# Patient Record
Sex: FEMALE | Race: WHITE | Employment: OTHER | URBAN - METROPOLITAN AREA
[De-identification: names, ages, dates, MRNs, and addresses within clinical notes are randomized per-mention and may not be internally consistent; named-entity substitution may affect disease eponyms.]

---

## 2017-02-09 ENCOUNTER — OFFICE VISIT (OUTPATIENT)
Dept: FAMILY MEDICINE CLINIC | Age: 82
End: 2017-02-09

## 2017-02-09 VITALS
SYSTOLIC BLOOD PRESSURE: 140 MMHG | TEMPERATURE: 98 F | RESPIRATION RATE: 20 BRPM | HEART RATE: 71 BPM | DIASTOLIC BLOOD PRESSURE: 63 MMHG | HEIGHT: 66 IN | BODY MASS INDEX: 30.05 KG/M2 | OXYGEN SATURATION: 93 % | WEIGHT: 187 LBS

## 2017-02-09 DIAGNOSIS — G89.4 CHRONIC PAIN SYNDROME: ICD-10-CM

## 2017-02-09 DIAGNOSIS — D68.9 COAGULATION DEFICIENCY (HCC): Primary | ICD-10-CM

## 2017-02-09 LAB
INR BLD: 2
PT POC: NORMAL SEC
VALID INTERNAL CONTROL?: YES

## 2017-02-09 RX ORDER — ACETAMINOPHEN AND CODEINE PHOSPHATE 300; 30 MG/1; MG/1
1 TABLET ORAL
Qty: 120 TAB | Refills: 2 | Status: SHIPPED | OUTPATIENT
Start: 2017-02-09 | End: 2017-04-25 | Stop reason: SDUPTHER

## 2017-02-09 NOTE — PROGRESS NOTES
Patient here for inr check. 3 mg coumadin daily. 1. Have you been to the ER, urgent care clinic since your last visit? Hospitalized since your last visit? No    2. Have you seen or consulted any other health care providers outside of the [address] [place] [address] since your last visit? Include any pap smears or colon screening. No     Results for orders placed or performed in visit on 02/09/17   AMB POC PT/INR   Result Value Ref Range    VALID INTERNAL CONTROL POC Yes     Prothrombin time (POC)  sec    INR POC 2.0      Chief Complaint   Patient presents with    Coagulation disorder     she is a 80y.o. year old female who presents for evalution. Reviewed PmHx, RxHx, FmHx, SocHx, AllgHx and updated and dated in the chart. Patient Active Problem List    Diagnosis    Iron deficiency anemia    Advance care planning     Has a LW      Pulmonary embol[facility] ([facility]    Itching    Elevated liver enzymes    S/P cholecystectomy     11/2013      S/P knee replacement     bilaterally      S/P shoulder surgery     Left      H/O carpal tunnel repair     Right hand      Chronic pain    HTN (hypertension)    OA (osteoarthritis)    Fibromyalgia    GERD (gastroesophageal reflux disease)    High cholesterol    Hiatal hernia       Review of Systems - negative except as listed above in the HPI    Objective:     Vitals:    02/09/17 0733   BP: 140/63   Pulse: 71   Resp: 20   Temp: 98 °F (36.7 °C)   SpO2: 93%   Weight: 187 lb (84.8 kg)   Height: 5' 6\" (1.676 m)     Physical Examination: General appearance - alert, well appearing, and in no distress  Chest - clear to auscultation, no wheezes, rales or rhonchi, symmetric air entry  Heart - normal rate, regular rhythm, normal S1, S2, no murmurs, rubs, clicks or gallops    Assessment/ Plan:   Tray was seen today for coagulation disorder.     Diagnoses and all orders for this visit:    Coagulation deficiency ([facility]  -     AMB POC PT/INR  -at goal       Follow-up Disposition:  Return in about 6 weeks (around 3/23/2017). I have discussed the diagnosis with the patient and the intended plan as seen in the above orders. The patient understands and agrees with the plan. The patient has received an after-visit summary and questions were answered concerning future plans. Medication Side Effects and Warnings were discussed with patient  Patient Labs were reviewed and or requested:  Patient Past Records were reviewed and or requested    Son Ding M.D. There are no Patient Instructions on file for this visit.

## 2017-02-09 NOTE — MR AVS SNAPSHOT
Visit Information Date & Time Provider Department Dept. Phone Encounter #  
 2/9/2017  7:45 AM Amanda Mak MD [address] [facility] [phone] [id number] Follow-up Instructions Return in about 6 weeks (around 3/23/2017). Your Appointments 5/9/2017  7:45 AM  
ESTABLISHED PATIENT with Amanda Mak MD  
[address] [facility] Appt Note: 3mo fuv  
 [address] [address]  
[phone]  
  
   
 [address] [address] Upcoming Health Maintenance Date Due  
 MEDICARE YEARLY EXAM 4/14/2017 GLAUCOMA SCREENING Q2Y 11/9/2018 DTaP/Tdap/Td series (2 - Td) 11/9/2026 Allergies as of 2/9/2017  Review Complete On: 2/9/2017 By: Amanda Mak MD  
  
 Severity Noted Reaction Type Reactions Angiotensin Ii,human  04/01/2010    Other (comments) States does not remember Avelox [Moxifloxacin]  04/01/2010    Other (comments) States does not remember Demerol [Meperidine]  04/01/2010    Hives Current Immunizations  Reviewed on 11/9/2016 Name Date Influenza High Dose Vaccine PF 11/9/2016 Influenza Vaccine 10/29/2013 Influenza Vaccine Nelida Savory) 10/20/2015 Influenza Vaccine Split 11/7/2012, 10/11/2011, 11/16/2010 Influenza Vaccine Whole 8/1/2009 Pneumococcal Conjugate (PCV-13) 4/13/2016 Pneumococcal Vaccine (Unspecified Type) 4/1/2008 Not reviewed this visit You Were Diagnosed With   
  
 Codes Comments Co[facility]    -  Primary ICD-10-CM: D69.9 ICD-9-CM: 286. 9 Chronic pain syndrome     ICD-10-CM: G89.4 ICD-9-CM: 338. 4 Vitals BP Pulse Temp Resp Height(growth percentile) Weight(growth percentile) 140/63 71 98 °F (36.7 °C) 20 5' 6\" (1.676 m) 187 lb (84.8 kg) SpO2 BMI OB Status Smoking Status 93% 30.18 kg/m2 Postmenopausal Never Smoker Vitals History BMI and BSA Data Body Mass Index Body Surface Area 30.18 kg/m 2 1.99 m 2 Preferred Pharmacy Pharmacy Name Phone [id number] [address] [facility] [place] [address] [phone] Your Updated Medication List  
  
   
This list is accurate as of: 2/9/17  8:10 AM.  Always use your most recent med list.  
  
  
  
  
 acetaminophen-codeine 300-30 mg per tablet Commonly known as:  TYLENOL #3 Take 1 Tab by mouth every four (4) hours as needed for Pain. Max Daily Amount: 6 Tabs. Indications: Pain  
  
 amLODIPine 10 mg tablet Commonly known as:  Wilkes Cater TAKE 1 TABLET BY MOUTH DAILY  
  
 citalopram 20 mg tablet Commonly known as:  CELEXA  
TAKE 1 TABLET BY MOUTH EVERY DAY  
  
 cyclobenzaprine 5 mg tablet Commonly known as:  FLEXERIL  
TAKE 1 TABLET BY MOUTH 3 TIMES A DAY AS NEEDED FORMUSCLE SPASM(S)  
  
 diclofenac EC 75 mg EC tablet Commonly known as:  VOLTAREN  
TAKE 1 TABLET BY MOUTH TWICE A DAY FOR 14 DAYS  
  
 gabapentin 600 mg tablet Commonly known as:  NEURONTIN  
TAKE 1 TABLET BY MOUTH 3 TIMES A DAY  
  
 iron polysaccharides 150 mg iron capsule Commonly known as:  Gregory Bloch Take 1 Cap by mouth two (2) times a day. losartan-hydroCHLOROthiazide 100-25 mg per tablet Commonly known as:  HYZAAR  
TAKE 1 TABLET BY MOUTH DAILY  
  
 warfarin 3 mg tablet Commonly known as:  COUMADIN Take 1 Tab by mouth daily. Prescriptions Printed Refills  
 acetaminophen-codeine (TYLENOL #3) 300-30 mg per tablet 2 Sig: Take 1 Tab by mouth every four (4) hours as needed for Pain. Max Daily Amount: 6 Tabs. Indications: Pain Class: Print Route: Oral  
  
We Performed the Following AMB POC PT/INR [08164 CPT(R)] Follow-up Instructions Return in about 6 weeks (around 3/23/2017). Introducing [facility]! Roxanna Rangel introduces [employer] patient portal. Now you can access parts of your medical record, email your doctor's office, and request medication refills online. 1. In your internet browser, go to [email or web address] 2. Click on the First Time User? Click Here link in the Sign In box. You will see the New Member Sign Up page. 3. Enter your [employer] Access Code exactly as it appears below. You will not need to use this code after youve completed the sign-up process. If you do not sign up before the expiration date, you must request a new code. · [employer] Access Code: [email or web address] Expires: 5/10/2017  8:10 AM 
 
4. Enter the last four digits of your Social Security Number (xxxx) and Date of Birth (mm/dd/yyyy) as indicated and click Submit. You will be taken to the next sign-up page. 5. Create a Happ[employer]hop ID. This will be your Happ[employer] login ID and cannot be changed, so think of one that is secure and easy to remember. 6. Create a Happ[employer] password. You can change your password at any time. 7. Enter your Password Reset Question and Answer. This can be used at a later time if you forget your password. 8. Enter your e-mail address. You will receive e-mail notification when new information is available in [address] 9. Click Sign Up. You can now view and download portions of your medical record. 10. Click the Download Summary menu link to download a portable copy of your medical information. If you have questions, please visit the Frequently Asked Questions section of the Happ[employer] website. Remember, [employer] is NOT to be used for urgent needs. For medical emergencies, dial 911. Now available from your iPhone and Android! Please provide this summary of care documentation to your next provider. Your primary care clinician is listed as LAUREL SINGH. If you have any questions after today's visit, please call [phone].

## 2017-02-13 DIAGNOSIS — D68.9 COAGULATION DEFICIENCY (HCC): ICD-10-CM

## 2017-02-13 RX ORDER — WARFARIN SODIUM 3 MG/1
TABLET ORAL
Qty: 30 TAB | Refills: 5 | Status: SHIPPED | OUTPATIENT
Start: 2017-02-13 | End: 2017-08-15 | Stop reason: SDUPTHER

## 2017-02-28 RX ORDER — AMLODIPINE BESYLATE 10 MG/1
TABLET ORAL
Qty: 90 TAB | Refills: 3 | Status: SHIPPED | OUTPATIENT
Start: 2017-02-28 | End: 2018-02-27 | Stop reason: SDUPTHER

## 2017-02-28 RX ORDER — LOSARTAN POTASSIUM AND HYDROCHLOROTHIAZIDE 25; 100 MG/1; MG/1
TABLET ORAL
Qty: 90 TAB | Refills: 3 | Status: SHIPPED | OUTPATIENT
Start: 2017-02-28 | End: 2018-02-27 | Stop reason: SDUPTHER

## 2017-02-28 RX ORDER — CITALOPRAM 20 MG/1
TABLET, FILM COATED ORAL
Qty: 90 TAB | Refills: 3 | Status: SHIPPED | OUTPATIENT
Start: 2017-02-28 | End: 2018-02-27 | Stop reason: SDUPTHER

## 2017-03-01 RX ORDER — GABAPENTIN 600 MG/1
TABLET ORAL
Qty: 270 TAB | Refills: 3 | Status: SHIPPED | OUTPATIENT
Start: 2017-03-01 | End: 2018-02-27 | Stop reason: SDUPTHER

## 2017-03-07 RX ORDER — DICLOFENAC SODIUM 75 MG/1
TABLET, DELAYED RELEASE ORAL
Qty: 60 TAB | Refills: 2 | Status: SHIPPED | OUTPATIENT
Start: 2017-03-07 | End: 2017-03-23

## 2017-03-07 RX ORDER — CYCLOBENZAPRINE HCL 5 MG
TABLET ORAL
Qty: 90 TAB | Refills: 3 | Status: SHIPPED | OUTPATIENT
Start: 2017-03-07 | End: 2018-03-21 | Stop reason: SDUPTHER

## 2017-03-23 ENCOUNTER — OFFICE VISIT (OUTPATIENT)
Dept: FAMILY MEDICINE CLINIC | Age: 82
End: 2017-03-23

## 2017-03-23 VITALS
RESPIRATION RATE: 20 BRPM | WEIGHT: 187 LBS | HEART RATE: 63 BPM | SYSTOLIC BLOOD PRESSURE: 119 MMHG | OXYGEN SATURATION: 94 % | HEIGHT: 66 IN | DIASTOLIC BLOOD PRESSURE: 69 MMHG | TEMPERATURE: 98 F | BODY MASS INDEX: 30.05 KG/M2

## 2017-03-23 DIAGNOSIS — M19.90 OSTEOARTHRITIS, UNSPECIFIED OSTEOARTHRITIS TYPE, UNSPECIFIED SITE: Chronic | ICD-10-CM

## 2017-03-23 DIAGNOSIS — D68.9 COAGULATION DEFICIENCY (HCC): Primary | ICD-10-CM

## 2017-03-23 DIAGNOSIS — G89.4 CHRONIC PAIN SYNDROME: ICD-10-CM

## 2017-03-23 DIAGNOSIS — I10 ESSENTIAL HYPERTENSION: Chronic | ICD-10-CM

## 2017-03-23 DIAGNOSIS — M79.7 FIBROMYALGIA: Chronic | ICD-10-CM

## 2017-03-23 LAB
INR BLD: 1.9
PT POC: NORMAL SEC
VALID INTERNAL CONTROL?: YES

## 2017-03-23 RX ORDER — NAPROXEN 500 MG/1
500 TABLET ORAL 2 TIMES DAILY WITH MEALS
Qty: 60 TAB | Refills: 5 | Status: SHIPPED | OUTPATIENT
Start: 2017-03-23 | End: 2017-10-16 | Stop reason: ALTCHOICE

## 2017-03-23 NOTE — PROGRESS NOTES
Patient here for inr check. 3 mg warfarin daily. 1. Have you been to the ER, urgent care clinic since your last visit? Hospitalized since your last visit? No    2. Have you seen or consulted any other health care providers outside of the [facility] since your last visit? Include any pap smears or colon screening. No     Results for orders placed or performed in visit on 03/23/17   AMB POC PT/INR   Result Value Ref Range    VALID INTERNAL CONTROL POC Yes     Prothrombin time (POC)  sec    INR POC 1.9    still with inc diffuse WINDY and wants to try diff nsaid    No chief complaint on file. she is a 80y.o. year old female who presents for evalution. Reviewed PmHx, RxHx, FmHx, SocHx, AllgHx and updated and dated in the chart. Patient Active Problem List    Diagnosis    Iron deficiency anemia    Advance care planning     Has a LW      Pulmonary embolism ([facility]    Itching    Elevated liver enzymes    S/P cholecystectomy     11/2013      S/P knee replacement     bilaterally      S/P shoulder surgery     Left      H/O carpal tunnel repair     Right hand      Chronic pain    HTN (hypertension)    OA (osteoarthritis)    Fibromyalgia    GERD (gastroesophageal reflux disease)    High cholesterol    Hiatal hernia       Review of Systems - negative except as listed above in the HPI    Objective:     Vitals:    03/23/17 0717   BP: 119/69   Pulse: 63   Resp: 20   Temp: 98 °F (36.7 °C)   SpO2: 94%   Weight: 187 lb (84.8 kg)   Height: 5' 6\" (1.676 m)     Physical Examination: General appearance - alert, well appearing, and in no distress      Assessment/ Plan:   Diagnoses and all orders for this visit:    Coagulation deficiency (HCC)  -     AMB POC PT/INR  -at goal    Essential hypertension  -at goal    Osteoarthritis, unspecified osteoarthritis type, unspecified site  -     naproxen (NAPROSYN) 500 mg tablet; Take 1 Tab by mouth two (2) times daily (with meals).   -dc voltaren    Fibromyalgia  -inc pain    Chronic pain syndrome  -pain contract signed today  - ok  -pt is 80 and UDS not indicated--chronic use and pt is using walker and known to MD     Follow-up Disposition:  Return in about 6 weeks (around 5/4/2017). I have discussed the diagnosis with the patient and the intended plan as seen in the above orders. The patient understands and agrees with the plan. The patient has received an after-visit summary and questions were answered concerning future plans. Medication Side Effects and Warnings were discussed with patient  Patient Labs were reviewed and or requested:  Patient Past Records were reviewed and or requested    Ortega Garcia M.D. There are no Patient Instructions on file for this visit.

## 2017-03-23 NOTE — LETTER
AGREEMENT for controlled medication treatment I, Tray Regan, have agreed to a course of treatment that includes taking controlled medication. For this treatment I designate _____________________________________ as the [facility] Provider.  The purpose of this agreement is to prevent misunderstandings about controlled medications I may be taking for treatment, and to comply with applicable laws. I understand this agreement is essential to the trust and confidence necessary in a provider-patient relationship and that the designated provider will treat me in accordance with the statements below. As part of my treatment I agree to the followin.  USE 
a. I will take the controlled medication as instructed by the designated provider and avoid improper use of controlled medications. b.   I will not share, sell or trade controlled medication with anyone as this is a criminal offense.  
c.   I will not use any illegal controlled substance, including marijuana, cocaine, etc. as this is a criminal offense. 2.  PROVIDERS 
a. I will only obtain controlled medication from the designated provider. b.   I will not attempt to obtain the same or similar controlled medications, such as opioid pain medication, stimulants, anti-anxiety or hypnotics from any other provider as this is a criminal offense. 
c.   I will inform the designated provider about all other licensed professionals providing medical care to me and authorize communication between all providers to coordinate care, particularly prescribing or dispensing of controlled medications. 3.  PHARMACY 
a.   I will only fill controlled medication prescriptions at the approved pharmacy as listed below. 4.  OFFICE VISITS 
a. I agree to attend scheduled office visit appointments. b.   I am aware my office visits may be monthly or as determined necessary by the designated provider. c.   I will communicate fully with the designated provider about the character and intensity of my symptoms, the effect of the symptoms on my daily life, and how well the controlled medication is helping to relieve the cause of my symptoms. 5.  REFILLS OR CALL-IN PRESCRIPTIONS OF CONTROLLED MEDICATIONS 
a. I agree that refills of my prescriptions for controlled medications will be made at the time of an office visit during regular office hours. No refills will be available during evenings or on weekends. Abusive or inappropriate behavior related to medication refills will not be tolerated. b.   I will not call the office repeatedly to inquire about my controlled medication. I understand that medications will be written on the due date and not before. Calling the office repeatedly will be considered harassment, and I may be discharged from the practice. c.   Controlled medications may not be called in for refill, but doing so is at the discretion of the designated provider. d.   I am aware that only I must  prescriptions for controlled medications at the office but the designated provider may allow a designee to  the prescription from the office under very specific circumstance that may develop. 6.  TOXICOLOGY SCREENING 
a. I agree to random urine toxicology screenings in order to comply with government and [address] [place] [address] regulations. I understand that I will be financially responsible for any charges incurred by this office, [facility] laboratory, [employer], or [facility] for the urine toxicology screening, which may not be covered by my insurance. Failure to do so will be considered non-compliance and I may be discharged. b. In some cases an oral swab or hair sample may be substituted for a urine screen. This is at the discretion of the designated provider. I understand that I will be financially responsible for any charges incurred as well. c.   I understand that if the urine toxicology does not show my medications prescribed to me by the designated provider, or it shows any illegal substance or any other medications NOT prescribed by the designated providers, I may be discharged from this practice at the discretion of the designated provider and no further controlled medications will be prescribed or follow-up appointments scheduled. Also, if any illegal substances are detected on the urine toxicology, this information may be provided to local law enforcement. 7. PILL COUNTS 
a. I am aware I may be called at any time to come into the office for a count of all my remaining controlled medications in order to help the designated provider understand the rate at which I use my controlled medications and to more effectively adjust dosage. b. I agree that I will use my medication at a rate NO greater than the prescribed rate and that use of my medication at a greater rate will result in my being without medication for the period of time until next expected due date. c. I will bring in the containers with the medication prescribed by all providers, including the designated provider, to each office visit even if there is no medication remaining. All controlled medication will be in the original containers from the pharmacy for each medication. Failure to do so will be considered non-compliance and I may be discharged from the practice. 8.  LOSS OR THEFT OF MEDICATION 
a. I will safeguard my controlled medication from loss or theft. b.   Lost or stolen controlled medication may not be replaced. This includes a prescription that has not yet been filled at the pharmacy. c.   In the event my controlled medications are stolen or lost, I will notify the designated providers office immediately.   If such event occurs during the night, weekend or holiday, I will leave a detailed message on the answering machine or answering service at the number listed above. 
d.   I will file and produce an official police report for any effort to replace controlled medications prescribed. 9.  AGENCY COLLABORATION I authorize the designated provider and the authorized pharmacy/pharmacist to cooperate fully with any city, state, or federal law enforcement agency in the investigation of any possible misuse, sale or other diversion of my controlled medication. 10.  TREATMENT I understand that if I violate any of the conditions, my controlled medication and/or treatment will be terminated. If the violation involves obtaining controlled substances and/or dangerous drugs from another source, the incident may be reported to other medical facilities and authorities, including law enforcement. In this case, the designated provider may taper off the medication over a period of several days, as necessary, to avoid withdrawal symptoms or will suggest alternate treatment facilities. Also, a drug dependence treatment program may be recommended. 11.  AGREEMENT I agree to follow these guidelines that have been fully explained to me. All of my questions and concerns regarding treatment have been adequately answered. A copy of this document has been given to me. I agree to use ______________________________ Authorized Pharmacy located at _________________________________________________________________ Telephone ________________________________for filling ALL controlled medication prescriptions. This agreement is entered into on 3/23/2017 Patient signature__________________________________________________________ Legal Guardian signature___________________________________________________ Provider signature_________________________________________________________ Witness signature_________________________________________________________

## 2017-03-23 NOTE — MR AVS SNAPSHOT
Visit Information Date & Time Provider Department Dept. Phone Encounter #  
 3/23/2017  7:30 AM Lesly Knight MD [address] [facility] [phone] [id number] Follow-up Instructions Return in about 6 weeks (around 5/4/2017). Your Appointments 5/9/2017  7:45 AM  
ESTABLISHED PATIENT with Lesly Knight MD  
[address] [facility]) Appt Note: 3mo fuv  
 [address] [address] [phone]  
  
   
 [address] [address] [address] Upcoming Health Maintenance Date Due  
 MEDICARE YEARLY EXAM 4/14/2017 GLAUCOMA SCREENING Q2Y 11/9/2018 DTaP/Tdap/Td series (2 - Td) 11/9/2026 Allergies as of 3/23/2017  Review Complete On: 3/23/2017 By: Lesly Knight MD  
  
 Severity Noted Reaction Type Reactions Angiotensin Ii,human  04/01/2010    Other (comments) States does not remember Avelox [Moxifloxacin]  04/01/2010    Other (comments) States does not remember Demerol [Meperidine]  04/01/2010    Hives Current Immunizations  Reviewed on 11/9/2016 Name Date Influenza High Dose Vaccine PF 11/9/2016 Influenza Vaccine 10/29/2013 Influenza Vaccine Naheed Miu) 10/20/2015 Influenza Vaccine Split 11/7/2012, 10/11/2011, 11/16/2010 Influenza Vaccine Whole 8/1/2009 Pneumococcal Conjugate (PCV-13) 4/13/2016 Pneumococcal Vaccine (Unspecified Type) 4/1/2008 Not reviewed this visit You Were Diagnosed With   
  
 Codes Comments [facility]    -  Primary ICD-10-CM: D69.9 ICD-9-CM: 286.9 Essential hypertension     ICD-10-CM: I10 
ICD-9-CM: 401.9 Osteoarthritis, unspecified osteoarthritis type, unspecified site     ICD-10-CM: M19.90 ICD-9-CM: 715.90 Fibromyalgia     ICD-10-CM: M79.7 ICD-9-CM: 729.1 Chronic pain syndrome     ICD-10-CM: G89.4 ICD-9-CM: 338. 4 Vitals BP Pulse Temp Resp Height(growth percentile) Weight(growth percentile) 119/69 63 98 °F (36.7 °C) 20 5' 6\" (1.676 m) 187 lb (84.8 kg) SpO2 BMI OB Status Smoking Status 94% 30.18 kg/m2 Postmenopausal Never Smoker Vitals History BMI and BSA Data Body Mass Index Body Surface Area  
 30.18 kg/m 2 1.99 m 2 Preferred Pharmacy Pharmacy Name Phone [facility] # Adalberto Hyatt [address] [place] [address] [phone] Your Updated Medication List  
  
   
This list is accurate as of: 3/23/17  7:57 AM.  Always use your most recent med list.  
  
  
  
  
 acetaminophen-codeine 300-30 mg per tablet Commonly known as:  TYLENOL #3 Take 1 Tab by mouth every four (4) hours as needed for Pain. Max Daily Amount: 6 Tabs. Indications: Pain  
  
 amLODIPine 10 mg tablet Commonly known as:  Rondall Andriy TAKE 1 TABLET BY MOUTH DAILY  
  
 citalopram 20 mg tablet Commonly known as:  CELEXA  
TAKE 1 TABLET BY MOUTH DAILY  
  
 cyclobenzaprine 5 mg tablet Commonly known as:  FLEXERIL  
TAKE 1 TABLET BY MOUTH 3 TIMES A DAY AS NEEDED FORMUSCLE SPASM(S)  
  
 gabapentin 600 mg tablet Commonly known as:  NEURONTIN  
TAKE 1 TABLET BY MOUTH 3 TIMES A DAY  
  
 iron polysaccharides 150 mg iron capsule Commonly known as:  Manoj [place] Take 1 Cap by mouth two (2) times a day. JANTOVEN 3 mg tablet Generic drug:  warfarin TAKE 1 TABLET BY MOUTH DAILY losartan-hydroCHLOROthiazide 100-25 mg per tablet Commonly known as:  HYZAAR  
TAKE 1 TABLET BY MOUTH DAILY  
  
 naproxen 500 mg tablet Commonly known as:  NAPROSYN Take 1 Tab by mouth two (2) times daily (with meals). Prescriptions Sent to Pharmacy Refills  
 naproxen (NAPROSYN) 500 mg tablet 5 Sig: Take 1 Tab by mouth two (2) times daily (with meals). Class: Normal  
 Pharmacy: [place] [address] [id number] [place] [address], [address] Ph #: [phone] Route: Oral  
  
We Performed the Following AMB POC PT/INR [57569 CPT(R)] Follow-up Instructions Return in about 6 weeks (around 5/4/2017). Introducing [facility]! Madhavi Salas introduces [employer] patient portal. Now you can access parts of your medical record, email your doctor's office, and request medication refills online. 1. In your internet browser, go to [email or web address] 2. Click on the First Time User? Click Here link in the Sign In box. You will see the New Member Sign Up page. 3. Enter your [employer] Access Code exactly as it appears below. You will not need to use this code after youve completed the sign-up process. If you do not sign up before the expiration date, you must request a new code. · [employer] Access Code: [email or web address] Expires: 5/10/2017  9:10 AM 
 
4. Enter the last four digits of your Social Security Number (xxxx) and Date of Birth (mm/dd/yyyy) as indicated and click Submit. You will be taken to the next sign-up page. 5. Create a [employer] ID. This will be your [employer] login ID and cannot be changed, so think of one that is secure and easy to remember. 6. Create a [employer] password. You can change your password at any time. 7. Enter your Password Reset Question and Answer. This can be used at a later time if you forget your password. 8. Enter your e-mail address. You will receive e-mail notification when new information is available in [address] 9. Click Sign Up. You can now view and download portions of your medical record. 10. Click the Download Summary menu link to download a portable copy of your medical information. If you have questions, please visit the Frequently Asked Questions section of the [employer] website. Remember, [employer] is NOT to be used for urgent needs. For medical emergencies, dial 911. Now available from your iPhone and Android! Please provide this summary of care documentation to your next provider. Your primary care clinician is listed as LAUREL SINGH.  If you have any questions after today's visit, please call [phone].

## 2017-04-24 ENCOUNTER — TELEPHONE (OUTPATIENT)
Dept: FAMILY MEDICINE CLINIC | Age: 82
End: 2017-04-24

## 2017-04-24 NOTE — TELEPHONE ENCOUNTER
Pt scheduled appt for tomorrow with you, wondering if you could call something into the pharmacy they are thinking she has the shingles. Thanks. Call back number for her is [phone].

## 2017-04-25 ENCOUNTER — OFFICE VISIT (OUTPATIENT)
Dept: FAMILY MEDICINE CLINIC | Age: 82
End: 2017-04-25

## 2017-04-25 VITALS
WEIGHT: 188.8 LBS | SYSTOLIC BLOOD PRESSURE: 129 MMHG | RESPIRATION RATE: 18 BRPM | DIASTOLIC BLOOD PRESSURE: 66 MMHG | BODY MASS INDEX: 30.34 KG/M2 | HEIGHT: 66 IN | TEMPERATURE: 98.3 F | HEART RATE: 64 BPM | OXYGEN SATURATION: 94 %

## 2017-04-25 DIAGNOSIS — I10 ESSENTIAL HYPERTENSION: Chronic | ICD-10-CM

## 2017-04-25 DIAGNOSIS — R21 RASH: Primary | ICD-10-CM

## 2017-04-25 DIAGNOSIS — G89.4 CHRONIC PAIN SYNDROME: ICD-10-CM

## 2017-04-25 RX ORDER — NALOXONE HYDROCHLORIDE 4 MG/.1ML
1 SPRAY NASAL AS NEEDED
Qty: 1 BOTTLE | Refills: 2 | Status: SHIPPED | OUTPATIENT
Start: 2017-04-25 | End: 2017-04-25 | Stop reason: SDUPTHER

## 2017-04-25 RX ORDER — NALOXONE HYDROCHLORIDE 4 MG/.1ML
1 SPRAY NASAL AS NEEDED
Qty: 1 BOTTLE | Refills: 2 | Status: SHIPPED | OUTPATIENT
Start: 2017-04-25 | End: 2018-05-30

## 2017-04-25 RX ORDER — ACETAMINOPHEN AND CODEINE PHOSPHATE 300; 30 MG/1; MG/1
1 TABLET ORAL
Qty: 120 TAB | Refills: 2 | Status: SHIPPED | OUTPATIENT
Start: 2017-04-25 | End: 2017-07-20 | Stop reason: SDUPTHER

## 2017-04-25 NOTE — MR AVS SNAPSHOT
Visit Information Date & Time Provider Department Dept. Phone Encounter #  
 4/25/2017  7:45 AM Barbara Knight MD [address] [facility] [phone] [id number] Follow-up Instructions Return in about 3 months (around 7/25/2017). Your Appointments 5/9/2017  7:45 AM  
ESTABLISHED PATIENT with Barbara Knight MD  
[address] [facility]) Appt Note: 3mo fuv  
 [address] [address]  
[phone]  
  
   
 [address] [address] Upcoming Health Maintenance Date Due  
 MEDICARE YEARLY EXAM 4/14/2017 GLAUCOMA SCREENING Q2Y 11/9/2018 DTaP/Tdap/Td series (2 - Td) 11/9/2026 Allergies as of 4/25/2017  Review Complete On: 4/25/2017 By: Barbara Knight MD  
  
 Severity Noted Reaction Type Reactions Angiotensin Ii,human  04/01/2010    Other (comments) States does not remember Avelox [Moxifloxacin]  04/01/2010    Other (comments) States does not remember Demerol [Meperidine]  04/01/2010    Hives Current Immunizations  Reviewed on 11/9/2016 Name Date Influenza High Dose Vaccine PF 11/9/2016 Influenza Vaccine 10/29/2013 Influenza Vaccine Otila Eloy) 10/20/2015 Influenza Vaccine Split 11/7/2012, 10/11/2011, 11/16/2010 Influenza Vaccine Whole 8/1/2009 Pneumococcal Conjugate (PCV-13) 4/13/2016 Pneumococcal Vaccine (Unspecified Type) 4/1/2008 Not reviewed this visit You Were Diagnosed With   
  
 Codes Comments Rash    -  Primary ICD-10-CM: R21 
ICD-9-CM: 782.1 Essential hypertension     ICD-10-CM: I10 
ICD-9-CM: 401.9 Chronic pain syndrome     ICD-10-CM: G89.4 ICD-9-CM: 338. 4 Vitals BP Pulse Temp Resp Height(growth percentile) Weight(growth percentile) 129/66 64 98.3 °F (36.8 °C) 18 5' 6\" (1.676 m) 188 lb 12.8 oz (85.6 kg) SpO2 BMI OB Status Smoking Status 94% 30.47 kg/m2 Postmenopausal Never Smoker Vitals History BMI and BSA Data Body Mass Index Body Surface Area  
 30.47 kg/m 2 2 m 2 Preferred Pharmacy Pharmacy Name Phone [id number] [address] [place] [address] [phone] Your Updated Medication List  
  
   
This list is accurate as of: 4/25/17  7:47 AM.  Always use your most recent med list.  
  
  
  
  
 acetaminophen-codeine 300-30 mg per tablet Commonly known as:  TYLENOL #3 Take 1 Tab by mouth every four (4) hours as needed for Pain. Max Daily Amount: 6 Tabs. Indications: Pain  
  
 amLODIPine 10 mg tablet Commonly known as:  [place] TAKE 1 TABLET BY MOUTH DAILY  
  
 citalopram 20 mg tablet Commonly known as:  CELEXA  
TAKE 1 TABLET BY MOUTH DAILY  
  
 cyclobenzaprine 5 mg tablet Commonly known as:  FLEXERIL  
TAKE 1 TABLET BY MOUTH 3 TIMES A DAY AS NEEDED FORMUSCLE SPASM(S)  
  
 gabapentin 600 mg tablet Commonly known as:  NEURONTIN  
TAKE 1 TABLET BY MOUTH 3 TIMES A DAY  
  
 iron polysaccharides 150 mg iron capsule Commonly known as:  Burnard Running Take 1 Cap by mouth two (2) times a day. JANTOVEN 3 mg tablet Generic drug:  warfarin TAKE 1 TABLET BY MOUTH DAILY losartan-hydroCHLOROthiazide 100-25 mg per tablet Commonly known as:  HYZAAR  
TAKE 1 TABLET BY MOUTH DAILY  
  
 methylPREDNISolone (PF) 125 mg/2 mL Solr Commonly known as:  SOLU-MEDROL 2 mL by IntraVENous route once for 1 dose.  
  
 naloxone 4 mg/actuation Spry Commonly known as:  NARCAN  
1 Spray by Nasal route as needed. Indications: OPIATE-INDUCED RESPIRATORY DEPRESSION  
  
 naproxen 500 mg tablet Commonly known as:  NAPROSYN Take 1 Tab by mouth two (2) times daily (with meals). Prescriptions Printed Refills  
 acetaminophen-codeine (TYLENOL #3) 300-30 mg per tablet 2 Sig: Take 1 Tab by mouth every four (4) hours as needed for Pain. Max Daily Amount: 6 Tabs. Indications: Pain Class: Print  Route: Oral  
  
 Prescriptions Sent to Pharmacy Refills  
 naloxone (NARCAN) 4 mg/actuation spry 2 Si Spray by Nasal route as needed. Indications: OPIATE-INDUCED RESPIRATORY DEPRESSION Class: Normal  
 Pharmacy: [place] [address] [id number] [place] [address], [address]  #: [phone] Route: Nasal  
  
We Performed the Following METHYLPREDNISOLONE INJECTION [ HCPCS] CA THER/PROPH/DIAG INJECTION, SUBCUT/IM [id number] CPT(R)] Follow-up Instructions Return in about 3 months (around 2017). Introducing [facility]! [facility] introduces [employer] patient portal. Now you can access parts of your medical record, email your doctor's office, and request medication refills online. 1. In your internet browser, go to [email or web address] 2. Click on the First Time User? Click Here link in the Sign In box. You will see the New Member Sign Up page. 3. Enter your [employer] Access Code exactly as it appears below. You will not need to use this code after youve completed the sign-up process. If you do not sign up before the expiration date, you must request a new code. · [employer] Access Code: [email or web address] Expires: 5/10/2017  9:10 AM 
 
4. Enter the last four digits of your Social Security Number (xxxx) and Date of Birth (mm/dd/yyyy) as indicated and click Submit. You will be taken to the next sign-up page. 5. Create a [employer] ID. This will be your [employer] login ID and cannot be changed, so think of one that is secure and easy to remember. 6. Create a [employer] password. You can change your password at any time. 7. Enter your Password Reset Question and Answer. This can be used at a later time if you forget your password. 8. Enter your e-mail address. You will receive e-mail notification when new information is available in [address] 9. Click Sign Up. You can now view and download portions of your medical record. 10. Click the Download Summary menu link to download a portable copy of your medical information. If you have questions, please visit the Frequently Asked Questions section of the [employer] website. Remember, [employer] is NOT to be used for urgent needs. For medical emergencies, dial 911. Now available from your iPhone and Android! Please provide this summary of care documentation to your next provider. Your primary care clinician is listed as LAUREL SINGH. If you have any questions after today's visit, please call [phone].

## 2017-04-25 NOTE — PROGRESS NOTES
Patient here for rash. Mostly on buttocks and spreading on back. Patient states it is painful. 9/10    1. Have you been to the ER, urgent care clinic since your last visit? Hospitalized since your last visit? No    2. Have you seen or consulted any other health care providers outside of the [address] [place] [address] since your last visit? Include any pap smears or colon screening. No       Chief Complaint   Patient presents with    Rash     buttocks, spreading up back     she is a 80y.o. year old female who presents for evalution. Reviewed PmHx, RxHx, FmHx, SocHx, AllgHx and updated and dated in the chart. Patient Active Problem List    Diagnosis    Iron deficiency anemia    Advance care planning     Has a LW      Pulmonary embolism ([facility]    Itching    Elevated liver enzymes    S/P cholecystectomy     11/2013      S/P knee replacement     bilaterally      S/P shoulder surgery     Left      H/O carpal tunnel repair     Right hand      Chronic pain    HTN (hypertension)    OA (osteoarthritis)    Fibromyalgia    GERD (gastroesophageal reflux disease)    High cholesterol    Hiatal hernia       Review of Systems - negative except as listed above in the HPI    Objective:     Vitals:    04/25/17 0724   BP: 129/66   Pulse: 64   Resp: 18   Temp: 98.3 °F (36.8 °C)   SpO2: 94%   Weight: 188 lb 12.8 oz (85.6 kg)   Height: 5' 6\" (1.676 m)     Physical Examination: General appearance - alert, well appearing, and in no distress  Skin - buttocks with red rash, diffuse    Assessment/ Plan:   Tray was seen today for rash. Diagnoses and all orders for this visit:    Rash  -     methylPREDNISolone, PF, (SOLU-MEDROL) 125 mg/2 mL solr; 2 mL by IntraVENous route once for 1 dose.   -     METHYLPREDNISOLONE INJECTION (Qty 2)  -     THER/PROPH/DIAG INJECTION, SUBCUT/IM  -use steroid cream    Essential hypertension  -at goal    Chronic pain syndrome  -     naloxone (NARCAN) 4 mg/actuation spry; 1 Spray by Nasal route as needed. Indications: OPIATE-INDUCED RESPIRATORY DEPRESSION  -     acetaminophen-codeine (TYLENOL #3) 300-30 mg per tablet; Take 1 Tab by mouth every four (4) hours as needed for Pain. Max Daily Amount: 6 Tabs. Indications: Pain  -   Opioid/Pain Management:    1. Has the patient signed a pain contract for chronic narcotics use? yes  2. Has the patient had a UDS or Serum screen as per guidelines as per [place] Board of Medicine?: well known pt and chronic ongoing issues   3. Calculated Morphine Milligram Equivalent (MME)=yes  4. Does patient meet necessary guidelines for Naloxone treatement per the [facility]?:    yes  5. Has the Prescription Monitoring Program been reviewed? yes             Follow-up Disposition:  Return in about 3 months (around 7/25/2017). I have discussed the diagnosis with the patient and the intended plan as seen in the above orders. The patient understands and agrees with the plan. The patient has received an after-visit summary and questions were answered concerning future plans. Medication Side Effects and Warnings were discussed with patient  Patient Labs were reviewed and or requested:  Patient Past Records were reviewed and or requested    Jt Horton M.D. There are no Patient Instructions on file for this visit.

## 2017-05-09 ENCOUNTER — OFFICE VISIT (OUTPATIENT)
Dept: FAMILY MEDICINE CLINIC | Age: 82
End: 2017-05-09

## 2017-05-09 ENCOUNTER — HOSPITAL ENCOUNTER (OUTPATIENT)
Dept: LAB | Age: 82
Discharge: HOME | End: 2017-05-09
Payer: MEDICARE

## 2017-05-09 VITALS
HEART RATE: 66 BPM | BODY MASS INDEX: 29.62 KG/M2 | TEMPERATURE: 98.7 F | HEIGHT: 66 IN | DIASTOLIC BLOOD PRESSURE: 66 MMHG | SYSTOLIC BLOOD PRESSURE: 135 MMHG | OXYGEN SATURATION: 97 % | RESPIRATION RATE: 18 BRPM | WEIGHT: 184.31 LBS

## 2017-05-09 DIAGNOSIS — E78.00 HIGH CHOLESTEROL: Chronic | ICD-10-CM

## 2017-05-09 DIAGNOSIS — G89.4 CHRONIC PAIN SYNDROME: ICD-10-CM

## 2017-05-09 DIAGNOSIS — I26.99 OTHER PULMONARY EMBOLISM WITHOUT ACUTE COR PULMONALE, UNSPECIFIED CHRONICITY (HCC): ICD-10-CM

## 2017-05-09 DIAGNOSIS — I10 ESSENTIAL HYPERTENSION: Chronic | ICD-10-CM

## 2017-05-09 DIAGNOSIS — D50.9 IRON DEFICIENCY ANEMIA, UNSPECIFIED IRON DEFICIENCY ANEMIA TYPE: ICD-10-CM

## 2017-05-09 DIAGNOSIS — Z00.00 ROUTINE GENERAL MEDICAL EXAMINATION AT A HEALTH CARE FACILITY: Primary | ICD-10-CM

## 2017-05-09 DIAGNOSIS — Z71.89 ADVANCED CARE PLANNING/COUNSELING DISCUSSION: ICD-10-CM

## 2017-05-09 DIAGNOSIS — E11.9 CONTROLLED TYPE 2 DIABETES MELLITUS WITHOUT COMPLICATION, WITHOUT LONG-TERM CURRENT USE OF INSULIN (HCC): ICD-10-CM

## 2017-05-09 LAB
INR BLD: 2.3
PT POC: 27.3 SEC
VALID INTERNAL CONTROL?: YES

## 2017-05-09 PROCEDURE — 82043 UR ALBUMIN QUANTITATIVE: CPT

## 2017-05-09 PROCEDURE — 80061 LIPID PANEL: CPT

## 2017-05-09 PROCEDURE — 83550 IRON BINDING TEST: CPT

## 2017-05-09 PROCEDURE — 83036 HEMOGLOBIN GLYCOSYLATED A1C: CPT

## 2017-05-09 PROCEDURE — 85025 COMPLETE CBC W/AUTO DIFF WBC: CPT

## 2017-05-09 PROCEDURE — 80053 COMPREHEN METABOLIC PANEL: CPT

## 2017-05-09 RX ORDER — WARFARIN 3 MG/1
3 TABLET ORAL DAILY
COMMUNITY
End: 2017-08-10 | Stop reason: ALTCHOICE

## 2017-05-09 NOTE — PROGRESS NOTES
1. Have you been to the ER, urgent care clinic since your last visit? Hospitalized since your last visit? No    2. Have you seen or consulted any other health care providers outside of the [facility] since your last visit? Include any pap smears or colon screening. No   Chief Complaint   Patient presents with    Hypertension     3 mos fuv for hypertension    Labs     labs- pt is fasting          Medicare Wellness Exam:    Chief Complaint   Patient presents with    Hypertension     3 mos fuv for hypertension    Labs     labs- pt is fasting      she is a 80y.o. year old female who presents for evaluation for their Medicare Wellness Visit. Catalina Holms is completed and assessed=yes  Depression Screen is completed and assessed=yes  Medication list reviewed and adjusted for accuracy=yes  Immunizations reviewed and updated=yes  Health/Preventative Screenings reviewed and updated=yes  ADL Functions reviewed=yes    Patient Active Problem List    Diagnosis    Advanced care planning/counseling discussion     Has no completed, dwp importance      Controlled type 2 diabetes mellitus without complication, without long-term current use of insulin ([facility]    Iron deficiency anemia    Advance care planning     Has a LW      Pulmonary embolism ([facility]    Itching    Elevated liver enzymes    S/P cholecystectomy     11/2013      S/P knee replacement     bilaterally      S/P shoulder surgery     Left      H/O carpal tunnel repair     Right hand      Chronic pain    HTN (hypertension)    OA (osteoarthritis)    Fibromyalgia    GERD (gastroesophageal reflux disease)    High cholesterol    Hiatal hernia       Reviewed PmHx, RxHx, FmHx, SocHx, AllgHx and updated and dated in the chart.     Review of Systems - negative except as listed above in the HPI    Objective:     Vitals:    05/09/17 0736   BP: 135/66   Pulse: 66   Resp: 18   Temp: 98.7 °F (37.1 °C)   TempSrc: Oral   SpO2: 97%   Weight: 184 lb 5 oz (83.6 kg)   Height: 5' 6\" (1.676 m)     Physical Examination: General appearance - alert, well appearing, and in no distress  Chest - clear to auscultation, no wheezes, rales or rhonchi, symmetric air entry  Heart - normal rate, regular rhythm, normal S1, S2, no murmurs, rubs, clicks or gallops  Abdomen - soft, nontender, nondistended, no masses or organomegaly  Extremities - peripheral pulses normal, no pedal edema, no clubbing or cyanosis      Assessment/ Plan:   Tray was seen today for hypertension and labs.     Diagnoses and all orders for this visit:    Routine general medical examination at a health care facility  -see below    Controlled type 2 diabetes mellitus without complication, without long-term current use of insulin ([facility]  -     LIPID PANEL  -     METABOLIC PANEL, COMPREHENSIVE  -     MICROALBUMIN, UR, RAND W/ MICROALBUMIN/CREA RATIO  -     HEMOGLOBIN A1C WITH EAG  -     REFERRAL TO OPHTHALMOLOGY  =-new dx  -dwp diet    Other pulmonary embolism without acute cor pulmonale, unspecified chronicity (HCC)  -     AMB POC PT/INR    Essential hypertension  -     LIPID PANEL  -     METABOLIC PANEL, COMPREHENSIVE  -at goal    High cholesterol  -     LIPID PANEL  -     METABOLIC PANEL, COMPREHENSIVE    Iron deficiency anemia, unspecified iron deficiency anemia type  -     CBC WITH AUTOMATED DIFF  -     IRON PROFILE    Chronic pain syndrome  -stable    Advanced care planning/counseling discussion  -dwp need to complete         -Pain evaluation performed in office=5/10  -Cognitive Screen performed in office  -Depression Screen, Fall risks (by up and go test)  and ADL functionality were addressed  -Medication list updated and reviewed for any changes   -A comprehensive review of medical issues and a plan was formulated  -End of life planning was addressed with pt   -Health Screenings for preventions were addressed and a plan was formulated  -Shingles Vaccine was recommended  -Discussed with patient cancer risk factors and appropriate screenings for age  -Patient evaluated for colonoscopy and referred if needed per screeing criteria  -Labs from previous visits were discussed with patient   -Discussed with patient diet and exercise and formulated a plan as needed  -An Advanced care plan was developed with the patient.  -Alcohol screening performed and was negative    -Follow-up Disposition:  Return in about 6 weeks (around 6/20/2017). I have discussed the diagnosis with the patient and the intended plan as seen in the above orders. The patient understands and agrees with the plan. The patient has received an after-visit summary and questions were answered concerning future plans. Medication Side Effects and Warnings were discussed with patien  Patient Labs were reviewed and or requested  Patient Past Records were reviewed and or requested    There are no Patient Instructions on file for this visit.       Hernesto Franco M.D.

## 2017-05-14 LAB
ALBUMIN SERPL-MCNC: 4 G/DL (ref 3.5–4.7)
ALBUMIN/CREAT UR: 31.5 MG/G CREAT (ref 0–30)
ALBUMIN/GLOB SERPL: 1.5 {RATIO} (ref 1.2–2.2)
ALP SERPL-CCNC: 136 IU/L (ref 39–117)
ALT SERPL-CCNC: 11 IU/L (ref 0–32)
AST SERPL-CCNC: 17 IU/L (ref 0–40)
BASOPHILS # BLD AUTO: 0 X10E3/UL (ref 0–0.2)
BASOPHILS NFR BLD AUTO: 1 %
BILIRUB SERPL-MCNC: <0.2 MG/DL (ref 0–1.2)
BUN SERPL-MCNC: 21 MG/DL (ref 8–27)
BUN/CREAT SERPL: 21 (ref 12–28)
CALCIUM SERPL-MCNC: 9.4 MG/DL (ref 8.7–10.3)
CHLORIDE SERPL-SCNC: 98 MMOL/L (ref 96–106)
CHOLEST SERPL-MCNC: 214 MG/DL (ref 100–199)
CO2 SERPL-SCNC: 22 MMOL/L (ref 18–29)
CREAT SERPL-MCNC: 0.99 MG/DL (ref 0.57–1)
CREAT UR-MCNC: 177.4 MG/DL
EOSINOPHIL # BLD AUTO: 0.1 X10E3/UL (ref 0–0.4)
EOSINOPHIL NFR BLD AUTO: 2 %
ERYTHROCYTE [DISTWIDTH] IN BLOOD BY AUTOMATED COUNT: 14.7 % (ref 12.3–15.4)
EST. AVERAGE GLUCOSE BLD GHB EST-MCNC: 128 MG/DL
GLOBULIN SER CALC-MCNC: 2.6 G/DL (ref 1.5–4.5)
GLUCOSE SERPL-MCNC: 102 MG/DL (ref 65–99)
HBA1C MFR BLD: 6.1 % (ref 4.8–5.6)
HCT VFR BLD AUTO: 29.9 % (ref 34–46.6)
HDLC SERPL-MCNC: 53 MG/DL
HGB BLD-MCNC: 9.3 G/DL (ref 11.1–15.9)
IMM GRANULOCYTES # BLD: 0 X10E3/UL (ref 0–0.1)
IMM GRANULOCYTES NFR BLD: 0 %
INTERPRETATION, 910389: NORMAL
INTERPRETATION: NORMAL
IRON SATN MFR SERPL: 16 % (ref 15–55)
IRON SERPL-MCNC: 61 UG/DL (ref 27–139)
LDLC SERPL CALC-MCNC: 134 MG/DL (ref 0–99)
LYMPHOCYTES # BLD AUTO: 1.3 X10E3/UL (ref 0.7–3.1)
LYMPHOCYTES NFR BLD AUTO: 22 %
Lab: NORMAL
MCH RBC QN AUTO: 28.4 PG (ref 26.6–33)
MCHC RBC AUTO-ENTMCNC: 31.1 G/DL (ref 31.5–35.7)
MCV RBC AUTO: 91 FL (ref 79–97)
MICROALBUMIN UR-MCNC: 55.9 UG/ML
MONOCYTES # BLD AUTO: 0.4 X10E3/UL (ref 0.1–0.9)
MONOCYTES NFR BLD AUTO: 7 %
NEUTROPHILS # BLD AUTO: 4.1 X10E3/UL (ref 1.4–7)
NEUTROPHILS NFR BLD AUTO: 68 %
PDF IMAGE, 910387: NORMAL
PLATELET # BLD AUTO: 298 X10E3/UL (ref 150–379)
POTASSIUM SERPL-SCNC: 4.4 MMOL/L (ref 3.5–5.2)
PROT SERPL-MCNC: 6.6 G/DL (ref 6–8.5)
RBC # BLD AUTO: 3.28 X10E6/UL (ref 3.77–5.28)
SODIUM SERPL-SCNC: 138 MMOL/L (ref 134–144)
TIBC SERPL-MCNC: 376 UG/DL (ref 250–450)
TRIGL SERPL-MCNC: 134 MG/DL (ref 0–149)
UIBC SERPL-MCNC: 315 UG/DL (ref 118–369)
VLDLC SERPL CALC-MCNC: 27 MG/DL (ref 5–40)
WBC # BLD AUTO: 5.9 X10E3/UL (ref 3.4–10.8)

## 2017-05-15 NOTE — PROGRESS NOTES
Mr. Adam Acvinicio notified, on [employer], reviewed lab results and rec per Dr. Chiara Bianchi. Verbalizes understanding. Appt to be made in 1 month for recheck labs.

## 2017-06-20 ENCOUNTER — OFFICE VISIT (OUTPATIENT)
Dept: FAMILY MEDICINE CLINIC | Age: 82
End: 2017-06-20

## 2017-06-20 VITALS
DIASTOLIC BLOOD PRESSURE: 55 MMHG | WEIGHT: 184 LBS | BODY MASS INDEX: 29.57 KG/M2 | HEART RATE: 87 BPM | RESPIRATION RATE: 16 BRPM | SYSTOLIC BLOOD PRESSURE: 135 MMHG | HEIGHT: 66 IN

## 2017-06-20 DIAGNOSIS — I26.99 OTHER PULMONARY EMBOLISM WITHOUT ACUTE COR PULMONALE, UNSPECIFIED CHRONICITY (HCC): Primary | ICD-10-CM

## 2017-06-20 DIAGNOSIS — E11.9 CONTROLLED TYPE 2 DIABETES MELLITUS WITHOUT COMPLICATION, WITHOUT LONG-TERM CURRENT USE OF INSULIN (HCC): ICD-10-CM

## 2017-06-20 DIAGNOSIS — D64.9 ANEMIA, UNSPECIFIED TYPE: ICD-10-CM

## 2017-06-20 LAB
INR BLD: 2.4
PT POC: 28.4 SECONDS
VALID INTERNAL CONTROL?: YES

## 2017-06-20 NOTE — PROGRESS NOTES
1. Have you been to the ER, urgent care clinic since your last visit? Hospitalized since your last visit? No    2. Have you seen or consulted any other health care providers outside of the [facility] since your last visit? Include any pap smears or colon screening. No   Chief Complaint   Patient presents with    Anticoagulation     Pt present to the office for PT/INR      Results for orders placed or performed in visit on 06/20/17   AMB POC PT/INR   Result Value Ref Range    VALID INTERNAL CONTROL POC Yes     Prothrombin time (POC) 28.4 seconds    INR POC 2.4       Having inc bilat knee pain and cannot walk very well and using a walker    Inc constipation as well    Pt is not taking her iron as rec      Chief Complaint   Patient presents with    Anticoagulation     she is a 80y.o. year old female who presents for evalution. Reviewed PmHx, RxHx, FmHx, SocHx, AllgHx and updated and dated in the chart.     Patient Active Problem List    Diagnosis    Advanced care planning/counseling discussion     Has no completed, dwp importance      Controlled type 2 diabetes mellitus without complication, without long-term current use of insulin ([address]    Iron deficiency anemia    Advance care planning     Has a LW      Pulmonary embolism ([address]    Itching    Elevated liver enzymes    S/P cholecystectomy     11/2013      S/P knee replacement     bilaterally      S/P shoulder surgery     Left      H/O carpal tunnel repair     Right hand      Chronic pain    HTN (hypertension)    OA (osteoarthritis)    Fibromyalgia    GERD (gastroesophageal reflux disease)    High cholesterol    Hiatal hernia       Review of Systems - negative except as listed above in the HPI    Objective:     Vitals:    06/20/17 0743   BP: 135/55   Pulse: 87   Resp: 16   Weight: 184 lb (83.5 kg)   Height: 5' 6\" (1.676 m)     Physical Examination: General appearance - alert, well appearing, and in no distress  Chest - clear to auscultation, no wheezes, rales or rhonchi, symmetric air entry  Heart - normal rate, regular rhythm, normal S1, S2, no murmurs, rubs, clicks or gallops  Abdomen - soft, nontender, nondistended, no masses or organomegaly  Extremities - peripheral pulses normal, no pedal edema, no clubbing or cyanosis    Assessment/ Plan:   Tray was seen today for anticoagulation. Diagnoses and all orders for this visit:    Other pulmonary embolism without acute cor pulmonale, unspecified chronicity (HCC)  -     AMB POC PT/INR  -  Results for orders placed or performed in visit on 06/20/17   AMB POC PT/INR   Result Value Ref Range    VALID INTERNAL CONTROL POC Yes     Prothrombin time (POC) 28.4 seconds    INR POC 2.4      'at goal    Controlled type 2 diabetes mellitus without complication, without long-term current use of insulin ([facility]  -     REFERRAL TO OPHTHALMOLOGY    Anemia, unspecified type  -pt is not taking iron, pt to take now       Follow-up Disposition:  Return in about 1 month (around 7/20/2017). I have discussed the diagnosis with the patient and the intended plan as seen in the above orders. The patient understands and agrees with the plan. The patient has received an after-visit summary and questions were answered concerning future plans. Medication Side Effects and Warnings were discussed with patient  Patient Labs were reviewed and or requested:  Patient Past Records were reviewed and or requested    Sean Martinez M.D. There are no Patient Instructions on file for this visit.

## 2017-06-20 NOTE — MR AVS SNAPSHOT
Visit Information Date & Time Provider Department Dept. Phone Encounter #  
 6/20/2017  7:45 AM Sadie Stoner MD [address] [facility] [phone] [id number] Follow-up Instructions Return in about 1 month (around 7/20/2017). Upcoming Health Maintenance Date Due  
 FOOT EXAM Q1 5/24/1943 EYE EXAM RETINAL OR DILATED Q1 7/18/2013 INFLUENZA AGE 9 TO ADULT 8/1/2017 HEMOGLOBIN A1C Q6M 11/9/2017 MICROALBUMIN Q1 5/9/2018 LIPID PANEL Q1 5/9/2018 MEDICARE YEARLY EXAM 5/10/2018 GLAUCOMA SCREENING Q2Y 11/9/2018 DTaP/Tdap/Td series (2 - Td) 11/9/2026 Allergies as of 6/20/2017  Review Complete On: 6/20/2017 By: Sadie Stoner MD  
  
 Severity Noted Reaction Type Reactions Angiotensin Ii,human  04/01/2010    Other (comments) States does not remember Avelox [Moxifloxacin]  04/01/2010    Other (comments) States does not remember Demerol [Meperidine]  04/01/2010    Hives Current Immunizations  Reviewed on 11/9/2016 Name Date Influenza High Dose Vaccine PF 11/9/2016 Influenza Vaccine 10/29/2013 Influenza Vaccine Cresencio Norris) 10/20/2015 Influenza Vaccine Split 11/7/2012, 10/11/2011, 11/16/2010 Influenza Vaccine Whole 8/1/2009 Pneumococcal Conjugate (PCV-13) 4/13/2016 Pneumococcal Vaccine (Unspecified Type) 4/1/2008 Not reviewed this visit You Were Diagnosed With   
  
 Codes Comments Other pulmonary embolism without acute cor pulmonale, unspecified chronicity (HCC)    -  Primary ICD-10-CM: I26.99 
ICD-9-CM: 415.19 Vitals BP Pulse Resp Height(growth percentile) Weight(growth percentile) BMI  
 135/55 87 16 5' 6\" (1.676 m) 184 lb (83.5 kg) 29.7 kg/m2 OB Status Smoking Status Postmenopausal Never Smoker BMI and BSA Data Body Mass Index Body Surface Area  
 29.7 kg/m 2 1.97 m 2 Preferred Pharmacy Pharmacy Name Phone [id number] - Darlene Clamp, [address] [place] [address] [phone] Your Updated Medication List  
  
   
This list is accurate as of: 6/20/17  8:06 AM.  Always use your most recent med list.  
  
  
  
  
 acetaminophen-codeine 300-30 mg per tablet Commonly known as:  TYLENOL #3 Take 1 Tab by mouth every four (4) hours as needed for Pain. Max Daily Amount: 6 Tabs. Indications: Pain  
  
 amLODIPine 10 mg tablet Commonly known as:  Pérez Staggers TAKE 1 TABLET BY MOUTH DAILY  
  
 citalopram 20 mg tablet Commonly known as:  CELEXA  
TAKE 1 TABLET BY MOUTH DAILY  
  
 cyclobenzaprine 5 mg tablet Commonly known as:  FLEXERIL  
TAKE 1 TABLET BY MOUTH 3 TIMES A DAY AS NEEDED FORMUSCLE SPASM(S)  
  
 gabapentin 600 mg tablet Commonly known as:  NEURONTIN  
TAKE 1 TABLET BY MOUTH 3 TIMES A DAY  
  
 iron polysaccharides 150 mg iron capsule Commonly known as:  Janace Dawley Take 1 Cap by mouth two (2) times a day. * COUMADIN 3 mg tablet Generic drug:  warfarin Take 3 mg by mouth daily. * JANTOVEN 3 mg tablet Generic drug:  warfarin TAKE 1 TABLET BY MOUTH DAILY losartan-hydroCHLOROthiazide 100-25 mg per tablet Commonly known as:  HYZAAR  
TAKE 1 TABLET BY MOUTH DAILY  
  
 naloxone 4 mg/actuation Spry Commonly known as:  NARCAN  
1 Spray by Nasal route as needed. Indications: OPIATE-INDUCED RESPIRATORY DEPRESSION  
  
 naproxen 500 mg tablet Commonly known as:  NAPROSYN Take 1 Tab by mouth two (2) times daily (with meals). * Notice: This list has 2 medication(s) that are the same as other medications prescribed for you. Read the directions carefully, and ask your doctor or other care provider to review them with you. We Performed the Following AMB POC PT/INR [47888 CPT(R)] Follow-up Instructions Return in about 1 month (around 7/20/2017). Introducing hospitals & HEALTH SERVICES!    
 [facility] introduces [employer] patient portal. Now you can access parts of your medical record, email your doctor's office, and request medication refills online. 1. In your internet browser, go to [email or web address] 2. Click on the First Time User? Click Here link in the Sign In box. You will see the New Member Sign Up page. 3. Enter your [employer] Access Code exactly as it appears below. You will not need to use this code after youve completed the sign-up process. If you do not sign up before the expiration date, you must request a new code. · [employer] Access Code: [email or web address] Expires: 9/18/2017  8:06 AM 
 
4. Enter the last four digits of your Social Security Number (xxxx) and Date of Birth (mm/dd/yyyy) as indicated and click Submit. You will be taken to the next sign-up page. 5. Create a Intellution ID. This will be your Intell[employer] login ID and cannot be changed, so think of one that is secure and easy to remember. 6. Create a [employer] password. You can change your password at any time. 7. Enter your Password Reset Question and Answer. This can be used at a later time if you forget your password. 8. Enter your e-mail address. You will receive e-mail notification when new information is available in [address] 9. Click Sign Up. You can now view and download portions of your medical record. 10. Click the Download Summary menu link to download a portable copy of your medical information. If you have questions, please visit the Frequently Asked Questions section of the [employer] website. Remember, [employer] is NOT to be used for urgent needs. For medical emergencies, dial 911. Now available from your iPhone and Android! Please provide this summary of care documentation to your next provider. Your primary care clinician is listed as LAUREL SINGH. If you have any questions after today's visit, please call [phone].

## 2017-07-20 ENCOUNTER — HOSPITAL ENCOUNTER (OUTPATIENT)
Dept: LAB | Age: 82
Discharge: HOME | End: 2017-07-20
Payer: MEDICARE

## 2017-07-20 ENCOUNTER — OFFICE VISIT (OUTPATIENT)
Dept: FAMILY MEDICINE CLINIC | Age: 82
End: 2017-07-20

## 2017-07-20 VITALS
SYSTOLIC BLOOD PRESSURE: 109 MMHG | HEIGHT: 66 IN | DIASTOLIC BLOOD PRESSURE: 65 MMHG | BODY MASS INDEX: 27.97 KG/M2 | HEART RATE: 80 BPM | OXYGEN SATURATION: 98 % | WEIGHT: 174 LBS | RESPIRATION RATE: 18 BRPM

## 2017-07-20 DIAGNOSIS — I26.99 OTHER PULMONARY EMBOLISM WITHOUT ACUTE COR PULMONALE, UNSPECIFIED CHRONICITY (HCC): Primary | ICD-10-CM

## 2017-07-20 DIAGNOSIS — R06.02 SOB (SHORTNESS OF BREATH): ICD-10-CM

## 2017-07-20 DIAGNOSIS — G89.4 CHRONIC PAIN SYNDROME: ICD-10-CM

## 2017-07-20 DIAGNOSIS — I10 ESSENTIAL HYPERTENSION: Chronic | ICD-10-CM

## 2017-07-20 DIAGNOSIS — D50.9 IRON DEFICIENCY ANEMIA, UNSPECIFIED IRON DEFICIENCY ANEMIA TYPE: ICD-10-CM

## 2017-07-20 LAB
INR BLD: 1.2
PT POC: 13.9 SECONDS
VALID INTERNAL CONTROL?: YES

## 2017-07-20 PROCEDURE — 85025 COMPLETE CBC W/AUTO DIFF WBC: CPT

## 2017-07-20 PROCEDURE — 83550 IRON BINDING TEST: CPT

## 2017-07-20 RX ORDER — ACETAMINOPHEN AND CODEINE PHOSPHATE 300; 30 MG/1; MG/1
1 TABLET ORAL
Qty: 120 TAB | Refills: 2 | Status: SHIPPED | OUTPATIENT
Start: 2017-07-20 | End: 2017-11-27

## 2017-07-20 NOTE — MR AVS SNAPSHOT
Visit Information Date & Time Provider Department Dept. Phone Encounter #  
 7/20/2017  7:45 AM Colby Morales MD [address] [facility] [phone] [id number] Follow-up Instructions Return in about 1 month (around 8/20/2017). Upcoming Health Maintenance Date Due  
 FOOT EXAM Q1 5/24/1943 EYE EXAM RETINAL OR DILATED Q1 7/18/2013 INFLUENZA AGE 9 TO ADULT 8/1/2017 HEMOGLOBIN A1C Q6M 11/9/2017 MICROALBUMIN Q1 5/9/2018 LIPID PANEL Q1 5/9/2018 MEDICARE YEARLY EXAM 5/10/2018 GLAUCOMA SCREENING Q2Y 11/9/2018 DTaP/Tdap/Td series (2 - Td) 11/9/2026 Allergies as of 7/20/2017  Review Complete On: 7/20/2017 By: Colby Morales MD  
  
 Severity Noted Reaction Type Reactions Angiotensin Ii,human  04/01/2010    Other (comments) States does not remember Avelox [Moxifloxacin]  04/01/2010    Other (comments) States does not remember Demerol [Meperidine]  04/01/2010    Hives Current Immunizations  Reviewed on 11/9/2016 Name Date Influenza High Dose Vaccine PF 11/9/2016 Influenza Vaccine 10/29/2013 Influenza Vaccine Haven Sallies) 10/20/2015 Influenza Vaccine Split 11/7/2012, 10/11/2011, 11/16/2010 Influenza Vaccine Whole 8/1/2009 Pneumococcal Conjugate (PCV-13) 4/13/2016 Pneumococcal Vaccine (Unspecified Type) 4/1/2008 Not reviewed this visit You Were Diagnosed With   
  
 Codes Comments Other pulmonary embolism without acute cor pulmonale, unspecified chronicity (HCC)    -  Primary ICD-10-CM: I26.99 
ICD-9-CM: 415.19 Essential hypertension     ICD-10-CM: I10 
ICD-9-CM: 401.9 Iron deficiency anemia, unspecified iron deficiency anemia type     ICD-10-CM: D50.9 ICD-9-CM: 280.9 Vitals BP Pulse Resp Height(growth percentile) Weight(growth percentile) SpO2  
 109/65 80 18 5' 6\" (1.676 m) 174 lb (78.9 kg) 98% BMI OB Status Smoking Status 28.08 kg/m2 Postmenopausal Never Smoker BMI and BSA Data Body Mass Index Body Surface Area 28.08 kg/m 2 1.92 m 2 Preferred Pharmacy Pharmacy Name Phone [facility] Carrie Hawley [address] [place] [address] [phone] Your Updated Medication List  
  
   
This list is accurate as of: 7/20/17  7:51 AM.  Always use your most recent med list.  
  
  
  
  
 acetaminophen-codeine 300-30 mg per tablet Commonly known as:  TYLENOL #3 Take 1 Tab by mouth every four (4) hours as needed for Pain. Max Daily Amount: 6 Tabs. Indications: Pain  
  
 amLODIPine 10 mg tablet Commonly known as:  Unknown Loris TAKE 1 TABLET BY MOUTH DAILY  
  
 citalopram 20 mg tablet Commonly known as:  CELEXA  
TAKE 1 TABLET BY MOUTH DAILY  
  
 cyclobenzaprine 5 mg tablet Commonly known as:  FLEXERIL  
TAKE 1 TABLET BY MOUTH 3 TIMES A DAY AS NEEDED FORMUSCLE SPASM(S)  
  
 gabapentin 600 mg tablet Commonly known as:  NEURONTIN  
TAKE 1 TABLET BY MOUTH 3 TIMES A DAY  
  
 iron polysaccharides 150 mg iron capsule Commonly known as:  Barbar Fury Take 1 Cap by mouth two (2) times a day. * COUMADIN 3 mg tablet Generic drug:  warfarin Take 3 mg by mouth daily. * JANTOVEN 3 mg tablet Generic drug:  warfarin TAKE 1 TABLET BY MOUTH DAILY losartan-hydroCHLOROthiazide 100-25 mg per tablet Commonly known as:  HYZAAR  
TAKE 1 TABLET BY MOUTH DAILY  
  
 naloxone 4 mg/actuation Spry Commonly known as:  NARCAN  
1 Spray by Nasal route as needed. Indications: OPIATE-INDUCED RESPIRATORY DEPRESSION  
  
 naproxen 500 mg tablet Commonly known as:  NAPROSYN Take 1 Tab by mouth two (2) times daily (with meals). * Notice: This list has 2 medication(s) that are the same as other medications prescribed for you. Read the directions carefully, and ask your doctor or other care provider to review them with you. We Performed the Following AMB POC PT/INR [10839 CPT(R)] CBC WITH AUTOMATED DIFF [26404 CPT(R)] IRON PROFILE [id number] CPT(R)] Follow-up Instructions Return in about 1 month (around 8/20/2017). Introducing [facility] & HEALTH SERVICES! Efrem George introduces [employer] patient portal. Now you can access parts of your medical record, email your doctor's office, and request medication refills online. 1. In your internet browser, go to [email or web address] 2. Click on the First Time User? Click Here link in the Sign In box. You will see the New Member Sign Up page. 3. Enter your VoyageByMe Access Code exactly as it appears below. You will not need to use this code after youve completed the sign-up process. If you do not sign up before the expiration date, you must request a new code. · Voy[employer]ByMe Access Code: [email or web address] Expires: 9/18/2017  8:06 AM 
 
4. Enter the last four digits of your Social Security Number (xxxx) and Date of Birth (mm/dd/yyyy) as indicated and click Submit. You will be taken to the next sign-up page. 5. Create a VoyageByMe ID. This will be your Voy[employer]ByMe login ID and cannot be changed, so think of one that is secure and easy to remember. 6. Create a Voy[employer]ByMe password. You can change your password at any time. 7. Enter your Password Reset Question and Answer. This can be used at a later time if you forget your password. 8. Enter your e-mail address. You will receive e-mail notification when new information is available in [address] 9. Click Sign Up. You can now view and download portions of your medical record. 10. Click the Download Summary menu link to download a portable copy of your medical information. If you have questions, please visit the Frequently Asked Questions section of the [employer]ByMe website. Remember, [employer]ByMe is NOT to be used for urgent needs. For medical emergencies, dial 911. Now available from your iPhone and Android! Please provide this summary of care documentation to your next provider. Your primary care clinician is listed as LAUREL SINGH. If you have any questions after today's visit, please call [phone].

## 2017-07-20 NOTE — PROGRESS NOTES
Chief Complaint   Patient presents with    Anticoagulation     Pt presents to the office  For INR, SOB    1. Have you been to the ER, urgent care clinic since your last visit? Hospitalized since your last visit? No    2. Have you seen or consulted any other health care providers outside of the [address] [place] [address] since your last visit? Include any pap smears or colon screening. No      Just had cardiac cath and was ok per pt last week and saw cardio    Pt is not taking her iron for the anemia      Chief Complaint   Patient presents with    Anticoagulation     she is a 80y.o. year old female who presents for evalution. Reviewed PmHx, RxHx, FmHx, SocHx, AllgHx and updated and dated in the chart.     Patient Active Problem List    Diagnosis    Advanced care planning/counseling discussion     Has no completed, dwp importance      Controlled type 2 diabetes mellitus without complication, without long-term current use of insulin ([address]    Iron deficiency anemia    Advance care planning     Has a LW      Pulmonary embolism ([facility]    Itching    Elevated liver enzymes    S/P cholecystectomy     11/2013      S/P knee replacement     bilaterally      S/P shoulder surgery     Left      H/O carpal tunnel repair     Right hand      Chronic pain    HTN (hypertension)    OA (osteoarthritis)    Fibromyalgia    GERD (gastroesophageal reflux disease)    High cholesterol    Hiatal hernia       Review of Systems - negative except as listed above in the HPI    Objective:     Vitals:    07/20/17 0738   BP: 109/65   Pulse: 80   Resp: 18   SpO2: 98%   Weight: 174 lb (78.9 kg)   Height: 5' 6\" (1.676 m)     Physical Examination: General appearance - alert, well appearing, and in no distress  Chest - clear to auscultation, no wheezes, rales or rhonchi, symmetric air entry  Heart - normal rate, regular rhythm, normal S1, S2, no murmurs, rubs, clicks or gallops  Abdomen - soft, nontender, nondistended, no masses or organomegaly  Extremities - peripheral pulses normal, no pedal edema, no clubbing or cyanosis        Assessment/ Plan:   Tray was seen today for anticoagulation. Diagnoses and all orders for this visit:    Other pulmonary embolism without acute cor pulmonale, unspecified chronicity (HCC)  -     AMB POC PT/INR  -off coumadin    Essential hypertension  -at goal    Iron deficiency anemia, unspecified iron deficiency anemia type  -     CBC WITH AUTOMATED DIFF  -     IRON PROFILE    SOB (shortness of breath)  -due to anemia  -pt must take iron tabs       Follow-up Disposition:  Return in about 1 month (around 8/20/2017). I have discussed the diagnosis with the patient and the intended plan as seen in the above orders. The patient understands and agrees with the plan. The patient has received an after-visit summary and questions were answered concerning future plans. Medication Side Effects and Warnings were discussed with patient  Patient Labs were reviewed and or requested:  Patient Past Records were reviewed and or requested    Price Starr M.D. There are no Patient Instructions on file for this visit.

## 2017-07-21 LAB
BASOPHILS # BLD AUTO: 0 X10E3/UL (ref 0–0.2)
BASOPHILS NFR BLD AUTO: 1 %
EOSINOPHIL # BLD AUTO: 0.1 X10E3/UL (ref 0–0.4)
EOSINOPHIL NFR BLD AUTO: 2 %
ERYTHROCYTE [DISTWIDTH] IN BLOOD BY AUTOMATED COUNT: 16 % (ref 12.3–15.4)
HCT VFR BLD AUTO: 23.3 % (ref 34–46.6)
HGB BLD-MCNC: 6.8 G/DL (ref 11.1–15.9)
IMM GRANULOCYTES # BLD: 0 X10E3/UL (ref 0–0.1)
IMM GRANULOCYTES NFR BLD: 0 %
IRON SATN MFR SERPL: 6 % (ref 15–55)
IRON SERPL-MCNC: 23 UG/DL (ref 27–139)
LYMPHOCYTES # BLD AUTO: 1.3 X10E3/UL (ref 0.7–3.1)
LYMPHOCYTES NFR BLD AUTO: 28 %
MCH RBC QN AUTO: 24.4 PG (ref 26.6–33)
MCHC RBC AUTO-ENTMCNC: 29.2 G/DL (ref 31.5–35.7)
MCV RBC AUTO: 84 FL (ref 79–97)
MONOCYTES # BLD AUTO: 0.4 X10E3/UL (ref 0.1–0.9)
MONOCYTES NFR BLD AUTO: 8 %
NEUTROPHILS # BLD AUTO: 2.8 X10E3/UL (ref 1.4–7)
NEUTROPHILS NFR BLD AUTO: 61 %
PLATELET # BLD AUTO: 367 X10E3/UL (ref 150–379)
RBC # BLD AUTO: 2.79 X10E6/UL (ref 3.77–5.28)
TIBC SERPL-MCNC: 370 UG/DL (ref 250–450)
UIBC SERPL-MCNC: 347 UG/DL (ref 118–369)
WBC # BLD AUTO: 4.6 X10E3/UL (ref 3.4–10.8)

## 2017-07-24 NOTE — PROGRESS NOTES
Blood count is too low--make a appt stat for her with Dr Mark Gautam for eval for transfusion and iron replacement

## 2017-07-24 NOTE — PROGRESS NOTES
Pt called on the number on file. Lab results were given to pt  and 's plan of care, pt verbalize understanding of this information.

## 2017-08-10 ENCOUNTER — OFFICE VISIT (OUTPATIENT)
Dept: ONCOLOGY | Age: 82
End: 2017-08-10

## 2017-08-10 ENCOUNTER — HOSPITAL ENCOUNTER (OUTPATIENT)
Dept: INFUSION THERAPY | Age: 82
Discharge: HOME | End: 2017-08-10
Payer: MEDICARE

## 2017-08-10 VITALS
HEART RATE: 73 BPM | DIASTOLIC BLOOD PRESSURE: 55 MMHG | SYSTOLIC BLOOD PRESSURE: 101 MMHG | TEMPERATURE: 97 F | RESPIRATION RATE: 16 BRPM

## 2017-08-10 DIAGNOSIS — K92.1 MELENA: ICD-10-CM

## 2017-08-10 DIAGNOSIS — R19.00 ABDOMINAL MASS, UNSPECIFIED LOCATION: ICD-10-CM

## 2017-08-10 DIAGNOSIS — I26.99 OTHER ACUTE PULMONARY EMBOLISM WITHOUT ACUTE COR PULMONALE (HCC): ICD-10-CM

## 2017-08-10 DIAGNOSIS — D64.9 ANEMIA, UNSPECIFIED TYPE: Primary | ICD-10-CM

## 2017-08-10 LAB
BASOPHILS # BLD AUTO: 0.1 K/UL (ref 0–0.1)
BASOPHILS # BLD: 1 % (ref 0–1)
DIFFERENTIAL METHOD BLD: ABNORMAL
EOSINOPHIL # BLD: 0.1 K/UL (ref 0–0.4)
EOSINOPHIL NFR BLD: 2 % (ref 0–7)
FERRITIN SERPL-MCNC: 38 NG/ML (ref 8–252)
HAPTOGLOB SERPL-MCNC: 136 MG/DL (ref 30–200)
HCT VFR BLD AUTO: 29.5 % (ref 35–47)
HGB BLD-MCNC: 8.9 G/DL (ref 11.5–16)
IRON SATN MFR SERPL: 26 % (ref 20–50)
IRON SERPL-MCNC: 91 UG/DL (ref 35–150)
LDH SERPL L TO P-CCNC: 181 U/L (ref 81–246)
LYMPHOCYTES # BLD AUTO: 32 % (ref 12–49)
LYMPHOCYTES # BLD: 1.6 K/UL (ref 0.8–3.5)
MCH RBC QN AUTO: 27.9 PG (ref 26–34)
MCHC RBC AUTO-ENTMCNC: 30.2 G/DL (ref 30–36.5)
MCV RBC AUTO: 92.5 FL (ref 80–99)
MONOCYTES # BLD: 0.2 K/UL (ref 0–1)
MONOCYTES NFR BLD AUTO: 4 % (ref 5–13)
NEUTS SEG # BLD: 3 K/UL (ref 1.8–8)
NEUTS SEG NFR BLD AUTO: 61 % (ref 32–75)
PERIPHERAL SMEAR,PSM: NORMAL
PLATELET # BLD AUTO: 296 K/UL (ref 150–400)
RBC # BLD AUTO: 3.19 M/UL (ref 3.8–5.2)
RBC MORPH BLD: ABNORMAL
RETICS/RBC NFR AUTO: 3.2 % (ref 0.7–2.1)
TIBC SERPL-MCNC: 356 UG/DL (ref 250–450)
VIT B12 SERPL-MCNC: 221 PG/ML (ref 211–911)
WBC # BLD AUTO: 5 K/UL (ref 3.6–11)

## 2017-08-10 PROCEDURE — 83010 ASSAY OF HAPTOGLOBIN QUANT: CPT | Performed by: INTERNAL MEDICINE

## 2017-08-10 PROCEDURE — 82728 ASSAY OF FERRITIN: CPT | Performed by: INTERNAL MEDICINE

## 2017-08-10 PROCEDURE — 83615 LACTATE (LD) (LDH) ENZYME: CPT | Performed by: INTERNAL MEDICINE

## 2017-08-10 PROCEDURE — 83540 ASSAY OF IRON: CPT | Performed by: INTERNAL MEDICINE

## 2017-08-10 PROCEDURE — 82747 ASSAY OF FOLIC ACID RBC: CPT | Performed by: INTERNAL MEDICINE

## 2017-08-10 PROCEDURE — 82607 VITAMIN B-12: CPT | Performed by: INTERNAL MEDICINE

## 2017-08-10 PROCEDURE — 36415 COLL VENOUS BLD VENIPUNCTURE: CPT

## 2017-08-10 PROCEDURE — 85025 COMPLETE CBC W/AUTO DIFF WBC: CPT | Performed by: INTERNAL MEDICINE

## 2017-08-10 PROCEDURE — 85045 AUTOMATED RETICULOCYTE COUNT: CPT | Performed by: INTERNAL MEDICINE

## 2017-08-10 PROCEDURE — 36415 COLL VENOUS BLD VENIPUNCTURE: CPT | Performed by: INTERNAL MEDICINE

## 2017-08-10 NOTE — PROGRESS NOTES
[address] [facility] at [facility]  [phone]    Hematology / Oncology Consult    Reason for Visit:   Parish Hardin is a 80 y.o. female who is seen in consultation at the request of Dr. Agustín Coe for evaluation of anemia. History of Present Illness:   Tray Alvarado is a pleasant 80 y.o. female who presents today for evaluation of anemia. She is a poor historian. She is accompanied by several family members who are also poor historians. Much of her history is from review of records. She has been having some anemia issues for the past year. She reports progressive fatigue over the past year as well. She notes some melena for the past month, describes black tarry stool. She believes this started PRIOR to her initiating iron pills. She reports frequently seeing \"white bugs\" in her stool. She had a colonoscopy with Dr. Rimma Johnson about 2 years ago, has never had an EGD that she can recollect. She has some abdominal pain at times, and she notes a \"lump\" in her left abdomen that has been growing, extending across the rest of her abdomen, uncomfortable. She denies indigestion, epigastric pain, or reflux symptoms. However, some family members think she may have a history of these issues. She does have some pica. She complains of double vision, and is seeing optho for this. She has a history of PE diagnosed at [facility] about 2 years ago, and she is on chronic coumadin since then. She and her family cannot recall many details about the diagnosis or about any possible VTE risk factors around that time.       Past Medical History:   Diagnosis Date    Anemia     Cancer ([facility]     basal cell on nose    Chronic pain     back pain    Fibromyalgia 4/1/2010    GERD (gastroesophageal reflux disease) 4/1/2010    Hiatal hernia 4/1/2010    High cholesterol 4/1/2010    HTN (hypertension) 4/1/2010    OA (osteoarthritis) 4/1/2010    back    Pulmonary emboli ([facility] 2015      Past Surgical History:   Procedure Laterality Date    BREAST SURGERY PROCEDURE UNLISTED      Breast im-plants x 2    ENLARGE BREAST WITH IMPLANT      HX APPENDECTOMY      HX BREAST BIOPSY      HX BREAST RECONSTRUCTION      Breast implants removed 1992    HX CATARACT REMOVAL      HX CHOLECYSTECTOMY  9/11/2013    HX HYSTERECTOMY      HX KNEE REPLACEMENT  2008    bilateral    HX ORTHOPAEDIC  2007    Bilateral TKR    HX PARTIAL HYSTERECTOMY      HX SHOULDER REPLACEMENT  2009    left    HX TONSILLECTOMY        Social History   Substance Use Topics    Smoking status: Never Smoker    Smokeless tobacco: Never Used    Alcohol use No      Family History   Problem Relation Age of Onset   [facility] Arthritis-rheumatoid Mother     Dementia Mother     Coronary Artery Disease Father     Cancer Brother      lung cancer     Current Outpatient Prescriptions   Medication Sig    acetaminophen-codeine (TYLENOL #3) 300-30 mg per tablet Take 1 Tab by mouth every four (4) hours as needed for Pain. Max Daily Amount: 6 Tabs. Indications: Pain    naloxone (NARCAN) 4 mg/actuation spry 1 Spray by Nasal route as needed. Indications: OPIATE-INDUCED RESPIRATORY DEPRESSION    naproxen (NAPROSYN) 500 mg tablet Take 1 Tab by mouth two (2) times daily (with meals).  cyclobenzaprine (FLEXERIL) 5 mg tablet TAKE 1 TABLET BY MOUTH 3 TIMES A DAY AS NEEDED FORMUSCLE SPASM(S)    gabapentin (NEURONTIN) 600 mg tablet TAKE 1 TABLET BY MOUTH 3 TIMES A DAY    losartan-hydroCHLOROthiazide (HYZAAR) 100-25 mg per tablet TAKE 1 TABLET BY MOUTH DAILY    citalopram (CELEXA) 20 mg tablet TAKE 1 TABLET BY MOUTH DAILY    amLODIPine (NORVASC) 10 mg tablet TAKE 1 TABLET BY MOUTH DAILY    JANTOVEN 3 mg tablet TAKE 1 TABLET BY MOUTH DAILY    iron polysaccharides (NU-IRON) 150 mg iron capsule Take 1 Cap by mouth two (2) times a day. No current facility-administered medications for this visit.        Allergies   Allergen Reactions    Angiotensin Ii,Human Other (comments)     States does not remember      Avelox [Moxifloxacin] Other (comments)     States does not remember      Demerol [Meperidine] Hives        Review of Systems: A complete review of systems was obtained, negative except as described above. Physical Exam:     Visit Vitals    BP (P) 122/65 (BP 1 Location: Right arm, BP Patient Position: Sitting)    Pulse (P) 61    Temp (P) 96.3 °F (35.7 °C) (Temporal)    Resp (P) 20    Ht (P) 5' 6\" (1.676 m)    Wt (P) 179 lb 9.6 oz (81.5 kg)    SpO2 (P) 97%    BMI (P) 28.99 kg/m2     General: No distress, elderly female  Eyes: PERRLA, anicteric sclerae  HENT: Atraumatic, OP clear  Neck: Supple  Lymphatic: No cervical, supraclavicular, or inguinal adenopathy  Respiratory: CTAB, normal respiratory effort  CV: Normal rate, regular rhythm, no murmurs, no peripheral edema  GI: Soft, nontender, nondistended. Palpable firmness in left lower quadrant. No hepatomegaly, no splenomegaly  MS: Normal gait and station. Digits without clubbing or cyanosis. Skin: No rashes, ecchymoses, or petechiae. Normal temperature, turgor, and texture. Psych: Alert, oriented, appropriate affect, normal judgment/insight    Results:     Lab Results   Component Value Date/Time    WBC 5.0 08/10/2017 12:10 PM    HGB 8.9 08/10/2017 12:10 PM    HCT 29.5 08/10/2017 12:10 PM    PLATELET 290 74/20/6592 12:10 PM    MCV 92.5 08/10/2017 12:10 PM    ABS.  NEUTROPHILS 3.0 08/10/2017 12:10 PM     Lab Results   Component Value Date/Time    Sodium 138 05/09/2017 08:05 AM    Potassium 4.4 05/09/2017 08:05 AM    Chloride 98 05/09/2017 08:05 AM    CO2 22 05/09/2017 08:05 AM    Glucose 102 05/09/2017 08:05 AM    BUN 21 05/09/2017 08:05 AM    Creatinine 0.99 05/09/2017 08:05 AM    GFR est AA 61 05/09/2017 08:05 AM    GFR est non-AA 53 05/09/2017 08:05 AM    Calcium 9.4 05/09/2017 08:05 AM    Creatinine (POC) 0.9 11/12/2012 07:33 AM     Lab Results   Component Value Date/Time    Bilirubin, total <0.2 05/09/2017 08:05 AM    ALT (SGPT) 11 05/09/2017 08:05 AM    AST (SGOT) 17 05/09/2017 08:05 AM    Alk. phosphatase 136 05/09/2017 08:05 AM    Protein, total 6.6 05/09/2017 08:05 AM    Albumin 4.0 05/09/2017 08:05 AM    Globulin 3.9 08/30/2013 12:03 PM     Lab Results   Component Value Date/Time    Reticulocyte count 3.2 08/10/2017 12:11 PM    Iron % saturation 6 07/20/2017 07:55 AM    TIBC 370 07/20/2017 07:55 AM    Ferritin 19 09/22/2016 08:47 AM     08/10/2017 12:10 PM    Sed rate (ESR) 11 11/12/2012 12:00 AM    TSH 2.480 07/07/2016 08:06 AM    HEP C VIRUS AB <0.1 11/25/2013 09:36 AM     Lab Results   Component Value Date/Time    INR POC 1.2 07/20/2017 07:43 AM         Colonoscopy by Dr. Alfredito Marshall 9/10/2015:   Terminal Ileum: normal  Cecum: normal  Ascending Colon: normal  Transverse Colon: normal  Descending Colon: normal  Sigmoid: mild diverticulosis; Rectum: no mucosal lesion appreciated  Grade 1 internal hemorrhoid(s); Records reviewed and summarized above. Assessment:   1) Anemia, normocytic  Present since 7/2016, previously HGB normal in 2014 and prior. Anemia progressing recently, with HGB dropping <7 on most recent check with PCP. Now rising back to 8.9 with just 3 weeks of oral iron. Concern for occult blood loss, given her coumadin use and reports of melena. Check labs today to further evaluate. Follow up with GI regarding melena. 2) H/o PE  On coumadin. Unclear if she needs to remain on this long-term or not, as she does not recall details of diagnosis or associated risk factors. Request records from [facility].    3) Melena  Follow up with GI about possible EGD    4) Abdominal mass  US to further evaluate      Plan:     · Labs today  · Continue oral iron  · US abdomen  · Follow up with GI  · Return to see me in several week to review lab results    I appreciate the opportunity to participate in Ms. Tray Regan's care.     Signed By: Adrianna Hanks MD     August 10, 2017

## 2017-08-10 NOTE — PROGRESS NOTES
08/10/17- Informed Mr. Contreras Christinaelan of results he verbalized understanding. No further questions or concerns.

## 2017-08-10 NOTE — PROGRESS NOTES
[facility] LAB NOTE    Pt arrived at [phone] left at 1226    ,Blood pressure 101/55, pulse 73, temperature 97 °F (36.1 °C), resp. rate 16.       Labs drawn peripherally as ordered    Lab will be in [facility]

## 2017-08-10 NOTE — MR AVS SNAPSHOT
Visit Information Date & Time Provider Department Dept. Phone Encounter #  
 8/10/2017 11:00 AM Luh Quezada MD [facility] at [address] [place] [address] Rd [id number] Follow-up Instructions Return in about 4 weeks (around 9/6/2017) for Raddin anemia fu. Your Appointments 8/21/2017  7:45 AM  
ESTABLISHED PATIENT with Guy Mancuso MD  
[address] [facility] Appt Note: fuv, blood count test  
 [address] [address]  
[phone]  
  
   
 [address] [address] Upcoming Health Maintenance Date Due  
 FOOT EXAM Q1 5/24/1943 EYE EXAM RETINAL OR DILATED Q1 7/18/2013 INFLUENZA AGE 9 TO ADULT 8/1/2017 HEMOGLOBIN A1C Q6M 11/9/2017 MICROALBUMIN Q1 5/9/2018 LIPID PANEL Q1 5/9/2018 MEDICARE YEARLY EXAM 5/10/2018 GLAUCOMA SCREENING Q2Y 11/9/2018 DTaP/Tdap/Td series (2 - Td) 11/9/2026 Allergies as of 8/10/2017  Review Complete On: 7/20/2017 By: Guy Mancuso MD  
  
 Severity Noted Reaction Type Reactions Angiotensin Ii,human  04/01/2010    Other (comments) States does not remember Avelox [Moxifloxacin]  04/01/2010    Other (comments) States does not remember Demerol [Meperidine]  04/01/2010    Hives Current Immunizations  Reviewed on 11/9/2016 Name Date Influenza High Dose Vaccine PF 11/9/2016 Influenza Vaccine 10/29/2013 Influenza Vaccine Camila Blight) 10/20/2015 Influenza Vaccine Split 11/7/2012, 10/11/2011, 11/16/2010 Influenza Vaccine Whole 8/1/2009 Pneumococcal Conjugate (PCV-13) 4/13/2016 ZZZ-RETIRED (DO NOT USE) Pneumococcal Vaccine (Unspecified Type) 4/1/2008 Not reviewed this visit You Were Diagnosed With   
  
 Codes Comments Abdominal mass, unspecified location    -  Primary ICD-10-CM: R19.00 ICD-9-CM: 789.30 Anemia, unspecified type     ICD-10-CM: D64.9 ICD-9-CM: 899. 9 Vitals BP Pulse Temp Resp Height(growth percentile) (P) 122/65 (BP 1 Location: Right arm, BP Patient Position: Sitting) (P) 61 (P) 96.3 °F (35.7 °C) (Temporal) (P) 20 (P) 5' 6\" (1.676 m) Weight(growth percentile) SpO2 BMI OB Status Smoking Status (P) 179 lb 9.6 oz (81.5 kg) (P) 97% (P) 28.99 kg/m2 Postmenopausal Never Smoker BMI and BSA Data Body Mass Index Body Surface Area (P) 28.99 kg/m 2 (P) 1.95 m 2 Preferred Pharmacy Pharmacy Name Phone [id number] [place] [place] [address] [phone] Your Updated Medication List  
  
   
This list is accurate as of: 8/10/17 11:48 AM.  Always use your most recent med list.  
  
  
  
  
 acetaminophen-codeine 300-30 mg per tablet Commonly known as:  TYLENOL #3 Take 1 Tab by mouth every four (4) hours as needed for Pain. Max Daily Amount: 6 Tabs. Indications: Pain  
  
 amLODIPine 10 mg tablet Commonly known as:  Dana Matar TAKE 1 TABLET BY MOUTH DAILY  
  
 citalopram 20 mg tablet Commonly known as:  CELEXA  
TAKE 1 TABLET BY MOUTH DAILY  
  
 cyclobenzaprine 5 mg tablet Commonly known as:  FLEXERIL  
TAKE 1 TABLET BY MOUTH 3 TIMES A DAY AS NEEDED FORMUSCLE SPASM(S)  
  
 gabapentin 600 mg tablet Commonly known as:  NEURONTIN  
TAKE 1 TABLET BY MOUTH 3 TIMES A DAY  
  
 iron polysaccharides 150 mg iron capsule Commonly known as:  Radha Billings Take 1 Cap by mouth two (2) times a day. * COUMADIN 3 mg tablet Generic drug:  warfarin Take 3 mg by mouth daily. * JANTOVEN 3 mg tablet Generic drug:  warfarin TAKE 1 TABLET BY MOUTH DAILY losartan-hydroCHLOROthiazide 100-25 mg per tablet Commonly known as:  HYZAAR  
TAKE 1 TABLET BY MOUTH DAILY  
  
 naloxone 4 mg/actuation Spry Commonly known as:  NARCAN  
1 Spray by Nasal route as needed. Indications: OPIATE-INDUCED RESPIRATORY DEPRESSION  
  
 naproxen 500 mg tablet Commonly known as:  NAPROSYN  
 Take 1 Tab by mouth two (2) times daily (with meals). * Notice: This list has 2 medication(s) that are the same as other medications prescribed for you. Read the directions carefully, and ask your doctor or other care provider to review them with you. Follow-up Instructions Return in about 4 weeks (around 9/6/2017) for rafael Pruett. To-Do List   
 Around 08/16/2017 Imaging:  US ABD COMP Introducing [facility] HE[facility]! [facility] introduces [employer] patient portal. Now you can access parts of your medical record, email your doctor's office, and request medication refills online. 1. In your internet browser, go to [email or web address] 2. Click on the First Time User? Click Here link in the Sign In box. You will see the New Member Sign Up page. 3. Enter your [employer] Access Code exactly as it appears below. You will not need to use this code after youve completed the sign-up process. If you do not sign up before the expiration date, you must request a new code. · [employer] Access Code: [email or web address] Expires: 9/18/2017  8:06 AM 
 
4. Enter the last four digits of your Social Security Number (xxxx) and Date of Birth (mm/dd/yyyy) as indicated and click Submit. You will be taken to the next sign-up page. 5. Create a [employer] ID. This will be your [employer] login ID and cannot be changed, so think of one that is secure and easy to remember. 6. Create a [employer] password. You can change your password at any time. 7. Enter your Password Reset Question and Answer. This can be used at a later time if you forget your password. 8. Enter your e-mail address. You will receive e-mail notification when new information is available in [address] 9. Click Sign Up. You can now view and download portions of your medical record. 10. Click the Download Summary menu link to download a portable copy of your medical information. If you have questions, please visit the Frequently Asked Questions section of the [employer] website. Remember, [employer] is NOT to be used for urgent needs. For medical emergencies, dial 911. Now available from your iPhone and Android! Please provide this summary of care documentation to your next provider. Your primary care clinician is listed as LAUREL SINGH. If you have any questions after today's visit, please call [phone].

## 2017-08-14 LAB
FOLATE BLD-MCNC: 403.2 NG/ML
FOLATE RBC-MCNC: 1371 NG/ML
HCT VFR BLD AUTO: 29.4 % (ref 34–46.6)

## 2017-08-15 ENCOUNTER — HOSPITAL ENCOUNTER (OUTPATIENT)
Dept: ULTRASOUND IMAGING | Age: 82
Discharge: HOME | End: 2017-08-15
Attending: INTERNAL MEDICINE
Payer: MEDICARE

## 2017-08-15 DIAGNOSIS — D68.9 COAGULATION DEFICIENCY (HCC): ICD-10-CM

## 2017-08-15 DIAGNOSIS — R19.00 ABDOMINAL MASS, UNSPECIFIED LOCATION: ICD-10-CM

## 2017-08-15 PROCEDURE — 76700 US EXAM ABDOM COMPLETE: CPT

## 2017-08-15 RX ORDER — WARFARIN SODIUM 3 MG/1
TABLET ORAL
Qty: 30 TAB | Refills: 5 | Status: SHIPPED | OUTPATIENT
Start: 2017-08-15 | End: 2018-02-13 | Stop reason: SDUPTHER

## 2017-08-21 ENCOUNTER — OFFICE VISIT (OUTPATIENT)
Dept: FAMILY MEDICINE CLINIC | Age: 82
End: 2017-08-21

## 2017-08-21 VITALS
TEMPERATURE: 98 F | SYSTOLIC BLOOD PRESSURE: 128 MMHG | HEIGHT: 66 IN | BODY MASS INDEX: 28.45 KG/M2 | OXYGEN SATURATION: 93 % | RESPIRATION RATE: 20 BRPM | HEART RATE: 71 BPM | WEIGHT: 177 LBS | DIASTOLIC BLOOD PRESSURE: 74 MMHG

## 2017-08-21 DIAGNOSIS — D68.9 COAGULATION DEFICIENCY (HCC): Primary | ICD-10-CM

## 2017-08-21 DIAGNOSIS — I10 ESSENTIAL HYPERTENSION: Chronic | ICD-10-CM

## 2017-08-21 DIAGNOSIS — K59.00 CONSTIPATION, UNSPECIFIED CONSTIPATION TYPE: ICD-10-CM

## 2017-08-21 LAB
INR BLD: 2.8
PT POC: NORMAL SECONDS
VALID INTERNAL CONTROL?: YES

## 2017-08-21 NOTE — MR AVS SNAPSHOT
Visit Information Date & Time Provider Department Dept. Phone Encounter #  
 8/21/2017  7:45 AM Guy Mancuso MD [address] [facility] [phone] [id number] Follow-up Instructions Return in about 6 weeks (around 10/2/2017), or if symptoms worsen or fail to improve. Your Appointments 9/8/2017  8:45 AM  
ESTABLISHED PATIENT with Chucho Erm, NP  
[facility] at [facility] [address] [facility]) Appt Note: Raddin, anemia fu.  
 [address] [facility], [address] [address]  
[phone]  
  
   
 [address] [facility], [address] [facility] Upcoming Health Maintenance Date Due  
 FOOT EXAM Q1 5/24/1943 EYE EXAM RETINAL OR DILATED Q1 7/18/2013 INFLUENZA AGE 9 TO ADULT 8/1/2017 HEMOGLOBIN A1C Q6M 11/9/2017 MICROALBUMIN Q1 5/9/2018 LIPID PANEL Q1 5/9/2018 MEDICARE YEARLY EXAM 5/10/2018 GLAUCOMA SCREENING Q2Y 11/9/2018 DTaP/Tdap/Td series (2 - Td) 11/9/2026 Allergies as of 8/21/2017  Review Complete On: 8/21/2017 By: Guy Mancuso MD  
  
 Severity Noted Reaction Type Reactions Angiotensin Ii,human  04/01/2010    Other (comments) States does not remember Avelox [Moxifloxacin]  04/01/2010    Other (comments) States does not remember Demerol [Meperidine]  04/01/2010    Hives Current Immunizations  Reviewed on 11/9/2016 Name Date Influenza High Dose Vaccine PF 11/9/2016 Influenza Vaccine 10/29/2013 Influenza Vaccine Camila Blight) 10/20/2015 Influenza Vaccine Split 11/7/2012, 10/11/2011, 11/16/2010 Influenza Vaccine Whole 8/1/2009 Pneumococcal Conjugate (PCV-13) 4/13/2016 ZZZ-RETIRED (DO NOT USE) Pneumococcal Vaccine (Unspecified Type) 4/1/2008 Not reviewed this visit You Were Diagnosed With   
  
 Codes Comments [facility] deficiency [facility]    -  Primary ICD-10-CM: D69.9 ICD-9-CM: 286.9 Constipation, unspecified constipation type     ICD-10-CM: K59.00 ICD-9-CM: 564.00 Essential hypertension     ICD-10-CM: I10 
ICD-9-CM: 401.9 Vitals BP Pulse Temp Resp Height(growth percentile) Weight(growth percentile) 128/74 71 98 °F (36.7 °C) 20 5' 6\" (1.676 m) 177 lb (80.3 kg) SpO2 BMI OB Status Smoking Status 93% 28.57 kg/m2 Postmenopausal Never Smoker Vitals History BMI and BSA Data Body Mass Index Body Surface Area 28.57 kg/m 2 1.93 m 2 Preferred Pharmacy Pharmacy Name Phone [id number] - [place] [facility] [address] [place] [address] [phone] Your Updated Medication List  
  
   
This list is accurate as of: 8/21/17  8:09 AM.  Always use your most recent med list.  
  
  
  
  
 acetaminophen-codeine 300-30 mg per tablet Commonly known as:  TYLENOL #3 Take 1 Tab by mouth every four (4) hours as needed for Pain. Max Daily Amount: 6 Tabs. Indications: Pain  
  
 amLODIPine 10 mg tablet Commonly known as:  Ran Sunny TAKE 1 TABLET BY MOUTH DAILY  
  
 citalopram 20 mg tablet Commonly known as:  CELEXA  
TAKE 1 TABLET BY MOUTH DAILY  
  
 cyclobenzaprine 5 mg tablet Commonly known as:  FLEXERIL  
TAKE 1 TABLET BY MOUTH 3 TIMES A DAY AS NEEDED FORMUSCLE SPASM(S)  
  
 gabapentin 600 mg tablet Commonly known as:  NEURONTIN  
TAKE 1 TABLET BY MOUTH 3 TIMES A DAY  
  
 iron polysaccharides 150 mg iron capsule Commonly known as:  Rendall Sieving Take 1 Cap by mouth two (2) times a day. JANTOVEN 3 mg tablet Generic drug:  warfarin TAKE 1 TABLET BY MOUTH DAILY losartan-hydroCHLOROthiazide 100-25 mg per tablet Commonly known as:  HYZAAR  
TAKE 1 TABLET BY MOUTH DAILY  
  
 naloxone 4 mg/actuation Spry Commonly known as:  NARCAN  
1 Spray by Nasal route as needed. Indications: OPIATE-INDUCED RESPIRATORY DEPRESSION  
  
 naproxen 500 mg tablet Commonly known as:  NAPROSYN  
 Take 1 Tab by mouth two (2) times daily (with meals). We Performed the Following AMB POC PT/INR [15448 CPT(R)] Follow-up Instructions Return in about 6 weeks (around 10/2/2017), or if symptoms worsen or fail to improve. Introducing [facility] HEALTH SERVICES! Orqu[employer] Keith introduces [employer] patient portal. Now you can access parts of your medical record, email your doctor's office, and request medication refills online. 1. In your internet browser, go to [email or web address] 2. Click on the First Time User? Click Here link in the Sign In box. You will see the New Member Sign Up page. 3. Enter your [employer] Access Code exactly as it appears below. You will not need to use this code after youve completed the sign-up process. If you do not sign up before the expiration date, you must request a new code. · [employer] Access Code: [email or web address] Expires: 9/18/2017  8:06 AM 
 
4. Enter the last four digits of your Social Security Number (xxxx) and Date of Birth (mm/dd/yyyy) as indicated and click Submit. You will be taken to the next sign-up page. 5. Create a [employer] ID. This will be your [employer] login ID and cannot be changed, so think of one that is secure and easy to remember. 6. Create a [employer] password. You can change your password at any time. 7. Enter your Password Reset Question and Answer. This can be used at a later time if you forget your password. 8. Enter your e-mail address. You will receive e-mail notification when new information is available in [address] 9. Click Sign Up. You can now view and download portions of your medical record. 10. Click the Download Summary menu link to download a portable copy of your medical information. If you have questions, please visit the Frequently Asked Questions section of the [employer] website. Remember, [employer] is NOT to be used for urgent needs. For medical emergencies, dial 911. Now available from your iPhone and Android! Please provide this summary of care documentation to your next provider. Your primary care clinician is listed as LAUREL SINGH. If you have any questions after today's visit, please call [phone].

## 2017-08-21 NOTE — PROGRESS NOTES
Patient here for INR check. Continues seeing Dr. Nellie Russell, last appt 8/10/2017. Continues iron suppliments. 1. Have you been to the ER, urgent care clinic since your last visit? Hospitalized since your last visit? No    2. Have you seen or consulted any other health care providers outside of the [address] [place] [address] since your last visit? Include any pap smears or colon screening. No     Results for orders placed or performed in visit on 08/21/17   AMB POC PT/INR   Result Value Ref Range    VALID INTERNAL CONTROL POC Yes     Prothrombin time (POC)  seconds    INR POC 2.8        Chief Complaint   Patient presents with    Coagulation disorder     3 mg jentoven daily     she is a 80y.o. year old female who presents for evalution. Reviewed PmHx, RxHx, FmHx, SocHx, AllgHx and updated and dated in the chart.     Patient Active Problem List    Diagnosis    Advanced care planning/counseling discussion     Has no completed, dwp importance      Controlled type 2 diabetes mellitus without complication, without long-term current use of insulin ([address]    Iron deficiency anemia    Advance care planning     Has a LW      Pulmonary embolism ([address]    Itching    Elevated liver enzymes    S/P cholecystectomy     11/2013      S/P knee replacement     bilaterally      S/P shoulder surgery     Left      H/O carpal tunnel repair     Right hand      Chronic pain    HTN (hypertension)    OA (osteoarthritis)    Fibromyalgia    GERD (gastroesophageal reflux disease)    High cholesterol    Hiatal hernia       Review of Systems - negative except as listed above in the HPI    Objective:     Vitals:    08/21/17 0738   BP: 128/74   Pulse: 71   Resp: 20   Temp: 98 °F (36.7 °C)   SpO2: 93%   Weight: 177 lb (80.3 kg)   Height: 5' 6\" (1.676 m)     Physical Examination: General appearance - alert, well appearing, and in no distress  Chest - clear to auscultation, no wheezes, rales or rhonchi, symmetric air entry  Heart - normal rate, regular rhythm, normal S1, S2, no murmurs, rubs, clicks or gallops        Assessment/ Plan:   Diagnoses and all orders for this visit:    1. Coagulation deficiency (HCC)  -     AMB POC PT/INR  -at goal    2. Constipation, unspecified constipation type  -rec senakot    3. Essential hypertension  -at goal     Follow-up Disposition:  Return in about 6 weeks (around 10/2/2017), or if symptoms worsen or fail to improve. I have discussed the diagnosis with the patient and the intended plan as seen in the above orders. The patient understands and agrees with the plan. The patient has received an after-visit summary and questions were answered concerning future plans. Medication Side Effects and Warnings were discussed with patient  Patient Labs were reviewed and or requested:  Patient Past Records were reviewed and or requested    Delmy Hardy M.D. There are no Patient Instructions on file for this visit.

## 2017-09-08 ENCOUNTER — HOSPITAL ENCOUNTER (OUTPATIENT)
Dept: INFUSION THERAPY | Age: 82
Discharge: HOME | End: 2017-09-08
Payer: MEDICARE

## 2017-09-08 ENCOUNTER — OFFICE VISIT (OUTPATIENT)
Dept: ONCOLOGY | Age: 82
End: 2017-09-08

## 2017-09-08 VITALS
OXYGEN SATURATION: 96 % | WEIGHT: 178 LBS | SYSTOLIC BLOOD PRESSURE: 120 MMHG | HEART RATE: 69 BPM | HEIGHT: 66 IN | DIASTOLIC BLOOD PRESSURE: 61 MMHG | BODY MASS INDEX: 28.61 KG/M2

## 2017-09-08 VITALS
SYSTOLIC BLOOD PRESSURE: 136 MMHG | DIASTOLIC BLOOD PRESSURE: 63 MMHG | RESPIRATION RATE: 20 BRPM | HEART RATE: 72 BPM | TEMPERATURE: 98.1 F

## 2017-09-08 DIAGNOSIS — I26.99 OTHER PULMONARY EMBOLISM WITHOUT ACUTE COR PULMONALE, UNSPECIFIED CHRONICITY (HCC): ICD-10-CM

## 2017-09-08 DIAGNOSIS — E44.0 MODERATE PROTEIN-CALORIE MALNUTRITION (HCC): ICD-10-CM

## 2017-09-08 DIAGNOSIS — Z79.01 CHRONIC ANTICOAGULATION: ICD-10-CM

## 2017-09-08 DIAGNOSIS — D50.9 IRON DEFICIENCY ANEMIA, UNSPECIFIED IRON DEFICIENCY ANEMIA TYPE: Primary | ICD-10-CM

## 2017-09-08 LAB
BASOPHILS # BLD: 0 K/UL (ref 0–0.1)
BASOPHILS NFR BLD: 1 % (ref 0–1)
EOSINOPHIL # BLD: 0.1 K/UL (ref 0–0.4)
EOSINOPHIL NFR BLD: 2 % (ref 0–7)
ERYTHROCYTE [DISTWIDTH] IN BLOOD BY AUTOMATED COUNT: 19.9 % (ref 11.5–14.5)
FERRITIN SERPL-MCNC: 42 NG/ML (ref 8–252)
HCT VFR BLD AUTO: 34.7 % (ref 35–47)
HGB BLD-MCNC: 11.1 G/DL (ref 11.5–16)
IRON SATN MFR SERPL: 14 % (ref 20–50)
IRON SERPL-MCNC: 43 UG/DL (ref 35–150)
LYMPHOCYTES # BLD: 2 K/UL (ref 0.8–3.5)
LYMPHOCYTES NFR BLD: 40 % (ref 12–49)
MCH RBC QN AUTO: 30.2 PG (ref 26–34)
MCHC RBC AUTO-ENTMCNC: 32 G/DL (ref 30–36.5)
MCV RBC AUTO: 94.6 FL (ref 80–99)
MONOCYTES # BLD: 0.4 K/UL (ref 0–1)
MONOCYTES NFR BLD: 8 % (ref 5–13)
NEUTS SEG # BLD: 2.5 K/UL (ref 1.8–8)
NEUTS SEG NFR BLD: 49 % (ref 32–75)
PLATELET # BLD AUTO: 241 K/UL (ref 150–400)
RBC # BLD AUTO: 3.67 M/UL (ref 3.8–5.2)
TIBC SERPL-MCNC: 318 UG/DL (ref 250–450)
WBC # BLD AUTO: 5 K/UL (ref 3.6–11)

## 2017-09-08 PROCEDURE — 83540 ASSAY OF IRON: CPT | Performed by: INTERNAL MEDICINE

## 2017-09-08 PROCEDURE — 85025 COMPLETE CBC W/AUTO DIFF WBC: CPT | Performed by: INTERNAL MEDICINE

## 2017-09-08 PROCEDURE — 36415 COLL VENOUS BLD VENIPUNCTURE: CPT

## 2017-09-08 PROCEDURE — 82728 ASSAY OF FERRITIN: CPT | Performed by: INTERNAL MEDICINE

## 2017-09-08 PROCEDURE — 36415 COLL VENOUS BLD VENIPUNCTURE: CPT | Performed by: INTERNAL MEDICINE

## 2017-09-08 NOTE — MR AVS SNAPSHOT
Visit Information Date & Time Provider Department Dept. Phone Encounter #  
 9/8/2017  8:45 AM Anthony Ritchie NP [facility] at [address] [place] [address] Rd [id number] Follow-up Instructions Return in 3 months (on 12/8/2017) for [place] . Your Appointments 10/2/2017  8:50 AM  
ESTABLISHED PATIENT with Arie Bullard MD  
[address] [facility]) Appt Note: 6wk f/u  
 [id number] [place] [address] Road [address]  
[phone]  
  
   
 [id number] [place] [address] Road [address]  
  
    
 1/5/2018  9:00 AM  
ESTABLISHED PATIENT with Blanca Fischer MD  
[facility] at [address] [address] [facility]) Appt Note: 3 months [address] [facility], [address] [facility] 99 [id number] [phone]  
  
   
 [address] [facility], [address] 100[address] [facility] Upcoming Health Maintenance Date Due  
 FOOT EXAM Q1 5/24/1943 EYE EXAM RETINAL OR DILATED Q1 7/18/2013 INFLUENZA AGE 9 TO ADULT 8/1/2017 HEMOGLOBIN A1C Q6M 11/9/2017 MICROALBUMIN Q1 5/9/2018 LIPID PANEL Q1 5/9/2018 MEDICARE YEARLY EXAM 5/10/2018 GLAUCOMA SCREENING Q2Y 11/9/2018 DTaP/Tdap/Td series (2 - Td) 11/9/2026 Allergies as of 9/8/2017  Review Complete On: 9/8/2017 By: Yelena Henning LPN Severity Noted Reaction Type Reactions Angiotensin Ii,human  04/01/2010    Other (comments) States does not remember Avelox [Moxifloxacin]  04/01/2010    Other (comments) States does not remember Demerol [Meperidine]  04/01/2010    Hives Current Immunizations  Reviewed on 11/9/2016 Name Date Influenza High Dose Vaccine PF 11/9/2016 Influenza Vaccine 10/29/2013 Influenza Vaccine Maria A Araiza) 10/20/2015 Influenza Vaccine Split 11/7/2012, 10/11/2011, 11/16/2010 Influenza Vaccine Whole 8/1/2009 Pneumococcal Conjugate (PCV-13) 4/13/2016  ZZZ-RETIRED (DO NOT USE) Pneumococcal Vaccine (Unspecified Type) 4/1/2008 Not reviewed this visit You Were Diagnosed With   
  
 Codes Comments Iron deficiency anemia, unspecified iron deficiency anemia type    -  Primary ICD-10-CM: D50.9 ICD-9-CM: 280.9 Moderate protein-calorie malnutrition ([address]     ICD-10-CM: E44.0 ICD-9-CM: 263.0 Vitals BP Pulse Height(growth percentile) Weight(growth percentile) SpO2 BMI  
 120/61 (BP 1 Location: Left arm, BP Patient Position: Sitting) 69 5' 6\" (1.676 m) 178 lb (80.7 kg) 96% 28.73 kg/m2 OB Status Smoking Status Postmenopausal Never Smoker Vitals History BMI and BSA Data Body Mass Index Body Surface Area 28.73 kg/m 2 1.94 m 2 Preferred Pharmacy Pharmacy Name Phone [id number]  [place] [address] [phone] Your Updated Medication List  
  
   
This list is accurate as of: 9/8/17  9:49 AM.  Always use your most recent med list.  
  
  
  
  
 acetaminophen-codeine 300-30 mg per tablet Commonly known as:  TYLENOL #3 Take 1 Tab by mouth every four (4) hours as needed for Pain. Max Daily Amount: 6 Tabs. Indications: Pain  
  
 amLODIPine 10 mg tablet Commonly known as:  [place] TAKE 1 TABLET BY MOUTH DAILY  
  
 citalopram 20 mg tablet Commonly known as:  CELEXA  
TAKE 1 TABLET BY MOUTH DAILY  
  
 cyclobenzaprine 5 mg tablet Commonly known as:  FLEXERIL  
TAKE 1 TABLET BY MOUTH 3 TIMES A DAY AS NEEDED FORMUSCLE SPASM(S)  
  
 gabapentin 600 mg tablet Commonly known as:  NEURONTIN  
TAKE 1 TABLET BY MOUTH 3 TIMES A DAY  
  
 iron polysaccharides 150 mg iron capsule Commonly known as:  Odean Davon Take 1 Cap by mouth two (2) times a day. JANTOVEN 3 mg tablet Generic drug:  warfarin TAKE 1 TABLET BY MOUTH DAILY losartan-hydroCHLOROthiazide 100-25 mg per tablet Commonly known as:  HYZAAR  
TAKE 1 TABLET BY MOUTH DAILY  
  
 naloxone 4 mg/actuation nasal spray Commonly known as:  [employer]  
 1 Spray by Nasal route as needed. Indications: OPIATE-INDUCED RESPIRATORY DEPRESSION  
  
 naproxen 500 mg tablet Commonly known as:  NAPROSYN Take 1 Tab by mouth two (2) times daily (with meals). We Performed the Following CBC WITH AUTOMATED DIFF [67251 CPT(R)] FERRITIN [01492 CPT(R)] IRON PROFILE [id number] CPT(R)] VITAMIN B12 [id number] CPT(R)] Follow-up Instructions Return in 3 months (on 12/8/2017) for [place] . Patient Instructions Dr. Estiven Kumari - [phone]  Gastrointestinal doctor Introducing [facility] [place] [facility]! Christina Stevens introduces Nusirt patient portal. Now you can access parts of your medical record, email your doctor's office, and request medication refills online. 1. In your internet browser, go to [email or web address] 2. Click on the First Time User? Click Here link in the Sign In box. You will see the New Member Sign Up page. 3. Enter your Nusirt Access Code exactly as it appears below. You will not need to use this code after youve completed the sign-up process. If you do not sign up before the expiration date, you must request a new code. · Nusirt Access Code: [email or web address] Expires: 9/18/2017  8:06 AM 
 
4. Enter the last four digits of your Social Security Number (xxxx) and Date of Birth (mm/dd/yyyy) as indicated and click Submit. You will be taken to the next sign-up page. 5. Create a Nusirt ID. This will be your Nusirt login ID and cannot be changed, so think of one that is secure and easy to remember. 6. Create a Nusirt password. You can change your password at any time. 7. Enter your Password Reset Question and Answer. This can be used at a later time if you forget your password. 8. Enter your e-mail address. You will receive e-mail notification when new information is available in [address] 9. Click Sign Up. You can now view and download portions of your medical record. 10. Click the Download Summary menu link to download a portable copy of your medical information. If you have questions, please visit the Frequently Asked Questions section of the goTenna website. Remember, goTenna is NOT to be used for urgent needs. For medical emergencies, dial 911. Now available from your iPhone and Android! Please provide this summary of care documentation to your next provider. Your primary care clinician is listed as LAUREL SINGH. If you have any questions after today's visit, please call [phone].

## 2017-09-08 NOTE — PROGRESS NOTES
[address] [facility] Oncology at [facility]  [phone]    Hematology / Oncology Followup    Reason for Visit:   Conner Escobar is a 80 y.o. female who is seen for follow up of  anemia. History of Present Illness:   Here today for follow up. Her  reports she continues on oral iron twice daily, tolerating it okay though having some abdominal discomfort and constipation which is managed with stool softeners. Patient remains very concerned about perceived \"knot\" on abdomen. Eating okay. Remains on coumadin. Family denies bleeding. Has not seen GI since last visit, family does not remember being told to follow up. She is a poor historian due to memory issues. She is accompanied by her  and daughter today. PAST HISTORY: The following sections were reviewed and updated in the EMR as appropriate: PMH, SH, FH, Medications, Allergies. Allergies   Allergen Reactions    Angiotensin Ii,Human Other (comments)     States does not remember      Avelox [Moxifloxacin] Other (comments)     States does not remember      Demerol [Meperidine] Hives      Review of Systems: A complete review of systems was obtained, reviewed, and scanned into the EMR. Pertinent findings reviewed above. Physical Exam:     Visit Vitals    /61 (BP 1 Location: Left arm, BP Patient Position: Sitting)    Pulse 69    Ht 5' 6\" (1.676 m)    Wt 178 lb (80.7 kg)    SpO2 96%    BMI 28.73 kg/m2     General: No distress, elderly female  Eyes: PERRLA, anicteric sclerae  HENT: Atraumatic, OP clear  Neck: Supple  Lymphatic: No cervical, supraclavicular, or inguinal adenopathy  Respiratory: CTAB, normal respiratory effort  CV: Normal rate, regular rhythm, no murmurs, no peripheral edema  GI: Soft, nontender, nondistended. No hepatomegaly, no splenomegaly  MS: Normal gait and station. Digits without clubbing or cyanosis. Skin: No rashes, ecchymoses, or petechiae.  Normal temperature, turgor, and texture. Psych: Alert, oriented, appropriate affect, normal judgment/insight    Results:     Lab Results   Component Value Date/Time    WBC 5.0 09/08/2017 10:30 AM    HGB 11.1 09/08/2017 10:30 AM    HCT 34.7 09/08/2017 10:30 AM    PLATELET 804 59/44/7505 10:30 AM    MCV 94.6 09/08/2017 10:30 AM    ABS. NEUTROPHILS 2.5 09/08/2017 10:30 AM     Lab Results   Component Value Date/Time    Sodium 138 05/09/2017 08:05 AM    Potassium 4.4 05/09/2017 08:05 AM    Chloride 98 05/09/2017 08:05 AM    CO2 22 05/09/2017 08:05 AM    Glucose 102 05/09/2017 08:05 AM    BUN 21 05/09/2017 08:05 AM    Creatinine 0.99 05/09/2017 08:05 AM    GFR est AA 61 05/09/2017 08:05 AM    GFR est non-AA 53 05/09/2017 08:05 AM    Calcium 9.4 05/09/2017 08:05 AM    Creatinine (POC) 0.9 11/12/2012 07:33 AM     Lab Results   Component Value Date/Time    Bilirubin, total <0.2 05/09/2017 08:05 AM    ALT (SGPT) 11 05/09/2017 08:05 AM    AST (SGOT) 17 05/09/2017 08:05 AM    Alk. phosphatase 136 05/09/2017 08:05 AM    Protein, total 6.6 05/09/2017 08:05 AM    Albumin 4.0 05/09/2017 08:05 AM    Globulin 3.9 08/30/2013 12:03 PM     Lab Results   Component Value Date/Time    Reticulocyte count 3.2 08/10/2017 12:11 PM    Iron % saturation 14 09/08/2017 10:30 AM    TIBC 318 09/08/2017 10:30 AM    Ferritin 42 09/08/2017 10:30 AM    Vitamin B12 221 08/10/2017 12:11 PM    Folate, RBC 1371 08/10/2017 12:10 PM    Haptoglobin 136 08/10/2017 12:10 PM     08/10/2017 12:10 PM    Sed rate (ESR) 11 11/12/2012 12:00 AM    TSH 2.480 07/07/2016 08:06 AM    HEP C VIRUS AB <0.1 11/25/2013 09:36 AM     Lab Results   Component Value Date/Time    INR POC 2.8 08/21/2017 07:45 AM       Colonoscopy by Dr. Melissa Franco 9/10/2015:   Terminal Ileum: normal  Cecum: normal  Ascending Colon: normal  Transverse Colon: normal  Descending Colon: normal  Sigmoid: mild diverticulosis; Rectum: no mucosal lesion appreciated  Grade 1 internal hemorrhoid(s); Abdomen U/S 8/15/2017:  There is no acute abnormality in the abdomen. Status post cholecystectomy. 2 incidental left kidney cysts. Assessment:   1) Anemia, normocytic  Present since 7/2016, previously HGB normal in 2014 and prior. Anemia progressing recently, with HGB dropping <7 prior to seeing me. Improving now on oral iron. Concern for occult blood loss, given her coumadin use and prior reports of melena. Continue oral iron and follow up with GI. Her B12 was borderline on last labs so I recommend she start an oral B12 supplement as well    2) H/o PE  On coumadin. Records report this event as unprovoked so she will likely need to continue this long term. 3) Melena  Follow up with GI about possible EGD and repeat colonoscopy. 4) Abdominal mass  Normal abdominal ultrasound.  No palpable mass on exam.       Plan:     · Labs today: CBC, ferritin, iron profile  · Continue oral iron  · Start Vitamin B12 oral  · Follow up with GI  · Labs in 3-4 months: CBC, ferritin, iron profile, B12 ([facility])  · Return to clinic in 3-4 months      Signed By: Piper Medrano MD     September 8, 2017

## 2017-09-08 NOTE — PROGRESS NOTES
Blood pressure 136/63, pulse 72, temperature 98.1 °F (36.7 °C), resp. rate 20. Pt arrived at 1027,labs drawn peripherally from right [facility], [place]. Pt tolerated well and left at 1034.

## 2017-09-08 NOTE — PROGRESS NOTES
Identified pt with two pt identifiers(name and ). Reviewed record in preparation for visit and have obtained necessary documentation. Chief Complaint   Patient presents with    Anemia        Health Maintenance Due   Topic    FOOT EXAM Q1     EYE EXAM RETINAL OR DILATED Q1     INFLUENZA AGE 9 TO ADULT      HM reviewed w/patient    Coordination of Care Questionnaire:  :   1) Have you been to an emergency room, urgent care clinic since your last visit? no   Hospitalized since your last visit? no             2. Have seen or consulted any other health care provider since your last visit? NO  If yes, where when, and reason for visit? 3) Do you have an Advanced Directive/ Living Will in place? YES  If yes, do we have a copy on file YES      Patient is accompanied by daughter and spouse I have received verbal consent from Louise W Carmelo Díaz to discuss any/all medical information while they are present in the room.

## 2017-10-02 ENCOUNTER — OFFICE VISIT (OUTPATIENT)
Dept: FAMILY MEDICINE CLINIC | Age: 82
End: 2017-10-02

## 2017-10-02 VITALS
HEIGHT: 66 IN | DIASTOLIC BLOOD PRESSURE: 59 MMHG | BODY MASS INDEX: 28.45 KG/M2 | HEART RATE: 89 BPM | RESPIRATION RATE: 18 BRPM | WEIGHT: 177 LBS | TEMPERATURE: 98 F | OXYGEN SATURATION: 98 % | SYSTOLIC BLOOD PRESSURE: 104 MMHG

## 2017-10-02 DIAGNOSIS — I26.99 OTHER PULMONARY EMBOLISM WITHOUT ACUTE COR PULMONALE, UNSPECIFIED CHRONICITY (HCC): Primary | ICD-10-CM

## 2017-10-02 LAB
INR BLD: 3.9
PT POC: 47.1 SECONDS
VALID INTERNAL CONTROL?: YES

## 2017-10-02 NOTE — PROGRESS NOTES
Chief Complaint   Patient presents with    Follow-up    Anticoagulation     Pt presents to the office for f/u, INR    1. Have you been to the ER, urgent care clinic since your last visit? Hospitalized since your last visit? No    2. Have you seen or consulted any other health care providers outside of the [address] [place] [address] since your last visit? Include any pap smears or colon screening. No      Results for orders placed or performed in visit on 10/02/17   AMB POC PT/INR   Result Value Ref Range    VALID INTERNAL CONTROL POC Yes     Prothrombin time (POC) 47.1 seconds    INR POC 3.9      Has cut back on salads      Chief Complaint   Patient presents with    Follow-up    Anticoagulation     She is a 80 y.o. female who presents for evalution. Reviewed PmHx, RxHx, FmHx, SocHx, AllgHx and updated and dated in the chart.     Patient Active Problem List    Diagnosis    Advanced care planning/counseling discussion     Has no completed, dwp importance      Controlled type 2 diabetes mellitus without complication, without long-term current use of insulin ([facility] [address]    Iron deficiency anemia    Advance care planning     Has a LW      Pulmonary embolism ([facility]    Itching    Elevated liver enzymes    S/P cholecystectomy     11/2013      S/P knee replacement     bilaterally      S/P shoulder surgery     Left      H/O carpal tunnel repair     Right hand      Chronic pain    HTN (hypertension)    OA (osteoarthritis)    Fibromyalgia    GERD (gastroesophageal reflux disease)    High cholesterol    Hiatal hernia       Review of Systems - negative except as listed above in the HPI    Objective:     Vitals:    10/02/17 0848   BP: 104/59   Pulse: 89   Resp: 18   Temp: 98 °F (36.7 °C)   TempSrc: Oral   SpO2: 98%   Weight: 177 lb (80.3 kg)   Height: 5' 6\" (1.676 m)     Physical Examination: General appearance - alert, well appearing, and in no distress  Chest - clear to auscultation, no wheezes, rales or rhonchi, symmetric air entry  Heart - normal rate, regular rhythm, normal S1, S2, no murmurs, rubs, clicks or gallops    Assessment/ Plan:   Diagnoses and all orders for this visit:    1. Other pulmonary embolism without acute cor pulmonale, unspecified chronicity (HCC)  -     AMB POC PT/INR  -too thin due to diet changes  -hold today and inc salads and greens in diet       Follow-up Disposition:  Return in about 2 weeks (around 10/16/2017). I have discussed the diagnosis with the patient and the intended plan as seen in the above orders. The patient understands and agrees with the plan. The patient has received an after-visit summary and questions were answered concerning future plans. Medication Side Effects and Warnings were discussed with patient  Patient Labs were reviewed and or requested:  Patient Past Records were reviewed and or requested    José Kirby M.D. There are no Patient Instructions on file for this visit.

## 2017-10-02 NOTE — MR AVS SNAPSHOT
Visit Information Date & Time Provider Department Dept. Phone Encounter #  
 10/2/2017  8:50 AM Kathy Young MD [address] [facility] [phone] [id number] Follow-up Instructions Return in about 2 weeks (around 10/16/2017). Your Appointments 1/5/2018  9:00 AM  
ESTABLISHED PATIENT with Katheryn Luna MD  
[facility] at [facility] [address] [facility]) Appt Note: 3 months [address], [address] [address]  
[phone]  
  
   
 [address], [address] 100[address] [facility] Upcoming Health Maintenance Date Due  
 FOOT EXAM Q1 5/24/1943 EYE EXAM RETINAL OR DILATED Q1 7/18/2013 INFLUENZA AGE 9 TO ADULT 8/1/2017 HEMOGLOBIN A1C Q6M 11/9/2017 MICROALBUMIN Q1 5/9/2018 LIPID PANEL Q1 5/9/2018 MEDICARE YEARLY EXAM 5/10/2018 GLAUCOMA SCREENING Q2Y 11/9/2018 DTaP/Tdap/Td series (2 - Td) 11/9/2026 Allergies as of 10/2/2017  Review Complete On: 10/2/2017 By: Kathy Young MD  
  
 Severity Noted Reaction Type Reactions Angiotensin Ii,human  04/01/2010    Other (comments) States does not remember Avelox [Moxifloxacin]  04/01/2010    Other (comments) States does not remember Demerol [Meperidine]  04/01/2010    Hives Current Immunizations  Reviewed on 11/9/2016 Name Date Influenza High Dose Vaccine PF 11/9/2016 Influenza Vaccine 10/29/2013 Influenza Vaccine Mittie Shouts) 10/20/2015 Influenza Vaccine Split 11/7/2012, 10/11/2011, 11/16/2010 Influenza Vaccine Whole 8/1/2009 Pneumococcal Conjugate (PCV-13) 4/13/2016 ZZZ-RETIRED (DO NOT USE) Pneumococcal Vaccine (Unspecified Type) 4/1/2008 Not reviewed this visit You Were Diagnosed With   
  
 Codes Comments Other pulmonary embolism without acute cor pulmonale, unspecified chronicity (HCC)    -  Primary ICD-10-CM: I26.99 
ICD-9-CM: 415.19 Vitals BP Pulse Temp Resp Height(growth percentile) Weight(growth percentile) 104/59 89 98 °F (36.7 °C) (Oral) 18 5' 6\" (1.676 m) 177 lb (80.3 kg) SpO2 BMI OB Status Smoking Status 98% 28.57 kg/m2 Postmenopausal Never Smoker Vitals History BMI and BSA Data Body Mass Index Body Surface Area 28.57 kg/m 2 1.93 m 2 Preferred Pharmacy Pharmacy Name Phone [facility] # [address] [place] [address] [phone] Your Updated Medication List  
  
   
This list is accurate as of: 10/2/17  9:08 AM.  Always use your most recent med list.  
  
  
  
  
 acetaminophen-codeine 300-30 mg per tablet Commonly known as:  TYLENOL #3 Take 1 Tab by mouth every four (4) hours as needed for Pain. Max Daily Amount: 6 Tabs. Indications: Pain  
  
 amLODIPine 10 mg tablet Commonly known as:  [place] TAKE 1 TABLET BY MOUTH DAILY  
  
 citalopram 20 mg tablet Commonly known as:  CELEXA  
TAKE 1 TABLET BY MOUTH DAILY  
  
 cyclobenzaprine 5 mg tablet Commonly known as:  FLEXERIL  
TAKE 1 TABLET BY MOUTH 3 TIMES A DAY AS NEEDED FORMUSCLE SPASM(S)  
  
 gabapentin 600 mg tablet Commonly known as:  NEURONTIN  
TAKE 1 TABLET BY MOUTH 3 TIMES A DAY  
  
 iron polysaccharides 150 mg iron capsule Commonly known as:  Karsten [employer]is Take 1 Cap by mouth two (2) times a day. JANTOVEN 3 mg tablet Generic drug:  warfarin TAKE 1 TABLET BY MOUTH DAILY losartan-hydroCHLOROthiazide 100-25 mg per tablet Commonly known as:  HYZAAR  
TAKE 1 TABLET BY MOUTH DAILY  
  
 naloxone 4 mg/actuation nasal spray Commonly known as:  NARCAN  
1 Spray by Nasal route as needed. Indications: OPIATE-INDUCED RESPIRATORY DEPRESSION  
  
 naproxen 500 mg tablet Commonly known as:  NAPROSYN Take 1 Tab by mouth two (2) times daily (with meals). We Performed the Following AMB POC PT/INR [28939 CPT(R)] Follow-up Instructions Return in about 2 weeks (around 10/16/2017). Introducing [facility] [place] [facility]! [employer] introduces GOBA patient portal. Now you can access parts of your medical record, email your doctor's office, and request medication refills online. 1. In your internet browser, go to [email or web address] 2. Click on the First Time User? Click Here link in the Sign In box. You will see the New Member Sign Up page. 3. Enter your GOBA Access Code exactly as it appears below. You will not need to use this code after youve completed the sign-up process. If you do not sign up before the expiration date, you must request a new code. · GOBA Access Code: [email or web address] Expires: 12/31/2017  9:08 AM 
 
4. Enter the last four digits of your Social Security Number (xxxx) and Date of Birth (mm/dd/yyyy) as indicated and click Submit. You will be taken to the next sign-up page. 5. Create a GOBA ID. This will be your GOBA login ID and cannot be changed, so think of one that is secure and easy to remember. 6. Create a GOBA password. You can change your password at any time. 7. Enter your Password Reset Question and Answer. This can be used at a later time if you forget your password. 8. Enter your e-mail address. You will receive e-mail notification when new information is available in [address] 9. Click Sign Up. You can now view and download portions of your medical record. 10. Click the Download Summary menu link to download a portable copy of your medical information. If you have questions, please visit the Frequently Asked Questions section of the GOBA website. Remember, GOBA is NOT to be used for urgent needs. For medical emergencies, dial 911. Now available from your iPhone and Android! Please provide this summary of care documentation to your next provider. Your primary care clinician is listed as LAUREL SINGH.  If you have any questions after today's visit, please call [phone].

## 2017-10-16 ENCOUNTER — OFFICE VISIT (OUTPATIENT)
Dept: FAMILY MEDICINE CLINIC | Age: 82
End: 2017-10-16

## 2017-10-16 VITALS
BODY MASS INDEX: 27.8 KG/M2 | HEIGHT: 66 IN | RESPIRATION RATE: 18 BRPM | WEIGHT: 173 LBS | DIASTOLIC BLOOD PRESSURE: 76 MMHG | SYSTOLIC BLOOD PRESSURE: 127 MMHG | OXYGEN SATURATION: 94 % | TEMPERATURE: 98.6 F | HEART RATE: 65 BPM

## 2017-10-16 DIAGNOSIS — D68.9 COAGULATION DEFICIENCY (HCC): ICD-10-CM

## 2017-10-16 DIAGNOSIS — I10 ESSENTIAL HYPERTENSION: Primary | Chronic | ICD-10-CM

## 2017-10-16 DIAGNOSIS — Z23 ENCOUNTER FOR IMMUNIZATION: ICD-10-CM

## 2017-10-16 LAB
INR BLD: 2.5
PT POC: NORMAL SECONDS
VALID INTERNAL CONTROL?: YES

## 2017-10-16 NOTE — MR AVS SNAPSHOT
Visit Information Date & Time Provider Department Dept. Phone Encounter #  
 10/16/2017  8:50 AM Phu Wick MD [address] [facility] [phone] [id number] Follow-up Instructions Return in about 6 weeks (around 11/27/2017). Your Appointments 1/5/2018  9:00 AM  
ESTABLISHED PATIENT with Krys Salmon MD  
[facility] [address] [facility]) Appt Note: 3 months [address], [address] [facility] [address] [id number]  
[phone]  
  
   
 [address] [facility], [address] [facility] Upcoming Health Maintenance Date Due  
 FOOT EXAM Q1 5/24/1943 EYE EXAM RETINAL OR DILATED Q1 7/18/2013 INFLUENZA AGE 9 TO ADULT 8/1/2017 HEMOGLOBIN A1C Q6M 11/9/2017 MICROALBUMIN Q1 5/9/2018 LIPID PANEL Q1 5/9/2018 MEDICARE YEARLY EXAM 5/10/2018 GLAUCOMA SCREENING Q2Y 11/9/2018 DTaP/Tdap/Td series (2 - Td) 11/9/2026 Allergies as of 10/16/2017  Review Complete On: 10/16/2017 By: Phu Wick MD  
  
 Severity Noted Reaction Type Reactions Angiotensin Ii,human  04/01/2010    Other (comments) States does not remember Avelox [Moxifloxacin]  04/01/2010    Other (comments) States does not remember Demerol [Meperidine]  04/01/2010    Hives Current Immunizations  Reviewed on 11/9/2016 Name Date Influenza High Dose Vaccine PF  Incomplete, 11/9/2016 Influenza Vaccine 10/29/2013 Influenza Vaccine Clifton Leech) 10/20/2015 Influenza Vaccine Split 11/7/2012, 10/11/2011, 11/16/2010 Influenza Vaccine Whole 8/1/2009 Pneumococcal Conjugate (PCV-13) 4/13/2016 ZZZ-RETIRED (DO NOT USE) Pneumococcal Vaccine (Unspecified Type) 4/1/2008 Not reviewed this visit You Were Diagnosed With   
  
 Codes Comments Essential hypertension    -  Primary ICD-10-CM: I10 
ICD-9-CM: 401.9 Coagulation deficiency ([facility]     ICD-10-CM: D69.9 ICD-9-CM: 286.9 Encounter for immunization     ICD-10-CM: H46 ICD-9-CM: V03.89 Vitals BP Pulse Temp Resp Height(growth percentile) Weight(growth percentile) 127/76 65 98.6 °F (37 °C) 18 5' 6\" (1.676 m) 173 lb (78.5 kg) SpO2 BMI OB Status Smoking Status 94% 27.92 kg/m2 Postmenopausal Never Smoker Vitals History BMI and BSA Data Body Mass Index Body Surface Area  
 27.92 kg/m 2 1.91 m 2 Preferred Pharmacy Pharmacy Name Phone [id number] - [place], [address] [facility] [address] [phone] Your Updated Medication List  
  
   
This list is accurate as of: 10/16/17  9:04 AM.  Always use your most recent med list.  
  
  
  
  
 acetaminophen-codeine 300-30 mg per tablet Commonly known as:  TYLENOL #3 Take 1 Tab by mouth every four (4) hours as needed for Pain. Max Daily Amount: 6 Tabs. Indications: Pain  
  
 amLODIPine 10 mg tablet Commonly known as:  Tucker Fanti TAKE 1 TABLET BY MOUTH DAILY  
  
 citalopram 20 mg tablet Commonly known as:  CELEXA  
TAKE 1 TABLET BY MOUTH DAILY  
  
 cyclobenzaprine 5 mg tablet Commonly known as:  FLEXERIL  
TAKE 1 TABLET BY MOUTH 3 TIMES A DAY AS NEEDED FORMUSCLE SPASM(S)  
  
 gabapentin 600 mg tablet Commonly known as:  NEURONTIN  
TAKE 1 TABLET BY MOUTH 3 TIMES A DAY  
  
 iron polysaccharides 150 mg iron capsule Commonly known as:  Kinza Phenes Take 1 Cap by mouth two (2) times a day. JANTOVEN 3 mg tablet Generic drug:  warfarin TAKE 1 TABLET BY MOUTH DAILY losartan-hydroCHLOROthiazide 100-25 mg per tablet Commonly known as:  HYZAAR  
TAKE 1 TABLET BY MOUTH DAILY  
  
 naloxone 4 mg/actuation nasal spray Commonly known as:  NARCAN  
1 Spray by Nasal route as needed. Indications: OPIATE-INDUCED RESPIRATORY DEPRESSION We Performed the Following ADMIN INFLUENZA VIRUS VAC [ HCPCS] AMB POC PT/INR [61255 CPT(R)] INFLUENZA VIRUS VACCINE, HIGH DOSE SEASONAL, PRESERVATIVE FREE [91374 CPT(R)] Follow-up Instructions Return in about 6 weeks (around 11/27/2017). Introducing [facility]! [facility] introduces [employer] patient portal. Now you can access parts of your medical record, email your doctor's office, and request medication refills online. 1. In your internet browser, go to [email or web address] 2. Click on the First Time User? Click Here link in the Sign In box. You will see the New Member Sign Up page. 3. Enter your [employer] Access Code exactly as it appears below. You will not need to use this code after youve completed the sign-up process. If you do not sign up before the expiration date, you must request a new code. · [employer] Access Code: [email or web address] Expires: 12/31/2017  9:08 AM 
 
4. Enter the last four digits of your Social Security Number (xxxx) and Date of Birth (mm/dd/yyyy) as indicated and click Submit. You will be taken to the next sign-up page. 5. Create a [employer] ID. This will be your [employer] login ID and cannot be changed, so think of one that is secure and easy to remember. 6. Create a [employer] password. You can change your password at any time. 7. Enter your Password Reset Question and Answer. This can be used at a later time if you forget your password. 8. Enter your e-mail address. You will receive e-mail notification when new information is available in [address] 9. Click Sign Up. You can now view and download portions of your medical record. 10. Click the Download Summary menu link to download a portable copy of your medical information. If you have questions, please visit the Frequently Asked Questions section of the [employer] website. Remember, [employer] is NOT to be used for urgent needs. For medical emergencies, dial 911. Now available from your iPhone and Android! Please provide this summary of care documentation to your next provider. Your primary care clinician is listed as LAUREL SINGH. If you have any questions after today's visit, please call [phone].

## 2017-10-16 NOTE — PROGRESS NOTES
Patient here for 2 week f/u, coumadin changes last visit. 1. Have you been to the ER, urgent care clinic since your last visit? Hospitalized since your last visit? No    2. Have you seen or consulted any other health care providers outside of the [address] [place] [address] since your last visit? Include any pap smears or colon screening. No     Results for orders placed or performed in visit on 10/16/17   AMB POC PT/INR   Result Value Ref Range    VALID INTERNAL CONTROL POC Yes     Prothrombin time (POC)  seconds    INR POC 2.5        Chief Complaint   Patient presents with    Coagulation disorder    Immunization/Injection     flu shot     She is a 80 y.o. female who presents for evalution. Reviewed PmHx, RxHx, FmHx, SocHx, AllgHx and updated and dated in the chart.     Patient Active Problem List    Diagnosis    Advanced care planning/counseling discussion     Has no completed, dwp importance      Controlled type 2 diabetes mellitus without complication, without long-term current use of insulin ([facility]    Iron deficiency anemia    Advance care planning     Has a LW      Pulmonary embolism ([facility]    Itching    Elevated liver enzymes    S/P cholecystectomy     11/2013      S/P knee replacement     bilaterally      S/P shoulder surgery     Left      H/O carpal tunnel repair     Right hand      Chronic pain    HTN (hypertension)    OA (osteoarthritis)    Fibromyalgia    GERD (gastroesophageal reflux disease)    High cholesterol    Hiatal hernia       Review of Systems - negative except as listed above in the HPI    Objective:     Vitals:    10/16/17 0846   BP: 127/76   Pulse: 65   Resp: 18   Temp: 98.6 °F (37 °C)   SpO2: 94%   Weight: 173 lb (78.5 kg)   Height: 5' 6\" (1.676 m)     Physical Examination: General appearance - alert, well appearing, and in no distress  Chest - clear to auscultation, no wheezes, rales or rhonchi, symmetric air entry  Heart - normal rate, regular rhythm, normal S1, S2, no murmurs, rubs, clicks or gallops    Assessment/ Plan:   Diagnoses and all orders for this visit:    1. Essential hypertension  -at goal    2. Coagulation deficiency (HCC)  -     AMB POC PT/INR  -at goal    3. Encounter for immunization  -     Influenza virus vaccine ([address]) 72 years and older ([address])  -     Administration fee () for Medicare insured patients       Follow-up Disposition:  Return in about 6 weeks (around 11/27/2017). I have discussed the diagnosis with the patient and the intended plan as seen in the above orders. The patient understands and agrees with the plan. The patient has received an after-visit summary and questions were answered concerning future plans. Medication Side Effects and Warnings were discussed with patient  Patient Labs were reviewed and or requested:  Patient Past Records were reviewed and or requested    Trudi Salazar M.D. There are no Patient Instructions on file for this visit.

## 2017-11-27 ENCOUNTER — HOSPITAL ENCOUNTER (OUTPATIENT)
Dept: LAB | Age: 82
Discharge: HOME | End: 2017-11-27
Payer: MEDICARE

## 2017-11-27 ENCOUNTER — OFFICE VISIT (OUTPATIENT)
Dept: FAMILY MEDICINE CLINIC | Age: 82
End: 2017-11-27

## 2017-11-27 ENCOUNTER — DOCUMENTATION ONLY (OUTPATIENT)
Dept: FAMILY MEDICINE CLINIC | Age: 82
End: 2017-11-27

## 2017-11-27 VITALS
BODY MASS INDEX: 28.04 KG/M2 | WEIGHT: 174.5 LBS | HEART RATE: 82 BPM | OXYGEN SATURATION: 97 % | SYSTOLIC BLOOD PRESSURE: 103 MMHG | RESPIRATION RATE: 20 BRPM | DIASTOLIC BLOOD PRESSURE: 63 MMHG | TEMPERATURE: 97.9 F | HEIGHT: 66 IN

## 2017-11-27 DIAGNOSIS — R79.1 INR (INTERNATIONAL NORMAL RATIO) ABNORMAL: ICD-10-CM

## 2017-11-27 DIAGNOSIS — G89.4 CHRONIC PAIN SYNDROME: ICD-10-CM

## 2017-11-27 DIAGNOSIS — I10 ESSENTIAL HYPERTENSION: Chronic | ICD-10-CM

## 2017-11-27 DIAGNOSIS — D50.9 IRON DEFICIENCY ANEMIA, UNSPECIFIED IRON DEFICIENCY ANEMIA TYPE: ICD-10-CM

## 2017-11-27 DIAGNOSIS — E11.9 CONTROLLED TYPE 2 DIABETES MELLITUS WITHOUT COMPLICATION, WITHOUT LONG-TERM CURRENT USE OF INSULIN (HCC): Primary | ICD-10-CM

## 2017-11-27 DIAGNOSIS — E78.00 HIGH CHOLESTEROL: Chronic | ICD-10-CM

## 2017-11-27 LAB
INR BLD: 3
PT POC: NORMAL SECONDS
VALID INTERNAL CONTROL?: YES

## 2017-11-27 PROCEDURE — 83036 HEMOGLOBIN GLYCOSYLATED A1C: CPT

## 2017-11-27 PROCEDURE — 80061 LIPID PANEL: CPT

## 2017-11-27 PROCEDURE — 80053 COMPREHEN METABOLIC PANEL: CPT

## 2017-11-27 PROCEDURE — 85025 COMPLETE CBC W/AUTO DIFF WBC: CPT

## 2017-11-27 PROCEDURE — 83550 IRON BINDING TEST: CPT

## 2017-11-27 RX ORDER — HYDROCODONE BITARTRATE AND ACETAMINOPHEN 7.5; 325 MG/1; MG/1
1 TABLET ORAL
Qty: 90 TAB | Refills: 0 | Status: SHIPPED | OUTPATIENT
Start: 2017-11-27 | End: 2018-02-27 | Stop reason: SDUPTHER

## 2017-11-27 RX ORDER — HYDROCODONE BITARTRATE AND ACETAMINOPHEN 7.5; 325 MG/1; MG/1
1 TABLET ORAL
Qty: 90 TAB | Refills: 0 | Status: SHIPPED | OUTPATIENT
Start: 2017-11-27 | End: 2017-11-27 | Stop reason: SDUPTHER

## 2017-11-27 NOTE — PROGRESS NOTES
1. Have you been to the ER, urgent care clinic since your last visit? Hospitalized since your last visit? No    2. Have you seen or consulted any other health care providers outside of the [address] [place] [address] since your last visit? Include any pap smears or colon screening. No   Chief Complaint   Patient presents with    Coagulation disorder     inr check      INR/Prothrombin Time  3.0    Inc pain and would like to inc from Tyl #3      Mult joint pains, walks with walker    Due for dm labs    Lab Results   Component Value Date/Time    Microalb/Creat ratio (ug/mg creat.) 31.5 05/09/2017 08:05 AM      Chief Complaint   Patient presents with    Coagulation disorder     inr check      she is a 80y.o. year old female who presents for evaluation. See Diabetic Report Card listed above. Patient Active Problem List    Diagnosis    Advanced care planning/counseling discussion     Has no completed, dwp importance      Controlled type 2 diabetes mellitus without complication, without long-term current use of insulin ([facility]    Iron deficiency anemia    Advance care planning     Has a LW      Pulmonary embolism ([facility]    Itching    Elevated liver enzymes    S/P cholecystectomy     11/2013      S/P knee replacement     bilaterally      S/P shoulder surgery     Left      H/O carpal tunnel repair     Right hand      Chronic pain    HTN (hypertension)    OA (osteoarthritis)    Fibromyalgia    GERD (gastroesophageal reflux disease)    High cholesterol    Hiatal hernia       Reviewed PmHx, RxHx, FmHx, SocHx, AllgHx--dated and updated in the chart.     Review of Systems - negative except as listed above in the HPI    Objective:     Vitals:    11/27/17 0838   BP: 103/63   Pulse: 82   Resp: 20   Temp: 97.9 °F (36.6 °C)   TempSrc: Oral   SpO2: 97%   Weight: 174 lb 8 oz (79.2 kg)   Height: 5' 6\" (1.676 m)     Physical Examination: General appearance - alert, well appearing, and in no distress  Neck - supple, no significant adenopathy  Chest - clear to auscultation, no wheezes, rales or rhonchi, symmetric air entry  Heart - normal rate, regular rhythm, normal S1, S2, no murmurs, rubs, clicks or gallops  Abdomen - soft, nontender, nondistended, no masses or organomegaly  Extremities - peripheral pulses normal, no pedal edema, no clubbing or cyanosis  Foot Exam:  Feet were examined, no sensory or circulatory issues, no ulcers noted  Assessment/ Plan:   Diagnoses and all orders for this visit:    1. Controlled type 2 diabetes mellitus without complication, without long-term current use of insulin (HCC)  -     LIPID PANEL  -     METABOLIC PANEL, COMPREHENSIVE  -     HEMOGLOBIN A1C WITH EAG  -     HM DIABETES FOOT EXAM  -     REFERRAL TO OPHTHALMOLOGY  -diet is poor    2. Essential hypertension  -     LIPID PANEL  -     METABOLIC PANEL, COMPREHENSIVE  -at goal    3. High cholesterol  -     LIPID PANEL  -     METABOLIC PANEL, COMPREHENSIVE    4. Iron deficiency anemia, unspecified iron deficiency anemia type  -     CBC WITH AUTOMATED DIFF  -is taking iron and B12 as well    5. INR (international normal ratio) abnormal  -     AMB POC PT/INR  -at goal    6. Chronic pain syndrome  -     HYDROcodone-acetaminophen (NORCO) 7.5-325 mg per tablet; Take 1 Tab by mouth every eight (8) hours as needed for Pain. Max Daily Amount: 3 Tabs. -3 fills  -dc Tyl #3  -pain is 9/10  -long hx of all over pain  -has had mult surgeries, walks with walker   Opioid/Pain Management:    1. Has the patient signed a pain contract for chronic narcotic use? yes  2. Has the patient had a UDS or Serum screen as per guidelines as per [place] [employer]?:   yes    3. Does patient meet necessary guidelines for Naloxone treatement per the [place] [employer]?:    yes  4. Has the Prescription Monitoring Program been reviewed? yes  5. Does patient have a long term condition that requires long term use of a Narcotic?  yes  6.   Has patient been tolerant of therapy and responsible to routine follow up and specialist follow-up? Yes         Follow-up Disposition:  Return in about 3 months (around 2/27/2018). Lab Results   Component Value Date/Time    Cholesterol, total 214 05/09/2017 08:05 AM    HDL Cholesterol 53 05/09/2017 08:05 AM    LDL, calculated 134 05/09/2017 08:05 AM    Triglyceride 134 05/09/2017 08:05 AM     Lab Results   Component Value Date/Time    Hemoglobin A1c 6.1 05/09/2017 08:05 AM    Hemoglobin A1c 6.5 07/07/2016 08:06 AM    Hemoglobin A1c 5.6 11/19/2009 08:09 AM    Microalb/Creat ratio (ug/mg creat.) 31.5 05/09/2017 08:05 AM    LDL, calculated 134 05/09/2017 08:05 AM    Creatinine (POC) 0.9 11/12/2012 07:33 AM    Creatinine 0.99 05/09/2017 08:05 AM          Discussed with patient goal of Diabetes to include:  HgA1C <7, LDL cholesterol <70, Blood pressure <130/80. Discussed with patient diet and weight management and to get regular exercise. Recommend yearly eye exams and daily foot care. The patient understands and agrees with the plan. I have discussed the diagnosis with the patient and the intended plan as seen in the above orders. The patient has received an after-visit summary and questions were answered concerning future plans. Medication Side Effects and Warnings were discussed with patient  Patient Labs were reviewed and or requested  Patient Past Records were reviewed and or requested    Felicia Simpson M.D. [address]    There are no Patient Instructions on file for this visit.

## 2017-11-27 NOTE — LETTER
11/29/2017 5:30 PM 
 
Ms. Tray Regan [facility] [address] [id number] Dear Ruel Oden: 
 
Please find your most recent results below. Resulted Orders AMB POC PT/INR Result Value Ref Range VALID INTERNAL CONTROL POC Yes Prothrombin time (POC)  seconds INR POC 3.0 IRON PROFILE Result Value Ref Range TIBC 289 250 - 450 ug/dL UIBC 171 118 - 369 ug/dL Iron 118 27 - 139 ug/dL Iron % saturation 41 15 - 55 % Narrative Performed at:  [address]  [id number] : Celeste Evans MD, Phone:  [phone] CBC WITH AUTOMATED DIFF Result Value Ref Range WBC 5.5 3.4 - 10.8 x10E3/uL  
 RBC 3.94 3.77 - 5.28 x10E6/uL HGB 12.1 11.1 - 15.9 g/dL Comment: **Effective December 4, 2017 the reference interval** 
  for Hemoglobin MALES only will be changing to: Males 13-15 years: 12.6 - 17.7 Males   >15 years: 13.0 - 17.7 HCT 37.6 34.0 - 46.6 % MCV 95 79 - 97 fL  
 MCH 30.7 26.6 - 33.0 pg  
 MCHC 32.2 31.5 - 35.7 g/dL  
 RDW 13.5 12.3 - 15.4 % PLATELET 515 075 - 908 x10E3/uL NEUTROPHILS 57 Not Estab. % Lymphocytes 36 Not Estab. % MONOCYTES 6 Not Estab. % EOSINOPHILS 1 Not Estab. % BASOPHILS 0 Not Estab. %  
 ABS. NEUTROPHILS 3.1 1.4 - 7.0 x10E3/uL Abs Lymphocytes 2.0 0.7 - 3.1 x10E3/uL  
 ABS. MONOCYTES 0.3 0.1 - 0.9 x10E3/uL  
 ABS. EOSINOPHILS 0.1 0.0 - 0.4 x10E3/uL  
 ABS. BASOPHILS 0.0 0.0 - 0.2 x10E3/uL IMMATURE GRANULOCYTES 0 Not Estab. %  
 ABS. IMM. GRANS. 0.0 0.0 - 0.1 x10E3/uL Narrative Performed at:  [address]  [id number] : Celeste Evans MD, Phone:  [phone] METABOLIC PANEL, COMPREHENSIVE Result Value Ref Range Glucose 93 65 - 99 mg/dL BUN 20 8 - 27 mg/dL Creatinine 0.97 0.57 - 1.00 mg/dL  GFR est non-AA 54 (L) >59 mL/min/1.73  
 GFR est AA 62 >59 mL/min/1.73  
 BUN/Creatinine ratio 21 12 - 28 Sodium 140 134 - 144 mmol/L Potassium 4.4 3.5 - 5.2 mmol/L Chloride 96 96 - 106 mmol/L  
 CO2 26 18 - 29 mmol/L Calcium 9.6 8.7 - 10.3 mg/dL Protein, total 6.8 6.0 - 8.5 g/dL Albumin 4.0 3.5 - 4.7 g/dL GLOBULIN, TOTAL 2.8 1.5 - 4.5 g/dL A-G Ratio 1.4 1.2 - 2.2 Bilirubin, total 0.4 0.0 - 1.2 mg/dL Alk. phosphatase 131 (H) 39 - 117 IU/L  
 AST (SGOT) 16 0 - 40 IU/L  
 ALT (SGPT) 8 0 - 32 IU/L Narrative Performed at:  [address]  [id number] : David Mackey MD, Phone:  [phone] LIPID PANEL Result Value Ref Range Cholesterol, total 244 (H) 100 - 199 mg/dL Triglyceride 194 (H) 0 - 149 mg/dL HDL Cholesterol 52 >39 mg/dL VLDL, calculated 39 5 - 40 mg/dL LDL, calculated 153 (H) 0 - 99 mg/dL Narrative Performed at:  [address]  [id number] : David Mackey MD, Phone:  [phone] CVD REPORT Result Value Ref Range INTERPRETATION Note Comment:  
   Supplemental report is available. PDF IMAGE Not applicable Narrative Performed at:  [facility] [place] [address]  [id number] : Janine Epperson PhD, Phone:  [phone] CKD REPORT Result Value Ref Range Interpretation Note Comment:  
   Supplemental report is available. Narrative Performed at:  [address] [place] [address]  [id number] : Janine Epperson PhD, Phone:  [phone] HEMOGLOBIN A1C WITH EAG Result Value Ref Range Hemoglobin A1c 6.0 (H) 4.8 - 5.6 % Comment:  
            Pre-diabetes: 5.7 - 6.4 Diabetes: >6.4 Glycemic control for adults with diabetes: <7.0 Estimated average glucose 126 mg/dL Narrative Performed at:  [facility] [address]  [id number] : Mar Jordan MD, Phone:  [phone] RECOMMENDATIONS: 
  
    
  Labs stable, no changes in medication Please call me if you have any questions: [phone] Sincerely, Abhishek Flores MD

## 2017-11-27 NOTE — MR AVS SNAPSHOT
Visit Information Date & Time Provider Department Dept. Phone Encounter #  
 11/27/2017  8:50 AM Malu Quach MD [address] [facility] [phone] [id number] Follow-up Instructions Return in about 3 months (around 2/27/2018). Your Appointments 1/5/2018  9:00 AM  
ESTABLISHED PATIENT with Alton Evans MD  
[facility] [address]) Appt Note: 3 months [address] [facility], [address] [facility] 99 [address]  
[phone]  
  
   
 [address] [facility], [address] 100[address] [facility] Upcoming Health Maintenance Date Due  
 FOOT EXAM Q1 5/24/1943 EYE EXAM RETINAL OR DILATED Q1 7/18/2013 HEMOGLOBIN A1C Q6M 11/9/2017 MICROALBUMIN Q1 5/9/2018 LIPID PANEL Q1 5/9/2018 MEDICARE YEARLY EXAM 5/10/2018 GLAUCOMA SCREENING Q2Y 11/9/2018 DTaP/Tdap/Td series (2 - Td) 11/9/2026 Allergies as of 11/27/2017  Review Complete On: 11/27/2017 By: Malu Quach MD  
  
 Severity Noted Reaction Type Reactions Angiotensin Ii,human  04/01/2010    Other (comments) States does not remember Avelox [Moxifloxacin]  04/01/2010    Other (comments) States does not remember Demerol [Meperidine]  04/01/2010    Hives Current Immunizations  Reviewed on 10/16/2017 Name Date Influenza High Dose Vaccine PF 10/16/2017, 11/9/2016 Influenza Vaccine 10/29/2013 Influenza Vaccine Cris León) 10/20/2015 Influenza Vaccine Split 11/7/2012, 10/11/2011, 11/16/2010 Influenza Vaccine Whole 8/1/2009 Pneumococcal Conjugate (PCV-13) 4/13/2016 ZZZ-RETIRED (DO NOT USE) Pneumococcal Vaccine (Unspecified Type) 4/1/2008 Not reviewed this visit You Were Diagnosed With   
  
 Codes Comments Controlled type 2 diabetes mellitus without complication, without long-term current use of insulin ([facility]    -  Primary ICD-10-CM: E11.9 ICD-9-CM: 250.00  Essential hypertension     ICD-10-CM: I10 
 ICD-9-CM: 401.9 High cholesterol     ICD-10-CM: E78.00 ICD-9-CM: 272.0 Iron deficiency anemia, unspecified iron deficiency anemia type     ICD-10-CM: D50.9 ICD-9-CM: 280.9 INR (international normal ratio) abnormal     ICD-10-CM: R79.1 ICD-9-CM: 790.92 Chronic pain syndrome     ICD-10-CM: G89.4 ICD-9-CM: 338. 4 Vitals BP Pulse Temp Resp Height(growth percentile) Weight(growth percentile) 103/63 (BP 1 Location: Left arm, BP Patient Position: Sitting) 82 97.9 °F (36.6 °C) (Oral) 20 5' 6\" (1.676 m) 174 lb 8 oz (79.2 kg) SpO2 BMI OB Status Smoking Status 97% 28.17 kg/m2 Postmenopausal Never Smoker Vitals History BMI and BSA Data Body Mass Index Body Surface Area  
 28.17 kg/m 2 1.92 m 2 Preferred Pharmacy Pharmacy Name Phone [facility] [id number] [place] [address] [phone] Your Updated Medication List  
  
   
This list is accurate as of: 11/27/17  9:03 AM.  Always use your most recent med list. amLODIPine 10 mg tablet Commonly known as:  Love Breach TAKE 1 TABLET BY MOUTH DAILY  
  
 citalopram 20 mg tablet Commonly known as:  CELEXA  
TAKE 1 TABLET BY MOUTH DAILY  
  
 cyclobenzaprine 5 mg tablet Commonly known as:  FLEXERIL  
TAKE 1 TABLET BY MOUTH 3 TIMES A DAY AS NEEDED FORMUSCLE SPASM(S)  
  
 gabapentin 600 mg tablet Commonly known as:  NEURONTIN  
TAKE 1 TABLET BY MOUTH 3 TIMES A DAY HYDROcodone-acetaminophen 7.5-325 mg per tablet Commonly known as:  Annamary Bertha Take 1 Tab by mouth every eight (8) hours as needed for Pain. Max Daily Amount: 3 Tabs. iron polysaccharides 150 mg iron capsule Commonly known as:  Celina Dock Take 1 Cap by mouth two (2) times a day. JANTOVEN 3 mg tablet Generic drug:  warfarin TAKE 1 TABLET BY MOUTH DAILY losartan-hydroCHLOROthiazide 100-25 mg per tablet Commonly known as:  HYZAAR  
TAKE 1 TABLET BY MOUTH DAILY naloxone 4 mg/actuation nasal spray Commonly known as:  NARCAN  
1 Spray by Nasal route as needed. Indications: OPIATE-INDUCED RESPIRATORY DEPRESSION Prescriptions Printed Refills HYDROcodone-acetaminophen (NORCO) 7.5-325 mg per tablet 0 Sig: Take 1 Tab by mouth every eight (8) hours as needed for Pain. Max Daily Amount: 3 Tabs. Class: Print Route: Oral  
  
We Performed the Following AMB POC PT/INR [40166 CPT(R)] CBC WITH AUTOMATED DIFF [72435 CPT(R)] HEMOGLOBIN A1C WITH EAG [48637 CPT(R)]  DIABETES FOOT EXAM [HM7 Custom] LIPID PANEL [20773 CPT(R)] METABOLIC PANEL, COMPREHENSIVE [12504 CPT(R)] REFERRAL TO OPHTHALMOLOGY [REF57 Custom] Follow-up Instructions Return in about 3 months (around 2/27/2018). Referral Information Referral ID Referred By Referred To  
  
 [id number] LAUREL SINGH Not Available Visits Status Start Date End Date 1 New Request 11/27/17 11/27/18 If your referral has a status of pending review or denied, additional information will be sent to support the outcome of this decision. Introducing [facility] & HEALTH SERVICES! Vickie Mclaughlin introduces S[employer] patient portal. Now you can access parts of your medical record, email your doctor's office, and request medication refills online. 1. In your internet browser, go to [email or web address] 2. Click on the First Time User? Click Here link in the Sign In box. You will see the New Member Sign Up page. 3. Enter your Soteira Access Code exactly as it appears below. You will not need to use this code after youve completed the sign-up process. If you do not sign up before the expiration date, you must request a new code. · Soteira Access Code: [email or web address] Expires: 12/31/2017  8:08 AM 
 
4. Enter the last four digits of your Social Security Number (xxxx) and Date of Birth (mm/dd/yyyy) as indicated and click Submit.  You will be taken to the next sign-up page. 5. Create a [email or web address] ID. This will be your [email or web address] login ID and cannot be changed, so think of one that is secure and easy to remember. 6. Create a [email or web address] password. You can change your password at any time. 7. Enter your Password Reset Question and Answer. This can be used at a later time if you forget your password. 8. Enter your e-mail address. You will receive e-mail notification when new information is available in [address] 9. Click Sign Up. You can now view and download portions of your medical record. 10. Click the Download Summary menu link to download a portable copy of your medical information. If you have questions, please visit the Frequently Asked Questions section of the [email or web address] website. Remember, [email or web address] is NOT to be used for urgent needs. For medical emergencies, dial 911. Now available from your iPhone and Android! Please provide this summary of care documentation to your next provider. Your primary care clinician is listed as LAUREL SINGH. If you have any questions after today's visit, please call [phone].

## 2017-11-28 LAB
ALBUMIN SERPL-MCNC: 4 G/DL (ref 3.5–4.7)
ALBUMIN/GLOB SERPL: 1.4 {RATIO} (ref 1.2–2.2)
ALP SERPL-CCNC: 131 IU/L (ref 39–117)
ALT SERPL-CCNC: 8 IU/L (ref 0–32)
AST SERPL-CCNC: 16 IU/L (ref 0–40)
BASOPHILS # BLD AUTO: 0 X10E3/UL (ref 0–0.2)
BASOPHILS NFR BLD AUTO: 0 %
BILIRUB SERPL-MCNC: 0.4 MG/DL (ref 0–1.2)
BUN SERPL-MCNC: 20 MG/DL (ref 8–27)
BUN/CREAT SERPL: 21 (ref 12–28)
CALCIUM SERPL-MCNC: 9.6 MG/DL (ref 8.7–10.3)
CHLORIDE SERPL-SCNC: 96 MMOL/L (ref 96–106)
CHOLEST SERPL-MCNC: 244 MG/DL (ref 100–199)
CO2 SERPL-SCNC: 26 MMOL/L (ref 18–29)
CREAT SERPL-MCNC: 0.97 MG/DL (ref 0.57–1)
EOSINOPHIL # BLD AUTO: 0.1 X10E3/UL (ref 0–0.4)
EOSINOPHIL NFR BLD AUTO: 1 %
ERYTHROCYTE [DISTWIDTH] IN BLOOD BY AUTOMATED COUNT: 13.5 % (ref 12.3–15.4)
EST. AVERAGE GLUCOSE BLD GHB EST-MCNC: 126 MG/DL
GFR SERPLBLD CREATININE-BSD FMLA CKD-EPI: 54 ML/MIN/1.73
GFR SERPLBLD CREATININE-BSD FMLA CKD-EPI: 62 ML/MIN/1.73
GLOBULIN SER CALC-MCNC: 2.8 G/DL (ref 1.5–4.5)
GLUCOSE SERPL-MCNC: 93 MG/DL (ref 65–99)
HBA1C MFR BLD: 6 % (ref 4.8–5.6)
HCT VFR BLD AUTO: 37.6 % (ref 34–46.6)
HDLC SERPL-MCNC: 52 MG/DL
HGB BLD-MCNC: 12.1 G/DL (ref 11.1–15.9)
IMM GRANULOCYTES # BLD: 0 X10E3/UL (ref 0–0.1)
IMM GRANULOCYTES NFR BLD: 0 %
INTERPRETATION, 910389: NORMAL
INTERPRETATION: NORMAL
IRON SATN MFR SERPL: 41 % (ref 15–55)
IRON SERPL-MCNC: 118 UG/DL (ref 27–139)
LDLC SERPL CALC-MCNC: 153 MG/DL (ref 0–99)
LYMPHOCYTES # BLD AUTO: 2 X10E3/UL (ref 0.7–3.1)
LYMPHOCYTES NFR BLD AUTO: 36 %
MCH RBC QN AUTO: 30.7 PG (ref 26.6–33)
MCHC RBC AUTO-ENTMCNC: 32.2 G/DL (ref 31.5–35.7)
MCV RBC AUTO: 95 FL (ref 79–97)
MONOCYTES # BLD AUTO: 0.3 X10E3/UL (ref 0.1–0.9)
MONOCYTES NFR BLD AUTO: 6 %
NEUTROPHILS # BLD AUTO: 3.1 X10E3/UL (ref 1.4–7)
NEUTROPHILS NFR BLD AUTO: 57 %
PDF IMAGE, 910387: NORMAL
PLATELET # BLD AUTO: 236 X10E3/UL (ref 150–379)
POTASSIUM SERPL-SCNC: 4.4 MMOL/L (ref 3.5–5.2)
PROT SERPL-MCNC: 6.8 G/DL (ref 6–8.5)
RBC # BLD AUTO: 3.94 X10E6/UL (ref 3.77–5.28)
SODIUM SERPL-SCNC: 140 MMOL/L (ref 134–144)
TIBC SERPL-MCNC: 289 UG/DL (ref 250–450)
TRIGL SERPL-MCNC: 194 MG/DL (ref 0–149)
UIBC SERPL-MCNC: 171 UG/DL (ref 118–369)
VLDLC SERPL CALC-MCNC: 39 MG/DL (ref 5–40)
WBC # BLD AUTO: 5.5 X10E3/UL (ref 3.4–10.8)

## 2017-11-29 NOTE — PROGRESS NOTES
A letter was sent to the patient at the address on file, with lab results and Dr. Uribe Juneau recommendations for the patient.

## 2018-01-02 ENCOUNTER — OFFICE VISIT (OUTPATIENT)
Dept: FAMILY MEDICINE CLINIC | Age: 83
End: 2018-01-02

## 2018-01-02 VITALS
WEIGHT: 176 LBS | HEIGHT: 66 IN | RESPIRATION RATE: 20 BRPM | HEART RATE: 58 BPM | OXYGEN SATURATION: 95 % | DIASTOLIC BLOOD PRESSURE: 56 MMHG | SYSTOLIC BLOOD PRESSURE: 127 MMHG | TEMPERATURE: 98 F | BODY MASS INDEX: 28.28 KG/M2

## 2018-01-02 DIAGNOSIS — E11.9 CONTROLLED TYPE 2 DIABETES MELLITUS WITHOUT COMPLICATION, WITHOUT LONG-TERM CURRENT USE OF INSULIN (HCC): ICD-10-CM

## 2018-01-02 DIAGNOSIS — I26.99 OTHER PULMONARY EMBOLISM WITHOUT ACUTE COR PULMONALE, UNSPECIFIED CHRONICITY (HCC): ICD-10-CM

## 2018-01-02 DIAGNOSIS — D68.9 COAGULATION DEFICIENCY (HCC): Primary | ICD-10-CM

## 2018-01-02 LAB
INR BLD: 2.9
PT POC: NORMAL SECONDS
VALID INTERNAL CONTROL?: YES

## 2018-01-02 NOTE — MR AVS SNAPSHOT
Visit Information Date & Time Provider Department Dept. Phone Encounter #  
 1/2/2018  8:50 AM Shelby Hayden MD [address] [facility] [phone] [id number] Follow-up Instructions Return in about 8 weeks (around 2/27/2018), or if symptoms worsen or fail to improve. Your Appointments 1/5/2018  9:00 AM  
ESTABLISHED PATIENT with Kerry Redmond MD  
[facility] at [address] [place] [address] [facility]) Appt Note: 3 months [address] [facility] [address] [facility] [address], [address]  
[phone]  
  
   
 [address] [facility], [address]  
  
    
 2/27/2018  7:45 AM  
ESTABLISHED PATIENT with Shelby Hayden MD  
[address] [facility]) Appt Note: 3mo fuv  
 [address] [address]  
[phone]  
  
   
 [address] [address] Upcoming Health Maintenance Date Due  
 EYE EXAM RETINAL OR DILATED Q1 7/18/2013 MICROALBUMIN Q1 5/9/2018 MEDICARE YEARLY EXAM 5/10/2018 HEMOGLOBIN A1C Q6M 5/27/2018 GLAUCOMA SCREENING Q2Y 11/9/2018 FOOT EXAM Q1 11/27/2018 LIPID PANEL Q1 11/27/2018 DTaP/Tdap/Td series (2 - Td) 11/9/2026 Allergies as of 1/2/2018  Review Complete On: 1/2/2018 By: Shelby Hayden MD  
  
 Severity Noted Reaction Type Reactions Angiotensin Ii,human  04/01/2010    Other (comments) States does not remember Avelox [Moxifloxacin]  04/01/2010    Other (comments) States does not remember Demerol [Meperidine]  04/01/2010    Hives Current Immunizations  Reviewed on 10/16/2017 Name Date Influenza High Dose Vaccine PF 10/16/2017, 11/9/2016 Influenza Vaccine 10/29/2013 Influenza Vaccine Paramjit Josephker) 10/20/2015 Influenza Vaccine Split 11/7/2012, 10/11/2011, 11/16/2010 Influenza Vaccine Whole 8/1/2009 Pneumococcal Conjugate (PCV-13) 4/13/2016 ZZZ-RETIRED (DO NOT USE) Pneumococcal Vaccine (Unspecified Type) 4/1/2008 Not reviewed this visit You Were Diagnosed With   
  
 Codes Comments Coagulation deficiency ([facility]    -  Primary ICD-10-CM: D69.9 ICD-9-CM: 286.9 Other pulmonary embolism without acute cor pulmonale, unspecified chronicity (HCC)     ICD-10-CM: I26.99 
ICD-9-CM: 415.19 Controlled type 2 diabetes mellitus without complication, without long-term current use of insulin ([facility].)     ICD-10-CM: E11.9 ICD-9-CM: 250.00 Vitals BP Pulse Temp Resp Height(growth percentile) Weight(growth percentile) 127/56 (!) 58 98 °F (36.7 °C) 20 5' 6\" (1.676 m) 176 lb (79.8 kg) SpO2 BMI OB Status Smoking Status 95% 28.41 kg/m2 Postmenopausal Never Smoker Vitals History BMI and BSA Data Body Mass Index Body Surface Area  
 28.41 kg/m 2 1.93 m 2 Preferred Pharmacy Pharmacy Name Phone [facility] # [id number]  Nate Pang, [address] [place] [address] [phone] Your Updated Medication List  
  
   
This list is accurate as of: 1/2/18  9:12 AM.  Always use your most recent med list. amLODIPine 10 mg tablet Commonly known as:  Allena Tl TAKE 1 TABLET BY MOUTH DAILY  
  
 citalopram 20 mg tablet Commonly known as:  CELEXA  
TAKE 1 TABLET BY MOUTH DAILY  
  
 cyclobenzaprine 5 mg tablet Commonly known as:  FLEXERIL  
TAKE 1 TABLET BY MOUTH 3 TIMES A DAY AS NEEDED FORMUSCLE SPASM(S)  
  
 gabapentin 600 mg tablet Commonly known as:  NEURONTIN  
TAKE 1 TABLET BY MOUTH 3 TIMES A DAY HYDROcodone-acetaminophen 7.5-325 mg per tablet Commonly known as:  Adriana Alfred Take 1 Tab by mouth every eight (8) hours as needed for Pain. Max Daily Amount: 3 Tabs. iron polysaccharides 150 mg iron capsule Commonly known as:  Patricia Blank Take 1 Cap by mouth two (2) times a day. JANTOVEN 3 mg tablet Generic drug:  warfarin TAKE 1 TABLET BY MOUTH DAILY losartan-hydroCHLOROthiazide 100-25 mg per tablet Commonly known as:  HYZAAR  
 TAKE 1 TABLET BY MOUTH DAILY  
  
 naloxone 4 mg/actuation nasal spray Commonly known as:  NARCAN  
1 Spray by Nasal route as needed. Indications: OPIATE-INDUCED RESPIRATORY DEPRESSION We Performed the Following AMB POC PT/INR [84707 CPT(R)] Follow-up Instructions Return in about 8 weeks (around 2/27/2018), or if symptoms worsen or fail to improve. Introducing [facility] & HEALTH SERVICES! El Mae introduces [employer]AGINATE - [employer] patient portal. Now you can access parts of your medical record, email your doctor's office, and request medication refills online. 1. In your internet browser, go to [email or web address] 2. Click on the First Time User? Click Here link in the Sign In box. You will see the New Member Sign Up page. 3. Enter your [employer]AG[employer] Access Code exactly as it appears below. You will not need to use this code after youve completed the sign-up process. If you do not sign up before the expiration date, you must request a new code. · IMAGIN[employer] Access Code: [email or web address] Expires: 4/2/2018  9:11 AM 
 
4. Enter the last four digits of your Social Security Number (xxxx) and Date of Birth (mm/dd/yyyy) as indicated and click Submit. You will be taken to the next sign-up page. 5. Create a IMAGINATE [employer] ID. This will be your [employer] login ID and cannot be changed, so think of one that is secure and easy to remember. 6. Create a [employer] password. You can change your password at any time. 7. Enter your Password Reset Question and Answer. This can be used at a later time if you forget your password. 8. Enter your e-mail address. You will receive e-mail notification when new information is available in [address] 9. Click Sign Up. You can now view and download portions of your medical record. 10. Click the Download Summary menu link to download a portable copy of your medical information.  
 
If you have questions, please visit the Frequently Asked Questions section of the [employer]. Remember, [employer] is NOT to be used for urgent needs. For medical emergencies, dial 911. Now available from your iPhone and Android! Please provide this summary of care documentation to your next provider. Your primary care clinician is listed as LAUREL SINGH. If you have any questions after today's visit, please call [phone].

## 2018-01-02 NOTE — PROGRESS NOTES
Patient here for inr check. 1. Have you been to the ER, urgent care clinic since your last visit? Hospitalized since your last visit? No    2. Have you seen or consulted any other health care providers outside of the [address] [place] [address] since your last visit? Include any pap smears or colon screening. No   Results for orders placed or performed in visit on 01/02/18   AMB POC PT/INR   Result Value Ref Range    VALID INTERNAL CONTROL POC Yes     Prothrombin time (POC)  seconds    INR POC 2.9      Lab Results   Component Value Date/Time    Microalb/Creat ratio (ug/mg creat.) 31.5 05/09/2017 08:05 AM      Chief Complaint   Patient presents with    Coagulation disorder     she is a 80y.o. year old female who presents for evaluation. See Diabetic Report Card listed above. Patient Active Problem List    Diagnosis    Advanced care planning/counseling discussion     Has no completed, dwp importance      Controlled type 2 diabetes mellitus without complication, without long-term current use of insulin ([facility]    Iron deficiency anemia    Advance care planning     Has a LW      Pulmonary embolism ([facility]    Itching    Elevated liver enzymes    S/P cholecystectomy     11/2013      S/P knee replacement     bilaterally      S/P shoulder surgery     Left      H/O carpal tunnel repair     Right hand      Chronic pain    HTN (hypertension)    OA (osteoarthritis)    Fibromyalgia    GERD (gastroesophageal reflux disease)    High cholesterol    Hiatal hernia       Reviewed PmHx, RxHx, FmHx, SocHx, AllgHx--dated and updated in the chart.     Review of Systems - negative except as listed above in the HPI    Objective:     Vitals:    01/02/18 0849   BP: 127/56   Pulse: (!) 58   Resp: 20   Temp: 98 °F (36.7 °C)   SpO2: 95%   Weight: 176 lb (79.8 kg)   Height: 5' 6\" (1.676 m)     Physical Examination: General appearance - alert, well appearing, and in no distress  Chest - clear to auscultation, no wheezes, rales or rhonchi, symmetric air entry  Heart - normal rate, regular rhythm, normal S1, S2, no murmurs, rubs, clicks or gallops    Assessment/ Plan:   Diagnoses and all orders for this visit:    1. Coagulation deficiency (HCC)  -     AMB POC PT/INR  -at goal    2. Other pulmonary embolism without acute cor pulmonale, unspecified chronicity ([facility]  -see above    3. Controlled type 2 diabetes mellitus without complication, without long-term current use of insulin ([facility]  -at goal       Follow-up Disposition:  Return in about 8 weeks (around 2/27/2018), or if symptoms worsen or fail to improve. Lab Results   Component Value Date/Time    Cholesterol, total 244 11/27/2017 09:14 AM    HDL Cholesterol 52 11/27/2017 09:14 AM    LDL, calculated 153 11/27/2017 09:14 AM    Triglyceride 194 11/27/2017 09:14 AM     Lab Results   Component Value Date/Time    Hemoglobin A1c 6.0 11/27/2017 09:14 AM    Hemoglobin A1c 6.1 05/09/2017 08:05 AM    Hemoglobin A1c 6.5 07/07/2016 08:06 AM    Microalb/Creat ratio (ug/mg creat.) 31.5 05/09/2017 08:05 AM    LDL, calculated 153 11/27/2017 09:14 AM    Creatinine (POC) 0.9 11/12/2012 07:33 AM    Creatinine 0.97 11/27/2017 09:14 AM          Discussed with patient goal of Diabetes to include:  HgA1C <7, LDL cholesterol <70, Blood pressure <130/80. Discussed with patient diet and weight management and to get regular exercise. Recommend yearly eye exams and daily foot care. The patient understands and agrees with the plan. I have discussed the diagnosis with the patient and the intended plan as seen in the above orders. The patient has received an after-visit summary and questions were answered concerning future plans. Medication Side Effects and Warnings were discussed with patient  Patient Labs were reviewed and or requested  Patient Past Records were reviewed and or requested    Micaela Gallagher M.D.   [address] [facility]    There are no Patient Instructions on file for this visit.

## 2018-02-05 ENCOUNTER — HOSPITAL ENCOUNTER (OUTPATIENT)
Dept: GENERAL RADIOLOGY | Age: 83
Discharge: HOME | End: 2018-02-05
Payer: MEDICARE

## 2018-02-05 DIAGNOSIS — K59.00 CONSTIPATION: ICD-10-CM

## 2018-02-05 PROCEDURE — 74018 RADEX ABDOMEN 1 VIEW: CPT

## 2018-02-09 ENCOUNTER — OFFICE VISIT (OUTPATIENT)
Dept: ONCOLOGY | Age: 83
End: 2018-02-09

## 2018-02-09 VITALS
OXYGEN SATURATION: 93 % | SYSTOLIC BLOOD PRESSURE: 136 MMHG | HEART RATE: 67 BPM | RESPIRATION RATE: 20 BRPM | TEMPERATURE: 97.8 F | DIASTOLIC BLOOD PRESSURE: 73 MMHG

## 2018-02-09 DIAGNOSIS — I26.99 OTHER PULMONARY EMBOLISM WITHOUT ACUTE COR PULMONALE, UNSPECIFIED CHRONICITY (HCC): ICD-10-CM

## 2018-02-09 DIAGNOSIS — E53.8 B12 DEFICIENCY: ICD-10-CM

## 2018-02-09 DIAGNOSIS — D50.9 IRON DEFICIENCY ANEMIA, UNSPECIFIED IRON DEFICIENCY ANEMIA TYPE: Primary | ICD-10-CM

## 2018-02-09 NOTE — PROGRESS NOTES
[facility] [place] at [address], [address] Scottie Graham: [phone]  F: [phone]      Reason for Visit:   Noman Riddle is a 80 y.o. female who is seen for follow up of  anemia. History of Present Illness:   She remains on oral iron, having some constipation issues with this. Taking some OTC medications. She saw GI, and they felt that a repeat EGD and colonoscopy were not necessary. She reports improvement in her abdominal symptoms. Still having black stools. She is a poor historian due to memory issues. She is accompanied by her  and daughter today. PAST HISTORY: The following sections were reviewed and updated in the EMR as appropriate: PMH, SH, FH, Medications, Allergies. Allergies   Allergen Reactions    Angiotensin Ii,Human Other (comments)     States does not remember      Avelox [Moxifloxacin] Other (comments)     States does not remember      Demerol [Meperidine] Hives      Review of Systems: A complete review of systems was obtained, reviewed, and scanned into the EMR. Pertinent findings reviewed above. Physical Exam:     Visit Vitals    /73 (BP 1 Location: Left arm, BP Patient Position: Sitting)    Pulse 67    Temp 97.8 °F (36.6 °C) (Oral)    Resp 20    SpO2 93%     General: No distress, elderly female  Eyes: PERRLA, anicteric sclerae  HENT: Atraumatic, OP clear  Neck: Supple  Lymphatic: No cervical, supraclavicular, or inguinal adenopathy  Respiratory: CTAB, normal respiratory effort  CV: Normal rate, regular rhythm, no murmurs, no peripheral edema  GI: Soft, nontender, nondistended. No hepatomegaly, no splenomegaly  MS: Normal gait and station. Digits without clubbing or cyanosis. Skin: No rashes, ecchymoses, or petechiae. Normal temperature, turgor, and texture.   Psych: Alert, oriented, appropriate affect, normal judgment/insight    Results:     Lab Results   Component Value Date/Time    WBC 5.5 11/27/2017 09:14 AM    HGB 12.1 11/27/2017 09:14 AM    HCT 37.6 11/27/2017 09:14 AM    PLATELET 504 14/54/6062 09:14 AM    MCV 95 11/27/2017 09:14 AM    ABS. NEUTROPHILS 3.1 11/27/2017 09:14 AM     Lab Results   Component Value Date/Time    Sodium 140 11/27/2017 09:14 AM    Potassium 4.4 11/27/2017 09:14 AM    Chloride 96 11/27/2017 09:14 AM    CO2 26 11/27/2017 09:14 AM    Glucose 93 11/27/2017 09:14 AM    BUN 20 11/27/2017 09:14 AM    Creatinine 0.97 11/27/2017 09:14 AM    GFR est AA 62 11/27/2017 09:14 AM    GFR est non-AA 54 (L) 11/27/2017 09:14 AM    Calcium 9.6 11/27/2017 09:14 AM    Creatinine (POC) 0.9 11/12/2012 07:33 AM     Lab Results   Component Value Date/Time    Bilirubin, total 0.4 11/27/2017 09:14 AM    ALT (SGPT) 8 11/27/2017 09:14 AM    AST (SGOT) 16 11/27/2017 09:14 AM    Alk. phosphatase 131 (H) 11/27/2017 09:14 AM    Protein, total 6.8 11/27/2017 09:14 AM    Albumin 4.0 11/27/2017 09:14 AM    Globulin 3.9 08/30/2013 12:03 PM     Lab Results   Component Value Date/Time    Reticulocyte count 3.2 (H) 08/10/2017 12:11 PM    Iron % saturation 41 11/27/2017 09:14 AM    TIBC 289 11/27/2017 09:14 AM    Ferritin 42 09/08/2017 10:30 AM    Vitamin B12 221 08/10/2017 12:11 PM    Folate, RBC 1371 08/10/2017 12:10 PM    Haptoglobin 136 08/10/2017 12:10 PM     08/10/2017 12:10 PM    Sed rate (ESR) 11 11/12/2012 12:00 AM    TSH 2.480 07/07/2016 08:06 AM    HEP C VIRUS AB <0.1 11/25/2013 09:36 AM     Lab Results   Component Value Date/Time    INR POC 2.9 01/02/2018 08:50 AM       Colonoscopy by Dr. Maren Castillo 9/10/2015:   Terminal Ileum: normal  Cecum: normal  Ascending Colon: normal  Transverse Colon: normal  Descending Colon: normal  Sigmoid: mild diverticulosis; Rectum: no mucosal lesion appreciated  Grade 1 internal hemorrhoid(s); Abdomen U/S 8/15/2017: There is no acute abnormality in the abdomen. Status post cholecystectomy. 2 incidental left kidney cysts.      Assessment:   1) Iron deficiency anemia  Improved with oral iron, HGB normal on last check from 11/2017. I will repeat labs to ensure they remain normal.  Continue oral iron for now, but if labs remain normal we can try her off the iron and see how she does. I have some concern for occult blood loss, given her coumadin use and prior reports of melena. If black stool persists off iron, then consider repeat endoscopy evaluation. 2) B12 deficiency  Continue oral B12 supplement. Repeat B12 level. 3) H/o PE  On coumadin. Records report this event as unprovoked so she will likely need to continue this long term. 4) Abdominal mass  Normal abdominal ultrasound. No palpable mass on exam. Symptoms seem to have improved with management of her constipation.       Plan:     · Labs today: CBC, ferritin, iron profile, B12  · Continue oral iron for now, pending labs  · Continue oral B12  · Labs in 6 months: CBC, ferritin, iron profile, B12 ([facility])  · Return to clinic in 6 months      Signed By: Abigail Muñiz MD

## 2018-02-13 ENCOUNTER — HOSPITAL ENCOUNTER (OUTPATIENT)
Dept: LAB | Age: 83
Discharge: HOME | End: 2018-02-13
Payer: MEDICARE

## 2018-02-13 DIAGNOSIS — D68.9 COAGULATION DEFICIENCY (HCC): ICD-10-CM

## 2018-02-13 PROCEDURE — 85025 COMPLETE CBC W/AUTO DIFF WBC: CPT

## 2018-02-13 PROCEDURE — 83550 IRON BINDING TEST: CPT

## 2018-02-13 PROCEDURE — 82607 VITAMIN B-12: CPT

## 2018-02-13 PROCEDURE — 82728 ASSAY OF FERRITIN: CPT

## 2018-02-13 PROCEDURE — 36415 COLL VENOUS BLD VENIPUNCTURE: CPT

## 2018-02-13 RX ORDER — WARFARIN SODIUM 3 MG/1
TABLET ORAL
Qty: 30 TAB | Refills: 4 | Status: SHIPPED | OUTPATIENT
Start: 2018-02-13 | End: 2018-06-28

## 2018-02-14 LAB
BASOPHILS # BLD AUTO: 0.1 X10E3/UL (ref 0–0.2)
BASOPHILS NFR BLD AUTO: 1 %
EOSINOPHIL # BLD AUTO: 0.1 X10E3/UL (ref 0–0.4)
EOSINOPHIL NFR BLD AUTO: 2 %
ERYTHROCYTE [DISTWIDTH] IN BLOOD BY AUTOMATED COUNT: 13.6 % (ref 12.3–15.4)
FERRITIN SERPL-MCNC: 88 NG/ML (ref 15–150)
HCT VFR BLD AUTO: 37.8 % (ref 34–46.6)
HGB BLD-MCNC: 12.3 G/DL (ref 11.1–15.9)
IMM GRANULOCYTES # BLD: 0 X10E3/UL (ref 0–0.1)
IMM GRANULOCYTES NFR BLD: 0 %
IRON SATN MFR SERPL: 37 % (ref 15–55)
IRON SERPL-MCNC: 112 UG/DL (ref 27–139)
LYMPHOCYTES # BLD AUTO: 1.9 X10E3/UL (ref 0.7–3.1)
LYMPHOCYTES NFR BLD AUTO: 35 %
MCH RBC QN AUTO: 31.6 PG (ref 26.6–33)
MCHC RBC AUTO-ENTMCNC: 32.5 G/DL (ref 31.5–35.7)
MCV RBC AUTO: 97 FL (ref 79–97)
MONOCYTES # BLD AUTO: 0.4 X10E3/UL (ref 0.1–0.9)
MONOCYTES NFR BLD AUTO: 8 %
NEUTROPHILS # BLD AUTO: 2.9 X10E3/UL (ref 1.4–7)
NEUTROPHILS NFR BLD AUTO: 54 %
PLATELET # BLD AUTO: 227 X10E3/UL (ref 150–379)
RBC # BLD AUTO: 3.89 X10E6/UL (ref 3.77–5.28)
TIBC SERPL-MCNC: 301 UG/DL (ref 250–450)
UIBC SERPL-MCNC: 189 UG/DL (ref 118–369)
VIT B12 SERPL-MCNC: 428 PG/ML (ref 232–1245)
WBC # BLD AUTO: 5.4 X10E3/UL (ref 3.4–10.8)

## 2018-02-27 ENCOUNTER — DOCUMENTATION ONLY (OUTPATIENT)
Dept: FAMILY MEDICINE CLINIC | Age: 83
End: 2018-02-27

## 2018-02-27 ENCOUNTER — HOSPITAL ENCOUNTER (OUTPATIENT)
Dept: LAB | Age: 83
Discharge: HOME | End: 2018-02-27
Payer: MEDICARE

## 2018-02-27 ENCOUNTER — OFFICE VISIT (OUTPATIENT)
Dept: FAMILY MEDICINE CLINIC | Age: 83
End: 2018-02-27

## 2018-02-27 VITALS
TEMPERATURE: 98.7 F | HEIGHT: 66 IN | OXYGEN SATURATION: 97 % | BODY MASS INDEX: 27.73 KG/M2 | DIASTOLIC BLOOD PRESSURE: 70 MMHG | SYSTOLIC BLOOD PRESSURE: 122 MMHG | WEIGHT: 172.56 LBS | HEART RATE: 60 BPM | RESPIRATION RATE: 20 BRPM

## 2018-02-27 DIAGNOSIS — G89.4 CHRONIC PAIN SYNDROME: ICD-10-CM

## 2018-02-27 DIAGNOSIS — R79.1 INR (INTERNATIONAL NORMAL RATIO) ABNORMAL: Primary | ICD-10-CM

## 2018-02-27 DIAGNOSIS — R31.9 URINARY TRACT INFECTION WITH HEMATURIA, SITE UNSPECIFIED: ICD-10-CM

## 2018-02-27 DIAGNOSIS — R30.0 DYSURIA: ICD-10-CM

## 2018-02-27 DIAGNOSIS — N39.0 URINARY TRACT INFECTION WITH HEMATURIA, SITE UNSPECIFIED: ICD-10-CM

## 2018-02-27 DIAGNOSIS — K59.00 CONSTIPATION, UNSPECIFIED CONSTIPATION TYPE: ICD-10-CM

## 2018-02-27 PROBLEM — E11.21 TYPE 2 DIABETES WITH NEPHROPATHY (HCC): Status: ACTIVE | Noted: 2018-02-27

## 2018-02-27 LAB
BILIRUB UR QL STRIP: NEGATIVE
GLUCOSE UR-MCNC: NEGATIVE MG/DL
INR BLD: 2.7
KETONES P FAST UR STRIP-MCNC: NEGATIVE MG/DL
PH UR STRIP: 6 [PH] (ref 4.6–8)
PROT UR QL STRIP: NORMAL
PT POC: NORMAL SECONDS
SP GR UR STRIP: 1.02 (ref 1–1.03)
UA UROBILINOGEN AMB POC: NORMAL (ref 0.2–1)
URINALYSIS CLARITY POC: CLEAR
URINALYSIS COLOR POC: YELLOW
URINE BLOOD POC: NORMAL
URINE LEUKOCYTES POC: NORMAL
URINE NITRITES POC: NEGATIVE
VALID INTERNAL CONTROL?: YES

## 2018-02-27 PROCEDURE — 82570 ASSAY OF URINE CREATININE: CPT

## 2018-02-27 PROCEDURE — 84443 ASSAY THYROID STIM HORMONE: CPT

## 2018-02-27 RX ORDER — GABAPENTIN 600 MG/1
TABLET ORAL
Qty: 270 TAB | Refills: 2 | Status: SHIPPED | OUTPATIENT
Start: 2018-02-27 | End: 2018-09-06

## 2018-02-27 RX ORDER — CITALOPRAM 20 MG/1
TABLET, FILM COATED ORAL
Qty: 90 TAB | Refills: 2 | Status: SHIPPED | OUTPATIENT
Start: 2018-02-27 | End: 2018-09-28

## 2018-02-27 RX ORDER — HYDROCODONE BITARTRATE AND ACETAMINOPHEN 7.5; 325 MG/1; MG/1
1 TABLET ORAL
Qty: 90 TAB | Refills: 0 | Status: SHIPPED | OUTPATIENT
Start: 2018-02-27 | End: 2018-04-16 | Stop reason: SDUPTHER

## 2018-02-27 RX ORDER — HYDROCODONE BITARTRATE AND ACETAMINOPHEN 7.5; 325 MG/1; MG/1
1 TABLET ORAL
Qty: 90 TAB | Refills: 0 | Status: SHIPPED | OUTPATIENT
Start: 2018-02-27 | End: 2018-02-27 | Stop reason: SDUPTHER

## 2018-02-27 RX ORDER — SULFAMETHOXAZOLE AND TRIMETHOPRIM 800; 160 MG/1; MG/1
1 TABLET ORAL 2 TIMES DAILY
Qty: 20 TAB | Refills: 0 | Status: SHIPPED | OUTPATIENT
Start: 2018-02-27 | End: 2018-03-09

## 2018-02-27 RX ORDER — LOSARTAN POTASSIUM AND HYDROCHLOROTHIAZIDE 25; 100 MG/1; MG/1
TABLET ORAL
Qty: 90 TAB | Refills: 2 | Status: SHIPPED | OUTPATIENT
Start: 2018-02-27 | End: 2018-06-22

## 2018-02-27 RX ORDER — AMLODIPINE BESYLATE 10 MG/1
TABLET ORAL
Qty: 90 TAB | Refills: 2 | Status: SHIPPED | OUTPATIENT
Start: 2018-02-27 | End: 2018-04-16

## 2018-02-27 NOTE — MR AVS SNAPSHOT
[address] [address] 
[phone] Patient: Tray Regan MRN:  [id number] Visit Information Date & Time Provider Department Dept. Phone Encounter #  
 2/27/2018  7:45 AM Everette Schirmer, MD [address] [phone] [id number] Follow-up Instructions Return in about 6 weeks (around 4/10/2018). Your Appointments 8/17/2018 10:00 AM  
ESTABLISHED PATIENT with Mariah Troncoso MD  
[facility] at [address] [place] [address]) Appt Note: 6mo iron deficiency fu, [place] [address] [facility], [address] [facility] [address]  
[phone]  
  
   
 [address] [facility], [address] [place] [address] Upcoming Health Maintenance Date Due  
 EYE EXAM RETINAL OR DILATED Q1 7/18/2013 MICROALBUMIN Q1 5/9/2018 MEDICARE YEARLY EXAM 5/10/2018 HEMOGLOBIN A1C Q6M 5/27/2018 GLAUCOMA SCREENING Q2Y 11/9/2018 FOOT EXAM Q1 11/27/2018 LIPID PANEL Q1 11/27/2018 DTaP/Tdap/Td series (2 - Td) 11/9/2026 Allergies as of 2/27/2018  Review Complete On: 2/27/2018 By: Everette Schirmer, MD  
  
 Severity Noted Reaction Type Reactions Angiotensin Ii,human  04/01/2010    Other (comments) States does not remember Avelox [Moxifloxacin]  04/01/2010    Other (comments) States does not remember Demerol [Meperidine]  04/01/2010    Hives Current Immunizations  Reviewed on 10/16/2017 Name Date Influenza High Dose Vaccine PF 10/16/2017, 11/9/2016 Influenza Vaccine 10/29/2013 Influenza Vaccine Parthenia Foyer) 10/20/2015 Influenza Vaccine Split 11/7/2012, 10/11/2011, 11/16/2010 Influenza Vaccine Whole 8/1/2009 Pneumococcal Conjugate (PCV-13) 4/13/2016 ZZZ-RETIRED (DO NOT USE) Pneumococcal Vaccine (Unspecified Type) 4/1/2008 Not reviewed this visit You Were Diagnosed With   
  
 Codes Comments INR (international normal ratio) abnormal    -  Primary ICD-10-CM: R79.1 ICD-9-CM: 790.92 Chronic pain syndrome     ICD-10-CM: G89.4 ICD-9-CM: 338.4 Constipation, unspecified constipation type     ICD-10-CM: K59.00 ICD-9-CM: 564.00 Dysuria     ICD-10-CM: R30.0 ICD-9-CM: 710. 1 Vitals BP Pulse Temp Resp Height(growth percentile) Weight(growth percentile) 122/70 (BP 1 Location: Left arm, BP Patient Position: Sitting) 60 98.7 °F (37.1 °C) (Oral) 20 5' 6\" (1.676 m) 172 lb 9 oz (78.3 kg) SpO2 BMI OB Status Smoking Status 97% 27.85 kg/m2 Postmenopausal Never Smoker Vitals History BMI and BSA Data Body Mass Index Body Surface Area  
 27.85 kg/m 2 1.91 m 2 Preferred Pharmacy Pharmacy Name Phone [id number] [place] [address] [address] [phone] Your Updated Medication List  
  
   
This list is accurate as of 2/27/18  8:05 AM.  Always use your most recent med list. amLODIPine 10 mg tablet Commonly known as:  Chelsey Prow TAKE 1 TABLET BY MOUTH DAILY  
  
 citalopram 20 mg tablet Commonly known as:  CELEXA  
TAKE 1 TABLET BY MOUTH DAILY  
  
 cyclobenzaprine 5 mg tablet Commonly known as:  FLEXERIL  
TAKE 1 TABLET BY MOUTH 3 TIMES A DAY AS NEEDED FORMUSCLE SPASM(S)  
  
 gabapentin 600 mg tablet Commonly known as:  NEURONTIN  
TAKE 1 TABLET BY MOUTH 3 TIMES A DAY  
  
 * HYDROcodone-acetaminophen 7.5-325 mg per tablet Commonly known as:  Savannah Pont Take 1 Tab by mouth every eight (8) hours as needed for Pain. Max Daily Amount: 3 Tabs. * HYDROcodone-acetaminophen 7.5-325 mg per tablet Commonly known as:  Savannah Pont Take 1 Tab by mouth every eight (8) hours as needed for Pain. Max Daily Amount: 3 Tabs. iron polysaccharides 150 mg iron capsule Commonly known as:  Yue Brenden Take 1 Cap by mouth two (2) times a day. JANTOVEN 3 mg tablet Generic drug:  warfarin TAKE 1 TABLET BY MOUTH DAILY losartan-hydroCHLOROthiazide 100-25 mg per tablet Commonly known as:  HYZAAR  
TAKE 1 TABLET BY MOUTH DAILY  
  
 naloxone 4 mg/actuation nasal spray Commonly known as:  NARCAN  
1 Spray by Nasal route as needed. Indications: OPIATE-INDUCED RESPIRATORY DEPRESSION  
  
 OTHER Folding Wheelchair Dx: OA  
  
 * Notice: This list has 2 medication(s) that are the same as other medications prescribed for you. Read the directions carefully, and ask your doctor or other care provider to review them with you. Prescriptions Printed Refills HYDROcodone-acetaminophen (NORCO) 7.5-325 mg per tablet 0 Sig: Take 1 Tab by mouth every eight (8) hours as needed for Pain. Max Daily Amount: 3 Tabs. Class: Print Route: Oral  
 HYDROcodone-acetaminophen (NORCO) 7.5-325 mg per tablet 0 Sig: Take 1 Tab by mouth every eight (8) hours as needed for Pain. Max Daily Amount: 3 Tabs. Class: Print Route: Oral  
 OTHER 0 Sig: Folding Wheelchair Dx: OA Class: Print We Performed the Following AMB POC PT/INR [47017 CPT(R)] TSH 3RD GENERATION [61951 CPT(R)] Follow-up Instructions Return in about 6 weeks (around 4/10/2018). Introducing [facility]! [employer] introduces Videofropper patient portal. Now you can access parts of your medical record, email your doctor's office, and request medication refills online. 1. In your internet browser, go to [email or web address] 2. Click on the First Time User? Click Here link in the Sign In box. You will see the New Member Sign Up page. 3. Enter your Videofropper Access Code exactly as it appears below. You will not need to use this code after youve completed the sign-up process. If you do not sign up before the expiration date, you must request a new code. · Videofropper Access Code: [email or web address] Expires: 4/2/2018  9:11 AM 
 
4.  Enter the last four digits of your Social Security Number (xxxx) and Date of Birth (mm/dd/yyyy) as indicated and click Submit. You will be taken to the next sign-up page. 5. Create a Spotlight ID. This will be your Spotlight login ID and cannot be changed, so think of one that is secure and easy to remember. 6. Create a Spotlight password. You can change your password at any time. 7. Enter your Password Reset Question and Answer. This can be used at a later time if you forget your password. 8. Enter your e-mail address. You will receive e-mail notification when new information is available in [address] 9. Click Sign Up. You can now view and download portions of your medical record. 10. Click the Download Summary menu link to download a portable copy of your medical information. If you have questions, please visit the Frequently Asked Questions section of the Spotlight website. Remember, Spotlight is NOT to be used for urgent needs. For medical emergencies, dial 911. Now available from your iPhone and Android! Please provide this summary of care documentation to your next provider. Your primary care clinician is listed as LAUREL SINGH. If you have any questions after today's visit, please call [phone].

## 2018-02-27 NOTE — LETTER
2/28/2018 8:39 AM 
 
Ms. Tray Regan [facility] [address] [address] Dear Ynes Au: 
 
Please find your most recent results below. Resulted Orders AMB POC PT/INR Result Value Ref Range VALID INTERNAL CONTROL POC Yes Prothrombin time (POC)  seconds INR POC 2.7 TSH 3RD GENERATION Result Value Ref Range TSH 2.300 0.450 - 4.500 uIU/mL Narrative Performed at:  [address]  [id number] : Jeb iRvas MD, Phone:  [phone] AMB POC URINALYSIS DIP STICK MANUAL W/O MICRO Result Value Ref Range Color (UA POC) Yellow Clarity (UA POC) Clear Glucose (UA POC) Negative Negative Bilirubin (UA POC) Negative Negative Ketones (UA POC) Negative Negative Specific gravity (UA POC) 1.020 1.001 - 1.035 Blood (UA POC) Trace Negative pH (UA POC) 6.0 4.6 - 8.0 Protein (UA POC) Trace Negative Urobilinogen (UA POC) 0.2 mg/dL 0.2 - 1 Nitrites (UA POC) Negative Negative Leukocyte esterase (UA POC) 1+ Negative RECOMMENDATIONS: 
   
    
  After reviewing your labs, I believe they are within normal  
limits for your age and let us stay with our current plan of action. In addition, you may receive a [employer] Survey from our office.    
Thank you in advance for filling this out for us, and if you cannot  
give a 10 on our ratings, please reach out to us and let us know  
what we can do better for you!  We strive for a ten, and while we  
may not be perfect 100% of the time, we always try our best for  
our patients! Magaly Henley M.D. Good Help to Those in Need Please call me if you have any questions: [phone] Sincerely, Dorine Boast, MD

## 2018-02-27 NOTE — PROGRESS NOTES
1. Have you been to the ER, urgent care clinic since your last visit? Hospitalized since your last visit? No    2. Have you seen or consulted any other health care providers outside of the [address] [place] [address] since your last visit? Include any pap smears or colon screening. No   Chief Complaint   Patient presents with    Coagulation disorder     INR       Results for orders placed or performed in visit on 02/27/18   AMB POC PT/INR   Result Value Ref Range    VALID INTERNAL CONTROL POC Yes     Prothrombin time (POC)  seconds    INR POC 2.7        Chief Complaint   Patient presents with    Coagulation disorder     INR     She is a 80 y.o. female who presents for evalution. Reviewed PmHx, RxHx, FmHx, SocHx, AllgHx and updated and dated in the chart.     Patient Active Problem List    Diagnosis    Advanced care planning/counseling discussion     Has no completed, dwp importance      Controlled type 2 diabetes mellitus without complication, without long-term current use of insulin ([facility]    Iron deficiency anemia    Advance care planning     Has a LW      Pulmonary embolism ([facility]    Itching    Elevated liver enzymes    S/P cholecystectomy     11/2013      S/P knee replacement     bilaterally      S/P shoulder surgery     Left      H/O carpal tunnel repair     Right hand      Chronic pain    HTN (hypertension)    OA (osteoarthritis)    Fibromyalgia    GERD (gastroesophageal reflux disease)    High cholesterol    Hiatal hernia       Review of Systems - negative except as listed above in the HPI    Objective:     Vitals:    02/27/18 0740   BP: 122/70   Pulse: 60   Resp: 20   Temp: 98.7 °F (37.1 °C)   TempSrc: Oral   SpO2: 97%   Weight: 172 lb 9 oz (78.3 kg)   Height: 5' 6\" (1.676 m)     Physical Examination: General appearance - alert, well appearing, and in no distress  Chest - clear to auscultation, no wheezes, rales or rhonchi, symmetric air entry  Heart - normal rate, regular rhythm, normal S1, S2, no murmurs, rubs, clicks or gallops  Abdomen - soft, nontender, nondistended, no masses or organomegaly    Assessment/ Plan:   Diagnoses and all orders for this visit:    1. INR (international normal ratio) abnormal  -     AMB POC PT/INR  -at goal    2. Chronic pain syndrome  -     HYDROcodone-acetaminophen (NORCO) 7.5-325 mg per tablet; Take 1 Tab by mouth every eight (8) hours as needed for Pain. Max Daily Amount: 3 Tabs. -     HYDROcodone-acetaminophen (NORCO) 7.5-325 mg per tablet; Take 1 Tab by mouth every eight (8) hours as needed for Pain. Max Daily Amount: 3 Tabs. -     OTHER; Folding Wheelchair  Dx: OA  -     COMPLIANCE DRUG SCREEN/PRESCRIPTION MONITORING  -3 fills   Opioid/Pain Management:    1. Has the patient signed a pain contract for chronic narcotic use? yes  2. Has the patient had a UDS or Serum screen as per guidelines as per [facility]?:   yes    3. Does patient meet necessary guidelines for Naloxone treatement per the [facility]?:    yes  4. Has the Prescription Monitoring Program been reviewed? yes  5. Does patient have a long term condition that requires long term use of a Narcotic?  yes  6. Has patient been tolerant of therapy and responsible to routine follow up and specialist follow-up? Yes            3. Constipation, unspecified constipation type  -     TSH 3RD GENERATION  -refer to GI    4. Dysuria  -     AMB POC URINALYSIS DIP STICK AUTO W/O MICRO       Follow-up Disposition:  Return in about 6 weeks (around 4/10/2018). I have discussed the diagnosis with the patient and the intended plan as seen in the above orders. The patient understands and agrees with the plan. The patient has received an after-visit summary and questions were answered concerning future plans.      Medication Side Effects and Warnings were discussed with patient  Patient Labs were reviewed and or requested:  Patient Past Records were reviewed and or requested    Lukasz Rascon M.D.    There are no Patient Instructions on file for this visit.

## 2018-02-28 LAB — TSH SERPL DL<=0.005 MIU/L-ACNC: 2.3 UIU/ML (ref 0.45–4.5)

## 2018-02-28 NOTE — PROGRESS NOTES
A letter was sent to the patient at the address on file, with lab results and Dr. Rickie George recommendations for the patient.

## 2018-02-28 NOTE — PROGRESS NOTES
After reviewing your labs, I believe they are within normal  limits for your age and let us stay with our current plan of action. In addition, you may receive a [employer] from our office. Thank you in advance for filling this out for us, and if you cannot   give a 10 on our ratings, please reach out to us and let us know  what we can do better for you! We strive for a ten, and while we   may not be perfect 100% of the time, we always try our best for  our patients! Jean-Claude Mccoy M.D.   Good Help to Those in Need  d

## 2018-03-04 LAB — DRUGS UR: NORMAL

## 2018-03-21 RX ORDER — CYCLOBENZAPRINE HCL 5 MG
TABLET ORAL
Qty: 90 TAB | Refills: 2 | Status: SHIPPED | OUTPATIENT
Start: 2018-03-21 | End: 2018-08-14 | Stop reason: CLARIF

## 2018-04-16 ENCOUNTER — HOSPITAL ENCOUNTER (OUTPATIENT)
Dept: LAB | Age: 83
Discharge: HOME | End: 2018-04-16
Payer: MEDICARE

## 2018-04-16 ENCOUNTER — OFFICE VISIT (OUTPATIENT)
Dept: FAMILY MEDICINE CLINIC | Age: 83
End: 2018-04-16

## 2018-04-16 VITALS
TEMPERATURE: 98.2 F | HEIGHT: 66 IN | HEART RATE: 88 BPM | OXYGEN SATURATION: 98 % | WEIGHT: 169 LBS | DIASTOLIC BLOOD PRESSURE: 76 MMHG | SYSTOLIC BLOOD PRESSURE: 111 MMHG | RESPIRATION RATE: 17 BRPM | BODY MASS INDEX: 27.16 KG/M2

## 2018-04-16 DIAGNOSIS — G89.4 CHRONIC PAIN SYNDROME: ICD-10-CM

## 2018-04-16 DIAGNOSIS — I26.99 OTHER PULMONARY EMBOLISM WITHOUT ACUTE COR PULMONALE, UNSPECIFIED CHRONICITY (HCC): ICD-10-CM

## 2018-04-16 DIAGNOSIS — E78.00 HIGH CHOLESTEROL: Chronic | ICD-10-CM

## 2018-04-16 DIAGNOSIS — M19.90 OSTEOARTHRITIS, UNSPECIFIED OSTEOARTHRITIS TYPE, UNSPECIFIED SITE: Chronic | ICD-10-CM

## 2018-04-16 DIAGNOSIS — I10 ESSENTIAL HYPERTENSION: Chronic | ICD-10-CM

## 2018-04-16 DIAGNOSIS — E11.9 CONTROLLED TYPE 2 DIABETES MELLITUS WITHOUT COMPLICATION, WITHOUT LONG-TERM CURRENT USE OF INSULIN (HCC): ICD-10-CM

## 2018-04-16 DIAGNOSIS — M79.7 FIBROMYALGIA: Chronic | ICD-10-CM

## 2018-04-16 DIAGNOSIS — D50.9 IRON DEFICIENCY ANEMIA, UNSPECIFIED IRON DEFICIENCY ANEMIA TYPE: ICD-10-CM

## 2018-04-16 DIAGNOSIS — E11.21 TYPE 2 DIABETES WITH NEPHROPATHY (HCC): Primary | ICD-10-CM

## 2018-04-16 PROCEDURE — 82043 UR ALBUMIN QUANTITATIVE: CPT

## 2018-04-16 PROCEDURE — 83036 HEMOGLOBIN GLYCOSYLATED A1C: CPT

## 2018-04-16 PROCEDURE — 80061 LIPID PANEL: CPT

## 2018-04-16 PROCEDURE — 85025 COMPLETE CBC W/AUTO DIFF WBC: CPT

## 2018-04-16 PROCEDURE — 80053 COMPREHEN METABOLIC PANEL: CPT

## 2018-04-16 PROCEDURE — 82728 ASSAY OF FERRITIN: CPT

## 2018-04-16 PROCEDURE — 83550 IRON BINDING TEST: CPT

## 2018-04-16 RX ORDER — LANOLIN ALCOHOL/MO/W.PET/CERES
500 CREAM (GRAM) TOPICAL DAILY
COMMUNITY
End: 2018-08-14 | Stop reason: CLARIF

## 2018-04-16 RX ORDER — HYDROCODONE BITARTRATE AND ACETAMINOPHEN 7.5; 325 MG/1; MG/1
1 TABLET ORAL
Qty: 90 TAB | Refills: 0 | Status: SHIPPED | OUTPATIENT
Start: 2018-04-16 | End: 2018-06-28 | Stop reason: SDUPTHER

## 2018-04-16 RX ORDER — HYDROCODONE BITARTRATE AND ACETAMINOPHEN 7.5; 325 MG/1; MG/1
1 TABLET ORAL
Qty: 90 TAB | Refills: 0 | Status: ON HOLD | OUTPATIENT
Start: 2018-04-16 | End: 2018-05-24

## 2018-04-16 NOTE — LETTER
4/18/2018 7:17 AM 
 
Ms. Tray Regan [facility] [address] [address] Dear Ann Marie Kline: 
 
Please find your most recent results below. Resulted Orders LIPID PANEL Result Value Ref Range Cholesterol, total 228 (H) 100 - 199 mg/dL Triglyceride 186 (H) 0 - 149 mg/dL HDL Cholesterol 55 >39 mg/dL VLDL, calculated 37 5 - 40 mg/dL LDL, calculated 136 (H) 0 - 99 mg/dL Narrative Performed at:  [address]  [id number] : Marsha Willams MD, Phone:  [phone] METABOLIC PANEL, COMPREHENSIVE Result Value Ref Range Glucose 104 (H) 65 - 99 mg/dL BUN 14 8 - 27 mg/dL Creatinine 0.90 0.57 - 1.00 mg/dL GFR est non-AA 59 (L) >59 mL/min/1.73 GFR est AA 68 >59 mL/min/1.73  
 BUN/Creatinine ratio 16 12 - 28 Sodium 142 134 - 144 mmol/L Potassium 4.3 3.5 - 5.2 mmol/L Chloride 100 96 - 106 mmol/L  
 CO2 25 18 - 29 mmol/L Calcium 10.0 8.7 - 10.3 mg/dL Protein, total 7.0 6.0 - 8.5 g/dL Albumin 3.9 3.5 - 4.7 g/dL GLOBULIN, TOTAL 3.1 1.5 - 4.5 g/dL A-G Ratio 1.3 1.2 - 2.2 Bilirubin, total 0.6 0.0 - 1.2 mg/dL Alk. phosphatase 136 (H) 39 - 117 IU/L  
 AST (SGOT) 15 0 - 40 IU/L  
 ALT (SGPT) 9 0 - 32 IU/L Narrative Performed at:  [address]  [id number] : Marsha Willams MD, Phone:  [phone] HEMOGLOBIN A1C WITH EAG Result Value Ref Range Hemoglobin A1c 5.4 4.8 - 5.6 % Comment:  
            Pre-diabetes: 5.7 - 6.4 Diabetes: >6.4 Glycemic control for adults with diabetes: <7.0 Estimated average glucose 108 mg/dL Narrative Performed at:  [address]  [id number] : Marsha Willams MD, Phone:  [phone] MICROALBUMIN, UR, RAND W/ MICROALB/CREAT RATIO Result Value Ref Range Creatinine, urine 136.2 Not Estab. mg/dL Microalbumin, urine 181.3 Not Estab. ug/mL Microalb/Creat ratio (ug/mg creat.) 133.1 (H) 0.0 - 30.0 mg/g creat Narrative Performed at:  [address]  [id number] : Aye Fierro MD, Phone:  [phone] CBC WITH AUTOMATED DIFF Result Value Ref Range WBC 5.5 3.4 - 10.8 x10E3/uL  
 RBC 3.86 3.77 - 5.28 x10E6/uL HGB 12.2 11.1 - 15.9 g/dL HCT 37.5 34.0 - 46.6 % MCV 97 79 - 97 fL  
 MCH 31.6 26.6 - 33.0 pg  
 MCHC 32.5 31.5 - 35.7 g/dL  
 RDW 15.0 12.3 - 15.4 % PLATELET 168 085 - 226 x10E3/uL NEUTROPHILS 55 Not Estab. % Lymphocytes 34 Not Estab. % MONOCYTES 9 Not Estab. % EOSINOPHILS 1 Not Estab. % BASOPHILS 1 Not Estab. %  
 ABS. NEUTROPHILS 3.0 1.4 - 7.0 x10E3/uL Abs Lymphocytes 1.9 0.7 - 3.1 x10E3/uL  
 ABS. MONOCYTES 0.5 0.1 - 0.9 x10E3/uL  
 ABS. EOSINOPHILS 0.1 0.0 - 0.4 x10E3/uL  
 ABS. BASOPHILS 0.0 0.0 - 0.2 x10E3/uL IMMATURE GRANULOCYTES 0 Not Estab. %  
 ABS. IMM. GRANS. 0.0 0.0 - 0.1 x10E3/uL Narrative Performed at:  [address]  [id number] : Aye Fierro MD, Phone:  [phone] IRON PROFILE Result Value Ref Range TIBC 303 250 - 450 ug/dL UIBC 92 (L) 118 - 369 ug/dL Iron 211 (H) 27 - 139 ug/dL Iron % saturation 70 (H) 15 - 55 % Narrative Performed at:  [address]  [id number] : Aye Fierro MD, Phone:  [phone] FERRITIN Result Value Ref Range Ferritin 87 15 - 150 ng/mL Narrative Performed at:  [address]  [id number] : Aye Fierro MD, Phone:  [phone] CVD REPORT Result Value Ref Range INTERPRETATION Note Comment:  
   Supplemental report is available. PDF IMAGE Not applicable Narrative Performed at:  [address] [place] [address] [address] [place] [address]  [id number] : Chinmay Ralph PhD, Phone:  [phone] CKD REPORT Result Value Ref Range Interpretation Note Comment:  
   Supplemental report is available. Narrative Performed at:  [address] [place] [address]  [id number] : Chinmay Ralph PhD, Phone:  [phone] DIABETES PATIENT EDUCATION Result Value Ref Range PDF Image Not applicable Narrative Performed at:  [address] [place] [address]  [id number] : Chinmay Ralph PhD, Phone:  [phone] RECOMMENDATIONS: 
  
    
  After reviewing your labs, I believe they are within normal  
limits for your age and let us stay with our current plan of action. In addition, you may receive a Press [employer]aney Survey from our office.    
Thank you in advance for filling this out for us, and if you cannot  
give a 10 on our ratings, please reach out to us and let us know  
what we can do better for you!  We strive for a ten, and while we  
may not be perfect 100% of the time, we always try our best for  
our patients! Flores Rees M.D. Good Help to Those in Need Please call me if you have any questions: [phone] Sincerely, Lenin Perez MD

## 2018-04-16 NOTE — PROGRESS NOTES
Chief Complaint   Patient presents with    Diabetes    Labs    Arthritis     No longer on amlodipine. ( stopped 3/25/18) BP: 111/76    1. Have you been to the ER, urgent care clinic since your last visit? Hospitalized since your last visit? No    2. Have you seen or consulted any other health care providers outside of the [address] [place] [address] since your last visit? Include any pap smears or colon screening. Yes Where: [facility] Pruett- Dr Gaston De La Garza      Lab Results   Component Value Date/Time    Microalb/Creat ratio (ug/mg creat.) 31.5 (H) 05/09/2017 08:05 AM      Chief Complaint   Patient presents with    Diabetes    Labs    Arthritis     she is a 80y.o. year old female who presents for evaluation. See Diabetic Report Card listed above. Patient Active Problem List    Diagnosis    Type 2 diabetes with nephropathy ([facility] [address]    Advanced care planning/counseling discussion     Has no completed, dwp importance      Controlled type 2 diabetes mellitus without complication, without long-term current use of insulin ([facility] [address]    Iron deficiency anemia    Advance care planning     Has a LW      Pulmonary embolism ([facility] [facility]    Itching    Elevated liver enzymes    S/P cholecystectomy     11/2013      S/P knee replacement     bilaterally      S/P shoulder surgery     Left      H/O carpal tunnel repair     Right hand      Chronic pain    HTN (hypertension)    OA (osteoarthritis)    Fibromyalgia    GERD (gastroesophageal reflux disease)    High cholesterol    Hiatal hernia       Reviewed PmHx, RxHx, FmHx, SocHx, AllgHx--dated and updated in the chart.     Review of Systems - negative except as listed above in the HPI    Objective:     Vitals:    04/16/18 0829   BP: 111/76   Pulse: 88   Resp: 17   Temp: 98.2 °F (36.8 °C)   TempSrc: Oral   SpO2: 98%   Weight: 169 lb (76.7 kg)   Height: 5' 6\" (1.676 m)     Physical Examination: General appearance - alert, well appearing, and in no distress  Chest - clear to auscultation, no wheezes, rales or rhonchi, symmetric air entry  Heart - normal rate, regular rhythm, normal S1, S2, no murmurs, rubs, clicks or gallops    Assessment/ Plan:   Diagnoses and all orders for this visit:    1. Type 2 diabetes with nephropathy ([facility])  -     LIPID PANEL  -     METABOLIC PANEL, COMPREHENSIVE  -     HEMOGLOBIN A1C WITH EAG  -     MICROALBUMIN, UR, RAND W/ MICROALB/CREAT RATIO  -at goal  Lab Results   Component Value Date/Time    Hemoglobin A1c 6.0 (H) 11/27/2017 09:14 AM       2. Controlled type 2 diabetes mellitus without complication, without long-term current use of insulin ([facility])  -     LIPID PANEL  -     METABOLIC PANEL, COMPREHENSIVE  -     HEMOGLOBIN A1C WITH EAG  -     MICROALBUMIN, UR, RAND W/ MICROALB/CREAT RATIO    3. Essential hypertension  -     LIPID PANEL  -     METABOLIC PANEL, COMPREHENSIVE  -at goal    4. High cholesterol  -     LIPID PANEL  -     METABOLIC PANEL, COMPREHENSIVE    5. Iron deficiency anemia, unspecified iron deficiency anemia type  -     CBC WITH AUTOMATED DIFF  -     IRON PROFILE  -     FERRITIN    6. Other pulmonary embolism without acute cor pulmonale, unspecified chronicity ([facility])  -     AMB POC PT/INR    7. Fibromyalgia   Opioid/Pain Management:    1. Has the patient signed a pain contract for chronic narcotic use? yes  2. Has the patient had a UDS or Serum screen as per guidelines as per [place] [facility]?:   yes    3. Does patient meet necessary guidelines for Naloxone treatement per the [place] [facility]?:    yes  4. Has the Prescription Monitoring Program been reviewed? yes  5. Does patient have a long term condition that requires long term use of a Narcotic?  yes  6. Has patient been tolerant of therapy and responsible to routine follow up and specialist follow-up? Yes      8. Osteoarthritis, unspecified osteoarthritis type, unspecified site  -stable on rx    9.  Chronic pain syndrome  -     HYDROcodone-acetaminophen (NORCO) 7.5-325 mg per tablet; Take 1 Tab by mouth every eight (8) hours as needed for Pain. Max Daily Amount: 3 Tabs. -     HYDROcodone-acetaminophen (NORCO) 7.5-325 mg per tablet; Take 1 Tab by mouth every eight (8) hours as needed for Pain. Max Daily Amount: 3 Tabs. -2 fills     Follow-up Disposition:  Return in about 6 weeks (around 5/28/2018). Lab Results   Component Value Date/Time    Cholesterol, total 244 (H) 11/27/2017 09:14 AM    HDL Cholesterol 52 11/27/2017 09:14 AM    LDL, calculated 153 (H) 11/27/2017 09:14 AM    Triglyceride 194 (H) 11/27/2017 09:14 AM     Lab Results   Component Value Date/Time    Hemoglobin A1c 6.0 (H) 11/27/2017 09:14 AM    Hemoglobin A1c 6.1 (H) 05/09/2017 08:05 AM    Hemoglobin A1c 6.5 (H) 07/07/2016 08:06 AM    Microalb/Creat ratio (ug/mg creat.) 31.5 (H) 05/09/2017 08:05 AM    LDL, calculated 153 (H) 11/27/2017 09:14 AM    Creatinine (POC) 0.9 11/12/2012 07:33 AM    Creatinine 0.97 11/27/2017 09:14 AM          Discussed with patient goal of Diabetes to include:  HgA1C <7, LDL cholesterol <70, Blood pressure <130/80. Discussed with patient diet and weight management and to get regular exercise. Recommend yearly eye exams and daily foot care. The patient understands and agrees with the plan. I have discussed the diagnosis with the patient and the intended plan as seen in the above orders. The patient has received an after-visit summary and questions were answered concerning future plans. Medication Side Effects and Warnings were discussed with patient  Patient Labs were reviewed and or requested  Patient Past Records were reviewed and or requested    Stephania Albert M.D. [id number]    There are no Patient Instructions on file for this visit.

## 2018-04-16 NOTE — MR AVS SNAPSHOT
[address] [address] 
[phone] Patient: Tray Regan MRN:  [id number] Visit Information Date & Time Provider Department Dept. Phone Encounter #  
 4/16/2018  9:00 AM Mynor Campbell MD [address] [facility] [phone] [id number] Follow-up Instructions Return in about 6 weeks (around 5/28/2018). Your Appointments 8/17/2018 10:00 AM  
ESTABLISHED PATIENT with Rogelio Starks MD  
[facility]) Appt Note: 6mo iron deficiency fu, [place] [address] [facility] [address] [facility] [id number] [phone]  
  
   
 [address] [facility], [address] 100[address] [facility] Upcoming Health Maintenance Date Due  
 EYE EXAM RETINAL OR DILATED Q1 7/18/2013 MICROALBUMIN Q1 5/9/2018 MEDICARE YEARLY EXAM 5/10/2018 HEMOGLOBIN A1C Q6M 5/27/2018 GLAUCOMA SCREENING Q2Y 11/9/2018 FOOT EXAM Q1 11/27/2018 LIPID PANEL Q1 11/27/2018 DTaP/Tdap/Td series (2 - Td) 11/9/2026 Allergies as of 4/16/2018  Review Complete On: 4/16/2018 By: Mynor Campbell MD  
  
 Severity Noted Reaction Type Reactions Angiotensin Ii,human  04/01/2010    Other (comments) States does not remember Avelox [Moxifloxacin]  04/01/2010    Other (comments) States does not remember Demerol [Meperidine]  04/01/2010    Hives Current Immunizations  Reviewed on 10/16/2017 Name Date Influenza High Dose Vaccine PF 10/16/2017, 11/9/2016 Influenza Vaccine 10/29/2013 Influenza Vaccine Page Echevaria) 10/20/2015 Influenza Vaccine Split 11/7/2012, 10/11/2011, 11/16/2010 Influenza Vaccine Whole 8/1/2009 Pneumococcal Conjugate (PCV-13) 4/13/2016 ZZZ-RETIRED (DO NOT USE) Pneumococcal Vaccine (Unspecified Type) 4/1/2008 Not reviewed this visit You Were Diagnosed With   
  
 Codes Comments Type 2 diabetes with nephropathy (HCC)    -  Primary ICD-10-CM: E11.21 
ICD-9-CM: 250.40, 583.81 Controlled type 2 diabetes mellitus without complication, without long-term current use of insulin ([facility]     ICD-10-CM: E11.9 ICD-9-CM: 250.00 Essential hypertension     ICD-10-CM: I10 
ICD-9-CM: 401.9 High cholesterol     ICD-10-CM: E78.00 ICD-9-CM: 272.0 Iron deficiency anemia, unspecified iron deficiency anemia type     ICD-10-CM: D50.9 ICD-9-CM: 280.9 Other pulmonary embolism without acute cor pulmonale, unspecified chronicity (HCC)     ICD-10-CM: I26.99 
ICD-9-CM: 415.19 Fibromyalgia     ICD-10-CM: M79.7 ICD-9-CM: 729.1 Osteoarthritis, unspecified osteoarthritis type, unspecified site     ICD-10-CM: M19.90 ICD-9-CM: 715.90 Chronic pain syndrome     ICD-10-CM: G89.4 ICD-9-CM: 338. 4 Vitals BP Pulse Temp Resp Height(growth percentile) Weight(growth percentile) 111/76 88 98.2 °F (36.8 °C) (Oral) 17 5' 6\" (1.676 m) 169 lb (76.7 kg) SpO2 BMI OB Status Smoking Status 98% 27.28 kg/m2 Postmenopausal Never Smoker Vitals History BMI and BSA Data Body Mass Index Body Surface Area  
 27.28 kg/m 2 1.89 m 2 Preferred Pharmacy Pharmacy Name Phone [facility] [id number] - [phone] [facility], [address] [place] [address] [phone] Your Updated Medication List  
  
   
This list is accurate as of 4/16/18  9:10 AM.  Always use your most recent med list.  
  
  
  
  
 citalopram 20 mg tablet Commonly known as:  CELEXA  
TAKE 1 TABLET BY MOUTH DAILY  
  
 cyclobenzaprine 5 mg tablet Commonly known as:  FLEXERIL  
TAKE 1 TABLET BY MOUTH 3 TIMES A DAY AS NEEDED FOR MUSCLE SPASMS  
  
 gabapentin 600 mg tablet Commonly known as:  NEURONTIN  
TAKE 1 TABLET BY MOUTH 3 TIMES A DAY  
  
 * HYDROcodone-acetaminophen 7.5-325 mg per tablet Commonly known as:  Francine Brown Take 1 Tab by mouth every eight (8) hours as needed for Pain.  Max Daily Amount: 3 Tabs. * HYDROcodone-acetaminophen 7.5-325 mg per tablet Commonly known as:  Sary Maria Take 1 Tab by mouth every eight (8) hours as needed for Pain. Max Daily Amount: 3 Tabs. Iron 325 mg (65 mg iron) Cper Generic drug:  ferrous sulfate Take  by mouth.  
  
 iron polysaccharides 150 mg iron capsule Commonly known as:  Jose Juan Amy Take 1 Cap by mouth two (2) times a day. JANTOVEN 3 mg tablet Generic drug:  warfarin TAKE 1 TABLET BY MOUTH DAILY losartan-hydroCHLOROthiazide 100-25 mg per tablet Commonly known as:  HYZAAR  
TAKE 1 TABLET BY MOUTH DAILY  
  
 naloxone 4 mg/actuation nasal spray Commonly known as:  NARCAN  
1 Spray by Nasal route as needed. Indications: OPIATE-INDUCED RESPIRATORY DEPRESSION  
  
 OTHER Folding Wheelchair Dx: OA  
  
 VITAMIN B-12 500 mcg tablet Generic drug:  cyanocobalamin Take 500 mcg by mouth daily. * Notice: This list has 2 medication(s) that are the same as other medications prescribed for you. Read the directions carefully, and ask your doctor or other care provider to review them with you. Prescriptions Printed Refills HYDROcodone-acetaminophen (NORCO) 7.5-325 mg per tablet 0 Sig: Take 1 Tab by mouth every eight (8) hours as needed for Pain. Max Daily Amount: 3 Tabs. Class: Print Route: Oral  
 HYDROcodone-acetaminophen (NORCO) 7.5-325 mg per tablet 0 Sig: Take 1 Tab by mouth every eight (8) hours as needed for Pain. Max Daily Amount: 3 Tabs. Class: Print Route: Oral  
  
We Performed the Following AMB POC PT/INR [13683 CPT(R)] CBC WITH AUTOMATED DIFF [37501 CPT(R)] HEMOGLOBIN A1C WITH EAG [71404 CPT(R)] LIPID PANEL [14555 CPT(R)] METABOLIC PANEL, COMPREHENSIVE [99984 CPT(R)] MICROALBUMIN, UR, RAND W/ MICROALB/CREAT RATIO [id number] CPT(R)] Follow-up Instructions Return in about 6 weeks (around 5/28/2018). Introducing hospitals & HEALTH SERVICES! [facility] introduces [facility] patient portal. Now you can access parts of your medical record, email your doctor's office, and request medication refills online. 1. In your internet browser, go to [email or web address] 2. Click on the First Time User? Click Here link in the Sign In box. You will see the New Member Sign Up page. 3. Enter your [facility] Access Code exactly as it appears below. You will not need to use this code after youve completed the sign-up process. If you do not sign up before the expiration date, you must request a new code. · [facility] Access Code: [email or web address] Expires: 7/15/2018  9:10 AM 
 
4. Enter the last four digits of your Social Security Number (xxxx) and Date of Birth (mm/dd/yyyy) as indicated and click Submit. You will be taken to the next sign-up page. 5. Create a [facility] ID. This will be your [facility] login ID and cannot be changed, so think of one that is secure and easy to remember. 6. Create a [facility] password. You can change your password at any time. 7. Enter your Password Reset Question and Answer. This can be used at a later time if you forget your password. 8. Enter your e-mail address. You will receive e-mail notification when new information is available in [address] 9. Click Sign Up. You can now view and download portions of your medical record. 10. Click the Download Summary menu link to download a portable copy of your medical information. If you have questions, please visit the Frequently Asked Questions section of the [facility] website. Remember, [facility] is NOT to be used for urgent needs. For medical emergencies, dial 911. Now available from your iPhone and Android! Please provide this summary of care documentation to your next provider. Your primary care clinician is listed as LAUREL SINGH. If you have any questions after today's visit, please call [phone].

## 2018-04-17 LAB
ALBUMIN SERPL-MCNC: 3.9 G/DL (ref 3.5–4.7)
ALBUMIN/CREAT UR: 133.1 MG/G CREAT (ref 0–30)
ALBUMIN/GLOB SERPL: 1.3 {RATIO} (ref 1.2–2.2)
ALP SERPL-CCNC: 136 IU/L (ref 39–117)
ALT SERPL-CCNC: 9 IU/L (ref 0–32)
AST SERPL-CCNC: 15 IU/L (ref 0–40)
BASOPHILS # BLD AUTO: 0 X10E3/UL (ref 0–0.2)
BASOPHILS NFR BLD AUTO: 1 %
BILIRUB SERPL-MCNC: 0.6 MG/DL (ref 0–1.2)
BUN SERPL-MCNC: 14 MG/DL (ref 8–27)
BUN/CREAT SERPL: 16 (ref 12–28)
CALCIUM SERPL-MCNC: 10 MG/DL (ref 8.7–10.3)
CHLORIDE SERPL-SCNC: 100 MMOL/L (ref 96–106)
CHOLEST SERPL-MCNC: 228 MG/DL (ref 100–199)
CO2 SERPL-SCNC: 25 MMOL/L (ref 18–29)
CREAT SERPL-MCNC: 0.9 MG/DL (ref 0.57–1)
CREAT UR-MCNC: 136.2 MG/DL
EOSINOPHIL # BLD AUTO: 0.1 X10E3/UL (ref 0–0.4)
EOSINOPHIL NFR BLD AUTO: 1 %
ERYTHROCYTE [DISTWIDTH] IN BLOOD BY AUTOMATED COUNT: 15 % (ref 12.3–15.4)
EST. AVERAGE GLUCOSE BLD GHB EST-MCNC: 108 MG/DL
FERRITIN SERPL-MCNC: 87 NG/ML (ref 15–150)
GFR SERPLBLD CREATININE-BSD FMLA CKD-EPI: 59 ML/MIN/1.73
GFR SERPLBLD CREATININE-BSD FMLA CKD-EPI: 68 ML/MIN/1.73
GLOBULIN SER CALC-MCNC: 3.1 G/DL (ref 1.5–4.5)
GLUCOSE SERPL-MCNC: 104 MG/DL (ref 65–99)
HBA1C MFR BLD: 5.4 % (ref 4.8–5.6)
HCT VFR BLD AUTO: 37.5 % (ref 34–46.6)
HDLC SERPL-MCNC: 55 MG/DL
HGB BLD-MCNC: 12.2 G/DL (ref 11.1–15.9)
IMM GRANULOCYTES # BLD: 0 X10E3/UL (ref 0–0.1)
IMM GRANULOCYTES NFR BLD: 0 %
INTERPRETATION, 910389: NORMAL
INTERPRETATION: NORMAL
IRON SATN MFR SERPL: 70 % (ref 15–55)
IRON SERPL-MCNC: 211 UG/DL (ref 27–139)
LDLC SERPL CALC-MCNC: 136 MG/DL (ref 0–99)
LYMPHOCYTES # BLD AUTO: 1.9 X10E3/UL (ref 0.7–3.1)
LYMPHOCYTES NFR BLD AUTO: 34 %
Lab: NORMAL
MCH RBC QN AUTO: 31.6 PG (ref 26.6–33)
MCHC RBC AUTO-ENTMCNC: 32.5 G/DL (ref 31.5–35.7)
MCV RBC AUTO: 97 FL (ref 79–97)
MICROALBUMIN UR-MCNC: 181.3 UG/ML
MONOCYTES # BLD AUTO: 0.5 X10E3/UL (ref 0.1–0.9)
MONOCYTES NFR BLD AUTO: 9 %
NEUTROPHILS # BLD AUTO: 3 X10E3/UL (ref 1.4–7)
NEUTROPHILS NFR BLD AUTO: 55 %
PDF IMAGE, 910387: NORMAL
PLATELET # BLD AUTO: 270 X10E3/UL (ref 150–379)
POTASSIUM SERPL-SCNC: 4.3 MMOL/L (ref 3.5–5.2)
PROT SERPL-MCNC: 7 G/DL (ref 6–8.5)
RBC # BLD AUTO: 3.86 X10E6/UL (ref 3.77–5.28)
SODIUM SERPL-SCNC: 142 MMOL/L (ref 134–144)
TIBC SERPL-MCNC: 303 UG/DL (ref 250–450)
TRIGL SERPL-MCNC: 186 MG/DL (ref 0–149)
UIBC SERPL-MCNC: 92 UG/DL (ref 118–369)
VLDLC SERPL CALC-MCNC: 37 MG/DL (ref 5–40)
WBC # BLD AUTO: 5.5 X10E3/UL (ref 3.4–10.8)

## 2018-04-17 NOTE — PROGRESS NOTES
After reviewing your labs, I believe they are within normal  limits for your age and let us stay with our current plan of action. In addition, you may receive a [employer] from our office. Thank you in advance for filling this out for us, and if you cannot   give a 10 on our ratings, please reach out to us and let us know  what we can do better for you! We strive for a ten, and while we   may not be perfect 100% of the time, we always try our best for  our patients! Tushar Vitale M.D.   Good Help to Those in Need

## 2018-04-18 NOTE — PROGRESS NOTES
A letter was sent to the patient at the address on file, with lab results and Dr. Erik Smith recommendations for the patient.

## 2018-05-24 ENCOUNTER — APPOINTMENT (OUTPATIENT)
Dept: GENERAL RADIOLOGY | Age: 83
DRG: 309 | End: 2018-05-24
Attending: INTERNAL MEDICINE
Payer: MEDICARE

## 2018-05-24 ENCOUNTER — HOSPITAL ENCOUNTER (INPATIENT)
Age: 83
LOS: 2 days | Discharge: HOME HEALTH | DRG: 309 | End: 2018-05-26
Attending: INTERNAL MEDICINE | Admitting: INTERNAL MEDICINE
Payer: MEDICARE

## 2018-05-24 PROBLEM — E78.5 DYSLIPIDEMIA: Status: ACTIVE | Noted: 2018-05-24

## 2018-05-24 PROBLEM — K59.00 CONSTIPATION: Status: ACTIVE | Noted: 2018-05-24

## 2018-05-24 PROBLEM — I48.91 A-FIB (HCC): Status: ACTIVE | Noted: 2018-05-24

## 2018-05-24 PROBLEM — Z79.01 CHRONIC ANTICOAGULATION: Status: ACTIVE | Noted: 2018-05-24

## 2018-05-24 PROBLEM — Z86.711 HISTORY OF PULMONARY EMBOLISM: Status: ACTIVE | Noted: 2018-05-24

## 2018-05-24 LAB
ALBUMIN SERPL-MCNC: 3.7 G/DL (ref 3.5–5)
ALBUMIN/GLOB SERPL: 1.1 {RATIO} (ref 1.1–2.2)
ALP SERPL-CCNC: 134 U/L (ref 45–117)
ALT SERPL-CCNC: 18 U/L (ref 12–78)
ANION GAP SERPL CALC-SCNC: 9 MMOL/L (ref 5–15)
AST SERPL-CCNC: 16 U/L (ref 15–37)
BASOPHILS # BLD: 0 K/UL (ref 0–0.1)
BASOPHILS NFR BLD: 1 % (ref 0–1)
BILIRUB DIRECT SERPL-MCNC: 0.2 MG/DL (ref 0–0.2)
BILIRUB SERPL-MCNC: 0.9 MG/DL (ref 0.2–1)
BNP SERPL-MCNC: 468 PG/ML (ref 0–450)
BUN SERPL-MCNC: 19 MG/DL (ref 6–20)
BUN/CREAT SERPL: 18 (ref 12–20)
CALCIUM SERPL-MCNC: 9.9 MG/DL (ref 8.5–10.1)
CHLORIDE SERPL-SCNC: 104 MMOL/L (ref 97–108)
CO2 SERPL-SCNC: 30 MMOL/L (ref 21–32)
CREAT SERPL-MCNC: 1.07 MG/DL (ref 0.55–1.02)
DIFFERENTIAL METHOD BLD: NORMAL
EOSINOPHIL # BLD: 0 K/UL (ref 0–0.4)
EOSINOPHIL NFR BLD: 0 % (ref 0–7)
ERYTHROCYTE [DISTWIDTH] IN BLOOD BY AUTOMATED COUNT: 13.2 % (ref 11.5–14.5)
GLOBULIN SER CALC-MCNC: 3.5 G/DL (ref 2–4)
GLUCOSE BLD STRIP.AUTO-MCNC: 118 MG/DL (ref 65–100)
GLUCOSE SERPL-MCNC: 105 MG/DL (ref 65–100)
HCT VFR BLD AUTO: 39.5 % (ref 35–47)
HGB BLD-MCNC: 13.3 G/DL (ref 11.5–16)
IMM GRANULOCYTES # BLD: 0 K/UL (ref 0–0.04)
IMM GRANULOCYTES NFR BLD AUTO: 0 % (ref 0–0.5)
INR PPP: 1.9 (ref 0.9–1.1)
LYMPHOCYTES # BLD: 1.4 K/UL (ref 0.8–3.5)
LYMPHOCYTES NFR BLD: 23 % (ref 12–49)
MCH RBC QN AUTO: 32.5 PG (ref 26–34)
MCHC RBC AUTO-ENTMCNC: 33.7 G/DL (ref 30–36.5)
MCV RBC AUTO: 96.6 FL (ref 80–99)
MONOCYTES # BLD: 0.4 K/UL (ref 0–1)
MONOCYTES NFR BLD: 6 % (ref 5–13)
NEUTS SEG # BLD: 4.4 K/UL (ref 1.8–8)
NEUTS SEG NFR BLD: 70 % (ref 32–75)
NRBC # BLD: 0 K/UL (ref 0–0.01)
NRBC BLD-RTO: 0 PER 100 WBC
PLATELET # BLD AUTO: 230 K/UL (ref 150–400)
PMV BLD AUTO: 10.9 FL (ref 8.9–12.9)
POTASSIUM SERPL-SCNC: 4 MMOL/L (ref 3.5–5.1)
PROT SERPL-MCNC: 7.2 G/DL (ref 6.4–8.2)
PROTHROMBIN TIME: 19.2 SEC (ref 9–11.1)
RBC # BLD AUTO: 4.09 M/UL (ref 3.8–5.2)
SERVICE CMNT-IMP: ABNORMAL
SODIUM SERPL-SCNC: 143 MMOL/L (ref 136–145)
TROPONIN I SERPL-MCNC: <0.04 NG/ML
TSH SERPL DL<=0.05 MIU/L-ACNC: 1.36 UIU/ML (ref 0.36–3.74)
WBC # BLD AUTO: 6.3 K/UL (ref 3.6–11)

## 2018-05-24 PROCEDURE — 84443 ASSAY THYROID STIM HORMONE: CPT | Performed by: INTERNAL MEDICINE

## 2018-05-24 PROCEDURE — 71045 X-RAY EXAM CHEST 1 VIEW: CPT

## 2018-05-24 PROCEDURE — 65610000006 HC RM INTENSIVE CARE

## 2018-05-24 PROCEDURE — 80076 HEPATIC FUNCTION PANEL: CPT | Performed by: INTERNAL MEDICINE

## 2018-05-24 PROCEDURE — 74011250636 HC RX REV CODE- 250/636: Performed by: INTERNAL MEDICINE

## 2018-05-24 PROCEDURE — 74011000250 HC RX REV CODE- 250: Performed by: INTERNAL MEDICINE

## 2018-05-24 PROCEDURE — 82962 GLUCOSE BLOOD TEST: CPT

## 2018-05-24 PROCEDURE — 80048 BASIC METABOLIC PNL TOTAL CA: CPT | Performed by: INTERNAL MEDICINE

## 2018-05-24 PROCEDURE — 83880 ASSAY OF NATRIURETIC PEPTIDE: CPT | Performed by: INTERNAL MEDICINE

## 2018-05-24 PROCEDURE — 85610 PROTHROMBIN TIME: CPT | Performed by: INTERNAL MEDICINE

## 2018-05-24 PROCEDURE — 36415 COLL VENOUS BLD VENIPUNCTURE: CPT | Performed by: INTERNAL MEDICINE

## 2018-05-24 PROCEDURE — 93005 ELECTROCARDIOGRAM TRACING: CPT

## 2018-05-24 PROCEDURE — 93306 TTE W/DOPPLER COMPLETE: CPT

## 2018-05-24 PROCEDURE — 84484 ASSAY OF TROPONIN QUANT: CPT | Performed by: INTERNAL MEDICINE

## 2018-05-24 PROCEDURE — 85025 COMPLETE CBC W/AUTO DIFF WBC: CPT | Performed by: INTERNAL MEDICINE

## 2018-05-24 PROCEDURE — 74011250637 HC RX REV CODE- 250/637: Performed by: INTERNAL MEDICINE

## 2018-05-24 RX ORDER — MAGNESIUM SULFATE 100 %
4 CRYSTALS MISCELLANEOUS AS NEEDED
Status: DISCONTINUED | OUTPATIENT
Start: 2018-05-24 | End: 2018-05-26 | Stop reason: HOSPADM

## 2018-05-24 RX ORDER — SODIUM CHLORIDE 0.9 % (FLUSH) 0.9 %
5-10 SYRINGE (ML) INJECTION EVERY 8 HOURS
Status: DISCONTINUED | OUTPATIENT
Start: 2018-05-24 | End: 2018-05-26 | Stop reason: HOSPADM

## 2018-05-24 RX ORDER — CITALOPRAM 20 MG/1
20 TABLET, FILM COATED ORAL DAILY
Status: DISCONTINUED | OUTPATIENT
Start: 2018-05-25 | End: 2018-05-26 | Stop reason: HOSPADM

## 2018-05-24 RX ORDER — ESMOLOL HYDROCHLORIDE 10 MG/ML
25-300 INJECTION, SOLUTION INTRAVENOUS
Status: DISCONTINUED | OUTPATIENT
Start: 2018-05-24 | End: 2018-05-25

## 2018-05-24 RX ORDER — GABAPENTIN 300 MG/1
300 CAPSULE ORAL 3 TIMES DAILY
Status: DISCONTINUED | OUTPATIENT
Start: 2018-05-24 | End: 2018-05-26 | Stop reason: HOSPADM

## 2018-05-24 RX ORDER — DEXTROSE 50 % IN WATER (D50W) INTRAVENOUS SYRINGE
12.5-25 AS NEEDED
Status: DISCONTINUED | OUTPATIENT
Start: 2018-05-24 | End: 2018-05-26 | Stop reason: HOSPADM

## 2018-05-24 RX ORDER — SODIUM CHLORIDE, SODIUM LACTATE, POTASSIUM CHLORIDE, CALCIUM CHLORIDE 600; 310; 30; 20 MG/100ML; MG/100ML; MG/100ML; MG/100ML
100 INJECTION, SOLUTION INTRAVENOUS CONTINUOUS
Status: DISCONTINUED | OUTPATIENT
Start: 2018-05-24 | End: 2018-05-25

## 2018-05-24 RX ORDER — AMIODARONE HYDROCHLORIDE 200 MG/1
400 TABLET ORAL 3 TIMES DAILY
Status: COMPLETED | OUTPATIENT
Start: 2018-05-24 | End: 2018-05-26

## 2018-05-24 RX ORDER — WARFARIN 2 MG/1
2 TABLET ORAL ONCE
Status: COMPLETED | OUTPATIENT
Start: 2018-05-24 | End: 2018-05-24

## 2018-05-24 RX ORDER — SODIUM CHLORIDE 0.9 % (FLUSH) 0.9 %
5-10 SYRINGE (ML) INJECTION AS NEEDED
Status: DISCONTINUED | OUTPATIENT
Start: 2018-05-24 | End: 2018-05-26 | Stop reason: HOSPADM

## 2018-05-24 RX ORDER — HYDROCODONE BITARTRATE AND ACETAMINOPHEN 7.5; 325 MG/1; MG/1
1 TABLET ORAL
Status: DISCONTINUED | OUTPATIENT
Start: 2018-05-24 | End: 2018-05-26 | Stop reason: HOSPADM

## 2018-05-24 RX ORDER — SODIUM CHLORIDE 9 MG/ML
75 INJECTION, SOLUTION INTRAVENOUS CONTINUOUS
Status: DISCONTINUED | OUTPATIENT
Start: 2018-05-24 | End: 2018-05-26 | Stop reason: HOSPADM

## 2018-05-24 RX ADMIN — AMIODARONE HYDROCHLORIDE 400 MG: 200 TABLET ORAL at 21:23

## 2018-05-24 RX ADMIN — WARFARIN SODIUM 2 MG: 2 TABLET ORAL at 18:03

## 2018-05-24 RX ADMIN — SODIUM CHLORIDE 75 ML/HR: 900 INJECTION, SOLUTION INTRAVENOUS at 12:23

## 2018-05-24 RX ADMIN — ESMOLOL HYDROCHLORIDE 150 MCG/KG/MIN: 10 INJECTION INTRAVENOUS at 19:42

## 2018-05-24 RX ADMIN — ESMOLOL HYDROCHLORIDE 200 MCG/KG/MIN: 10 INJECTION INTRAVENOUS at 16:07

## 2018-05-24 RX ADMIN — Medication 10 ML: at 13:46

## 2018-05-24 RX ADMIN — GABAPENTIN 300 MG: 300 CAPSULE ORAL at 21:24

## 2018-05-24 RX ADMIN — ESMOLOL HYDROCHLORIDE 150 MCG/KG/MIN: 10 INJECTION INTRAVENOUS at 18:04

## 2018-05-24 RX ADMIN — Medication 10 ML: at 21:24

## 2018-05-24 RX ADMIN — ESMOLOL HYDROCHLORIDE 25 MCG/KG/MIN: 10 INJECTION INTRAVENOUS at 12:23

## 2018-05-24 RX ADMIN — GABAPENTIN 300 MG: 300 CAPSULE ORAL at 15:54

## 2018-05-24 RX ADMIN — AMIODARONE HYDROCHLORIDE 400 MG: 200 TABLET ORAL at 15:54

## 2018-05-24 NOTE — PROGRESS NOTES
Anesthesia at bedside with the patient. Patient has a rapid, irregular heart rate. Will wait for further instructions before proceeding with procedure.

## 2018-05-24 NOTE — CONSULTS
Bri Conner MD    [address] [facility] [place], [address] [facility]    Office [phone],O [phone]  Pager [phone]    Date of  Admission: 5/24/2018  6:31 AM  PCP- Arron Zelaya MD    Tray Handy Pump is a 80 y.o. female admitted for CONSTIPATION . Consult requested by Fede Biggs MD    Assessment/Plan    Atrial fibrillation with RVR  Anemia  History of pulmonary embolism-on anticoagulation. Diabetes mellitus  Anemia  Constipation  Hypertension/dyslipidemia  Chronic pain/arthritis/on chronic narcotic pain medications\      Plan:  Since patient has A. fib with RVR I, Colonoscopy procedure be canceled for today. BMP/CBC/troponin/TSH/Mg  IV Esmolol/PO amiodarone  Continue anticoagulation; Phamacy to dose  Clear Liquids per Dr Garry Astorga. Possible colonoscopy in AM  Echocardiogram when heart rate has decreased             I appreciate the opportunity to be involved in [facility] Rodrige[facility]. See below note for details. Please do not hesitate to contact us with questions or concerns. Bri Conner MD    Cardiac Testing/ Procedures: A. Cardiac Cath/PCI:    B.ECHO/GULSHAN:    C.StressNuclear/Stress ECHO/Stress test:    D.Vascular:    E. EP:    F. Miscellaneous:    Care Plan discussed with: Patient, Family and Nursing Staff    Subjective:  Patient is a 45-year-old  female with history of anemia, chronic pain, dyslipidemia, hypertension, history of pulmonary emboli - on anticoagulation, DM who came in for outpatient Colonoscopy. He was noted to be in atrial fibrillation with rapid ventricular rate. No chest pain. No palpitations, dizziness. No history of syncope. No history of swelling lower extremities. Pt however states that she has had CP intermittently - for past few months.      Past Medical History:   Diagnosis Date    Anemia     Cancer ([facility]     basal cell on nose    Chronic pain     back pain    Fibromyalgia 4/1/2010    GERD (gastroesophageal reflux disease) 4/1/2010    Hiatal hernia 4/1/2010    High cholesterol 4/1/2010    HTN (hypertension) 4/1/2010    OA (osteoarthritis) 4/1/2010    back    Pulmonary emboli ([address] 2015      Past Surgical History:   Procedure Laterality Date    BREAST SURGERY PROCEDURE UNLISTED      Breast im-plants x 2    ENLARGE BREAST WITH IMPLANT      HX APPENDECTOMY      HX BREAST BIOPSY      HX BREAST RECONSTRUCTION      Breast implants removed 1992    HX CATARACT REMOVAL      HX CHOLECYSTECTOMY  9/11/2013    HX HYSTERECTOMY      HX KNEE REPLACEMENT  2008    bilateral    HX ORTHOPAEDIC  2007    Bilateral TKR    HX PARTIAL HYSTERECTOMY      HX SHOULDER REPLACEMENT  2009    left    HX TONSILLECTOMY       Allergies   Allergen Reactions    Angiotensin Ii,Human Other (comments)     States does not remember      Avelox [Moxifloxacin] Other (comments)     States does not remember      Demerol [Meperidine] Hives     Family History   Problem Relation Age of Onset   [facility] Arthritis-rheumatoid Mother     Dementia Mother     Coronary Artery Disease Father     Cancer Brother      lung cancer      Social History   Substance Use Topics    Smoking status: Never Smoker    Smokeless tobacco: Never Used    Alcohol use No          Medications:  Prescriptions Prior to Admission   Medication Sig    ferrous sulfate (IRON) 325 mg (65 mg iron) cpER Take  by mouth.  cyanocobalamin (VITAMIN B-12) 500 mcg tablet Take 500 mcg by mouth daily.  HYDROcodone-acetaminophen (NORCO) 7.5-325 mg per tablet Take 1 Tab by mouth every eight (8) hours as needed for Pain. Max Daily Amount: 3 Tabs.     cyclobenzaprine (FLEXERIL) 5 mg tablet TAKE 1 TABLET BY MOUTH 3 TIMES A DAY AS NEEDED FOR MUSCLE SPASMS    OTHER Folding Wheelchair  Dx: OA    losartan-hydroCHLOROthiazide (HYZAAR) 100-25 mg per tablet TAKE 1 TABLET BY MOUTH DAILY    citalopram (CELEXA) 20 mg tablet TAKE 1 TABLET BY MOUTH DAILY    gabapentin (NEURONTIN) 600 mg tablet TAKE 1 TABLET BY MOUTH 3 TIMES A DAY  JANTOVEN 3 mg tablet TAKE 1 TABLET BY MOUTH DAILY    naloxone (NARCAN) 4 mg/actuation spry 1 Spray by Nasal route as needed. Indications: OPIATE-INDUCED RESPIRATORY DEPRESSION     No current facility-administered medications for this encounter. Review of Systems:  (bold if positive, if negative)    As in HPI - rest of ROS noncontributory        Physical Exam:  Visit Vitals    /67    Pulse (!) 148    Resp 19    Ht 5' 6\" (1.676 m)    Wt 158 lb (71.7 kg)    BMI 25.5 kg/m2         Telemetry: Afib with RVR    Gen: Elderly, well-nourished, in no acute distress  HEENT:  Pale conjunctivae, hearing intact to voice, moist mucous membranes  Neck: Supple,No JVD, No Carotid Bruit,   Resp: No accessory muscle use, Clear breath sounds, No rales or rhonchi  Card: Irregular Rate,Rythm,Normal S1, S2, 2/6 sys murmur+, No rubs or gallop. No thrills. Abd:  Soft, SB+,   MSK: No cyanosis  Skin: No rashes   Neuro: moving all four extremities, no focal deficit, follows commands appropriately  Psych:  Fair insight, oriented to person, place, alert, Nml Affect  LE: No edema  Vascular:Radial Pulses 2+ and symmetric        EKG: Soumya Belgeorge  Afib with RVR/ Low voltage/  IMI/AMI au      Cxray:    LABS:        Lab Results   Component Value Date/Time    WBC 5.5 04/16/2018 09:16 AM    HGB 12.2 04/16/2018 09:16 AM    HCT 37.5 04/16/2018 09:16 AM    PLATELET 627 38/69/5200 09:16 AM    MCV 97 04/16/2018 09:16 AM     Lab Results   Component Value Date/Time    Sodium 142 04/16/2018 09:16 AM    Potassium 4.3 04/16/2018 09:16 AM    Chloride 100 04/16/2018 09:16 AM    CO2 25 04/16/2018 09:16 AM    Anion gap 9 08/30/2013 12:03 PM    Glucose 104 (H) 04/16/2018 09:16 AM    BUN 14 04/16/2018 09:16 AM    Creatinine 0.90 04/16/2018 09:16 AM    BUN/Creatinine ratio 16 04/16/2018 09:16 AM    GFR est AA 68 04/16/2018 09:16 AM    GFR est non-AA 59 (L) 04/16/2018 09:16 AM    Calcium 10.0 04/16/2018 09:16 AM     No results found for: CPK, RCK1, RCK2, RCK3, RCK4, CKNDX, CKND1, TROPT, TROIQ, BNPP, BNP  No results found for: APTT  Lab Results   Component Value Date/Time    INR POC 2.7 02/27/2018 07:50 AM    INR POC 2.9 01/02/2018 08:50 AM    INR POC 3.0 11/27/2017 08:46 AM     No results found for: BNP, BNPP, XBNPT      Nilson Medina MD

## 2018-05-24 NOTE — IP AVS SNAPSHOT
[address] 
[phone] Patient: Tray Regan MRN: [id number] EUK:[birth year] A check chris indicates which time of day the medication should be taken. My Medications START taking these medications Instructions Each Dose to Equal  
 Morning Noon Evening Bedtime  
 amiodarone 200 mg tablet Commonly known as:  CORDARONE Your last dose was: Your next dose is: Take 1 Tab by mouth daily. 200 mg  
    
   
   
   
  
 dilTIAZem  mg ER capsule Commonly known as:  CARDIZEM CD Your last dose was: Your next dose is: Take 1 Cap by mouth daily. 120 mg CONTINUE taking these medications Instructions Each Dose to Equal  
 Morning Noon Evening Bedtime  
 citalopram 20 mg tablet Commonly known as:  Demian Hopes Your last dose was: Your next dose is: TAKE 1 TABLET BY MOUTH DAILY  
     
   
   
   
  
 cyclobenzaprine 5 mg tablet Commonly known as:  FLEXERIL Your last dose was: Your next dose is: TAKE 1 TABLET BY MOUTH 3 TIMES A DAY AS NEEDED FOR MUSCLE SPASMS  
     
   
   
   
  
 gabapentin 600 mg tablet Commonly known as:  NEURONTIN Your last dose was: Your next dose is: TAKE 1 TABLET BY MOUTH 3 TIMES A DAY HYDROcodone-acetaminophen 7.5-325 mg per tablet Commonly known as:  Andriy Talamantes Your last dose was: Your next dose is: Take 1 Tab by mouth every eight (8) hours as needed for Pain. Max Daily Amount: 3 Tabs. 1 Tab Iron 325 mg (65 mg iron) Cper Generic drug:  ferrous sulfate Your last dose was: Your next dose is: Take  by mouth. JANTOVEN 3 mg tablet Generic drug:  warfarin Your last dose was: Your next dose is: TAKE 1 TABLET BY MOUTH DAILY losartan-hydroCHLOROthiazide 100-25 mg per tablet Commonly known as:  HYZAAR Your last dose was: Your next dose is: TAKE 1 TABLET BY MOUTH DAILY  
     
   
   
   
  
 naloxone 4 mg/actuation nasal spray Commonly known as:  [employer] Your last dose was: Your next dose is:    
   
   
 1 Spray by Nasal route as needed. Indications: OPIATE-INDUCED RESPIRATORY DEPRESSION  
 1 Spray OTHER Your last dose was: Your next dose is:    
   
   
 Folding Wheelchair Dx: OA  
     
   
   
   
  
 VITAMIN B-12 500 mcg tablet Generic drug:  cyanocobalamin Your last dose was: Your next dose is: Take 500 mcg by mouth daily. 500 mcg Where to Get Your Medications These medications were sent to [facility] [address] [address], [address]  [address] [place] [address] Phone:  [phone]  
  amiodarone 200 mg tablet  
 dilTIAZem  mg ER capsule

## 2018-05-24 NOTE — CARDIO/PULMONARY
Cardiac Rehab: Received consult for cardiac education on AFib. 79 yo female admitted for colonoscopy, which was postponed due to rapid HR (5/24). Per Dr Harriet Renee, dx includes: Preet Colvin. LVEF 55-60% by echo (5/24/18), with mod MR and mild-mod TR. Cardiac Meds:  ACE/ARB - losartan (as Hyzaar). BB - none  Statin - none  ASA - none (on warfarin). Prior to admission meds also included: warfarin and HCTZ (as Hyzaar). New PO Cardiac Medications: amiodarone and diltiazem. Smoking History: Never smoked. 5/24/2018 Pt has been on warfarin due to history of PE (2015). Also has hx of decreased memory. Cardiac teaching deferred until family is available. 5/25/2018 Met with Tray Regan on CCU. Her  was also present. Provided AFib education folder. Briefly, reviewed AFib, new cardiac meds, plan for DCCV, and purpose of anticoagulation with warfarin. Pt was primarily concerned with having a wash up done prior to procedure.  asked about how soon after procedure, pt would be discharged. Explained need to talk with MD after procedure to determine whether pt will be having colonoscopy done during this admission. Relayed pt/'s concerns to CCU nurse.

## 2018-05-24 NOTE — PROGRESS NOTES
1156: TRANSFER - IN REPORT:    Verbal report received from [facility], RN(name) on Tray LAMA Link  being received from Endo(unit) for routine progression of care      Report consisted of patients Situation, Background, Assessment and   Recommendations(SBAR). Information from the following report(s) SBAR, Intake/Output, MAR, Accordion, Recent Results, Med Rec Status and Cardiac Rhythm A fib was reviewed with the receiving nurse. Opportunity for questions and clarification was provided. Assessment completed upon patients arrival to unit and care assumed.

## 2018-05-24 NOTE — PROGRESS NOTES
1215: Patient received to the unit. Assessment completed, vital signs taken, patient shows no signs of distress. Esmolol started at 25mcg/kg/hr will titrate to achieved HR < 110. Primary Nurse Jonnathan Rivas and Mark Mancini RN performed a dual skin assessment on this patient No impairment noted  Oumar score is 20    1500: Patient is attempting to take off monitoring equipment. RN explained necessity of monitoring vital signs while on such important medication. 1610: Patient moved closer to nurse's station for monitoring. She has attempted to get out of the bed without an RN x2.     1751: Patient's HR is now consistently less than 110. Will attempt to begin weaning. 1842: Patient has tolerated weaning of Esmolol. VSS. Will continue to monitor. 1900: Bedside shift change report given to Radha Escobar RN (oncoming nurse) by Valencia/CALIXTO Morris (offgoing nurse). Report included the following information SBAR, Kardex, Intake/Output and MAR.

## 2018-05-24 NOTE — PERIOP NOTES
Pt arrived early for procedure. Spoke with pt and . They understand and are comfortable waiting until scheduled time. Will attempt to bring back early if able to do so.

## 2018-05-24 NOTE — DISCHARGE INSTRUCTIONS
Atrial Fibrillation: Care Instructions  Your Care Instructions    Atrial fibrillation is an irregular and often fast heartbeat. Treating this condition is important for several reasons. It can cause blood clots, which can travel from your heart to your brain and cause a stroke. If you have a fast heartbeat, you may feel lightheaded, dizzy, and weak. An irregular heartbeat can also increase your risk for heart failure. Atrial fibrillation is often the result of another heart condition, such as high blood pressure or coronary artery disease. Making changes to improve your heart condition will help you stay healthy and active. Follow-up care is a key part of your treatment and safety. Be sure to make and go to all appointments, and call your doctor if you are having problems. It's also a good idea to know your test results and keep a list of the medicines you take. How can you care for yourself at home? Medicines  ? · Take your medicines exactly as prescribed. Call your doctor if you think you are having a problem with your medicine. You will get more details on the specific medicines your doctor prescribes. ? · If your doctor has given you a blood thinner to prevent a stroke, be sure you get instructions about how to take your medicine safely. Blood thinners can cause serious bleeding problems. ? · Do not take any vitamins, over-the-counter drugs, or herbal products without talking to your doctor first.   ? Lifestyle changes  ? · Do not smoke. Smoking can increase your chance of a stroke and heart attack. If you need help quitting, talk to your doctor about stop-smoking programs and medicines. These can increase your chances of quitting for good. ? · Eat a heart-healthy diet. ? · Stay at a healthy weight. Lose weight if you need to.   ? · Limit alcohol to 2 drinks a day for men and 1 drink a day for women. Too much alcohol can cause health problems. ? · Avoid colds and flu.  Get a pneumococcal vaccine shot. If you have had one before, ask your doctor whether you need another dose. Get a flu shot every year. If you must be around people with colds or flu, wash your hands often. Activity  ? · If your doctor recommends it, get more exercise. Walking is a good choice. Bit by bit, increase the amount you walk every day. Try for at least 30 minutes on most days of the week. You also may want to swim, bike, or do other activities. Your doctor may suggest that you join a cardiac rehabilitation program so that you can have help increasing your physical activity safely. ? · Start light exercise if your doctor says it is okay. Even a small amount will help you get stronger, have more energy, and manage stress. Walking is an easy way to get exercise. Start out by walking a little more than you did in the hospital. Gradually increase the amount you walk. ? · When you exercise, watch for signs that your heart is working too hard. You are pushing too hard if you cannot talk while you are exercising. If you become short of breath or dizzy or have chest pain, sit down and rest immediately. ? · Check your pulse regularly. Place two fingers on the artery at the palm side of your wrist, in line with your thumb. If your heartbeat seems uneven or fast, talk to your doctor. When should you call for help? Call 911 anytime you think you may need emergency care. For example, call if:  ? · You have symptoms of a heart attack. These may include:  ¨ Chest pain or pressure, or a strange feeling in the chest.  ¨ Sweating. ¨ Shortness of breath. ¨ Nausea or vomiting. ¨ Pain, pressure, or a strange feeling in the back, neck, jaw, or upper belly or in one or both shoulders or arms. ¨ Lightheadedness or sudden weakness. ¨ A fast or irregular heartbeat. After you call 911, the  may tell you to chew 1 adult-strength or 2 to 4 low-dose aspirin. Wait for an ambulance. Do not try to drive yourself.    ? · You have symptoms of a stroke. These may include:  ¨ Sudden numbness, tingling, weakness, or loss of movement in your face, arm, or leg, especially on only one side of your body. ¨ Sudden vision changes. ¨ Sudden trouble speaking. ¨ Sudden confusion or trouble understanding simple statements. ¨ Sudden problems with walking or balance. ¨ A sudden, severe headache that is different from past headaches. ? · You passed out (lost consciousness). ?Call your doctor now or seek immediate medical care if:  ? · You have new or increased shortness of breath. ? · You feel dizzy or lightheaded, or you feel like you may faint. ? · Your heart rate becomes irregular. ? · You can feel your heart flutter in your chest or skip heartbeats. Tell your doctor if these symptoms are new or worse. ? Watch closely for changes in your health, and be sure to contact your doctor if you have any problems. Where can you learn more? Go to http://leonel-socorro.info/. Enter U020 in the search box to learn more about \"Atrial Fibrillation: Care Instructions. \"  Current as of: September 21, 2016  Content Version: 11.4  © 8560-6644 [employer]. Care instructions adapted under license by [employer] (which disclaims liability or warranty for this information). If you have questions about a medical condition or this instruction, always ask your healthcare professional. [address] any warranty or liability for your use of this information.

## 2018-05-24 NOTE — PROGRESS NOTES
[facility] Dosing Services:     Consult for Warfarin Dosing by Pharmacy by Dr. Flora Dietrich provided for this 80 y.o.  female , for indication of h/o Pulmonary Embolism, afib RVR. Day of Therapy: continued from home---> 3 mg po daily   Dose to achieve an INR goal of 2-3    Order entered for  Warfarin  2 (mg) ordered to be given today at 18:00. Dose reduced due to amiodarone new start. May require further reductions. Significant drug interactions: amiodarone new start  Previous dose given 3 mg PTA ? PT/INR Lab Results   Component Value Date/Time    INR 1.9 (H) 05/24/2018 11:50 AM      Platelets Lab Results   Component Value Date/Time    PLATELET 138 01/32/1550 11:10 AM      H/H Lab Results   Component Value Date/Time    HGB 13.3 05/24/2018 11:10 AM        Pharmacy to follow daily and will provide subsequent Warfarin dosing based on clinical status.   JASON Lopez  Contact information [phone]

## 2018-05-24 NOTE — PERIOP NOTES
1 Notified Dr Zander Presley of patient presenting with irregular heart rhythm (A-fib), patient and  deny any history. Due to new onset and uncontrolled rate, Dr Ba Patterson did not feel it was safe to administer anesthesia and suggested Cardiology consult. Dr Zander Presley verbally ordered 12 lead EKG and Cardiology consult. [address] to obtain 12 lead EKG. [phone] and spoke with Romeo Medrano for Cardiology Consult. 305 Gary Mclaughlin with Romeo Medrano regarding Cardiology Consult. 56 Dr Zander Presley verbally ordered labs. SBAR given to Jcarlos Valdes RN.    3689 Dr Vásquez Ahr called; SBAR given. Dr Vásquez Ahr cancelled procedure and will be by to see patient. Dr Vásquez Ahr requested additional labs. 0 Dr Thalia Ponce at bedside. 1150 Assisted patient up to bathroom. 1220 Assisted in transporting patient to [facility] bed 5 on travel monitor.

## 2018-05-24 NOTE — IP AVS SNAPSHOT
[address] [facility] [address] 
 
 
 [address] [place] [address] 
[phone] Patient: Tray Regan MRN: [id number] YG About your hospitalization You were admitted on:  May 24, 2018 You last received care in the:  [facility] 3 PROG CARE TELE 1 You were discharged on:  May 26, 2018 Why you were hospitalized Your primary diagnosis was:  Not on File Your diagnoses also included:  Chronic Pain, Htn (Hypertension), A-Fib (Hcc), Dyslipidemia, Chronic Anticoagulation, Constipation, History Of Pulmonary Embolism Follow-up Information Follow up With Details Comments Contact Info Fannie Munguia MD Go on 2018 Hospital PCP f/u appointment on  @ 9:00 a.m. [address] [address] [address] 
[phone] 4413 [facility] [phone] Arvind Hoskins MD On 2018 10:20 am  [address] [facility] [address] 1007 [facility] 
[phone] Your Scheduled Appointments Tuesday May 29, 2018  9:00 AM EDT Any with Fannie Munguia MD  
[facility] [facility] [address] [address] [phone] Wednesday May 30, 2018 10:20 AM EDT  
ESTABLISHED PATIENT with Arvind Hoskins MD  
[facility] ([facility]) [address] [facility] 600 1007 [facility]  
[phone] Discharge Orders None A check chris indicates which time of day the medication should be taken. My Medications START taking these medications Instructions Each Dose to Equal  
 Morning Noon Evening Bedtime  
 amiodarone 200 mg tablet Commonly known as:  CORDARONE Your last dose was: Your next dose is: Take 1 Tab by mouth daily. 200 mg  
    
   
   
   
  
 dilTIAZem  mg ER capsule Commonly known as:  CARDIZEM CD Your last dose was: Your next dose is: Take 1 Cap by mouth daily. 120 mg CONTINUE taking these medications Instructions Each Dose to Equal  
 Morning Noon Evening Bedtime  
 citalopram 20 mg tablet Commonly known as:  Charo Del Castillo Your last dose was: Your next dose is: TAKE 1 TABLET BY MOUTH DAILY  
     
   
   
   
  
 cyclobenzaprine 5 mg tablet Commonly known as:  FLEXERIL Your last dose was: Your next dose is: TAKE 1 TABLET BY MOUTH 3 TIMES A DAY AS NEEDED FOR MUSCLE SPASMS  
     
   
   
   
  
 gabapentin 600 mg tablet Commonly known as:  NEURONTIN Your last dose was: Your next dose is: TAKE 1 TABLET BY MOUTH 3 TIMES A DAY HYDROcodone-acetaminophen 7.5-325 mg per tablet Commonly known as:  Ashlee Plum Your last dose was: Your next dose is: Take 1 Tab by mouth every eight (8) hours as needed for Pain. Max Daily Amount: 3 Tabs. 1 Tab Iron 325 mg (65 mg iron) Cper Generic drug:  ferrous sulfate Your last dose was: Your next dose is: Take  by mouth. JANTOVEN 3 mg tablet Generic drug:  warfarin Your last dose was: Your next dose is: TAKE 1 TABLET BY MOUTH DAILY losartan-hydroCHLOROthiazide 100-25 mg per tablet Commonly known as:  HYZAAR Your last dose was: Your next dose is: TAKE 1 TABLET BY MOUTH DAILY  
     
   
   
   
  
 naloxone 4 mg/actuation nasal spray Commonly known as:  [employer] Your last dose was: Your next dose is:    
   
   
 1 Spray by Nasal route as needed. Indications: OPIATE-INDUCED RESPIRATORY DEPRESSION  
 1 Spray OTHER Your last dose was: Your next dose is:    
   
   
 Folding Wheelchair Dx: OA  
     
   
   
   
  
 VITAMIN B-12 500 mcg tablet Generic drug:  cyanocobalamin Your last dose was: Your next dose is: Take 500 mcg by mouth daily. 500 mcg Where to Get Your Medications These medications were sent to [facility] [address] [facility], [address]  [address] [place] [address] Phone:  [phone]  
  amiodarone 200 mg tablet  
 dilTIAZem  mg ER capsule Opioid Education Prescription Opioids: What You Need to Know: 
 
Prescription opioids can be used to help relieve moderate-to-severe pain and are often prescribed following a surgery or injury, or for certain health conditions. These medications can be an important part of treatment but also come with serious risks. Opioids are strong pain medicines. Examples include hydrocodone, oxycodone, fentanyl, and morphine. Heroin is an example of an illegal opioid. It is important to work with your health care provider to make sure you are getting the safest, most effective care. WHAT ARE THE RISKS AND SIDE EFFECTS OF OPIOID USE? Prescription opioids carry serious risks of addiction and overdose, especially with prolonged use. An opioid overdose, often marked by slow breathing, can cause sudden death. The use of prescription opioids can have a number of side effects as well, even when taken as directed. · Tolerance-meaning you might need to take more of a medication for the same pain relief · Physical dependence-meaning you have symptoms of withdrawal when the medication is stopped. Withdrawal symptoms can include nausea, sweating, chills, diarrhea, stomach cramps, and muscle aches. Withdrawal can last up to several weeks, depending on which drug you took and how long you took it. · Increased sensitivity to pain · Constipation · Nausea, vomiting, and dry mouth · Sleepiness and dizziness · Confusion · Depression · Low levels of testosterone that can result in lower sex drive, energy, and strength · Itching and sweating RISKS ARE GREATER WITH:      
· History of drug misuse, substance use disorder, or overdose · Mental health conditions (such as depression or anxiety) · Sleep apnea · Older age (72 years or older) · Pregnancy Avoid alcohol while taking prescription opioids. Also, unless specifically advised by your health care provider, medications to avoid include: · Benzodiazepines (such as Xanax or Valium) · Muscle relaxants (such as Soma or Flexeril) · Hypnotics (such as Ambien or Lunesta) · Other prescription opioids KNOW YOUR OPTIONS Talk to your health care provider about ways to manage your pain that don't involve prescription opioids. Some of these options may actually work better and have fewer risks and side effects. Options may include: 
· Pain relievers such as acetaminophen, ibuprofen, and naproxen · Some medications that are also used for depression or seizures · Physical therapy and exercise · Counseling to help patients learn how to cope better with triggers of pain and stress. · Application of heat or cold compress · Massage therapy · Relaxation techniques Be Informed Make sure you know the name of your medication, how much and how often to take it, and its potential risks & side effects. IF YOU ARE PRESCRIBED OPIOIDS FOR PAIN: 
· Never take opioids in greater amounts or more often than prescribed. Remember the goal is not to be pain-free but to manage your pain at a tolerable level. · Follow up with your primary care provider to: · Work together to create a plan on how to manage your pain. · Talk about ways to help manage your pain that don't involve prescription opioids. · Talk about any and all concerns and side effects. · Help prevent misuse and abuse. · Never sell or share prescription opioids · Help prevent misuse and abuse.  
· Store prescription opioids in a secure place and out of reach of others (this may include visitors, children, friends, and family). · Safely dispose of unused/unwanted prescription opioids: Find your community drug take-back program or your pharmacy mail-back program, or flush them down the toilet, following guidance from the Food and Drug Administration (www.fda.gov/Drugs/ResourcesForYou). · Visit www.cdc.gov/drugoverdose to learn about the risks of opioid abuse and overdose. · If you believe you may be struggling with addiction, tell your health care provider and ask for guidance or call [address] [place], [place] [address] at 2-212-566-VOZT. Discharge Instructions Atrial Fibrillation: Care Instructions Your Care Instructions Atrial fibrillation is an irregular and often fast heartbeat. Treating this condition is important for several reasons. It can cause blood clots, which can travel from your heart to your brain and cause a stroke. If you have a fast heartbeat, you may feel lightheaded, dizzy, and weak. An irregular heartbeat can also increase your risk for heart failure. Atrial fibrillation is often the result of another heart condition, such as high blood pressure or coronary artery disease. Making changes to improve your heart condition will help you stay healthy and active. Follow-up care is a key part of your treatment and safety. Be sure to make and go to all appointments, and call your doctor if you are having problems. It's also a good idea to know your test results and keep a list of the medicines you take. How can you care for yourself at home? Medicines ? · Take your medicines exactly as prescribed. Call your doctor if you think you are having a problem with your medicine. You will get more details on the specific medicines your doctor prescribes. ? · If your doctor has given you a blood thinner to prevent a stroke, be sure you get instructions about how to take your medicine safely.  Blood thinners can cause serious bleeding problems. ? · Do not take any vitamins, over-the-counter drugs, or herbal products without talking to your doctor first. ? Lifestyle changes ? · Do not smoke. Smoking can increase your chance of a stroke and heart attack. If you need help quitting, talk to your doctor about stop-smoking programs and medicines. These can increase your chances of quitting for good. ? · Eat a heart-healthy diet. ? · Stay at a healthy weight. Lose weight if you need to.  
? · Limit alcohol to 2 drinks a day for men and 1 drink a day for women. Too much alcohol can cause health problems. ? · Avoid colds and flu. Get a pneumococcal vaccine shot. If you have had one before, ask your doctor whether you need another dose. Get a flu shot every year. If you must be around people with colds or flu, wash your hands often. Activity ? · If your doctor recommends it, get more exercise. Walking is a good choice. Bit by bit, increase the amount you walk every day. Try for at least 30 minutes on most days of the week. You also may want to swim, bike, or do other activities. Your doctor may suggest that you join a cardiac rehabilitation program so that you can have help increasing your physical activity safely. ? · Start light exercise if your doctor says it is okay. Even a small amount will help you get stronger, have more energy, and manage stress. Walking is an easy way to get exercise. Start out by walking a little more than you did in the hospital. Gradually increase the amount you walk. ? · When you exercise, watch for signs that your heart is working too hard. You are pushing too hard if you cannot talk while you are exercising. If you become short of breath or dizzy or have chest pain, sit down and rest immediately. ? · Check your pulse regularly. Place two fingers on the artery at the palm side of your wrist, in line with your thumb.  If your heartbeat seems uneven or fast, talk to your doctor. When should you call for help? Call 911 anytime you think you may need emergency care. For example, call if: 
? · You have symptoms of a heart attack. These may include: ¨ Chest pain or pressure, or a strange feeling in the chest. 
¨ Sweating. ¨ Shortness of breath. ¨ Nausea or vomiting. ¨ Pain, pressure, or a strange feeling in the back, neck, jaw, or upper belly or in one or both shoulders or arms. ¨ Lightheadedness or sudden weakness. ¨ A fast or irregular heartbeat. After you call 911, the  may tell you to chew 1 adult-strength or 2 to 4 low-dose aspirin. Wait for an ambulance. Do not try to drive yourself. ? · You have symptoms of a stroke. These may include: 
¨ Sudden numbness, tingling, weakness, or loss of movement in your face, arm, or leg, especially on only one side of your body. ¨ Sudden vision changes. ¨ Sudden trouble speaking. ¨ Sudden confusion or trouble understanding simple statements. ¨ Sudden problems with walking or balance. ¨ A sudden, severe headache that is different from past headaches. ? · You passed out (lost consciousness). ?Call your doctor now or seek immediate medical care if: 
? · You have new or increased shortness of breath. ? · You feel dizzy or lightheaded, or you feel like you may faint. ? · Your heart rate becomes irregular. ? · You can feel your heart flutter in your chest or skip heartbeats. Tell your doctor if these symptoms are new or worse. ? Watch closely for changes in your health, and be sure to contact your doctor if you have any problems. Where can you learn more? Go to http://leonel-socorro.info/. Enter U020 in the search box to learn more about \"Atrial Fibrillation: Care Instructions. \" Current as of: September 21, 2016 Content Version: 11.4 © 0452-3862 Healthwise, [employer].  Care instructions adapted under license by [address] (which disclaims liability or warranty for this information). If you have questions about a medical condition or this instruction, always ask your healthcare professional. [address] any warranty or liability for your use of this information. ACO Transitions of Care Introducing [employer] [address] Ebony Mclaughlin offers a voluntary care coordination program to provide high quality service and care to [facility] fee-for-service beneficiaries. Minor Silk was designed to help you enhance your health and well-being through the following services: ? Transitions of Care  support for individuals who are transitioning from one care setting to another (example: Hospital to home). ? Chronic and Complex Care Coordination  support for individuals and caregivers of those with serious or chronic illnesses or with more than one chronic (ongoing) condition and those who take a number of different medications. If you meet specific medical criteria, a [facility] [address] may call you directly to coordinate your care with your primary care physician and your other care providers. For questions about the [facility] programs, please, contact your physicians office. For general questions or additional information about Accountable Care Organizations: 
Please visit www.medicare.gov/acos. html or call 1-800-MEDICARE (9-116.539.4993) TTY users should call 4-323.889.3452. Bubble Motion Announcement We are excited to announce that we are making your provider's discharge notes available to you in Bubble Motion. You will see these notes when they are completed and signed by the physician that discharged you from your recent hospital stay.   If you have any questions or concerns about any information you see in Bubble Motion, please call the [facility] Department where you were seen or reach out to your Primary Care Provider for more information about your plan of care. Introducing [facility]! [facility] introduces MixRank patient portal. Now you can access parts of your medical record, email your doctor's office, and request medication refills online. 1. In your internet browser, go to [email or web address] for Gamerst 2. Click on the First Time User? Click Here link in the Sign In box. You will see the New Member Sign Up page. 3. Enter your MixRank Access Code exactly as it appears below. You will not need to use this code after youve completed the sign-up process. If you do not sign up before the expiration date, you must request a new code. · MixRank Access Code: [email or web address] Expires: 7/15/2018  9:10 AM 
 
4. Enter the last four digits of your Social Security Number (xxxx) and Date of Birth (mm/dd/yyyy) as indicated and click Submit. You will be taken to the next sign-up page. 5. Create a MixRank ID. This will be your MixRank login ID and cannot be changed, so think of one that is secure and easy to remember. 6. Create a MixRank password. You can change your password at any time. 7. Enter your Password Reset Question and Answer. This can be used at a later time if you forget your password. 8. Enter your e-mail address. You will receive e-mail notification when new information is available in [address] 9. Click Sign Up. You can now view and download portions of your medical record. 10. Click the Download Summary menu link to download a portable copy of your medical information. If you have questions, please visit the Frequently Asked Questions section of the MixRank website. Remember, MixRank is NOT to be used for urgent needs. For medical emergencies, dial 911. Now available from your iPhone and Android! Introducing Naveed Tariq As a [employer] patient, I wanted to make you aware of our electronic visit tool called Naveed Tariq. [employer] 24/7 allows you to connect within minutes with a medical provider 24 hours a day, seven days a week via a mobile device or tablet or logging into a secure website from your computer. You can access Naveed Mariofin from anywhere in the [place]. A virtual visit might be right for you when you have a simple condition and feel like you just dont want to get out of bed, or cant get away from work for an appointment, when your regular [employer] provider is not available (evenings, weekends or holidays), or when youre out of town and need minor care. Electronic visits cost only $49 and if the [employer] 24/7 provider determines a prescription is needed to treat your condition, one can be electronically transmitted to a nearby pharmacy*. Please take a moment to enroll today if you have not already done so. The enrollment process is free and takes just a few minutes. To enroll, please download the [employer] 24/7 scarlet to your tablet or phone, or visit [email or web address] to enroll on your computer. And, as an [address] [place] [address] patient with a [employer] account, the results of your visits will be scanned into your electronic medical record and your primary care provider will be able to view the scanned results. We urge you to continue to see your regular [employer] provider for your ongoing medical care. And while your primary care provider may not be the one available when you seek a Naveed Rodyuefin virtual visit, the peace of mind you get from getting a real diagnosis real time can be priceless. For more information on Naveed Marcelyuefin, view our Frequently Asked Questions (FAQs) at [email or web address]. Sincerely, 
 
Natalie Parks MD 
Chief Medical Officer Jorge Mclaughlin *:  certain medications cannot be prescribed via Naveed Tariq Providers Seen During Your Hospitalization Provider Specialty Primary office phone Lorin Thomas MD Gastroenterology [phone] Tammy Turner MD Cardiology [phone] Your Primary Care Physician (PCP) Primary Care Physician Office Phone Office Fax 1650 Tina Ramirez [phone] You are allergic to the following Allergen Reactions Angiotensin Ii,Human Other (comments) States does not remember Avelox (Moxifloxacin) Other (comments) States does not remember Demerol (Meperidine) Hives Recent Documentation Height Weight BMI OB Status Smoking Status 1.676 m 74.6 kg 26.55 kg/m2 Postmenopausal Never Smoker Emergency Contacts Name Discharge Info Relation Home Work Mobile Link[employer]ero DISCHARGE CAREGIVER [3] Spouse [3] [phone] Patient Belongings The following personal items are in your possession at time of discharge: 
  Dental Appliances: With patient         Home Medications: None   Jewelry: With patient  Clothing: With patient    Other Valuables: None Discharge Instructions Attachments/References MEFS - AMIODARONE (CORDARONE, PACERONE) - (BY MOUTH) (ENGLISH) MEFS - WARFARIN (COUMADIN, JANTOVEN) - (BY MOUTH) (ENGLISH) WARFARIN SAFETY TIPS (ENGLISH) VITAMIN K DIET (ENGLISH) Patient Handouts Amiodarone (Cordarone, Pacerone) - (By mouth) Why this medicine is used:  
Treats heart rhythm problems. Contact a nurse or doctor right away if you have: · Blistering, peeling, red skin rash · Fast, pounding, or uneven heartbeat; lightheadedness or fainting · Blurred vision or other vision changes; yellow skin or eyes, hair loss · Dark urine or pale stools, loss of appetite, stomach pain · Weight gain or loss, nervousness, tremors, trouble sleeping, unusual tiredness Common side effects: · Mild nausea, vomiting · Constipation © 2017 [facility] Information is for End User's use only and may not be sold, redistributed or otherwise used for commercial purposes. Warfarin (Coumadin, Jantoven) - (By mouth) Why this medicine is used:  
Prevents and treats blood clots. Contact a nurse or doctor right away if you have: 
· Sudden or severe headache · Red or dark brown urine, or red or black, tarry stools · Bloody vomit or vomit that looks like coffee grounds · Bleeding that does not stop or bruises that do not heal 
· Purplish red, net-like, blotchy spots on the skin Common side effects: · Minor bleeding or bruising © 2017 [facility] Information is for End User's use only and may not be sold, redistributed or otherwise used for commercial purposes. Taking Warfarin Safely: Care Instructions Your Care Instructions Warfarin is a medicine that you take to prevent blood clots. It is often called a blood thinner. Doctors give warfarin (such as Coumadin) to reduce the risk of blood clots. You may be at risk for blood clots if you have atrial fibrillation or deep vein thrombosis. Some other health problems may also put you at risk. Warfarin slows the amount of time it takes for your blood to clot. It can cause bleeding problems. Even if you've been taking warfarin for a while, it's important to know how to take it safely. Foods and other medicines can affect the way warfarin works. Some can make warfarin work too well. This can cause bleeding problems. And some can make it work poorly, so that it does not prevent blood clots very well. You will need regular blood tests to check how long it takes for your blood to form a clot. This test is called a PT or prothrombin time test. The result of the test is called an INR level. Depending on the test results, your doctor or anticoagulation clinic may adjust your dose of warfarin. Follow-up care is a key part of your treatment and safety. Be sure to make and go to all appointments, and call your doctor if you are having problems. It's also a good idea to know your test results and keep a list of the medicines you take. How can you care for yourself at home? Take warfarin safely · Take your warfarin at the same time each day. · If you miss a dose of warfarin, don't take an extra dose to make up for it. Your doctor can tell you exactly what to do so you don't take too much or too little. · Wear medical alert jewelry that lets others know that you take warfarin. You can buy this at most drugstores. · Don't take warfarin if you are pregnant or planning to get pregnant. Talk to your doctor about how you can prevent getting pregnant while you are taking it. · Don't change your dose or stop taking warfarin unless your doctor tells you to. Effects of medicines and food on warfarin · Don't start or stop taking any medicines, vitamins, or natural remedies unless you first talk to your doctor. Many medicines can affect how warfarin works. These include aspirin and other pain relievers, over-the-counter medicines, multivitamins, dietary supplements, and herbal products. · Tell all of your doctors and pharmacists that you take warfarin. Some prescription medicines can affect how warfarin works. · Keep the amount of vitamin K in your diet about the same from day to day. Do not suddenly eat a lot more or a lot less food that is rich in vitamin K than you usually do. Vitamin K affects how warfarin works and how your blood clots. Talk with your doctor before making big changes in your diet. Foods that have a lot of vitamin K include cooked green vegetables, such as: ¨ Kale, spinach, turnip greens, roque greens, Swiss chard, and mustard greens. ¨ [place] sprouts, broccoli, and asparagus. · Limit your use of alcohol. Avoid bleeding by preventing falls and injuries · Wear slippers or shoes with nonskid soles. · Remove throw rugs and clutter. · Rearrange furniture and electrical cords to keep them out of walking paths. · Keep stairways, porches, and outside walkways well lit. Use night-lights in hallways and bathrooms. · Be extra careful when you work with sharp tools or knives. When should you call for help? Call 911 anytime you think you may need emergency care. For example, call if: 
? · You have a sudden, severe headache that is different from past headaches. ?Call your doctor now or seek immediate medical care if: 
? · You have any abnormal bleeding, such as: 
¨ Nosebleeds. ¨ Vaginal bleeding that is different (heavier, more frequent, at a different time of the month) than what you are used to. ¨ Bloody or black stools, or rectal bleeding. ¨ Bloody or pink urine. ? Watch closely for changes in your health, and be sure to contact your doctor if you have any problems. Where can you learn more? Go to http://leonel-socorro.info/. Enter V155 in the search box to learn more about \"Taking Warfarin Safely: Care Instructions. \" Current as of: September 21, 2016 Content Version: 11.4 © 5951-0633 [employer]. Care instructions adapted under license by [employer] (which disclaims liability or warranty for this information). If you have questions about a medical condition or this instruction, always ask your healthcare professional. [place] [address] any warranty or liability for your use of this information. Consistent Vitamin K Diet: Care Instructions Your Care Instructions Your body needs vitamin K to clot blood and keep your bones strong. It's found in leafy green vegetables such as kale and spinach. If you take the blood thinner warfarin (Coumadin), you need to be careful about how much vitamin K you get. Vitamin K can keep your warfarin from working as it should. Most people who take warfarin can eat normally. The important thing is to get about the same amount of vitamin K each day. Don't suddenly start eating foods with a lot more or a lot less vitamin K. You can choose how much vitamin K you eat. For example, if you already eat a lot of leafy green vegetables, that's fine. Just keep it about the same amount each day. Follow-up care is a key part of your treatment and safety. Be sure to make and go to all appointments, and call your doctor if you are having problems. It's also a good idea to know your test results and keep a list of the medicines you take. How can you care for yourself at home? You don't need to stop eating food high in vitamin K. But you do need to know what foods contain vitamin K. Then you can try to eat about the same amount of vitamin K each day. · You might limit foods that are high in vitamin K to about 1 serving a day. These foods have about 250 to 500 micrograms (mcg) of vitamin K in each serving. They include: ¨ Cooked leafy green vegetables. Examples are kale, spinach, turnip greens, roque greens, Swiss chard, and mustard greens. One serving is ½ cup. · You might limit foods that are medium-high in vitamin K to about 3 servings a day. These foods have about 50 to 150 mcg of vitamin K in each serving. These include: ¨ Cooked brussels sprouts, broccoli, cabbage, and asparagus. One serving is ½ cup. ¨ Raw leafy green vegetables. Examples are spinach, green leaf lettuce, anoop lettuce, and endive. One serving is 1 cup. · Vitamin K also is found in many multivitamins. You don't need to stop taking your multivitamin if it has vitamin K. But you do need to take it every day. · Check with your doctor before you start or stop taking any supplements or herbal products. Some of these may contain vitamin K. Where can you learn more? Go to http://leonel-socorro.info/. Enter W130 in the search box to learn more about \"Consistent Vitamin K Diet: Care Instructions. \" Current as of: September 21, 2016 Content Version: 11.4 © 2006-2017 [employer]. Care instructions adapted under license by [employer] (which disclaims liability or warranty for this information). If you have questions about a medical condition or this instruction, always ask your healthcare professional. [address] any warranty or liability for your use of this information. Please provide this summary of care documentation to your next provider. Signatures-by signing, you are acknowledging that this After Visit Summary has been reviewed with you and you have received a copy. Patient Signature:  ____________________________________________________________ Date:  ____________________________________________________________  
  
[facility] Provider Signature:  ____________________________________________________________ Date:  ____________________________________________________________

## 2018-05-24 NOTE — PERIOP NOTES
Pt presented with extreme tachycardia. Cardiology consulted. Dr Eulalio Ly ordered labs, collected and sent. Pt will be admitted. TRANSFER - OUT REPORT:    Verbal report given to CALIXTO SANDOVAL on Tray Regan  being transferred to [place] for urgent transfer       Report consisted of patients Situation, Background, Assessment and   Recommendations(SBAR). Information from the following report(s) SBAR and MAR was reviewed with the receiving nurse. Lines:       Opportunity for questions and clarification was provided. Patient transported with:   patent IV.
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
## 2018-05-25 ENCOUNTER — HOME HEALTH ADMISSION (OUTPATIENT)
Dept: HOME HEALTH SERVICES | Facility: HOME HEALTH | Age: 83
End: 2018-05-25
Payer: MEDICARE

## 2018-05-25 LAB
ANION GAP SERPL CALC-SCNC: 10 MMOL/L (ref 5–15)
ANION GAP SERPL CALC-SCNC: 11 MMOL/L (ref 5–15)
ATRIAL RATE: 312 BPM
BACTERIA SPEC CULT: NORMAL
BACTERIA SPEC CULT: NORMAL
BASOPHILS # BLD: 0 K/UL (ref 0–0.1)
BASOPHILS NFR BLD: 0 % (ref 0–1)
BUN SERPL-MCNC: 19 MG/DL (ref 6–20)
BUN SERPL-MCNC: 20 MG/DL (ref 6–20)
BUN/CREAT SERPL: 21 (ref 12–20)
BUN/CREAT SERPL: 21 (ref 12–20)
CALCIUM SERPL-MCNC: 9 MG/DL (ref 8.5–10.1)
CALCIUM SERPL-MCNC: 9.3 MG/DL (ref 8.5–10.1)
CALCULATED R AXIS, ECG10: -4 DEGREES
CALCULATED T AXIS, ECG11: 151 DEGREES
CHLORIDE SERPL-SCNC: 103 MMOL/L (ref 97–108)
CHLORIDE SERPL-SCNC: 104 MMOL/L (ref 97–108)
CO2 SERPL-SCNC: 24 MMOL/L (ref 21–32)
CO2 SERPL-SCNC: 24 MMOL/L (ref 21–32)
CREAT SERPL-MCNC: 0.9 MG/DL (ref 0.55–1.02)
CREAT SERPL-MCNC: 0.97 MG/DL (ref 0.55–1.02)
DIAGNOSIS, 93000: NORMAL
DIFFERENTIAL METHOD BLD: ABNORMAL
EOSINOPHIL # BLD: 0 K/UL (ref 0–0.4)
EOSINOPHIL NFR BLD: 0 % (ref 0–7)
ERYTHROCYTE [DISTWIDTH] IN BLOOD BY AUTOMATED COUNT: 13.2 % (ref 11.5–14.5)
EST. AVERAGE GLUCOSE BLD GHB EST-MCNC: 148 MG/DL
GLUCOSE BLD STRIP.AUTO-MCNC: 101 MG/DL (ref 65–100)
GLUCOSE BLD STRIP.AUTO-MCNC: 104 MG/DL (ref 65–100)
GLUCOSE BLD STRIP.AUTO-MCNC: 105 MG/DL (ref 65–100)
GLUCOSE BLD STRIP.AUTO-MCNC: 99 MG/DL (ref 65–100)
GLUCOSE SERPL-MCNC: 94 MG/DL (ref 65–100)
GLUCOSE SERPL-MCNC: 98 MG/DL (ref 65–100)
HBA1C MFR BLD: 6.8 % (ref 4.2–6.3)
HCT VFR BLD AUTO: 35.6 % (ref 35–47)
HGB BLD-MCNC: 11.7 G/DL (ref 11.5–16)
IMM GRANULOCYTES # BLD: 0 K/UL (ref 0–0.04)
IMM GRANULOCYTES NFR BLD AUTO: 1 % (ref 0–0.5)
INR PPP: 1.8 (ref 0.9–1.1)
LYMPHOCYTES # BLD: 1.5 K/UL (ref 0.8–3.5)
LYMPHOCYTES NFR BLD: 27 % (ref 12–49)
MAGNESIUM SERPL-MCNC: 1.3 MG/DL (ref 1.6–2.4)
MAGNESIUM SERPL-MCNC: 1.9 MG/DL (ref 1.6–2.4)
MCH RBC QN AUTO: 32.3 PG (ref 26–34)
MCHC RBC AUTO-ENTMCNC: 32.9 G/DL (ref 30–36.5)
MCV RBC AUTO: 98.3 FL (ref 80–99)
MONOCYTES # BLD: 0.3 K/UL (ref 0–1)
MONOCYTES NFR BLD: 6 % (ref 5–13)
NEUTS SEG # BLD: 3.6 K/UL (ref 1.8–8)
NEUTS SEG NFR BLD: 66 % (ref 32–75)
NRBC # BLD: 0.02 K/UL (ref 0–0.01)
NRBC BLD-RTO: 0.4 PER 100 WBC
PLATELET # BLD AUTO: 173 K/UL (ref 150–400)
PMV BLD AUTO: 10.9 FL (ref 8.9–12.9)
POTASSIUM SERPL-SCNC: 3.1 MMOL/L (ref 3.5–5.1)
POTASSIUM SERPL-SCNC: 3.4 MMOL/L (ref 3.5–5.1)
PROTHROMBIN TIME: 18.5 SEC (ref 9–11.1)
Q-T INTERVAL, ECG07: 276 MS
QRS DURATION, ECG06: 76 MS
QTC CALCULATION (BEZET), ECG08: 402 MS
RBC # BLD AUTO: 3.62 M/UL (ref 3.8–5.2)
SERVICE CMNT-IMP: ABNORMAL
SERVICE CMNT-IMP: NORMAL
SERVICE CMNT-IMP: NORMAL
SODIUM SERPL-SCNC: 138 MMOL/L (ref 136–145)
SODIUM SERPL-SCNC: 138 MMOL/L (ref 136–145)
VENTRICULAR RATE, ECG03: 128 BPM
WBC # BLD AUTO: 5.5 K/UL (ref 3.6–11)

## 2018-05-25 PROCEDURE — 83735 ASSAY OF MAGNESIUM: CPT | Performed by: NURSE PRACTITIONER

## 2018-05-25 PROCEDURE — 93312 ECHO TRANSESOPHAGEAL: CPT

## 2018-05-25 PROCEDURE — 85025 COMPLETE CBC W/AUTO DIFF WBC: CPT | Performed by: INTERNAL MEDICINE

## 2018-05-25 PROCEDURE — 74011250637 HC RX REV CODE- 250/637: Performed by: INTERNAL MEDICINE

## 2018-05-25 PROCEDURE — 74011250636 HC RX REV CODE- 250/636: Performed by: NURSE PRACTITIONER

## 2018-05-25 PROCEDURE — B24BZZ4 ULTRASONOGRAPHY OF HEART WITH AORTA, TRANSESOPHAGEAL: ICD-10-PCS | Performed by: SPECIALIST

## 2018-05-25 PROCEDURE — 74011250637 HC RX REV CODE- 250/637: Performed by: NURSE PRACTITIONER

## 2018-05-25 PROCEDURE — 5A2204Z RESTORATION OF CARDIAC RHYTHM, SINGLE: ICD-10-PCS | Performed by: SPECIALIST

## 2018-05-25 PROCEDURE — 80048 BASIC METABOLIC PNL TOTAL CA: CPT | Performed by: NURSE PRACTITIONER

## 2018-05-25 PROCEDURE — 36415 COLL VENOUS BLD VENIPUNCTURE: CPT | Performed by: INTERNAL MEDICINE

## 2018-05-25 PROCEDURE — 83036 HEMOGLOBIN GLYCOSYLATED A1C: CPT | Performed by: INTERNAL MEDICINE

## 2018-05-25 PROCEDURE — 85610 PROTHROMBIN TIME: CPT | Performed by: INTERNAL MEDICINE

## 2018-05-25 PROCEDURE — 74011250636 HC RX REV CODE- 250/636: Performed by: INTERNAL MEDICINE

## 2018-05-25 PROCEDURE — 92960 CARDIOVERSION ELECTRIC EXT: CPT

## 2018-05-25 PROCEDURE — 65660000000 HC RM CCU STEPDOWN

## 2018-05-25 PROCEDURE — 74011250636 HC RX REV CODE- 250/636

## 2018-05-25 PROCEDURE — 80048 BASIC METABOLIC PNL TOTAL CA: CPT | Performed by: INTERNAL MEDICINE

## 2018-05-25 PROCEDURE — 82962 GLUCOSE BLOOD TEST: CPT

## 2018-05-25 RX ORDER — ENOXAPARIN SODIUM 100 MG/ML
40 INJECTION SUBCUTANEOUS EVERY 12 HOURS
Status: DISCONTINUED | OUTPATIENT
Start: 2018-05-26 | End: 2018-05-26 | Stop reason: HOSPADM

## 2018-05-25 RX ORDER — DILTIAZEM HYDROCHLORIDE 30 MG/1
30 TABLET, FILM COATED ORAL
Status: DISPENSED | OUTPATIENT
Start: 2018-05-25 | End: 2018-05-26

## 2018-05-25 RX ORDER — AMIODARONE HYDROCHLORIDE 200 MG/1
200 TABLET ORAL DAILY
Qty: 30 TAB | Refills: 0 | Status: SHIPPED | OUTPATIENT
Start: 2018-05-25 | End: 2018-06-22 | Stop reason: SDUPTHER

## 2018-05-25 RX ORDER — DILTIAZEM HYDROCHLORIDE 120 MG/1
120 CAPSULE, COATED, EXTENDED RELEASE ORAL DAILY
Status: DISCONTINUED | OUTPATIENT
Start: 2018-05-26 | End: 2018-05-26

## 2018-05-25 RX ORDER — MIDAZOLAM HYDROCHLORIDE 1 MG/ML
INJECTION, SOLUTION INTRAMUSCULAR; INTRAVENOUS
Status: DISPENSED
Start: 2018-05-25 | End: 2018-05-26

## 2018-05-25 RX ORDER — LIDOCAINE HYDROCHLORIDE 20 MG/ML
JELLY TOPICAL ONCE
Status: DISCONTINUED | OUTPATIENT
Start: 2018-05-25 | End: 2018-05-25 | Stop reason: ALTCHOICE

## 2018-05-25 RX ORDER — POTASSIUM CHLORIDE 7.45 MG/ML
10 INJECTION INTRAVENOUS
Status: DISPENSED | OUTPATIENT
Start: 2018-05-25 | End: 2018-05-25

## 2018-05-25 RX ORDER — ENOXAPARIN SODIUM 100 MG/ML
30 INJECTION SUBCUTANEOUS EVERY 12 HOURS
Status: DISCONTINUED | OUTPATIENT
Start: 2018-05-26 | End: 2018-05-26 | Stop reason: HOSPADM

## 2018-05-25 RX ORDER — WARFARIN 2 MG/1
2 TABLET ORAL ONCE
Status: COMPLETED | OUTPATIENT
Start: 2018-05-25 | End: 2018-05-25

## 2018-05-25 RX ORDER — MIDAZOLAM HYDROCHLORIDE 1 MG/ML
.5-1 INJECTION, SOLUTION INTRAMUSCULAR; INTRAVENOUS
Status: DISCONTINUED | OUTPATIENT
Start: 2018-05-25 | End: 2018-05-25 | Stop reason: ALTCHOICE

## 2018-05-25 RX ORDER — MAGNESIUM SULFATE HEPTAHYDRATE 40 MG/ML
2 INJECTION, SOLUTION INTRAVENOUS ONCE
Status: COMPLETED | OUTPATIENT
Start: 2018-05-25 | End: 2018-05-25

## 2018-05-25 RX ORDER — ENOXAPARIN SODIUM 100 MG/ML
1 INJECTION SUBCUTANEOUS ONCE
Status: DISCONTINUED | OUTPATIENT
Start: 2018-05-25 | End: 2018-05-25

## 2018-05-25 RX ORDER — LIDOCAINE 50 MG/G
OINTMENT TOPICAL AS NEEDED
Status: DISCONTINUED | OUTPATIENT
Start: 2018-05-25 | End: 2018-05-25 | Stop reason: ALTCHOICE

## 2018-05-25 RX ORDER — POTASSIUM CHLORIDE 7.45 MG/ML
10 INJECTION INTRAVENOUS
Status: COMPLETED | OUTPATIENT
Start: 2018-05-25 | End: 2018-05-25

## 2018-05-25 RX ORDER — LIDOCAINE 50 MG/G
OINTMENT TOPICAL
Status: DISPENSED
Start: 2018-05-25 | End: 2018-05-26

## 2018-05-25 RX ORDER — INSULIN LISPRO 100 [IU]/ML
INJECTION, SOLUTION INTRAVENOUS; SUBCUTANEOUS EVERY 6 HOURS
Status: DISCONTINUED | OUTPATIENT
Start: 2018-05-25 | End: 2018-05-26 | Stop reason: HOSPADM

## 2018-05-25 RX ORDER — MIDAZOLAM HYDROCHLORIDE 5 MG/ML
5 INJECTION INTRAMUSCULAR; INTRAVENOUS ONCE
Status: COMPLETED | OUTPATIENT
Start: 2018-05-25 | End: 2018-05-25

## 2018-05-25 RX ORDER — LIDOCAINE HYDROCHLORIDE 20 MG/ML
15 SOLUTION OROPHARYNGEAL AS NEEDED
Status: DISCONTINUED | OUTPATIENT
Start: 2018-05-25 | End: 2018-05-26 | Stop reason: HOSPADM

## 2018-05-25 RX ORDER — DILTIAZEM HYDROCHLORIDE 120 MG/1
120 CAPSULE, COATED, EXTENDED RELEASE ORAL DAILY
Qty: 30 CAP | Refills: 1 | Status: SHIPPED | OUTPATIENT
Start: 2018-05-26 | End: 2018-06-22 | Stop reason: SDUPTHER

## 2018-05-25 RX ORDER — ENOXAPARIN SODIUM 100 MG/ML
1 INJECTION SUBCUTANEOUS EVERY 12 HOURS
Status: DISCONTINUED | OUTPATIENT
Start: 2018-05-26 | End: 2018-05-25 | Stop reason: CLARIF

## 2018-05-25 RX ORDER — FENTANYL CITRATE 50 UG/ML
INJECTION, SOLUTION INTRAMUSCULAR; INTRAVENOUS
Status: DISPENSED
Start: 2018-05-25 | End: 2018-05-26

## 2018-05-25 RX ORDER — LIDOCAINE HYDROCHLORIDE 20 MG/ML
JELLY TOPICAL
Status: DISPENSED
Start: 2018-05-25 | End: 2018-05-26

## 2018-05-25 RX ORDER — FENTANYL CITRATE 50 UG/ML
25-200 INJECTION, SOLUTION INTRAMUSCULAR; INTRAVENOUS
Status: DISCONTINUED | OUTPATIENT
Start: 2018-05-25 | End: 2018-05-26 | Stop reason: HOSPADM

## 2018-05-25 RX ORDER — ENOXAPARIN SODIUM 100 MG/ML
70 INJECTION SUBCUTANEOUS ONCE
Status: COMPLETED | OUTPATIENT
Start: 2018-05-25 | End: 2018-05-25

## 2018-05-25 RX ADMIN — AMIODARONE HYDROCHLORIDE 400 MG: 200 TABLET ORAL at 16:28

## 2018-05-25 RX ADMIN — DILTIAZEM HYDROCHLORIDE 30 MG: 30 TABLET, FILM COATED ORAL at 08:32

## 2018-05-25 RX ADMIN — GABAPENTIN 300 MG: 300 CAPSULE ORAL at 08:32

## 2018-05-25 RX ADMIN — POTASSIUM CHLORIDE 10 MEQ: 10 INJECTION, SOLUTION INTRAVENOUS at 12:05

## 2018-05-25 RX ADMIN — DILTIAZEM HYDROCHLORIDE 30 MG: 30 TABLET, FILM COATED ORAL at 21:45

## 2018-05-25 RX ADMIN — Medication 10 ML: at 06:02

## 2018-05-25 RX ADMIN — GABAPENTIN 300 MG: 300 CAPSULE ORAL at 16:28

## 2018-05-25 RX ADMIN — WARFARIN SODIUM 2 MG: 2 TABLET ORAL at 19:01

## 2018-05-25 RX ADMIN — CITALOPRAM 20 MG: 20 TABLET, FILM COATED ORAL at 08:32

## 2018-05-25 RX ADMIN — MAGNESIUM SULFATE HEPTAHYDRATE 2 G: 40 INJECTION, SOLUTION INTRAVENOUS at 11:00

## 2018-05-25 RX ADMIN — ENOXAPARIN SODIUM 70 MG: 80 INJECTION SUBCUTANEOUS at 13:36

## 2018-05-25 RX ADMIN — POTASSIUM CHLORIDE 10 MEQ: 10 INJECTION, SOLUTION INTRAVENOUS at 17:44

## 2018-05-25 RX ADMIN — SODIUM CHLORIDE 75 ML/HR: 900 INJECTION, SOLUTION INTRAVENOUS at 13:50

## 2018-05-25 RX ADMIN — POTASSIUM CHLORIDE 10 MEQ: 10 INJECTION, SOLUTION INTRAVENOUS at 11:00

## 2018-05-25 RX ADMIN — DILTIAZEM HYDROCHLORIDE 30 MG: 30 TABLET, FILM COATED ORAL at 16:28

## 2018-05-25 RX ADMIN — Medication 10 ML: at 01:38

## 2018-05-25 RX ADMIN — AMIODARONE HYDROCHLORIDE 400 MG: 200 TABLET ORAL at 21:45

## 2018-05-25 RX ADMIN — Medication 10 ML: at 14:20

## 2018-05-25 RX ADMIN — POTASSIUM CHLORIDE 10 MEQ: 10 INJECTION, SOLUTION INTRAVENOUS at 13:56

## 2018-05-25 RX ADMIN — SODIUM CHLORIDE 75 ML/HR: 900 INJECTION, SOLUTION INTRAVENOUS at 22:45

## 2018-05-25 RX ADMIN — SODIUM CHLORIDE 75 ML/HR: 900 INJECTION, SOLUTION INTRAVENOUS at 01:39

## 2018-05-25 RX ADMIN — AMIODARONE HYDROCHLORIDE 400 MG: 200 TABLET ORAL at 08:32

## 2018-05-25 RX ADMIN — Medication 10 ML: at 21:46

## 2018-05-25 RX ADMIN — GABAPENTIN 300 MG: 300 CAPSULE ORAL at 21:45

## 2018-05-25 RX ADMIN — MIDAZOLAM HYDROCHLORIDE 5 MG: 5 INJECTION, SOLUTION INTRAMUSCULAR; INTRAVENOUS at 13:48

## 2018-05-25 NOTE — PROGRESS NOTES
Cardiology Progress Note       ICU   NAME:  Tray Regan   :   1933   MRN:   [id number]     Assessment/Plan:   1. A fib RVR: esmolol weaned off overnight however HR sustaining in the 120's. Add po dilt, cont  amio, warfarin. Plan for GULSHAN/DCCV later this am. D/W pt and spouse at bedside and agree to proceed. 2. Chronic constipation: will need to postpone colonoscopy. 3. HTN:   4. Dyslipidemia:   5. Hx PE     Pt personally seen and examined. Chart reviewed. Agree with advanced NP's history, exam and  A/P with changes/additons. Continues to have HR in 120's at rest  GULSHAN DCCV today. ON Coumadin INR 1.8/1.9  TTE - EF nml/Mod MR/LA size - nml      GULSHAN Consent:GULSHAN guided DCCV consent    Patient seen and examined for GULSHAN. RIBA of procedure explained to patient/ ( he has had the procedure previously). Risks include but not limited to, need for conscious sedation, bleeding, esophageal tear/rupture, VT, VF,need for emergent surgery and death. Patient denies dysphagia/Cancer of Upper GI/throat/ROBE/Esophaageal varices;ulcers;webs/Cervical spine problems/Chest radiation. All the patient's questions were answered to their satisfaction. Patient expresses understanding and agrees to undergo procedure. ASA 2  AW 2    Discussed with patient/nursing/family    Rosenda Hernandez MD, [facility]    Face to face done with pt. Pt will be set up with Javier KAUR. Subjective:   Patient is a 69-year-old  female with history of anemia, chronic pain, dyslipidemia, hypertension, history of pulmonary emboli - on anticoagulation, DM who came in for outpatient Colonoscopy. She was noted to be in atrial fibrillation with rapid ventricular rate. No chest pain. No palpitations, dizziness. No history of syncope. No history of swelling lower extremities.  Pt however states that she has had CP intermittently - for past few months.              Cardiac ROS: Patient denies any exertional chest pain, dyspnea, palpitations, syncope, orthopnea, edema or paroxysmal nocturnal dyspnea. Previous Cardiac Eval        Review of Systems: No nausea, indigestion, vomiting, pain, cough, sputum. No bleeding. Taking clear liquids. Objective:     Visit Vitals    /81 (BP 1 Location: Left arm, BP Patient Position: At rest)    Pulse (!) 125    Temp 98.4 °F (36.9 °C)    Resp 18    Ht 5' 6\" (1.676 m)    Wt 158 lb (71.7 kg)    SpO2 96%    BMI 25.5 kg/m2      O2 Device: Room air    Temp (24hrs), Av.2 °F (36.8 °C), Min:98.1 °F (36.7 °C), Max:98.4 °F (36.9 °C)           1901 -  0700  In: 3.8 [I.V.:3.8]  Out: -   TELE: a fib RVR    General: AAOx3 cooperative, no acute distress. HEENT: Atraumatic. Pink and moist.  Anicteric sclerae. Neck : Supple,   Lungs: CTA bilaterally. No wheezing/rhonchi/rales. Heart: tachycardic, irregular, 2/6 ZANDER,  no rubs, no gallops. No JVD. No carotid bruits. Abdomen: Soft, non-distended, non-tender. + Bowel sounds. Extremities: No edema,  Neurologic: Grossly intact. Alert and oriented X 3. No acute neurological distress. Psych: Fair insight. Not anxious or agitated.         Care Plan discussed with:    Comments   Patient x    Family  x spouse   RN x    Care Manager                    Consultant:  x        Data Review:     No lab exists for component: ITNL   Recent Labs      18   1110   TROIQ  <0.04     Recent Labs      18   0440  18   1110   NA  138  143   K  3.4*  4.0   CL  103  104   CO2  24  30   BUN  19  19   CREA  0.90  1.07*   GLU  94  105*   ALB   --   3.7   WBC  5.5  6.3   HGB  11.7  13.3   HCT  35.6  39.5   PLT  173  230     Recent Labs      18   0440  18   1150   INR  1.8*  1.9*   PTP  18.5*  19.2*       Medications reviewed  Current Facility-Administered Medications   Medication Dose Route Frequency    dilTIAZem (CARDIZEM) IR tablet 30 mg  30 mg Oral AC&HS    lactated Ringers infusion  100 mL/hr IntraVENous CONTINUOUS    sodium chloride (NS) flush 5-10 mL  5-10 mL IntraVENous Q8H    sodium chloride (NS) flush 5-10 mL  5-10 mL IntraVENous PRN    0.9% sodium chloride infusion  75 mL/hr IntraVENous CONTINUOUS    amiodarone (CORDARONE) tablet 400 mg  400 mg Oral TID    HYDROcodone-acetaminophen (NORCO) 7.5-325 mg per tablet 1 Tab  1 Tab Oral Q8H PRN    citalopram (CELEXA) tablet 20 mg  20 mg Oral DAILY    gabapentin (NEURONTIN) capsule 300 mg  300 mg Oral TID    insulin regular (NOVOLIN R, HUMULIN R) injection   SubCUTAneous AC&HS    glucose chewable tablet 16 g  4 Tab Oral PRN    dextrose (D50W) injection syrg 12.5-25 g  12.5-25 g IntraVENous PRN    glucagon (GLUCAGEN) injection 1 mg  1 mg IntraMUSCular PRN    esmolol 10mg/mL (BREVIBLOC) infusion   mcg/kg/min IntraVENous TITRATE    warfarin: pharmacy to dose    Other Rx Dosing/Monitoring         Pooja Grubbs NP

## 2018-05-25 NOTE — PROGRESS NOTES
[facility] Dosing Services:     Consult for Warfarin Dosing by Pharmacy by Dr. Jimmy Hendrickson provided for this 80 y.o.  female , for indication of h/o Pulmonary Embolism, afib RVR. Day of Therapy--> continued from home---> 3 mg po daily   Dose to achieve an INR goal of 2-3    Order entered for  Warfarin  2 (mg) ordered to be given today at 18:00. Significant drug interactions: amiodarone (new start)  Previous dose given 2 mg    PT/INR Lab Results   Component Value Date/Time    INR 1.8 (H) 05/25/2018 04:40 AM      Platelets Lab Results   Component Value Date/Time    PLATELET 615 20/64/3731 04:40 AM      H/H Lab Results   Component Value Date/Time    HGB 11.7 05/25/2018 04:40 AM        Pharmacy to follow daily and will provide subsequent Warfarin dosing based on clinical status.   LOLIS Maier)  Contact information [phone]

## 2018-05-25 NOTE — PROGRESS NOTES
I discussed pt with Jose Rodriguez with [employer] as pt is a [facility] pt. I also discussed pt with attending who gave orders for home health for SN for afib education and medication reconciliation. Orders sent through cc link with the request that pt be followed by Dr Laurel Marmolejo for home health orders. I will discuss face to face with cardiology NP. I am requesting that [address] [facility] [facility] [address] [facility] [address] please bring me a face to face sheet for cardiology NP to complete. [facility] has accepted pt for home health services when discharged. Plan discussed with pt and her . [facility] placed on pt.'s AVS.    If pt is discharged home this weekend or on Monday(holiday) nursing,please notify [facility] by calling [phone] to tell them pt is going home. [facility] can pull up AVS and discharge information through the cc link.     Larry Rowe

## 2018-05-25 NOTE — PROGRESS NOTES
Hospital PCP JUNIOR follow-up appointment with Kathie Walls on Tuesday May 29,2018 @ 9:00 a.m.  Added to AVS.   Yashira Ryder CM Specialist

## 2018-05-25 NOTE — PROGRESS NOTES
TRANSFER - IN REPORT:    Verbal report received from [address] (name) on Tray LAMA Link  being received from ICU (unit) for routine progression of care      Report consisted of patients Situation, Background, Assessment and   Recommendations(SBAR). Information from the following report(s) SBAR, Kardex, Procedure Summary, Intake/Output, MAR, Recent Results and Cardiac Rhythm NSR was reviewed with the receiving nurse. Opportunity for questions and clarification was provided. 1745 Assessment completed upon patients arrival to unit and care assumed. 1900 Bedside and Verbal shift change report given to Debi (oncoming nurse) by Mauro Hargrove (offgoing nurse). Report included the following information SBAR, Intake/Output, MAR, Recent Results and Cardiac Rhythm NSR.

## 2018-05-25 NOTE — CDMP QUERY
1.  Please clarify if this patient has a history of CKD 3 (reference ranges given below)    => CKD 3 in the setting of elderly female with hypertension and diabetes, as evidenced by GFR ranging 41-60, dating 7/2016 through present admission  => Other explanation of clinical findings   => Clinically Undetermined (no explanation for clinical findings)    The medical record reflects the following clinical findings, treatment, and risk factors. Risk Factors:  79 y/o female, hypertension, diabetes    Clinical Indicators:  chart review shows serum GFR ranges 41-60 dating from 7/2016 through present admission    Treatment: serial monitoring of lab values    CKD STAGES    Stage 1: > 90  Stage 2:  60-89  Stage 3: 30-59  Stage 4: 15-29  Stage 5: < 15    Please clarify and document your clinical opinion in the progress notes and discharge summary including the definitive and/or presumptive diagnosis, (suspected or probable), related to the above clinical findings. Please include clinical findings supporting your diagnosis. Thanks for your time.     Barbara Mancera RN, BSN  [phone]

## 2018-05-25 NOTE — PROGRESS NOTES
Met with patient  Explained the procedure in detail - GULSHAN/DCCV  Consent signed by /myself  INR subtherapeutic  Will start lovenox bid to bridge until INR > 2.0 x 48 hrs    Mane Ram MD

## 2018-05-25 NOTE — PROGRESS NOTES
Spoke to Mansi Purcell, repleting K and Mg currently and will recheck and confirm K and Mg is therapeutic before procedure will begin; Tu Smyth is now performing procedure

## 2018-05-25 NOTE — PROCEDURES
Transesophageal Echo    Date of Procedure: 5/25/2018   Preoperative Diagnosis: persistent AF with RVR  Postoperative Diagnosis: see below    Procedure: GULSHAN  Cardiologist: Teresa Syed MD  Anesthesia: local + IV sedation  Estimated Blood Loss: None  Findings: no LA/MOHAN thrombus, prominent pectinate muscles in MOHAN. Moderate RIAN, mild-moderate MR, mod-severe TR, LVEF 55%. See full GULSHAN note.   Complications: none

## 2018-05-25 NOTE — PROGRESS NOTES
[facility]     Name: Parish Hardin MRN: [id number]   : 1933 Hospital: [address]   Date: 2018        Impression Plan   1. A-fib with RVR  2. Mild pulm edema secondary to a-fib w RVR  3. Hx of PE- on warfarin  4. Hx of HTN                 · Off of esmolol gtt  · Amiodarone/diltiazem  · GULSHAN with DCCV this afternoon  · Colonoscopy held           Radiology  ( personally reviewed) CXR mild pulmonary edema   ABG No results for input(s): PHI, PO2I, PCO2I in the last 72 hours. Subjective     Cc: I feel fine    79 yo with PMHX of anemia who presented for an elective colonoscopy and was found to be in a-fib with RVR. Admitted and a-fib initially controlled with esmolol and dilt. Is on warfarin for hx of PE. INR 1.8. Pt currently rate controlled on PO meds. Plan for DCCV this afternoon. Pt confused and unable to provide further hx. Review of Systems:  Review of systems not obtained due to patient factors. Past Medical History:   Diagnosis Date    Anemia     Cancer ([address]     basal cell on nose    Chronic pain     back pain    Fibromyalgia 2010    GERD (gastroesophageal reflux disease) 2010    Hiatal hernia 2010    High cholesterol 2010    HTN (hypertension) 2010    OA (osteoarthritis) 2010    back    Pulmonary emboli ([address]       Past Surgical History:   Procedure Laterality Date    BREAST SURGERY PROCEDURE UNLISTED      Breast im-plants x 2    ENLARGE BREAST WITH IMPLANT      HX APPENDECTOMY      HX BREAST BIOPSY      HX BREAST RECONSTRUCTION      Breast implants removed     HX CATARACT REMOVAL      HX CHOLECYSTECTOMY  2013    HX HYSTERECTOMY      HX KNEE REPLACEMENT  2008    bilateral    HX ORTHOPAEDIC  2007    Bilateral TKR    HX PARTIAL HYSTERECTOMY      HX SHOULDER REPLACEMENT  2009    left    HX TONSILLECTOMY        Prior to Admission medications    Medication Sig Start Date End Date Taking? Authorizing Provider   ferrous sulfate (IRON) 325 mg (65 mg iron) cpER Take  by mouth. Yes Historical Provider   cyanocobalamin (VITAMIN B-12) 500 mcg tablet Take 500 mcg by mouth daily. Yes Historical Provider   HYDROcodone-acetaminophen (NORCO) 7.5-325 mg per tablet Take 1 Tab by mouth every eight (8) hours as needed for Pain. Max Daily Amount: 3 Tabs. 4/16/18  Yes Lara Smith MD   cyclobenzaprine (FLEXERIL) 5 mg tablet TAKE 1 TABLET BY MOUTH 3 TIMES A DAY AS NEEDED FOR MUSCLE SPASMS 3/21/18  Yes Lara Smith MD   OTHER Folding Wheelchair  Dx: OA 2/27/18  Yes Lara Smith MD   losartan-hydroCHLOROthiazide [facility]) 100-25 mg per tablet TAKE 1 TABLET BY MOUTH DAILY 2/27/18  Yes Lara Smith MD   citalopram (CELEXA) 20 mg tablet TAKE 1 TABLET BY MOUTH DAILY 2/27/18  Yes Lara Smith MD   gabapentin (NEURONTIN) 600 mg tablet TAKE 1 TABLET BY MOUTH 3 TIMES A DAY 2/27/18  Yes Lara Smith MD   JANTOVEN 3 mg tablet TAKE 1 TABLET BY MOUTH DAILY 2/13/18  Yes Lara Smith MD   naloxone [facility]) 4 mg/actuation spry 1 Spray by Nasal route as needed.  Indications: OPIATE-INDUCED RESPIRATORY DEPRESSION 4/25/17  Yes Lara Smith MD     Current Facility-Administered Medications   Medication Dose Route Frequency    dilTIAZem (CARDIZEM) IR tablet 30 mg  30 mg Oral AC&HS    midazolam (VERSED) injection 5 mg  5 mg IntraVENous ONCE    warfarin (COUMADIN) tablet 2 mg  2 mg Oral ONCE    magnesium sulfate 2 g/50 ml IVPB (premix or compounded)  2 g IntraVENous ONCE    potassium chloride 10 mEq in 100 ml IVPB  10 mEq IntraVENous Q1H    lactated Ringers infusion  100 mL/hr IntraVENous CONTINUOUS    sodium chloride (NS) flush 5-10 mL  5-10 mL IntraVENous Q8H    0.9% sodium chloride infusion  75 mL/hr IntraVENous CONTINUOUS    amiodarone (CORDARONE) tablet 400 mg  400 mg Oral TID    citalopram (CELEXA) tablet 20 mg  20 mg Oral DAILY    gabapentin (NEURONTIN) capsule 300 mg  300 mg Oral TID    insulin regular (NOVOLIN R, HUMULIN R) injection   SubCUTAneous AC&HS    esmolol 10mg/mL (BREVIBLOC) infusion   mcg/kg/min IntraVENous TITRATE    warfarin: pharmacy to dose    Other Rx Dosing/Monitoring     Allergies   Allergen Reactions    Angiotensin Ii,Human Other (comments)     States does not remember      Avelox [Moxifloxacin] Other (comments)     States does not remember      Demerol [Meperidine] Hives      Social History   Substance Use Topics    Smoking status: Never Smoker    Smokeless tobacco: Never Used    Alcohol use No      Family History   Problem Relation Age of Onset   Arnaldo Tiwari Arthritis-rheumatoid Mother     Dementia Mother     Coronary Artery Disease Father     Cancer Brother      lung cancer          Laboratory: I have personally reviewed the critical care flowsheet and labs.      Recent Labs      05/25/18   0440  05/24/18   1110   WBC  5.5  6.3   HGB  11.7  13.3   HCT  35.6  39.5   PLT  173  230     Recent Labs      05/25/18   0440  05/24/18   1150  05/24/18   1110   NA  138   --   143   K  3.4*   --   4.0   CL  103   --   104   CO2  24   --   30   GLU  94   --   105*   BUN  19   --   19   CREA  0.90   --   1.07*   CA  9.0   --   9.9   MG  1.3*   --    --    ALB   --    --   3.7   SGOT   --    --   16   ALT   --    --   18   INR  1.8*  1.9*   --        Objective:     Mode Rate Tidal Volume Pressure FiO2 PEEP                    Vital Signs:     TMAX(24)      Intake/Output:   Last shift:         Last 3 shifts: 05/25 0701 - 05/25 1900  In: 60 [P.O.:60]  Out: - RRIOLAST3  Intake/Output Summary (Last 24 hours) at 05/25/18 1134  Last data filed at 05/25/18 2261   Gross per 24 hour   Intake          1983.76 ml   Output                0 ml   Net          1983.76 ml     EXAM:   GENERAL: well developed and in no distress, HEENT:  PERRL, EOMI, no alar flaring or epistaxis, oral mucosa moist without cyanosis, NECK:  no jugular vein distention, no retractions, no thyromegaly or masses, LUNGS: CTA, no w/r/r HEART: Irregular rate and rhythm with no MGR; no edema is present, ABDOMEN:  soft with no tenderness, bowel sounds present, EXTREMITIES:  warm with no cyanosis, SKIN:  no jaundice or ecchymosis and NEUROLOGIC:  alert and oriented, grossly non-focal    Adrián Fails, MD  [facility]

## 2018-05-25 NOTE — DIABETES MGMT
Progress Note     Addendum: [id number]  Met with Dr. Ant Franco MD and interdisciplinary team. Orders - for Humalog Normal Sensitivity scale to cover for glucose > 139 mg/dL Every 6 hours; discontinue insulin Regular were obtained via Lenin Franco MD and entered into [facility]. Yifan Sandoval. Amanda Mccloud, MPH, RN, BSN, [address]    [phone] (office)  [phone] (pager)              Recommendations/ Comments: If appropriate, please consider discontinuing insulin regular for POCT Glucose coverage and adding Humalog Normal Sensitivity scale to cover for glucose > 139 mg/dL. This will decrease risk for insulin stacking and consequently risk for hypoglycemia. Morning glucose: 104 mg/dL (per am POCT Glucose). Required 0 units of correction in the last 24 hours. Inpatient medications for glucose management:  1. Correction Scale: insulin Regular Normal Sensitivity scale to cover for glucose > 139 mg/dL before meals and for glucose >199 at bedtime      POC Glucose last 24hrs:   Lab Results   Component Value Date/Time    GLU 94 05/25/2018 04:40 AM     (H) 05/24/2018 11:10 AM    GLUCPOC 104 (H) 05/25/2018 07:54 AM    GLUCPOC 118 (H) 05/24/2018 03:57 PM        Estimated Creatinine Clearance: 46.4 mL/min (based on Cr of 0.9). Diet order:   Active Orders   Diet    DIET NPO Except Meds        PO intake: Patient Vitals for the past 72 hrs:   % Diet Eaten   05/25/18 0833 0 %       History of Diabetes:   Tray Wang is a 80 y.o. female with a past medical history significant for DM per  Buck Thomas MD's H&P dated 5/24/2018.      Prior to admission medications for glucose management: per past medical records  -none for DM    A1C: reflects excellent control  Lab Results   Component Value Date/Time    Hemoglobin A1c 5.4 04/16/2018 09:16 AM    Hemoglobin A1c 6.0 (H) 11/27/2017 09:14 AM       Reference range*:  Increased risk for diabetes: 5.7 - 6.4%  Diabetes: >6.4%  Glycemic control for adults with diabetes: <7.0 %    *LAXMI REVELES (2014). Diagnosis and classification of diabetes mellitus. Diabetes care, 37, S81. Thank you. María Serra. Ina MPH, RN, BSN, [address]   [phone]    -A target glucose range of 140-180 mg/dL (7.8-10.0 mmol/L) is recommended for the majority of critically ill patients and noncritically ill patients. -More stringent goals, such as 110-140 mg/dL (6.1-7.8 mmol/L), may be appropriate for selected patients, if this can be achieved without significant hypoglycemia. *    *American Diabetes Association. 14. Diabetes care in the hospital:Standards of Medical Care in [id number]. Diabetes Care 2018;41(Suppl.  1):P119-M130

## 2018-05-25 NOTE — PROCEDURES
BRIEF PROCEDURE NOTE    Date of Procedure: 5/25/2018   Preoperative Diagnosis: atrial fibrillation  Postoperative Diagnosis: same   Procedure: Synchronized DC cardioversion  Cardiologist: Tawanna Mckay MD  Anesthesia:  IV sedation  Estimated Blood Loss: None  Findings: Following GULSHAN demonstrating no LA/MOHAN thrombus, the patient was sedated with intravenous midazolam. 200 joule biphasic shock was delivered using anterior and posterior pads converting atrial fibrillation to sinus rhythm. No complications were noted.      Complications: none    Successful GULSHAN guided DCCV converting AF to sinus  Continue amiodarone  Continue lovenox bridge until INR > 2 x 48 hrs

## 2018-05-25 NOTE — PROGRESS NOTES
Bedside and Verbal shift change report given to Janet Jack RN (oncoming nurse) by Mills [place] (offgoing nurse). Report included the following information SBAR, Kardex, Procedure Summary, Intake/Output, MAR, Accordion, Recent Results, Med Rec Status and Cardiac Rhythm afib with RVR. Bedside and Verbal shift change report given to [address] [place] [address] (oncoming nurse) by Janet Jack RN (offgoing nurse). Report included the following information SBAR, Kardex, Procedure Summary, Intake/Output, MAR, Accordion, Recent Results, Med Rec Status and Cardiac Rhythm sinus tachy post cardioversion.

## 2018-05-25 NOTE — ADVANCED PRACTICE NURSE
Successful cardioversion to SR.    Will plan to change dilt to CD in am  amio 200 mg every day on discharge  INR in am  Cont lovenox till INR is 2 then plan to d/c   Amio/dilt e scribed to pharmacy
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
## 2018-05-26 VITALS
OXYGEN SATURATION: 95 % | HEIGHT: 66 IN | DIASTOLIC BLOOD PRESSURE: 92 MMHG | HEART RATE: 99 BPM | RESPIRATION RATE: 16 BRPM | WEIGHT: 164.5 LBS | BODY MASS INDEX: 26.44 KG/M2 | SYSTOLIC BLOOD PRESSURE: 169 MMHG | TEMPERATURE: 98.6 F

## 2018-05-26 LAB
ANION GAP SERPL CALC-SCNC: 10 MMOL/L (ref 5–15)
BUN SERPL-MCNC: 16 MG/DL (ref 6–20)
BUN/CREAT SERPL: 18 (ref 12–20)
CALCIUM SERPL-MCNC: 9.3 MG/DL (ref 8.5–10.1)
CHLORIDE SERPL-SCNC: 106 MMOL/L (ref 97–108)
CO2 SERPL-SCNC: 25 MMOL/L (ref 21–32)
CREAT SERPL-MCNC: 0.89 MG/DL (ref 0.55–1.02)
GLUCOSE BLD STRIP.AUTO-MCNC: 88 MG/DL (ref 65–100)
GLUCOSE BLD STRIP.AUTO-MCNC: 93 MG/DL (ref 65–100)
GLUCOSE SERPL-MCNC: 86 MG/DL (ref 65–100)
INR PPP: 2.2 (ref 0.9–1.1)
MAGNESIUM SERPL-MCNC: 1.6 MG/DL (ref 1.6–2.4)
POTASSIUM SERPL-SCNC: 3.6 MMOL/L (ref 3.5–5.1)
PROTHROMBIN TIME: 22.3 SEC (ref 9–11.1)
SERVICE CMNT-IMP: NORMAL
SERVICE CMNT-IMP: NORMAL
SODIUM SERPL-SCNC: 141 MMOL/L (ref 136–145)

## 2018-05-26 PROCEDURE — 36415 COLL VENOUS BLD VENIPUNCTURE: CPT | Performed by: INTERNAL MEDICINE

## 2018-05-26 PROCEDURE — 83735 ASSAY OF MAGNESIUM: CPT | Performed by: NURSE PRACTITIONER

## 2018-05-26 PROCEDURE — 74011250636 HC RX REV CODE- 250/636: Performed by: NURSE PRACTITIONER

## 2018-05-26 PROCEDURE — 74011250637 HC RX REV CODE- 250/637: Performed by: NURSE PRACTITIONER

## 2018-05-26 PROCEDURE — 80048 BASIC METABOLIC PNL TOTAL CA: CPT | Performed by: NURSE PRACTITIONER

## 2018-05-26 PROCEDURE — 74011250637 HC RX REV CODE- 250/637: Performed by: INTERNAL MEDICINE

## 2018-05-26 PROCEDURE — 82962 GLUCOSE BLOOD TEST: CPT

## 2018-05-26 PROCEDURE — 85610 PROTHROMBIN TIME: CPT | Performed by: INTERNAL MEDICINE

## 2018-05-26 RX ORDER — DILTIAZEM HYDROCHLORIDE 120 MG/1
240 CAPSULE, COATED, EXTENDED RELEASE ORAL DAILY
Status: DISCONTINUED | OUTPATIENT
Start: 2018-05-27 | End: 2018-05-26 | Stop reason: HOSPADM

## 2018-05-26 RX ADMIN — AMIODARONE HYDROCHLORIDE 400 MG: 200 TABLET ORAL at 09:16

## 2018-05-26 RX ADMIN — Medication 10 ML: at 05:39

## 2018-05-26 RX ADMIN — ENOXAPARIN SODIUM 30 MG: 40 INJECTION SUBCUTANEOUS at 05:39

## 2018-05-26 RX ADMIN — GABAPENTIN 300 MG: 300 CAPSULE ORAL at 09:17

## 2018-05-26 RX ADMIN — CITALOPRAM 20 MG: 20 TABLET, FILM COATED ORAL at 09:16

## 2018-05-26 RX ADMIN — DILTIAZEM HYDROCHLORIDE 120 MG: 120 CAPSULE, COATED, EXTENDED RELEASE ORAL at 09:17

## 2018-05-26 RX ADMIN — ENOXAPARIN SODIUM 40 MG: 40 INJECTION SUBCUTANEOUS at 05:39

## 2018-05-26 NOTE — PROGRESS NOTES
HISTORY OF PRESENTING ILLNESS     No acute events overnight. Patient underwent cardioversion. INR is now therapeutic.        ACTIVE PROBLEM LIST     Patient Active Problem List    Diagnosis Date Noted    A-fib [facility]) 05/24/2018    Dyslipidemia 05/24/2018    Chronic anticoagulation 05/24/2018    Constipation 05/24/2018    History of pulmonary embolism 05/24/2018    Type 2 diabetes with nephropathy ([address] 02/27/2018    Advanced care planning/counseling discussion 05/09/2017    Controlled type 2 diabetes mellitus without complication, without long-term current use of insulin ([address] 05/09/2017    Iron deficiency anemia 08/11/2016    Advance care planning 04/13/2016    Pulmonary embolism ([facility] 02/25/2015    Itching 02/07/2014    Elevated liver enzymes 01/02/2014    S/P cholecystectomy 01/02/2014    S/P knee replacement 01/02/2014    S/P shoulder surgery 01/02/2014    H/O carpal tunnel repair 01/02/2014    Chronic pain 11/16/2010    HTN (hypertension) 04/01/2010    OA (osteoarthritis) 04/01/2010    Fibromyalgia 04/01/2010    GERD (gastroesophageal reflux disease) 04/01/2010    High cholesterol 04/01/2010    Hiatal hernia 04/01/2010           PAST MEDICAL HISTORY     Past Medical History:   Diagnosis Date    Anemia     Cancer ([address]     basal cell on nose    Chronic pain     back pain    Fibromyalgia 4/1/2010    GERD (gastroesophageal reflux disease) 4/1/2010    Hiatal hernia 4/1/2010    High cholesterol 4/1/2010    HTN (hypertension) 4/1/2010    OA (osteoarthritis) 4/1/2010    back    Pulmonary emboli ([address] 2015           PAST SURGICAL HISTORY     Past Surgical History:   Procedure Laterality Date    BREAST SURGERY PROCEDURE UNLISTED      Breast im-plants x 2    ENLARGE BREAST WITH IMPLANT      HX APPENDECTOMY      HX BREAST BIOPSY      HX BREAST RECONSTRUCTION      Breast implants removed 1992    HX CATARACT REMOVAL      HX CHOLECYSTECTOMY  9/11/2013    HX HYSTERECTOMY  HX KNEE REPLACEMENT  2008    bilateral    HX ORTHOPAEDIC  2007    Bilateral TKR    HX PARTIAL HYSTERECTOMY      HX SHOULDER REPLACEMENT  2009    left    HX TONSILLECTOMY            ALLERGIES     Allergies   Allergen Reactions    Angiotensin Ii,Human Other (comments)     States does not remember      Avelox [Moxifloxacin] Other (comments)     States does not remember      Demerol [Meperidine] Hives          FAMILY HISTORY     Family History   Problem Relation Age of Onset   [facility] Arthritis-rheumatoid Mother     Dementia Mother     Coronary Artery Disease Father     Cancer Brother      lung cancer    negative for cardiac disease       SOCIAL HISTORY     Social History     Social History    Marital status:      Spouse name: N/A    Number of children: N/A    Years of education: N/A     Social History Main Topics    Smoking status: Never Smoker    Smokeless tobacco: Never Used    Alcohol use No    Drug use: No    Sexual activity: No     Other Topics Concern    Not on file     Social History Narrative         MEDICATIONS     Current Facility-Administered Medications   Medication Dose Route Frequency    fentaNYL citrate (PF) injection  mcg   mcg IntraVENous Multiple    lidocaine (XYLOCAINE) 2 % viscous solution 15 mL  15 mL Mouth/Throat PRN    insulin lispro (HUMALOG) injection   SubCUTAneous Q6H    dilTIAZem CD (CARDIZEM CD) capsule 120 mg  120 mg Oral DAILY    enoxaparin (LOVENOX) injection 40 mg  40 mg SubCUTAneous Q12H    And    enoxaparin (LOVENOX) injection 30 mg  30 mg SubCUTAneous Q12H    sodium chloride (NS) flush 5-10 mL  5-10 mL IntraVENous Q8H    sodium chloride (NS) flush 5-10 mL  5-10 mL IntraVENous PRN    0.9% sodium chloride infusion  75 mL/hr IntraVENous CONTINUOUS    HYDROcodone-acetaminophen (NORCO) 7.5-325 mg per tablet 1 Tab  1 Tab Oral Q8H PRN    citalopram (CELEXA) tablet 20 mg  20 mg Oral DAILY    gabapentin (NEURONTIN) capsule 300 mg  300 mg Oral TID    glucose chewable tablet 16 g  4 Tab Oral PRN    dextrose (D50W) injection syrg 12.5-25 g  12.5-25 g IntraVENous PRN    glucagon (GLUCAGEN) injection 1 mg  1 mg IntraMUSCular PRN    warfarin: pharmacy to dose    Other Rx Dosing/Monitoring       I have reviewed the nurses notes, vitals, problem list, allergy list, medical history, family, social history and medications. REVIEW OF SYMPTOMS      General: Pt denies excessive weight gain or loss. Pt is able to conduct ADL's  HEENT: Denies blurred vision, headaches, hearing loss, epistaxis and difficulty swallowing. Respiratory: Denies cough, congestion, shortness of breath, MELENDEZ, wheezing or stridor. Cardiovascular: Denies precordial pain, palpitations, edema or PND  Gastrointestinal: Denies poor appetite, indigestion, abdominal pain or blood in stool  Genitourinary: Denies hematuria, dysuria, increased urinary frequency  Musculoskeletal: Denies joint pain or swelling from muscles or joints  Neurologic: Denies tremor, paresthesias, headache, or sensory motor disturbance  Psychiatric: Denies confusion, insomnia, depression  Integumentray: Denies rash, itching or ulcers. Hematologic: Denies easy bruising, bleeding       PHYSICAL EXAMINATION      Vitals:    05/26/18 0213 05/26/18 0333 05/26/18 0653 05/26/18 0910   BP:  156/85  (!) 160/109   Pulse:  85 87 63   Resp:  20     Temp:  98.7 °F (37.1 °C)  98.1 °F (36.7 °C)   SpO2:  96%     Weight: 164 lb 8 oz (74.6 kg)      Height:         General: Well developed, in no acute distress. HEENT: No jaundice, oral mucosa moist, no oral ulcers  Neck: Supple, no stiffness, no lymphadenopathy, supple  Heart:  irreg irreg, no murmur, gallop or rub, no jugular venous distention  Respiratory: Clear bilaterally x 4, no wheezing or rales  Abdomen:   Soft, non-tender, bowel sounds are active.   Extremities:  No edema, normal cap refill, no cyanosis.   Musculoskeletal: No clubbing, no deformities  Neuro: A&Ox3, speech clear, gait stable, cooperative, no focal neurologic deficits  Skin: Skin color is normal. No rashes or lesions. Non diaphoretic, moist.  Vascular: 2+ pulses symmetric in all extremities       DIAGNOSTIC DATA      TELEMETRY: SR with frequent PACs       LABORATORY DATA      Lab Results   Component Value Date/Time    WBC 5.5 05/25/2018 04:40 AM    HGB 11.7 05/25/2018 04:40 AM    HCT 35.6 05/25/2018 04:40 AM    PLATELET 575 40/06/2425 04:40 AM    MCV 98.3 05/25/2018 04:40 AM      Lab Results   Component Value Date/Time    Sodium 141 05/26/2018 03:43 AM    Potassium 3.6 05/26/2018 03:43 AM    Chloride 106 05/26/2018 03:43 AM    CO2 25 05/26/2018 03:43 AM    Anion gap 10 05/26/2018 03:43 AM    Glucose 86 05/26/2018 03:43 AM    BUN 16 05/26/2018 03:43 AM    Creatinine 0.89 05/26/2018 03:43 AM    BUN/Creatinine ratio 18 05/26/2018 03:43 AM    GFR est AA >60 05/26/2018 03:43 AM    GFR est non-AA >60 05/26/2018 03:43 AM    Calcium 9.3 05/26/2018 03:43 AM    Bilirubin, total 0.9 05/24/2018 11:10 AM    AST (SGOT) 16 05/24/2018 11:10 AM    Alk. phosphatase 134 (H) 05/24/2018 11:10 AM    Protein, total 7.2 05/24/2018 11:10 AM    Albumin 3.7 05/24/2018 11:10 AM    Globulin 3.5 05/24/2018 11:10 AM    A-G Ratio 1.1 05/24/2018 11:10 AM    ALT (SGPT) 18 05/24/2018 11:10 AM           ASSESSMENT      1. Atrial fibrillation status post cardioversion plan for discharge today on Coumadin and amiodarone/diltiazem.  Frequent PACs          Sophie Hayes MD  Cardiac Electrophysiology / Cardiology    [address]  [address]  [phone] / [phone] Fax   [phone] / [phone] Fax

## 2018-05-26 NOTE — PROGRESS NOTES
Problem: Falls - Risk of  Goal: *Absence of Falls  Document Kizzy Fall Risk and appropriate interventions in the flowsheet. Outcome: Progressing Towards Goal  Fall Risk Interventions:  Mobility Interventions: Bed/chair exit alarm    Mentation Interventions: Bed/chair exit alarm    Medication Interventions: Bed/chair exit alarm    Elimination Interventions: Call light in reach. ...      1900- Bedside and Verbal shift change report given to [address] [place] [address]  (oncoming nurse) by Milena Marks RN  (offgoing nurse). Report included the following information SBAR, Kardex and Recent Results. 0700- Bedside and Verbal shift change report given to Thong Thomas RN  (oncoming nurse) by Yudelka Robles RN  (offgoing nurse). Report included the following information SBAR, Kardex and Recent Results. ..

## 2018-05-26 NOTE — DISCHARGE SUMMARY
Cardiac Electrophysiology Discharge Summary       Patient ID:  Johnny Leung  [id number]  1 y.o.  5/24/1933    Admit Date: 5/24/2018    Discharge Date: 5/26/2018     Admitting Physician: Arvind Hoskins MD     Discharge Physician: Merrill Galeano MD    Admission Diagnoses: CONSTIPATION ;A-fib [facility]); Constipation;HTN (hypertension);D*    Discharge Diagnoses: Active Problems:    HTN (hypertension) (4/1/2010)      Chronic pain (11/16/2010)      A-fib ([address] (5/24/2018)      Dyslipidemia (5/24/2018)      Chronic anticoagulation (5/24/2018)      Constipation (5/24/2018)      History of pulmonary embolism (5/24/2018)        Discharge Condition: stable    Cardiology Procedures this Admission:  Patient presented with AF with RVR. INR subtherapeutic. Underwent GULSHAN/DCCV. INR now 2.2. Started on amiodarone/diltiazem. Hospital Course: Patient underwent above procedure without complication and remained stable without acute events. Discharge Exam:  Visit Vitals    BP (!) 160/109 (BP 1 Location: Left arm, BP Patient Position: Sitting)    Pulse 63    Temp 98.1 °F (36.7 °C)    Resp 20    Ht 5' 6\" (1.676 m)    Wt 164 lb 8 oz (74.6 kg)    SpO2 96%    BMI 26.55 kg/m2       Abdomen: soft, non-tender  Extremities: extremities normal  Heart: regular rate and rhythm  Lungs: clear to auscultation bilaterally  Neck: no JVD  Neurologic: Grossly normal  Pulses: 2+ and symmetric    Disposition: Home    Patient Instructions:   Current Discharge Medication List      START taking these medications    Details   dilTIAZem CD (CARDIZEM CD) 120 mg ER capsule Take 1 Cap by mouth daily. Qty: 30 Cap, Refills: 1      amiodarone (CORDARONE) 200 mg tablet Take 1 Tab by mouth daily. Qty: 30 Tab, Refills: 0         CONTINUE these medications which have NOT CHANGED    Details   ferrous sulfate (IRON) 325 mg (65 mg iron) cpER Take  by mouth.      cyanocobalamin (VITAMIN B-12) 500 mcg tablet Take 500 mcg by mouth daily. HYDROcodone-acetaminophen (NORCO) 7.5-325 mg per tablet Take 1 Tab by mouth every eight (8) hours as needed for Pain. Max Daily Amount: 3 Tabs. Qty: 90 Tab, Refills: 0    Associated Diagnoses: Chronic pain syndrome      cyclobenzaprine (FLEXERIL) 5 mg tablet TAKE 1 TABLET BY MOUTH 3 TIMES A DAY AS NEEDED FOR MUSCLE SPASMS  Qty: 90 Tab, Refills: 2      OTHER Folding Wheelchair  Dx: OA  Qty: 1 Each, Refills: 0    Associated Diagnoses: Chronic pain syndrome      losartan-hydroCHLOROthiazide (HYZAAR) 100-25 mg per tablet TAKE 1 TABLET BY MOUTH DAILY  Qty: 90 Tab, Refills: 2      citalopram (CELEXA) 20 mg tablet TAKE 1 TABLET BY MOUTH DAILY  Qty: 90 Tab, Refills: 2      gabapentin (NEURONTIN) 600 mg tablet TAKE 1 TABLET BY MOUTH 3 TIMES A DAY  Qty: 270 Tab, Refills: 2      JANTOVEN 3 mg tablet TAKE 1 TABLET BY MOUTH DAILY  Qty: 30 Tab, Refills: 4    Associated Diagnoses: Coagulation deficiency (HCC)      naloxone (NARCAN) 4 mg/actuation spry 1 Spray by Nasal route as needed. Indications: OPIATE-INDUCED RESPIRATORY DEPRESSION  Qty: 1 Bottle, Refills: 2    Associated Diagnoses: Chronic pain syndrome             Referenced discharge instructions provided by nursing for diet and activity.     Follow-up with Paul Weiner MD             Signed:  Shanti Crabtree MD  Cardiac Electrophysiology  5/26/2018  10:21 AM

## 2018-05-28 ENCOUNTER — HOME CARE VISIT (OUTPATIENT)
Dept: SCHEDULING | Facility: HOME HEALTH | Age: 83
End: 2018-05-28
Payer: MEDICARE

## 2018-05-28 PROCEDURE — 400013 HH SOC

## 2018-05-28 PROCEDURE — 3331090001 HH PPS REVENUE CREDIT

## 2018-05-28 PROCEDURE — G0299 HHS/HOSPICE OF RN EA 15 MIN: HCPCS

## 2018-05-28 PROCEDURE — 3331090002 HH PPS REVENUE DEBIT

## 2018-05-29 ENCOUNTER — PATIENT OUTREACH (OUTPATIENT)
Dept: FAMILY MEDICINE CLINIC | Age: 83
End: 2018-05-29

## 2018-05-29 ENCOUNTER — OFFICE VISIT (OUTPATIENT)
Dept: FAMILY MEDICINE CLINIC | Age: 83
End: 2018-05-29

## 2018-05-29 VITALS
OXYGEN SATURATION: 95 % | DIASTOLIC BLOOD PRESSURE: 44 MMHG | BODY MASS INDEX: 26.92 KG/M2 | SYSTOLIC BLOOD PRESSURE: 97 MMHG | TEMPERATURE: 97.5 F | WEIGHT: 167.5 LBS | RESPIRATION RATE: 16 BRPM | HEIGHT: 66 IN | HEART RATE: 58 BPM

## 2018-05-29 VITALS
TEMPERATURE: 99.3 F | DIASTOLIC BLOOD PRESSURE: 68 MMHG | RESPIRATION RATE: 16 BRPM | OXYGEN SATURATION: 95 % | HEART RATE: 82 BPM | SYSTOLIC BLOOD PRESSURE: 128 MMHG

## 2018-05-29 DIAGNOSIS — Z00.00 ROUTINE GENERAL MEDICAL EXAMINATION AT A HEALTH CARE FACILITY: Primary | ICD-10-CM

## 2018-05-29 DIAGNOSIS — I10 ESSENTIAL HYPERTENSION: Chronic | ICD-10-CM

## 2018-05-29 DIAGNOSIS — Z71.89 ADVANCE CARE PLANNING: ICD-10-CM

## 2018-05-29 DIAGNOSIS — I48.91 ATRIAL FIBRILLATION, UNSPECIFIED TYPE (HCC): ICD-10-CM

## 2018-05-29 PROCEDURE — 3331090001 HH PPS REVENUE CREDIT

## 2018-05-29 PROCEDURE — 3331090002 HH PPS REVENUE DEBIT

## 2018-05-29 NOTE — MR AVS SNAPSHOT
[address] [address] 
[phone] Patient: Tray Regan MRN:  [id number] Visit Information Date & Time Provider Department Dept. Phone Encounter #  
 5/29/2018  9:00 AM Angel Lehman MD [address] [facility] [phone] [id number] Follow-up Instructions Return in about 1 month (around 6/29/2018). Your Appointments 5/29/2018  9:00 AM  
Any with Angel Lehman MD  
[facility] [address]) Appt Note: 6wk fuv  
 [address] [place] [address] [address] [id number] [phone]  
  
   
 [id number] [place] [address] Road [address]  
  
    
 5/30/2018 10:20 AM  
ESTABLISHED PATIENT with Nilson Medina MD  
[facility] ([facility]) Appt Note: hospital follow up for - AFIB - per Elizabethann Matter [address]  
54 [facility] [address] [address] 8/17/2018 10:00 AM  
ESTABLISHED PATIENT with Piper Medrano MD  
[facility] [address]) Appt Note: 6mo iron deficiency Seble lehman [address] [facility], [address] [facility] [address] [address] [phone]  
  
   
 [address] [facility], [facility]9 [address] Upcoming Health Maintenance Date Due  
 EYE EXAM RETINAL OR DILATED Q1 7/18/2013 MEDICARE YEARLY EXAM 5/10/2018 Influenza Age 5 to Adult 8/1/2018 GLAUCOMA SCREENING Q2Y 11/9/2018 HEMOGLOBIN A1C Q6M 11/25/2018 FOOT EXAM Q1 11/27/2018 MICROALBUMIN Q1 4/16/2019 LIPID PANEL Q1 4/16/2019 DTaP/Tdap/Td series (2 - Td) 11/9/2026 Allergies as of 5/29/2018  Review Complete On: 5/29/2018 By: Angel Lehman MD  
  
 Severity Noted Reaction Type Reactions Angiotensin Ii,human  04/01/2010    Other (comments) States does not remember Avelox [Moxifloxacin]  04/01/2010    Other (comments) States does not remember Demerol [Meperidine]  04/01/2010    Hives Current Immunizations  Reviewed on 5/24/2018 Name Date Influenza High Dose Vaccine PF 10/16/2017, 11/9/2016 Influenza Vaccine 10/29/2013 Influenza Vaccine Tori Rg) 10/20/2015 Influenza Vaccine Split 11/7/2012, 10/11/2011, 11/16/2010 Influenza Vaccine Whole 8/1/2009 Pneumococcal Conjugate (PCV-13) 4/13/2016 ZZZ-RETIRED (DO NOT USE) Pneumococcal Vaccine (Unspecified Type) 4/1/2008 Not reviewed this visit You Were Diagnosed With   
  
 Codes Comments Routine general medical examination at a health care facility    -  Primary ICD-10-CM: Z00.00 ICD-9-CM: V70.0 Atrial fibrillation, unspecified type ([facility]     ICD-10-CM: I48.91 
ICD-9-CM: 427.31 Essential hypertension     ICD-10-CM: I10 
ICD-9-CM: 401.9 Advance care planning     ICD-10-CM: Z71.89 ICD-9-CM: V65.49 Vitals BP Pulse Temp Resp Height(growth percentile) Weight(growth percentile) 97/44 (!) 58 97.5 °F (36.4 °C) (Oral) 16 5' 6\" (1.676 m) 167 lb 8 oz (76 kg) SpO2 BMI OB Status Smoking Status 95% 27.04 kg/m2 Postmenopausal Never Smoker Vitals History BMI and BSA Data Body Mass Index Body Surface Area  
 27.04 kg/m 2 1.88 m 2 Preferred Pharmacy Pharmacy Name Phone [id number] - [place], [address] [place] [address] [phone] Your Updated Medication List  
  
   
This list is accurate as of 5/29/18  8:57 AM.  Always use your most recent med list.  
  
  
  
  
 amiodarone 200 mg tablet Commonly known as:  CORDARONE Take 1 Tab by mouth daily. citalopram 20 mg tablet Commonly known as:  CELEXA  
TAKE 1 TABLET BY MOUTH DAILY  
  
 cyclobenzaprine 5 mg tablet Commonly known as:  FLEXERIL  
TAKE 1 TABLET BY MOUTH 3 TIMES A DAY AS NEEDED FOR MUSCLE SPASMS  
  
 dilTIAZem  mg ER capsule Commonly known as:  CARDIZEM CD Take 1 Cap by mouth daily. gabapentin 600 mg tablet Commonly known as:  NEURONTIN  
TAKE 1 TABLET BY MOUTH 3 TIMES A DAY HYDROcodone-acetaminophen 7.5-325 mg per tablet Commonly known as:  [place] Take 1 Tab by mouth every eight (8) hours as needed for Pain. Max Daily Amount: 3 Tabs. Iron 325 mg (65 mg iron) Cper Generic drug:  ferrous sulfate Take  by mouth. JANTOVEN 3 mg tablet Generic drug:  warfarin TAKE 1 TABLET BY MOUTH DAILY losartan-hydroCHLOROthiazide 100-25 mg per tablet Commonly known as:  HYZAAR  
TAKE 1 TABLET BY MOUTH DAILY  
  
 naloxone 4 mg/actuation nasal spray Commonly known as:  NARCAN  
1 Spray by Nasal route as needed. Indications: OPIATE-INDUCED RESPIRATORY DEPRESSION  
  
 OTHER Folding Wheelchair Dx: OA  
  
 VITAMIN B-12 500 mcg tablet Generic drug:  cyanocobalamin Take 500 mcg by mouth daily. Follow-up Instructions Return in about 1 month (around 6/29/2018). Introducing [facility]! [facility] introduces [employer] patient portal. Now you can access parts of your medical record, email your doctor's office, and request medication refills online. 1. In your internet browser, go to [email or web address] 2. Click on the First Time User? Click Here link in the Sign In box. You will see the New Member Sign Up page. 3. Enter your [employer]psByTips Access Code exactly as it appears below. You will not need to use this code after youve completed the sign-up process. If you do not sign up before the expiration date, you must request a new code. · [employer] Access Code: [email or web address] Expires: 7/15/2018  9:10 AM 
 
4. Enter the last four digits of your Social Security Number (xxxx) and Date of Birth (mm/dd/yyyy) as indicated and click Submit. You will be taken to the next sign-up page. 5. Create a [employer]psByTips ID.  This will be your [employer]ByT[employer] login ID and cannot be changed, so think of one that is secure and easy to remember. 6. Create a [employer] password. You can change your password at any time. 7. Enter your Password Reset Question and Answer. This can be used at a later time if you forget your password. 8. Enter your e-mail address. You will receive e-mail notification when new information is available in [address] 9. Click Sign Up. You can now view and download portions of your medical record. 10. Click the Download Summary menu link to download a portable copy of your medical information. If you have questions, please visit the Frequently Asked Questions section of the [employer] website. Remember, [employer] is NOT to be used for urgent needs. For medical emergencies, dial 911. Now available from your iPhone and Android! Please provide this summary of care documentation to your next provider. Your primary care clinician is listed as LAUREL SINGH. If you have any questions after today's visit, please call [phone].
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
## 2018-05-29 NOTE — PROGRESS NOTES
Chief Complaint   Patient presents with   [facility] Follow Up     [facility] 5/24/18    Hypertension    Irregular Heart Beat     1. Have you been to the ER, urgent care clinic since your last visit? Hospitalized since your last visit? Yes Where: [facility] 5 24/18    2. Have you seen or consulted any other health care providers outside of the [address] [place] [address] since your last visit? Include any pap smears or colon screening. No     Medicare Wellness Exam:    Chief Complaint   Patient presents with   [facility] Follow Up     [facility] 5/24/18    Hypertension    Irregular Heart Beat     she is a 80y.o. year old female who presents for evaluation for their Medicare Wellness Visit. Ronit Jesus is completed and assessed=yes  Depression Screen is completed and assessed=yes  Medication list reviewed and adjusted for accuracy=yes  Immunizations reviewed and updated=yes  Health/Preventative Screenings reviewed and updated=yes  ADL Functions reviewed=yes    Patient Active Problem List    Diagnosis    A-fib ([address]    Dyslipidemia    Chronic anticoagulation    Constipation    History of pulmonary embolism    Type 2 diabetes with nephropathy ([address]    Advanced care planning/counseling discussion     Has no completed, dwp importance      Controlled type 2 diabetes mellitus without complication, without long-term current use of insulin ([address]    Iron deficiency anemia    Advance care planning     Has a LW      Pulmonary embolism ([address]    Itching    Elevated liver enzymes    S/P cholecystectomy     11/2013      S/P knee replacement     bilaterally      S/P shoulder surgery     Left      H/O carpal tunnel repair     Right hand      Chronic pain    HTN (hypertension)    OA (osteoarthritis)    Fibromyalgia    GERD (gastroesophageal reflux disease)    High cholesterol    Hiatal hernia       Reviewed PmHx, RxHx, FmHx, SocHx, AllgHx and updated and dated in the chart.     Review of Systems - negative except as listed above in the HPI    Objective:     Vitals:    05/29/18 0830   BP: 97/44   Pulse: (!) 58   Resp: 16   Temp: 97.5 °F (36.4 °C)   TempSrc: Oral   SpO2: 95%   Weight: 167 lb 8 oz (76 kg)   Height: 5' 6\" (1.676 m)     Physical Examination: General appearance - alert, well appearing, and in no distress  Chest - clear to auscultation, no wheezes, rales or rhonchi, symmetric air entry  Heart - normal rate, regular rhythm, normal S1, S2, no murmurs, rubs, clicks or gallops    Assessment/ Plan:   Diagnoses and all orders for this visit:    1. Routine general medical examination at a health care facility  -see below    2. Atrial fibrillation, unspecified type ([address]  -rate better now  -see hosp DC summary    3. Essential hypertension  -low but no sxs    4. Advance care planning  I discussed with patient living will and gave a legal form for patient to fill out and bring back to the office.         -Pain evaluation performed in office=6/10 JPs  -Cognitive Screen performed in office  -Depression Screen, Fall risks (by up and go test)  and ADL functionality were addressed  -Medication list updated and reviewed for any changes   -A comprehensive review of medical issues and a plan was formulated  -End of life planning was addressed with pt   -Health Screenings for preventions were addressed and a plan was formulated  -Shingles Vaccine was recommended  -Discussed with patient cancer risk factors and appropriate screenings for age  -Patient evaluated for colonoscopy and referred if needed per screeing criteria  -Labs from previous visits were discussed with patient   -Discussed with patient diet and exercise and formulated a plan as needed  -An Advanced care plan was developed with the patient.  -Alcohol screening performed and was negative    -Follow-up Disposition:  Return in about 1 month (around 6/29/2018). I have discussed the diagnosis with the patient and the intended plan as seen in the above orders.  The patient understands and agrees with the plan. The patient has received an after-visit summary and questions were answered concerning future plans. Medication Side Effects and Warnings were discussed with patien  Patient Labs were reviewed and or requested  Patient Past Records were reviewed and or requested    There are no Patient Instructions on file for this visit.       Jerman Carpenter M.D.

## 2018-05-30 ENCOUNTER — OFFICE VISIT (OUTPATIENT)
Dept: CARDIOLOGY CLINIC | Age: 83
End: 2018-05-30

## 2018-05-30 VITALS
HEART RATE: 71 BPM | OXYGEN SATURATION: 94 % | HEIGHT: 66 IN | SYSTOLIC BLOOD PRESSURE: 122 MMHG | WEIGHT: 168 LBS | BODY MASS INDEX: 27 KG/M2 | RESPIRATION RATE: 16 BRPM | DIASTOLIC BLOOD PRESSURE: 62 MMHG

## 2018-05-30 DIAGNOSIS — I10 ESSENTIAL HYPERTENSION: Chronic | ICD-10-CM

## 2018-05-30 DIAGNOSIS — E78.5 DYSLIPIDEMIA: ICD-10-CM

## 2018-05-30 DIAGNOSIS — I48.0 PAF (PAROXYSMAL ATRIAL FIBRILLATION) (HCC): Primary | ICD-10-CM

## 2018-05-30 PROCEDURE — 3331090002 HH PPS REVENUE DEBIT

## 2018-05-30 PROCEDURE — 3331090001 HH PPS REVENUE CREDIT

## 2018-05-30 NOTE — PROGRESS NOTES
Sneha Nelson MD    [address] [facility] [place], [address] [facility]    Office [phone],[facility] [phone]  Pager [phone]    Aurelia Rodriguez is a 80 y.o. female is here for f/u visit. Primary care physician:  Lara Smith MD    Patient Active Problem List   Diagnosis Code    HTN (hypertension) I10    OA (osteoarthritis) M19.90    Fibromyalgia M79.7    GERD (gastroesophageal reflux disease) K21.9    High cholesterol E78.00    Hiatal hernia K44.9    Chronic pain G89.29    Elevated liver enzymes R74.8    S/P cholecystectomy Z90.49    S/P knee replacement Z96.659    S/P shoulder surgery Z98.890    H/O carpal tunnel repair Z98.890    Itching L29.9    Pulmonary embolism (HCC) I26.99    Advance care planning Z71.89    Iron deficiency anemia D50.9    Advanced care planning/counseling discussion Z71.89    Controlled type 2 diabetes mellitus without complication, without long-term current use of insulin ([address] E11.9    Type 2 diabetes with nephropathy ([address] E11.21    A-fib ([address] I48.91    Dyslipidemia E78.5    Chronic anticoagulation Z79.01    Constipation K59.00    History of pulmonary embolism Z86.711       Dear Dr. GAMBOA [facility],    I had the pleasure of seeing  Ms Aurelia Rodriguez in the office today. Chief complaint:  Chief Complaint   Patient presents with   [facility] Follow Up       Assessment:    Paroxysmal atrial fibrillation-status post GULSHAN DC CV 5/25/18. Patient was undergoing EGD when she was found to be in A. fib with RVR  History of PE-chronic anticoagulation  Hypertension        Plan:     Patient's blood pressure was running low. Her  has been holding her amiodarone/Cardizem CD since yesterday. Patient is also on losartan/hydrochlorothiazide. Will DC her losartan/hydrochlorothiazide. Restart Cardizem CD/amiodarone. Monitor blood pressure/heart rate. Bring machine next visit for proximal checking with our machine.   If systolic blood pressure starts trending up, will reintroduce losartan/hydrochlorothiazide at half the dose. On amiodarone-TSH within normal limits 5/2018, needs yearly ophthalmology appointments/pulmonary evaluation. Have discussed side effects of the medication with patient/. Will discuss with patient about ischemia workup next visit. Aggressive cardiac vascular risk factor modification. Follow-up in 2-3 weeks for recheck of her blood pressure. Will repeat EKG at that visit. Patient understands the plan. All questions were answered to the patient's satisfaction. Medication Side Effects and Warnings were discussed with patient: yes  Patient Labs were reviewed and or requested:  yes  Patient Past Records were reviewed and or requested: yes    I appreciate the opportunity to be involved in Ms. Rodrigez. See note below for details. Please do not hesitate to contact us with questions or concerns. Te Borges MD    Cardiac Testing/ Procedures: A. Cardiac Cath/PCI:    B.ECHO/GULSHAN: 5/24/18 echocardiogram-EF 55-60%. Wall thickness mild to moderately increased. KAYLA, moderate MR, moderate TR, small pericardial effusion      C. StressNuclear/Stress ECHO/Stress test:    D.Vascular:    E. EP: 5/25/18 Successful GULSHAN guided DCCV converting AF to sinus    F. Miscellaneous:    Subjective:  Tray Gann is a 80 y.o. female who returns for follow up today. Patient has been doing well since discharge. As per , systolic blood pressure was running low and he has held her Cardizem CD and amiodarone since yesterday. Patient continues to take her other medications including losartan/hydrochlorthiazide. No dizziness, syncope, swelling lower extremities, chest pain, palpitations.         ROS:  (bold if positive, if negative)     trace edema,         Medications before admission:    Current Outpatient Prescriptions   Medication Sig Dispense    polyethylene glycol (MIRALAX) 17 gram packet Take 17 g by mouth as needed (Constipation).  dilTIAZem CD (CARDIZEM CD) 120 mg ER capsule Take 1 Cap by mouth daily. 30 Cap    amiodarone (CORDARONE) 200 mg tablet Take 1 Tab by mouth daily. 30 Tab    ferrous sulfate (IRON) 325 mg (65 mg iron) cpER Take  by mouth.  cyanocobalamin (VITAMIN B-12) 500 mcg tablet Take 500 mcg by mouth daily.  HYDROcodone-acetaminophen (NORCO) 7.5-325 mg per tablet Take 1 Tab by mouth every eight (8) hours as needed for Pain. Max Daily Amount: 3 Tabs. 90 Tab    cyclobenzaprine (FLEXERIL) 5 mg tablet TAKE 1 TABLET BY MOUTH 3 TIMES A DAY AS NEEDED FOR MUSCLE SPASMS 90 Tab    OTHER Folding Wheelchair  Dx: OA 1 Each    losartan-hydroCHLOROthiazide (HYZAAR) 100-25 mg per tablet TAKE 1 TABLET BY MOUTH DAILY 90 Tab    citalopram (CELEXA) 20 mg tablet TAKE 1 TABLET BY MOUTH DAILY 90 Tab    gabapentin (NEURONTIN) 600 mg tablet TAKE 1 TABLET BY MOUTH 3 TIMES A  Tab    JANTOVEN 3 mg tablet TAKE 1 TABLET BY MOUTH DAILY 30 Tab     No current facility-administered medications for this visit. Family History of CAD:   Yes    Social History:  Current  Smoker No    Physical Exam:  Visit Vitals    /62 (BP 1 Location: Right arm)    Pulse 71    Resp 16    Ht 5' 6\" (1.676 m)    Wt 168 lb (76.2 kg)    SpO2 94%    BMI 27.12 kg/m2          Gen: Well-developed, well-nourished,elderly, In wheel chair  Neck: Supple,No JVD, No Carotid Bruit,   Resp: No accessory muscle use, Clear breath sounds, No rales or rhonchi  Card: Regular Rate,Rythm,Normal S1, S2, 2/6 ESM+, No rubs or gallop. No thrills.    Abd:  Soft, BS+,   MSK: No cyanosis  Skin: No rashes    Neuro: moving all four extremities , follows commands appropriately  Psych:  Good insight, oriented to person, place , alert, Nml Affect  LE: No edema    EKG:  SR/PAC/1st deg AV block      LABS:        Lab Results   Component Value Date/Time    WBC 5.5 05/25/2018 04:40 AM    HGB 11.7 05/25/2018 04:40 AM    HCT 35.6 05/25/2018 04:40 AM    PLATELET 862 05/25/2018 04:40 AM     Lab Results   Component Value Date/Time    Sodium 141 05/26/2018 03:43 AM    Potassium 3.6 05/26/2018 03:43 AM    Chloride 106 05/26/2018 03:43 AM    CO2 25 05/26/2018 03:43 AM    Anion gap 10 05/26/2018 03:43 AM    Glucose 86 05/26/2018 03:43 AM    BUN 16 05/26/2018 03:43 AM    Creatinine 0.89 05/26/2018 03:43 AM    BUN/Creatinine ratio 18 05/26/2018 03:43 AM    GFR est AA >60 05/26/2018 03:43 AM    GFR est non-AA >60 05/26/2018 03:43 AM    Calcium 9.3 05/26/2018 03:43 AM       No results found for: APTT  Lab Results   Component Value Date/Time    INR 2.2 (H) 05/26/2018 03:43 AM    INR 1.8 (H) 05/25/2018 04:40 AM    INR 1.9 (H) 05/24/2018 11:50 AM    Prothrombin time 22.3 (H) 05/26/2018 03:43 AM    Prothrombin time 18.5 (H) 05/25/2018 04:40 AM    Prothrombin time 19.2 (H) 05/24/2018 11:50 AM     No components found for: Rebecca Jesus MD

## 2018-05-30 NOTE — MR AVS SNAPSHOT
[address] [facility] [address] [place] [phone] Patient: Tray Regan MRN:  [id number] Visit Information Date & Time Provider Department Dept. Phone Encounter #  
 5/30/2018 10:20 AM Pavan Kumar MD [facility] Mejias [phone] [id number] Your Appointments 6/28/2018 10:40 AM  
ESTABLISHED PATIENT with Leah Nowak MD  
[address] [facility] [address] [place] [address]) Appt Note: 1mo fuv  
 [address] [address]  
[phone]  
  
   
 [address] [address]  
  
    
 8/17/2018 10:00 AM  
ESTABLISHED PATIENT with Kindra Galaviz MD  
[facility]) Appt Note: 6mo iron deficiency fu, [place] [address] [facility], [address] [facility] [address] [address]  
[phone]  
  
   
 [address] [place] [address], [address] [place] [address] Upcoming Health Maintenance Date Due  
 EYE EXAM RETINAL OR DILATED Q1 7/18/2013 Influenza Age 5 to Adult 8/1/2018 GLAUCOMA SCREENING Q2Y 11/9/2018 HEMOGLOBIN A1C Q6M 11/25/2018 FOOT EXAM Q1 11/27/2018 MICROALBUMIN Q1 4/16/2019 LIPID PANEL Q1 4/16/2019 MEDICARE YEARLY EXAM 5/30/2019 DTaP/Tdap/Td series (2 - Td) 11/9/2026 Allergies as of 5/30/2018  Review Complete On: 5/30/2018 By: Sincere Taveras Severity Noted Reaction Type Reactions Angiotensin Ii,human  04/01/2010    Other (comments) States does not remember Avelox [Moxifloxacin]  04/01/2010    Other (comments) States does not remember Demerol [Meperidine]  04/01/2010    Hives Current Immunizations  Reviewed on 5/24/2018 Name Date Influenza High Dose Vaccine PF 10/16/2017, 11/9/2016 Influenza Vaccine 10/29/2013 Influenza Vaccine Elenora Gemma) 10/20/2015 Influenza Vaccine Split 11/7/2012, 10/11/2011, 11/16/2010 Influenza Vaccine Whole 8/1/2009 Pneumococcal Conjugate (PCV-13) 4/13/2016 ZZZ-RETIRED (DO NOT USE) Pneumococcal Vaccine (Unspecified Type) 4/1/2008 Not reviewed this visit You Were Diagnosed With   
  
 Codes Comments Atrial fibrillation, unspecified type ([facility]    -  Primary ICD-10-CM: I48.91 
ICD-9-CM: 427.31 Vitals BP Pulse Resp Height(growth percentile) Weight(growth percentile) SpO2  
 122/62 (BP 1 Location: Right arm) 71 16 5' 6\" (1.676 m) 168 lb (76.2 kg) 94% BMI OB Status Smoking Status 27.12 kg/m2 Postmenopausal Never Smoker Vitals History BMI and BSA Data Body Mass Index Body Surface Area  
 27.12 kg/m 2 1.88 m 2 Preferred Pharmacy Pharmacy Name Phone [id number] - [address] [facility], [address] [place] [address] [phone] Your Updated Medication List  
  
   
This list is accurate as of 5/30/18 10:37 AM.  Always use your most recent med list.  
  
  
  
  
 amiodarone 200 mg tablet Commonly known as:  CORDARONE Take 1 Tab by mouth daily. citalopram 20 mg tablet Commonly known as:  CELEXA  
TAKE 1 TABLET BY MOUTH DAILY  
  
 cyclobenzaprine 5 mg tablet Commonly known as:  FLEXERIL  
TAKE 1 TABLET BY MOUTH 3 TIMES A DAY AS NEEDED FOR MUSCLE SPASMS  
  
 dilTIAZem  mg ER capsule Commonly known as:  CARDIZEM CD Take 1 Cap by mouth daily. gabapentin 600 mg tablet Commonly known as:  NEURONTIN  
TAKE 1 TABLET BY MOUTH 3 TIMES A DAY HYDROcodone-acetaminophen 7.5-325 mg per tablet Commonly known as:  Claudia Pare Take 1 Tab by mouth every eight (8) hours as needed for Pain. Max Daily Amount: 3 Tabs. Iron 325 mg (65 mg iron) Cper Generic drug:  ferrous sulfate Take  by mouth. JANTOVEN 3 mg tablet Generic drug:  warfarin TAKE 1 TABLET BY MOUTH DAILY losartan-hydroCHLOROthiazide 100-25 mg per tablet Commonly known as:  HYZAAR  
TAKE 1 TABLET BY MOUTH DAILY  
  
 OTHER  
 Folding Wheelchair Dx: OA  
  
 polyethylene glycol 17 gram packet Commonly known as:  Karri Garnett Take 17 g by mouth as needed (Constipation). VITAMIN B-12 500 mcg tablet Generic drug:  cyanocobalamin Take 500 mcg by mouth daily. We Performed the Following AMB POC EKG ROUTINE W/ 12 LEADS, INTER & REP [52533 CPT(R)] To-Do List   
 05/31/2018 To Be Determined Appointment with Buck Hill LPN at [place] [address]  
  
 06/04/2018 To Be Determined Appointment with Buck Hill LPN at [place] [address]  
  
 06/07/2018 To Be Determined Appointment with Buck Hill LPN at [place] [address]  
  
 06/13/2018 To Be Determined Appointment with Buck Hill LPN at [place] [address]  
  
 06/20/2018 To Be Determined Appointment with Buck Hill LPN at [place] [address]  
  
 06/27/2018 To Be Determined Appointment with Katia Barajas at [place] [address]  
  
 06/27/2018 To Be Determined Appointment with Katia Barajas at [place] [address]  
  
 07/04/2018 To Be Determined Appointment with Buck Hill LPN at [place] [address]  
  
 07/11/2018 To Be Determined Appointment with Buck Hill LPN at [place] [address]  
  
 07/18/2018 To Be Determined Appointment with Buck Hill LPN at [place] [address]  
  
 07/25/2018 To Be Determined Appointment with Katia Barajas at [place] [address] Introducing [facility]! [employer] introduces [employer] patient portal. Now you can access parts of your medical record, email your doctor's office, and request medication refills online.    
 
1. In your internet browser, go to [email or web address] 2. Click on the First Time User? Click Here link in the Sign In box. You will see the New Member Sign Up page. 3. Enter your iStoryTime Access Code exactly as it appears below. You will not need to use this code after youve completed the sign-up process. If you do not sign up before the expiration date, you must request a new code. · iStoryTime Access Code: [email or web address] Expires: 7/15/2018  9:10 AM 
 
4. Enter the last four digits of your Social Security Number (xxxx) and Date of Birth (mm/dd/yyyy) as indicated and click Submit. You will be taken to the next sign-up page. 5. Create a [employer] ID. This will be your iStoryTime login ID and cannot be changed, so think of one that is secure and easy to remember. 6. Create a iStoryTime password. You can change your password at any time. 7. Enter your Password Reset Question and Answer. This can be used at a later time if you forget your password. 8. Enter your e-mail address. You will receive e-mail notification when new information is available in [address] 9. Click Sign Up. You can now view and download portions of your medical record. 10. Click the Download Summary menu link to download a portable copy of your medical information. If you have questions, please visit the Frequently Asked Questions section of the iStoryTime website. Remember, iStoryTime is NOT to be used for urgent needs. For medical emergencies, dial 911. Now available from your iPhone and Android! Please provide this summary of care documentation to your next provider. Your primary care clinician is listed as LAUREL SINGH. If you have any questions after today's visit, please call [phone].

## 2018-05-31 ENCOUNTER — HOME CARE VISIT (OUTPATIENT)
Dept: SCHEDULING | Facility: HOME HEALTH | Age: 83
End: 2018-05-31
Payer: MEDICARE

## 2018-05-31 PROCEDURE — 3331090001 HH PPS REVENUE CREDIT

## 2018-05-31 PROCEDURE — 3331090002 HH PPS REVENUE DEBIT

## 2018-06-01 PROCEDURE — 3331090001 HH PPS REVENUE CREDIT

## 2018-06-01 PROCEDURE — 3331090002 HH PPS REVENUE DEBIT

## 2018-06-02 PROCEDURE — 3331090002 HH PPS REVENUE DEBIT

## 2018-06-02 PROCEDURE — 3331090001 HH PPS REVENUE CREDIT

## 2018-06-03 PROCEDURE — 3331090002 HH PPS REVENUE DEBIT

## 2018-06-03 PROCEDURE — 3331090001 HH PPS REVENUE CREDIT

## 2018-06-04 ENCOUNTER — HOME CARE VISIT (OUTPATIENT)
Dept: SCHEDULING | Facility: HOME HEALTH | Age: 83
End: 2018-06-04
Payer: MEDICARE

## 2018-06-04 ENCOUNTER — PATIENT OUTREACH (OUTPATIENT)
Dept: FAMILY MEDICINE CLINIC | Age: 83
End: 2018-06-04

## 2018-06-04 PROCEDURE — 3331090001 HH PPS REVENUE CREDIT

## 2018-06-04 PROCEDURE — 3331090002 HH PPS REVENUE DEBIT

## 2018-06-04 PROCEDURE — G0300 HHS/HOSPICE OF LPN EA 15 MIN: HCPCS

## 2018-06-04 NOTE — PROGRESS NOTES
[address]  [phone]  Fax [phone]    Patient Name: Parish Hardin  YOB: 1933    . Hussein Michele      Afib Education            5/29/18- Discussed basic information. Needs reinforcement. 6/4/18- Sending \"Uptodate\" The basics Atrial Fibrillation       Knowledge and adherence of prescribed medication (ie. action, side effects, missed dose, etc.).            6/4/18 Discussed perimeters on when to hold cardiac medications and when to restart Losartan. Patient's  verbalized understanding by repeating back what he would do. Will reinforce at next contact. 5/29/18 Needs to follow up with cardiac MD for perimeters on new medications-when to hold, etc.. NN Plan: F/U in 2 weeks.

## 2018-06-05 VITALS
OXYGEN SATURATION: 97 % | DIASTOLIC BLOOD PRESSURE: 76 MMHG | SYSTOLIC BLOOD PRESSURE: 136 MMHG | HEART RATE: 75 BPM | TEMPERATURE: 97.7 F

## 2018-06-05 PROCEDURE — 3331090002 HH PPS REVENUE DEBIT

## 2018-06-05 PROCEDURE — 3331090001 HH PPS REVENUE CREDIT

## 2018-06-06 PROCEDURE — 3331090001 HH PPS REVENUE CREDIT

## 2018-06-06 PROCEDURE — 3331090002 HH PPS REVENUE DEBIT

## 2018-06-07 ENCOUNTER — HOME CARE VISIT (OUTPATIENT)
Dept: HOME HEALTH SERVICES | Facility: HOME HEALTH | Age: 83
End: 2018-06-07
Payer: MEDICARE

## 2018-06-07 PROCEDURE — 3331090002 HH PPS REVENUE DEBIT

## 2018-06-07 PROCEDURE — 3331090001 HH PPS REVENUE CREDIT

## 2018-06-08 PROCEDURE — 3331090002 HH PPS REVENUE DEBIT

## 2018-06-08 PROCEDURE — 3331090001 HH PPS REVENUE CREDIT

## 2018-06-09 PROCEDURE — 3331090002 HH PPS REVENUE DEBIT

## 2018-06-09 PROCEDURE — 3331090001 HH PPS REVENUE CREDIT

## 2018-06-10 PROCEDURE — 3331090001 HH PPS REVENUE CREDIT

## 2018-06-10 PROCEDURE — 3331090002 HH PPS REVENUE DEBIT

## 2018-06-11 PROCEDURE — 3331090001 HH PPS REVENUE CREDIT

## 2018-06-11 PROCEDURE — 3331090002 HH PPS REVENUE DEBIT

## 2018-06-12 PROCEDURE — 3331090002 HH PPS REVENUE DEBIT

## 2018-06-12 PROCEDURE — 3331090001 HH PPS REVENUE CREDIT

## 2018-06-13 ENCOUNTER — HOME CARE VISIT (OUTPATIENT)
Dept: SCHEDULING | Facility: HOME HEALTH | Age: 83
End: 2018-06-13
Payer: MEDICARE

## 2018-06-13 PROCEDURE — G0300 HHS/HOSPICE OF LPN EA 15 MIN: HCPCS

## 2018-06-13 PROCEDURE — 3331090001 HH PPS REVENUE CREDIT

## 2018-06-13 PROCEDURE — 3331090002 HH PPS REVENUE DEBIT

## 2018-06-14 PROCEDURE — 3331090001 HH PPS REVENUE CREDIT

## 2018-06-14 PROCEDURE — 3331090002 HH PPS REVENUE DEBIT

## 2018-06-15 PROCEDURE — 3331090002 HH PPS REVENUE DEBIT

## 2018-06-15 PROCEDURE — 3331090001 HH PPS REVENUE CREDIT

## 2018-06-16 PROCEDURE — 3331090002 HH PPS REVENUE DEBIT

## 2018-06-16 PROCEDURE — 3331090001 HH PPS REVENUE CREDIT

## 2018-06-17 PROCEDURE — 3331090001 HH PPS REVENUE CREDIT

## 2018-06-17 PROCEDURE — 3331090002 HH PPS REVENUE DEBIT

## 2018-06-18 PROCEDURE — 3331090002 HH PPS REVENUE DEBIT

## 2018-06-18 PROCEDURE — 3331090001 HH PPS REVENUE CREDIT

## 2018-06-19 PROCEDURE — 3331090002 HH PPS REVENUE DEBIT

## 2018-06-19 PROCEDURE — 3331090001 HH PPS REVENUE CREDIT

## 2018-06-20 ENCOUNTER — HOSPITAL ENCOUNTER (EMERGENCY)
Age: 83
Discharge: HOME | End: 2018-06-20
Attending: EMERGENCY MEDICINE
Payer: MEDICARE

## 2018-06-20 ENCOUNTER — APPOINTMENT (OUTPATIENT)
Dept: CT IMAGING | Age: 83
End: 2018-06-20
Attending: EMERGENCY MEDICINE
Payer: MEDICARE

## 2018-06-20 ENCOUNTER — APPOINTMENT (OUTPATIENT)
Dept: GENERAL RADIOLOGY | Age: 83
End: 2018-06-20
Attending: EMERGENCY MEDICINE
Payer: MEDICARE

## 2018-06-20 VITALS
HEIGHT: 66 IN | SYSTOLIC BLOOD PRESSURE: 142 MMHG | RESPIRATION RATE: 13 BRPM | HEART RATE: 86 BPM | OXYGEN SATURATION: 91 % | WEIGHT: 167.99 LBS | DIASTOLIC BLOOD PRESSURE: 93 MMHG | BODY MASS INDEX: 27 KG/M2 | TEMPERATURE: 98.5 F

## 2018-06-20 DIAGNOSIS — R10.84 ABDOMINAL PAIN, GENERALIZED: ICD-10-CM

## 2018-06-20 DIAGNOSIS — Z79.01 CHRONIC ANTICOAGULATION: ICD-10-CM

## 2018-06-20 DIAGNOSIS — K59.00 OBSTIPATION: ICD-10-CM

## 2018-06-20 DIAGNOSIS — I48.19 PERSISTENT ATRIAL FIBRILLATION (HCC): Primary | ICD-10-CM

## 2018-06-20 LAB
ALBUMIN SERPL-MCNC: 3.5 G/DL (ref 3.5–5)
ALBUMIN/GLOB SERPL: 0.8 {RATIO} (ref 1.1–2.2)
ALP SERPL-CCNC: 132 U/L (ref 45–117)
ALT SERPL-CCNC: 17 U/L (ref 12–78)
ANION GAP SERPL CALC-SCNC: 6 MMOL/L (ref 5–15)
APTT PPP: 60.4 SEC (ref 22.1–32)
AST SERPL-CCNC: 16 U/L (ref 15–37)
ATRIAL RATE: 102 BPM
BASOPHILS # BLD: 0 K/UL (ref 0–0.1)
BASOPHILS NFR BLD: 1 % (ref 0–1)
BILIRUB SERPL-MCNC: 0.5 MG/DL (ref 0.2–1)
BUN SERPL-MCNC: 16 MG/DL (ref 6–20)
BUN/CREAT SERPL: 15 (ref 12–20)
CALCIUM SERPL-MCNC: 9.5 MG/DL (ref 8.5–10.1)
CALCULATED R AXIS, ECG10: -18 DEGREES
CALCULATED T AXIS, ECG11: 74 DEGREES
CHLORIDE SERPL-SCNC: 103 MMOL/L (ref 97–108)
CK MB CFR SERPL CALC: 2.4 % (ref 0–2.5)
CK MB SERPL-MCNC: 1.8 NG/ML (ref 5–25)
CK SERPL-CCNC: 74 U/L (ref 26–192)
CO2 SERPL-SCNC: 29 MMOL/L (ref 21–32)
CREAT SERPL-MCNC: 1.07 MG/DL (ref 0.55–1.02)
DIAGNOSIS, 93000: NORMAL
DIFFERENTIAL METHOD BLD: ABNORMAL
EOSINOPHIL # BLD: 0.1 K/UL (ref 0–0.4)
EOSINOPHIL NFR BLD: 2 % (ref 0–7)
ERYTHROCYTE [DISTWIDTH] IN BLOOD BY AUTOMATED COUNT: 13.6 % (ref 11.5–14.5)
GLOBULIN SER CALC-MCNC: 4.2 G/DL (ref 2–4)
GLUCOSE SERPL-MCNC: 116 MG/DL (ref 65–100)
HCT VFR BLD AUTO: 38.7 % (ref 35–47)
HGB BLD-MCNC: 12.5 G/DL (ref 11.5–16)
IMM GRANULOCYTES # BLD: 0 K/UL (ref 0–0.04)
IMM GRANULOCYTES NFR BLD AUTO: 0 % (ref 0–0.5)
INR PPP: 4.1 (ref 0.9–1.1)
LIPASE SERPL-CCNC: 150 U/L (ref 73–393)
LYMPHOCYTES # BLD: 1.5 K/UL (ref 0.8–3.5)
LYMPHOCYTES NFR BLD: 27 % (ref 12–49)
MAGNESIUM SERPL-MCNC: 1.9 MG/DL (ref 1.6–2.4)
MCH RBC QN AUTO: 32.2 PG (ref 26–34)
MCHC RBC AUTO-ENTMCNC: 32.3 G/DL (ref 30–36.5)
MCV RBC AUTO: 99.7 FL (ref 80–99)
MONOCYTES # BLD: 0.4 K/UL (ref 0–1)
MONOCYTES NFR BLD: 7 % (ref 5–13)
NEUTS SEG # BLD: 3.5 K/UL (ref 1.8–8)
NEUTS SEG NFR BLD: 63 % (ref 32–75)
NRBC # BLD: 0 K/UL (ref 0–0.01)
NRBC BLD-RTO: 0 PER 100 WBC
PLATELET # BLD AUTO: 254 K/UL (ref 150–400)
PMV BLD AUTO: 10 FL (ref 8.9–12.9)
POTASSIUM SERPL-SCNC: 4.1 MMOL/L (ref 3.5–5.1)
PROT SERPL-MCNC: 7.7 G/DL (ref 6.4–8.2)
PROTHROMBIN TIME: 41.6 SEC (ref 9–11.1)
Q-T INTERVAL, ECG07: 392 MS
QRS DURATION, ECG06: 90 MS
QTC CALCULATION (BEZET), ECG08: 533 MS
RBC # BLD AUTO: 3.88 M/UL (ref 3.8–5.2)
SODIUM SERPL-SCNC: 138 MMOL/L (ref 136–145)
THERAPEUTIC RANGE,PTTT: ABNORMAL SECS (ref 58–77)
TROPONIN I SERPL-MCNC: <0.05 NG/ML
VENTRICULAR RATE, ECG03: 111 BPM
WBC # BLD AUTO: 5.6 K/UL (ref 3.6–11)

## 2018-06-20 PROCEDURE — 3331090002 HH PPS REVENUE DEBIT

## 2018-06-20 PROCEDURE — 83735 ASSAY OF MAGNESIUM: CPT | Performed by: EMERGENCY MEDICINE

## 2018-06-20 PROCEDURE — 85730 THROMBOPLASTIN TIME PARTIAL: CPT | Performed by: EMERGENCY MEDICINE

## 2018-06-20 PROCEDURE — 74011250637 HC RX REV CODE- 250/637: Performed by: EMERGENCY MEDICINE

## 2018-06-20 PROCEDURE — 83690 ASSAY OF LIPASE: CPT | Performed by: EMERGENCY MEDICINE

## 2018-06-20 PROCEDURE — 84484 ASSAY OF TROPONIN QUANT: CPT | Performed by: EMERGENCY MEDICINE

## 2018-06-20 PROCEDURE — 85025 COMPLETE CBC W/AUTO DIFF WBC: CPT | Performed by: EMERGENCY MEDICINE

## 2018-06-20 PROCEDURE — 82550 ASSAY OF CK (CPK): CPT | Performed by: EMERGENCY MEDICINE

## 2018-06-20 PROCEDURE — 96374 THER/PROPH/DIAG INJ IV PUSH: CPT

## 2018-06-20 PROCEDURE — 36415 COLL VENOUS BLD VENIPUNCTURE: CPT | Performed by: EMERGENCY MEDICINE

## 2018-06-20 PROCEDURE — 74176 CT ABD & PELVIS W/O CONTRAST: CPT

## 2018-06-20 PROCEDURE — 80053 COMPREHEN METABOLIC PANEL: CPT | Performed by: EMERGENCY MEDICINE

## 2018-06-20 PROCEDURE — 74011000250 HC RX REV CODE- 250: Performed by: EMERGENCY MEDICINE

## 2018-06-20 PROCEDURE — 85610 PROTHROMBIN TIME: CPT | Performed by: EMERGENCY MEDICINE

## 2018-06-20 PROCEDURE — 99285 EMERGENCY DEPT VISIT HI MDM: CPT

## 2018-06-20 PROCEDURE — 93005 ELECTROCARDIOGRAM TRACING: CPT

## 2018-06-20 PROCEDURE — 3331090001 HH PPS REVENUE CREDIT

## 2018-06-20 PROCEDURE — 71045 X-RAY EXAM CHEST 1 VIEW: CPT

## 2018-06-20 RX ORDER — ASPIRIN 325 MG
325 TABLET ORAL ONCE
Status: COMPLETED | OUTPATIENT
Start: 2018-06-20 | End: 2018-06-20

## 2018-06-20 RX ORDER — DILTIAZEM HYDROCHLORIDE 5 MG/ML
10 INJECTION INTRAVENOUS
Status: COMPLETED | OUTPATIENT
Start: 2018-06-20 | End: 2018-06-20

## 2018-06-20 RX ORDER — DILTIAZEM HYDROCHLORIDE 5 MG/ML
20 INJECTION INTRAVENOUS
Status: DISCONTINUED | OUTPATIENT
Start: 2018-06-20 | End: 2018-06-20

## 2018-06-20 RX ADMIN — ASPIRIN 325 MG: 325 TABLET ORAL at 06:22

## 2018-06-20 RX ADMIN — DILTIAZEM HYDROCHLORIDE 10 MG: 5 INJECTION INTRAVENOUS at 06:23

## 2018-06-20 RX ADMIN — NITROGLYCERIN 1 INCH: 20 OINTMENT TOPICAL at 06:22

## 2018-06-20 NOTE — ED TRIAGE NOTES
Pt arrives with  with c/o of chest pain last night. Pt states that the pain comes and goes and has been going on for several weeks. States she has seen a Dr about it but was told she was fine.

## 2018-06-20 NOTE — DISCHARGE INSTRUCTIONS
Angina: Care Instructions  Your Care Instructions    You have a problem called angina. Angina happens when there is not enough blood flow to your heart muscle. Angina is a sign of coronary artery disease (CAD). CAD occurs when blood vessels that supply the heart become narrowed. Having CAD increases your risk of a heart attack. Chest pain or pressure is the most common symptom of angina. But some people have other symptoms, like:  · Pain, pressure, or a strange feeling in the back, neck, jaw, or upper belly, or in one or both shoulders or arms. · Shortness of breath. · Nausea or vomiting. · Lightheadedness or sudden weakness. · Fast or irregular heartbeat. Women are somewhat more likely than men to have angina symptoms like shortness of breath, nausea, and back or jaw pain. Angina can be dangerous. That's why it is important to pay attention to your symptoms. Know what is typical for you, learn how to control your symptoms, and understand when you need to get treatment. A change in your usual pattern of symptoms is an emergency. It may mean that you are having a heart attack. The doctor has checked you carefully, but problems can develop later. If you notice any problems or new symptoms, get medical treatment right away. Follow-up care is a key part of your treatment and safety. Be sure to make and go to all appointments, and call your doctor if you are having problems. It's also a good idea to know your test results and keep a list of the medicines you take. How can you care for yourself at home? Medicines  ? · If your doctor has given you nitroglycerin for angina symptoms, keep it with you at all times. If you have symptoms, sit down and rest, and take the first dose of nitroglycerin as directed. If your symptoms get worse or are not getting better within 5 minutes, call 911 right away. Stay on the phone. The emergency  will give you further instructions.    ? · If your doctor advises it, take 1 low-dose aspirin a day to prevent heart attack. ? · Be safe with medicines. Take your medicines exactly as prescribed. Call your doctor if you think you are having a problem with your medicine. You will get more details on the specific medicines your doctor prescribes. ? Lifestyle changes  ? · Do not smoke. If you need help quitting, talk to your doctor about stop-smoking programs and medicines. These can increase your chances of quitting for good. ? · Eat a heart-healthy diet that is low in saturated fat and salt, and is high in fiber. Talk to your doctor or a dietitian about healthy eating. ? · Stay at a healthy weight. Or lose weight if you need to. Activity  ? · Talk to your doctor about a level of activity that is safe for you. ? · If an activity causes angina symptoms, stop and rest.   When should you call for help? Call 911 anytime you think you may need emergency care. For example, call if:  ? · You passed out (lost consciousness). ? · You have symptoms of a heart attack. These may include:  ¨ Chest pain or pressure, or a strange feeling in the chest.  ¨ Sweating. ¨ Shortness of breath. ¨ Nausea or vomiting. ¨ Pain, pressure, or a strange feeling in the back, neck, jaw, or upper belly or in one or both shoulders or arms. ¨ Lightheadedness or sudden weakness. ¨ A fast or irregular heartbeat. After you call 911, the  may tell you to chew 1 adult-strength or 2 to 4 low-dose aspirin. Wait for an ambulance. Do not try to drive yourself. ? · You have angina symptoms that do not go away with rest or are not getting better within 5 minutes after you take a dose of nitroglycerin. ?Call your doctor now or seek immediate medical care if:  ? · You are having angina symptoms more often than usual, or they are different or worse than usual.   ? · You feel dizzy or lightheaded, or you feel like you may faint. ? Watch closely for changes in your health, and be sure to contact your doctor if you have any problems. Where can you learn more? Go to http://leonel-socorro.info/. Enter H129 in the search box to learn more about \"Angina: Care Instructions. \"  Current as of: September 21, 2016  Content Version: 11.4  © 5134-2926 [employer]. Care instructions adapted under license by [employer] (which disclaims liability or warranty for this information). If you have questions about a medical condition or this instruction, always ask your healthcare professional. [address] any warranty or liability for your use of this information. Abdominal Pain: Care Instructions  Your Care Instructions    Abdominal pain has many possible causes. Some aren't serious and get better on their own in a few days. Others need more testing and treatment. If your pain continues or gets worse, you need to be rechecked and may need more tests to find out what is wrong. You may need surgery to correct the problem. Don't ignore new symptoms, such as fever, nausea and vomiting, urination problems, pain that gets worse, and dizziness. These may be signs of a more serious problem. Your doctor may have recommended a follow-up visit in the next 8 to 12 hours. If you are not getting better, you may need more tests or treatment. The doctor has checked you carefully, but problems can develop later. If you notice any problems or new symptoms, get medical treatment right away. Follow-up care is a key part of your treatment and safety. Be sure to make and go to all appointments, and call your doctor if you are having problems. It's also a good idea to know your test results and keep a list of the medicines you take. How can you care for yourself at home? · Rest until you feel better. · To prevent dehydration, drink plenty of fluids, enough so that your urine is light yellow or clear like water.  Choose water and other caffeine-free clear liquids until you feel better. If you have kidney, heart, or liver disease and have to limit fluids, talk with your doctor before you increase the amount of fluids you drink. · If your stomach is upset, eat mild foods, such as rice, dry toast or crackers, bananas, and applesauce. Try eating several small meals instead of two or three large ones. · Wait until 48 hours after all symptoms have gone away before you have spicy foods, alcohol, and drinks that contain caffeine. · Do not eat foods that are high in fat. · Avoid anti-inflammatory medicines such as aspirin, ibuprofen (Advil, Motrin), and naproxen (Aleve). These can cause stomach upset. Talk to your doctor if you take daily aspirin for another health problem. When should you call for help? Call 911 anytime you think you may need emergency care. For example, call if:  ? · You passed out (lost consciousness). ? · You pass maroon or very bloody stools. ? · You vomit blood or what looks like coffee grounds. ? · You have new, severe belly pain. ?Call your doctor now or seek immediate medical care if:  ? · Your pain gets worse, especially if it becomes focused in one area of your belly. ? · You have a new or higher fever. ? · Your stools are black and look like tar, or they have streaks of blood. ? · You have unexpected vaginal bleeding. ? · You have symptoms of a urinary tract infection. These may include:  ¨ Pain when you urinate. ¨ Urinating more often than usual.  ¨ Blood in your urine. ? · You are dizzy or lightheaded, or you feel like you may faint. ? Watch closely for changes in your health, and be sure to contact your doctor if:  ? · You are not getting better after 1 day (24 hours). Where can you learn more? Go to http://leonel-socorro.info/. Enter Y682 in the search box to learn more about \"Abdominal Pain: Care Instructions. \"  Current as of: March 20, 2017  Content Version: 11.4  © 5562-5214 [employer], [facility].  Care instructions adapted under license by [employer] (which disclaims liability or warranty for this information). If you have questions about a medical condition or this instruction, always ask your healthcare professional. [address] any warranty or liability for your use of this information.

## 2018-06-20 NOTE — ED NOTES
20G IV removed from RAC. MD discussed dc instructions and information with pt. Pt verbalized understanding. Pt assisted out of ED via w/c by RN.   No signs of distress noted

## 2018-06-20 NOTE — ED PROVIDER NOTES
HPI Comments: The patient is an 66-year-old female with multiple chronic medical problems include atrial fibrillation, chronic anticoagulation, multiple medication allergies, hypertension, fibromyalgia, GERD, hiatal hernia, chronic pain, basal cell carcinoma, osteoarthritis, pulmonary emboli, anemia, status post appendectomy, hysterectomy, breast implant, T&A, who presents to ED with a complaint of chest and abdominal pain x2 days, which is chronic, however, worse tonight.  also states that the patient was uncomfortable, and he took her pulse it was 120 and blood pressure was 170/110 he decided to bring her to the ED for further evaluation. She denies any nausea or vomiting, headache, neck, and back pain, dysuria, hematuria, vaginal discharge or bleeding, dizziness, extremities, weakness, numbness, decreased appetite, or activity,    Patient is a 80 y.o. female presenting with chest pain.    Chest Pain (Angina)           Past Medical History:   Diagnosis Date    Anemia     Cancer ([address]     basal cell on nose    Chronic pain     back pain    Fibromyalgia 4/1/2010    GERD (gastroesophageal reflux disease) 4/1/2010    Hiatal hernia 4/1/2010    High cholesterol 4/1/2010    HTN (hypertension) 4/1/2010    OA (osteoarthritis) 4/1/2010    back    Pulmonary emboli ([address] 2015       Past Surgical History:   Procedure Laterality Date    BREAST SURGERY PROCEDURE UNLISTED      Breast im-plants x 2    ENLARGE BREAST WITH IMPLANT      HX APPENDECTOMY      HX BREAST BIOPSY      HX BREAST RECONSTRUCTION      Breast implants removed 1992    HX CATARACT REMOVAL      HX CHOLECYSTECTOMY  9/11/2013    HX HYSTERECTOMY      HX KNEE REPLACEMENT  2008    bilateral    HX ORTHOPAEDIC  2007    Bilateral TKR    HX PARTIAL HYSTERECTOMY      HX SHOULDER REPLACEMENT  2009    left    HX TONSILLECTOMY           Family History:   Problem Relation Age of Onset   [facility] Arthritis-rheumatoid Mother     Dementia Mother    [facility] Coronary Artery Disease Father     Cancer Brother      lung cancer       Social History     Social History    Marital status:      Spouse name: N/A    Number of children: N/A    Years of education: N/A     Occupational History    Not on file. Social History Main Topics    Smoking status: Never Smoker    Smokeless tobacco: Never Used    Alcohol use No    Drug use: No    Sexual activity: No     Other Topics Concern    Not on file     Social History Narrative         ALLERGIES: Angiotensin ii,human; Avelox [moxifloxacin]; and Demerol [meperidine]    Review of Systems   Cardiovascular: Positive for chest pain. All other systems reviewed and are negative. Vitals:    06/20/18 0549 06/20/18 0551   BP:  (!) 152/101   Pulse: 90    Resp: 22    Temp: 98.5 °F (36.9 °C)    SpO2: 90%    Weight: 76.2 kg (167 lb 15.9 oz)    Height: 5' 6\" (1.676 m)             Physical Exam   Nursing note and vitals reviewed. CONSTITUTIONAL: Well-appearing; well-nourished; in mild distress  HEAD: Normocephalic; atraumatic  EYES: PERRL; EOM intact; conjunctiva and sclera are clear bilaterally. ENT: No rhinorrhea; normal pharynx with no tonsillar hypertrophy; mucous membranes pink/moist, no erythema, no exudate. NECK: Supple; non-tender; no cervical lymphadenopathy  CARD: Normal S1, S2; no murmurs, rubs, or gallops. Increased Irregularly irregular rate and rhythm. RESP: Normal respiratory effort; breath sounds clear and equal bilaterally; no wheezes, rhonchi, or rales. ABD: Normal bowel sounds; non-distended; non-tender; no palpable organomegaly, no masses, no bruits. Back Exam: Normal inspection; no vertebral point tenderness, no CVA tenderness. Normal range of motion. EXT: Normal ROM in all four extremities; non-tender to palpation; no swelling or deformity; distal pulses are normal, no edema. SKIN: Warm; dry; no rash.   NEURO:Alert and oriented x 3, coherent, BRIAN-XII grossly intact, sensory and motor are non-focal.        MDM  Number of Diagnoses or Management Options  Diagnosis management comments: Assessment: 63-year-old female, who presents with intermittent symptoms of chest pain, and shortness of breath with abdominal pain that is worse today. The patient's  stated that the patient had abnormal vital signs so she was brought to the ER for further evaluation. Differential diagnosis consists of CHF, COPD, pneumonia, with acute exacerbation, bowel obstruction, obstipation, pancreatitis. Plan: EKG/ chest x-ray/ lab/ Cardizem bolus/ CT scan of the abdomen and pelvis/ 70serial exam/ Monitor and Reevaluate. Amount and/or Complexity of Data Reviewed  Clinical lab tests: ordered and reviewed  Tests in the radiology section of CPT®: ordered and reviewed  Tests in the medicine section of CPT®: reviewed and ordered  Discussion of test results with the performing providers: yes  Decide to obtain previous medical records or to obtain history from someone other than the patient: yes  Obtain history from someone other than the patient: yes  Review and summarize past medical records: yes  Discuss the patient with other providers: yes  Independent visualization of images, tracings, or specimens: yes    Risk of Complications, Morbidity, and/or Mortality  Presenting problems: moderate  Diagnostic procedures: moderate  Management options: moderate    Critical Care  Total time providing critical care: (Total critical care time spent exclusive of procedures: 45 minutes)    Patient Progress  Patient progress: stable        ED Course       Procedures     ED EKG interpretation:  Rhythm: atrial fib; and irregular. Rate (approx.): 111; Axis: left axis deviation; QRS interval: normal ; ST/T wave: non-specific changes; in  Lead: Diffusely; Other findings: abnormal ekg. This EKG was interpreted by Iván Boucher MD,ED Provider. XRAY INTERPRETATION (ED MD)  Chest Xray  No acute process seen. Normal heart size.  No bony abnormalities. No infiltrate.   Maryann Oscar MD 5:54 AM

## 2018-06-21 PROCEDURE — 3331090002 HH PPS REVENUE DEBIT

## 2018-06-21 PROCEDURE — 3331090001 HH PPS REVENUE CREDIT

## 2018-06-22 ENCOUNTER — OFFICE VISIT (OUTPATIENT)
Dept: CARDIOLOGY CLINIC | Age: 83
End: 2018-06-22

## 2018-06-22 VITALS
OXYGEN SATURATION: 93 % | BODY MASS INDEX: 27.29 KG/M2 | SYSTOLIC BLOOD PRESSURE: 136 MMHG | HEIGHT: 66 IN | DIASTOLIC BLOOD PRESSURE: 86 MMHG | WEIGHT: 169.8 LBS | RESPIRATION RATE: 16 BRPM | HEART RATE: 98 BPM

## 2018-06-22 DIAGNOSIS — E78.00 HIGH CHOLESTEROL: Chronic | ICD-10-CM

## 2018-06-22 DIAGNOSIS — I10 ESSENTIAL HYPERTENSION: Chronic | ICD-10-CM

## 2018-06-22 DIAGNOSIS — I48.91 ATRIAL FIBRILLATION, UNSPECIFIED TYPE (HCC): Primary | ICD-10-CM

## 2018-06-22 PROCEDURE — 3331090001 HH PPS REVENUE CREDIT

## 2018-06-22 PROCEDURE — 3331090002 HH PPS REVENUE DEBIT

## 2018-06-22 RX ORDER — AMIODARONE HYDROCHLORIDE 200 MG/1
200 TABLET ORAL DAILY
Qty: 30 TAB | Refills: 2 | Status: SHIPPED | OUTPATIENT
Start: 2018-06-22 | End: 2018-08-14 | Stop reason: CLARIF

## 2018-06-22 RX ORDER — DILTIAZEM HYDROCHLORIDE 240 MG/1
240 CAPSULE, COATED, EXTENDED RELEASE ORAL DAILY
Qty: 90 CAP | Refills: 1 | Status: SHIPPED | OUTPATIENT
Start: 2018-06-22 | End: 2018-08-14 | Stop reason: CLARIF

## 2018-06-22 NOTE — PROGRESS NOTES
Nitin Jones MD    [address] [facility] [place], [address] [facility]    Office [phone],[facility] [phone]  Pager [phone]    Michael Reid is a 80 y.o. female is here for f/u visit. Primary care physician:  Ashley Culp MD    Patient Active Problem List   Diagnosis Code    HTN (hypertension) I10    OA (osteoarthritis) M19.90    Fibromyalgia M79.7    GERD (gastroesophageal reflux disease) K21.9    High cholesterol E78.00    Hiatal hernia K44.9    Chronic pain G89.29    Elevated liver enzymes R74.8    S/P cholecystectomy Z90.49    S/P knee replacement Z96.659    S/P shoulder surgery Z98.890    H/O carpal tunnel repair Z98.890    Itching L29.9    Pulmonary embolism (HCC) I26.99    Advance care planning Z71.89    Iron deficiency anemia D50.9    Advanced care planning/counseling discussion Z71.89    Controlled type 2 diabetes mellitus without complication, without long-term current use of insulin ([address] E11.9    Type 2 diabetes with nephropathy ([address] E11.21    A-fib ([address] I48.91    Dyslipidemia E78.5    Chronic anticoagulation Z79.01    Constipation K59.00    History of pulmonary embolism Z86.711       Dear Dr. Momo Girard,    I had the pleasure of seeing  Ms Michael Reid in the office today. Chief complaint:  Chief Complaint   Patient presents with    Irregular Heart Beat     afib    Hypertension    Cholesterol Problem       Assessment:    Atrial fibrillation with controlled ventricular response  History of paroxysmal atrial fibrillation-status post GULSHAN DC CV 5/25/18. Patient was undergoing EGD when she was found to be in A. fib with RVR. Today patient is in atrial fibrillation. History of PE-chronic anticoagulation-managed by PCP  Hypertension        Plan:     As per , patient has not had to use the losartan/hydrochlorothiazide frequently. He has been using it a few times for systolic blood pressure greater than 170.   Increase Cardizem CD to360 mg daily. Patient is in atrial fibrillation. Will continue amiodarone for another 4 weeks but if she continues to be in A. fib on follow-up visit, will discontinue amiodarone. Continue to use losartan/hydrochlorothiazide on a as needed basis for systolic blood pressure greater than 170  Home blood pressure machine running approximately 10 points above of his blood pressure. On amiodarone-TSH within normal limits 5/2018, needs yearly ophthalmology appointments/pulmonary evaluation if she continues to be on amiodarone on follow-up visit. Have discussed side effects of the medication with patient/ previously. Will discuss with patient about ischemia workup next visit. Aggressive cardio vascular risk factor modification. Follow-up in 4 weeks or earlier as needed. Patient understands the plan. All questions were answered to the patient's satisfaction. Medication Side Effects and Warnings were discussed with patient: yes  Patient Labs were reviewed and or requested:  yes  Patient Past Records were reviewed and or requested: yes    I appreciate the opportunity to be involved in Ms. Rodrigez. See note below for details. Please do not hesitate to contact us with questions or concerns. Glynn Jaime MD    Cardiac Testing/ Procedures: A. Cardiac Cath/PCI:    B.ECHO/GULSHAN: 5/24/18 echocardiogram-EF 55-60%. Wall thickness mild to moderately increased. KAYLA, moderate MR, moderate TR, small pericardial effusion      C. StressNuclear/Stress ECHO/Stress test:    D.Vascular:    E. EP: 5/25/18 Successful GULSHAN guided DCCV converting AF to sinus    F. Miscellaneous:    Subjective:  Tray Goodson is a 80 y.o. female who returns for follow up today. As per , patient has not had to use the losartan/hydrochlorothiazide frequently. He has been using it a few times for systolic blood pressure greater than 170. Has been having abdominal pain requiring ED visit.   .  No dizziness, syncope, swelling lower extremities, chest pain, palpitations. ROS:  (bold if positive, if negative)    As in HPI       Medications before admission:    Current Outpatient Prescriptions   Medication Sig Dispense    amiodarone (CORDARONE) 200 mg tablet Take 1 Tab by mouth daily. 30 Tab    dilTIAZem CD (CARDIZEM CD) 240 mg ER capsule Take 1 Cap by mouth daily. 90 Cap    ferrous sulfate 325 mg (65 mg iron) cpER Take 325 mg by mouth daily.  polyethylene glycol (MIRALAX) 17 gram packet Take 17 g by mouth as needed (Constipation).  cyanocobalamin (VITAMIN B-12) 500 mcg tablet Take 500 mcg by mouth daily.  HYDROcodone-acetaminophen (NORCO) 7.5-325 mg per tablet Take 1 Tab by mouth every eight (8) hours as needed for Pain. Max Daily Amount: 3 Tabs. 90 Tab    cyclobenzaprine (FLEXERIL) 5 mg tablet TAKE 1 TABLET BY MOUTH 3 TIMES A DAY AS NEEDED FOR MUSCLE SPASMS 90 Tab    OTHER Folding Wheelchair  Dx: OA 1 Each    citalopram (CELEXA) 20 mg tablet TAKE 1 TABLET BY MOUTH DAILY 90 Tab    gabapentin (NEURONTIN) 600 mg tablet TAKE 1 TABLET BY MOUTH 3 TIMES A  Tab    JANTOVEN 3 mg tablet TAKE 1 TABLET BY MOUTH DAILY 30 Tab     No current facility-administered medications for this visit. Family History of CAD:   Yes    Social History:  Current  Smoker No    Physical Exam:  Visit Vitals    /86 (BP 1 Location: Right arm)  Comment: PATIENT'S MACHINE    Pulse 98    Resp 16    Ht 5' 6\" (1.676 m)    Wt 169 lb 12.8 oz (77 kg)    SpO2 93%    BMI 27.41 kg/m2          Gen: Well-developed, well-nourished,elderly, In wheel chair  Neck: Supple,No JVD, No Carotid Bruit,   Resp: No accessory muscle use, Clear breath sounds, No rales or rhonchi  Card: Irregular rate,Rythm,Normal S1, S2, 2/6 ESM+, No rubs or gallop. No thrills.    Abd:  Soft, BS+,   MSK: No cyanosis  Skin: No rashes    Neuro: moving all four extremities , follows commands appropriately  Psych:  Good insight, oriented to person, place , alert, Nml Affect  LE: No edema    EKG: A. fib with CVR,NSTT      LABS:        Lab Results   Component Value Date/Time    WBC 5.6 06/20/2018 05:49 AM    HGB 12.5 06/20/2018 05:49 AM    HCT 38.7 06/20/2018 05:49 AM    PLATELET 982 37/66/6964 05:49 AM     Lab Results   Component Value Date/Time    Sodium 138 06/20/2018 05:49 AM    Potassium 4.1 06/20/2018 05:49 AM    Chloride 103 06/20/2018 05:49 AM    CO2 29 06/20/2018 05:49 AM    Anion gap 6 06/20/2018 05:49 AM    Glucose 116 (H) 06/20/2018 05:49 AM    BUN 16 06/20/2018 05:49 AM    Creatinine 1.07 (H) 06/20/2018 05:49 AM    BUN/Creatinine ratio 15 06/20/2018 05:49 AM    GFR est AA 59 (L) 06/20/2018 05:49 AM    GFR est non-AA 49 (L) 06/20/2018 05:49 AM    Calcium 9.5 06/20/2018 05:49 AM       Lab Results   Component Value Date/Time    aPTT 60.4 (H) 06/20/2018 05:49 AM     Lab Results   Component Value Date/Time    INR 4.1 (H) 06/20/2018 05:49 AM    INR 2.2 (H) 05/26/2018 03:43 AM    INR 1.8 (H) 05/25/2018 04:40 AM    Prothrombin time 41.6 (H) 06/20/2018 05:49 AM    Prothrombin time 22.3 (H) 05/26/2018 03:43 AM    Prothrombin time 18.5 (H) 05/25/2018 04:40 AM     No components found for: Carla Green MD

## 2018-06-22 NOTE — PROGRESS NOTES
Diltiazem increased to 240 mg, daily  Refills for amiodarone sent    Verbal order per Dr. Hernandez Shan

## 2018-06-22 NOTE — MR AVS SNAPSHOT
[address] [facility] [address] 
[phone] Patient: Tray Regan MRN:  [id number] Visit Information Date & Time Provider Department Dept. Phone Encounter #  
 6/22/2018  9:00 AM Janis Cevallos MD [facility] Catalina Carranza [phone] [id number] Your Appointments 6/28/2018 10:40 AM  
ESTABLISHED PATIENT with Jonathan Castellanos MD  
[address] [facility] 365[address] [facility]) Appt Note: 1mo fuv  
 [address] [address]  
[phone]  
  
   
 [address] [address]  
  
    
 7/24/2018  9:20 AM  
ESTABLISHED PATIENT with Janis Cevallos MD  
[facility] ([facility]) Appt Note: 1 MO FUP PER DR PRYOR  
 [address] [facility] [address] [address] [address] 8/17/2018 10:00 AM  
ESTABLISHED PATIENT with Klaudia Augustin MD  
[facility] [address]) Appt Note: 6mo iron deficiency fu, [place] [address] [facility], [address] [facility] 99 [address]  
[phone]  
  
   
 301 [facility], [address] 70 [facility] Upcoming Health Maintenance Date Due  
 EYE EXAM RETINAL OR DILATED Q1 7/18/2013 Influenza Age 5 to Adult 8/1/2018 GLAUCOMA SCREENING Q2Y 11/9/2018 HEMOGLOBIN A1C Q6M 11/25/2018 FOOT EXAM Q1 11/27/2018 MICROALBUMIN Q1 4/16/2019 LIPID PANEL Q1 4/16/2019 MEDICARE YEARLY EXAM 5/30/2019 DTaP/Tdap/Td series (2 - Td) 11/9/2026 Allergies as of 6/22/2018  Review Complete On: 6/20/2018 By: Mari Alcaraz, RT, R, CT Severity Noted Reaction Type Reactions Angiotensin Ii,human  04/01/2010    Other (comments) States does not remember Avelox [Moxifloxacin]  04/01/2010    Other (comments) States does not remember Demerol [Meperidine]  04/01/2010    Hives Current Immunizations  Reviewed on 5/24/2018 Name Date Influenza High Dose Vaccine PF 10/16/2017, 11/9/2016 Influenza Vaccine 10/29/2013 Influenza Vaccine Refugio Means) 10/20/2015 Influenza Vaccine Split 11/7/2012, 10/11/2011, 11/16/2010 Influenza Vaccine Whole 8/1/2009 Pneumococcal Conjugate (PCV-13) 4/13/2016 ZZZ-RETIRED (DO NOT USE) Pneumococcal Vaccine (Unspecified Type) 4/1/2008 Not reviewed this visit You Were Diagnosed With   
  
 Codes Comments Atrial fibrillation, unspecified type ([facility]    -  Primary ICD-10-CM: I48.91 
ICD-9-CM: 427.31 Vitals BP Pulse Resp Height(growth percentile) Weight(growth percentile) SpO2  
 136/86 (BP 1 Location: Right arm) 98 16 5' 6\" (1.676 m) 169 lb 12.8 oz (77 kg) 93% BMI OB Status Smoking Status 27.41 kg/m2 Postmenopausal Never Smoker Vitals History BMI and BSA Data Body Mass Index Body Surface Area  
 27.41 kg/m 2 1.89 m 2 Preferred Pharmacy Pharmacy Name Phone [id number], [address] [place] [address] [phone] Your Updated Medication List  
  
   
This list is accurate as of 6/22/18  9:11 AM.  Always use your most recent med list.  
  
  
  
  
 amiodarone 200 mg tablet Commonly known as:  CORDARONE Take 1 Tab by mouth daily. citalopram 20 mg tablet Commonly known as:  CELEXA  
TAKE 1 TABLET BY MOUTH DAILY  
  
 cyclobenzaprine 5 mg tablet Commonly known as:  FLEXERIL  
TAKE 1 TABLET BY MOUTH 3 TIMES A DAY AS NEEDED FOR MUSCLE SPASMS  
  
 dilTIAZem  mg ER capsule Commonly known as:  CARDIZEM CD Take 1 Cap by mouth daily. ferrous sulfate 325 mg (65 mg iron) Cper Take 325 mg by mouth daily. gabapentin 600 mg tablet Commonly known as:  NEURONTIN  
TAKE 1 TABLET BY MOUTH 3 TIMES A DAY HYDROcodone-acetaminophen 7.5-325 mg per tablet Commonly known as:  Isabelle Wong  
 Take 1 Tab by mouth every eight (8) hours as needed for Pain. Max Daily Amount: 3 Tabs. JANTOVEN 3 mg tablet Generic drug:  warfarin TAKE 1 TABLET BY MOUTH DAILY  
  
 OTHER Folding Wheelchair Dx: OA  
  
 polyethylene glycol 17 gram packet Commonly known as:  Apollo Javier Take 17 g by mouth as needed (Constipation). VITAMIN B-12 500 mcg tablet Generic drug:  cyanocobalamin Take 500 mcg by mouth daily. We Performed the Following AMB POC EKG ROUTINE W/ 12 LEADS, INTER & REP [83691 CPT(R)] To-Do List   
 06/27/2018 To Be Determined Appointment with Elizabeth Temple at [place] [address]  
  
 06/27/2018 To Be Determined Appointment with Elizabeth Temple at [place] [address]  
  
 07/04/2018 To Be Determined Appointment with Negin Mark at [place] [address]  
  
 07/11/2018 To Be Determined Appointment with Negin Mark at [place] [address]  
  
 07/18/2018 To Be Determined Appointment with Negin Mark at [place] [address]  
  
 07/25/2018 To Be Determined Appointment with Elizabeth Temple at [place] [address] Introducing [facility]! Sheron Boswell introduces CrowdFan[employer] patient portal. Now you can access parts of your medical record, email your doctor's office, and request medication refills online. 1. In your internet browser, go to [email or web address] 2. Click on the First Time User? Click Here link in the Sign In box. You will see the New Member Sign Up page. 3. Enter your [employer] Access Code exactly as it appears below. You will not need to use this code after youve completed the sign-up process. If you do not sign up before the expiration date, you must request a new code. · [employer] Access Code: [email or web address] Expires: 7/15/2018  9:10 AM 
 
 4. Enter the last four digits of your Social Security Number (xxxx) and Date of Birth (mm/dd/yyyy) as indicated and click Submit. You will be taken to the next sign-up page. 5. Create a Splunk ID. This will be your Splunk login ID and cannot be changed, so think of one that is secure and easy to remember. 6. Create a Splunk password. You can change your password at any time. 7. Enter your Password Reset Question and Answer. This can be used at a later time if you forget your password. 8. Enter your e-mail address. You will receive e-mail notification when new information is available in [address] 9. Click Sign Up. You can now view and download portions of your medical record. 10. Click the Download Summary menu link to download a portable copy of your medical information. If you have questions, please visit the Frequently Asked Questions section of the Splunk website. Remember, Splunk is NOT to be used for urgent needs. For medical emergencies, dial 911. Now available from your iPhone and Android! Please provide this summary of care documentation to your next provider. Your primary care clinician is listed as LAUREL SINGH. If you have any questions after today's visit, please call [phone].

## 2018-06-23 PROCEDURE — 3331090002 HH PPS REVENUE DEBIT

## 2018-06-23 PROCEDURE — 3331090001 HH PPS REVENUE CREDIT

## 2018-06-24 PROCEDURE — 3331090002 HH PPS REVENUE DEBIT

## 2018-06-24 PROCEDURE — 3331090001 HH PPS REVENUE CREDIT

## 2018-06-25 ENCOUNTER — PATIENT OUTREACH (OUTPATIENT)
Dept: FAMILY MEDICINE CLINIC | Age: 83
End: 2018-06-25

## 2018-06-25 PROCEDURE — 3331090001 HH PPS REVENUE CREDIT

## 2018-06-25 PROCEDURE — 3331090002 HH PPS REVENUE DEBIT

## 2018-06-25 NOTE — PROGRESS NOTES
[address]  [phone]  Fax [phone]    Patient Name: Olamide Collazo  YOB: 1933 5/24-5/26/18 Atrial Fib; constipation    Patient has graduated from the Transitions of Care Coordination  program on 6/26/18. Patient's symptoms are stable at this time. Patient/family has the ability to self-manage and is followed closely by the cardiologist and PCP. ..  Goals      Afib Education            5/29/18- Discussed basic information. Needs reinforcement. 6/4/18- Sending \"Up to date\" The basics Atrial Fibrillation  6/25/18- Remains in Afib.  states he continues to monitor her BP and pulse. States cardiologist increased the Diltiazem to help slow down the heart rate versus conversion. Readings from today 126/76 and . States he makes sure she takes her blood thinner daily. Will discuss with PCP if NTG sl would be beneficial in emergency.  states they had some but it is in their house that is being reconstructed. Completing goal       Knowledge and adherence of prescribed medication (ie. action, side effects, missed dose, etc.).            6/4/18 Discussed perimeters on when to hold cardiac medications and when to restart Losartan. Patient's  verbalized understanding by repeating back what he would do. Will reinforce at next contact. Today's /86 pulse 77.   5/29/18 Needs to follow up with cardiac MD for perimeters on new medications-when to hold, etc..  5/30 and 6/12- appts completed with cardio. Is monitoring BP/pulse and has verbalized understanding when to adjust prn cardiac medications. 6/25- Reviewed cardiac medications including blood thinner; verbalized understanding . Completing goal.            Care management goals have been completed at this time. No further nurse navigator follow up scheduled. Pt has nurse navigator's contact information for any further questions, concerns, or needs.   Patients upcoming visits: Future Appointments  Date Time Provider Department Center   6/27/2018 To Be Determined Reather Osiris [place] [id number]   6/27/2018 To Be Determined Reather Osiris [address] [place] [address]   6/28/2018 10:40 AM Lorenzo Allison MD [facility] SCHED   7/4/2018 To Be Determined Yuri Berrios [place] [address]   7/11/2018 To Be Determined Brittney Drilling [facility]   7/18/2018 To Be Determined Cisco Drilling [facility]   7/24/2018 9:20 AM Juliann Sheldon MD [facility] SCHED   7/25/2018 To Be Determined Reather Osiris [place] [id number]   8/17/2018 10:00 AM Jaden Centeno MD [address] [place] [address], [address]

## 2018-06-25 NOTE — Clinical Note
Hi Dr Agustín Coe, Don't know if NTG would be beneficial with afib? Apparently she had some in the past but it was in their old house that is condemned. I thought I would ask. ..they will be seeing you on Thursday.  Thanks, Chiki Brooks

## 2018-06-26 VITALS
OXYGEN SATURATION: 98 % | SYSTOLIC BLOOD PRESSURE: 130 MMHG | HEART RATE: 103 BPM | RESPIRATION RATE: 17 BRPM | TEMPERATURE: 97 F | DIASTOLIC BLOOD PRESSURE: 80 MMHG

## 2018-06-26 PROCEDURE — 3331090002 HH PPS REVENUE DEBIT

## 2018-06-26 PROCEDURE — 3331090001 HH PPS REVENUE CREDIT

## 2018-06-27 ENCOUNTER — HOME CARE VISIT (OUTPATIENT)
Dept: SCHEDULING | Facility: HOME HEALTH | Age: 83
End: 2018-06-27
Payer: MEDICARE

## 2018-06-27 VITALS
TEMPERATURE: 98.5 F | SYSTOLIC BLOOD PRESSURE: 132 MMHG | HEART RATE: 72 BPM | OXYGEN SATURATION: 96 % | DIASTOLIC BLOOD PRESSURE: 78 MMHG | RESPIRATION RATE: 18 BRPM

## 2018-06-27 PROCEDURE — 3331090001 HH PPS REVENUE CREDIT

## 2018-06-27 PROCEDURE — G0299 HHS/HOSPICE OF RN EA 15 MIN: HCPCS

## 2018-06-27 PROCEDURE — 3331090002 HH PPS REVENUE DEBIT

## 2018-06-28 ENCOUNTER — OFFICE VISIT (OUTPATIENT)
Dept: FAMILY MEDICINE CLINIC | Age: 83
End: 2018-06-28

## 2018-06-28 VITALS
HEART RATE: 78 BPM | HEIGHT: 66 IN | OXYGEN SATURATION: 93 % | WEIGHT: 173 LBS | BODY MASS INDEX: 27.8 KG/M2 | SYSTOLIC BLOOD PRESSURE: 139 MMHG | TEMPERATURE: 97.1 F | DIASTOLIC BLOOD PRESSURE: 73 MMHG | RESPIRATION RATE: 20 BRPM

## 2018-06-28 DIAGNOSIS — D68.9 COAGULATION DEFICIENCY (HCC): Primary | ICD-10-CM

## 2018-06-28 DIAGNOSIS — I48.91 ATRIAL FIBRILLATION, UNSPECIFIED TYPE (HCC): ICD-10-CM

## 2018-06-28 DIAGNOSIS — G89.4 CHRONIC PAIN SYNDROME: ICD-10-CM

## 2018-06-28 LAB
INR BLD: 5.5
PT POC: NORMAL SECONDS
VALID INTERNAL CONTROL?: YES

## 2018-06-28 PROCEDURE — 3331090002 HH PPS REVENUE DEBIT

## 2018-06-28 PROCEDURE — 3331090001 HH PPS REVENUE CREDIT

## 2018-06-28 RX ORDER — HYDROCODONE BITARTRATE AND ACETAMINOPHEN 7.5; 325 MG/1; MG/1
1 TABLET ORAL
Qty: 90 TAB | Refills: 0 | Status: SHIPPED | OUTPATIENT
Start: 2018-06-28 | End: 2018-08-14 | Stop reason: CLARIF

## 2018-06-28 RX ORDER — WARFARIN 1 MG/1
1 TABLET ORAL DAILY
Qty: 30 TAB | Refills: 5 | Status: SHIPPED | OUTPATIENT
Start: 2018-06-28 | End: 2018-08-14 | Stop reason: CLARIF

## 2018-06-28 RX ORDER — HYDROCODONE BITARTRATE AND ACETAMINOPHEN 7.5; 325 MG/1; MG/1
1 TABLET ORAL
Qty: 90 TAB | Refills: 0 | Status: SHIPPED | OUTPATIENT
Start: 2018-06-28 | End: 2018-06-28 | Stop reason: SDUPTHER

## 2018-06-28 NOTE — PROGRESS NOTES
Patient here for 1 month f/u. inr    1. Have you been to the ER, urgent care clinic since your last visit? Hospitalized since your last visit? No    2. Have you seen or consulted any other health care providers outside of the [address] [place] [address] since your last visit? Include any pap smears or colon screening. No  Results for orders placed or performed in visit on 06/28/18   AMB POC PT/INR   Result Value Ref Range    VALID INTERNAL CONTROL POC Yes     Prothrombin time (POC)  seconds    INR POC 5.5        Chief Complaint   Patient presents with    Coagulation disorder     inr     She is a 80 y.o. female who presents for evalution. Reviewed PmHx, RxHx, FmHx, SocHx, AllgHx and updated and dated in the chart.     Patient Active Problem List    Diagnosis    A-fib ([facility]    Dyslipidemia    Chronic anticoagulation    Constipation    History of pulmonary embolism    Type 2 diabetes with nephropathy ([facility]    Advanced care planning/counseling discussion     Has no completed, dwp importance      Controlled type 2 diabetes mellitus without complication, without long-term current use of insulin ([facility]    Iron deficiency anemia    Advance care planning     Has a LW      Pulmonary embolism ([facility]    Itching    Elevated liver enzymes    S/P cholecystectomy     11/2013      S/P knee replacement     bilaterally      S/P shoulder surgery     Left      H/O carpal tunnel repair     Right hand      Chronic pain    HTN (hypertension)    OA (osteoarthritis)    Fibromyalgia    GERD (gastroesophageal reflux disease)    High cholesterol    Hiatal hernia       Review of Systems - negative except as listed above in the HPI    Objective:     Vitals:    06/28/18 0909   BP: 139/73   Pulse: 78   Resp: 20   Temp: 97.1 °F (36.2 °C)   SpO2: 93%   Weight: 173 lb (78.5 kg)   Height: 5' 6\" (1.676 m)     Physical Examination: General appearance - alert, well appearing, and in no distress  Chest - clear to auscultation, no wheezes, rales or rhonchi, symmetric air entry  Heart - normal rate, regular rhythm, normal S1, S2, no murmurs, rubs, clicks or gallops    Assessment/ Plan:   Diagnoses and all orders for this visit:    1. Coagulation deficiency (HCC)  -     AMB POC PT/INR  -too thin and suspect due to new rx amiodarone  -dec to 1mg after holding for 2 days  -check in 2 weeks with [place]       Follow-up Disposition:  Return in about 2 weeks (around 7/12/2018). I have discussed the diagnosis with the patient and the intended plan as seen in the above orders. The patient understands and agrees with the plan. The patient has received an after-visit summary and questions were answered concerning future plans. Medication Side Effects and Warnings were discussed with patient  Patient Labs were reviewed and or requested:  Patient Past Records were reviewed and or requested    Sujatha Ch M.D. There are no Patient Instructions on file for this visit.

## 2018-06-28 NOTE — MR AVS SNAPSHOT
[address] [address] 
[phone] Patient: Tray Regan MRN:  [id number] Visit Information Date & Time Provider Department Dept. Phone Encounter #  
 6/28/2018 10:40 AM Mynor Campbell MD 5900 [facility] [phone] [id number] Follow-up Instructions Return in about 2 weeks (around 7/12/2018). Your Appointments 6/28/2018 10:40 AM  
ESTABLISHED PATIENT with Mynor Campbell MD  
59[address] [facility] 3651 [facility]) Appt Note: 1mo fuv  
 [address] [address]  
[phone]  
  
   
 [address] [address]  
  
    
 7/24/2018  9:20 AM  
ESTABLISHED PATIENT with Fausto Bartholomew MD  
[facility] Appt Note: 1 MO FUP PER DR PRYOR  
 [id number] [facility] [address] [place] [address] [address] 8/17/2018 10:00 AM  
ESTABLISHED PATIENT with Rogelio Starks MD  
[facility] [address]) Appt Note: 6mo iron deficiency fu, [place] 37[address] [facility], 2329 [facility] 99 [address]  
[phone]  
  
   
 37[address] [facility], [address] Upcoming Health Maintenance Date Due  
 EYE EXAM RETINAL OR DILATED Q1 7/18/2013 Influenza Age 5 to Adult 8/1/2018 GLAUCOMA SCREENING Q2Y 11/9/2018 HEMOGLOBIN A1C Q6M 11/25/2018 FOOT EXAM Q1 11/27/2018 MICROALBUMIN Q1 4/16/2019 LIPID PANEL Q1 4/16/2019 MEDICARE YEARLY EXAM 5/30/2019 DTaP/Tdap/Td series (2 - Td) 11/9/2026 Allergies as of 6/28/2018  Review Complete On: 6/28/2018 By: Mynor Campbell MD  
  
 Severity Noted Reaction Type Reactions Angiotensin Ii,human  04/01/2010    Other (comments) States does not remember Avelox [Moxifloxacin]  04/01/2010    Other (comments) States does not remember Demerol [Meperidine]  04/01/2010    Hives Current Immunizations  Reviewed on 5/24/2018 Name Date Influenza High Dose Vaccine PF 10/16/2017, 11/9/2016 Influenza Vaccine 10/29/2013 Influenza Vaccine Odella Dragon) 10/20/2015 Influenza Vaccine Split 11/7/2012, 10/11/2011, 11/16/2010 Influenza Vaccine Whole 8/1/2009 Pneumococcal Conjugate (PCV-13) 4/13/2016 ZZZ-RETIRED (DO NOT USE) Pneumococcal Vaccine (Unspecified Type) 4/1/2008 Not reviewed this visit You Were Diagnosed With   
  
 Codes Comments Coagulation deficiency ([facility]    -  Primary ICD-10-CM: D69.9 ICD-9-CM: 286. 9 Vitals BP Pulse Temp Resp Height(growth percentile) Weight(growth percentile) 139/73 78 97.1 °F (36.2 °C) 20 5' 6\" (1.676 m) 173 lb (78.5 kg) SpO2 BMI OB Status Smoking Status 93% 27.92 kg/m2 Postmenopausal Never Smoker Vitals History BMI and BSA Data Body Mass Index Body Surface Area  
 27.92 kg/m 2 1.91 m 2 Preferred Pharmacy Pharmacy Name Phone [facility] [id number] - [address] [facility], [address] [place] [address] [phone] Your Updated Medication List  
  
   
This list is accurate as of 6/28/18  9:33 AM.  Always use your most recent med list.  
  
  
  
  
 amiodarone 200 mg tablet Commonly known as:  CORDARONE Take 1 Tab by mouth daily. citalopram 20 mg tablet Commonly known as:  CELEXA  
TAKE 1 TABLET BY MOUTH DAILY  
  
 cyclobenzaprine 5 mg tablet Commonly known as:  FLEXERIL  
TAKE 1 TABLET BY MOUTH 3 TIMES A DAY AS NEEDED FOR MUSCLE SPASMS  
  
 dilTIAZem  mg ER capsule Commonly known as:  CARDIZEM CD Take 1 Cap by mouth daily. ferrous sulfate 325 mg (65 mg iron) Cper Take 325 mg by mouth daily. gabapentin 600 mg tablet Commonly known as:  NEURONTIN  
TAKE 1 TABLET BY MOUTH 3 TIMES A DAY HYDROcodone-acetaminophen 7.5-325 mg per tablet Commonly known as:  Palak Watters  
 Take 1 Tab by mouth every eight (8) hours as needed for Pain. Max Daily Amount: 3 Tabs. JANTOVEN 3 mg tablet Generic drug:  warfarin TAKE 1 TABLET BY MOUTH DAILY  
  
 OTHER Folding Wheelchair Dx: OA  
  
 polyethylene glycol 17 gram packet Commonly known as:  Maurene Lamp Take 17 g by mouth as needed (Constipation). VITAMIN B-12 500 mcg tablet Generic drug:  cyanocobalamin Take 500 mcg by mouth daily. We Performed the Following AMB POC PT/INR [96282 CPT(R)] Follow-up Instructions Return in about 2 weeks (around 7/12/2018). To-Do List   
 07/04/2018 To Be Determined Appointment with Juan Leo at [place] [address]  
  
 07/11/2018 To Be Determined Appointment with Juan Leo at [place] [address]  
  
 07/18/2018 To Be Determined Appointment with Juan Leo at [place] [address]  
  
 07/25/2018 To Be Determined Appointment with Georges Burris RN at [place] [address] Introducing [facility]! [employer] introduces [employer] patient portal. Now you can access parts of your medical record, email your doctor's office, and request medication refills online. 1. In your internet browser, go to [email or web address] 2. Click on the First Time User? Click Here link in the Sign In box. You will see the New Member Sign Up page. 3. Enter your [employer] Access Code exactly as it appears below. You will not need to use this code after youve completed the sign-up process. If you do not sign up before the expiration date, you must request a new code. · [employer] Access Code: [email or web address] Expires: 7/15/2018  9:10 AM 
 
4. Enter the last four digits of your Social Security Number (xxxx) and Date of Birth (mm/dd/yyyy) as indicated and click Submit. You will be taken to the next sign-up page. 5. Create a Body & Soul ID. This will be your [employer] & Soul login ID and cannot be changed, so think of one that is secure and easy to remember. 6. Create a [employer] & Soul password. You can change your password at any time. 7. Enter your Password Reset Question and Answer. This can be used at a later time if you forget your password. 8. Enter your e-mail address. You will receive e-mail notification when new information is available in [address] 9. Click Sign Up. You can now view and download portions of your medical record. 10. Click the Download Summary menu link to download a portable copy of your medical information. If you have questions, please visit the Frequently Asked Questions section of the [employer] website. Remember, [employer] is NOT to be used for urgent needs. For medical emergencies, dial 911. Now available from your iPhone and Android! Please provide this summary of care documentation to your next provider. Your primary care clinician is listed as LAUREL SINGH. If you have any questions after today's visit, please call [phone].

## 2018-06-29 PROCEDURE — 3331090001 HH PPS REVENUE CREDIT

## 2018-06-29 PROCEDURE — 3331090002 HH PPS REVENUE DEBIT

## 2018-06-30 PROCEDURE — 3331090002 HH PPS REVENUE DEBIT

## 2018-06-30 PROCEDURE — 3331090001 HH PPS REVENUE CREDIT

## 2018-07-01 PROCEDURE — 3331090002 HH PPS REVENUE DEBIT

## 2018-07-01 PROCEDURE — 3331090001 HH PPS REVENUE CREDIT

## 2018-07-02 PROCEDURE — 3331090002 HH PPS REVENUE DEBIT

## 2018-07-02 PROCEDURE — 3331090001 HH PPS REVENUE CREDIT

## 2018-07-03 PROCEDURE — 3331090002 HH PPS REVENUE DEBIT

## 2018-07-03 PROCEDURE — 3331090001 HH PPS REVENUE CREDIT

## 2018-07-04 ENCOUNTER — HOME CARE VISIT (OUTPATIENT)
Dept: SCHEDULING | Facility: HOME HEALTH | Age: 83
End: 2018-07-04
Payer: MEDICARE

## 2018-07-04 PROCEDURE — 3331090001 HH PPS REVENUE CREDIT

## 2018-07-04 PROCEDURE — 3331090002 HH PPS REVENUE DEBIT

## 2018-07-05 PROCEDURE — 3331090002 HH PPS REVENUE DEBIT

## 2018-07-05 PROCEDURE — 3331090001 HH PPS REVENUE CREDIT

## 2018-07-06 PROCEDURE — 3331090001 HH PPS REVENUE CREDIT

## 2018-07-06 PROCEDURE — 3331090002 HH PPS REVENUE DEBIT

## 2018-07-07 PROCEDURE — 3331090001 HH PPS REVENUE CREDIT

## 2018-07-07 PROCEDURE — 3331090002 HH PPS REVENUE DEBIT

## 2018-07-08 PROCEDURE — 3331090002 HH PPS REVENUE DEBIT

## 2018-07-08 PROCEDURE — 3331090001 HH PPS REVENUE CREDIT

## 2018-07-09 PROCEDURE — 3331090002 HH PPS REVENUE DEBIT

## 2018-07-09 PROCEDURE — 3331090001 HH PPS REVENUE CREDIT

## 2018-07-10 PROCEDURE — 3331090001 HH PPS REVENUE CREDIT

## 2018-07-10 PROCEDURE — 3331090002 HH PPS REVENUE DEBIT

## 2018-07-11 ENCOUNTER — TELEPHONE (OUTPATIENT)
Dept: FAMILY MEDICINE CLINIC | Age: 83
End: 2018-07-11

## 2018-07-11 PROCEDURE — 3331090002 HH PPS REVENUE DEBIT

## 2018-07-11 PROCEDURE — 3331090001 HH PPS REVENUE CREDIT

## 2018-07-11 NOTE — TELEPHONE ENCOUNTER
Priti Rubalcava from [facility]  INR 1.1  PT 13.4   Coumadin 1 mg daily. Priti Rubalcava [phone]. Leave orders on .

## 2018-07-11 NOTE — TELEPHONE ENCOUNTER
Spoke to Chrissy Solders from [facility]. Informed new orders per Dr. Rosalind Prasad. Verbalizes understanding. Patient called, spoke to  about new orders.  verbalizes understanding. Home health to do INR check in one week after dose increase to 2 mg daily.

## 2018-07-12 PROCEDURE — 3331090001 HH PPS REVENUE CREDIT

## 2018-07-12 PROCEDURE — 3331090002 HH PPS REVENUE DEBIT

## 2018-07-13 PROCEDURE — 3331090001 HH PPS REVENUE CREDIT

## 2018-07-13 PROCEDURE — 3331090002 HH PPS REVENUE DEBIT

## 2018-07-14 PROCEDURE — 3331090001 HH PPS REVENUE CREDIT

## 2018-07-14 PROCEDURE — 3331090002 HH PPS REVENUE DEBIT

## 2018-07-15 PROCEDURE — 3331090002 HH PPS REVENUE DEBIT

## 2018-07-15 PROCEDURE — 3331090001 HH PPS REVENUE CREDIT

## 2018-07-16 PROCEDURE — 3331090001 HH PPS REVENUE CREDIT

## 2018-07-16 PROCEDURE — 3331090002 HH PPS REVENUE DEBIT

## 2018-07-17 PROCEDURE — 3331090001 HH PPS REVENUE CREDIT

## 2018-07-17 PROCEDURE — 3331090002 HH PPS REVENUE DEBIT

## 2018-07-18 ENCOUNTER — TELEPHONE (OUTPATIENT)
Dept: FAMILY MEDICINE CLINIC | Age: 83
End: 2018-07-18

## 2018-07-18 ENCOUNTER — HOME CARE VISIT (OUTPATIENT)
Dept: SCHEDULING | Facility: HOME HEALTH | Age: 83
End: 2018-07-18
Payer: MEDICARE

## 2018-07-18 ENCOUNTER — HOME CARE VISIT (OUTPATIENT)
Dept: HOME HEALTH SERVICES | Facility: HOME HEALTH | Age: 83
End: 2018-07-18
Payer: MEDICARE

## 2018-07-18 PROCEDURE — 3331090001 HH PPS REVENUE CREDIT

## 2018-07-18 PROCEDURE — G0300 HHS/HOSPICE OF LPN EA 15 MIN: HCPCS

## 2018-07-18 PROCEDURE — 3331090002 HH PPS REVENUE DEBIT

## 2018-07-18 NOTE — TELEPHONE ENCOUNTER
Karthikeyan Cipro secours . INR 1.3  PT 15.7  Taking 2 mg daily  [phone]    Per Dr. Matthias Galvan, verbal given to increase coumadin to 3 mg daily.  Check INR in 1 week 7/25/18

## 2018-07-19 PROCEDURE — 3331090001 HH PPS REVENUE CREDIT

## 2018-07-19 PROCEDURE — 3331090002 HH PPS REVENUE DEBIT

## 2018-07-20 ENCOUNTER — APPOINTMENT (OUTPATIENT)
Dept: CT IMAGING | Age: 83
End: 2018-07-20
Attending: EMERGENCY MEDICINE
Payer: MEDICARE

## 2018-07-20 ENCOUNTER — HOSPITAL ENCOUNTER (EMERGENCY)
Age: 83
Discharge: HOME | End: 2018-07-20
Attending: EMERGENCY MEDICINE
Payer: MEDICARE

## 2018-07-20 VITALS
HEART RATE: 92 BPM | RESPIRATION RATE: 18 BRPM | TEMPERATURE: 98.2 F | BODY MASS INDEX: 26.68 KG/M2 | WEIGHT: 170 LBS | DIASTOLIC BLOOD PRESSURE: 82 MMHG | HEIGHT: 67 IN | OXYGEN SATURATION: 93 % | SYSTOLIC BLOOD PRESSURE: 149 MMHG

## 2018-07-20 DIAGNOSIS — M25.60 JOINT STIFFNESS: ICD-10-CM
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
## 2018-07-20 DIAGNOSIS — S09.90XA CLOSED HEAD INJURY, INITIAL ENCOUNTER: ICD-10-CM

## 2018-07-20 PROCEDURE — 74011250637 HC RX REV CODE- 250/637: Performed by: EMERGENCY MEDICINE

## 2018-07-20 PROCEDURE — 3331090002 HH PPS REVENUE DEBIT

## 2018-07-20 PROCEDURE — 99283 EMERGENCY DEPT VISIT LOW MDM: CPT

## 2018-07-20 PROCEDURE — 70450 CT HEAD/BRAIN W/O DYE: CPT

## 2018-07-20 PROCEDURE — 3331090001 HH PPS REVENUE CREDIT

## 2018-07-20 RX ORDER — ACETAMINOPHEN 325 MG/1
650 TABLET ORAL
Qty: 30 TAB | Refills: 0 | Status: SHIPPED | OUTPATIENT
Start: 2018-07-20 | End: 2018-08-14 | Stop reason: CLARIF

## 2018-07-20 RX ORDER — ACETAMINOPHEN 325 MG/1
650 TABLET ORAL
Status: COMPLETED | OUTPATIENT
Start: 2018-07-20 | End: 2018-07-20

## 2018-07-20 RX ADMIN — ACETAMINOPHEN 650 MG: 325 TABLET ORAL at 06:48

## 2018-07-20 NOTE — ED TRIAGE NOTES
Triage: Was going to the bathroom and had a GLF hit her head on the floor, No LOC. Reports taking a blood thinner. Patient reports neck pain and right hand pain. Patient is a poor historian  is present with patient.

## 2018-07-20 NOTE — DISCHARGE INSTRUCTIONS
Preventing Falls: Care Instructions  Your Care Instructions    Getting around your home safely can be a challenge if you have injuries or health problems that make it easy for you to fall. Loose rugs and furniture in walkways are among the dangers for many older people who have problems walking or who have poor eyesight. People who have conditions such as arthritis, osteoporosis, or dementia also have to be careful not to fall. You can make your home safer with a few simple measures. Follow-up care is a key part of your treatment and safety. Be sure to make and go to all appointments, and call your doctor if you are having problems. It's also a good idea to know your test results and keep a list of the medicines you take. How can you care for yourself at home? Taking care of yourself  · You may get dizzy if you do not drink enough water. To prevent dehydration, drink plenty of fluids, enough so that your urine is light yellow or clear like water. Choose water and other caffeine-free clear liquids. If you have kidney, heart, or liver disease and have to limit fluids, talk with your doctor before you increase the amount of fluids you drink. · Exercise regularly to improve your strength, muscle tone, and balance. Walk if you can. Swimming may be a good choice if you cannot walk easily. · Have your vision and hearing checked each year or any time you notice a change. If you have trouble seeing and hearing, you might not be able to avoid objects and could lose your balance. · Know the side effects of the medicines you take. Ask your doctor or pharmacist whether the medicines you take can affect your balance. Sleeping pills or sedatives can affect your balance. · Limit the amount of alcohol you drink. Alcohol can impair your balance and other senses. · Ask your doctor whether calluses or corns on your feet need to be removed.  If you wear loose-fitting shoes because of calluses or corns, you can lose your balance and fall. · Talk to your doctor if you have numbness in your feet. Preventing falls at home  · Remove raised doorway thresholds, throw rugs, and clutter. Repair loose carpet or raised areas in the floor. · Move furniture and electrical cords to keep them out of walking paths. · Use nonskid floor wax, and wipe up spills right away, especially on ceramic tile floors. · If you use a walker or cane, put rubber tips on it. If you use crutches, clean the bottoms of them regularly with an abrasive pad, such as steel wool. · Keep your house well lit, especially [employer], and outside walkways. Use night-lights in areas such as hallways and bathrooms. Add extra light switches or use remote switches (such as switches that go on or off when you clap your hands) to make it easier to turn lights on if you have to get up during the night. · Install sturdy handrails on stairways. · Move items in your cabinets so that the things you use a lot are on the lower shelves (about waist level). · Keep a cordless phone and a flashlight with new batteries by your bed. If possible, put a phone in each of the main rooms of your house, or carry a cell phone in case you fall and cannot reach a phone. Or, you can wear a device around your neck or wrist. You push a button that sends a signal for help. · Wear low-heeled shoes that fit well and give your feet good support. Use footwear with nonskid soles. Check the heels and soles of your shoes for wear. Repair or replace worn heels or soles. · Do not wear socks without shoes on wood floors. · Walk on the grass when the sidewalks are slippery. If you live in an area that gets snow and ice in the winter, sprinkle salt on slippery steps and sidewalks. Preventing falls in the bath  · Install grab bars and nonskid mats inside and outside your shower or tub and near the toilet and sinks. · Use shower chairs and bath benches.   · Use a hand-held shower head that will allow you to sit while showering. · Get into a tub or shower by putting the weaker leg in first. Get out of a tub or shower with your strong side first.  · Repair loose toilet seats and consider installing a raised toilet seat to make getting on and off the toilet easier. · Keep your bathroom door unlocked while you are in the shower. Where can you learn more? Go to http://leonel-socorro.info/. Enter 0476 79 69 [id number] in the search box to learn more about \"Preventing Falls: Care Instructions. \"  Current as of: May 12, 2017  Content Version: 11.7  © 8388-4129 [employer]. Care instructions adapted under license by [employer] (which disclaims liability or warranty for this information). If you have questions about a medical condition or this instruction, always ask your healthcare professional. [place] [address] any warranty or liability for your use of this information. Learning About a Closed Head Injury  What is a closed head injury? A closed head injury happens when your head gets hit hard. The strong force of the blow causes your brain to shake in your skull. This movement can cause the brain to bruise, swell, or tear. Sometimes nerves or blood vessels also get damaged. This can cause bleeding in or around the brain. A concussion is a type of closed head injury. What are the symptoms? If you have a mild concussion, you may have a mild headache or feel \"not quite right. \" These symptoms are common. They usually go away over a few days to 4 weeks. But sometimes after a concussion, you feel like you can't function as well as before the injury. And you have new symptoms. This is called postconcussive syndrome. You may:  · Find it harder to solve problems, think, concentrate, or remember. · Have headaches. · Have changes in your sleep patterns, such as not being able to sleep or sleeping all the time. · Have changes in your personality.   · Not be interested in your usual activities. · Feel angry or anxious without a clear reason. · Lose your sense of taste or smell. · Be dizzy, lightheaded, or unsteady. It may be hard to stand or walk. How is a closed head injury treated? Any person who may have a concussion needs to see a doctor. Some people have to stay in the hospital to be watched. Others can go home safely. If you go home, follow your doctor's instructions. He or she will tell you if you need someone to watch you closely for the next 24 hours or longer. Rest is the best treatment. Get plenty of sleep at night. And try to rest during the day. · Avoid activities that are physically or mentally demanding. These include housework, exercise, and schoolwork. And don't play video games, send text messages, or use the computer. You may need to change your school or work schedule to be able to avoid these activities. · Ask your doctor when it's okay to drive, ride a bike, or operate machinery. · Take an over-the-counter pain medicine, such as acetaminophen (Tylenol), ibuprofen (Advil, Motrin), or naproxen (Aleve). Be safe with medicines. Read and follow all instructions on the label. · Check with your doctor before you use any other medicines for pain. · Do not drink alcohol or use illegal drugs. They can slow recovery. They can also increase your risk of getting a second head injury. Follow-up care is a key part of your treatment and safety. Be sure to make and go to all appointments, and call your doctor if you are having problems. It's also a good idea to know your test results and keep a list of the medicines you take. Where can you learn more? Go to http://leonel-socorro.info/. Enter E235 in the search box to learn more about \"Learning About a Closed Head Injury. \"  Current as of: October 9, 2017  Content Version: 11.7  © 7138-3168 [employer].  Care instructions adapted under license by [employer] (which disclaims liability or warranty for this information). If you have questions about a medical condition or this instruction, always ask your healthcare professional. [address] any warranty or liability for your use of this information.

## 2018-07-20 NOTE — ED PROVIDER NOTES
Patient is a 80 y.o. female presenting with fall. The history is provided by the patient and the spouse. Fall   The accident occurred 3 to 5 hours ago. Fall occurred: while walking to the bathroom. She fell from a height of ground level. She landed on hard floor. There was no blood loss. Point of impact: laid down and hit back of head on floor. The pain is mild. She was ambulatory at the scene. There was no entrapment after the fall. There was no drug use involved in the accident. There was no alcohol use involved in the accident. Associated symptoms include headaches. Pertinent negatives include no visual change, no fever, no abdominal pain, no bowel incontinence, no vomiting, no extremity weakness, no loss of consciousness and no laceration. Risk factors: anticoagulated on warfarin. Treatments tried: took home neurontin and norco. The treatment provided no relief. Past Medical History:   Diagnosis Date    Anemia     Atrial fibrillation ([address]     Cancer (HCC)     basal cell on nose    Chronic pain     back pain    Fibromyalgia 4/1/2010    GERD (gastroesophageal reflux disease) 4/1/2010    Hiatal hernia 4/1/2010    High cholesterol 4/1/2010    HTN (hypertension) 4/1/2010    Ill-defined condition     A.  Fib    OA (osteoarthritis) 4/1/2010    back    Pulmonary emboli ([address] 2015       Past Surgical History:   Procedure Laterality Date    BREAST SURGERY PROCEDURE UNLISTED      Breast im-plants x 2    ENLARGE BREAST WITH IMPLANT      HX APPENDECTOMY      HX BREAST BIOPSY      HX BREAST RECONSTRUCTION      Breast implants removed 1992    HX CATARACT REMOVAL      HX CHOLECYSTECTOMY  9/11/2013    HX HYSTERECTOMY      HX KNEE REPLACEMENT  2008    bilateral    HX ORTHOPAEDIC  2007    Bilateral TKR    HX PARTIAL HYSTERECTOMY      HX SHOULDER REPLACEMENT  2009    left    HX TONSILLECTOMY           Family History:   Problem Relation Age of Onset   Gladystine Mower Arthritis-rheumatoid Mother     Dementia Mother     Coronary Artery Disease Father     Cancer Brother      lung cancer       Social History     Social History    Marital status:      Spouse name: N/A    Number of children: N/A    Years of education: N/A     Occupational History    Not on file. Social History Main Topics    Smoking status: Never Smoker    Smokeless tobacco: Never Used    Alcohol use No    Drug use: No    Sexual activity: No     Other Topics Concern    Not on file     Social History Narrative         ALLERGIES: Angiotensin ii,human; Avelox [moxifloxacin]; and Demerol [meperidine]    Review of Systems   Constitutional: Negative for fever. Gastrointestinal: Negative for abdominal pain, bowel incontinence and vomiting. Musculoskeletal: Negative for extremity weakness. Neurological: Positive for headaches. Negative for loss of consciousness. All other systems reviewed and are negative. Vitals:    07/20/18 0550   BP: 149/82   Pulse: 92   Resp: 18   Temp: 98.2 °F (36.8 °C)   SpO2: 93%   Weight: 77.1 kg (170 lb)   Height: 5' 7\" (1.702 m)            Physical Exam   Constitutional: She appears well-developed and well-nourished. No distress. HENT:   Head: Normocephalic and atraumatic. Head is without abrasion, without contusion and without laceration. Hair is normal.   Eyes: Conjunctivae are normal.   Neck: Neck supple. Cardiovascular: Normal rate, regular rhythm and normal heart sounds. Pulmonary/Chest: Effort normal and breath sounds normal. No stridor. No respiratory distress. She exhibits no tenderness, no crepitus and no deformity. Abdominal: She exhibits no distension. There is no tenderness. There is no rebound and no guarding. Musculoskeletal: Normal range of motion. No hip tenderness or pelvic instability. Ambulatory at baseline with some difficulty   Neurological: She is alert. No cranial nerve deficit. Coordination normal. GCS eye subscore is 4. GCS verbal subscore is 5.  GCS motor subscore is 6.   Full strength and sensation   Skin: Skin is warm and dry. No laceration noted. Psychiatric: She has a normal mood and affect. Nursing note and vitals reviewed. MDM    80 y.o. female presents with fall from standing striking her head on the ground after going to the floor last night walking to the bathroom. She is on warfarin. No external signs of trauma and CT negative. She is stiff without any physical exam signs of bony injury. sotero maximize tylenol therapy for pain control.     ED Course       Procedures

## 2018-07-21 PROCEDURE — 3331090002 HH PPS REVENUE DEBIT

## 2018-07-21 PROCEDURE — 3331090001 HH PPS REVENUE CREDIT

## 2018-07-22 PROCEDURE — 3331090001 HH PPS REVENUE CREDIT

## 2018-07-22 PROCEDURE — 3331090002 HH PPS REVENUE DEBIT

## 2018-07-23 PROCEDURE — 3331090002 HH PPS REVENUE DEBIT

## 2018-07-23 PROCEDURE — 3331090001 HH PPS REVENUE CREDIT

## 2018-07-24 ENCOUNTER — OFFICE VISIT (OUTPATIENT)
Dept: CARDIOLOGY CLINIC | Age: 83
End: 2018-07-24

## 2018-07-24 VITALS
SYSTOLIC BLOOD PRESSURE: 130 MMHG | WEIGHT: 168 LBS | HEIGHT: 67 IN | OXYGEN SATURATION: 94 % | HEART RATE: 87 BPM | DIASTOLIC BLOOD PRESSURE: 98 MMHG | RESPIRATION RATE: 14 BRPM | BODY MASS INDEX: 26.37 KG/M2

## 2018-07-24 DIAGNOSIS — E78.5 DYSLIPIDEMIA: ICD-10-CM

## 2018-07-24 DIAGNOSIS — I10 ESSENTIAL HYPERTENSION: Chronic | ICD-10-CM

## 2018-07-24 DIAGNOSIS — I48.19 PERSISTENT ATRIAL FIBRILLATION (HCC): Primary | ICD-10-CM

## 2018-07-24 PROCEDURE — 3331090002 HH PPS REVENUE DEBIT

## 2018-07-24 PROCEDURE — 3331090001 HH PPS REVENUE CREDIT

## 2018-07-24 NOTE — MR AVS SNAPSHOT
[address] [facility] [address] [facility] 
[phone] Patient: Tray Regan MRN:   Visit Information Date & Time Provider Department Dept. Phone Encounter #  
 2018  9:20 AM Richie Alatorre MD [facility] Radha Lance [phone] [id number] Your Appointments 2018  9:20 AM  
ESTABLISHED PATIENT with Richie Alatorre MD  
[facility] Appt Note: 1 MO FUP PER DR PRYOR  
 [address] [address] [facility]  
[address] [address] [address] 2018 10:00 AM  
ESTABLISHED PATIENT with Alton Evans MD  
[facility] Appt Note: 6mo iron deficiency fu, [place] [address] [facility], [address] [facility] [address] [facility] [id number] [place], [place] [phone]  
  
   
 [address] [place] [address], [address] [facility],[address] [facility]  
  
    
 10/30/2018  9:40 AM  
ESTABLISHED PATIENT with Richie Alatorre MD  
[facility] ([facility]) Appt Note: 3 month follow up per Dr. Ann Console [address] [facility] [address] [facility]  
[phone] Upcoming Health Maintenance Date Due  
 EYE EXAM RETINAL OR DILATED Q1 2013 Influenza Age 5 to Adult 2018 GLAUCOMA SCREENING Q2Y 2018 HEMOGLOBIN A1C Q6M 2018 FOOT EXAM Q1 2018 MICROALBUMIN Q1 2019 LIPID PANEL Q1 2019 MEDICARE YEARLY EXAM 2019 DTaP/Tdap/Td series (2 - Td) 2026 Allergies as of 2018  Review Complete On: 2018 By: Desi Wilburn LPN Severity Noted Reaction Type Reactions Angiotensin Ii,human  2010    Other (comments) States does not remember Avelox [Moxifloxacin]  2010    Other (comments) States does not remember Demerol [Meperidine]  04/01/2010    Hives Current Immunizations  Reviewed on 7/24/2018 Name Date Influenza High Dose Vaccine PF 10/16/2017, 11/9/2016 Influenza Vaccine 10/29/2013 Influenza Vaccine [place] Going) 10/20/2015 Influenza Vaccine Split 11/7/2012, 10/11/2011, 11/16/2010 Influenza Vaccine Whole 8/1/2009 Pneumococcal Conjugate (PCV-13) 4/13/2016 ZZZ-RETIRED (DO NOT USE) Pneumococcal Vaccine (Unspecified Type) 4/1/2008 Reviewed by Dianna Rangel LPN on 8/49/8059 at  8:19 AM  
Vitals BP Pulse Resp Height(growth percentile) Weight(growth percentile) SpO2  
 (!) 130/98 (BP 1 Location: Right arm, BP Patient Position: Sitting) 87 14 5' 7\" (1.702 m) 168 lb (76.2 kg) 94% BMI OB Status Smoking Status 26.31 kg/m2 Postmenopausal Never Smoker Vitals History BMI and BSA Data Body Mass Index Body Surface Area  
 26.31 kg/m 2 1.9 m 2 Preferred Pharmacy Pharmacy Name Phone [id number] - [place], [address] [place] [address] [phone] Your Updated Medication List  
  
   
This list is accurate as of 7/24/18  9:12 AM.  Always use your most recent med list.  
  
  
  
  
 acetaminophen 325 mg tablet Commonly known as:  TYLENOL Take 2 Tabs by mouth every six (6) hours as needed for Pain. amiodarone 200 mg tablet Commonly known as:  CORDARONE Take 1 Tab by mouth daily. citalopram 20 mg tablet Commonly known as:  CELEXA  
TAKE 1 TABLET BY MOUTH DAILY  
  
 cyclobenzaprine 5 mg tablet Commonly known as:  FLEXERIL  
TAKE 1 TABLET BY MOUTH 3 TIMES A DAY AS NEEDED FOR MUSCLE SPASMS  
  
 dilTIAZem  mg ER capsule Commonly known as:  CARDIZEM CD Take 1 Cap by mouth daily. ferrous sulfate 325 mg (65 mg iron) Cper Take 325 mg by mouth daily. gabapentin 600 mg tablet Commonly known as:  NEURONTIN  
TAKE 1 TABLET BY MOUTH 3 TIMES A DAY  
  
 HYDROcodone-acetaminophen 7.5-325 mg per tablet Commonly known as:  [facility] Take 1 Tab by mouth every eight (8) hours as needed for Pain. Max Daily Amount: 3 Tabs. OTHER Folding Wheelchair Dx: OA  
  
 OTHER Check INR in 2 weeks  
  
 polyethylene glycol 17 gram packet Commonly known as:  Breanne  Take 17 g by mouth as needed (Constipation). VITAMIN B-12 500 mcg tablet Generic drug:  cyanocobalamin Take 500 mcg by mouth daily. warfarin 1 mg tablet Commonly known as:  COUMADIN Take 1 Tab by mouth daily. To-Do List   
 07/25/2018 To Be Determined Appointment with Josue Dwyer RN at [place] [address] Introducing [facility]! Lucina Hooper introduces Feathr patient portal. Now you can access parts of your medical record, email your doctor's office, and request medication refills online. 1. In your internet browser, go to [email or web address] 2. Click on the First Time User? Click Here link in the Sign In box. You will see the New Member Sign Up page. 3. Enter your Feathr Access Code exactly as it appears below. You will not need to use this code after youve completed the sign-up process. If you do not sign up before the expiration date, you must request a new code. · Feathr Access Code: [email or web address] Expires: 10/14/2018 10:15 AM 
 
4. Enter the last four digits of your Social Security Number (xxxx) and Date of Birth (mm/dd/yyyy) as indicated and click Submit. You will be taken to the next sign-up page. 5. Create a MyFreightWorldt ID. This will be your Feathr login ID and cannot be changed, so think of one that is secure and easy to remember. 6. Create a Feathr password. You can change your password at any time. 7. Enter your Password Reset Question and Answer. This can be used at a later time if you forget your password. 8. Enter your e-mail address.  You will receive e-mail notification when new information is available in Feed.fm. 9. Click Sign Up. You can now view and download portions of your medical record. 10. Click the Download Summary menu link to download a portable copy of your medical information. If you have questions, please visit the Frequently Asked Questions section of the Feed.fm website. Remember, Feed.fm is NOT to be used for urgent needs. For medical emergencies, dial 911. Now available from your iPhone and Android! Please provide this summary of care documentation to your next provider. Your primary care clinician is listed as LAUREL SINGH. If you have any questions after today's visit, please call [phone].

## 2018-07-24 NOTE — PROGRESS NOTES
Chief Complaint   Patient presents with    Follow-up     1 mth    Hypertension    Cholesterol Problem    Irregular Heart Beat     1. Have you been to the ER, urgent care clinic since your last visit? Hospitalized since your last visit? Yes, 7/20/18,[facility]    2. Have you seen or consulted any other health care providers outside of the [facility] since your last visit? Include any pap smears or colon screening.  No    Visit Vitals    BP (!) 130/98 (BP 1 Location: Right arm, BP Patient Position: Sitting)    Pulse 87    Resp 14    Ht 5' 7\" (1.702 m)    Wt 168 lb (76.2 kg)    SpO2 94%    BMI 26.31 kg/m2

## 2018-07-25 ENCOUNTER — HOME CARE VISIT (OUTPATIENT)
Dept: SCHEDULING | Facility: HOME HEALTH | Age: 83
End: 2018-07-25
Payer: MEDICARE

## 2018-07-25 ENCOUNTER — TELEPHONE (OUTPATIENT)
Dept: FAMILY MEDICINE CLINIC | Age: 83
End: 2018-07-25

## 2018-07-25 LAB
INR BLD: 2.5 (ref 0.9–1.1)
PT POC: 30.6 SECONDS (ref 11.8–14.9)

## 2018-07-25 PROCEDURE — G0299 HHS/HOSPICE OF RN EA 15 MIN: HCPCS

## 2018-07-25 PROCEDURE — 3331090001 HH PPS REVENUE CREDIT

## 2018-07-25 PROCEDURE — 3331090002 HH PPS REVENUE DEBIT

## 2018-07-25 NOTE — PROGRESS NOTES
Anton Turner MD    [address] [facility] [place], [address] [facility]    Office [phone],[place] [phone]  Pager [phone]    Chema Bauer is a 80 y.o. female is here for f/u visit. Primary care physician:  Abhishek Flores MD    Patient Active Problem List   Diagnosis Code    HTN (hypertension) I10    OA (osteoarthritis) M19.90    Fibromyalgia M79.7    GERD (gastroesophageal reflux disease) K21.9    High cholesterol E78.00    Hiatal hernia K44.9    Chronic pain G89.29    Elevated liver enzymes R74.8    S/P cholecystectomy Z90.49    S/P knee replacement Z96.659    S/P shoulder surgery Z98.890    H/O carpal tunnel repair Z98.890    Itching L29.9    Pulmonary embolism (HCC) I26.99    Advance care planning Z71.89    Iron deficiency anemia D50.9    Advanced care planning/counseling discussion Z71.89    Controlled type 2 diabetes mellitus without complication, without long-term current use of insulin ([facility] [address] E11.9    Type 2 diabetes with nephropathy ([facility] [address] E11.21    A-fib ([facility] [address] I48.91    Dyslipidemia E78.5    Chronic anticoagulation Z79.01    Constipation K59.00    History of pulmonary embolism Z86.711       Dear Dr. Jacinta Aschoff,    I had the pleasure of seeing  Ms Chema Bauer in the office today. Chief complaint:  Chief Complaint   Patient presents with    Follow-up     1 mth    Hypertension    Cholesterol Problem    Irregular Heart Beat       Assessment:    Atrial fibrillation with controlled ventricular response  History of paroxysmal atrial fibrillation-status post GULSHAN DC CV 5/25/18. Patient was undergoing EGD when she was found to be in A. fib with RVR. Today patient is in atrial fibrillation. History of PE-chronic anticoagulation-managed by PCP  Hypertension        Plan:     As per , patient has not had to use the losartan/hydrochlorothiazide frequently. She has been using it a few times for systolic blood pressure greater than 170.   On Cardizem CD to 240 mg daily. Patient is in atrial fibrillation clinically - will DC amiodarone. Continue to use losartan/hydrochlorothiazide on  as needed basis for systolic blood pressure greater than 170  Home blood pressure machine running approximately 10 points above of his blood pressure. Will discuss with patient about ischemia workup next visit. Aggressive cardio vascular risk factor modification. Follow-up in 12 weeks or earlier as needed. Patient understands the plan. All questions were answered to the patient's satisfaction. Medication Side Effects and Warnings were discussed with patient: yes  Patient Labs were reviewed and or requested:  yes  Patient Past Records were reviewed and or requested: yes    I appreciate the opportunity to be involved in Ms. Rodrigez. See note below for details. Please do not hesitate to contact us with questions or concerns. Neptali Campbell MD    Cardiac Testing/ Procedures: A. Cardiac Cath/PCI:    B.ECHO/GULSHAN: 5/24/18 echocardiogram-EF 55-60%. Wall thickness mild to moderately increased. KAYLA, moderate MR, moderate TR, small pericardial effusion      C. StressNuclear/Stress ECHO/Stress test:    D.Vascular:    E. EP: 5/25/18 Successful GULSHAN guided DCCV converting AF to sinus    F. Miscellaneous:    Subjective:  Tray Ralph is a 80 y.o. female who returns for follow up today. As per , patient has not had to use the losartan/hydrochlorothiazide frequently. He has been using it a few times for systolic blood pressure greater than 170. Botkins Mellow No dizziness, syncope, swelling lower extremities, chest pain, palpitations. ROS:  (bold if positive, if negative)    As in HPI       Medications before admission:    Current Outpatient Prescriptions   Medication Sig Dispense    acetaminophen (TYLENOL) 325 mg tablet Take 2 Tabs by mouth every six (6) hours as needed for Pain.  30 Tab    OTHER Check INR in 2 weeks 1 Each    warfarin (COUMADIN) 1 mg tablet Take 1 Tab by mouth daily. (Patient taking differently: Take 3 mg by mouth daily.) 30 Tab    HYDROcodone-acetaminophen (NORCO) 7.5-325 mg per tablet Take 1 Tab by mouth every eight (8) hours as needed for Pain. Max Daily Amount: 3 Tabs. 90 Tab    amiodarone (CORDARONE) 200 mg tablet Take 1 Tab by mouth daily. 30 Tab    dilTIAZem CD (CARDIZEM CD) 240 mg ER capsule Take 1 Cap by mouth daily. 90 Cap    polyethylene glycol (MIRALAX) 17 gram packet Take 17 g by mouth as needed (Constipation).  cyclobenzaprine (FLEXERIL) 5 mg tablet TAKE 1 TABLET BY MOUTH 3 TIMES A DAY AS NEEDED FOR MUSCLE SPASMS 90 Tab    OTHER Folding Wheelchair  Dx: OA 1 Each    citalopram (CELEXA) 20 mg tablet TAKE 1 TABLET BY MOUTH DAILY 90 Tab    gabapentin (NEURONTIN) 600 mg tablet TAKE 1 TABLET BY MOUTH 3 TIMES A  Tab    ferrous sulfate 325 mg (65 mg iron) cpER Take 325 mg by mouth daily.  cyanocobalamin (VITAMIN B-12) 500 mcg tablet Take 500 mcg by mouth daily. No current facility-administered medications for this visit. Family History of CAD:   Yes    Social History:  Current  Smoker No    Physical Exam:  Visit Vitals    BP (!) 130/98 (BP 1 Location: Right arm, BP Patient Position: Sitting)    Pulse 87    Resp 14    Ht 5' 7\" (1.702 m)    Wt 168 lb (76.2 kg)    SpO2 94%    BMI 26.31 kg/m2          Gen: Well-developed, well-nourished,elderly, In wheel chair  Neck: Supple,No JVD, No Carotid Bruit,   Resp: No accessory muscle use, Clear breath sounds, No rales or rhonchi  Card: Irregular rate,Rythm,Normal S1, S2, 2/6 ESM+, No rubs or gallop. No thrills.    Abd:  Soft, BS+,   MSK: No cyanosis  Skin: No rashes    Neuro: moving all four extremities , follows commands appropriately  Psych:  Good insight, oriented to person, place , alert, Nml Affect  LE: No edema    EKG:      LABS:        Lab Results   Component Value Date/Time    WBC 5.6 06/20/2018 05:49 AM    HGB 12.5 06/20/2018 05:49 AM    HCT 38.7 06/20/2018 05:49 AM PLATELET 280 23/41/1362 05:49 AM     Lab Results   Component Value Date/Time    Sodium 138 06/20/2018 05:49 AM    Potassium 4.1 06/20/2018 05:49 AM    Chloride 103 06/20/2018 05:49 AM    CO2 29 06/20/2018 05:49 AM    Anion gap 6 06/20/2018 05:49 AM    Glucose 116 (H) 06/20/2018 05:49 AM    BUN 16 06/20/2018 05:49 AM    Creatinine 1.07 (H) 06/20/2018 05:49 AM    BUN/Creatinine ratio 15 06/20/2018 05:49 AM    GFR est AA 59 (L) 06/20/2018 05:49 AM    GFR est non-AA 49 (L) 06/20/2018 05:49 AM    Calcium 9.5 06/20/2018 05:49 AM       Lab Results   Component Value Date/Time    aPTT 60.4 (H) 06/20/2018 05:49 AM     Lab Results   Component Value Date/Time    INR 4.1 (H) 06/20/2018 05:49 AM    INR 2.2 (H) 05/26/2018 03:43 AM    INR 1.8 (H) 05/25/2018 04:40 AM    INR POC 5.5 06/28/2018 09:16 AM    Prothrombin time 41.6 (H) 06/20/2018 05:49 AM    Prothrombin time 22.3 (H) 05/26/2018 03:43 AM    Prothrombin time 18.5 (H) 05/25/2018 04:40 AM     No components found for: Dalila Cevallos MD

## 2018-07-25 NOTE — TELEPHONE ENCOUNTER
PT 30.6  INR 2.5 coumadin 3 mg daily  [phone] Sandra Helm states this was her last visit.  Patient has an appointment next week with Dr. Michelle De Jesus already scheduled to check INR

## 2018-07-26 VITALS
HEART RATE: 98 BPM | OXYGEN SATURATION: 98 % | SYSTOLIC BLOOD PRESSURE: 138 MMHG | RESPIRATION RATE: 18 BRPM | TEMPERATURE: 98 F | DIASTOLIC BLOOD PRESSURE: 76 MMHG

## 2018-07-26 VITALS
RESPIRATION RATE: 18 BRPM | SYSTOLIC BLOOD PRESSURE: 128 MMHG | OXYGEN SATURATION: 96 % | HEART RATE: 86 BPM | TEMPERATURE: 97.4 F | DIASTOLIC BLOOD PRESSURE: 70 MMHG

## 2018-07-26 PROCEDURE — 3331090003 HH PPS REVENUE ADJ

## 2018-07-26 PROCEDURE — 3331090001 HH PPS REVENUE CREDIT

## 2018-07-26 PROCEDURE — 3331090002 HH PPS REVENUE DEBIT

## 2018-08-01 ENCOUNTER — OFFICE VISIT (OUTPATIENT)
Dept: FAMILY MEDICINE CLINIC | Age: 83
End: 2018-08-01

## 2018-08-01 VITALS
WEIGHT: 171 LBS | SYSTOLIC BLOOD PRESSURE: 145 MMHG | DIASTOLIC BLOOD PRESSURE: 84 MMHG | HEIGHT: 67 IN | OXYGEN SATURATION: 93 % | TEMPERATURE: 97.5 F | BODY MASS INDEX: 26.84 KG/M2 | HEART RATE: 88 BPM | RESPIRATION RATE: 16 BRPM

## 2018-08-01 DIAGNOSIS — I48.0 PAROXYSMAL ATRIAL FIBRILLATION (HCC): Primary | ICD-10-CM

## 2018-08-01 LAB
INR BLD: 3.1
PT POC: 36.7 SECONDS
VALID INTERNAL CONTROL?: YES

## 2018-08-01 NOTE — PATIENT INSTRUCTIONS

## 2018-08-01 NOTE — MR AVS SNAPSHOT
[address] [address] 
[phone] Patient: Tray Regan MRN:  [id number] Visit Information Date & Time Provider Department Dept. Phone Encounter #  
 8/1/2018  7:30 AM Abhishek Flores MD [address] [facility] [phone] [id number] Follow-up Instructions Return in about 1 month (around 9/1/2018). Your Appointments 8/17/2018 10:00 AM  
ESTABLISHED PATIENT with Vijay Johnson MD  
[facility] at [address] [facility] [address] [facility]) Appt Note: 6mo iron deficiency fu, [place] [address] [facility], [address] [facility] [address]  
[phone]  
  
   
 [address] [facility], [address] [facility],[address]  
  
    
 10/30/2018  9:40 AM  
ESTABLISHED PATIENT with Anton Turner MD  
[facility] ([address]) Appt Note: 3 month follow up per Dr. Manuel Lowe [address] [facility] [address] [address] [address] [address] [address] Upcoming Health Maintenance Date Due  
 EYE EXAM RETINAL OR DILATED Q1 7/18/2013 Influenza Age 5 to Adult 8/1/2018 GLAUCOMA SCREENING Q2Y 11/9/2018 HEMOGLOBIN A1C Q6M 11/25/2018 FOOT EXAM Q1 11/27/2018 MICROALBUMIN Q1 4/16/2019 LIPID PANEL Q1 4/16/2019 MEDICARE YEARLY EXAM 5/30/2019 DTaP/Tdap/Td series (2 - Td) 11/9/2026 Allergies as of 8/1/2018  Review Complete On: 8/1/2018 By: Abhishek Flores MD  
  
 Severity Noted Reaction Type Reactions Angiotensin Ii,human  04/01/2010    Other (comments) States does not remember Avelox [Moxifloxacin]  04/01/2010    Other (comments) States does not remember Demerol [Meperidine]  04/01/2010    Hives Current Immunizations  Reviewed on 7/24/2018 Name Date Influenza High Dose Vaccine PF 10/16/2017, 11/9/2016 Influenza Vaccine 10/29/2013 Influenza Vaccine Olive Bloodgood) 10/20/2015 Influenza Vaccine Split 11/7/2012, 10/11/2011, 11/16/2010 Influenza Vaccine Whole 8/1/2009 Pneumococcal Conjugate (PCV-13) 4/13/2016 ZZZ-RETIRED (DO NOT USE) Pneumococcal Vaccine (Unspecified Type) 4/1/2008 Not reviewed this visit You Were Diagnosed With   
  
 Codes Comments Paroxysmal atrial fibrillation (HCC)    -  Primary ICD-10-CM: I48.0 ICD-9-CM: 427.31 Vitals BP Pulse Temp Resp Height(growth percentile) Weight(growth percentile) 145/84 88 97.5 °F (36.4 °C) (Oral) 16 5' 7\" (1.702 m) 171 lb (77.6 kg) SpO2 BMI OB Status Smoking Status 93% 26.78 kg/m2 Postmenopausal Never Smoker Vitals History BMI and BSA Data Body Mass Index Body Surface Area  
 26.78 kg/m 2 1.92 m 2 Preferred Pharmacy Pharmacy Name Phone [id number] - [place] [address] [phone] Your Updated Medication List  
  
   
This list is accurate as of 8/1/18  7:44 AM.  Always use your most recent med list.  
  
  
  
  
 acetaminophen 325 mg tablet Commonly known as:  TYLENOL Take 2 Tabs by mouth every six (6) hours as needed for Pain. amiodarone 200 mg tablet Commonly known as:  CORDARONE Take 1 Tab by mouth daily. citalopram 20 mg tablet Commonly known as:  CELEXA  
TAKE 1 TABLET BY MOUTH DAILY  
  
 cyclobenzaprine 5 mg tablet Commonly known as:  FLEXERIL  
TAKE 1 TABLET BY MOUTH 3 TIMES A DAY AS NEEDED FOR MUSCLE SPASMS  
  
 dilTIAZem  mg ER capsule Commonly known as:  CARDIZEM CD Take 1 Cap by mouth daily. ferrous sulfate 325 mg (65 mg iron) Cper Take 325 mg by mouth daily. gabapentin 600 mg tablet Commonly known as:  NEURONTIN  
TAKE 1 TABLET BY MOUTH 3 TIMES A DAY HYDROcodone-acetaminophen 7.5-325 mg per tablet Commonly known as:  Birda Dickinson Take 1 Tab by mouth every eight (8) hours as needed for Pain. Max Daily Amount: 3 Tabs.   
  
 OTHER  
 Folding Wheelchair Dx: OA  
  
 OTHER Check INR in 2 weeks  
  
 polyethylene glycol 17 gram packet Commonly known as:  Bela Tucker Take 17 g by mouth as needed (Constipation). VITAMIN B-12 500 mcg tablet Generic drug:  cyanocobalamin Take 500 mcg by mouth daily. warfarin 1 mg tablet Commonly known as:  COUMADIN Take 1 Tab by mouth daily. We Performed the Following AMB POC PT/INR [88108 CPT(R)] Follow-up Instructions Return in about 1 month (around 9/1/2018). Patient Instructions Preventing Falls: Care Instructions Your Care Instructions Getting around your home safely can be a challenge if you have injuries or health problems that make it easy for you to fall. Loose rugs and furniture in walkways are among the dangers for many older people who have problems walking or who have poor eyesight. People who have conditions such as arthritis, osteoporosis, or dementia also have to be careful not to fall. You can make your home safer with a few simple measures. Follow-up care is a key part of your treatment and safety. Be sure to make and go to all appointments, and call your doctor if you are having problems. It's also a good idea to know your test results and keep a list of the medicines you take. How can you care for yourself at home? Taking care of yourself · You may get dizzy if you do not drink enough water. To prevent dehydration, drink plenty of fluids, enough so that your urine is light yellow or clear like water. Choose water and other caffeine-free clear liquids. If you have kidney, heart, or liver disease and have to limit fluids, talk with your doctor before you increase the amount of fluids you drink. · Exercise regularly to improve your strength, muscle tone, and balance. Walk if you can. Swimming may be a good choice if you cannot walk easily.  
· Have your vision and hearing checked each year or any time you notice a change. If you have trouble seeing and hearing, you might not be able to avoid objects and could lose your balance. · Know the side effects of the medicines you take. Ask your doctor or pharmacist whether the medicines you take can affect your balance. Sleeping pills or sedatives can affect your balance. · Limit the amount of alcohol you drink. Alcohol can impair your balance and other senses. · Ask your doctor whether calluses or corns on your feet need to be removed. If you wear loose-fitting shoes because of calluses or corns, you can lose your balance and fall. · Talk to your doctor if you have numbness in your feet. Preventing falls at home · Remove raised doorway thresholds, throw rugs, and clutter. Repair loose carpet or raised areas in the floor. · Move furniture and electrical cords to keep them out of walking paths. · Use nonskid floor wax, and wipe up spills right away, especially on ceramic tile floors. · If you use a walker or cane, put rubber tips on it. If you use crutches, clean the bottoms of them regularly with an abrasive pad, such as steel wool. · Keep your house well lit, especially [facility], and outside walkways. Use night-lights in areas such as hallways and bathrooms. Add extra light switches or use remote switches (such as switches that go on or off when you clap your hands) to make it easier to turn lights on if you have to get up during the night. · Install sturdy handrails on stairways. · Move items in your cabinets so that the things you use a lot are on the lower shelves (about waist level). · Keep a cordless phone and a flashlight with new batteries by your bed. If possible, put a phone in each of the main rooms of your house, or carry a cell phone in case you fall and cannot reach a phone. Or, you can wear a device around your neck or wrist. You push a button that sends a signal for help. · Wear low-heeled shoes that fit well and give your feet good support. Use footwear with nonskid soles. Check the heels and soles of your shoes for wear. Repair or replace worn heels or soles. · Do not wear socks without shoes on wood floors. · Walk on the grass when the sidewalks are slippery. If you live in an area that gets snow and ice in the winter, sprinkle salt on slippery steps and sidewalks. Preventing falls in the bath · Install grab bars and nonskid mats inside and outside your shower or tub and near the toilet and sinks. · Use shower chairs and bath benches. · Use a hand-held shower head that will allow you to sit while showering. · Get into a tub or shower by putting the weaker leg in first. Get out of a tub or shower with your strong side first. 
· Repair loose toilet seats and consider installing a raised toilet seat to make getting on and off the toilet easier. · Keep your bathroom door unlocked while you are in the shower. Where can you learn more? Go to http://leonel-socorro.info/. Enter [phone] in the search box to learn more about \"Preventing Falls: Care Instructions. \" Current as of: May 12, 2017 Content Version: 11.7 © 5961-5614 [employer]. Care instructions adapted under license by [employer] (which disclaims liability or warranty for this information). If you have questions about a medical condition or this instruction, always ask your healthcare professional. [place] [address] any warranty or liability for your use of this information. Introducing [facility]! [employer] introduces [employer] patient portal. Now you can access parts of your medical record, email your doctor's office, and request medication refills online. 1. In your internet browser, go to [email or web address] [email or web address] 2. Click on the First Time User? Click Here link in the Sign In box. You will see the New Member Sign Up page. 3. Enter your [employer] Access Code exactly as it appears below. You will not need to use this code after youve completed the sign-up process. If you do not sign up before the expiration date, you must request a new code. · [employer] Access Code: [email or web address] Expires: 10/14/2018 10:15 AM 
 
4. Enter the last four digits of your Social Security Number (xxxx) and Date of Birth (mm/dd/yyyy) as indicated and click Submit. You will be taken to the next sign-up page. 5. Create a [employer] ID. This will be your [employer] login ID and cannot be changed, so think of one that is secure and easy to remember. 6. Create a [employer] password. You can change your password at any time. 7. Enter your Password Reset Question and Answer. This can be used at a later time if you forget your password. 8. Enter your e-mail address. You will receive e-mail notification when new information is available in [address]. 9. Click Sign Up. You can now view and download portions of your medical record. 10. Click the Download Summary menu link to download a portable copy of your medical information. If you have questions, please visit the Frequently Asked Questions section of the [employer] website. Remember, [employer] is NOT to be used for urgent needs. For medical emergencies, dial 911. Now available from your iPhone and Android! Please provide this summary of care documentation to your next provider. Your primary care clinician is listed as LAUREL SINGH. If you have any questions after today's visit, please call [phone].

## 2018-08-02 DIAGNOSIS — D68.9 COAGULATION DEFICIENCY (HCC): ICD-10-CM

## 2018-08-02 RX ORDER — WARFARIN SODIUM 3 MG/1
TABLET ORAL
Qty: 30 TAB | Refills: 3 | Status: SHIPPED | OUTPATIENT
Start: 2018-08-02 | End: 2018-08-14 | Stop reason: CLARIF

## 2018-08-07 ENCOUNTER — TELEPHONE (OUTPATIENT)
Dept: ONCOLOGY | Age: 83
End: 2018-08-07

## 2018-08-13 ENCOUNTER — APPOINTMENT (OUTPATIENT)
Dept: GENERAL RADIOLOGY | Age: 83
DRG: 563 | End: 2018-08-13
Attending: EMERGENCY MEDICINE
Payer: MEDICARE

## 2018-08-13 ENCOUNTER — HOSPITAL ENCOUNTER (EMERGENCY)
Age: 83
Discharge: HOME | DRG: 563 | End: 2018-08-13
Attending: EMERGENCY MEDICINE
Payer: MEDICARE

## 2018-08-13 ENCOUNTER — HOSPITAL ENCOUNTER (EMERGENCY)
Age: 83
Discharge: HOME | DRG: 563 | End: 2018-08-13
Attending: EMERGENCY MEDICINE | Admitting: EMERGENCY MEDICINE
Payer: MEDICARE

## 2018-08-13 VITALS
BODY MASS INDEX: 27.48 KG/M2 | DIASTOLIC BLOOD PRESSURE: 86 MMHG | WEIGHT: 171 LBS | SYSTOLIC BLOOD PRESSURE: 139 MMHG | HEART RATE: 97 BPM | TEMPERATURE: 99 F | OXYGEN SATURATION: 93 % | HEIGHT: 66 IN | RESPIRATION RATE: 16 BRPM

## 2018-08-13 VITALS
WEIGHT: 171 LBS | RESPIRATION RATE: 14 BRPM | OXYGEN SATURATION: 95 % | HEIGHT: 66 IN | TEMPERATURE: 98.4 F | BODY MASS INDEX: 27.48 KG/M2 | DIASTOLIC BLOOD PRESSURE: 103 MMHG | HEART RATE: 78 BPM | SYSTOLIC BLOOD PRESSURE: 187 MMHG

## 2018-08-13 DIAGNOSIS — S62.101A CLOSED FRACTURE OF RIGHT WRIST, INITIAL ENCOUNTER: Primary | ICD-10-CM

## 2018-08-13 PROCEDURE — 77030028224 HC PDNG CST BSNM -A

## 2018-08-13 PROCEDURE — 73090 X-RAY EXAM OF FOREARM: CPT

## 2018-08-13 PROCEDURE — 77030011881 HC TAPE CST FBRGLS BSNM -A

## 2018-08-13 PROCEDURE — L3650 SO 8 ABD RESTRAINT PRE OTS: HCPCS

## 2018-08-13 PROCEDURE — 99283 EMERGENCY DEPT VISIT LOW MDM: CPT

## 2018-08-13 PROCEDURE — 77030011943

## 2018-08-13 PROCEDURE — 75810000053 HC SPLINT APPLICATION

## 2018-08-13 PROCEDURE — 77030038269 HC DRN EXT URIN PURWCK BARD -A

## 2018-08-13 PROCEDURE — 73110 X-RAY EXAM OF WRIST: CPT

## 2018-08-13 PROCEDURE — 74011250637 HC RX REV CODE- 250/637: Performed by: EMERGENCY MEDICINE

## 2018-08-13 PROCEDURE — 74011250637 HC RX REV CODE- 250/637: Performed by: PHYSICIAN ASSISTANT

## 2018-08-13 PROCEDURE — 99284 EMERGENCY DEPT VISIT MOD MDM: CPT

## 2018-08-13 PROCEDURE — 2W3CX1Z IMMOBILIZATION OF RIGHT LOWER ARM USING SPLINT: ICD-10-PCS | Performed by: EMERGENCY MEDICINE

## 2018-08-13 RX ORDER — HYDROCODONE BITARTRATE AND ACETAMINOPHEN 5; 325 MG/1; MG/1
1 TABLET ORAL
Status: COMPLETED | OUTPATIENT
Start: 2018-08-13 | End: 2018-08-13

## 2018-08-13 RX ADMIN — HYDROCODONE BITARTRATE AND ACETAMINOPHEN 1 TABLET: 5; 325 TABLET ORAL at 02:15

## 2018-08-13 RX ADMIN — HYDROCODONE BITARTRATE AND ACETAMINOPHEN 1 TABLET: 5; 325 TABLET ORAL at 18:30

## 2018-08-13 NOTE — ED NOTES
Provider reviewed discharge instructions with the patient and spouse. The patient and spouse verbalized understanding.
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
## 2018-08-13 NOTE — ED PROVIDER NOTES
HPI Comments: 80 y.o. female with past medical history significant for Dementia, GERD, fibromyalgia, PE, A-fib, GLF presents with complaints of right wrist pain after GLF this morning. The pt was seen in the ED earlier this morning after the fall. The pt was accompanied to the ED by her  who reports that she was walking to the bathroom this morning when she fell on an outstretched hand. Denies head injury. She was dx with a right distal radius fx and placed in a wrist splint and advised to follow up with ortho. She has come back to the ED complaining that her splint \"feels too tight\" and complaining of worsening pain. Denies any new injury. There are no other acute medical complaints at this time. PCP: MD Angela Parkinson PA-C            Patient is a 80 y.o. female presenting with wrist pain. Wrist Pain    Pertinent negatives include no numbness and no back pain. Past Medical History:   Diagnosis Date    Anemia     Atrial fibrillation ([address]     Cancer (HCC)     basal cell on nose    Chronic pain     back pain    Fibromyalgia 4/1/2010    GERD (gastroesophageal reflux disease) 4/1/2010    Hiatal hernia 4/1/2010    High cholesterol 4/1/2010    HTN (hypertension) 4/1/2010    Ill-defined condition     A.  Fib    OA (osteoarthritis) 4/1/2010    back    Pulmonary emboli ([facility] [address] 2015       Past Surgical History:   Procedure Laterality Date    BREAST SURGERY PROCEDURE UNLISTED      Breast im-plants x 2    ENLARGE BREAST WITH IMPLANT      HX APPENDECTOMY      HX BREAST BIOPSY      HX BREAST RECONSTRUCTION      Breast implants removed 1992    HX CATARACT REMOVAL      HX CHOLECYSTECTOMY  9/11/2013    HX HYSTERECTOMY      HX KNEE REPLACEMENT  2008    bilateral    HX ORTHOPAEDIC  2007    Bilateral TKR    HX PARTIAL HYSTERECTOMY      HX SHOULDER REPLACEMENT  2009    left    HX TONSILLECTOMY           Family History:   Problem Relation Age of Onset    Arthritis-rheumatoid Mother     Dementia Mother     Coronary Artery Disease Father     Cancer Brother      lung cancer       Social History     Social History    Marital status:      Spouse name: N/A    Number of children: N/A    Years of education: N/A     Occupational History    Not on file. Social History Main Topics    Smoking status: Never Smoker    Smokeless tobacco: Never Used    Alcohol use No    Drug use: No    Sexual activity: No     Other Topics Concern    Not on file     Social History Narrative         ALLERGIES: Angiotensin ii,human; Avelox [moxifloxacin]; and Demerol [meperidine]    Review of Systems   Constitutional: Negative for activity change, appetite change, diaphoresis and fever. HENT: Negative for ear discharge, ear pain, facial swelling, rhinorrhea, sore throat, tinnitus, trouble swallowing and voice change. Eyes: Negative for photophobia, pain, discharge, redness and visual disturbance. Respiratory: Negative for cough, chest tightness, shortness of breath, wheezing and stridor. Cardiovascular: Negative for chest pain and palpitations. Gastrointestinal: Negative for abdominal pain, constipation, diarrhea, nausea and vomiting. Endocrine: Negative for polydipsia and polyuria. Genitourinary: Negative for dysuria, flank pain and hematuria. Musculoskeletal: Positive for arthralgias and myalgias. Negative for back pain. Skin: Negative for color change and rash. Neurological: Negative for dizziness, syncope, speech difficulty, light-headedness and numbness. Psychiatric/Behavioral: Negative for behavioral problems. Vitals:    08/13/18 1722   BP: (!) 134/96   Pulse: (!) 120   Resp: 14   Temp: 99 °F (37.2 °C)   SpO2: 94%   Weight: 77.6 kg (171 lb)   Height: 5' 6\" (1.676 m)            Physical Exam   Constitutional: She is oriented to person, place, and time. She appears well-developed and well-nourished. HENT:   Head: Normocephalic and atraumatic. Eyes: Conjunctivae are normal. Pupils are equal, round, and reactive to light. Right eye exhibits no discharge. Left eye exhibits no discharge. Neck: Normal range of motion. Neck supple. No thyromegaly present. Cardiovascular: Normal rate, regular rhythm and normal heart sounds. Exam reveals no gallop and no friction rub. No murmur heard. Pulmonary/Chest: Effort normal and breath sounds normal. No respiratory distress. She has no wheezes. Musculoskeletal: Normal range of motion. Pt in right wrist splint (ACE wrap). Swelling over the dorsal aspect of right hand and fingers. Good cap refill. Pt is NVI. Neurological: She is alert and oriented to person, place, and time. Skin: Skin is warm. Psychiatric: She has a normal mood and affect. MDM  Number of Diagnoses or Management Options  Closed fracture of right wrist, initial encounter:   Diagnosis management comments: Re-wrapped pt wrist splint. Pt counseled on hydrocodone dosing schedule and advised to follow up with ortho. Pt family states that they feel comfortable taking care of patient pain at home. Pt family given strict return precautions. Reviewed treatment plan with attending and they agree.   Drake Okeefe PA-C        ED Course       Procedures

## 2018-08-13 NOTE — DISCHARGE INSTRUCTIONS
Wearing a Splint: Care Instructions  Your Care Instructions    A splint protects a broken bone or other injury. If you have a removable splint, follow your doctor's instructions and only remove the splint if your doctor says it's okay. Most splints can be adjusted. Your doctor will show you how to do this and will tell you when you might need to adjust the splint. A splint is sometimes called a brace. You may also hear it called an immobilizer. An immobilizer, such as a splint or cast, keeps you from moving the injured area. You may get a splint that's already factory-made. Or your doctor might make your splint from plaster or fiberglass. Some splints have a built-in air cushion. Air pads are inflated to hold the injured area in place. Follow-up care is a key part of your treatment and safety. Be sure to make and go to all appointments, and call your doctor if you are having problems. It's also a good idea to know your test results and keep a list of the medicines you take. How can you care for yourself at home? General care  · Follow your doctor's instructions on how much weight you can put on your injured limb. · If the fingers or toes on the limb with the splint were not injured, wiggle them every now and then. This helps move the blood and fluids in the injured limb. · Prop up the injured limb on a pillow when you ice it or anytime you sit or lie down during the next 3 days. Try to keep it above the level of your heart. This will help reduce swelling. · Put ice or cold packs on the limb for 10 to 20 minutes at a time. Try to do this every 1 to 2 hours for the next 3 days (when you are awake) or until the swelling goes down. Be careful not to get the splint wet. Put a thin cloth between the ice and your skin. If your splint is removable, ask your doctor if you can take it off when you use ice. · If you have an adjustable splint that feels too tight, loosen it slightly.   · Keep up your muscle strength and tone as much as you can while protecting your injured limb. Your doctor may want you to tense and relax the muscles protected by the splint. Check with your doctor or your physical or occupational therapist for instructions. Splint and skin care  · If your splint is not to be removed, try blowing cool air from a hair dryer or fan into the splint to help relieve itching. Never stick items under your splint to scratch the skin. · Do not use oils or lotions near your splint. If the skin becomes red or sore around the edge of the splint, you may pad the edges with a soft material, such as moleskin, or use tape to cover the edges. · If you're allowed to take your splint off, be sure your skin is dry before you put it back on. Be careful not to put the splint on too tightly. · Check the skin under the splint every day. If you can't remove the splint, check the skin around the edges. Tell your doctor if you see redness or sores. Water and your splint  · Keep your splint dry. Moisture can collect under the splint and cause skin irritation and itching. If you have a wound or have had surgery, moisture under the splint can increase the risk of infection. · Tape a sheet of plastic to cover your splint when you take a shower or bath, unless your doctor said you can take it off while bathing. · If you can take the splint off when you bathe, pat the area dry after bathing and put the splint back on.  · If your splint gets a little wet, you can dry it with a hair dryer. Use a \"cool\" setting. When should you call for help? Call your doctor now or seek immediate medical care if:    · You have increased or severe pain.     · You feel a warm or painful spot under the splint.     · You have problems with your splint. For example:  ¨ The skin under the splint is burning or stinging. ¨ The splint feels too tight. ¨ There is a lot of swelling near the splint. (Some swelling is normal.)  ¨ You have a new fever.   ¨ There is drainage or a bad smell coming from the splint.     · Your limb turns cold or changes color.     · You have trouble moving your fingers or toes.     · You have symptoms of a blood clot in your arm or leg (called a deep vein thrombosis). These may include:  ¨ Pain in the arm, calf, back of the knee, thigh, or groin. ¨ Redness and swelling in the arm, leg, or groin.    Watch closely for changes in your health, and be sure to contact your doctor if:    · The splint is breaking apart or losing its shape.     · You are not getting better as expected. Where can you learn more? Go to http://[facility] [facility].info/. Enter W511 in the search box to learn more about \"Wearing a Splint: Care Instructions. \"  Current as of: November 29, 2017  Content Version: 11.7  © 0887-7895 [employer]. Care instructions adapted under license by [employer] (which disclaims liability or warranty for this information). If you have questions about a medical condition or this instruction, always ask your healthcare professional. [address] any warranty or liability for your use of this information. We hope that we have addressed all of your medical concerns. The examination and treatment you received in the Emergency Department were for an emergent problem and were not intended as complete care. It is important that you follow up with your healthcare provider(s) for ongoing care. If your symptoms worsen or do not improve as expected, and you are unable to reach your usual health care provider(s), you should return to the Emergency Department. Today's healthcare is undergoing tremendous change, and patient satisfaction surveys are one of the many tools to assess the quality of medical care. You may receive a survey from the [employer] regarding your experience in the Emergency Department.   I hope that your experience has been completely positive, particularly the medical care that I provided. As such, please participate in the survey; anything less than excellent does not meet my expectations or intentions. [facility] and [facility] participate in nationally recognized quality of care measures. If your blood pressure is greater than 120/80, as reported below, we urge that you seek medical care to address the potential of high blood pressure, commonly known as hypertension. Hypertension can be hereditary or can be caused by certain medical conditions, pain, stress, or \"white coat syndrome. \"       Please make an appointment with your health care provider(s) for follow up of your Emergency Department visit. VITALS:   Patient Vitals for the past 8 hrs:   Temp Pulse Resp BP SpO2   08/13/18 1722 99 °F (37.2 °C) (!) 120 14 (!) 134/96 94 %          Thank you for allowing us to provide you with medical care today. We realize that you have many choices for your emergency care needs. Please choose us in the future for any continued health care needs. Familia Byrnes, [facility]: [phone]            No results found for this or any previous visit (from the past 24 hour(s)). Xr Forearm Rt Ap/lat    Result Date: 8/13/2018  EXAM:  XR FOREARM RT AP/LAT Clinical history: Distal radial fracture. INDICATION:   fall. COMPARISON: None. FINDINGS: Two views of the right radius and ulna demonstrate distal radial fracture. Extensive degenerative change at the radiocarpal and carpometacarpal rows. .     IMPRESSION:  Distal radial fracture with minimal displacement. Severe degenerative change at the wrist..     Xr Wrist Rt Ap/lat/obl Min 3v    Result Date: 8/13/2018  EXAM: XR WRIST RT AP/LAT/OBL MIN 3V HISTORY: Fall, fell in bathroom, right arm INDICATION:  fall. COMPARISON: None.  FINDINGS: Three  views of the right wrist demonstrate nondisplaced distal radius fracture. Severe degenerative change of the radiocarpal and carpometacarpal rows. No ulnar fracture. .  The soft tissues are within normal limits. IMPRESSION:  Nondisplaced fracture of the distal radius.  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

## 2018-08-13 NOTE — ED PROVIDER NOTES
HPI Comments: Pt got up to the bathroom and hurt her right arm. Pt was not using her walker like she is supposed to. Pt complaining of pain in her forearm area. Spouse present during incident. No LOC. Patient is a 80 y.o. female presenting with fall. The history is provided by the patient and the spouse. No  was used. Fall   The accident occurred less than 1 hour ago. The fall occurred while walking. She fell from a height of ground level. She landed on hard floor. The point of impact was the right wrist. The pain is present in the right wrist. The pain is at a severity of 9/10. The pain is moderate. She was ambulatory at the scene. There was no entrapment after the fall. There was no drug use involved in the accident. There was no alcohol use involved in the accident. Pertinent negatives include no fever, no numbness, no abdominal pain, no vomiting, no hematuria and no loss of consciousness. The risk factors include dementia, being elderly and recurrent falls. The symptoms are aggravated by pressure on injury. She has tried nothing for the symptoms. The patient's last tetanus shot was less than 5 years ago. Past Medical History:   Diagnosis Date    Anemia     Atrial fibrillation ([address]     Cancer (HCC)     basal cell on nose    Chronic pain     back pain    Fibromyalgia 4/1/2010    GERD (gastroesophageal reflux disease) 4/1/2010    Hiatal hernia 4/1/2010    High cholesterol 4/1/2010    HTN (hypertension) 4/1/2010    Ill-defined condition     A.  Fib    OA (osteoarthritis) 4/1/2010    back    Pulmonary emboli ([address] 2015       Past Surgical History:   Procedure Laterality Date    BREAST SURGERY PROCEDURE UNLISTED      Breast im-plants x 2    ENLARGE BREAST WITH IMPLANT      HX APPENDECTOMY      HX BREAST BIOPSY      HX BREAST RECONSTRUCTION      Breast implants removed 1992    HX CATARACT REMOVAL      HX CHOLECYSTECTOMY  9/11/2013    HX HYSTERECTOMY      HX KNEE REPLACEMENT  2008    bilateral    HX ORTHOPAEDIC  2007    Bilateral TKR    HX PARTIAL HYSTERECTOMY      HX SHOULDER REPLACEMENT  2009    left    HX TONSILLECTOMY           Family History:   Problem Relation Age of Onset   [employer] Arthritis-rheumatoid Mother     Dementia Mother     Coronary Artery Disease Father     Cancer Brother      lung cancer       Social History     Social History    Marital status:      Spouse name: N/A    Number of children: N/A    Years of education: N/A     Occupational History    Not on file. Social History Main Topics    Smoking status: Never Smoker    Smokeless tobacco: Never Used    Alcohol use No    Drug use: No    Sexual activity: No     Other Topics Concern    Not on file     Social History Narrative     ALLERGIES: Angiotensin ii,human; Avelox [moxifloxacin]; and Demerol [meperidine]    Review of Systems   Constitutional: Negative for appetite change, chills, fever and unexpected weight change. HENT: Negative for ear pain, hearing loss, rhinorrhea and trouble swallowing. Eyes: Negative for pain and visual disturbance. Respiratory: Negative for cough, chest tightness and shortness of breath. Cardiovascular: Negative for chest pain and palpitations. Gastrointestinal: Negative for abdominal distention, abdominal pain, blood in stool and vomiting. Genitourinary: Negative for dysuria, hematuria and urgency. Musculoskeletal: Negative for back pain and myalgias. Skin: Negative for rash. Neurological: Negative for dizziness, loss of consciousness, syncope, weakness and numbness. Psychiatric/Behavioral: Negative for confusion and suicidal ideas. All other systems reviewed and are negative.       Vitals:    08/13/18 0123 08/13/18 0312   BP: (!) 148/95 (!) 187/103   Pulse: 93 78   Resp: 17 14   Temp: 98.4 °F (36.9 °C)    SpO2: 95%    Weight: 77.6 kg (171 lb)    Height: 5' 6\" (1.676 m)             Physical Exam   Constitutional: She appears well-developed and well-nourished. No distress. Frail, elderly appearing   HENT:   Head: Normocephalic and atraumatic. Right Ear: External ear normal.   Left Ear: External ear normal.   Nose: Nose normal.   Mouth/Throat: Oropharynx is clear and moist. No oropharyngeal exudate. Eyes: Conjunctivae and EOM are normal. Pupils are equal, round, and reactive to light. Right eye exhibits no discharge. Left eye exhibits no discharge. No scleral icterus. Neck: Normal range of motion. Neck supple. No JVD present. No tracheal deviation present. Cardiovascular: Normal rate, regular rhythm, normal heart sounds and intact distal pulses. Exam reveals no gallop and no friction rub. No murmur heard. Pulmonary/Chest: Effort normal and breath sounds normal. No stridor. No respiratory distress. She has no decreased breath sounds. She has no wheezes. She has no rhonchi. She has no rales. She exhibits no tenderness. Abdominal: Soft. Bowel sounds are normal. She exhibits no distension. There is no tenderness. There is no rebound and no guarding. Musculoskeletal: She exhibits no edema. Right wrist: She exhibits decreased range of motion (due to pain), tenderness and bony tenderness. She exhibits no swelling, no effusion, no crepitus, no deformity and no laceration. RUE NVI. Neurological: She is alert. She has normal strength and normal reflexes. No cranial nerve deficit or sensory deficit. She exhibits normal muscle tone. Coordination normal. GCS eye subscore is 4. GCS verbal subscore is 4. GCS motor subscore is 6. Pt currently at her baseline per spouse   Skin: Skin is warm and dry. No rash noted. She is not diaphoretic. No erythema. No pallor. Psychiatric: She has a normal mood and affect. Her behavior is normal. Judgment and thought content normal.   Nursing note and vitals reviewed.        MDM  Number of Diagnoses or Management Options  Closed fracture of right wrist, initial encounter:      Amount and/or Complexity of Data Reviewed  Tests in the radiology section of CPT®: ordered and reviewed    Risk of Complications, Morbidity, and/or Mortality  Presenting problems: moderate  Diagnostic procedures: low  Management options: moderate    Patient Progress  Patient progress: stable        ED Course       Procedures    Chief Complaint   Patient presents with    Fall    Arm Pain    Wrist Pain       The patient's presenting problems have been discussed, and they are in agreement with the care plan formulated and outlined with them. I have encouraged them to ask questions as they arise throughout their visit. MEDICATIONS GIVEN:  Medications   HYDROcodone-acetaminophen (NORCO) 5-325 mg per tablet 1 Tab (1 Tab Oral Given 8/13/18 0215)       LABS REVIEWED:  No results found for this or any previous visit (from the past 24 hour(s)). VITAL SIGNS:  Patient Vitals for the past 12 hrs:   Temp Pulse Resp BP SpO2   08/13/18 0312 - 78 14 (!) 187/103 -   08/13/18 0123 98.4 °F (36.9 °C) 93 17 (!) 148/95 95 %       RADIOLOGY RESULTS:  The following have been ordered and reviewed:  Xr Forearm Rt Ap/lat    Result Date: 8/13/2018  EXAM:  XR FOREARM RT AP/LAT Clinical history: Distal radial fracture. INDICATION:   fall. COMPARISON: None. FINDINGS: Two views of the right radius and ulna demonstrate distal radial fracture. Extensive degenerative change at the radiocarpal and carpometacarpal rows. .     IMPRESSION:  Distal radial fracture with minimal displacement. Severe degenerative change at the wrist..     Xr Wrist Rt Ap/lat/obl Min 3v    Result Date: 8/13/2018  EXAM: XR WRIST RT AP/LAT/OBL MIN 3V HISTORY: Fall, fell in bathroom, right arm INDICATION:  fall. COMPARISON: None. FINDINGS: Three  views of the right wrist demonstrate nondisplaced distal radius fracture. Severe degenerative change of the radiocarpal and carpometacarpal rows. No ulnar fracture. .  The soft tissues are within normal limits.      IMPRESSION: Nondisplaced fracture of the distal radius. 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     PROGRESS NOTES:  Post examination exam after splint application was performed. Distal N-V intact. Patient tolerated well. Discussed results and plan with patient and spouse. Patient will be discharged home with PCP and ortho follow up. Patient instructed to return to the emergency room for any worsening symptoms or any other concerns. DIAGNOSIS:    1. Closed fracture of right wrist, initial encounter        PLAN:  Follow-up Information     Follow up With Details Comments Yonny Ferro MD Schedule an appointment as soon as possible for a visit  [address]  [address]      Harry Leslie MD In 1 week  [address] [facility]  [phone]      [facility] EMERGENCY DEPT  If symptoms worsen [address] [facility]  [phone]        Discharge Medication List as of 8/13/2018  2:55 AM      CONTINUE these medications which have NOT CHANGED    Details   !! JANTOVEN 3 mg tablet TAKE ONE TABLET BY MOUTH ONE TIME DAILY , Normal, Disp-30 Tab, R-3      !! warfarin (COUMADIN) 1 mg tablet Take 1 Tab by mouth daily. , Normal, Disp-30 Tab, R-5      amiodarone (CORDARONE) 200 mg tablet Take 1 Tab by mouth daily. , Normal, Disp-30 Tab, R-2      dilTIAZem CD (CARDIZEM CD) 240 mg ER capsule Take 1 Cap by mouth daily. , NormalDose increased 6/22/18Disp-90 Cap, R-1      ferrous sulfate 325 mg (65 mg iron) cpER Take 325 mg by mouth daily. , Historical Med      cyanocobalamin (VITAMIN B-12) 500 mcg tablet Take 500 mcg by mouth daily. , Historical Med      citalopram (CELEXA) 20 mg tablet TAKE 1 TABLET BY MOUTH DAILY, Normal, Disp-90 Tab, R-2      gabapentin (NEURONTIN) 600 mg tablet TAKE 1 TABLET BY MOUTH 3 TIMES A DAY, Normal, Disp-270 Tab, R-2      acetaminophen (TYLENOL) 325 mg tablet Take 2 Tabs by mouth every six (6) hours as needed for Pain., Print, Disp-30 Tab, R-0      !! OTHER Check INR in 2 weeks, Print, Disp-1 Each, R-0      HYDROcodone-acetaminophen (NORCO) 7.5-325 mg per tablet Take 1 Tab by mouth every eight (8) hours as needed for Pain. Max Daily Amount: 3 Tabs., Print, Disp-90 Tab, R-0      polyethylene glycol (MIRALAX) 17 gram packet Take 17 g by mouth as needed (Constipation). , Historical Med      cyclobenzaprine (FLEXERIL) 5 mg tablet TAKE 1 TABLET BY MOUTH 3 TIMES A DAY AS NEEDED FOR MUSCLE SPASMS, Normal, Disp-90 Tab, R-2      !! OTHER Folding Wheelchair  Dx: OA, Print, Disp-1 Each, R-0       !! - Potential duplicate medications found. Please discuss with provider. ED COURSE: The patient's hospital course has been uncomplicated.

## 2018-08-13 NOTE — DISCHARGE INSTRUCTIONS
We hope that we have addressed all of your medical concerns. The examination and treatment you received in the Emergency Department were for an emergent problem and were not intended as complete care. It is important that you follow up with your healthcare provider(s) for ongoing care. If your symptoms worsen or do not improve as expected, and you are unable to reach your usual health care provider(s), you should return to the Emergency Department. Today's healthcare is undergoing tremendous change, and patient satisfaction surveys are one of the many tools to assess the quality of medical care. You may receive a survey from the [employer] organization regarding your experience in the Emergency Department. I hope that your experience has been completely positive, particularly the medical care that I provided. As such, please participate in the survey; anything less than excellent does not meet my expectations or intentions. [facility] and [facility] participate in nationally recognized quality of care measures. If your blood pressure is greater than 120/80, as reported below, we urge that you seek medical care to address the potential of high blood pressure, commonly known as hypertension. Hypertension can be hereditary or can be caused by certain medical conditions, pain, stress, or \"white coat syndrome. \"       Please make an appointment with your health care provider(s) for follow up of your Emergency Department visit. VITALS:   Patient Vitals for the past 8 hrs:   Temp Pulse Resp BP SpO2   08/13/18 0123 98.4 °F (36.9 °C) 93 17 (!) 148/95 95 %          Thank you for allowing us to provide you with medical care today. We realize that you have many choices for your emergency care needs. Please choose us in the future for any continued health care needs. Myke Pardo, [facility]. Office: [phone]            No results found for this or any previous visit (from the past 24 hour(s)). Xr Forearm Rt Ap/lat    Result Date: 8/13/2018  EXAM:  XR FOREARM RT AP/LAT Clinical history: Distal radial fracture. INDICATION:   fall. COMPARISON: None. FINDINGS: Two views of the right radius and ulna demonstrate distal radial fracture. Extensive degenerative change at the radiocarpal and carpometacarpal rows. .     IMPRESSION:  Distal radial fracture with minimal displacement. Severe degenerative change at the wrist..     Xr Wrist Rt Ap/lat/obl Min 3v    Result Date: 8/13/2018  EXAM: XR WRIST RT AP/LAT/OBL MIN 3V HISTORY: Fall, fell in bathroom, right arm INDICATION:  fall. COMPARISON: None. FINDINGS: Three  views of the right wrist demonstrate nondisplaced distal radius fracture. Severe degenerative change of the radiocarpal and carpometacarpal rows. No ulnar fracture. .  The soft tissues are within normal limits. IMPRESSION:  Nondisplaced fracture of the distal radius. 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              Broken Wrist: Care Instructions  Your Care Instructions    Your wrist can break, or fracture, during sports, a fall, or other accidents. The break may happen when your wrist is hit or is used to protect you in a fall. Fractures can range from a small, hairline crack, to a bone or bones broken into two or more pieces. Your treatment depends on how bad the break is. Your doctor may have put your wrist in a cast or splint. This will help keep your wrist stable until your follow-up appointment. It may take weeks or months for your wrist to heal. You can help it heal with care at home. You heal best when you take good care of yourself. Eat a variety of healthy foods, and don't smoke. Follow-up care is a key part of your treatment and safety. Be sure to make and go to all appointments, and call your doctor if you are having problems.  It's also a good idea to know your test results and keep a list of the medicines you take. How can you care for yourself at home? · Put ice or a cold pack on your wrist for 10 to 20 minutes at a time. Try to do this every 1 to 2 hours for the next 3 days (when you are awake). Put a thin cloth between the ice and your cast or splint. Keep your cast or splint dry. · Follow the splint or cast care instructions your doctor gives you. If you have a splint, do not take it off unless your doctor tells you to. Be careful not to put the splint on too tight. · Be safe with medicines. Take pain medicines exactly as directed. ¨ If the doctor gave you a prescription medicine for pain, take it as prescribed. ¨ If you are not taking a prescription pain medicine, ask your doctor if you can take an over-the-counter medicine. · Prop up your wrist on pillows when you sit or lie down in the first few days after the injury. Keep your wrist higher than the level of your heart. This will help reduce swelling. · Move your fingers often to reduce swelling and stiffness, but do not use that hand to grab or carry anything. · Follow instructions for exercises to keep your arm strong. When should you call for help? Call your doctor now or seek immediate medical care if:    · You have new or worse pain.     · Your hand or fingers are cool or pale or change color.     · Your cast or splint feels too tight.     · You have tingling, weakness, or numbness in your hand or fingers.    Watch closely for changes in your health, and be sure to contact your doctor if:    · You do not get better as expected.     · You have problems with your cast or splint. Where can you learn more? Go to http://leonel-socorro.info/. Enter [id number] in the search box to learn more about \"Broken Wrist: Care Instructions. \"  Current as of: November 29, 2017  Content Version: 11.7  © 2163-3265 [employer].  Care instructions adapted under license by [employer] (which disclaims liability or warranty for this information). If you have questions about a medical condition or this instruction, always ask your healthcare professional. [address] any warranty or liability for your use of this information.

## 2018-08-14 ENCOUNTER — HOSPITAL ENCOUNTER (INPATIENT)
Age: 83
LOS: 7 days | Discharge: NURSING, REHAB OR LONG-TERM CARE | DRG: 563 | End: 2018-08-21
Attending: EMERGENCY MEDICINE | Admitting: FAMILY MEDICINE
Payer: MEDICARE

## 2018-08-14 ENCOUNTER — APPOINTMENT (OUTPATIENT)
Dept: GENERAL RADIOLOGY | Age: 83
DRG: 563 | End: 2018-08-14
Attending: EMERGENCY MEDICINE
Payer: MEDICARE

## 2018-08-14 ENCOUNTER — APPOINTMENT (OUTPATIENT)
Dept: CT IMAGING | Age: 83
DRG: 563 | End: 2018-08-14
Attending: EMERGENCY MEDICINE
Payer: MEDICARE

## 2018-08-14 DIAGNOSIS — R79.1 SUPRATHERAPEUTIC INR: ICD-10-CM

## 2018-08-14 DIAGNOSIS — R53.81 PHYSICAL DEBILITY: ICD-10-CM

## 2018-08-14 DIAGNOSIS — S62.101D CLOSED FRACTURE OF RIGHT WRIST WITH ROUTINE HEALING, SUBSEQUENT ENCOUNTER: ICD-10-CM

## 2018-08-14 DIAGNOSIS — R29.6 FREQUENT FALLS: Primary | ICD-10-CM

## 2018-08-14 DIAGNOSIS — Z86.79 HISTORY OF CHRONIC ATRIAL FIBRILLATION: ICD-10-CM

## 2018-08-14 DIAGNOSIS — Z86.711 HISTORY OF PULMONARY EMBOLISM: ICD-10-CM

## 2018-08-14 DIAGNOSIS — Z71.89 DNR (DO NOT RESUSCITATE) DISCUSSION: ICD-10-CM

## 2018-08-14 DIAGNOSIS — D50.9 IRON DEFICIENCY ANEMIA, UNSPECIFIED IRON DEFICIENCY ANEMIA TYPE: ICD-10-CM

## 2018-08-14 DIAGNOSIS — Z71.89 GOALS OF CARE, COUNSELING/DISCUSSION: ICD-10-CM

## 2018-08-14 DIAGNOSIS — F03.91 DEMENTIA WITH BEHAVIORAL DISTURBANCE, UNSPECIFIED DEMENTIA TYPE: ICD-10-CM

## 2018-08-14 DIAGNOSIS — M79.602 LEFT ARM PAIN: ICD-10-CM

## 2018-08-14 PROBLEM — S62.101A WRIST FRACTURE, RIGHT: Status: ACTIVE | Noted: 2018-08-14

## 2018-08-14 LAB
ALBUMIN SERPL-MCNC: 3.4 G/DL (ref 3.5–5)
ALBUMIN/GLOB SERPL: 0.8 {RATIO} (ref 1.1–2.2)
ALP SERPL-CCNC: 122 U/L (ref 45–117)
ALT SERPL-CCNC: 16 U/L (ref 12–78)
ANION GAP SERPL CALC-SCNC: 9 MMOL/L (ref 5–15)
APPEARANCE UR: CLEAR
APTT PPP: 59.5 SEC (ref 22.1–32)
AST SERPL-CCNC: 17 U/L (ref 15–37)
ATRIAL RATE: 324 BPM
BACTERIA URNS QL MICRO: ABNORMAL /HPF
BASOPHILS # BLD: 0 K/UL (ref 0–0.1)
BASOPHILS NFR BLD: 0 % (ref 0–1)
BILIRUB SERPL-MCNC: 0.7 MG/DL (ref 0.2–1)
BILIRUB UR QL: NEGATIVE
BUN SERPL-MCNC: 13 MG/DL (ref 6–20)
BUN/CREAT SERPL: 15 (ref 12–20)
CALCIUM SERPL-MCNC: 8.9 MG/DL (ref 8.5–10.1)
CALCULATED R AXIS, ECG10: -5 DEGREES
CALCULATED T AXIS, ECG11: 111 DEGREES
CHLORIDE SERPL-SCNC: 100 MMOL/L (ref 97–108)
CO2 SERPL-SCNC: 27 MMOL/L (ref 21–32)
COLOR UR: ABNORMAL
CREAT SERPL-MCNC: 0.89 MG/DL (ref 0.55–1.02)
DIAGNOSIS, 93000: NORMAL
DIFFERENTIAL METHOD BLD: ABNORMAL
EOSINOPHIL # BLD: 0 K/UL (ref 0–0.4)
EOSINOPHIL NFR BLD: 0 % (ref 0–7)
EPITH CASTS URNS QL MICRO: ABNORMAL /LPF
ERYTHROCYTE [DISTWIDTH] IN BLOOD BY AUTOMATED COUNT: 13.6 % (ref 11.5–14.5)
GLOBULIN SER CALC-MCNC: 4.2 G/DL (ref 2–4)
GLUCOSE BLD STRIP.AUTO-MCNC: 125 MG/DL (ref 65–100)
GLUCOSE BLD STRIP.AUTO-MCNC: 126 MG/DL (ref 65–100)
GLUCOSE BLD STRIP.AUTO-MCNC: 131 MG/DL (ref 65–100)
GLUCOSE SERPL-MCNC: 138 MG/DL (ref 65–100)
GLUCOSE UR STRIP.AUTO-MCNC: NEGATIVE MG/DL
HCT VFR BLD AUTO: 36.9 % (ref 35–47)
HGB BLD-MCNC: 12.2 G/DL (ref 11.5–16)
HGB UR QL STRIP: NEGATIVE
IMM GRANULOCYTES # BLD: 0 K/UL (ref 0–0.04)
IMM GRANULOCYTES NFR BLD AUTO: 0 % (ref 0–0.5)
INR PPP: 3.3 (ref 0.9–1.1)
KETONES UR QL STRIP.AUTO: NEGATIVE MG/DL
LEUKOCYTE ESTERASE UR QL STRIP.AUTO: NEGATIVE
LYMPHOCYTES # BLD: 1.1 K/UL (ref 0.8–3.5)
LYMPHOCYTES NFR BLD: 11 % (ref 12–49)
MCH RBC QN AUTO: 31.8 PG (ref 26–34)
MCHC RBC AUTO-ENTMCNC: 33.1 G/DL (ref 30–36.5)
MCV RBC AUTO: 96.1 FL (ref 80–99)
MONOCYTES # BLD: 1 K/UL (ref 0–1)
MONOCYTES NFR BLD: 10 % (ref 5–13)
NEUTS SEG # BLD: 8.1 K/UL (ref 1.8–8)
NEUTS SEG NFR BLD: 79 % (ref 32–75)
NITRITE UR QL STRIP.AUTO: NEGATIVE
NRBC # BLD: 0 K/UL (ref 0–0.01)
NRBC BLD-RTO: 0 PER 100 WBC
PH UR STRIP: 7 [PH] (ref 5–8)
PLATELET # BLD AUTO: 208 K/UL (ref 150–400)
PMV BLD AUTO: 10.2 FL (ref 8.9–12.9)
POTASSIUM SERPL-SCNC: 3.5 MMOL/L (ref 3.5–5.1)
PROT SERPL-MCNC: 7.6 G/DL (ref 6.4–8.2)
PROT UR STRIP-MCNC: 100 MG/DL
PROTHROMBIN TIME: 33.5 SEC (ref 9–11.1)
Q-T INTERVAL, ECG07: 282 MS
QRS DURATION, ECG06: 98 MS
QTC CALCULATION (BEZET), ECG08: 393 MS
RBC # BLD AUTO: 3.84 M/UL (ref 3.8–5.2)
RBC #/AREA URNS HPF: ABNORMAL /HPF (ref 0–5)
SERVICE CMNT-IMP: ABNORMAL
SODIUM SERPL-SCNC: 136 MMOL/L (ref 136–145)
SP GR UR REFRACTOMETRY: 1.02 (ref 1–1.03)
THERAPEUTIC RANGE,PTTT: ABNORMAL SECS (ref 58–77)
UR CULT HOLD, URHOLD: NORMAL
UROBILINOGEN UR QL STRIP.AUTO: 0.2 EU/DL (ref 0.2–1)
VENTRICULAR RATE, ECG03: 117 BPM
WBC # BLD AUTO: 10.2 K/UL (ref 3.6–11)
WBC URNS QL MICRO: ABNORMAL /HPF (ref 0–4)

## 2018-08-14 PROCEDURE — 82962 GLUCOSE BLOOD TEST: CPT

## 2018-08-14 PROCEDURE — 73110 X-RAY EXAM OF WRIST: CPT

## 2018-08-14 PROCEDURE — 74011250636 HC RX REV CODE- 250/636: Performed by: EMERGENCY MEDICINE

## 2018-08-14 PROCEDURE — 85610 PROTHROMBIN TIME: CPT | Performed by: EMERGENCY MEDICINE

## 2018-08-14 PROCEDURE — 74011250637 HC RX REV CODE- 250/637: Performed by: STUDENT IN AN ORGANIZED HEALTH CARE EDUCATION/TRAINING PROGRAM

## 2018-08-14 PROCEDURE — 36415 COLL VENOUS BLD VENIPUNCTURE: CPT | Performed by: EMERGENCY MEDICINE

## 2018-08-14 PROCEDURE — 85025 COMPLETE CBC W/AUTO DIFF WBC: CPT | Performed by: EMERGENCY MEDICINE

## 2018-08-14 PROCEDURE — 87086 URINE CULTURE/COLONY COUNT: CPT | Performed by: EMERGENCY MEDICINE

## 2018-08-14 PROCEDURE — 65660000000 HC RM CCU STEPDOWN

## 2018-08-14 PROCEDURE — 74011250636 HC RX REV CODE- 250/636: Performed by: FAMILY MEDICINE

## 2018-08-14 PROCEDURE — 99285 EMERGENCY DEPT VISIT HI MDM: CPT

## 2018-08-14 PROCEDURE — 85730 THROMBOPLASTIN TIME PARTIAL: CPT | Performed by: EMERGENCY MEDICINE

## 2018-08-14 PROCEDURE — 70450 CT HEAD/BRAIN W/O DYE: CPT

## 2018-08-14 PROCEDURE — 81001 URINALYSIS AUTO W/SCOPE: CPT | Performed by: EMERGENCY MEDICINE

## 2018-08-14 PROCEDURE — 93005 ELECTROCARDIOGRAM TRACING: CPT

## 2018-08-14 PROCEDURE — 72170 X-RAY EXAM OF PELVIS: CPT

## 2018-08-14 PROCEDURE — 96374 THER/PROPH/DIAG INJ IV PUSH: CPT

## 2018-08-14 PROCEDURE — 80053 COMPREHEN METABOLIC PANEL: CPT | Performed by: EMERGENCY MEDICINE

## 2018-08-14 RX ORDER — SODIUM CHLORIDE, SODIUM LACTATE, POTASSIUM CHLORIDE, CALCIUM CHLORIDE 600; 310; 30; 20 MG/100ML; MG/100ML; MG/100ML; MG/100ML
250 INJECTION, SOLUTION INTRAVENOUS ONCE
Status: COMPLETED | OUTPATIENT
Start: 2018-08-15 | End: 2018-08-15

## 2018-08-14 RX ORDER — DILTIAZEM HYDROCHLORIDE 240 MG/1
240 CAPSULE, COATED, EXTENDED RELEASE ORAL DAILY
COMMUNITY
End: 2018-09-28

## 2018-08-14 RX ORDER — HYDROCODONE BITARTRATE AND ACETAMINOPHEN 7.5; 325 MG/1; MG/1
1 TABLET ORAL
Status: DISCONTINUED | OUTPATIENT
Start: 2018-08-14 | End: 2018-08-19

## 2018-08-14 RX ORDER — GABAPENTIN 300 MG/1
600 CAPSULE ORAL 3 TIMES DAILY
Status: DISCONTINUED | OUTPATIENT
Start: 2018-08-14 | End: 2018-08-21 | Stop reason: HOSPADM

## 2018-08-14 RX ORDER — NYSTATIN 100000 [USP'U]/G
POWDER TOPICAL 2 TIMES DAILY
Status: DISCONTINUED | OUTPATIENT
Start: 2018-08-14 | End: 2018-08-21 | Stop reason: HOSPADM

## 2018-08-14 RX ORDER — SODIUM CHLORIDE 9 MG/ML
100 INJECTION, SOLUTION INTRAVENOUS CONTINUOUS
Status: DISCONTINUED | OUTPATIENT
Start: 2018-08-14 | End: 2018-08-14

## 2018-08-14 RX ORDER — WARFARIN 3 MG/1
3 TABLET ORAL EVERY EVENING
COMMUNITY
End: 2018-08-21

## 2018-08-14 RX ORDER — SODIUM CHLORIDE 0.9 % (FLUSH) 0.9 %
5-10 SYRINGE (ML) INJECTION AS NEEDED
Status: DISCONTINUED | OUTPATIENT
Start: 2018-08-14 | End: 2018-08-21 | Stop reason: HOSPADM

## 2018-08-14 RX ORDER — MORPHINE SULFATE 4 MG/ML
4 INJECTION INTRAVENOUS
Status: COMPLETED | OUTPATIENT
Start: 2018-08-14 | End: 2018-08-14

## 2018-08-14 RX ORDER — POLYETHYLENE GLYCOL 3350 17 G/17G
17 POWDER, FOR SOLUTION ORAL
Status: DISCONTINUED | OUTPATIENT
Start: 2018-08-14 | End: 2018-08-21 | Stop reason: HOSPADM

## 2018-08-14 RX ORDER — CYCLOBENZAPRINE HCL 10 MG
5 TABLET ORAL
Status: DISCONTINUED | OUTPATIENT
Start: 2018-08-14 | End: 2018-08-21 | Stop reason: HOSPADM

## 2018-08-14 RX ORDER — CITALOPRAM 20 MG/1
20 TABLET, FILM COATED ORAL DAILY
Status: DISCONTINUED | OUTPATIENT
Start: 2018-08-14 | End: 2018-08-21 | Stop reason: HOSPADM

## 2018-08-14 RX ORDER — INSULIN LISPRO 100 [IU]/ML
INJECTION, SOLUTION INTRAVENOUS; SUBCUTANEOUS
Status: DISCONTINUED | OUTPATIENT
Start: 2018-08-14 | End: 2018-08-21 | Stop reason: HOSPADM

## 2018-08-14 RX ORDER — DEXTROSE 50 % IN WATER (D50W) INTRAVENOUS SYRINGE
12.5-25 AS NEEDED
Status: DISCONTINUED | OUTPATIENT
Start: 2018-08-14 | End: 2018-08-21 | Stop reason: HOSPADM

## 2018-08-14 RX ORDER — HYDROCODONE BITARTRATE AND ACETAMINOPHEN 7.5; 325 MG/1; MG/1
1 TABLET ORAL
COMMUNITY
End: 2018-08-21

## 2018-08-14 RX ORDER — MAGNESIUM SULFATE 100 %
4 CRYSTALS MISCELLANEOUS AS NEEDED
Status: DISCONTINUED | OUTPATIENT
Start: 2018-08-14 | End: 2018-08-21 | Stop reason: HOSPADM

## 2018-08-14 RX ORDER — CYCLOBENZAPRINE HCL 5 MG
5 TABLET ORAL
COMMUNITY
End: 2018-09-06

## 2018-08-14 RX ORDER — LOSARTAN POTASSIUM AND HYDROCHLOROTHIAZIDE 25; 100 MG/1; MG/1
0.5 TABLET ORAL
COMMUNITY
End: 2018-09-06

## 2018-08-14 RX ORDER — DILTIAZEM HYDROCHLORIDE 240 MG/1
240 CAPSULE, COATED, EXTENDED RELEASE ORAL DAILY
Status: DISCONTINUED | OUTPATIENT
Start: 2018-08-14 | End: 2018-08-15

## 2018-08-14 RX ORDER — SODIUM CHLORIDE 0.9 % (FLUSH) 0.9 %
5-10 SYRINGE (ML) INJECTION EVERY 8 HOURS
Status: DISCONTINUED | OUTPATIENT
Start: 2018-08-14 | End: 2018-08-21 | Stop reason: HOSPADM

## 2018-08-14 RX ADMIN — GABAPENTIN 600 MG: 300 CAPSULE ORAL at 10:03

## 2018-08-14 RX ADMIN — GABAPENTIN 600 MG: 300 CAPSULE ORAL at 15:44

## 2018-08-14 RX ADMIN — SODIUM CHLORIDE 100 ML/HR: 900 INJECTION, SOLUTION INTRAVENOUS at 20:45

## 2018-08-14 RX ADMIN — HYDROCODONE BITARTRATE AND ACETAMINOPHEN 1 TABLET: 7.5; 325 TABLET ORAL at 10:02

## 2018-08-14 RX ADMIN — MORPHINE SULFATE 4 MG: 4 INJECTION INTRAVENOUS at 05:44

## 2018-08-14 RX ADMIN — DILTIAZEM HYDROCHLORIDE 240 MG: 240 CAPSULE, COATED, EXTENDED RELEASE ORAL at 08:21

## 2018-08-14 RX ADMIN — NYSTATIN: 100000 POWDER TOPICAL at 19:59

## 2018-08-14 RX ADMIN — GABAPENTIN 600 MG: 300 CAPSULE ORAL at 23:13

## 2018-08-14 RX ADMIN — Medication 5 ML: at 14:00

## 2018-08-14 RX ADMIN — HYDROCODONE BITARTRATE AND ACETAMINOPHEN 1 TABLET: 7.5; 325 TABLET ORAL at 15:44

## 2018-08-14 RX ADMIN — HYDROCODONE BITARTRATE AND ACETAMINOPHEN 1 TABLET: 7.5; 325 TABLET ORAL at 23:13

## 2018-08-14 RX ADMIN — CITALOPRAM HYDROBROMIDE 20 MG: 20 TABLET ORAL at 10:02

## 2018-08-14 NOTE — PROGRESS NOTES
[facility] Dosing Services: 8/14/2018    Consult for Warfarin Dosing by Pharmacy by Dr. Quesada Getting provided for this 80 y.o.  female , for indication of Atrial Fibrillation. Day of Therapy: continued from home. Home dose is 3 mg by mouth daily  Dose to achieve an INR goal of 2-3    INR 3.3 today (supratherapeutic). No Warfarin today. Significant drug interactions: none  Previous dose given Warfarin 3 mg on 8/13/2018 (at home)   PT/INR Lab Results   Component Value Date/Time    INR 3.3 (H) 08/14/2018 04:31 AM      Platelets Lab Results   Component Value Date/Time    PLATELET 144 04/92/1910 04:31 AM      H/H Lab Results   Component Value Date/Time    HGB 12.2 08/14/2018 04:31 AM        Pharmacist will follow daily and will provide subsequent Warfarin dosing based on clinical status.   Savannah Mariano, Pharmacist

## 2018-08-14 NOTE — ED NOTES
PT SLEEPING COMFORTABLY IN BED. EVEN RISE AND FALL OF CHEST, PLACED ON O2 2L NC FOR COMFORT. AROUSABLE TO TOUCH.  WILL CONT TO MONITOR

## 2018-08-14 NOTE — IP AVS SNAPSHOT
Flora Ruiz 
 
 
 [address] 100[address] [facility] 
[phone] Patient: Tray Regan MRN: [id number] About your hospitalization You were admitted on:  August 14, 2018 You last received care in the:  [facility] [address] You were discharged on:  August 21, 2018 Why you were hospitalized Your primary diagnosis was:  Frequent Falls Your diagnoses also included:  Controlled Type 2 Diabetes Mellitus Without Complication, Without Long-Term Current Use Of Insulin (Hcc), Dyslipidemia, Gerd (Gastroesophageal Reflux Disease), Htn (Hypertension), Iron Deficiency Anemia, Wrist Fracture, Right Follow-up Information Follow up With Details Comments Contact Info Deric Starr MD In 7 days  [address] [facility] [address] 1007 [facility] 
[phone] Abhishek Flores MD On 8/24/2018 at 1:10pm N [address] [place] [address] 
[phone] [facility] 90   [address] [facility],[address] [address] 
[phone] Your Scheduled Appointments Wednesday August 22, 2018  1:15 PM EDT TRANSITIONAL CARE MANAGEMENT with Abhishek Flores MD  
[address]) [address] [place] [address] [id number] [phone] Tuesday September 04, 2018  9:30 AM EDT  
ESTABLISHED PATIENT with Abhishek Flores MD  
[address]) [address] [place] [address] [id number] [phone] Discharge Orders None A check chris indicates which time of day the medication should be taken. My Medications START taking these medications Instructions Each Dose to Equal  
 Morning Noon Evening Bedtime  
 acetaminophen 325 mg tablet Commonly known as:  TYLENOL Your last dose was: Your next dose is: Take 2 Tabs by mouth every six (6) hours as needed for Pain.   
 650 mg  
    
   
   
   
  
 docusate sodium 100 mg capsule Commonly known as:  Ltanya Harley Your last dose was: Your next dose is: Take 1 Cap by mouth daily for 90 days. 100 mg CHANGE how you take these medications Instructions Each Dose to Equal  
 Morning Noon Evening Bedtime HYDROcodone-acetaminophen 7.5-325 mg per tablet Commonly known as:  Martha Fothergill What changed:  when to take this Your last dose was: Your next dose is: Take 1 Tab by mouth every eight (8) hours as needed for Pain. Max Daily Amount: 3 Tabs. 1 Tab CONTINUE taking these medications Instructions Each Dose to Equal  
 Morning Noon Evening Bedtime CARTIA  mg ER capsule Generic drug:  dilTIAZem CD Your last dose was: Your next dose is: Take 240 mg by mouth daily. 240 mg  
    
   
   
   
  
 citalopram 20 mg tablet Commonly known as:  Husam Moscoso Your last dose was: Your next dose is: TAKE 1 TABLET BY MOUTH DAILY  
     
   
   
   
  
 cyclobenzaprine 5 mg tablet Commonly known as:  FLEXERIL Your last dose was: Your next dose is: Take 5 mg by mouth daily as needed for Muscle Spasm(s). 5 mg  
    
   
   
   
  
 gabapentin 600 mg tablet Commonly known as:  NEURONTIN Your last dose was: Your next dose is: TAKE 1 TABLET BY MOUTH 3 TIMES A DAY  
     
   
   
   
  
 losartan-hydroCHLOROthiazide 100-25 mg per tablet Commonly known as:  HYZAAR Your last dose was: Your next dose is: Take 0.5 Tabs by mouth daily as needed (SBP greater than 170). 0.5 Tab OTHER Your last dose was: Your next dose is:    
   
   
 Folding Wheelchair Dx: OA  
     
   
   
   
  
 OTHER Your last dose was: Your next dose is:    
   
   
 Check INR in 2 weeks polyethylene glycol 17 gram packet Commonly known as:  Safia Vo Your last dose was: Your next dose is: Take 17 g by mouth as needed (Constipation). 17 g STOP taking these medications JANTOVEN 3 mg tablet Generic drug:  warfarin Where to Get Your Medications Information on where to get these meds will be given to you by the nurse or doctor. ! Ask your nurse or doctor about these medications  
  acetaminophen 325 mg tablet  
 docusate sodium 100 mg capsule HYDROcodone-acetaminophen 7.5-325 mg per tablet Opioid Education Prescription Opioids: What You Need to Know: 
 
 
Discharging provider:  Francisca Khan MD  - Family Medicine Resident Dr. Deric Man - Attending, Family Medicine Gavin Lundberg . . . . . . . . . . . . . . . . . . . . . . . . . . . . . . . . . . . . . . . . . . . . . . . . . . . . . . . . . . . . . . . . . . . . . . Gavin Lundberg FINAL DIAGNOSES & HOSPITAL COURSE: 
Louis Augustine is a 80 y.o. female with a history of Dementia, recurrent falls, HTN, Osteoarthritis, HLD, pulmonary embolism, iron deficiency anemia, Afib, DM type 2 with neuropathy and fibromyalgia who presented to the ED via EMS complaining Right Wrist pain after a ground level fall (GLF).   
History obtained from patient's  and ED provider as pt has memory issues. 
  
Pt was seen in the ED on 18 after a GLF and found to have a non-displaced wrist fracture of the right distal radius.  Pt was splinted with a sugar tongue splint and discharged with outpatient ortho follow-up. Pt then returned to the ED on 08/14 following another GLF and worsening right wrist pain. Per 's report, pt fell last night while trying to go to the bathroom without her walker. Pt has not had any fever/chills, diarrhea, abd pain or vomiting. She has frequent hallucinations about  trying to poison her and refuses to take her pain medications at home.  reports that this is patient's 3rd fall in about a month. She was admitted for a right wrist fracture.  
[facility] Course Right Wrist fracture: The patient's non-displaced right wrist fracture was confirmed on x-ray. No acute pelvic fracture noted on pelv Xray. A CT head was normal. Ortho was consulted and recommended conservative treatment of the fracture. Right upper extremity was placed in a sling and the forearm remained in a sling as it was too edematous to cast. Patient needs strict elevation due to severity of hand/ finger swelling. She will follow up outpatient with Dr Keyla Rodas. The patient received Morphine 1 mg q4h for 3 days. This was transitioned to Tylenol 625mg q6h and Norco 7.5-325 mg q8h prn for pain. Pain was well controlled at time of discharge.  
   
Paroxysmal Afib: Patient's home warfarin dose was held during this hospital stay per Hematology and Cardiology. The patient's recurring falls places her at a high risk of bleeding complications and this outweighs the benefit of anticoagulation at this time.  
   
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  
   
Asymptomatic Bacteriuria: On admission, a urinalysis showed 2+ bacteria and neg LE/nitrites. Urine culture resulted Lactobacillus spp.  The patient completed a course of IV Rocephin for 3 days.  
   
DM type 2 with neuropathy: The patient received her home dose of Gabapentin 600mg TID for neuropathic pain.  
   
 Hypertension: The patient's home dose of Losartan/HCTZ 50/12.5mg was held and her blood pressure monitored.  
     
Depression: The patient continued to receive her home Celexa 20mg daily.  
   
Hx of Iron deficiency anemia: Normal Hb(12.2). Pt was initially on Iron but has since been discontinued by Dr. Albesa Mortimer (Heme/onc) given persistent constipation and dark stools. Her Hb on discharge was stable.  
   
Muscle Spasms: The patient continued to receive her home Flexeril 5mg prn.  
  
FOLLOW-UP CARE RECOMMENDATIONS: 
Follow-up Information Follow up With Details Comments Contact Info Randall Abarca MD In 7 days  [address] [facility] [address] 1900 [facility] 
[phone] Dawood Kee MD On 8/24/2018 at 1:10pm [address] [address] 
[phone] It is very important that you keep follow-up appointment(s). Bring discharge papers, medication list (and/or medication bottles) to follow-up appointments for review by outpatient provider(s). MEDICATION CHANGES: 
Stop taking Warfarin 3mg daily due to risk of falls and bleeding. Continue Tylenol 650mg q6hrs and Norco 7.5-325mg q8hrs for pain FOLLOW-UP TESTS RECOMMENDED:  
None ONGOING TREATMENT PLAN:  
For right undisplaced distal radius fracture, patient needs strict elevation due to severity of hand/ finger swelling. The patient's right upper extremity is in a sling and hand needs to be propped up to be higher than level of heart. Non weight bearing/exercises with Right arm until evaluated by Ortho. PENDING TEST RESULTS: 
At the time of discharge the following test results are still pending: None Please review these results as they become available. Specific symptoms to watch for: chest pain, shortness of breath, fever, chills, nausea, vomiting, diarrhea, change in mentation, falling, weakness, bleeding. DIET:  Diabetic Diet ACTIVITY:  Ambulate with assistance WOUND CARE: None needed EQUIPMENT needed:  None INCIDENTAL FINDINGS:  None GOALS OF CARE: 
  Eventual return to home/independent/assisted living  
x  Long term SNF Hospice No rehospitalization Patient condition at discharge:  
Functional status Poor   
x  Deconditioned Independent Cognition Ladi Head Forgetful (some sensescence) x  Dementia Catheters/lines (plus indication) Gar PICC   
  PEG Code status 
x  Full code DNR Mabeline Sink . . . . . . . . . . . . . . . . . . . . . . . . . . . . . . . . . . . . . . . . . . . . . . . . . . . . . . . . . . . . . . . . . . . . . . Mabeline Sink CHRONIC MEDICAL CONDITIONS: 
Problem List as of 8/21/2018  Date Reviewed: 8/1/2018 Codes Class Noted - Resolved * (Principal)Frequent falls ICD-10-CM: R29.6 ICD-9-CM: V15.88  8/14/2018 - Present Wrist fracture, right ICD-10-CM: S62.101A ICD-9-CM: 814.00  8/14/2018 - Present A-fib [facility]) ICD-10-CM: I48.91 
ICD-9-CM: 427.31  5/24/2018 - Present Dyslipidemia ICD-10-CM: E78.5 ICD-9-CM: 272.4  5/24/2018 - Present Chronic anticoagulation ICD-10-CM: Z79.01 
ICD-9-CM: V58.61  5/24/2018 - Present Constipation ICD-10-CM: K59.00 ICD-9-CM: 564.00  5/24/2018 - Present History of pulmonary embolism ICD-10-CM: Z86.711 ICD-9-CM: V12.55  5/24/2018 - Present Type 2 diabetes with nephropathy ([facility] [address] ICD-10-CM: E11.21 
ICD-9-CM: 250.40, 583.81  2/27/2018 - Present Advanced care planning/counseling discussion ICD-10-CM: Z71.89 ICD-9-CM: V65.49  5/9/2017 - Present Overview Signed 5/9/2017  8:03 AM by Amanda Mak MD  
  Has no completed, dwp importance Controlled type 2 diabetes mellitus without complication, without long-term current use of insulin ([facility] [address] ICD-10-CM: E11.9 ICD-9-CM: 250.00  5/9/2017 - Present Iron deficiency anemia ICD-10-CM: D50.9 ICD-9-CM: 280.9  8/11/2016 - Present  Advance care planning ICD-10-CM: Z71.89 
 ICD-9-CM: V65.49  4/13/2016 - Present Overview Signed 4/13/2016  8:49 AM by Perfecto Ortiz MD  
  Has a Hunt Masker Pulmonary embolism [facility]) ICD-10-CM: I26.99 
ICD-9-CM: 415.19  2/25/2015 - Present Itching ICD-10-CM: L29.9 ICD-9-CM: 698.9  2/7/2014 - Present Elevated liver enzymes ICD-10-CM: R74.8 ICD-9-CM: 790.5  1/2/2014 - Present S/P cholecystectomy ICD-10-CM: Z90.49 ICD-9-CM: V45.79  1/2/2014 - Present Overview Signed 1/2/2014 11:42 AM by Chava Aguila MD  
  11/2013 S/P knee replacement ICD-10-CM: U87.215 ICD-9-CM: V43.65  1/2/2014 - Present Overview Signed 1/2/2014 11:43 AM by Chava Aguila MD  
  bilaterally S/P shoulder surgery ICD-10-CM: D41.495 ICD-9-CM: V45.89  1/2/2014 - Present Overview Signed 1/2/2014 11:43 AM by Chava Aguila MD  
  Left H/O carpal tunnel repair ICD-10-CM: R86.693 ICD-9-CM: V15.29  1/2/2014 - Present Overview Signed 1/2/2014 11:43 AM by Chava Aguila MD  
  Right hand Chronic pain ICD-10-CM: G89.29 ICD-9-CM: 338.29  11/16/2010 - Present HTN (hypertension) (Chronic) ICD-10-CM: I10 
ICD-9-CM: 401.9  4/1/2010 - Present OA (osteoarthritis) (Chronic) ICD-10-CM: M19.90 ICD-9-CM: 715.90  4/1/2010 - Present Fibromyalgia (Chronic) ICD-10-CM: M79.7 ICD-9-CM: 729.1  4/1/2010 - Present GERD (gastroesophageal reflux disease) (Chronic) ICD-10-CM: K21.9 ICD-9-CM: 530.81  4/1/2010 - Present High cholesterol (Chronic) ICD-10-CM: E78.00 ICD-9-CM: 272.0  4/1/2010 - Present Hiatal hernia (Chronic) ICD-10-CM: K44.9 ICD-9-CM: 553.3  4/1/2010 - Present Information obtained by :  
I understand that if any problems occur once I am at home I am to contact my physician. I understand and acknowledge receipt of the instructions indicated above. Physician's or R.N.'s Signature                                                                  Date/Time Patient or Representative Signature                                                          Date/Time 
 
 
 
 
ACO Transitions of Care Introducing [employer] [address] Ebony Mclaughlin offers a voluntary care coordination program to provide high quality service and care to [facility] fee-for-service beneficiaries. Harpreet Bolaños was designed to help you enhance your health and well-being through the following services: ? Transitions of Care  support for individuals who are transitioning from one care setting to another (example: Hospital to home). ? Chronic and Complex Care Coordination  support for individuals and caregivers of those with serious or chronic illnesses or with more than one chronic (ongoing) condition and those who take a number of different medications. If you meet specific medical criteria, a [address] [place] [address] Rd may call you directly to coordinate your care with your primary care physician and your other care providers. For questions about the [facility] programs, please, contact your physicians office. For general questions or additional information about Accountable Care Organizations: 
Please visit www.medicare.gov/acos. html or call 1-800-MEDICARE (4-878.526.7695) TTY users should call 9-330.917.5607. Direct Access Software Announcement We are excited to announce that we are making your provider's discharge notes available to you in Direct Access Software.   You will see these notes when they are completed and signed by the physician that discharged you from your recent hospital stay. If you have any questions or concerns about any information you see in KeyOwner, please call the Health Information Department where you were seen or reach out to your Primary Care Provider for more information about your plan of care. Introducing [facility]! [employer] introduces KeyOwner patient portal. Now you can access parts of your medical record, email your doctor's office, and request medication refills online. 1. In your internet browser, go to [email or web address] [email or web address] 2. Click on the First Time User? Click Here link in the Sign In box. You will see the New Member Sign Up page. 3. Enter your KeyOwner Access Code exactly as it appears below. You will not need to use this code after youve completed the sign-up process. If you do not sign up before the expiration date, you must request a new code. · KeyOwner Access Code: [email or web address] Expires: 10/14/2018 10:15 AM 
 
4. Enter the last four digits of your Social Security Number (xxxx) and Date of Birth (mm/dd/yyyy) as indicated and click Submit. You will be taken to the next sign-up page. 5. Create a KeyOwner ID. This will be your KeyOwner login ID and cannot be changed, so think of one that is secure and easy to remember. 6. Create a KeyOwner password. You can change your password at any time. 7. Enter your Password Reset Question and Answer. This can be used at a later time if you forget your password. 8. Enter your e-mail address. You will receive e-mail notification when new information is available in [address] 9. Click Sign Up. You can now view and download portions of your medical record. 10. Click the Download Summary menu link to download a portable copy of your medical information. If you have questions, please visit the Frequently Asked Questions section of the KeyOwner website. Remember, KeyOwner is NOT to be used for urgent needs. For medical emergencies, dial 911. Now available from your iPhone and Android! Introducing Naveed Tariq As a [facility] patient, I wanted to make you aware of our electronic visit tool called Naveed Tariq. [employer] allows you to connect within minutes with a medical provider 24 hours a day, seven days a week via a mobile device or tablet or logging into a secure website from your computer. You can access Naveed Tariq from anywhere in the [place]. A virtual visit might be right for you when you have a simple condition and feel like you just dont want to get out of bed, or cant get away from work for an appointment, when your regular [facility] provider is not available (evenings, weekends or holidays), or when youre out of town and need minor care. Electronic visits cost only $49 and if the [employer] provider determines a prescription is needed to treat your condition, one can be electronically transmitted to a nearby pharmacy*. Please take a moment to enroll today if you have not already done so. The enrollment process is free and takes just a few minutes. To enroll, please download the ByeCity scarlet to your tablet or phone, or visit [email or web address] to enroll on your computer. And, as an [address] [place] [address] patient with a [employer] account, the results of your visits will be scanned into your electronic medical record and your primary care provider will be able to view the scanned results. We urge you to continue to see your regular [facility] provider for your ongoing medical care. And while your primary care provider may not be the one available when you seek a Naveed Tariq virtual visit, the peace of mind you get from getting a real diagnosis real time can be priceless. For more information on Naveed Tariq, view our Frequently Asked Questions (FAQs) at [email or web address]. Sincerely, 
 
Tianna Reeves MD 
Chief Medical Officer Jorge Mclaughlin *:  certain medications cannot be prescribed via Naveed Tariq Last Palliative Care Discharge Note   
 08/21/18 1341  Version 1 of 1 Goals of Care/Treatment Preferences The Palliative Medicine team was consulted as part of your/your loved one's care in the hospital. Our team is a supportive service; we strive to relieve suffering and improve quality of life. We reviewed advance care planning information, which includes the following: 
Patient's [address] is[de-identified] Legal Next of Kin Primary Decision Maker Name: Magdi Acosta Primary Decision Maker Phone Number: [phone] Primary Decision Maker Relationship to Patient: Spouse Confirm Advance Directive: None Patient Would Like to Complete Advance Directive: Unable Does the patient have other document types: Power of [place] Patient/Health Care Proxy Stated Goals:  (full restorative measures in an attempt to recover, short term placement until home has been restored and then return home) We reviewed / discussed your code status as: Full Code Full Code means perform CPR in the event of cardiac arrest. 
    DNR means do NOT perform CPR in the event of cardiac arrest. 
    Partial Code means you have specific preferences, please discuss with your healthcare team. 
    [place] means this issue was not addressed / resolved during your stay Medical Interventions: Full interventions Because of the importance of this information, we are providing you with a printed copy to share with other healthcare providers after this hospitalization is complete. Providers Seen During Your Hospitalization Provider Specialty Primary office phone Saúl Carol, [address] [facility] [phone] Adi Cano DO Family Practice [phone] Aure Ramirez MD [facility] [phone] Your Primary Care Physician (PCP) Primary Care Physician Office Phone Office Fax [phone] Tina Will [phone] You are allergic to the following Allergen Reactions Angiotensin Ii,Human Other (comments) States does not remember Avelox (Moxifloxacin) Other (comments) States does not remember Demerol (Meperidine) Hives Recent Documentation Height Weight BMI OB Status Smoking Status 1.727 m 76.1 kg 25.51 kg/m2 Postmenopausal Never Smoker Emergency Contacts Name Discharge Info Relation Home Work Mobile Link,Romero DISCHARGE CAREGIVER [3] Spouse [3] [phone] Patient Belongings The following personal items are in your possession at time of discharge: 
  Dental Appliances: Lowers, Uppers, With patient  Visual Aid: Glasses      Home Medications: None   Jewelry: None  Clothing: At bedside    Other Valuables: None Please provide this summary of care documentation to your next provider. Signatures-by signing, you are acknowledging that this After Visit Summary has been reviewed with you and you have received a copy. Patient Signature:  ____________________________________________________________ Date:  ____________________________________________________________  
  
Mario Asher Provider Signature:  ____________________________________________________________ Date:  ____________________________________________________________

## 2018-08-14 NOTE — IP AVS SNAPSHOT
[address] [place] [address] 1007 [facility] 
[phone] Patient: Tray Regan MRN: [id number] A check chris indicates which time of day the medication should be taken. My Medications START taking these medications Instructions Each Dose to Equal  
 Morning Noon Evening Bedtime  
 acetaminophen 325 mg tablet Commonly known as:  TYLENOL Your last dose was: Your next dose is: Take 2 Tabs by mouth every six (6) hours as needed for Pain. 650 mg  
    
   
   
   
  
 docusate sodium 100 mg capsule Commonly known as:  Matthias Mario Your last dose was: Your next dose is: Take 1 Cap by mouth daily for 90 days. 100 mg CHANGE how you take these medications Instructions Each Dose to Equal  
 Morning Noon Evening Bedtime HYDROcodone-acetaminophen 7.5-325 mg per tablet Commonly known as:  Jeni Mclaughlin What changed:  when to take this Your last dose was: Your next dose is: Take 1 Tab by mouth every eight (8) hours as needed for Pain. Max Daily Amount: 3 Tabs. 1 Tab CONTINUE taking these medications Instructions Each Dose to Equal  
 Morning Noon Evening Bedtime CARTIA  mg ER capsule Generic drug:  dilTIAZem CD Your last dose was: Your next dose is: Take 240 mg by mouth daily. 240 mg  
    
   
   
   
  
 citalopram 20 mg tablet Commonly known as:  Santiago Jones Your last dose was: Your next dose is: TAKE 1 TABLET BY MOUTH DAILY  
     
   
   
   
  
 cyclobenzaprine 5 mg tablet Commonly known as:  FLEXERIL Your last dose was: Your next dose is: Take 5 mg by mouth daily as needed for Muscle Spasm(s). 5 mg  
    
   
   
   
  
 gabapentin 600 mg tablet Commonly known as:  NEURONTIN  
   
 Your last dose was: Your next dose is: TAKE 1 TABLET BY MOUTH 3 TIMES A DAY  
     
   
   
   
  
 losartan-hydroCHLOROthiazide 100-25 mg per tablet Commonly known as:  HYZAAR Your last dose was: Your next dose is: Take 0.5 Tabs by mouth daily as needed (SBP greater than 170). 0.5 Tab OTHER Your last dose was: Your next dose is:    
   
   
 Folding Wheelchair Dx: OA  
     
   
   
   
  
 OTHER Your last dose was: Your next dose is:    
   
   
 Check INR in 2 weeks  
     
   
   
   
  
 polyethylene glycol 17 gram packet Commonly known as:  Tai Leo Your last dose was: Your next dose is: Take 17 g by mouth as needed (Constipation). 17 g STOP taking these medications JANTOVEN 3 mg tablet Generic drug:  warfarin Where to Get Your Medications Information on where to get these meds will be given to you by the nurse or doctor. ! Ask your nurse or doctor about these medications  
  acetaminophen 325 mg tablet  
 docusate sodium 100 mg capsule HYDROcodone-acetaminophen 7.5-325 mg per tablet
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
## 2018-08-14 NOTE — ED NOTES
Verbal shift change report given to Tano (oncoming nurse) by Brittany Allan (offgoing nurse). Report included the following information SBAR, Kardex, ED Summary, [facility] and Recent Results.

## 2018-08-14 NOTE — PROGRESS NOTES
[address] [place] [address] with U and [facility] [employer]       Resident Progress Note in Brief    S:    Per the patient's nurse, patient's family was bedside and requested to speak to MD.   I spoke to the patient's daughter regarding our plan of care and answered any questions she had. We spoke about the possibility of rehab and/or long term nursing facility placement for Ms Amando Dodge. The daughter as well as her brother and sister have been considering a nursing facility for their Mother's care but will discuss the options with their step father (patient's spouse). I informed Ms Regan's daughter that the Family Med team is always on hand to answer any questions regarding Ms Regan's care. Please see daily progress note for detailed plan.      Priya Vo MD  Family Medicine Resident

## 2018-08-14 NOTE — CONSULTS
Patient: Dm Turner  : 1933      Today's Date: 2018      HISTORY OF PRESENT ILLNESS:     History of Present Illness:    Pt with PMHx of Afib, chronic anticoagulated with warfarin, HTN, unprovoked PE, anemia, dementia, DMT2, fibromyalgia, and recurrent falls. Pt has 2 ED visits and found to have right wrist fx. Family state that pt has been falling more and hallucinations are worse. Pt has hx of dementia therefore hx was limited. Per nurse the heart rate decreased from 110s to 80s after Cardizem was given. Family Practice consulted cardiology for assessment of anticoagulation (coumadin) risk in this pt. Pt followed by Dr. Mitchell Lockhart in OP. EKG on  shows Aflutter with variable AV block. Rate 117. PAST MEDICAL HISTORY:     Past Medical History:   Diagnosis Date    Anemia     Atrial fibrillation ([address]     Cancer (HCC)     basal cell on nose    Chronic pain     back pain    Fibromyalgia 2010    GERD (gastroesophageal reflux disease) 2010    Hiatal hernia 2010    High cholesterol 2010    HTN (hypertension) 2010    Ill-defined condition     A.  Fib    OA (osteoarthritis) 2010    back    Pulmonary emboli ([address]            Past Surgical History:   Procedure Laterality Date    BREAST SURGERY PROCEDURE UNLISTED      Breast im-plants x 2    ENLARGE BREAST WITH IMPLANT      HX APPENDECTOMY      HX BREAST BIOPSY      HX BREAST RECONSTRUCTION      Breast implants removed     HX CATARACT REMOVAL      HX CHOLECYSTECTOMY  2013    HX HYSTERECTOMY      HX KNEE REPLACEMENT  2008    bilateral    HX ORTHOPAEDIC  2007    Bilateral TKR    HX PARTIAL HYSTERECTOMY      HX SHOULDER REPLACEMENT  2009    left    HX TONSILLECTOMY             Patient Active Problem List   Diagnosis Code    HTN (hypertension) I10    OA (osteoarthritis) M19.90    Fibromyalgia M79.7    GERD (gastroesophageal reflux disease) K21.9    High cholesterol E78.00    Hiatal hernia K44.9    Chronic pain G89.29    Elevated liver enzymes R74.8    S/P cholecystectomy Z90.49    S/P knee replacement Z96.659    S/P shoulder surgery Z98.890    H/O carpal tunnel repair Z98.890    Itching L29.9    Pulmonary embolism (HCC) I26.99    Advance care planning Z71.89    Iron deficiency anemia D50.9    Advanced care planning/counseling discussion Z71.89    Controlled type 2 diabetes mellitus without complication, without long-term current use of insulin (HCC) E11.9    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.21    A-fib (HCC) I48.91    Dyslipidemia E78.5    Chronic anticoagulation Z79.01    Constipation K59.00    History of pulmonary embolism Z86.711    Frequent falls R29.6    Wrist fracture, right S62.101A             FAMILY HISTORY:     Family History   Problem Relation Age of Onset   [address] [facility] [address] Arthritis-rheumatoid Mother     Dementia Mother     Coronary Artery Disease Father     Cancer Brother      lung cancer             SOCIAL HISTORY:     Social History   Substance Use Topics    Smoking status: Never Smoker    Smokeless tobacco: Never Used    Alcohol use No               REVIEW OF SYMPTOMS:     Review of Symptoms:  Unable to assess given pt dementia      PHYSICAL EXAM:     Physical Exam:  Visit Vitals    /82    Pulse 91    Temp 99 °F (37.2 °C)    Resp 19    Ht 5' 8\" (1.727 m)    Wt 171 lb (77.6 kg)    SpO2 96%    BMI 26 kg/m2     Patient is somnolent and hard to arouse. HEENT:  Hearing intact at high volume  Neck Exam: Supple, No JVD or carotid bruits. Lung Exam: Clear to auscultation, even breath sounds. Cardiac Exam: irregularly irregular rhythem with no murmur  Abdomen: Soft, non-tender, normal bowel sounds. Extremities: Right wrist in splint with good capillary refill. No lower extremity edema. Vascular: 2+ dorsalis pedis pulses bilaterally. Psych: confused. Slow speech. Hx of dementia  Neuro - Grossly intact, pain sensation intact. Confused. Oriented to person. LABS / OTHER STUDIES:     Recent Results (from the past 24 hour(s))   CBC WITH AUTOMATED DIFF    Collection Time: 08/14/18  4:31 AM   Result Value Ref Range    WBC 10.2 3.6 - 11.0 K/uL    RBC 3.84 3.80 - 5.20 M/uL    HGB 12.2 11.5 - 16.0 g/dL    HCT 36.9 35.0 - 47.0 %    MCV 96.1 80.0 - 99.0 FL    MCH 31.8 26.0 - 34.0 PG    MCHC 33.1 30.0 - 36.5 g/dL    RDW 13.6 11.5 - 14.5 %    PLATELET 893 528 - 396 K/uL    MPV 10.2 8.9 - 12.9 FL    NRBC 0.0 0  WBC    ABSOLUTE NRBC 0.00 0.00 - 0.01 K/uL    NEUTROPHILS 79 (H) 32 - 75 %    LYMPHOCYTES 11 (L) 12 - 49 %    MONOCYTES 10 5 - 13 %    EOSINOPHILS 0 0 - 7 %    BASOPHILS 0 0 - 1 %    IMMATURE GRANULOCYTES 0 0.0 - 0.5 %    ABS. NEUTROPHILS 8.1 (H) 1.8 - 8.0 K/UL    ABS. LYMPHOCYTES 1.1 0.8 - 3.5 K/UL    ABS. MONOCYTES 1.0 0.0 - 1.0 K/UL    ABS. EOSINOPHILS 0.0 0.0 - 0.4 K/UL    ABS. BASOPHILS 0.0 0.0 - 0.1 K/UL    ABS. IMM. GRANS. 0.0 0.00 - 0.04 K/UL    DF AUTOMATED     METABOLIC PANEL, COMPREHENSIVE    Collection Time: 08/14/18  4:31 AM   Result Value Ref Range    Sodium 136 136 - 145 mmol/L    Potassium 3.5 3.5 - 5.1 mmol/L    Chloride 100 97 - 108 mmol/L    CO2 27 21 - 32 mmol/L    Anion gap 9 5 - 15 mmol/L    Glucose 138 (H) 65 - 100 mg/dL    BUN 13 6 - 20 MG/DL    Creatinine 0.89 0.55 - 1.02 MG/DL    BUN/Creatinine ratio 15 12 - 20      GFR est AA >60 >60 ml/min/1.73m2    GFR est non-AA >60 >60 ml/min/1.73m2    Calcium 8.9 8.5 - 10.1 MG/DL    Bilirubin, total 0.7 0.2 - 1.0 MG/DL    ALT (SGPT) 16 12 - 78 U/L    AST (SGOT) 17 15 - 37 U/L    Alk.  phosphatase 122 (H) 45 - 117 U/L    Protein, total 7.6 6.4 - 8.2 g/dL    Albumin 3.4 (L) 3.5 - 5.0 g/dL    Globulin 4.2 (H) 2.0 - 4.0 g/dL    A-G Ratio 0.8 (L) 1.1 - 2.2     URINALYSIS W/MICROSCOPIC    Collection Time: 08/14/18  4:31 AM   Result Value Ref Range    Color YELLOW/STRAW      Appearance CLEAR CLEAR      Specific gravity 1.016 1.003 - 1.030      pH (UA) 7.0 5.0 - 8.0      Protein 100 (A) NEG mg/dL    Glucose NEGATIVE  NEG mg/dL    Ketone NEGATIVE  NEG mg/dL    Bilirubin NEGATIVE  NEG      Blood NEGATIVE  NEG      Urobilinogen 0.2 0.2 - 1.0 EU/dL    Nitrites NEGATIVE  NEG      Leukocyte Esterase NEGATIVE  NEG      WBC 10-20 0 - 4 /hpf    RBC 0-5 0 - 5 /hpf    Epithelial cells FEW FEW /lpf    Bacteria 2+ (A) NEG /hpf   URINE CULTURE HOLD SAMPLE    Collection Time: 08/14/18  4:31 AM   Result Value Ref Range    Urine culture hold        URINE ON HOLD IN MICROBIOLOGY DEPT FOR 3 DAYS. IF UNPRESERVED URINE IS SUBMITTED, IT CANNOT BE USED FOR ADDITIONAL TESTING AFTER 24 HRS, RECOLLECTION WILL BE REQUIRED. PROTHROMBIN TIME + INR    Collection Time: 08/14/18  4:31 AM   Result Value Ref Range    INR 3.3 (H) 0.9 - 1.1      Prothrombin time 33.5 (H) 9.0 - 11.1 sec   PTT    Collection Time: 08/14/18  4:31 AM   Result Value Ref Range    aPTT 59.5 (H) 22.1 - 32.0 sec    aPTT, therapeutic range     58.0 - 77.0 SECS   EKG, 12 LEAD, INITIAL    Collection Time: 08/14/18  4:41 AM   Result Value Ref Range    Ventricular Rate 117 BPM    Atrial Rate 324 BPM    QRS Duration 98 ms    Q-T Interval 282 ms    QTC Calculation (Bezet) 393 ms    Calculated R Axis -5 degrees    Calculated T Axis 111 degrees    Diagnosis       Atrial flutter with variable AV block  Septal infarct (cited on or before 24-MAY-2018)  Abnormal ECG  When compared with ECG of 20-JUN-2018 05:47,  Atrial flutter has replaced Atrial fibrillation  Nonspecific T wave abnormality, worse in Lateral leads     GLUCOSE, POC    Collection Time: 08/14/18 11:13 AM   Result Value Ref Range    Glucose (POC) 131 (H) 65 - 100 mg/dL    Performed by [facility]              CARDIAC DIAGNOSTICS:     Cardiac Evaluation Includes:    ECHO/GULSHAN: 5/24/18 echocardiogram-EF 55-60%. Wall thickness mild to moderately increased.   KAYLA, moderate MR, moderate TR, small pericardial effusion     EP: 5/25/18 Successful GULSHAN guided DCCV converting AF to sinus      ASSESSMENT AND PLAN:     Assessment and Plan:    Aflutter with variable AV block in pt with hx of PAfib: HR currently 80, on admission pt HR 110s. On coumadin, INR 3.3 (goal 2-3). Recent EP study (5/25/18) with GULSHAN guided DCCV and successfully converted to NSR. Pt however converted back to Afib at subsequent visits. EF 55-60% in 5/25/18. Followed by Dr. Kiko Loyola.   -Continue Cardizem 240 mg daily  -Given pt's hx of falls, from cardiac stand point can discontinue coumadin. However need to discuss with Pulm regarding discontinuing coumadin, possible IVC filter placement. HTN: Pt to take Cardizem CD daily and use losartan/hydrochlorothiazide on  as needed basis for systolic blood pressure greater than 170. Unsure of how often she is using PRN medications.  -Continue regimen of Cardizem and PRN medication. Hx of unprovoked PE: currently on coumadin. INR 3.3.   -Need to discuss with Pulm regarding discontinuing coumadin, possible IVC filter placement.     Discussed pt with Dr. Kristel Stokes, DO

## 2018-08-14 NOTE — H&P
[address] [facility]   Admission H&P    Date of admission: 8/14/2018    Patient name: Frank Chavez  MRN: [id number]  YOB: 1933  Age: 80 y.o. Primary care provider:  Laura Torrez MD     Source of Information: patient, medical records    Chief complaint:  Right wrist Fracture secondary to Ground Level Fall. History of Present Illness  Tray Pink is a 80 y.o. female with a history of Dementia, recurrent falls, HTN, Osteoarthritis, HLD, pulmonary embolism, Iron deficiency anemia, Afib, DM type 2 with neuropathy and fibromyalgia who presents to the ED via EMS complaining Right Wrist pain after a GLF. History obtained from patient's  and ED provider as pt has memory issues. Pt was seen in the ED on 08/13/18 after GLF and found to have a non-displaced wrist fracture of the right distal radius. Pt was splinted with a sugar tongue splint and discharged with outpatient ortho follow-up. Pt then returned to the ED this morning following another GLF and worsening right wrist pain. Per 's report, pt fell last night while trying to go to the bathroom without her walker. Pt has not had any fever/chills, diarrhea, abd pain or vomiting. She has frequent hallucinations about  trying to poisson her and refuses to take her pain medications at home.  reports that this is patient's 3rd fall in about a month. Pt was scheduled to see Dr. Radha Sims (Ortho) today at 9 AM for follow-up of her wrist fracture. Per chart review, pt has a history of PE on chronic anticoagulation. She also has a history of paroxysmal Afib  and underwent an EP study (5/25/18) with GULSHAN guided DCCV and successfully converted to NSR. Pt however converted back into Afib. She follows Dr. Kin Escalera outpatient.     Emergency Room Course:     Patient Vitals for the past 12 hrs:   Temp Pulse Resp BP SpO2   08/14/18 0618 - (!) 117 22 - 97 %   08/14/18 0617 - (!) 132 21 (!) 169/98 96 %   08/14/18 0610 - (!) 127 - (!) 173/96 96 %   08/14/18 0601 - - - - 95 %   08/14/18 0413 98.5 °F (36.9 °C) 74 18 (!) 199/91 95 %      Labs: Remarkable for INR 3.3 (Goal 2-3). UA shows 2+ bacteria, 100 protein. Negative LE and nitrites    XR Right Wrist -  Postreduction images of distal radial fracture. XR PELV - No acute fracture. Ct Head wo cont neg  EKG showed Atrial Flutter with rate 117. Pt was treated with Morphine 4mg for pain. Home Medications   Prior to Admission medications    Medication Sig Start Date End Date Taking? Authorizing Provider   JANTOVEN 3 mg tablet TAKE ONE TABLET BY MOUTH ONE TIME DAILY  8/2/18   Nba Byers MD   acetaminophen (TYLENOL) 325 mg tablet Take 2 Tabs by mouth every six (6) hours as needed for Pain. 7/20/18   Andres Mccarty MD   OTHER Check INR in 2 weeks 6/28/18   Nba Byers MD   warfarin (COUMADIN) 1 mg tablet Take 1 Tab by mouth daily. Patient taking differently: Take 3 mg by mouth daily. 6/28/18   Nba Byers MD   HYDROcodone-acetaminophen [facility]) 7.5-325 mg per tablet Take 1 Tab by mouth every eight (8) hours as needed for Pain. Max Daily Amount: 3 Tabs. 6/28/18   Nba Byers MD   amiodarone (CORDARONE) 200 mg tablet Take 1 Tab by mouth daily. 6/22/18   Rodolfo Mujica MD   dilTIAZem CD (CARDIZEM CD) 240 mg ER capsule Take 1 Cap by mouth daily. 6/22/18   Rodolfo Mujica MD   ferrous sulfate 325 mg (65 mg iron) cpER Take 325 mg by mouth daily. Historical Provider   polyethylene glycol (MIRALAX) 17 gram packet Take 17 g by mouth as needed (Constipation). Historical Provider   cyanocobalamin (VITAMIN B-12) 500 mcg tablet Take 500 mcg by mouth daily.     Historical Provider   cyclobenzaprine (FLEXERIL) 5 mg tablet TAKE 1 TABLET BY MOUTH 3 TIMES A DAY AS NEEDED FOR MUSCLE SPASMS 3/21/18   Nba Byers MD   OTHER Folding Wheelchair  Dx: Jacobo Rinne 2/27/18   Nba Byers MD   citalopram (CELEXA) 20 mg tablet TAKE 1 TABLET BY MOUTH DAILY 2/27/18 Kathy Epperson MD   gabapentin (NEURONTIN) 600 mg tablet TAKE 1 TABLET BY MOUTH 3 TIMES A DAY 2/27/18   Kathy Epperson MD       Allergies   Allergies   Allergen Reactions    Angiotensin Ii,Human Other (comments)     States does not remember      Avelox [Moxifloxacin] Other (comments)     States does not remember      Demerol [Meperidine] Hives       Past Medical History:   Diagnosis Date    Anemia     At[facility] [address]     Cancer ([facility]     basal cell on nose    Chronic pain     back pain    Fibromyalgia 4/1/2010    GERD (gastroesophageal reflux disease) 4/1/2010    Hiatal hernia 4/1/2010    High cholesterol 4/1/2010    HTN (hypertension) 4/1/2010    Ill-defined condition     A.  Fib    OA (osteoarthritis) 4/1/2010    back    Pulmonary emboli ([facility] 2015       Past Surgical History:   Procedure Laterality Date    BREAST SURGERY PROCEDURE UNLISTED      Breast im-plants x 2    ENLARGE BREAST WITH IMPLANT      HX APPENDECTOMY      HX BREAST BIOPSY      HX BREAST RECONSTRUCTION      Breast implants removed 1992    HX CATARACT REMOVAL      HX CHOLECYSTECTOMY  9/11/2013    HX HYSTERECTOMY      HX KNEE REPLACEMENT  2008    bilateral    HX ORTHOPAEDIC  2007    Bilateral TKR    HX PARTIAL HYSTERECTOMY      HX SHOULDER REPLACEMENT  2009    left    HX TONSILLECTOMY         Family History   Problem Relation Age of Onset   [address] [place] [address] Arthritis-rheumatoid Mother     Dementia Mother     Coronary Artery Disease Father     Cancer Brother      lung cancer       Social History   Patient resides    Independently    x  With       Assisted living      SNF      Ambulates    Independently      With cane     X  Assisted walker           Alcohol history   x  None     Social     Chronic     Smoking history  x  None     Former smoker     Current smoker       Review of Systems  Unable to obtain as patient is demented     Physical Exam  Visit Vitals    BP (!) 169/98    Pulse (!) 117    Temp 98.5 °F (36.9 °C)  Resp 22    Ht 5' 8\" (1.727 m)    Wt 171 lb (77.6 kg)    SpO2 97%    BMI 26 kg/m2        General: No acute distress. Not cooperative    Head: Normocephalic. Atraumatic. Eyes:  Conjunctiva pink. Sclera white. PERRL. Respiratory: CTAB. No w/r/r/c.   Cardiovascular: Irregularly Irregular rhythm. Tachycardia. No m/r/g. Pulses 2+ throughout. GI: + bowel sounds. Nontender. No rebound tenderness or guarding. Nondistended. Extremities: No edema. No palpable cord. No tenderness. Musculoskeletal: Right wrist is in sugartong splint with moderate swelling. Splint loosely attached. Neuro: Oriented only to person. Laboratory Data  Recent Results (from the past 24 hour(s))   CBC WITH AUTOMATED DIFF    Collection Time: 08/14/18  4:31 AM   Result Value Ref Range    WBC 10.2 3.6 - 11.0 K/uL    RBC 3.84 3.80 - 5.20 M/uL    HGB 12.2 11.5 - 16.0 g/dL    HCT 36.9 35.0 - 47.0 %    MCV 96.1 80.0 - 99.0 FL    MCH 31.8 26.0 - 34.0 PG    MCHC 33.1 30.0 - 36.5 g/dL    RDW 13.6 11.5 - 14.5 %    PLATELET 036 850 - 528 K/uL    MPV 10.2 8.9 - 12.9 FL    NRBC 0.0 0  WBC    ABSOLUTE NRBC 0.00 0.00 - 0.01 K/uL    NEUTROPHILS 79 (H) 32 - 75 %    LYMPHOCYTES 11 (L) 12 - 49 %    MONOCYTES 10 5 - 13 %    EOSINOPHILS 0 0 - 7 %    BASOPHILS 0 0 - 1 %    IMMATURE GRANULOCYTES 0 0.0 - 0.5 %    ABS. NEUTROPHILS 8.1 (H) 1.8 - 8.0 K/UL    ABS. LYMPHOCYTES 1.1 0.8 - 3.5 K/UL    ABS. MONOCYTES 1.0 0.0 - 1.0 K/UL    ABS. EOSINOPHILS 0.0 0.0 - 0.4 K/UL    ABS. BASOPHILS 0.0 0.0 - 0.1 K/UL    ABS. IMM.  GRANS. 0.0 0.00 - 0.04 K/UL    DF AUTOMATED     METABOLIC PANEL, COMPREHENSIVE    Collection Time: 08/14/18  4:31 AM   Result Value Ref Range    Sodium 136 136 - 145 mmol/L    Potassium 3.5 3.5 - 5.1 mmol/L    Chloride 100 97 - 108 mmol/L    CO2 27 21 - 32 mmol/L    Anion gap 9 5 - 15 mmol/L    Glucose 138 (H) 65 - 100 mg/dL    BUN 13 6 - 20 MG/DL    Creatinine 0.89 0.55 - 1.02 MG/DL    BUN/Creatinine ratio 15 12 - 20      GFR est AA >60 >60 ml/min/1.73m2    GFR est non-AA >60 >60 ml/min/1.73m2    Calcium 8.9 8.5 - 10.1 MG/DL    Bilirubin, total 0.7 0.2 - 1.0 MG/DL    ALT (SGPT) 16 12 - 78 U/L    AST (SGOT) 17 15 - 37 U/L    Alk. phosphatase 122 (H) 45 - 117 U/L    Protein, total 7.6 6.4 - 8.2 g/dL    Albumin 3.4 (L) 3.5 - 5.0 g/dL    Globulin 4.2 (H) 2.0 - 4.0 g/dL    A-G Ratio 0.8 (L) 1.1 - 2.2     URINALYSIS W/MICROSCOPIC    Collection Time: 08/14/18  4:31 AM   Result Value Ref Range    Color YELLOW/STRAW      Appearance CLEAR CLEAR      Specific gravity 1.016 1.003 - 1.030      pH (UA) 7.0 5.0 - 8.0      Protein 100 (A) NEG mg/dL    Glucose NEGATIVE  NEG mg/dL    Ketone NEGATIVE  NEG mg/dL    Bilirubin NEGATIVE  NEG      Blood NEGATIVE  NEG      Urobilinogen 0.2 0.2 - 1.0 EU/dL    Nitrites NEGATIVE  NEG      Leukocyte Esterase NEGATIVE  NEG      WBC PENDING /hpf    RBC PENDING /hpf    Epithelial cells PENDING /lpf    Bacteria PENDING /hpf   URINE CULTURE HOLD SAMPLE    Collection Time: 08/14/18  4:31 AM   Result Value Ref Range    Urine culture hold        URINE ON HOLD IN MICROBIOLOGY DEPT FOR 3 DAYS. IF UNPRESERVED URINE IS SUBMITTED, IT CANNOT BE USED FOR ADDITIONAL TESTING AFTER 24 HRS, RECOLLECTION WILL BE REQUIRED.    PROTHROMBIN TIME + INR    Collection Time: 08/14/18  4:31 AM   Result Value Ref Range    INR 3.3 (H) 0.9 - 1.1      Prothrombin time 33.5 (H) 9.0 - 11.1 sec   PTT    Collection Time: 08/14/18  4:31 AM   Result Value Ref Range    aPTT 59.5 (H) 22.1 - 32.0 sec    aPTT, therapeutic range     58.0 - 77.0 SECS   EKG, 12 LEAD, INITIAL    Collection Time: 08/14/18  4:41 AM   Result Value Ref Range    Ventricular Rate 117 BPM    Atrial Rate 324 BPM    QRS Duration 98 ms    Q-T Interval 282 ms    QTC Calculation (Bezet) 393 ms    Calculated R Axis -5 degrees    Calculated T Axis 111 degrees    Diagnosis       Atrial flutter with variable AV block  Septal infarct (cited on or before 24-MAY-2018)  Abnormal ECG  When compared with ECG of 20-JUN-2018 05:47,  Atrial flutter has replaced Atrial fibrillation  Nonspecific T wave abnormality, worse in Lateral leads         Imaging  CXR Results  (Last 48 hours)    None        CT Results  (Last 48 hours)               08/14/18 0439  CT HEAD WO CONT Final result    Impression:  IMPRESSION:        No acute cranial process               Narrative:  EXAM:  CT HEAD WO CONT   Clinical history: Fall, fractured wrist, increased pain   INDICATION:   fall       COMPARISON: 7/20/2018. CONTRAST:  None. TECHNIQUE: Unenhanced CT of the head was performed using 5 mm images. Brain and   bone windows were generated. CT dose reduction was achieved through use of a   standardized protocol tailored for this examination and automatic exposure   control for dose modulation. FINDINGS:   The ventricles and sulci are normal in size, shape and configuration and   midline. Mild scattered hypodensities in the cerebral white matter. There is no   intracranial hemorrhage, extra-axial collection, mass, mass effect or midline   shift. The basilar cisterns are open. No acute infarct is identified. The bone   windows demonstrate no abnormalities. The visualized portions of the paranasal   sinuses and mastoid air cells are clear. Assessment and Plan   Tray Ralph is a 80 y.o. female who is admitted for Right wrist fracture following recurrent falls. Right Wrist fracture: Secondary to GLF on right outstretch hand. This is 3rd fall in the past month. Non-displaced right wrist fracture confirmed on x-ray. No acute pelvic fracture noted on pelv Xray. Normal CT head    - Ortho consult, appreciate recs. Suppose to see Dr. Acacia Alford today for follow up  - Fall precaution and sitter in the room to prevent patient from pulling her IV lines.   - Norco 7.5-325mg Q8H prn for pain  - PT/OT  - CM consult for placement  - Admitted to Tele given history of Afib    Paroxysmal Afib: Currently in Afib with RVR (rates 110s).  Underwent an EP study (5/25/18) with GULSHAN guided DCCV and successfully converted to NSR. Pt however converted back to Afib at subsequent visits. Pt of Dr. Kin Escalera. INR 3.3 (goal 2-3). -Last Echo (5/25/18) with EF 55%-60%  -Cont home Cardizem 240mg daily.   -Cont home warfarin 3mg daily, pharmacy dosing  -Will consider Cardio consult if pt stays in persistent Afib despite treatment    Hx of Pulmonary Embolism on warfarin: INR 3.3. Per Heme/onc note, this was unprovoked and pt will be on life long anti-coag  -Cont warfarin 3mg, pharmacy to adjust dose    Asymptomatic Bacteriuria: 2+ bacteria and neg LE/nitrites.  -Send for cultures    DM type 2 with neuropathy: Last A1c 6.8 (5/25/18). Diet controlled    - Insulin Sliding Scale normal sensitivity with AC&HS glucose checks. - Hypoglycemia protocols ordered. - Cont Gabapentin 600mg TID for neuropathic pain    Hypertension: On admission BP was 199/91 as pt was agitated. This later trended down to 168/98.   - Restart home medications of Losartan/HCTZ 50/12.5 mg for SBP >170 per cards  - Will continue to monitor at this time and readjust as BP's trend. Hyperlipidemia: Lipid panel (4/16/18) - Tchol 228, HDL 55,  and TG 1886. Diet controlled, pt not on any medication   - Continue to monitor    Depression:   -Cont home Celexa 20mg daily    Hx of Iron deficiency anemia: Normal Hb(12.2). Pt was initially on Iron but has since been discontinued by Dr. Mitesh Carbajal (Heme/onc) given persistent constipation and dark stools  -Cont to monitor  -Daily CBC    Muscle Spasms:  -Cont home flexeril 5mg prn     Constipation:  -Miralax prn    FEN/GI - Diabetic Diet  Activity - Fall precaution, ambulate with assistance  DVT prophylaxis - Continue home coumadin, pharmacy dosing per protocol  GI prophylaxis -  None  Disposition - CM consulted for placing. CODE STATUS:  Full, discussed with      cell is [phone], name is Jay Romero.         Patient will be discussed with Dr. Brian Elizabeth (supervising provider)       Steph Johns MD  [facility] Problems  Date Reviewed: 8/1/2018    None

## 2018-08-14 NOTE — ED NOTES
PRESENTED WITH R WRIST SPLINT FROM PREV VISIT. SPLINT DISHEVELED IN APPEARANCE.  REPORTS, \"SHE KEEPS FOOLING WITH IT\". CAP REFILL BRISK.  RADIAL PULSE +2 IN R WRIST

## 2018-08-14 NOTE — PROGRESS NOTES
[facility]: MED RECONCILIATION    Comments/Recommendations:    Home medication list updated with patient's .  Losartan/HCTZ 100/25 - patient takes 1/2 tablet daily as needed for SBP greater than 170. Medications added:     · Losartan/HCTZ 100/25 - 1/2 tablet daily as needed for SBP greater than 170. Medications removed:    · Acetaminophen  · Amiodarone  · Cyanocobalamin  · Ferrous Sulfate    Medications adjusted:    · Cyclobenzaprine 5 mg by mouth daily as needed  · Hydrocodone/APAP 7.5/325 1 tablet by mouth three times daily as needed for pain  · Jantoven 3 mg by mouth every evening    Allergies: Angiotensin ii,human; Avelox [moxifloxacin]; and Demerol [meperidine]    Prior to Admission Medications   Prescriptions Last Dose Informant Patient Reported? Taking? HYDROcodone-acetaminophen (NORCO) 7.5-325 mg per tablet 8/13/2018 at Unknown time Significant Other Yes Yes   Sig: Take 1 Tab by mouth three (3) times daily as needed for Pain. OTHER  Significant Other No No   Sig: Folding Wheelchair  Dx: OA   OTHER  Significant Other No No   Sig: Check INR in 2 weeks   citalopram (CELEXA) 20 mg tablet 8/13/2018 at Unknown time Significant Other No Yes   Sig: TAKE 1 TABLET BY MOUTH DAILY   cyclobenzaprine (FLEXERIL) 5 mg tablet 8/13/2018 at Unknown time Significant Other Yes Yes   Sig: Take 5 mg by mouth daily as needed for Muscle Spasm(s). dilTIAZem CD (CARTIA XT) 240 mg ER capsule 8/13/2018 at Unknown time Significant Other Yes Yes   Sig: Take 240 mg by mouth daily. gabapentin (NEURONTIN) 600 mg tablet 8/13/2018 at Unknown time Significant Other No Yes   Sig: TAKE 1 TABLET BY MOUTH 3 TIMES A DAY   losartan-hydroCHLOROthiazide (HYZAAR) 100-25 mg per tablet 8/13/2018 at Unknown time Significant Other Yes Yes   Sig: Take 0.5 Tabs by mouth daily as needed (SBP greater than 170). polyethylene glycol (MIRALAX) 17 gram packet 8/12/2018 Significant Other Yes No   Sig: Take 17 g by mouth as needed (Constipation). warfarin (JANTOVEN) 3 mg tablet 8/13/2018 at Unknown time Significant Other Yes Yes   Sig: Take 3 mg by mouth every evening.         Xiomy Quintana, Pharmacist

## 2018-08-14 NOTE — ED NOTES
Bedside and Verbal shift change report given to Hui (oncoming nurse) by Cleopatra Cruz (offgoing nurse). Report included the following information SBAR, Kardex, ED Summary, [facility] and Recent Results.

## 2018-08-14 NOTE — PROGRESS NOTES
0730- Introduced self to patient as primary nurse and assumed care. Pt incontinent of urine, cleaned, linen and gown changed, and pure wick placed on pt. Pt with legs through stretcher railing, seizure pads applied to bed for pt protection. Pt with dementia and confused at baseline.  at bedside at this time. Pt's nasal cannula not on face, oxygen saturations 92% on room air, placed on 2 L nasal cannula. Pillow placed under pt's right arm. Bed alarm placed under pt and charge nurse notified that pt is high fall risk and should have a sitter at bedside.

## 2018-08-14 NOTE — ED TRIAGE NOTES
PT TO ED FROM HOME FOR CONT R WRIST PAIN. HERE 8/13 FOR SAME. DX WITH BROKEN R WRIST, SPLINTED IN ED.  REPORTS PT REFUSES TO TAKE PAIN MEDS. STATES \"SHE THINKS I AM TRYING TO POISON HER\".  REPORTS FALLS X2 ON 8/13. ALSO C/O INABILITY TO MOVE LEGS. PHX DEMENTIA    AOX1- SELF (AT BASELINE). ABLE TO MOVE ALL EXTREMITIES.  NAD

## 2018-08-14 NOTE — CONSULTS
ORTHOPAEDIC FRACTURE CONSULT NOTE    Subjective:     Date of Consultation:  August 14, 2018      Tray Crabtree is a 80 y.o. female with hx of afib, dyslipidemia, dementia, DM, anemia, HTN, OA who is being admitted for frequent falls. Pt injured her right wrist after falling yesterday. Pt fell with an outstretched arm. She was seen in the ED, placed in a sugartong splint and discharged. Pt lives at home with  and was unable to care for herself and continued to have falls making it unsafe for her to be home. Pt was supposed to seen in follow up today for her wrist fracture in the Orthopedic office. Pt complains of moderate pain. Pt with hx of dementia, unable to review ROS.        Patient Active Problem List    Diagnosis Date Noted    Frequent falls 08/14/2018    Wrist fracture, right 08/14/2018    A-fib ([address] 05/24/2018    Dyslipidemia 05/24/2018    Chronic anticoagulation 05/24/2018    Constipation 05/24/2018    History of pulmonary embolism 05/24/2018    Type 2 diabetes with nephropathy ([address] 02/27/2018    Advanced care planning/counseling discussion 05/09/2017    Controlled type 2 diabetes mellitus without complication, without long-term current use of insulin ([address] 05/09/2017    Iron deficiency anemia 08/11/2016    Advance care planning 04/13/2016    Pulmonary embolism ([address] 02/25/2015    Itching 02/07/2014    Elevated liver enzymes 01/02/2014    S/P cholecystectomy 01/02/2014    S/P knee replacement 01/02/2014    S/P shoulder surgery 01/02/2014    H/O carpal tunnel repair 01/02/2014    Chronic pain 11/16/2010    HTN (hypertension) 04/01/2010    OA (osteoarthritis) 04/01/2010    Fibromyalgia 04/01/2010    GERD (gastroesophageal reflux disease) 04/01/2010    High cholesterol 04/01/2010    Hiatal hernia 04/01/2010     Family History   Problem Relation Age of Onset    Arthritis-rheumatoid Mother     Dementia Mother     Coronary Artery Disease Father     Cancer Brother      lung cancer      Social History   Substance Use Topics    Smoking status: Never Smoker    Smokeless tobacco: Never Used    Alcohol use No     Past Medical History:   Diagnosis Date    Anemia     [facility]rial fibrillation [facility]     Cancer (HCC)     basal cell on nose    Chronic pain     back pain    Fibromyalgia 4/1/2010    GERD (gastroesophageal reflux disease) 4/1/2010    Hiatal hernia 4/1/2010    High cholesterol 4/1/2010    HTN (hypertension) 4/1/2010    Ill-defined condition     A. Fib    OA (osteoarthritis) 4/1/2010    back    Pulmonary emboli ([facility] [address] 2015      Past Surgical History:   Procedure Laterality Date    BREAST SURGERY PROCEDURE UNLISTED      Breast im-plants x 2    ENLARGE BREAST WITH IMPLANT      HX APPENDECTOMY      HX BREAST BIOPSY      HX BREAST RECONSTRUCTION      Breast implants removed 1992    HX CATARACT REMOVAL      HX CHOLECYSTECTOMY  9/11/2013    HX HYSTERECTOMY      HX KNEE REPLACEMENT  2008    bilateral    HX ORTHOPAEDIC  2007    Bilateral TKR    HX PARTIAL HYSTERECTOMY      HX SHOULDER REPLACEMENT  2009    left    HX TONSILLECTOMY        Prior to Admission medications    Medication Sig Start Date End Date Taking? Authorizing Provider   cyclobenzaprine (FLEXERIL) 5 mg tablet Take 5 mg by mouth daily as needed for Muscle Spasm(s). Yes Historical Provider   dilTIAZem CD (CARTIA XT) 240 mg ER capsule Take 240 mg by mouth daily. Yes Historical Provider   HYDROcodone-acetaminophen (NORCO) 7.5-325 mg per tablet Take 1 Tab by mouth three (3) times daily as needed for Pain. Yes Historical Provider   warfarin (JANTOVEN) 3 mg tablet Take 3 mg by mouth every evening. Yes Historical Provider   losartan-hydroCHLOROthiazide (HYZAAR) 100-25 mg per tablet Take 0.5 Tabs by mouth daily as needed (SBP greater than 170).    Yes Historical Provider   citalopram (CELEXA) 20 mg tablet TAKE 1 TABLET BY MOUTH DAILY 2/27/18  Yes Janna Armas MD   gabapentin (NEURONTIN) 600 mg tablet TAKE 1 TABLET BY MOUTH 3 TIMES A DAY 2/27/18  Yes Laura Torrez MD   OTHER Check INR in 2 weeks 6/28/18   Laura Torrez MD   polyethylene glycol [facility]) 17 gram packet Take 17 g by mouth as needed (Constipation). Historical Provider   OTHER Folding Wheelchair  Dx: Jayson Abt 2/27/18   Laura Torrez MD     Current Facility-Administered Medications   Medication Dose Route Frequency    sodium chloride (NS) flush 5-10 mL  5-10 mL IntraVENous Q8H    sodium chloride (NS) flush 5-10 mL  5-10 mL IntraVENous PRN    dilTIAZem CD (CARDIZEM CD) capsule 240 mg  240 mg Oral DAILY    sodium chloride (NS) flush 5-10 mL  5-10 mL IntraVENous Q8H    sodium chloride (NS) flush 5-10 mL  5-10 mL IntraVENous PRN    HYDROcodone-acetaminophen (NORCO) 7.5-325 mg per tablet 1 Tab  1 Tab Oral TID PRN    polyethylene glycol (MIRALAX) packet 17 g  17 g Oral DAILY PRN    citalopram (CELEXA) tablet 20 mg  20 mg Oral DAILY    gabapentin (NEURONTIN) capsule 600 mg  600 mg Oral TID    cyclobenzaprine (FLEXERIL) tablet 5 mg  5 mg Oral DAILY PRN    Warfarin - Pharmacist dosing   Other PRN    Warfarin - No Warfarin on 8/14 (INR 3.3)   Other ONCE    losartan/hydroCHLOROthiazide (HYZAAR) 50/12.5 mg   Oral DAILY PRN    insulin lispro (HUMALOG) injection   SubCUTAneous AC&HS    glucose chewable tablet 16 g  4 Tab Oral PRN    dextrose (D50W) injection syrg 12.5-25 g  12.5-25 g IntraVENous PRN    glucagon (GLUCAGEN) injection 1 mg  1 mg IntraMUSCular PRN    nystatin (MYCOSTATIN) 100,000 unit/gram powder   Topical BID      Allergies   Allergen Reactions    Angiotensin Ii,Human Other (comments)     States does not remember      Avelox [Moxifloxacin] Other (comments)     States does not remember      Demerol [Meperidine] Hives        Review of Systems:  A comprehensive review of systems was negative except for that written in the HPI.     Mental Status: medium dementia    Objective:     Patient Vitals for the past 8 hrs:   BP Temp Pulse Resp SpO2 18 1619 127/67 99.4 °F (37.4 °C) (!) 115 21 93 %   18 1500 (!) 151/93 - (!) 101 20 98 %   18 1400 (!) 138/94 - - - -   18 1238 147/82 - 91 19 96 %   18 1100 155/75 99 °F (37.2 °C) (!) 106 21 90 %     Temp (24hrs), Av °F (37.2 °C), Min:98.5 °F (36.9 °C), Max:99.4 °F (37.4 °C)      Gen: Well-developed,  in no acute distress. Grimaces in pain with movement of right wrist / speaks unintelligible. Musc: Right wrist / forearm in sugartong splint. Hand and fingers are significantly swollen and bruised. No opens areas. Cap refill less than 3 seconds. Skin: No skin breakdown noted. Skin warm, pink, dry  Neuro: Cranial nerves are grossly intact, no focal motor weakness, follows commands appropriately   Psych:Pt is confused/ attempting to pull at ace bandage on splint. Imaging Review: EXAM: XR WRIST RT AP/LAT/OBL MIN 3V        HISTORY: Fall, fell in bathroom, right arm  INDICATION:  fall.     COMPARISON: None.     FINDINGS: Three  views of the right wrist demonstrate nondisplaced distal radius  fracture. Severe degenerative change of the radiocarpal and carpometacarpal  rows. No ulnar fracture. .  The soft tissues are within normal limits.     IMPRESSION  IMPRESSION:       Nondisplaced fracture of the distal radius.     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     Labs:   Recent Results (from the past 24 hour(s))   CBC WITH AUTOMATED DIFF    Collection Time: 18  4:31 AM   Result Value Ref Range    WBC 10.2 3.6 - 11.0 K/uL    RBC 3.84 3.80 - 5.20 M/uL    HGB 12.2 11.5 - 16.0 g/dL    HCT 36.9 35.0 - 47.0 %    MCV 96.1 80.0 - 99.0 FL    MCH 31.8 26.0 - 34.0 PG    MCHC 33.1 30.0 - 36.5 g/dL    RDW 13.6 11.5 - 14.5 %    PLATELET 177 191 - 917 K/uL    MPV 10.2 8.9 - 12.9 FL    NRBC 0.0 0  WBC    ABSOLUTE NRBC 0.00 0.00 - 0.01 K/uL    NEUTROPHILS 79 (H) 32 - 75 %    LYMPHOCYTES 11 (L) 12 - 49 %    MONOCYTES 10 5 - 13 %    EOSINOPHILS 0 0 - 7 %    BASOPHILS 0 0 - 1 % IMMATURE GRANULOCYTES 0 0.0 - 0.5 %    ABS. NEUTROPHILS 8.1 (H) 1.8 - 8.0 K/UL    ABS. LYMPHOCYTES 1.1 0.8 - 3.5 K/UL    ABS. MONOCYTES 1.0 0.0 - 1.0 K/UL    ABS. EOSINOPHILS 0.0 0.0 - 0.4 K/UL    ABS. BASOPHILS 0.0 0.0 - 0.1 K/UL    ABS. IMM. GRANS. 0.0 0.00 - 0.04 K/UL    DF AUTOMATED     METABOLIC PANEL, COMPREHENSIVE    Collection Time: 08/14/18  4:31 AM   Result Value Ref Range    Sodium 136 136 - 145 mmol/L    Potassium 3.5 3.5 - 5.1 mmol/L    Chloride 100 97 - 108 mmol/L    CO2 27 21 - 32 mmol/L    Anion gap 9 5 - 15 mmol/L    Glucose 138 (H) 65 - 100 mg/dL    BUN 13 6 - 20 MG/DL    Creatinine 0.89 0.55 - 1.02 MG/DL    BUN/Creatinine ratio 15 12 - 20      GFR est AA >60 >60 ml/min/1.73m2    GFR est non-AA >60 >60 ml/min/1.73m2    Calcium 8.9 8.5 - 10.1 MG/DL    Bilirubin, total 0.7 0.2 - 1.0 MG/DL    ALT (SGPT) 16 12 - 78 U/L    AST (SGOT) 17 15 - 37 U/L    Alk. phosphatase 122 (H) 45 - 117 U/L    Protein, total 7.6 6.4 - 8.2 g/dL    Albumin 3.4 (L) 3.5 - 5.0 g/dL    Globulin 4.2 (H) 2.0 - 4.0 g/dL    A-G Ratio 0.8 (L) 1.1 - 2.2     URINALYSIS W/MICROSCOPIC    Collection Time: 08/14/18  4:31 AM   Result Value Ref Range    Color YELLOW/STRAW      Appearance CLEAR CLEAR      Specific gravity 1.016 1.003 - 1.030      pH (UA) 7.0 5.0 - 8.0      Protein 100 (A) NEG mg/dL    Glucose NEGATIVE  NEG mg/dL    Ketone NEGATIVE  NEG mg/dL    Bilirubin NEGATIVE  NEG      Blood NEGATIVE  NEG      Urobilinogen 0.2 0.2 - 1.0 EU/dL    Nitrites NEGATIVE  NEG      Leukocyte Esterase NEGATIVE  NEG      WBC 10-20 0 - 4 /hpf    RBC 0-5 0 - 5 /hpf    Epithelial cells FEW FEW /lpf    Bacteria 2+ (A) NEG /hpf   URINE CULTURE HOLD SAMPLE    Collection Time: 08/14/18  4:31 AM   Result Value Ref Range    Urine culture hold        URINE ON HOLD IN MICROBIOLOGY DEPT FOR 3 DAYS. IF UNPRESERVED URINE IS SUBMITTED, IT CANNOT BE USED FOR ADDITIONAL TESTING AFTER 24 HRS, RECOLLECTION WILL BE REQUIRED.    PROTHROMBIN TIME + INR    Collection Time: 08/14/18  4:31 AM   Result Value Ref Range    INR 3.3 (H) 0.9 - 1.1      Prothrombin time 33.5 (H) 9.0 - 11.1 sec   PTT    Collection Time: 08/14/18  4:31 AM   Result Value Ref Range    aPTT 59.5 (H) 22.1 - 32.0 sec    aPTT, therapeutic range     58.0 - 77.0 SECS   EKG, 12 LEAD, INITIAL    Collection Time: 08/14/18  4:41 AM   Result Value Ref Range    Ventricular Rate 117 BPM    Atrial Rate 324 BPM    QRS Duration 98 ms    Q-T Interval 282 ms    QTC Calculation (Bezet) 393 ms    Calculated R Axis -5 degrees    Calculated T Axis 111 degrees    Diagnosis       Atrial flutter with variable AV block  Septal infarct (cited on or before 24-MAY-2018)  Abnormal ECG  When compared with ECG of 20-JUN-2018 05:47,  Atrial flutter has replaced Atrial fibrillation  Nonspecific T wave abnormality, worse in Lateral leads     GLUCOSE, POC    Collection Time: 08/14/18 11:13 AM   Result Value Ref Range    Glucose (POC) 131 (H) 65 - 100 mg/dL    Performed by [address] [place] [address], POC    Collection Time: 08/14/18  3:58 PM   Result Value Ref Range    Glucose (POC) 125 (H) 65 - 100 mg/dL    Performed by Rizwana Gandhi          Impression:     Patient Active Problem List    Diagnosis Date Noted    Frequent falls 08/14/2018    Wrist fracture, right 08/14/2018    A-fib ([facility] 05/24/2018    Dyslipidemia 05/24/2018    Chronic anticoagulation 05/24/2018    Constipation 05/24/2018    History of pulmonary embolism 05/24/2018    Type 2 diabetes with nephropathy ([address] 02/27/2018    Advanced care planning/counseling discussion 05/09/2017    Controlled type 2 diabetes mellitus without complication, without long-term current use of insulin ([address] 05/09/2017    Iron deficiency anemia 08/11/2016    Advance care planning 04/13/2016    Pulmonary embolism ([facility] 02/25/2015    Itching 02/07/2014    Elevated liver enzymes 01/02/2014    S/P cholecystectomy 01/02/2014    S/P knee replacement 01/02/2014    S/P shoulder surgery 01/02/2014    H/O carpal tunnel repair 01/02/2014    Chronic pain 11/16/2010    HTN (hypertension) 04/01/2010    OA (osteoarthritis) 04/01/2010    Fibromyalgia 04/01/2010    GERD (gastroesophageal reflux disease) 04/01/2010    High cholesterol 04/01/2010    Hiatal hernia 04/01/2010     Principal Problem:    Frequent falls (8/14/2018)    Active Problems:    HTN (hypertension) (4/1/2010)      GERD (gastroesophageal reflux disease) (4/1/2010)      Iron deficiency anemia (8/11/2016)      Controlled type 2 diabetes mellitus without complication, without long-term current use of insulin ([facility] [address] (5/9/2017)      Dyslipidemia (5/24/2018)      Wrist fracture, right (8/14/2018)        Plan:   Right nondisplaced distal radius fracture   Pt currently in sugartong splint. Will treat conservatively. Pt needs strict elevation due to severity of hand/ finger swelling. Placed right UE in sling and propped hand up to be higher than level of heart. Encouraged sitter/ nurse to keep hand elevated at all times. Pt to follow up as outpatient with Ortho surgeon/ cast application. Unable to do so now due to severity of swelling and acute fx just yesterday. Pt was to originally see Dr. Alicia Webber who was on call yesterday when patient was originally seen (I was not aware at the time patient was seen and discussed with Dr. Sami Rosales on call today)  Pt to follow up with Dr. Alicia Webber as outpatient. Will continue to follow status of hand swelling/ splint integrity. Discussed with Dr. Sami Rosales, he agrees with above plan.        Siri Marsh NP

## 2018-08-15 ENCOUNTER — PATIENT OUTREACH (OUTPATIENT)
Dept: FAMILY MEDICINE CLINIC | Age: 83
End: 2018-08-15

## 2018-08-15 LAB
ANION GAP SERPL CALC-SCNC: 7 MMOL/L (ref 5–15)
BACTERIA SPEC CULT: ABNORMAL
BUN SERPL-MCNC: 16 MG/DL (ref 6–20)
BUN/CREAT SERPL: 16 (ref 12–20)
CALCIUM SERPL-MCNC: 9 MG/DL (ref 8.5–10.1)
CC UR VC: ABNORMAL
CHLORIDE SERPL-SCNC: 102 MMOL/L (ref 97–108)
CO2 SERPL-SCNC: 27 MMOL/L (ref 21–32)
CREAT SERPL-MCNC: 0.97 MG/DL (ref 0.55–1.02)
ERYTHROCYTE [DISTWIDTH] IN BLOOD BY AUTOMATED COUNT: 13.3 % (ref 11.5–14.5)
GLUCOSE BLD STRIP.AUTO-MCNC: 111 MG/DL (ref 65–100)
GLUCOSE BLD STRIP.AUTO-MCNC: 114 MG/DL (ref 65–100)
GLUCOSE BLD STRIP.AUTO-MCNC: 123 MG/DL (ref 65–100)
GLUCOSE BLD STRIP.AUTO-MCNC: 125 MG/DL (ref 65–100)
GLUCOSE SERPL-MCNC: 112 MG/DL (ref 65–100)
HCT VFR BLD AUTO: 37.2 % (ref 35–47)
HGB BLD-MCNC: 11.9 G/DL (ref 11.5–16)
INR PPP: 3.6 (ref 0.9–1.1)
MCH RBC QN AUTO: 31.9 PG (ref 26–34)
MCHC RBC AUTO-ENTMCNC: 32 G/DL (ref 30–36.5)
MCV RBC AUTO: 99.7 FL (ref 80–99)
NRBC # BLD: 0 K/UL (ref 0–0.01)
NRBC BLD-RTO: 0 PER 100 WBC
PLATELET # BLD AUTO: 186 K/UL (ref 150–400)
PMV BLD AUTO: 10.6 FL (ref 8.9–12.9)
POTASSIUM SERPL-SCNC: 3.8 MMOL/L (ref 3.5–5.1)
PROTHROMBIN TIME: 36.7 SEC (ref 9–11.1)
RBC # BLD AUTO: 3.73 M/UL (ref 3.8–5.2)
SERVICE CMNT-IMP: ABNORMAL
SODIUM SERPL-SCNC: 136 MMOL/L (ref 136–145)
WBC # BLD AUTO: 9.5 K/UL (ref 3.6–11)

## 2018-08-15 PROCEDURE — 97535 SELF CARE MNGMENT TRAINING: CPT

## 2018-08-15 PROCEDURE — 85027 COMPLETE CBC AUTOMATED: CPT | Performed by: STUDENT IN AN ORGANIZED HEALTH CARE EDUCATION/TRAINING PROGRAM

## 2018-08-15 PROCEDURE — 77030038269 HC DRN EXT URIN PURWCK BARD -A

## 2018-08-15 PROCEDURE — 74011250636 HC RX REV CODE- 250/636: Performed by: STUDENT IN AN ORGANIZED HEALTH CARE EDUCATION/TRAINING PROGRAM

## 2018-08-15 PROCEDURE — 97530 THERAPEUTIC ACTIVITIES: CPT

## 2018-08-15 PROCEDURE — 82962 GLUCOSE BLOOD TEST: CPT

## 2018-08-15 PROCEDURE — 51798 US URINE CAPACITY MEASURE: CPT

## 2018-08-15 PROCEDURE — 36415 COLL VENOUS BLD VENIPUNCTURE: CPT | Performed by: STUDENT IN AN ORGANIZED HEALTH CARE EDUCATION/TRAINING PROGRAM

## 2018-08-15 PROCEDURE — 74011250637 HC RX REV CODE- 250/637: Performed by: FAMILY MEDICINE

## 2018-08-15 PROCEDURE — 74011000258 HC RX REV CODE- 258: Performed by: STUDENT IN AN ORGANIZED HEALTH CARE EDUCATION/TRAINING PROGRAM

## 2018-08-15 PROCEDURE — 97165 OT EVAL LOW COMPLEX 30 MIN: CPT

## 2018-08-15 PROCEDURE — 77010033678 HC OXYGEN DAILY

## 2018-08-15 PROCEDURE — 85610 PROTHROMBIN TIME: CPT | Performed by: STUDENT IN AN ORGANIZED HEALTH CARE EDUCATION/TRAINING PROGRAM

## 2018-08-15 PROCEDURE — 74011250637 HC RX REV CODE- 250/637: Performed by: STUDENT IN AN ORGANIZED HEALTH CARE EDUCATION/TRAINING PROGRAM

## 2018-08-15 PROCEDURE — 80048 BASIC METABOLIC PNL TOTAL CA: CPT | Performed by: STUDENT IN AN ORGANIZED HEALTH CARE EDUCATION/TRAINING PROGRAM

## 2018-08-15 PROCEDURE — 97161 PT EVAL LOW COMPLEX 20 MIN: CPT

## 2018-08-15 PROCEDURE — A4216 STERILE WATER/SALINE, 10 ML: HCPCS | Performed by: STUDENT IN AN ORGANIZED HEALTH CARE EDUCATION/TRAINING PROGRAM

## 2018-08-15 PROCEDURE — 65660000000 HC RM CCU STEPDOWN

## 2018-08-15 RX ORDER — SODIUM CHLORIDE, SODIUM LACTATE, POTASSIUM CHLORIDE, CALCIUM CHLORIDE 600; 310; 30; 20 MG/100ML; MG/100ML; MG/100ML; MG/100ML
125 INJECTION, SOLUTION INTRAVENOUS CONTINUOUS
Status: DISCONTINUED | OUTPATIENT
Start: 2018-08-15 | End: 2018-08-18

## 2018-08-15 RX ORDER — DILTIAZEM HYDROCHLORIDE 180 MG/1
360 CAPSULE, COATED, EXTENDED RELEASE ORAL DAILY
Status: DISCONTINUED | OUTPATIENT
Start: 2018-08-16 | End: 2018-08-16

## 2018-08-15 RX ORDER — DILTIAZEM HYDROCHLORIDE 120 MG/1
120 CAPSULE, COATED, EXTENDED RELEASE ORAL ONCE
Status: ACTIVE | OUTPATIENT
Start: 2018-08-15 | End: 2018-08-15

## 2018-08-15 RX ORDER — OLANZAPINE 2.5 MG/1
2.5 TABLET ORAL
Status: DISCONTINUED | OUTPATIENT
Start: 2018-08-15 | End: 2018-08-16

## 2018-08-15 RX ORDER — DILTIAZEM HYDROCHLORIDE 30 MG/1
120 TABLET, FILM COATED ORAL
Status: DISCONTINUED | OUTPATIENT
Start: 2018-08-15 | End: 2018-08-15

## 2018-08-15 RX ADMIN — HYDROCODONE BITARTRATE AND ACETAMINOPHEN 1 TABLET: 7.5; 325 TABLET ORAL at 01:50

## 2018-08-15 RX ADMIN — SODIUM CHLORIDE, SODIUM LACTATE, POTASSIUM CHLORIDE, AND CALCIUM CHLORIDE 125 ML/HR: 600; 310; 30; 20 INJECTION, SOLUTION INTRAVENOUS at 17:53

## 2018-08-15 RX ADMIN — HYDROCODONE BITARTRATE AND ACETAMINOPHEN 1 TABLET: 7.5; 325 TABLET ORAL at 08:24

## 2018-08-15 RX ADMIN — DILTIAZEM HYDROCHLORIDE 240 MG: 240 CAPSULE, COATED, EXTENDED RELEASE ORAL at 08:26

## 2018-08-15 RX ADMIN — Medication 10 ML: at 21:03

## 2018-08-15 RX ADMIN — HYDROCODONE BITARTRATE AND ACETAMINOPHEN 1 TABLET: 7.5; 325 TABLET ORAL at 16:28

## 2018-08-15 RX ADMIN — NYSTATIN: 100000 POWDER TOPICAL at 17:53

## 2018-08-15 RX ADMIN — Medication 10 ML: at 16:30

## 2018-08-15 RX ADMIN — NYSTATIN: 100000 POWDER TOPICAL at 08:26

## 2018-08-15 RX ADMIN — SODIUM CHLORIDE, SODIUM LACTATE, POTASSIUM CHLORIDE, AND CALCIUM CHLORIDE 125 ML/HR: 600; 310; 30; 20 INJECTION, SOLUTION INTRAVENOUS at 00:50

## 2018-08-15 RX ADMIN — WATER 2.5 MG: 1 INJECTION INTRAMUSCULAR; INTRAVENOUS; SUBCUTANEOUS at 21:02

## 2018-08-15 RX ADMIN — Medication 10 ML: at 21:02

## 2018-08-15 RX ADMIN — SODIUM CHLORIDE, SODIUM LACTATE, POTASSIUM CHLORIDE, AND CALCIUM CHLORIDE 250 ML: 600; 310; 30; 20 INJECTION, SOLUTION INTRAVENOUS at 00:35

## 2018-08-15 RX ADMIN — Medication 10 ML: at 00:27

## 2018-08-15 RX ADMIN — Medication 5 ML: at 06:00

## 2018-08-15 RX ADMIN — GABAPENTIN 600 MG: 300 CAPSULE ORAL at 08:24

## 2018-08-15 RX ADMIN — CITALOPRAM HYDROBROMIDE 20 MG: 20 TABLET ORAL at 08:24

## 2018-08-15 RX ADMIN — Medication 10 ML: at 16:29

## 2018-08-15 RX ADMIN — CEFTRIAXONE SODIUM 1 G: 1 INJECTION, POWDER, FOR SOLUTION INTRAMUSCULAR; INTRAVENOUS at 16:28

## 2018-08-15 RX ADMIN — GABAPENTIN 600 MG: 300 CAPSULE ORAL at 16:28

## 2018-08-15 RX ADMIN — SODIUM CHLORIDE, SODIUM LACTATE, POTASSIUM CHLORIDE, AND CALCIUM CHLORIDE 125 ML/HR: 600; 310; 30; 20 INJECTION, SOLUTION INTRAVENOUS at 05:01

## 2018-08-15 NOTE — ROUTINE PROCESS
0022  Patient difficult to fully assess due to being combative. 56  Spoke with  to clarify order for LR bolus considering patient elevated blood pressure. Advised to continue with bolus. 5856  Bladder scan done. Showed 0 mL. Abdomen semi-soft. Patient very restless. 9772  Patient voided on pad and some in bed pan.    0701  Bedside and Verbal shift change report given to Reggie Fortune (oncoming nurse) by Carla Sr (offgoing nurse). Report included the following information SBAR, Kardex, ED Summary, Procedure Summary, Intake/Output, MAR, Recent Results and Cardiac Rhythm A Flutter.

## 2018-08-15 NOTE — PROGRESS NOTES
Orthopaedic Progress Note      August 15, 2018 11:48 AM     Patient: Zainab Dodge MRN: [id number]  SSN: [id number]    YOB: 1933  Age: 80 y.o. Sex: female      Admit date:  8/14/2018  Admitting Physician:  Orion Montero DO   Consulting Physician(s): Treatment Team: Attending Provider: Orion Montero DO; Consulting Provider: Orion Montero DO; Consulting Provider: Sherron Humphrey NP; Care Manager: Robert Solis; Consulting Provider: Jethro Liang MD; Consulting Provider: Harry Orantes MD; Care Manager: John Cooney    SUBJECTIVE:     Tray Franks is a 80 y.o. female is resting in bed. She is confused this morning. She is unable to follow my commands very well. She is redirected by her daughter during examination. OBJECTIVE:       Physical Exam:  General: Alert and in no distress. Respiratory: Respirations unlabored  Neurological:  Neurovascular exam within normal limits. Musculoskeletal: Calves soft, supple, non-tender upon palpation. Right hand with ecchymosis and edema. Splint in place.         Vital Signs:      Patient Vitals for the past 8 hrs:   BP Temp Pulse Resp SpO2 Weight   08/15/18 1112 (!) 127/25 99.6 °F (37.6 °C) (!) 103 18 98 % -   08/15/18 1110 - - (!) 109 - - -   08/15/18 1100 - - (!) 107 - - -   08/15/18 1045 - - (!) 110 - - -   08/15/18 1015 - - (!) 110 - - -   08/15/18 1000 - - (!) 121 - - -   08/15/18 0945 - - (!) 135 - - -   08/15/18 0930 - - (!) 123 - - -   08/15/18 0915 - - (!) 135 - - -   08/15/18 0900 - - (!) 132 - - -   08/15/18 0855 - - (!) 134 - - -   08/15/18 0845 - - (!) 127 - - -   08/15/18 0835 - - (!) 148 - - -   08/15/18 0830 - - (!) 131 - - -   08/15/18 0820 - - (!) 143 - - -   08/15/18 0815 - - (!) 132 - - -   08/15/18 1734 (!) 158/97 97.9 °F (36.6 °C) (!) 130 20 98 % -   08/15/18 0759 - - (!) 140 - 92 % -   08/15/18 0745 - - (!) 124 - - -   08/15/18 0730 - - (!) 131 - 95 % -   08/15/18 0710 - - (!) 118 - - -   08/15/18 5958 - - (!) 118 - - -   08/15/18 0523 - - - - - 76.3 kg (168 lb 3.2 oz)   08/15/18 0457 (!) 163/95 99 °F (37.2 °C) 99 21 97 % -                                          Temp (24hrs), Av.8 °F (37.1 °C), Min:97.9 °F (36.6 °C), Max:99.6 °F (37.6 °C)      Labs:        Recent Labs      08/15/18   0054   HCT  37.2   HGB  11.9   INR  3.6*     Lab Results   Component Value Date/Time    Sodium 136 08/15/2018 12:54 AM    Potassium 3.8 08/15/2018 12:54 AM    Chloride 102 08/15/2018 12:54 AM    CO2 27 08/15/2018 12:54 AM    Glucose 112 (H) 08/15/2018 12:54 AM    BUN 16 08/15/2018 12:54 AM    Creatinine 0.97 08/15/2018 12:54 AM    Calcium 9.0 08/15/2018 12:54 AM       PT/OT:                Patient mobility                         ASSESSMENT / PLAN:   Principal Problem:    Frequent falls (2018)    Active Problems:    HTN (hypertension) (2010)      GERD (gastroesophageal reflux disease) (2010)      Iron deficiency anemia (2016)      Controlled type 2 diabetes mellitus without complication, without long-term current use of insulin ([facility] [address] (2017)      Dyslipidemia (2018)      Wrist fracture, right (2018)       A: 1. Nondisplaced right distal radius fracture    P: 1. Right wrist: her dementia makes this more complicated. She has tried to remove her splint multiple times. She has too much edema currently to cast and I would be concerned with the cast becoming loose and having skin breakdown. She will maintain the splint and followup outpatient for cast placement. If she remains in the hospital for the next 4 days please reconsult so we can place cast.  She needs strict elevation of her hand and arm at all times. Followup with Dr. Jessica Monahan as an outpatient in 7 days.           Signed By:  Vivek Wilkinson, [address] [facility] [address]

## 2018-08-15 NOTE — PROGRESS NOTES
[facility] Dosing Services: 8/15/2018    Consult for Warfarin Dosing by Pharmacy by Dr. Laurann Habermann provided for this 80 y.o.  female , for indication of Atrial Fibrillation, Hx of PE (unprovoked)  Day of Therapy: Inpatient day 2 ; Continued from home. Home dose is 3 mg by mouth daily  Dose to achieve an INR goal of 2-3    Assessment/Plan:  -Cardiology recommends discontinuing anticoagulation (risk outweighs benefit, hx of recurrent falls), but recommends discussion with pulmonary/hematology. Pending multidisciplinary assessment, but pharmacy to dose consult still remains active  -Will hold warfarin tonight due to supra-therapeutic INR of 3.6 today    Significant drug interactions: none (amiodarone as PTA med removed by med rec)  Previous dose given Warfarin 3 mg on 8/13/2018 (at home)  Held Dose 8/14   PT/INR Lab Results   Component Value Date/Time    INR 3.6 (H) 08/15/2018 12:54 AM      Platelets Lab Results   Component Value Date/Time    PLATELET 098 05/98/9459 12:54 AM      H/H Lab Results   Component Value Date/Time    HGB 11.9 08/15/2018 12:54 AM        Pharmacist will follow daily and will provide subsequent Warfarin dosing based on clinical status.   Nash Montoya, TERRID

## 2018-08-15 NOTE — PROGRESS NOTES
[address]  [phone]  Fax [phone]    Patient Name: Marcie Branch  YOB: 1933    Outpatient Nurse Navigator notified of patient's admission and possible discharge to a rehab facility. Patient admitted on  8/14/18 to [facility] for Frequent Falls. NN will continue to monitor chart for discharge plans.

## 2018-08-15 NOTE — PROGRESS NOTES
CM met with pt's , Ashley Current, and daughter Tray SCALES to discuss discharge plans. They have a list of SNFs and are in the process of figuring out which facilities to send referrals. They will follow-up with me later today with their choices.   Carlos Barreto, RAJESHW

## 2018-08-15 NOTE — PROGRESS NOTES
Problem: Mobility Impaired (Adult and Pediatric)  Goal: *Acute Goals and Plan of Care (Insert Text)  Physical Therapy Goals  Initiated 8/15/2018  1. Patient will move from supine to sit and sit to supine  in bed with minimal assistance/contact guard assist within 7 day(s). 2.  Patient will transfer from bed to chair and chair to bed with minimal assistance/contact guard assist using the least restrictive device within 7 day(s). 3.  Patient will perform sit to stand with minimal assistance/contact guard assist within 7 day(s). 4.  Patient will ambulate with minimal assistance/contact guard assist for 100 feet with the least restrictive device within 7 day(s). physical Therapy EVALUATION  Patient: Louis Augustine [de-identified]80 y.o. female)  Date: 8/15/2018  Primary Diagnosis: Wrist fracture, right        Precautions: Fall       ASSESSMENT :  Based on the objective data described below, the patient presents with severe confusion, impaired safety awareness, poor balance, and multiple falls in the last ~24 hours following admission for a right wrist fx. Prior to admission this patient was living with her  in an apartment while their home was being repaired. Her prior level is unknown d/t patient being a poor historian. The chart indicates a RW and wheelchair available. Patient with poor processing of commands and became easily agitated when she did not understand or was surprised. Cognition appears to be a limiting factor > physical debility at present but she remains a high fall risk. Recommend discharge to a rehab setting pending progress towards goals. Patient will benefit from skilled intervention to address the above impairments.   Patients rehabilitation potential is considered to be Fair  Factors which may influence rehabilitation potential include:   []         None noted  [x]         Mental ability/status  []         Medical condition  []         Home/family situation and support systems  [] Safety awareness  []         Pain tolerance/management  []         Other:      PLAN :  Recommendations and Planned Interventions:  [x]           Bed Mobility Training             [x]    Neuromuscular Re-Education  [x]           Transfer Training                   []    Orthotic/Prosthetic Training  [x]           Gait Training                         []    Modalities  [x]           Therapeutic Exercises           []    Edema Management/Control  [x]           Therapeutic Activities            []    Patient and Family Training/Education  []           Other (comment):    Frequency/Duration: Patient will be followed by physical therapy  3 times a week to address goals. Discharge Recommendations: Alpesh Perez  Further Equipment Recommendations for Discharge: to be determined       SUBJECTIVE:   Patient stated This all grew up here out of the gilliam. Summer Reeves    OBJECTIVE DATA SUMMARY:   HISTORY:    Past Medical History:   Diagnosis Date    Anemia     [facility] ([facility]     Cancer ([facility]     basal cell on nose    Chronic pain     back pain    Fibromyalgia 4/1/2010    GERD (gastroesophageal reflux disease) 4/1/2010    Hiatal hernia 4/1/2010    High cholesterol 4/1/2010    HTN (hypertension) 4/1/2010    Ill-defined condition     A.  Fib    OA (osteoarthritis) 4/1/2010    back    Pulmonary emboli ([facility] 2015     Past Surgical History:   Procedure Laterality Date    BREAST SURGERY PROCEDURE UNLISTED      Breast im-plants x 2    ENLARGE BREAST WITH IMPLANT      HX APPENDECTOMY      HX BREAST BIOPSY      HX BREAST RECONSTRUCTION      Breast implants removed 1992    HX CATARACT REMOVAL      HX CHOLECYSTECTOMY  9/11/2013    HX HYSTERECTOMY      HX KNEE REPLACEMENT  2008    bilateral    HX ORTHOPAEDIC  2007    Bilateral TKR    HX PARTIAL HYSTERECTOMY      HX SHOULDER REPLACEMENT  2009    left    HX TONSILLECTOMY       Prior Level of Function/Home Situation: unclear  Personal factors and/or comorbidities impacting plan of care: dementia    Home Situation  Home Environment: Apartment (currently living in appartment while house is being repaired)  One/Two Story Residence: One story  Living Alone: No  Support Systems: Spouse/Significant Other/Partner  Patient Expects to be Discharged to[de-identified] Unknown  Current DME Used/Available at Home: Wheelchair, Walker, Shower chair    EXAMINATION/PRESENTATION/DECISION MAKING:   Critical Behavior:  Neurologic State: Alert  Orientation Level: Disoriented X4  Cognition: Decreased attention/concentration, Poor safety awareness, Impulsive, Impaired decision making, Memory loss  Safety/Judgement: Lack of insight into deficits, Decreased awareness of environment  Hearing: Auditory  Auditory Impairment: Hard of hearing, bilateral  Functional Mobility:  Bed Mobility:  Rolling: Moderate assistance;Assist x2  Supine to Sit: Moderate assistance;Assist x2  Sit to Supine: Maximum assistance;Assist x2  Scooting: Assist x2; Moderate assistance  Transfers:  Sit to Stand: Maximum assistance;Assist x1;Assist x2; Additional time  Stand to Sit: Maximum assistance;Assist x1;Assist x2        Bed to Chair: Maximum assistance;Assist x1;Assist x2; Additional time              Balance:   Sitting: Impaired  Sitting - Static: Fair (occasional)  Sitting - Dynamic: Fair (occasional)  Standing: Impaired  Standing - Static: Poor  Standing - Dynamic : Poor    Functional Measure:  Tinetti test:    Sitting Balance: 0  Arises: 0  Attempts to Rise: 0  Immediate Standing Balance: 0  Standing Balance: 0  Nudged: 0  Eyes Closed: 0  Turn 360 Degrees - Continuous/Discontinuous: 0  Turn 360 Degrees - Steady/Unsteady: 0  Sitting Down: 0  Balance Score: 0  Indication of Gait: 0  R Step Length/Height: 0  L Step Length/Height: 0  R Foot Clearance: 0  L Foot Clearance: 0  Step Symmetry: 0  Step Continuity: 0  Path: 0  Trunk: 0  Walking Time: 0  Gait Score: 0  Total Score: 0       Tinetti Test and G-code impairment scale:  Percentage of Impairment CH    0%   CI    1-19% CJ    20-39% CK    40-59% CL    60-79% CM    80-99% CN     100%   Tinetti  Score 0-28 28 23-27 17-22 12-16 6-11 1-5 0       Tinetti Tool Score Risk of Falls  <19 = High Fall Risk  19-24 = Moderate Fall Risk  25-28 = Low Fall Risk  Tinetti ME. Performance-Oriented Assessment of Mobility Problems in Elderly Patients. [address]; [id number]. (Scoring Description: PT Bulletin Feb. 10, 1993)    Older adults: Jeremiah Schwab et al, 2009; n = 1000 [facility] elderly evaluated with ABC, GILBERTO, ADL, and IADL)  · Mean GILBERTO score for males aged 69-68 years = 26.21(3.40)  · Mean GILBERTO score for females age 69-68 years = 25.16(4.30)  · Mean GILBERTO score for males over 80 years = 23.29(6.02)  · Mean GILBERTO score for females over 80 years = 17.20(8.32)         G codes: In compliance with CMSs Claims Based Outcome Reporting, the following G-code set was chosen for this patient based on their primary functional limitation being treated: The outcome measure chosen to determine the severity of the functional limitation was the Tinetti with a score of 0/28 which was correlated with the impairment scale.     ? Mobility - Walking and Moving Around:     - CURRENT STATUS: CN - 100% impaired, limited or restricted    - GOAL STATUS: CM - 80%-99% impaired, limited or restricted    - D/C STATUS:  ---------------To be determined---------------      Physical Therapy Evaluation Charge Determination   History Examination Presentation Decision-Making   MEDIUM  Complexity : 1-2 comorbidities / personal factors will impact the outcome/ POC  MEDIUM Complexity : 3 Standardized tests and measures addressing body structure, function, activity limitation and / or participation in recreation  LOW Complexity : Stable, uncomplicated  LOW Complexity : FOTO score of       Based on the above components, the patient evaluation is determined to be of the following complexity level: LOW     Pain:  Pain Scale 1: Numeric (0 - 10)  Pain Intensity 1: 6              Activity Tolerance:    BPM with exetion  Please refer to the flowsheet for vital signs taken during this treatment. After treatment:   []         Patient left in no apparent distress sitting up in chair  [x]         Patient left in no apparent distress in bed  [x]         Call bell left within reach  [x]         Nursing notified  [x]         Caregiver present  [x]         Bed alarm activated    COMMUNICATION/EDUCATION:   The patients plan of care was discussed with: Registered Nurse. [x]         Fall prevention education was provided and the patient/caregiver indicated understanding. [x]         Patient/family have participated as able in goal setting and plan of care. [x]         Patient/family agree to work toward stated goals and plan of care. []         Patient understands intent and goals of therapy, but is neutral about his/her participation. []         Patient is unable to participate in goal setting and plan of care.     Thank you for this referral.  Gil Serna, PT, DPT   Time Calculation: 26 mins

## 2018-08-15 NOTE — PROGRESS NOTES
Pt evaluated. Sitter at bedside. Has low urinary output since she has been on [facility] likely do to poor PO intake. Has only been on MIVF. Visit Vitals    /79    Pulse 83    Temp 98.1 °F (36.7 °C)    Resp 16    Ht 5' 8\" (1.727 m)    Wt 171 lb (77.6 kg)    SpO2 96%    BMI 26 kg/m2     Pleasantly demented. Lungs clear  Abdomen nontender   No LE edema    Will give 500 cc LR along with MIVF  Nurse to perform a bedside swallow. Ordered bladder scan to rule out urinary retention. Will follow-up    Gladis Joe DO    Pt assessed at 0400 on 8/15. Lungs noted to be clear without crackles or wheezing. Continue MIVF as she is elevated Cr on morning labs. Plan is to encourage PO intake and take off MIVF.      Gladis Joe DO

## 2018-08-15 NOTE — PROGRESS NOTES
Occupational Therapy Goals  Initiated 8/15/2018  1. Patient will perform grooming with moderate assistance  within 7 day(s). 2.  Patient will perform upper body dressing and bathing with moderate assistance  within 7 day(s). 3.  Patient will perform toilet transfers to [facility] with moderate assistance within 7 day(s). 4.  Patient will perform all aspects of toileting with moderate assistance within 7 day(s). 5.  Patient will participate in upper extremity therapeutic exercise/activities with moderate assistance  for 10 minutes within 7 day(s). 6.  Patient will follow 25% of commands during functional activities within 7 days. Occupational Therapy EVALUATION  Patient: Louis Augustine [de-identified]80 y.o. female)  Date: 8/15/2018  Primary Diagnosis: Wrist fracture, right        Precautions: fall       ASSESSMENT :  Based on the objective data described below, the patient presents with decreased activity tolerance following admission on 8/14 for R wrist pain. Patient was admitted to the ED and discharged the previous day for R non displaced distal radius fx. Patient has experienced x3 falls the day leading up to admission and has a significant fall history. Orthopedic services have evaluated patient; She has a sugartong splint intact, recommended strict elevation of R UE d/t swelling, and currently being treated conservatively. Patient has dementia. She presents with significant confusion, intermittent agitation, impulsivity, poor safety awareness, and very minimal command following. Patient does best with tactile + manual cuing and simple one-step cues. She required mod to max A x2 for bed mobility and max A x2 for stand-pivot transfer to the [facility]. Patient currently requires max to total A for all ADLs. Patient would benefit from continued skilled OT to determine if progress can be made towards goals. Patient will benefit from skilled intervention to address the above impairments.   Patients rehabilitation potential is considered to be Guarded  Factors which may influence rehabilitation potential include:   []             None noted  [x]             Mental ability/status  []             Medical condition  []             Home/family situation and support systems  []             Safety awareness  []             Pain tolerance/management  []             Other:      PLAN :  Recommendations and Planned Interventions:  [x]               Self Care Training                  [x]        Therapeutic Activities  [x]               Functional Mobility Training    []        Cognitive Retraining  [x]               Therapeutic Exercises           [x]        Endurance Activities  []               Balance Training                   []        Neuromuscular Re-Education  []               Visual/Perceptual Training     [x]   Home Safety Training  [x]               Patient Education                 [x]        Family Training/Education  []               Other (comment):    Frequency/Duration: Patient will be followed by occupational therapy 2 times a week to address goals. Discharge Recommendations: Skilled Nursing Facility  Further Equipment Recommendations for Discharge: none at this time      SUBJECTIVE:   Patient stated I need to pee.     OBJECTIVE DATA SUMMARY:   HISTORY:   Past Medical History:   Diagnosis Date    Anemia     [facility] ([facility]     Cancer ([facility])     basal cell on nose    Chronic pain     back pain    Fibromyalgia 4/1/2010    GERD (gastroesophageal reflux disease) 4/1/2010    Hiatal hernia 4/1/2010    High cholesterol 4/1/2010    HTN (hypertension) 4/1/2010    Ill-defined condition     A.  Fib    OA (osteoarthritis) 4/1/2010    back    Pulmonary emboli ([facility] 2015     Past Surgical History:   Procedure Laterality Date    BREAST SURGERY PROCEDURE UNLISTED      Breast im-plants x 2    ENLARGE BREAST WITH IMPLANT      HX APPENDECTOMY      HX BREAST BIOPSY      HX BREAST RECONSTRUCTION      Breast implants removed 1992    HX CATARACT REMOVAL      HX CHOLECYSTECTOMY  9/11/2013    HX HYSTERECTOMY      HX KNEE REPLACEMENT  2008    bilateral    HX ORTHOPAEDIC  2007    Bilateral TKR    HX PARTIAL HYSTERECTOMY      HX SHOULDER REPLACEMENT  2009    left    HX TONSILLECTOMY         Prior Level of Function/Environment/Context: Patient is a poor historian. No family present to confirm information. Home Situation  Home Environment: Apartment (currently living in appartment while house is being repaired)  One/Two Story Residence: One story  Living Alone: No  Support Systems: Spouse/Significant Other/Partner  Patient Expects to be Discharged to[de-identified] Unknown  Current DME Used/Available at Home: Wheelchair, Walker, Shower chair    Hand dominance: Right    EXAMINATION OF PERFORMANCE DEFICITS:  Cognitive/Behavioral Status:  Neurologic State: Alert  Orientation Level: Disoriented X4  Cognition: Decreased attention/concentration;Poor safety awareness; Impulsive; Impaired decision making;Memory loss  Perception: Cues to maintain midline in sitting;Cues to maintain midline in standing  Perseveration: Perseverates during conversation  Safety/Judgement: Lack of insight into deficits; Decreased awareness of environment    Skin: Intact in the uppers    Edema: None noted in the uppers    Hearing:   Auditory  Auditory Impairment: Hard of hearing, bilateral    Range of Motion:  Left upper: WDL  Right upper: Not tested s/p distal radius fx    Strength:  Grossly decreased minimally functional in the uppers    Coordination:  Fine Motor Skills-Upper: Left Impaired;Right Impaired    Gross Motor Skills-Upper: Left Impaired;Right Impaired    Tone & Sensation:  Tone: normal   Sensation: intact      Balance:  Sitting: Impaired  Sitting - Static: Fair (occasional)  Sitting - Dynamic: Fair (occasional)  Standing: Impaired  Standing - Static: Poor  Standing - Dynamic : Poor    Functional Mobility and Transfers for ADLs:  Bed Mobility:  Rolling: Moderate assistance;Assist x2  Supine to Sit: Moderate assistance;Assist x2  Sit to Supine: Maximum assistance;Assist x2  Scooting: Assist x2; Moderate assistance    Transfers:  Sit to Stand: Maximum assistance;Assist x1;Assist x2; Additional time  Stand to Sit: Maximum assistance;Assist x1;Assist x2  Bed to Chair: Maximum assistance;Assist x1;Assist x2; Additional time  Toilet Transfer : Maximum assistance;Assist x1;Assist x2; Additional time (stand-pivot transfer to [facility])    ADL Assessment:  Feeding: Maximum assistance    Oral Facial Hygiene/Grooming: Maximum assistance    Bathing: Total assistance    Upper Body Dressing: Total assistance    Lower Body Dressing: Total assistance    Toileting: Total assistance     Cognitive Retraining  Safety/Judgement: Lack of insight into deficits; Decreased awareness of environment    Functional Measure:  Barthel Index:    Bathin  Bladder: 0  Bowels: 0  Groomin  Dressin  Feedin  Mobility: 0  Stairs: 0  Toilet Use: 0  Transfer (Bed to Chair and Back): 5  Total: 5       Barthel and G-code impairment scale:  Percentage of impairment CH  0% CI  1-19% CJ  20-39% CK  40-59% CL  60-79% CM  80-99% CN  100%   Barthel Score 0-100 100 99-80 79-60 59-40 20-39 1-19   0   Barthel Score 0-20 20 17-19 13-16 9-12 5-8 1-4 0      The Barthel ADL Index: Guidelines  1. The index should be used as a record of what a patient does, not as a record of what a patient could do. 2. The main aim is to establish degree of independence from any help, physical or verbal, however minor and for whatever reason. 3. The need for supervision renders the patient not independent. 4. A patient's performance should be established using the best available evidence. Asking the patient, friends/relatives and nurses are the usual sources, but direct observation and common sense are also important. However direct testing is not needed.   5. Usually the patient's performance over the preceding 24-48 hours is important, but occasionally longer periods will be relevant. 6. Middle categories imply that the patient supplies over 50 per cent of the effort. 7. Use of aids to be independent is allowed. Lissy Bagley., Barthel, D.W. (1934). Functional evaluation: the Barthel Index. [address] [facility] (14)2. Ben [place] antony LES Rizo, Donnie Beth.[facility], [address] [place] [address] Ave (1999). Measuring the change indisability after inpatient rehabilitation; comparison of the responsiveness of the Barthel Index and Functional [place] Measure. Journal of Neurology, Neurosurgery, and Psychiatry, 66(4), 071-507. Elana Granado, EARL.J.A, PAMELLA Cole, & Sabrina Harp M.A. (2004.) Assessment of post-stroke quality of life in cost-effectiveness studies: The usefulness of the Barthel Index and the EuroQoL-5D. Quality of Life Research, 13, 937-28       G codes: In compliance with CMSs Claims Based Outcome Reporting, the following G-code set was chosen for this patient based on their primary functional limitation being treated: The outcome measure chosen to determine the severity of the functional limitation was the Barthel index with a score of 5/100 which was correlated with the impairment scale. ?  Self Care:     - CURRENT STATUS: CM - 80%-99% impaired, limited or restricted    - GOAL STATUS: CL - 60%-79% impaired, limited or restricted    - D/C STATUS:  ---------------To be determined---------------     Occupational Therapy Evaluation Charge Determination   History Examination Decision-Making   LOW Complexity : Brief history review  LOW Complexity : 1-3 performance deficits relating to physical, cognitive , or psychosocial skils that result in activity limitations and / or participation restrictions  LOW Complexity : No comorbidities that affect functional and no verbal or physical assistance needed to complete eval tasks       Based on the above components, the patient evaluation is determined to be of the following complexity level: LOW   Pain:  Pain Scale 1: Numeric (0 - 10)  Pain Intensity 1: 6              Activity Tolerance:   Patient tolerated eval well. Please refer to the flowsheet for vital signs taken during this treatment. After treatment:   [] Patient left in no apparent distress sitting up in chair  [x] Patient left in no apparent distress in bed  [x] Call bell left within reach  [x] Nursing notified  [x] Sitter present  [x] Bed alarm activated    COMMUNICATION/EDUCATION:   The patients plan of care was discussed with: Physical Therapist, Registered Nurse and patient. .  [x] Home safety education was provided and the patient/caregiver indicated understanding. [x] Patient/family have participated as able in goal setting and plan of care. [x] Patient/family agree to work toward stated goals and plan of care. [] Patient understands intent and goals of therapy, but is neutral about his/her participation. [] Patient is unable to participate in goal setting and plan of care. This patients plan of care is appropriate for delegation to [facility].     Thank you for this referral.  Carly Shafer OTR/L  Time Calculation: 28 mins

## 2018-08-15 NOTE — PROGRESS NOTES
[address] [address]   Office [phone]  Fax [phone]          Assessment and Plan     Tray Estes is a 80 y.o. female admitted for right wrist fracture following recurrent falls. She has spent 1 night(s) in the hospital.    24 Hour Events: Ms Faisal Barton had low urine output. She was given two 250ml boluses and started on MIVF. Her UOP following the boluses was 200ml (0.3ml/kg). Right Wrist fracture: Secondary to GLF on right outstretch hand. This is 3rd fall in the past month. Non-displaced right wrist fracture confirmed on x-ray. No acute pelvic fracture noted on pelv Xray. Normal CT head  - Ortho consult, appreciate recs. · Pt currently in sugartong splint and right upper extremity splint   · Will treat conservatively. Pt needs strict elevation due to severity of hand/ finger swelling. · Follow up outpatient with Dr Mark Anthony Chavez   - [address] [facility] 7.5-325mg Q8H prn for pain  - PT/OT  - CM consult for placement     Paroxysmal Afib: Currently in Afib with RVR (rates 110s). Underwent an EP study (5/25/18) with GULSHAN guided DCCV and successfully converted to NSR. Pt however converted back to Afib at subsequent visits. Pt of Dr. Jacek Ryan. INR 3.3 (goal 2-3). -Last Echo (5/25/18) with EF 55%-60%  -Cont home Cardizem 240mg daily.   -Hold home warfarin 3mg today for INR 3.6, pharmacy dosing.  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 Per Heme/onc note, this was unprovoked and pt will be on life long anti-coag  -Hold warfarin 3mg today for INR 3.6, pharmacy to adjust dose     Asymptomatic Bacteriuria: 2+ bacteria and neg LE/nitrites. -Urine culture pending      DM type 2 with neuropathy: Last A1c 6.8 (5/25/18). Diet controlled  - Insulin Sliding Scale normal sensitivity with AC&HS glucose checks. - Hypoglycemia protocols ordered. - Cont Gabapentin 600mg TID for neuropathic pain     Hypertension: On admission BP was 199/91 as pt was agitated.  This later trended down to 168/98.   - Restart home medications of Losartan/HCTZ 50/12.5 mg for SBP >170 per cards  - Will continue to monitor at this time and readjust as BP's trend.     Hyperlipidemia: Lipid panel (18) - Tchol 228, HDL 55,  and TG 1886. Diet controlled, pt not on any medication   - Continue to monitor     Depression:   -Cont home Celexa 20mg daily     Hx of Iron deficiency anemia: Normal Hb(12.2). Pt was initially on Iron but has since been discontinued by Dr. Tej Bradley (Heme/onc) given persistent constipation and dark stools  -Cont to monitor  -Daily CBC     Muscle Spasms:  -Cont home flexeril 5mg prn      Constipation:  -Miralax prn     FEN/GI - Diabetic Diet  Activity - Fall precaution, ambulate with assistance  DVT prophylaxis - Continue home coumadin, pharmacy dosing per protocol  GI prophylaxis -  None  Disposition - CM consulted for placing.     CODE STATUS:  Full, discussed with     cell is [phone], name is Bhavin Vidal. I will discuss this patient with Dr Hallie Torres     I appreciate the opportunity to participate in the care of this patient,  Lakisha Nogueira MD  [facility] Medicine Resident         Subjective / Objective     Subjective  This morning Ms Faisal Barton was not oriented to time person or place and the sitter reported there was ing overnight. She was unable to express any pain or discomfort. Temp (24hrs), Av.9 °F (37.2 °C), Min:98.1 °F (36.7 °C), Max:99.4 °F (37.4 °C)     Objective  Respiratory: O2 Flow Rate (L/min): 2 l/min O2 Device: Nasal cannula   Visit Vitals    BP (!) 163/95 (BP 1 Location: Left arm, BP Patient Position: At rest)    Pulse (!) 118    Temp 99 °F (37.2 °C)    Resp 21    Ht 5' 8\" (1.727 m)    Wt 168 lb 3.2 oz (76.3 kg)    SpO2 97%    BMI 25.57 kg/m2        General: No acute distress. Not cooperative    Head: Normocephalic. Atraumatic. Eyes:              Conjunctiva pink. Sclera white. PERRL. Respiratory: CTAB.  No w/r/r/c.   Cardiovascular: Irregularly Irregular rhythm. Tachycardia. No m/r/g. Pulses 2+ throughout. GI: + bowel sounds. Nontender. No rebound tenderness or guarding. Nondistended. Extremities: No edema. No palpable cord. No tenderness. Musculoskeletal: Right wrist is in sugartong splint with moderate swelling. Bruising present in right hand. Tender to palpation.     Neuro: Not oriented to person place or time.      I/O:  Date 08/14/18 0700 - 08/15/18 0659 08/15/18 0700 - 08/16/18 0659   Shift [id number] 1900-0659 24 Hour Total 1732-2582 0634-0876 24 Hour Total   I  N  T  A  K  E   I.V.  (mL/kg/hr)  200 200         I.V.  200 200       Shift Total  (mL/kg)  200  (2.6) 200  (2.6)      O  U  T  P  U  T   Urine  (mL/kg/hr)  200 200         Urine Voided  200 200         Urine Occurrence(s)  1 x 1 x       Shift Total  (mL/kg)  200  (2.6) 200  (2.6)      NET  0 0      Weight (kg) 77.6 76.3 76.3 76.3 76.3 76.3       CBC:  Recent Labs      08/15/18   0054  08/14/18   0431   WBC  9.5  10.2   HGB  11.9  12.2   HCT  37.2  36.9   PLT  186  753       Metabolic Panel:  Recent Labs      08/15/18   0054  08/14/18   0431   NA  136  136   K  3.8  3.5   CL  102  100   CO2  27  27   BUN  16  13   CREA  0.97  0.89   GLU  112*  138*   CA  9.0  8.9   ALB   --   3.4*   SGOT   --   17   ALT   --   16   INR  3.6*  3.3*          For Billing    Chief Complaint   Patient presents with    Wrist Pain       Hospital Problems  Date Reviewed: 8/1/2018          Codes Class Noted POA    * (Principal)Frequent falls ICD-10-CM: R29.6  ICD-9-CM: V15.88  8/14/2018 Unknown        Wrist fracture, right ICD-10-CM: S62.101A  ICD-9-CM: 814.00  8/14/2018 Unknown        Dyslipidemia ICD-10-CM: E78.5  ICD-9-CM: 272.4  5/24/2018 Yes        Controlled type 2 diabetes mellitus without complication, without long-term current use of insulin (HCC) ICD-10-CM: E11.9  ICD-9-CM: 250.00  5/9/2017 Yes        Iron deficiency anemia ICD-10-CM: D50.9  ICD-9-CM: 280.9 8/11/2016 Yes        HTN (hypertension) (Chronic) ICD-10-CM: I10  ICD-9-CM: 401.9  4/1/2010 Yes        GERD (gastroesophageal reflux disease) (Chronic) ICD-10-CM: K21.9  ICD-9-CM: 530.81  4/1/2010 Yes

## 2018-08-15 NOTE — CONSULTS
[facility] at [address], [address] [place] [address]  W: [phone]  F: [phone]      Reason for Visit:   Marcie Branch is a 80 y.o. female who is seen in consultation at the request of  for evaluation of pt with chronic anticoagulation for unprovoked PE, now with recurrent falls; recs for anticoagulation      Oncology  History:   none      History of Present Illness:     Ms Monahan Golden was admitted on 8/14/2018 from the ED when she presented via EMS with c/o falls and rt wrist pain. Hx of dementia.  reports 3 falls over the last 24 hours. Jostin Born day ago; seen in the ED  and was found to have a rt wrist fx; splint applied and ortho follow up made. Now presents following 2 additional falls. Therefore was admitted for further eval and management. No hx obtained from pt due to dementia. Appears comfortable.  at bedside. Past Medical History:   Diagnosis Date    Anemia     Atrial fibrillation ([address]     Cancer (HCC)     basal cell on nose    Chronic pain     back pain    Fibromyalgia 4/1/2010    GERD (gastroesophageal reflux disease) 4/1/2010    Hiatal hernia 4/1/2010    High cholesterol 4/1/2010    HTN (hypertension) 4/1/2010    Ill-defined condition     A.  Fib    OA (osteoarthritis) 4/1/2010    back    Pulmonary emboli ([address] 2015      Past Surgical History:   Procedure Laterality Date    BREAST SURGERY PROCEDURE UNLISTED      Breast im-plants x 2    ENLARGE BREAST WITH IMPLANT      HX APPENDECTOMY      HX BREAST BIOPSY      HX BREAST RECONSTRUCTION      Breast implants removed 1992    HX CATARACT REMOVAL      HX CHOLECYSTECTOMY  9/11/2013    HX HYSTERECTOMY      HX KNEE REPLACEMENT  2008    bilateral    HX ORTHOPAEDIC  2007    Bilateral TKR    HX PARTIAL HYSTERECTOMY      HX SHOULDER REPLACEMENT  2009    left    HX TONSILLECTOMY        Social History   Substance Use Topics    Smoking status: Never Smoker  Smokeless tobacco: Never Used    Alcohol use No      Family History   Problem Relation Age of Onset   [address] [facility] [address] Arthritis-rheumatoid Mother     Dementia Mother     Coronary Artery Disease Father     Cancer Brother      lung cancer     Current Facility-Administered Medications   Medication Dose Route Frequency    lactated Ringers infusion  125 mL/hr IntraVENous CONTINUOUS    lactated ringers bolus infusion 250 mL  250 mL IntraVENous ONCE    [START ON 8/16/2018] dilTIAZem CD (CARDIZEM CD) capsule 360 mg  360 mg Oral DAILY    dilTIAZem CD (CARDIZEM CD) capsule 120 mg  120 mg Oral ONCE    sodium chloride (NS) flush 5-10 mL  5-10 mL IntraVENous Q8H    sodium chloride (NS) flush 5-10 mL  5-10 mL IntraVENous PRN    sodium chloride (NS) flush 5-10 mL  5-10 mL IntraVENous Q8H    sodium chloride (NS) flush 5-10 mL  5-10 mL IntraVENous PRN    HYDROcodone-acetaminophen (NORCO) 7.5-325 mg per tablet 1 Tab  1 Tab Oral TID PRN    polyethylene glycol (MIRALAX) packet 17 g  17 g Oral DAILY PRN    citalopram (CELEXA) tablet 20 mg  20 mg Oral DAILY    gabapentin (NEURONTIN) capsule 600 mg  600 mg Oral TID    cyclobenzaprine (FLEXERIL) tablet 5 mg  5 mg Oral DAILY PRN    losartan/hydroCHLOROthiazide (HYZAAR) 50/12.5 mg   Oral DAILY PRN    insulin lispro (HUMALOG) injection   SubCUTAneous AC&HS    glucose chewable tablet 16 g  4 Tab Oral PRN    dextrose (D50W) injection syrg 12.5-25 g  12.5-25 g IntraVENous PRN    glucagon (GLUCAGEN) injection 1 mg  1 mg IntraMUSCular PRN    nystatin (MYCOSTATIN) 100,000 unit/gram powder   Topical BID      Allergies   Allergen Reactions    Angiotensin Ii,Human Other (comments)     States does not remember      Avelox [Moxifloxacin] Other (comments)     States does not remember      Demerol [Meperidine] Hives        Review of Systems: A complete review of systems was obtained, negative except as described above.       Physical Exam:     Visit Vitals    BP (!) 127/25 (BP 1 Location: Left arm, BP Patient Position: At rest)    Pulse (!) 103    Temp 99.6 °F (37.6 °C)    Resp 18    Ht 5' 8\" (1.727 m)    Wt 168 lb 3.2 oz (76.3 kg)    SpO2 98%    BMI 25.57 kg/m2     ECOG PS: 3-4  General: elderly frail No acute  distress  Eyes: PERRLA, anicteric sclerae  HENT: Atraumatic with normal appearance of ears and nose; OP clear  Neck: Supple; no thyromegaly   Lymphatic: No cervical, supraclavicular, or axillary adenopathy  Respiratory: CTAB, normal respiratory effort  CV: Normal rate, regular rhythm, no murmurs, edema to rt hand   GI: Soft, nontender, nondistended, no masses, no hepatomegaly, no splenomegaly  MS: Digits without clubbing or cyanosis. Skin: ecchymoses noted to rt hand, No rashes or petechiae. Normal temperature, turgor, and texture. Neuro/Psych: Alert, appropriate affect, poor judgment/insight      Results:     Lab Results   Component Value Date/Time    WBC 9.5 08/15/2018 12:54 AM    HGB 11.9 08/15/2018 12:54 AM    HCT 37.2 08/15/2018 12:54 AM    PLATELET 224 38/21/8515 12:54 AM    MCV 99.7 (H) 08/15/2018 12:54 AM    ABS. NEUTROPHILS 8.1 (H) 08/14/2018 04:31 AM     Lab Results   Component Value Date/Time    Sodium 136 08/15/2018 12:54 AM    Potassium 3.8 08/15/2018 12:54 AM    Chloride 102 08/15/2018 12:54 AM    CO2 27 08/15/2018 12:54 AM    Glucose 112 (H) 08/15/2018 12:54 AM    BUN 16 08/15/2018 12:54 AM    Creatinine 0.97 08/15/2018 12:54 AM    GFR est AA >60 08/15/2018 12:54 AM    GFR est non-AA 55 (L) 08/15/2018 12:54 AM    Calcium 9.0 08/15/2018 12:54 AM    Glucose (POC) 125 (H) 08/15/2018 11:04 AM    Creatinine (POC) 0.9 11/12/2012 07:33 AM     Lab Results   Component Value Date/Time    Bilirubin, total 0.7 08/14/2018 04:31 AM    ALT (SGPT) 16 08/14/2018 04:31 AM    AST (SGOT) 17 08/14/2018 04:31 AM    Alk.  phosphatase 122 (H) 08/14/2018 04:31 AM    Protein, total 7.6 08/14/2018 04:31 AM    Albumin 3.4 (L) 08/14/2018 04:31 AM    Globulin 4.2 (H) 08/14/2018 04:31 AM     Lab Results   Component Value Date/Time    Reticulocyte count 3.2 (H) 08/10/2017 12:11 PM    Iron % saturation 70 (H) 04/16/2018 09:16 AM    TIBC 303 04/16/2018 09:16 AM    Ferritin 87 04/16/2018 09:16 AM    Vitamin B12 428 02/13/2018 08:38 AM    Folate, RBC 1371 08/10/2017 12:10 PM    Haptoglobin 136 08/10/2017 12:10 PM     08/10/2017 12:10 PM    Sed rate (ESR) 11 11/12/2012 12:00 AM    TSH 1.36 05/24/2018 11:10 AM    Lipase 150 06/20/2018 05:49 AM    HEP C VIRUS AB <0.1 11/25/2013 09:36 AM     Lab Results   Component Value Date/Time    INR 3.6 (H) 08/15/2018 12:54 AM    aPTT 59.5 (H) 08/14/2018 04:31 AM     Lab Results   Component Value Date/Time     08/10/2017 12:10 PM     8/14/2018 CT HEAD WO CONT  IMPRESSION:   No acute cranial process    8/14/2018 XR PELV  IMPRESSION:  No acute fracture. 8/14/2018 XR WRIST RT   IMPRESSION:     Postreduction images distal radial fracture. .     Assessment and Recommendations:   1. H/o pulmonary embolism  I previously recommended long-term anticoagulation given her high risk of recurrent VTE. However, with her progressive dementia and recurring falls, she now has a high risk of bleeding complications on anticoagulation. Her risk/benefit ratio now appears to weigh in favor of stopping anticoagulation. Should she improve in the future with a significant reduction in her falls, I recommend considering resuming anticoagulation at that time. 2. Afib  Cardiology has recommended no further anticoagulation given her fall risk. 3. Anemia  Hx of Iron def . Resolved with iron replacement. She was to follow up with me in the office this week, but her  cancelled this and has asked for her PCP to monitor this moving forward. 4. Dementia  Continue supportive care    5. Rt wrist fx  Ortho following: no plan for surgery       Patient seen in conjunction with Ajit Gauthier NP. We will sign off. Please call with any questions.     Signed By: Lebron Le MD

## 2018-08-15 NOTE — PROGRESS NOTES
12 - Sitter at bedside called this RN to room re: pt who is kicking at her and another RN and is not agreeable to being repositioned. Pt refuses to take diltiazem and keeps using left hand in an attempt to undo dressing on right arm. Pt also uses left elbow to hit staff at bedside. Family practice paged. 2 RNs and sitter at bedside in room to stay in the meantime. 5 - Family practice in room to assess. Verbal order for mittens received for pt and staff safety. MD to place PRN medications. MD also ordered to hold the additional dose of diltiazem that pt will not take. Pt left with sitter and  laying quietly in bed with 1 mitten on left had. Bed in lowest and locked position. 0 - Family practice notified re: positive urine culture result. MD acknowledged. Orders to follow.

## 2018-08-16 LAB
ANION GAP SERPL CALC-SCNC: 6 MMOL/L (ref 5–15)
APPEARANCE UR: CLEAR
BACTERIA URNS QL MICRO: NEGATIVE /HPF
BILIRUB UR QL: NEGATIVE
BUN SERPL-MCNC: 14 MG/DL (ref 6–20)
BUN/CREAT SERPL: 15 (ref 12–20)
CALCIUM SERPL-MCNC: 9.1 MG/DL (ref 8.5–10.1)
CHLORIDE SERPL-SCNC: 101 MMOL/L (ref 97–108)
CO2 SERPL-SCNC: 30 MMOL/L (ref 21–32)
COLOR UR: ABNORMAL
CREAT SERPL-MCNC: 0.91 MG/DL (ref 0.55–1.02)
EPITH CASTS URNS QL MICRO: ABNORMAL /LPF
ERYTHROCYTE [DISTWIDTH] IN BLOOD BY AUTOMATED COUNT: 13.2 % (ref 11.5–14.5)
GLUCOSE BLD STRIP.AUTO-MCNC: 108 MG/DL (ref 65–100)
GLUCOSE BLD STRIP.AUTO-MCNC: 118 MG/DL (ref 65–100)
GLUCOSE BLD STRIP.AUTO-MCNC: 128 MG/DL (ref 65–100)
GLUCOSE BLD STRIP.AUTO-MCNC: 130 MG/DL (ref 65–100)
GLUCOSE SERPL-MCNC: 103 MG/DL (ref 65–100)
GLUCOSE UR STRIP.AUTO-MCNC: NEGATIVE MG/DL
HCT VFR BLD AUTO: 36.5 % (ref 35–47)
HGB BLD-MCNC: 12 G/DL (ref 11.5–16)
HGB UR QL STRIP: ABNORMAL
HYALINE CASTS URNS QL MICRO: ABNORMAL /LPF (ref 0–5)
INR PPP: 3.3 (ref 0.9–1.1)
KETONES UR QL STRIP.AUTO: ABNORMAL MG/DL
LEUKOCYTE ESTERASE UR QL STRIP.AUTO: ABNORMAL
MCH RBC QN AUTO: 32 PG (ref 26–34)
MCHC RBC AUTO-ENTMCNC: 32.9 G/DL (ref 30–36.5)
MCV RBC AUTO: 97.3 FL (ref 80–99)
NITRITE UR QL STRIP.AUTO: NEGATIVE
NRBC # BLD: 0 K/UL (ref 0–0.01)
NRBC BLD-RTO: 0 PER 100 WBC
PH UR STRIP: 8 [PH] (ref 5–8)
PLATELET # BLD AUTO: 195 K/UL (ref 150–400)
PMV BLD AUTO: 10.6 FL (ref 8.9–12.9)
POTASSIUM SERPL-SCNC: 3.1 MMOL/L (ref 3.5–5.1)
PROT UR STRIP-MCNC: 30 MG/DL
PROTHROMBIN TIME: 31.5 SEC (ref 9–11.1)
RBC # BLD AUTO: 3.75 M/UL (ref 3.8–5.2)
RBC #/AREA URNS HPF: ABNORMAL /HPF (ref 0–5)
SERVICE CMNT-IMP: ABNORMAL
SODIUM SERPL-SCNC: 137 MMOL/L (ref 136–145)
SP GR UR REFRACTOMETRY: 1.01 (ref 1–1.03)
UA: UC IF INDICATED,UAUC: ABNORMAL
UROBILINOGEN UR QL STRIP.AUTO: 0.2 EU/DL (ref 0.2–1)
WBC # BLD AUTO: 8.2 K/UL (ref 3.6–11)
WBC URNS QL MICRO: ABNORMAL /HPF (ref 0–4)

## 2018-08-16 PROCEDURE — 36415 COLL VENOUS BLD VENIPUNCTURE: CPT | Performed by: STUDENT IN AN ORGANIZED HEALTH CARE EDUCATION/TRAINING PROGRAM

## 2018-08-16 PROCEDURE — 74011250637 HC RX REV CODE- 250/637: Performed by: STUDENT IN AN ORGANIZED HEALTH CARE EDUCATION/TRAINING PROGRAM

## 2018-08-16 PROCEDURE — 87186 SC STD MICRODIL/AGAR DIL: CPT | Performed by: FAMILY MEDICINE

## 2018-08-16 PROCEDURE — 74011000250 HC RX REV CODE- 250: Performed by: STUDENT IN AN ORGANIZED HEALTH CARE EDUCATION/TRAINING PROGRAM

## 2018-08-16 PROCEDURE — 81001 URINALYSIS AUTO W/SCOPE: CPT | Performed by: STUDENT IN AN ORGANIZED HEALTH CARE EDUCATION/TRAINING PROGRAM

## 2018-08-16 PROCEDURE — 77030038269 HC DRN EXT URIN PURWCK BARD -A

## 2018-08-16 PROCEDURE — 74011250636 HC RX REV CODE- 250/636: Performed by: FAMILY MEDICINE

## 2018-08-16 PROCEDURE — 82962 GLUCOSE BLOOD TEST: CPT

## 2018-08-16 PROCEDURE — 74011000258 HC RX REV CODE- 258: Performed by: STUDENT IN AN ORGANIZED HEALTH CARE EDUCATION/TRAINING PROGRAM

## 2018-08-16 PROCEDURE — 85027 COMPLETE CBC AUTOMATED: CPT | Performed by: STUDENT IN AN ORGANIZED HEALTH CARE EDUCATION/TRAINING PROGRAM

## 2018-08-16 PROCEDURE — 87086 URINE CULTURE/COLONY COUNT: CPT | Performed by: FAMILY MEDICINE

## 2018-08-16 PROCEDURE — 87077 CULTURE AEROBIC IDENTIFY: CPT | Performed by: FAMILY MEDICINE

## 2018-08-16 PROCEDURE — 74011250636 HC RX REV CODE- 250/636: Performed by: STUDENT IN AN ORGANIZED HEALTH CARE EDUCATION/TRAINING PROGRAM

## 2018-08-16 PROCEDURE — 65660000000 HC RM CCU STEPDOWN

## 2018-08-16 PROCEDURE — 80048 BASIC METABOLIC PNL TOTAL CA: CPT | Performed by: STUDENT IN AN ORGANIZED HEALTH CARE EDUCATION/TRAINING PROGRAM

## 2018-08-16 PROCEDURE — 85610 PROTHROMBIN TIME: CPT | Performed by: STUDENT IN AN ORGANIZED HEALTH CARE EDUCATION/TRAINING PROGRAM

## 2018-08-16 RX ORDER — DILTIAZEM HYDROCHLORIDE 5 MG/ML
0.25 INJECTION INTRAVENOUS ONCE
Status: DISCONTINUED | OUTPATIENT
Start: 2018-08-16 | End: 2018-08-16

## 2018-08-16 RX ORDER — METOPROLOL TARTRATE 5 MG/5ML
2.5 INJECTION INTRAVENOUS
Status: DISCONTINUED | OUTPATIENT
Start: 2018-08-16 | End: 2018-08-16

## 2018-08-16 RX ORDER — DILTIAZEM HYDROCHLORIDE 5 MG/ML
20 INJECTION INTRAVENOUS ONCE
Status: COMPLETED | OUTPATIENT
Start: 2018-08-16 | End: 2018-08-16

## 2018-08-16 RX ORDER — POTASSIUM CHLORIDE 7.45 MG/ML
10 INJECTION INTRAVENOUS ONCE
Status: COMPLETED | OUTPATIENT
Start: 2018-08-16 | End: 2018-08-16

## 2018-08-16 RX ORDER — MORPHINE SULFATE 4 MG/ML
1 INJECTION INTRAVENOUS
Status: DISCONTINUED | OUTPATIENT
Start: 2018-08-16 | End: 2018-08-16

## 2018-08-16 RX ORDER — MORPHINE SULFATE 4 MG/ML
1 INJECTION INTRAVENOUS ONCE
Status: COMPLETED | OUTPATIENT
Start: 2018-08-16 | End: 2018-08-16

## 2018-08-16 RX ORDER — LABETALOL HYDROCHLORIDE 5 MG/ML
20 INJECTION, SOLUTION INTRAVENOUS ONCE
Status: COMPLETED | OUTPATIENT
Start: 2018-08-16 | End: 2018-08-16

## 2018-08-16 RX ORDER — HYDRALAZINE HYDROCHLORIDE 20 MG/ML
10 INJECTION INTRAMUSCULAR; INTRAVENOUS ONCE
Status: DISCONTINUED | OUTPATIENT
Start: 2018-08-16 | End: 2018-08-16

## 2018-08-16 RX ORDER — METOPROLOL TARTRATE 5 MG/5ML
5 INJECTION INTRAVENOUS
Status: COMPLETED | OUTPATIENT
Start: 2018-08-16 | End: 2018-08-16

## 2018-08-16 RX ORDER — METOPROLOL TARTRATE 5 MG/5ML
2.5 INJECTION INTRAVENOUS
Status: DISCONTINUED | OUTPATIENT
Start: 2018-08-16 | End: 2018-08-17

## 2018-08-16 RX ORDER — MORPHINE SULFATE 4 MG/ML
1 INJECTION INTRAVENOUS EVERY 4 HOURS
Status: DISCONTINUED | OUTPATIENT
Start: 2018-08-16 | End: 2018-08-19

## 2018-08-16 RX ORDER — HALOPERIDOL 5 MG/ML
2 INJECTION INTRAMUSCULAR
Status: DISCONTINUED | OUTPATIENT
Start: 2018-08-16 | End: 2018-08-21 | Stop reason: HOSPADM

## 2018-08-16 RX ADMIN — Medication 10 ML: at 21:32

## 2018-08-16 RX ADMIN — MORPHINE SULFATE 1 MG: 4 INJECTION INTRAVENOUS at 21:32

## 2018-08-16 RX ADMIN — POTASSIUM CHLORIDE 10 MEQ: 10 INJECTION, SOLUTION INTRAVENOUS at 08:38

## 2018-08-16 RX ADMIN — METOPROLOL TARTRATE 5 MG: 1 INJECTION, SOLUTION INTRAVENOUS at 18:00

## 2018-08-16 RX ADMIN — HALOPERIDOL LACTATE 2 MG: 5 INJECTION, SOLUTION INTRAMUSCULAR at 11:37

## 2018-08-16 RX ADMIN — POTASSIUM CHLORIDE 10 MEQ: 10 INJECTION, SOLUTION INTRAVENOUS at 07:09

## 2018-08-16 RX ADMIN — POTASSIUM CHLORIDE 10 MEQ: 10 INJECTION, SOLUTION INTRAVENOUS at 10:05

## 2018-08-16 RX ADMIN — Medication 10 ML: at 15:56

## 2018-08-16 RX ADMIN — SODIUM CHLORIDE, SODIUM LACTATE, POTASSIUM CHLORIDE, AND CALCIUM CHLORIDE 125 ML/HR: 600; 310; 30; 20 INJECTION, SOLUTION INTRAVENOUS at 04:54

## 2018-08-16 RX ADMIN — NYSTATIN: 100000 POWDER TOPICAL at 11:59

## 2018-08-16 RX ADMIN — DILTIAZEM HYDROCHLORIDE 20 MG: 5 INJECTION INTRAVENOUS at 10:10

## 2018-08-16 RX ADMIN — MORPHINE SULFATE 1 MG: 4 INJECTION INTRAVENOUS at 08:39

## 2018-08-16 RX ADMIN — LABETALOL HYDROCHLORIDE 20 MG: 5 INJECTION INTRAVENOUS at 03:51

## 2018-08-16 RX ADMIN — Medication 10 ML: at 21:33

## 2018-08-16 RX ADMIN — MORPHINE SULFATE 1 MG: 4 INJECTION INTRAVENOUS at 06:10

## 2018-08-16 RX ADMIN — MORPHINE SULFATE 1 MG: 4 INJECTION INTRAVENOUS at 15:51

## 2018-08-16 RX ADMIN — CEFTRIAXONE SODIUM 1 G: 1 INJECTION, POWDER, FOR SOLUTION INTRAMUSCULAR; INTRAVENOUS at 15:39

## 2018-08-16 RX ADMIN — POTASSIUM CHLORIDE 10 MEQ: 10 INJECTION, SOLUTION INTRAVENOUS at 05:36

## 2018-08-16 RX ADMIN — MORPHINE SULFATE 1 MG: 4 INJECTION INTRAVENOUS at 11:50

## 2018-08-16 NOTE — PROGRESS NOTES
[address] [facility] [address] [address]   Office [phone]  Fax [phone]          Assessment and Plan     Tray Quintero is a 80 y.o. female admitted for right wrist fracture following recurrent falls. She has spent 2 night(s) in the hospital.    24 Hour Events: Overnight Ms Taniya Franks was combative and unable to take PO. She received one dose of zyprexa for agitation. Her diastolic blood pressure peaked to 120 and she received a dose of Labetalol. Her BP came down to 147/80. She continued to be combative this morning and received a dose of morphine. Right Wrist fracture: Secondary to GLF on right outstretch hand. This is 3rd fall in the past month. Non-displaced right wrist fracture confirmed on x-ray. No acute pelvic fracture noted on pelv Xray. Normal CT head  - Ortho consult, appreciate recs. · Pt currently in sugartong splint and right upper extremity splint   · Will treat conservatively. Pt needs strict elevation due to severity of hand/ finger swelling. · Follow up outpatient with Dr Kendal Johnson   - Morphine 1 mg qh4 prn  - CM working with Ms Pepito Herrera family regarding her placement      Paroxysmal Afib: Currently in Afib with RVR (rates 110s). Underwent an EP study (5/25/18) with GULSHAN guided DCCV and successfully converted to NSR. Pt however converted back to Afib at subsequent visits. Pt of Dr. David Anderson. INR 3.3 (goal 2-3). Last Echo (5/25/18) with EF 55%-60%  -Cont home Cardizem 360mg daily.   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 Per Heme/onc note, this was unprovoked and pt will be on life long anti-coag  -Hold warfarin 3mg      Asymptomatic Bacteriuria: 2+ bacteria and neg LE/nitrites.   -Urine culture: Lactobacillus spp.  -Continue IV Rocephin    DM type 2 with neuropathy: Last A1c 6.8 (18). Diet controlled  - Insulin Sliding Scale normal sensitivity with AC&HS glucose checks. - Hypoglycemia protocols ordered. - Cont Gabapentin 600mg TID for neuropathic pain     Hypertension: On admission BP was 199/91 as pt was agitated. This later trended down to 168/98.   - Restart home medications of Losartan/HCTZ 50/12.5 mg for SBP >170 per cards  - Will continue to monitor at this time and readjust as BP's trend.     Hyperlipidemia: Lipid panel (18) - Tchol 228, HDL 55,  and TG 1886. Diet controlled, pt not on any medication   - Continue to monitor     Depression:   -Continue home Celexa 20mg daily     Hx of Iron deficiency anemia: Normal Hb(12.2). Pt was initially on Iron but has since been discontinued by Dr. Becca Collier (Heme/onc) given persistent constipation and dark stools  -Continue to monitor     Muscle Spasms:  -Continue home flexeril 5mg prn      Constipation:  -Miralax prn     FEN/GI - Diabetic Diet  Activity - Fall precaution, ambulate with assistance  DVT prophylaxis - Continue home coumadin, pharmacy dosing per protocol  GI prophylaxis -  None  Disposition - CM consulted for placing.     CODE STATUS:  Full, discussed with     cell is [phone], name is Laine Gonzalez. I will discuss this patient with Dr Werner Krishna     I appreciate the opportunity to participate in the care of this patient,  Troy Mcgrath MD  [facility] Medicine Resident         Subjective / Objective     Subjective  This morning Ms Lloyd Bolaños was not oriented to time, person or place and the nurse reported the patient was confused and combative. She was unable to express any pain or discomfort. Temp (24hrs), Av.5 °F (36.9 °C), Min:97.9 °F (36.6 °C), Max:99.6 °F (37.6 °C)     Objective:  Vital signs: (most recent): Blood pressure (!) 125/102, pulse 100, temperature 98.6 °F (37 °C), resp.  rate 16, height 5' 8\" (1.727 m), weight 167 lb 4.8 oz (75.9 kg), SpO2 94 %.      Respiratory: O2 Flow Rate (L/min): 2 l/min O2 Device: Room air   Visit Vitals    BP (!) 125/102    Pulse 100    Temp 98.6 °F (37 °C)    Resp 16    Ht 5' 8\" (1.727 m)    Wt 167 lb 4.8 oz (75.9 kg)    SpO2 94%    BMI 25.44 kg/m2     Physical Exam  I was unable to examine Ms Diane Choi further as she was combative this morning. General: No acute distress. Not cooperative    Musculoskeletal: Right wrist is in sugartong splint with moderate swelling. Bruising present in right hand. Neuro: Not oriented to person place or time.      I/O:    Date 08/15/18 0700 - 08/16/18 0659 08/16/18 0700 - 08/17/18 0659   Shift 4882-1757 5634-3717 24 Hour Total 8188-3870 0878-5828 24 Hour Total   I  N  T  A  K  E   P. O.  0 0         P. O.  0 0       I.V.  (mL/kg/hr) 50  (0.1)  50         Volume (cefTRIAXone (ROCEPHIN) 1 g in 0.9% sodium chloride (MBP/ADV) 50 mL) 50  50       Shift Total  (mL/kg) 50  (0.7) 0  (0) 50  (0.7)      O  U  T  P  U  T   Urine  (mL/kg/hr)  800 800         Urine Voided  800 800         Urine Occurrence(s) 3 x  3 x       Shift Total  (mL/kg)  800  (10.5) 800  (10.5)      NET 50 -800 -750      Weight (kg) 76.3 75.9 75.9 75.9 75.9 75.9       CBC:  Recent Labs      08/16/18   0120  08/15/18   0054  08/14/18   0431   WBC  8.2  9.5  10.2   HGB  12.0  11.9  12.2   HCT  36.5  37.2  36.9   PLT  195  186  602       Metabolic Panel:  Recent Labs      08/16/18   0120  08/15/18   0054  08/14/18   0431   NA  137  136  136   K  3.1*  3.8  3.5   CL  101  102  100   CO2  30  27  27   BUN  14  16  13   CREA  0.91  0.97  0.89   GLU  103*  112*  138*   CA  9.1  9.0  8.9   ALB   --    --   3.4*   SGOT   --    --   17   ALT   --    --   16   INR  3.3*  3.6*  3.3*          For Billing    Chief Complaint   Patient presents with    Wrist Pain       Hospital Problems  Date Reviewed: 8/1/2018          Codes Class Noted POA    * (Principal)Frequent falls ICD-10-CM: R29.6  ICD-9-CM: V15.88  8/14/2018 Unknown        Wrist fracture, right ICD-10-CM: S62.101A  ICD-9-CM: 814.00  8/14/2018 Unknown        Dyslipidemia ICD-10-CM: E78.5  ICD-9-CM: 272.4  5/24/2018 Yes        Controlled type 2 diabetes mellitus without complication, without long-term current use of insulin (HCC) ICD-10-CM: E11.9  ICD-9-CM: 250.00  5/9/2017 Yes        Iron deficiency anemia ICD-10-CM: D50.9  ICD-9-CM: 280.9  8/11/2016 Yes        HTN (hypertension) (Chronic) ICD-10-CM: I10  ICD-9-CM: 401.9  4/1/2010 Yes        GERD (gastroesophageal reflux disease) (Chronic) ICD-10-CM: K21.9  ICD-9-CM: 530.81  4/1/2010 Yes

## 2018-08-16 NOTE — PROGRESS NOTES
Problem: Falls - Risk of  Goal: *Absence of Falls  Document Kizzy Fall Risk and appropriate interventions in the flowsheet.    Outcome: Progressing Towards Goal  Fall Risk Interventions:  Mobility Interventions: Bed/chair exit alarm, Patient to call before getting OOB    Mentation Interventions: Adequate sleep, hydration, pain control, Bed/chair exit alarm, Door open when patient unattended    Medication Interventions: Bed/chair exit alarm, Patient to call before getting OOB, Teach patient to arise slowly    Elimination Interventions: Bed/chair exit alarm, Call light in reach, Patient to call for help with toileting needs    History of Falls Interventions: Bed/chair exit alarm, Consult care management for discharge planning, Door open when patient unattended, Room close to nurse's station

## 2018-08-16 NOTE — ROUTINE PROCESS
Bedside shift change report given to [employer] [place] (oncoming nurse) by Leopodlo Byrnes (offgoing nurse). Report included the following information SBAR, Kardex, Intake/Output, MAR, Accordion and Recent Results.

## 2018-08-16 NOTE — PROGRESS NOTES
Notified attending MD'S of increasing HR trend of 120's to 140\"s. Pt has been agitated and combative most of time. PO unabel to give due to being  Combative.

## 2018-08-16 NOTE — PROGRESS NOTES
[address] [place] [address] with [address] [place] [address]       Resident Progress Note in Brief    S:   Paged by RN to see patient as HR remained in the 110-130s and nurse was uncomfortable about the reading. Pt seen and examined at bedside. Pt is lying comfortably on her bed with a sitter and her daughter in the room. She would respond to her name but unable to hold a conversation. Patient's daughter wanted to know the plan. O:  Visit Vitals    /86    Pulse (!) 111    Temp 98.6 °F (37 °C)    Resp 19    Ht 5' 8\" (1.727 m)    Wt 167 lb 4.8 oz (75.9 kg)    SpO2 94%    BMI 25.44 kg/m2     Physical Examination:   General appearance - well appearing, lying comfortably on her bed and in no distress  Chest - clear to auscultation, no wheezes, rales or rhonchi, symmetric air entry  Heart - Irregularly irregular rhythm, tachycardiac  Neurological - Alert but not oriented     A/P:   81 yo female admitted for right wrist fx after GLF. -Afib with RVR: Already received daily dose of Diltiazem.  Discussed with pharmacy and started pt on Metoprolol 2.5mg prn for persistent HR>120.   -If Afib with RVR still not controlled, will call cardiology for recs  -Cont to re-direct pt during sundowning  -Unable to take PO meds as she spites then up, will hold for now.  -Discussed plan with family  -Please see daily progress note for detailed plan    Rosalina Dey MD  Family Medicine Resident

## 2018-08-16 NOTE — PROGRESS NOTES
0100- patient received in the bed with sitter present. Oriented to person only, fidgeting with gown- mostly calm in the bed. Incontinence checked and pads replaced under patient- with purewick in place. Removed mitt of left hand no bruising or skin breakdown noted. 0200- Sitter with patient- sleeping at this time. 3988- BP elevated- notified Dr. Estephania Jones. Order placed for IV labetalol. Patient moves arms when BP cuff inflates- she is tearful asking about her  Sanchez Left. Reassured patient and will recheck BP. Removed mitt from left hand- no redness, bruising or breakdown noted    0445- Rechecked /102- patient calmer in bed with sitter present. Patient did drink water from a straw- but do not feel she will follow commands enough at this time to take pill by mouth. 0530- patient sitting up in bed with legs over the side rail- trying to get up. Assisted sitter to place pt back in the middle of the bed and placed seizure pads on both bed rails to protect patient as she is hitting her left hand on the rail. Trying to hit staff with left hand. Notified MD and will give 1mg morphine to address pain issues. Will continue to monitor. Removed mot from left hand- no redness, bruising or break down noted. 5942- patient is calmer in bed after morphine- less restless. Updated family practice when rounding. Sitter at bedside- will continue to monitor.

## 2018-08-16 NOTE — PROGRESS NOTES
8- CASE MANAGEMENT NOTE:  I have reviewed the pt's EMR, met and later spoke on the phone with her daughter, Marylene Partridge ([phone]), and spoke on the phone with her , Nay Moreno ([phone]), to discuss discharge needs. At this time, it appears the pt was dependent on her  for ADL's, not really ambulatory and now not following directions. She also requires a sitter. I feel she would require a dementia unit at an assisted living facility and explained to the family this is not covered by Medicare. The family, daughter,  and another daughter and son (currently on vacation) will discuss this and I am hopeful to meet with the daughter and  tomorrow.  DONNA MontalvoW, CM

## 2018-08-16 NOTE — PROGRESS NOTES
[address] [place] [address] with [facility] and Jules Barron       Resident Progress Note in Brief    S: Paged by nursing staff about pt being combative and agitated. Patient seen and examined at bedside. AOX0, combative and incoherent. O:  Visit Vitals    BP (!) 125/102    Pulse 100    Temp 98.6 °F (37 °C)    Resp 16    Ht 5' 8\" (1.727 m)    Wt 167 lb 4.8 oz (75.9 kg)    SpO2 94%    BMI 25.44 kg/m2     Physical Examination:   General appearance -  AOX0, combative and incoherent. Chest - Anterior lung sounds CTAB  Heart - Irregular Irregular  Abdomen - soft, nontender  Extremities - Left wrist with Mitts in place. Right wrist splinted. A/P:  80 yr old female admitted for right wrist fracture  1) Dementia / Sundowning  - Unable to tolerate PO meds. Administer Zyprexa 2.5mg IM x1 dose    Addendum:    0330: Paged about elevated BPs to 178/120. BP rechecks remained high.             Likely 2/2 to pain and agitation            Administer Labetalol 20mg, Morphine 1mg            Will consider psych consult in Avel Velasco MD  Family Medicine Resident

## 2018-08-16 NOTE — PROGRESS NOTES
Attempted to work with the patient for Physical Therapy, chart reviewed and discussed with nurse who is in the room with the patient, advised to defer for today patient agitated and restless has a sitter in the room. We will continue to follow up with the patient for therapy thank you.

## 2018-08-16 NOTE — ROUTINE PROCESS
2047  Patient has pulled out IV and attempting to get out of bed. Sitter having difficulty managing her. Patient is being uncooperative and concern patient is going to cause harm to herself or others. Attempted to try to medicate with po medication but patient will not accept anything by mouth. Called and spoke with Dr. Freddy Alaniz to express concerns. Advised he will come and assess patient. 2356  Patient seems to be more cooperative and is now resting. Removed mitten to assess skin. No skin break down or bruising noted. Bedside and Verbal shift change report given to Gina (oncoming nurse) by Low Alonzo (offgoing nurse). Report included the following information SBAR, Kardex, ED Summary, Procedure Summary, Intake/Output, MAR, Recent Results and Cardiac Rhythm A Flutter.

## 2018-08-16 NOTE — PROGRESS NOTES
Spiritual Care Assessment/Progress Note  [address]      NAME: Omar Kern      MRN: [id number]  AGE: 80 y.o. SEX: female  Anabaptism Affiliation: Lutheran   Language: English     8/16/2018     Total Time (in minutes): 6     Spiritual Assessment begun in SF 3 PROG CARE TELE 2 through conversation with:         []Patient        [x] Family    [] Friend(s)        Reason for Consult: Palliative Care, Initial/Spiritual Assessment     Spiritual beliefs Per : (Please include comment if needed)     [x] Identifies with a kelly tradition:    Lutheran     [x] Supported by a kelly community:            [] Claims no spiritual orientation:           [] Seeking spiritual identity:                [] Adheres to an individual form of spirituality:           [] Not able to assess:                           Identified resources for coping:      [] Prayer                               [] Music                  [] Guided Imagery     [] Family/friends                 [] Pet visits     [] Devotional reading                         [] Unknown     [] Other:                                            Interventions offered during this visit: (See comments for more details)          Family/Friend(s):  Affirmation of kelly, Iconic (affirming the presence of God/Higher Power), Prayer (assurance of)     Plan of Care:     [] Support spiritual and/or cultural needs    [] Support AMD and/or advance care planning process      [] Support grieving process   [] Coordinate Rites and/or Rituals    [] Coordination with community clergy   [] No spiritual needs identified at this time   [] Detailed Plan of Care below (See Comments)  [] Make referral to Music Therapy  [] Make referral to Pet Therapy     [] Make referral to Addiction services  [] Make referral to [facility]  [] Make referral to Spiritual Care Partner  [] No future visits requested        [x] Follow up visits as needed     Comments: Initial Palliative Care spiritual assessment in [facility]. Miss Regan's  was outside her door, staff was working with her. He shared he is a [place] [place] in [place]. He has no specific needs at this time. Provided spiritual presence and prayer. Advised of  Availability.   Visited by: Ngoc Sanchez [address] [facility] Scottie ([id number])

## 2018-08-17 LAB
ANION GAP SERPL CALC-SCNC: 11 MMOL/L (ref 5–15)
BUN SERPL-MCNC: 15 MG/DL (ref 6–20)
BUN/CREAT SERPL: 19 (ref 12–20)
CALCIUM SERPL-MCNC: 9.3 MG/DL (ref 8.5–10.1)
CHLORIDE SERPL-SCNC: 101 MMOL/L (ref 97–108)
CO2 SERPL-SCNC: 26 MMOL/L (ref 21–32)
CREAT SERPL-MCNC: 0.78 MG/DL (ref 0.55–1.02)
ERYTHROCYTE [DISTWIDTH] IN BLOOD BY AUTOMATED COUNT: 13.1 % (ref 11.5–14.5)
GLUCOSE BLD STRIP.AUTO-MCNC: 106 MG/DL (ref 65–100)
GLUCOSE BLD STRIP.AUTO-MCNC: 107 MG/DL (ref 65–100)
GLUCOSE BLD STRIP.AUTO-MCNC: 107 MG/DL (ref 65–100)
GLUCOSE BLD STRIP.AUTO-MCNC: 98 MG/DL (ref 65–100)
GLUCOSE SERPL-MCNC: 108 MG/DL (ref 65–100)
HCT VFR BLD AUTO: 40.1 % (ref 35–47)
HGB BLD-MCNC: 12.9 G/DL (ref 11.5–16)
INR PPP: 2.6 (ref 0.9–1.1)
MCH RBC QN AUTO: 32.2 PG (ref 26–34)
MCHC RBC AUTO-ENTMCNC: 32.2 G/DL (ref 30–36.5)
MCV RBC AUTO: 100 FL (ref 80–99)
NRBC # BLD: 0 K/UL (ref 0–0.01)
NRBC BLD-RTO: 0 PER 100 WBC
PLATELET # BLD AUTO: 246 K/UL (ref 150–400)
PMV BLD AUTO: 10.2 FL (ref 8.9–12.9)
POTASSIUM SERPL-SCNC: 4 MMOL/L (ref 3.5–5.1)
PROTHROMBIN TIME: 25.6 SEC (ref 9–11.1)
RBC # BLD AUTO: 4.01 M/UL (ref 3.8–5.2)
SERVICE CMNT-IMP: ABNORMAL
SERVICE CMNT-IMP: NORMAL
SODIUM SERPL-SCNC: 138 MMOL/L (ref 136–145)
WBC # BLD AUTO: 7.8 K/UL (ref 3.6–11)

## 2018-08-17 PROCEDURE — 85610 PROTHROMBIN TIME: CPT | Performed by: FAMILY MEDICINE

## 2018-08-17 PROCEDURE — 36415 COLL VENOUS BLD VENIPUNCTURE: CPT | Performed by: STUDENT IN AN ORGANIZED HEALTH CARE EDUCATION/TRAINING PROGRAM

## 2018-08-17 PROCEDURE — 85027 COMPLETE CBC AUTOMATED: CPT | Performed by: STUDENT IN AN ORGANIZED HEALTH CARE EDUCATION/TRAINING PROGRAM

## 2018-08-17 PROCEDURE — 65660000000 HC RM CCU STEPDOWN

## 2018-08-17 PROCEDURE — 74011250637 HC RX REV CODE- 250/637: Performed by: STUDENT IN AN ORGANIZED HEALTH CARE EDUCATION/TRAINING PROGRAM

## 2018-08-17 PROCEDURE — 80048 BASIC METABOLIC PNL TOTAL CA: CPT | Performed by: STUDENT IN AN ORGANIZED HEALTH CARE EDUCATION/TRAINING PROGRAM

## 2018-08-17 PROCEDURE — 74011000258 HC RX REV CODE- 258: Performed by: STUDENT IN AN ORGANIZED HEALTH CARE EDUCATION/TRAINING PROGRAM

## 2018-08-17 PROCEDURE — 74011250636 HC RX REV CODE- 250/636: Performed by: FAMILY MEDICINE

## 2018-08-17 PROCEDURE — 77030038269 HC DRN EXT URIN PURWCK BARD -A

## 2018-08-17 PROCEDURE — 74011250636 HC RX REV CODE- 250/636: Performed by: STUDENT IN AN ORGANIZED HEALTH CARE EDUCATION/TRAINING PROGRAM

## 2018-08-17 PROCEDURE — 93971 EXTREMITY STUDY: CPT

## 2018-08-17 PROCEDURE — 74011000250 HC RX REV CODE- 250: Performed by: STUDENT IN AN ORGANIZED HEALTH CARE EDUCATION/TRAINING PROGRAM

## 2018-08-17 PROCEDURE — 74011250637 HC RX REV CODE- 250/637: Performed by: NURSE PRACTITIONER

## 2018-08-17 PROCEDURE — 82962 GLUCOSE BLOOD TEST: CPT

## 2018-08-17 RX ORDER — ACETAMINOPHEN 325 MG/1
650 TABLET ORAL EVERY 6 HOURS
Status: DISCONTINUED | OUTPATIENT
Start: 2018-08-17 | End: 2018-08-21 | Stop reason: HOSPADM

## 2018-08-17 RX ORDER — DILTIAZEM HYDROCHLORIDE 180 MG/1
180 CAPSULE, COATED, EXTENDED RELEASE ORAL DAILY
Status: DISCONTINUED | OUTPATIENT
Start: 2018-08-17 | End: 2018-08-17

## 2018-08-17 RX ORDER — DILTIAZEM HYDROCHLORIDE 30 MG/1
60 TABLET, FILM COATED ORAL
Status: DISCONTINUED | OUTPATIENT
Start: 2018-08-17 | End: 2018-08-17

## 2018-08-17 RX ORDER — DILTIAZEM HYDROCHLORIDE 240 MG/1
240 CAPSULE, COATED, EXTENDED RELEASE ORAL DAILY
Status: DISCONTINUED | OUTPATIENT
Start: 2018-08-18 | End: 2018-08-21 | Stop reason: HOSPADM

## 2018-08-17 RX ADMIN — Medication 10 ML: at 16:31

## 2018-08-17 RX ADMIN — Medication 10 ML: at 05:47

## 2018-08-17 RX ADMIN — NYSTATIN: 100000 POWDER TOPICAL at 18:13

## 2018-08-17 RX ADMIN — METOROPROLOL TARTRATE 2.5 MG: 5 INJECTION, SOLUTION INTRAVENOUS at 05:52

## 2018-08-17 RX ADMIN — MORPHINE SULFATE 1 MG: 4 INJECTION INTRAVENOUS at 03:56

## 2018-08-17 RX ADMIN — DILTIAZEM HYDROCHLORIDE 180 MG: 180 CAPSULE, COATED, EXTENDED RELEASE ORAL at 10:51

## 2018-08-17 RX ADMIN — HALOPERIDOL LACTATE 2 MG: 5 INJECTION, SOLUTION INTRAMUSCULAR at 03:48

## 2018-08-17 RX ADMIN — METOROPROLOL TARTRATE 2.5 MG: 5 INJECTION, SOLUTION INTRAVENOUS at 01:07

## 2018-08-17 RX ADMIN — MORPHINE SULFATE 1 MG: 4 INJECTION INTRAVENOUS at 22:10

## 2018-08-17 RX ADMIN — METOROPROLOL TARTRATE 2.5 MG: 5 INJECTION, SOLUTION INTRAVENOUS at 08:39

## 2018-08-17 RX ADMIN — Medication 10 ML: at 22:10

## 2018-08-17 RX ADMIN — GABAPENTIN 600 MG: 300 CAPSULE ORAL at 08:33

## 2018-08-17 RX ADMIN — CITALOPRAM HYDROBROMIDE 20 MG: 20 TABLET ORAL at 08:34

## 2018-08-17 RX ADMIN — Medication 10 ML: at 16:32

## 2018-08-17 RX ADMIN — CEFTRIAXONE SODIUM 1 G: 1 INJECTION, POWDER, FOR SOLUTION INTRAMUSCULAR; INTRAVENOUS at 16:35

## 2018-08-17 RX ADMIN — Medication 10 ML: at 22:11

## 2018-08-17 RX ADMIN — MORPHINE SULFATE 1 MG: 4 INJECTION INTRAVENOUS at 10:54

## 2018-08-17 RX ADMIN — MORPHINE SULFATE 1 MG: 4 INJECTION INTRAVENOUS at 00:06

## 2018-08-17 NOTE — PROGRESS NOTES
Attempted to work with the patient for Physical Therapy, chart reviewed and discussed with nurse patient too lethargic to participate with Therapy. Spouse in the room and confirmed that patient was able to ambulate short distance at home before she fell and fracture her right right. We will continue to follow up with the patient for therapy. Thank you.

## 2018-08-17 NOTE — PROGRESS NOTES
Bedside shift change report given to Levar Stewart RN (oncoming nurse) by Jeanie Marte RN (offgoing nurse). Report included the following information SBAR, Kardex and MAR.

## 2018-08-17 NOTE — PROGRESS NOTES
Problem: Pressure Injury - Risk of  Goal: *Prevention of pressure injury  Document Oumar Scale and appropriate interventions in the flowsheet. Outcome: Progressing Towards Goal  Pressure Injury Interventions:  Sensory Interventions: Assess changes in LOC    Moisture Interventions: Internal/External urinary devices    Activity Interventions: Assess need for specialty bed    Mobility Interventions: HOB 30 degrees or less    Nutrition Interventions: Document food/fluid/supplement intake    Friction and Shear Interventions: HOB 30 degrees or less               Problem: Falls - Risk of  Goal: *Absence of Falls  Document Kizzy Fall Risk and appropriate interventions in the flowsheet.    Outcome: Progressing Towards Goal  Fall Risk Interventions:  Mobility Interventions: Bed/chair exit alarm    Mentation Interventions: Bed/chair exit alarm, Family/sitter at bedside    Medication Interventions: Teach patient to arise slowly    Elimination Interventions: Bed/chair exit alarm    History of Falls Interventions: Bed/chair exit alarm
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
## 2018-08-17 NOTE — PROGRESS NOTES
Pt has shown mentation much calmer and cooperates more than before. Taking commands and responds appropriateley,  being pointed and asked who he was she responds  \" the best man in the world\",  Willing and able to take po meds with apple sauce without any difficult swallow.  stays at bedside. Notifies the abnormal swelling noticed in left forearm to the attendings. 15:00  Pt has been somnolent after the perk up this AM where po's meds first time being able to administer. One hour after that and since then she responds to voice but drifts back to sleep not staying awake enough to have meals or meds. Restrain of left hand has been off since this AM. Keith Gonzalez is with patient.

## 2018-08-17 NOTE — PROGRESS NOTES
1920:  Bedside and Verbal shift change report given to [address] [place] [address] Avenue, RN (oncoming nurse) by Kusum Horton RN (offgoing nurse). Report included the following information SBAR, Kardex, ED Summary, MAR, Accordion, Recent Results and Cardiac Rhythm A fib.     2136: Patient clenching lips tight and refusing to take PO Gabapentin. 2359:  Patient crying, anxious, and asking where her \"momma\" is. Patient confused and does not know where she. Reoriented patient and reminded her who I was and who the sitter was. 0107:  Patient's heart rate fluctuating between 120-140's. PRN Metoprolol given via IV.      0122:  Patient's heart rate between .     0347:  Patient agitated and trying to get mitten off left hand. Swinging arms and kicking legs out of bed. Haldol given IM.      0500:  Patient's IV infiltrated. Patient trying to bite other RN. 25 G IV placed in left lower forearm. 8876: MD came in to evaluate patient. Orders received to give another 2.5 mg of IV Metoprolol. 0720: Bedside and Verbal shift change report given to Kusum Horton, RN (oncoming nurse) by [address] [place] [address] Avenue, RN (offgoing nurse). Report included the following information SBAR, Kardex, ED Summary, MAR, Accordion, Recent Results and Cardiac Rhythm A fib.

## 2018-08-17 NOTE — PROGRESS NOTES
Bedside shift change report given to Arlene Hua RN (oncoming nurse) by Tamia Gamboa RN (offgoing nurse). Report included the following information SBAR, Kardex and MAR.

## 2018-08-17 NOTE — PROGRESS NOTES
Cardiology Progress Note                             [address], [place], [address]                                 Phone [phone]; Fax [phone]        2018 9:17 AM     Admit Date:           2018  Admit Diagnosis:  Wrist fracture, right  :          1933   MRN:          [id number]   ASSESSMENT/RECOMMENDATION:   1) Afib with RVR : cardizem d/c d/t patient refusing to take PO (previously taking 240 mg daily for rate control). Now agreeing to take meds this AM  -HR at 135-145 throughout the night. HR Currently 115  -D/C Metoprolol IV, restart cardizem-can increase to 240 mg tomorrow if tolerated       2) HTN: BPelevated  -Start Cardizem 180 CD   -Losartan/HCTZ on  as needed basis for systolic blood pressure greater than 170 (has not been used)      3) Hx of unprovoked PE: coumadin on hold now d/t frequent falls/confusion. INR 2.5.   -hem/onc recommending holding [address] for now and  resuming [address]  Later if she improves/does not havefalls.           Reviewed plan with the patient's  and attending  Pt personally seen and examined. Chart reviewed. Agree with advanced NP's history, exam and  A/P with changes/additons. If pt does not take/refuses PO meds and if she continues to be tachycardic - cardizem gtt. Will see prn . Plz call if needed        [facility]. MD, [facility]               Last 3 Recorded Weights in this Encounter    08/15/18 0523 18 0309 18 0459   Weight: 168 lb 3.2 oz (76.3 kg) 167 lb 4.8 oz (75.9 kg) 170 lb (77.1 kg)         08/15 1901 -  0700  In: 6233.2 [P.O.:150; I.V.:6083.2]  Out: 1350 [Urine:1350]    SUBJECTIVE               Tray LAMA Link sleeping.  NAD          Current Facility-Administered Medications   Medication Dose Route Frequency    morphine injection 1 mg  1 mg IntraVENous Q4H    haloperidol lactate (HALDOL) injection 2 mg 2 mg IntraMUSCular Q4H PRN    metoprolol (LOPRESSOR) injection 2.5 mg  2.5 mg IntraVENous Q6H PRN    lactated Ringers infusion  125 mL/hr IntraVENous CONTINUOUS    cefTRIAXone (ROCEPHIN) 1 g in 0.9% sodium chloride (MBP/ADV) 50 mL  1 g IntraVENous Q24H    sodium chloride (NS) flush 5-10 mL  5-10 mL IntraVENous Q8H    sodium chloride (NS) flush 5-10 mL  5-10 mL IntraVENous PRN    sodium chloride (NS) flush 5-10 mL  5-10 mL IntraVENous Q8H    sodium chloride (NS) flush 5-10 mL  5-10 mL IntraVENous PRN    HYDROcodone-acetaminophen (NORCO) 7.5-325 mg per tablet 1 Tab  1 Tab Oral TID PRN    polyethylene glycol (MIRALAX) packet 17 g  17 g Oral DAILY PRN    citalopram (CELEXA) tablet 20 mg  20 mg Oral DAILY    gabapentin (NEURONTIN) capsule 600 mg  600 mg Oral TID    cyclobenzaprine (FLEXERIL) tablet 5 mg  5 mg Oral DAILY PRN    losartan/hydroCHLOROthiazide (HYZAAR) 50/12.5 mg   Oral DAILY PRN    insulin lispro (HUMALOG) injection   SubCUTAneous AC&HS    glucose chewable tablet 16 g  4 Tab Oral PRN    dextrose (D50W) injection syrg 12.5-25 g  12.5-25 g IntraVENous PRN    glucagon (GLUCAGEN) injection 1 mg  1 mg IntraMUSCular PRN    nystatin (MYCOSTATIN) 100,000 unit/gram powder   Topical BID      OBJECTIVE               Intake/Output Summary (Last 24 hours) at 08/17/18 0917  Last data filed at 08/17/18 0700   Gross per 24 hour   Intake          6233.16 ml   Output              550 ml   Net          5683.16 ml       Review of Systems - History obtained from the patient AS PER  \[facility]\""    Telemetry: Afib with rate 110-140    PHYSICAL EXAM        Visit Vitals    BP (!) 154/126    Pulse (!) 118    Temp 97.9 °F (36.6 °C)    Resp (!) 36    Ht 5' 8\" (1.727 m)    Wt 170 lb (77.1 kg)    SpO2 99%    BMI 25.85 kg/m2       Gen: Elderly, sleeping, in no acute distress   HEENT:  Pink conjunctivae, Hearing grossly normal.No scleral icterus or conjunctival, moist mucous membranes  Neck: Supple,No JVD  Resp: No accessory muscle use, Clear breath sounds anterior, No wheezing  Card: Irregular/accelerated Rate,Rythm, No murmurs, rubs or gallop. No thrills. GI:          soft, non-tender   MSK: No cyanosis or clubbing, good capillary refill  Skin: No rashes or ulcers, no bruising  Neuro:   moving all four extremities, no focal deficit, follows commands appropriately  LE: No edema       DATA REVIEW            Laboratory and Imaging have been reviewed by me and are notable for  No results for input(s): CPK, CKMB, TROIQ in the last 72 hours.   Recent Labs      08/17/18   0517  08/16/18   0120  08/15/18   0054   NA  138  137  136   K  4.0  3.1*  3.8   CO2  26  30  27   BUN  15  14  16   CREA  0.78  0.91  0.97   GLU  108*  103*  112*   WBC  7.8  8.2  9.5   HGB  12.9  12.0  11.9   HCT  40.1  36.5  37.2   PLT  246  195  [facility], , NP

## 2018-08-17 NOTE — PROGRESS NOTES
Pt continues to require a mitt on the L hand to prevent interference with medical treatment and devices. Dr. Minda Perdue ordered for restraints to be continued at this time. 4239:  MEWs score review; score is 4 related to increased HR and BP. Pt is combative and requires a sitter for safety.

## 2018-08-17 NOTE — PROCEDURES
[address]  *** FINAL REPORT ***    Name: Hua Ladd  MRN: [id number]    Inpatient  : 24 May 1933  HIS Order #: [id number]  [address] [facility] Visit #: [id number]  Date: 17 Aug 2018    TYPE OF TEST: Peripheral Venous Testing    REASON FOR TEST  Limb swelling    Left Arm:-  Deep venous thrombosis:           No  Superficial venous thrombosis:    No      INTERPRETATION/FINDINGS  PROCEDURE: LEFT UPPER EXTREMITY VENOUS DUPLEX: Evaluation of upper  extremity veins with ultrasound (B-mode imaging, pulsed Doppler, color   Doppler). Includes the internal jugular, subclavian, axillary,  brachial, radial, ulnar, basilic, and cephalic veins. FINDINGS: Technically difficult exam due to patient edema. Gray scale  of the forearm suboptimal. Gray scale and color flow duplex images of  the veins of the left upper extremity and bilateral subclavian veins  demonstrate normal compressibility, absence of filing defects, reflux  or phlebitic changes of the internal jugular, bilateral subclavian,  axillary, upper arm and forearm veins of the left arm. CONCLUSION: Technically difficult exam. No deep vein thrombosis or  thrombophlebitis. No evidence of thrombus in contralateral right  subclavian vein. ADDITIONAL COMMENTS    INCIDENTAL FINDING: Avascular, anechoic structure noted in the  anterior forearm measuring 0.54 x 0.94 cm. I have personally reviewed the data relevant to the interpretation of  this  study. TECHNOLOGIST: Dianne Rausch. Biju  Signed: 2018 10:48 AM    PHYSICIAN: Ke Stevenson.  Carla Mercer MD  Signed: 2018 09:52 AM
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
## 2018-08-18 LAB
ANION GAP SERPL CALC-SCNC: 4 MMOL/L (ref 5–15)
BACTERIA SPEC CULT: ABNORMAL
BUN SERPL-MCNC: 20 MG/DL (ref 6–20)
BUN/CREAT SERPL: 22 (ref 12–20)
CALCIUM SERPL-MCNC: 9.1 MG/DL (ref 8.5–10.1)
CC UR VC: ABNORMAL
CHLORIDE SERPL-SCNC: 103 MMOL/L (ref 97–108)
CO2 SERPL-SCNC: 31 MMOL/L (ref 21–32)
CREAT SERPL-MCNC: 0.93 MG/DL (ref 0.55–1.02)
ERYTHROCYTE [DISTWIDTH] IN BLOOD BY AUTOMATED COUNT: 12.9 % (ref 11.5–14.5)
GLUCOSE BLD STRIP.AUTO-MCNC: 100 MG/DL (ref 65–100)
GLUCOSE BLD STRIP.AUTO-MCNC: 107 MG/DL (ref 65–100)
GLUCOSE BLD STRIP.AUTO-MCNC: 119 MG/DL (ref 65–100)
GLUCOSE BLD STRIP.AUTO-MCNC: 97 MG/DL (ref 65–100)
GLUCOSE SERPL-MCNC: 106 MG/DL (ref 65–100)
HCT VFR BLD AUTO: 39 % (ref 35–47)
HGB BLD-MCNC: 13.2 G/DL (ref 11.5–16)
MCH RBC QN AUTO: 32.5 PG (ref 26–34)
MCHC RBC AUTO-ENTMCNC: 33.8 G/DL (ref 30–36.5)
MCV RBC AUTO: 96.1 FL (ref 80–99)
NRBC # BLD: 0 K/UL (ref 0–0.01)
NRBC BLD-RTO: 0 PER 100 WBC
PLATELET # BLD AUTO: 283 K/UL (ref 150–400)
PMV BLD AUTO: 10 FL (ref 8.9–12.9)
POTASSIUM SERPL-SCNC: 4.1 MMOL/L (ref 3.5–5.1)
RBC # BLD AUTO: 4.06 M/UL (ref 3.8–5.2)
SERVICE CMNT-IMP: ABNORMAL
SERVICE CMNT-IMP: NORMAL
SERVICE CMNT-IMP: NORMAL
SODIUM SERPL-SCNC: 138 MMOL/L (ref 136–145)
WBC # BLD AUTO: 8.2 K/UL (ref 3.6–11)

## 2018-08-18 PROCEDURE — 74011250637 HC RX REV CODE- 250/637: Performed by: NURSE PRACTITIONER

## 2018-08-18 PROCEDURE — 74011250637 HC RX REV CODE- 250/637: Performed by: STUDENT IN AN ORGANIZED HEALTH CARE EDUCATION/TRAINING PROGRAM

## 2018-08-18 PROCEDURE — 65660000000 HC RM CCU STEPDOWN

## 2018-08-18 PROCEDURE — 85027 COMPLETE CBC AUTOMATED: CPT | Performed by: STUDENT IN AN ORGANIZED HEALTH CARE EDUCATION/TRAINING PROGRAM

## 2018-08-18 PROCEDURE — 36415 COLL VENOUS BLD VENIPUNCTURE: CPT | Performed by: STUDENT IN AN ORGANIZED HEALTH CARE EDUCATION/TRAINING PROGRAM

## 2018-08-18 PROCEDURE — 74011250636 HC RX REV CODE- 250/636: Performed by: FAMILY MEDICINE

## 2018-08-18 PROCEDURE — 77030038269 HC DRN EXT URIN PURWCK BARD -A

## 2018-08-18 PROCEDURE — 80048 BASIC METABOLIC PNL TOTAL CA: CPT | Performed by: STUDENT IN AN ORGANIZED HEALTH CARE EDUCATION/TRAINING PROGRAM

## 2018-08-18 PROCEDURE — 74011000250 HC RX REV CODE- 250: Performed by: STUDENT IN AN ORGANIZED HEALTH CARE EDUCATION/TRAINING PROGRAM

## 2018-08-18 PROCEDURE — 74011250636 HC RX REV CODE- 250/636: Performed by: STUDENT IN AN ORGANIZED HEALTH CARE EDUCATION/TRAINING PROGRAM

## 2018-08-18 PROCEDURE — 82962 GLUCOSE BLOOD TEST: CPT

## 2018-08-18 PROCEDURE — 51798 US URINE CAPACITY MEASURE: CPT

## 2018-08-18 RX ORDER — METOPROLOL TARTRATE 5 MG/5ML
2.5 INJECTION INTRAVENOUS ONCE
Status: COMPLETED | OUTPATIENT
Start: 2018-08-18 | End: 2018-08-18

## 2018-08-18 RX ADMIN — ACETAMINOPHEN 650 MG: 325 TABLET ORAL at 12:40

## 2018-08-18 RX ADMIN — CITALOPRAM HYDROBROMIDE 20 MG: 20 TABLET ORAL at 08:21

## 2018-08-18 RX ADMIN — Medication 10 ML: at 22:51

## 2018-08-18 RX ADMIN — ACETAMINOPHEN 650 MG: 325 TABLET ORAL at 17:23

## 2018-08-18 RX ADMIN — Medication 10 ML: at 03:53

## 2018-08-18 RX ADMIN — Medication 10 ML: at 03:52

## 2018-08-18 RX ADMIN — MORPHINE SULFATE 1 MG: 4 INJECTION INTRAVENOUS at 00:00

## 2018-08-18 RX ADMIN — GABAPENTIN 600 MG: 300 CAPSULE ORAL at 22:49

## 2018-08-18 RX ADMIN — GABAPENTIN 600 MG: 300 CAPSULE ORAL at 08:21

## 2018-08-18 RX ADMIN — NYSTATIN: 100000 POWDER TOPICAL at 17:27

## 2018-08-18 RX ADMIN — NYSTATIN: 100000 POWDER TOPICAL at 08:21

## 2018-08-18 RX ADMIN — METOROPROLOL TARTRATE 2.5 MG: 5 INJECTION, SOLUTION INTRAVENOUS at 04:18

## 2018-08-18 RX ADMIN — Medication 10 ML: at 12:41

## 2018-08-18 RX ADMIN — MORPHINE SULFATE 1 MG: 4 INJECTION INTRAVENOUS at 03:53

## 2018-08-18 RX ADMIN — DILTIAZEM HYDROCHLORIDE 240 MG: 240 CAPSULE, COATED, EXTENDED RELEASE ORAL at 08:24

## 2018-08-18 RX ADMIN — ACETAMINOPHEN 650 MG: 325 TABLET ORAL at 05:14

## 2018-08-18 RX ADMIN — SODIUM CHLORIDE, SODIUM LACTATE, POTASSIUM CHLORIDE, AND CALCIUM CHLORIDE 125 ML/HR: 600; 310; 30; 20 INJECTION, SOLUTION INTRAVENOUS at 16:13

## 2018-08-18 RX ADMIN — MORPHINE SULFATE 1 MG: 4 INJECTION INTRAVENOUS at 08:21

## 2018-08-18 RX ADMIN — GABAPENTIN 600 MG: 300 CAPSULE ORAL at 17:23

## 2018-08-18 RX ADMIN — HYDROCHLOROTHIAZIDE: 25 TABLET ORAL at 18:19

## 2018-08-18 RX ADMIN — MORPHINE SULFATE 1 MG: 4 INJECTION INTRAVENOUS at 13:08

## 2018-08-18 NOTE — PROGRESS NOTES
4151 Pt's  in room, helping pt to eat. 1020 Pt repositioned by tech and RN.    1726 hold Morphine per [facility], reassess in 1 hr, call Md. and let them know what is going on. Can hold Gabapetine and Tylanol if pt wont wake up. VS WNL    1811   Patient Vitals for the past 4 hrs:   Temp Pulse Resp BP SpO2   08/18/18 1805 - (!) 114 - (!) 184/91 95 %   08/18/18 1726 - - - - 94 %   08/18/18 1537 97.6 °F (36.4 °C) 87 16 150/71 93 %       Notified Md. Cleo Tan of pt's VS. Reminded her pt only had PO Diuretic, if possible to get IV, that would work much faster. Bedside and Verbal shift change report given to [address] (oncoming nurse) by Vicente Rich RN (offgoing nurse). Report included the following information SBAR, Kardex, Procedure Summary, Intake/Output, MAR, Recent Results, Med Rec Status and Cardiac Rhythm A fibIsac Friedman Sink

## 2018-08-18 NOTE — PROGRESS NOTES
S:  Evaluated patient in evening. Per nursing, is eating and drinking well with approx 500ml of oral fluids today. They do reports that patient's BPs have been elevated, but no other concerns from nursing. Evaluated patient. Patient did not communicate any pain, but it is difficult to assess given her baseline orientation. O:  Visit Vitals    BP (!) 184/91    Pulse (!) 114    Temp 97.6 °F (36.4 °C)    Resp 16    Ht 5' 8\" (1.727 m)    Wt 171 lb 4.8 oz (77.7 kg)    SpO2 95%    BMI 26.05 kg/m2       Gen: patient laying bed asleep but easily arousable and pleasant  Resp: no accessory muscle use, no inc WOB,   Cards: perfusing well  Extrem: right Upper wrist secured in bandage; blt LE 2+ edema      A/P:    81 y/o female a/f right wrist fracture now with HTN and LE edema without any indication of neurologic changes from baseline    -patient is on Losartan/HCTZ for BP  -will discontinue  and monitor BP overnight  -will consider PRN if BP remains elevated    I reviewed the patient's medical history, the resident's findings on physical examination, the patient's diagnoses, and treatment plan as documented in the resident note. I concur with the treatment plan as documented. Additional suggestions noted.

## 2018-08-18 NOTE — PROGRESS NOTES
1900:  Bedside and Verbal shift change report given to [address] [place] [address] Avenue, RN (oncoming nurse) by Jeanie Marte RN (offgoing nurse). Report included the following information SBAR, Kardex, MAR, Accordion, Recent Results and Cardiac Rhythm A fib.     2000:  Patient resting comfortably. 2100:  Patient resting comfortably. 2214:  Patient clenching teeth and refusing to take PO Gabapentin. 2340:  Patient refusing to take scheduled Tylenol. Offered medicine PO with applesauce, and patient declined. Patient stating, \"I don't want all that stuff. \"    0030:  Patient given scheduled morphine. 0100:  Patient's IV line infiltrated. Second RN attempting IV stick. 0145:  RN and PCT from ED arrived to assess and were able to get IV access in the left hand. 0220:  Patient resting comfortably. 8283:  PCT taking vitals. Patient throwing legs off bed and moving around. Received a call from telemetry stating that patient's heart rate had gone up to the 140's/150's.      0400: Patient settled back in bed. Heart rate bouncing from the 1 teens to the 130's.      0410:  Received a call from [facility] practice regarding patient's heart rate. Orders received to give 2.5 mg of IV metoprolol. 0515:  Patient pleasant and open to taking PO Tylenol. Declined medicine with applesauce, but was willing to take med. PO with water. 0720: Bedside and Verbal shift change report given to Mynor Marion RN (oncoming nurse) by [address] [place] [address] Avenue, RN (offgoing nurse). Report included the following information SBAR, Kardex, ED Summary, MAR, Accordion, Recent Results and Cardiac Rhythm A fib.

## 2018-08-18 NOTE — PROGRESS NOTES
[address] [facility] [place] [address]   Office [phone]  Fax [phone]          Assessment and Plan     Tray Bentley is a 80 y.o. female admitted for right wrist fracture following recurrent falls. She has spent 4 night(s) in the hospital.    24 Hour Events: Overnight Ms Regan's heart rate was tachycardic in the 121 - 138, she received one dose of Metoprolol 2.5mg and her heart rate has come back down to the low 100s. Right Wrist fracture: Secondary to GLF on right outstretch hand. This is 3rd fall in the past month. Non-displaced right wrist fracture confirmed on x-ray. No acute pelvic fracture noted on pelv Xray. Normal CT head  - Ortho consult, appreciate recs. · Pt currently in sugartong splint and right upper extremity splint   · Will treat conservatively. Pt needs strict elevation due to severity of hand/ finger swelling. · Follow up outpatient with Dr Kae Tovar   -  Morphine 1 mg qh4 prn  - CM working with Ms Rubén Self family regarding her placement      Paroxysmal Afib: Currently in Afib with RVR (rates 110s). Underwent an EP study (5/25/18) with GULSHAN guided DCCV and successfully converted to NSR. Pt however converted back to Afib at subsequent visits. Pt of Dr. Jayme Babb. INR 3.3 (goal 2-3). Last Echo (5/25/18) with EF 55%-60%  - Diltiazem 240mg PO qdaily, per cards recs  -Hold home warfarin 3mg   -Consult Hematology, appreciate recs    Pt has a high risk of bleeding complications on anticoagulation due to falls   Her risk/benefit ratio now appears to weigh in favor of stopping anticoagulation     Hx of Pulmonary Embolism on warfarin: INR 3.3. Per Heme/onc note, this was unprovoked and pt will be on life long anti-coag  -Hold warfarin 3mg      Asymptomatic Bacteriuria: 2+ bacteria and neg LE/nitrites. -Urine culture: Lactobacillus spp. 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  Diet controlled  - Insulin Sliding Scale normal sensitivity with AC&HS glucose checks. - Hypoglycemia protocols ordered. - Cont Gabapentin 600mg TID for neuropathic pain     Hypertension: On admission BP was 199/91 as pt was agitated. This later trended down to 168/98.   - Restart home medications of Losartan/HCTZ 50/12.5 mg for SBP >170 per cards  - Will continue to monitor at this time and readjust as BP's trend.     Hyperlipidemia: Lipid panel (18) - Tchol 228, HDL 55,  and TG 1886. Diet controlled, pt not on any medication   - Continue to monitor     Depression:   -Continue home Celexa 20mg daily     Hx of Iron deficiency anemia: Normal Hb(12.2). Pt was initially on Iron but has since been discontinued by Dr. Kortney Martin (Heme/onc) given persistent constipation and dark stools  -Continue to monitor     Muscle Spasms:  -Continue home flexeril 5mg prn      Constipation:  -Miralax prn     FEN/GI - Diabetic Diet  Activity - Fall precaution, ambulate with assistance  DVT prophylaxis - Continue home coumadin, pharmacy dosing per protocol  GI prophylaxis -  None  Disposition - CM consulted for placing.     CODE STATUS:  Full, discussed with     cell is [phone], name is Sergey Reyes. I will discuss this patient with Dr Ramos Carr (Attending Physician)     I appreciate the opportunity to participate in the care of this patient,  Mirian Mendosa MD  [facility] Medicine Resident         Subjective / Objective     Subjective  This morning Ms Ana Laura Vila was not oriented to time, person or place. She was resting comfortably and awake. She is still confused but not agitated. She was unable to express any pain or discomfort. Temp (24hrs), Av.8 °F (36.6 °C), Min:97.4 °F (36.3 °C), Max:98.1 °F (36.7 °C)     Objective:  Vital signs: (most recent): Blood pressure 167/88, pulse (!) 106, temperature 97.8 °F (36.6 °C), resp. rate 18, height 5' 8\" (1.727 m), weight 171 lb 4.8 oz (77.7 kg), SpO2 93 %. Respiratory: O2 Flow Rate (L/min): 2 l/min O2 Device: Room air   Visit Vitals    /88 (BP 1 Location: Left arm, BP Patient Position: At rest)    Pulse (!) 106    Temp 97.8 °F (36.6 °C)    Resp 18    Ht 5' 8\" (1.727 m)    Wt 171 lb 4.8 oz (77.7 kg)    SpO2 93%    BMI 26.05 kg/m2     Physical Exam  General:  Heart No acute distress. Not cooperative   Tachycardic, irregular rhythm. Musculoskeletal: Right wrist is in sugartong splint with moderate swelling. Bruising present in fingers on the right. Patient is able to move fingers. Left forearm swollen up to the level of elbow, non erythematous or warm to the touch. Neuro: Not oriented to person place or time.      I/O:  Date 08/17/18 0700 - 08/18/18 0659 08/18/18 0700 - 08/19/18 0659   Shift 3100-6935 0794-9624 24 Hour Total 9350-0592 7828-0759 24 Hour Total   I  N  T  A  K  E   P.O.  200 200         P. O.  200 200       I.V.  (mL/kg/hr) 81.3  (0.1) 2683.3  (2.9) 2764.6  (1.5)         Volume (lactated Ringers infusion) 81.3 2683. 3 2764.6         Volume (cefTRIAXone (ROCEPHIN) 1 g in 0.9% sodium chloride (MBP/ADV) 50 mL) 0  0       Shift Total  (mL/kg) 81.3  (1.1) 2883.3  (37.1) 2964.6  (38.2)      O  U  T  P  U  T   Urine  (mL/kg/hr)  700  (0.8) 700  (0.4)         Urine Output (mL) (External Female Catheter 08/17/18)  700 700       Shift Total  (mL/kg)  700  (9) 700  (9)      NET 81.3 2183.3 2264.6      Weight (kg) 77.1 77.7 77.7 77.7 77.7 77.7       CBC:  Recent Labs      08/18/18   0115  08/17/18   0517  08/16/18   0120   WBC  8.2  7.8  8.2   HGB  13.2  12.9  12.0   HCT  39.0  40.1  36.5   PLT  283  246  788       Metabolic Panel:  Recent Labs      08/18/18   0115  08/17/18   0650  08/17/18   0517  08/16/18   0120   NA  138   --   138  137   K  4.1   --   4.0  3.1*   CL  103   --   101  101   CO2  31   --   26  30   BUN  20   --   15  14   CREA  0.93   --   0.78  0.91   GLU  106*   --   108*  103*   CA  9.1   --   9.3  9.1   INR   --   2.6* --   3.3*          For Billing    Chief Complaint   Patient presents with    Wrist Pain       Hospital Problems  Date Reviewed: 8/1/2018          Codes Class Noted POA    * (Principal)Frequent falls ICD-10-CM: R29.6  ICD-9-CM: V15.88  8/14/2018 Unknown        Wrist fracture, right ICD-10-CM: S62.101A  ICD-9-CM: 814.00  8/14/2018 Unknown        Dyslipidemia ICD-10-CM: E78.5  ICD-9-CM: 272.4  5/24/2018 Yes        Controlled type 2 diabetes mellitus without complication, without long-term current use of insulin ([facility] [address] ICD-10-CM: E11.9  ICD-9-CM: 250.00  5/9/2017 Yes        Iron deficiency anemia ICD-10-CM: D50.9  ICD-9-CM: 280.9  8/11/2016 Yes        HTN (hypertension) (Chronic) ICD-10-CM: I10  ICD-9-CM: 401.9  4/1/2010 Yes        GERD (gastroesophageal reflux disease) (Chronic) ICD-10-CM: K21.9  ICD-9-CM: 530.81  4/1/2010 Yes               [address] Medicine Residency Attending Addendum:  I saw and evaluated the patient, performing the key elements of the service. I discussed the findings, assessment and plan with the resident and agree with the resident's findings and plan as documented in the resident's note.     The patient was seen on 8/18/18  No acute events overnight      Vitals:    08/21/18 0700 08/21/18 0759 08/21/18 1129 08/21/18 1301   BP:  (!) 149/92 130/81    Pulse: (!) 113 99 95    Resp:  16 16    Temp:  98 °F (36.7 °C) 97.8 °F (36.6 °C)    SpO2:  92% 95% 97%   Weight:       Height:         VS-afebrile  Right wrist in splint      Assessment/Plan:   Right wirst fracture- awaiting placement    Hospital Problems  Date Reviewed: 8/1/2018          Codes Class Noted POA    * (Principal)Frequent falls ICD-10-CM: R29.6  ICD-9-CM: V15.88  8/14/2018 Unknown        Wrist fracture, right ICD-10-CM: S62.101A  ICD-9-CM: 814.00  8/14/2018 Unknown        Dyslipidemia ICD-10-CM: E78.5  ICD-9-CM: 272.4  5/24/2018 Yes        Controlled type 2 diabetes mellitus without complication, without long-term current use of insulin ([facility] [address] ICD-10-CM: E11.9  ICD-9-CM: 250.00  5/9/2017 Yes        Iron deficiency anemia ICD-10-CM: D50.9  ICD-9-CM: 280.9  8/11/2016 Yes        HTN (hypertension) (Chronic) ICD-10-CM: I10  ICD-9-CM: 401.9  4/1/2010 Yes        GERD (gastroesophageal reflux disease) (Chronic) ICD-10-CM: K21.9  ICD-9-CM: 530.81  4/1/2010 Yes

## 2018-08-18 NOTE — PROGRESS NOTES
Problem: Pressure Injury - Risk of  Goal: *Prevention of pressure injury  Document Oumar Scale and appropriate interventions in the flowsheet. Outcome: Progressing Towards Goal  Pressure Injury Interventions:  Sensory Interventions: Assess changes in LOC    Moisture Interventions: Internal/External urinary devices    Activity Interventions: Assess need for specialty bed    Mobility Interventions: HOB 30 degrees or less    Nutrition Interventions: Document food/fluid/supplement intake    Friction and Shear Interventions: HOB 30 degrees or less               Problem: Falls - Risk of  Goal: *Absence of Falls  Document Kizzy Fall Risk and appropriate interventions in the flowsheet.    Outcome: Progressing Towards Goal  Fall Risk Interventions:  Mobility Interventions: Bed/chair exit alarm    Mentation Interventions: Bed/chair exit alarm, Family/sitter at bedside    Medication Interventions: Bed/chair exit alarm, Patient to call before getting OOB, Teach patient to arise slowly    Elimination Interventions: Bed/chair exit alarm, Call light in reach    History of Falls Interventions: Bed/chair exit alarm

## 2018-08-19 LAB
ANION GAP SERPL CALC-SCNC: 8 MMOL/L (ref 5–15)
BUN SERPL-MCNC: 21 MG/DL (ref 6–20)
BUN/CREAT SERPL: 26 (ref 12–20)
CALCIUM SERPL-MCNC: 9.1 MG/DL (ref 8.5–10.1)
CHLORIDE SERPL-SCNC: 102 MMOL/L (ref 97–108)
CO2 SERPL-SCNC: 26 MMOL/L (ref 21–32)
CREAT SERPL-MCNC: 0.8 MG/DL (ref 0.55–1.02)
ERYTHROCYTE [DISTWIDTH] IN BLOOD BY AUTOMATED COUNT: 12.6 % (ref 11.5–14.5)
GLUCOSE BLD STRIP.AUTO-MCNC: 109 MG/DL (ref 65–100)
GLUCOSE BLD STRIP.AUTO-MCNC: 124 MG/DL (ref 65–100)
GLUCOSE BLD STRIP.AUTO-MCNC: 97 MG/DL (ref 65–100)
GLUCOSE BLD STRIP.AUTO-MCNC: 97 MG/DL (ref 65–100)
GLUCOSE SERPL-MCNC: 101 MG/DL (ref 65–100)
HCT VFR BLD AUTO: 39.4 % (ref 35–47)
HGB BLD-MCNC: 13.2 G/DL (ref 11.5–16)
MCH RBC QN AUTO: 32.4 PG (ref 26–34)
MCHC RBC AUTO-ENTMCNC: 33.5 G/DL (ref 30–36.5)
MCV RBC AUTO: 96.6 FL (ref 80–99)
NRBC # BLD: 0 K/UL (ref 0–0.01)
NRBC BLD-RTO: 0 PER 100 WBC
PLATELET # BLD AUTO: 299 K/UL (ref 150–400)
PMV BLD AUTO: 10.5 FL (ref 8.9–12.9)
POTASSIUM SERPL-SCNC: 4 MMOL/L (ref 3.5–5.1)
RBC # BLD AUTO: 4.08 M/UL (ref 3.8–5.2)
SERVICE CMNT-IMP: ABNORMAL
SERVICE CMNT-IMP: ABNORMAL
SERVICE CMNT-IMP: NORMAL
SERVICE CMNT-IMP: NORMAL
SODIUM SERPL-SCNC: 136 MMOL/L (ref 136–145)
WBC # BLD AUTO: 6.4 K/UL (ref 3.6–11)

## 2018-08-19 PROCEDURE — 74011250636 HC RX REV CODE- 250/636: Performed by: STUDENT IN AN ORGANIZED HEALTH CARE EDUCATION/TRAINING PROGRAM

## 2018-08-19 PROCEDURE — 80048 BASIC METABOLIC PNL TOTAL CA: CPT | Performed by: STUDENT IN AN ORGANIZED HEALTH CARE EDUCATION/TRAINING PROGRAM

## 2018-08-19 PROCEDURE — 74011250637 HC RX REV CODE- 250/637: Performed by: NURSE PRACTITIONER

## 2018-08-19 PROCEDURE — 74011250637 HC RX REV CODE- 250/637: Performed by: STUDENT IN AN ORGANIZED HEALTH CARE EDUCATION/TRAINING PROGRAM

## 2018-08-19 PROCEDURE — 74011250636 HC RX REV CODE- 250/636: Performed by: FAMILY MEDICINE

## 2018-08-19 PROCEDURE — 36415 COLL VENOUS BLD VENIPUNCTURE: CPT | Performed by: STUDENT IN AN ORGANIZED HEALTH CARE EDUCATION/TRAINING PROGRAM

## 2018-08-19 PROCEDURE — 82962 GLUCOSE BLOOD TEST: CPT

## 2018-08-19 PROCEDURE — 65660000000 HC RM CCU STEPDOWN

## 2018-08-19 PROCEDURE — 85027 COMPLETE CBC AUTOMATED: CPT | Performed by: STUDENT IN AN ORGANIZED HEALTH CARE EDUCATION/TRAINING PROGRAM

## 2018-08-19 RX ORDER — HYDROCODONE BITARTRATE AND ACETAMINOPHEN 7.5; 325 MG/1; MG/1
1 TABLET ORAL EVERY 8 HOURS
Status: DISCONTINUED | OUTPATIENT
Start: 2018-08-19 | End: 2018-08-21 | Stop reason: HOSPADM

## 2018-08-19 RX ORDER — HYDROCHLOROTHIAZIDE 25 MG/1
12.5 TABLET ORAL
Status: DISCONTINUED | OUTPATIENT
Start: 2018-08-19 | End: 2018-08-21 | Stop reason: HOSPADM

## 2018-08-19 RX ORDER — LOSARTAN POTASSIUM 50 MG/1
50 TABLET ORAL
Status: DISCONTINUED | OUTPATIENT
Start: 2018-08-19 | End: 2018-08-21 | Stop reason: HOSPADM

## 2018-08-19 RX ORDER — MORPHINE SULFATE 4 MG/ML
1 INJECTION INTRAVENOUS
Status: DISCONTINUED | OUTPATIENT
Start: 2018-08-19 | End: 2018-08-21 | Stop reason: HOSPADM

## 2018-08-19 RX ADMIN — Medication 10 ML: at 23:11

## 2018-08-19 RX ADMIN — ACETAMINOPHEN 650 MG: 325 TABLET ORAL at 12:28

## 2018-08-19 RX ADMIN — GABAPENTIN 600 MG: 300 CAPSULE ORAL at 08:34

## 2018-08-19 RX ADMIN — Medication 10 ML: at 05:42

## 2018-08-19 RX ADMIN — MORPHINE SULFATE 1 MG: 4 INJECTION INTRAVENOUS at 12:27

## 2018-08-19 RX ADMIN — HALOPERIDOL LACTATE 2 MG: 5 INJECTION, SOLUTION INTRAMUSCULAR at 03:00

## 2018-08-19 RX ADMIN — HYDROCODONE BITARTRATE AND ACETAMINOPHEN 1 TABLET: 7.5; 325 TABLET ORAL at 23:10

## 2018-08-19 RX ADMIN — MORPHINE SULFATE 1 MG: 4 INJECTION INTRAVENOUS at 08:42

## 2018-08-19 RX ADMIN — GABAPENTIN 600 MG: 300 CAPSULE ORAL at 15:21

## 2018-08-19 RX ADMIN — GABAPENTIN 600 MG: 300 CAPSULE ORAL at 23:10

## 2018-08-19 RX ADMIN — NYSTATIN: 100000 POWDER TOPICAL at 18:59

## 2018-08-19 RX ADMIN — NYSTATIN: 100000 POWDER TOPICAL at 08:35

## 2018-08-19 RX ADMIN — MORPHINE SULFATE 1 MG: 4 INJECTION INTRAVENOUS at 05:38

## 2018-08-19 RX ADMIN — HYDROCODONE BITARTRATE AND ACETAMINOPHEN 1 TABLET: 7.5; 325 TABLET ORAL at 15:21

## 2018-08-19 RX ADMIN — ACETAMINOPHEN 650 MG: 325 TABLET ORAL at 18:59

## 2018-08-19 RX ADMIN — DILTIAZEM HYDROCHLORIDE 240 MG: 240 CAPSULE, COATED, EXTENDED RELEASE ORAL at 08:42

## 2018-08-19 RX ADMIN — Medication 10 ML: at 12:28

## 2018-08-19 RX ADMIN — CITALOPRAM HYDROBROMIDE 20 MG: 20 TABLET ORAL at 08:34

## 2018-08-19 NOTE — PROGRESS NOTES
[facility] PROGRAM   Daily Progress Note    Date: 8/18/2018  PCP: Tony Quintana MD     Assessment/Plan:   Ruel Oden is a 80 y.o. female admitted for right wrist fracture following recurrent falls. She has spent 5 night(s) in the hospital.     24 Hour Events:   -No acute overnight. Pt however needed a sitter due to agitation     Right Wrist fracture: Secondary to GLF on right outstretch hand. This is 3rd fall in the past month. Non-displaced right wrist fracture confirmed on x-ray. No acute pelvic fracture noted on pelv Xray. Normal CT head  - Ortho consult, appreciate recs. Plan is conservative management for now. Will follow outpt with Dr. Simi Cat  -  Morphine 1 mg Q4H prn  - CM working with Ms Mami Arthur family regarding her placement       Paroxysmal Afib: Currently in Afib with RVR (rates 110s).  Underwent an EP study (5/25/18) with GULSHAN guided DCCV and successfully converted to NSR. Pt however converted back to Afib at subsequent visits. Pt of Dr. 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 Per Heme/onc note, this was unprovoked and pt will be on life long anti-coag  -D/c warfarin 3mg per heme/onc      Asymptomatic Bacteriuria: 2+ bacteria and neg LE/nitrites. -Urine culture: Lactobacillus spp. - Repeat urine given possible contaminant.  - Completed 3 days of IV rocephin      DM type 2 with neuropathy: Last A1c 6.8 (5/25/18). Diet controlled  - Insulin Sliding Scale normal sensitivity with AC&HS glucose checks. - Hypoglycemia protocols ordered. - Cont Gabapentin 600mg TID for neuropathic pain      Hypertension: BP's have been very labile and generally runs high when pt gets agitated.    - Cont home medications of Losartan/HCTZ 50/12.5 mg for SBP >170 per cards  - Will continue to monitor at this time and readjust as BP's trend.      Hyperlipidemia: Lipid panel (4/16/18) - Tchol 228, HDL 55,  and TG 1886. Diet controlled, pt not on any medication   - Continue to monitor      Depression:   -Continue home Celexa 20mg daily      Hx of Iron deficiency anemia: Normal Hb(12.2). Pt was initially on Iron but has since been discontinued by Dr. Kuldeep Lemos (Heme/onc) given persistent constipation and dark stools  -Continue to monitor      Muscle Spasms:  -Continue home flexeril 5mg prn       Constipation:  -Miralax prn      FEN/GI - Diabetic Diet  Activity - Fall precaution, ambulate with assistance  DVT prophylaxis - Holding coumadin per heme/onc and Cards due to frequent falls  GI prophylaxis -  None  Disposition - CM consulted for placing.       CODE STATUS:  Full, discussed with     cell is [phone], name is Nay Moreno.      Pt was discussed with Dr. Lisbeth Sykes (supervising provider)    I appreciate the opportunity to participate in the care of this patient,    Leonarda Bryant MD  [facility] Medicine Resident       Subjective  No acute events overnight. Patient denies fever, chills, chest pain, palpitations, shortness of breath, abdominal pain, nausea and vomiting, and LE edema.  Tolerating her diet  Inpatient Medications  Current Facility-Administered Medications   Medication Dose Route Frequency    acetaminophen (TYLENOL) tablet 650 mg  650 mg Oral Q6H    dilTIAZem CD (CARDIZEM CD) capsule 240 mg  240 mg Oral DAILY    morphine injection 1 mg  1 mg IntraVENous Q4H    haloperidol lactate (HALDOL) injection 2 mg  2 mg IntraMUSCular Q4H PRN    sodium chloride (NS) flush 5-10 mL  5-10 mL IntraVENous Q8H    sodium chloride (NS) flush 5-10 mL  5-10 mL IntraVENous PRN    sodium chloride (NS) flush 5-10 mL  5-10 mL IntraVENous Q8H    sodium chloride (NS) flush 5-10 mL  5-10 mL IntraVENous PRN    HYDROcodone-acetaminophen (NORCO) 7.5-325 mg per tablet 1 Tab  1 Tab Oral TID PRN    polyethylene glycol (MIRALAX) packet 17 g  17 g Oral DAILY PRN    citalopram (CELEXA) tablet 20 mg  20 mg Oral DAILY    gabapentin (NEURONTIN) capsule 600 mg  600 mg Oral TID    cyclobenzaprine (FLEXERIL) tablet 5 mg  5 mg Oral DAILY PRN    losartan/hydroCHLOROthiazide (HYZAAR) 50/12.5 mg   Oral DAILY PRN    insulin lispro (HUMALOG) injection   SubCUTAneous AC&HS    glucose chewable tablet 16 g  4 Tab Oral PRN    dextrose (D50W) injection syrg 12.5-25 g  12.5-25 g IntraVENous PRN    glucagon (GLUCAGEN) injection 1 mg  1 mg IntraMUSCular PRN    nystatin (MYCOSTATIN) 100,000 unit/gram powder   Topical BID         Allergies  Allergies   Allergen Reactions    Angiotensin Ii,Human Other (comments)     States does not remember      Avelox [Moxifloxacin] Other (comments)     States does not remember      Demerol [Meperidine] Hives         Objective  Vitals:  Visit Vitals    BP (!) 184/91    Pulse (!) 114    Temp 97.6 °F (36.4 °C)    Resp 16    Ht 5' 8\" (1.727 m)    Wt 171 lb 4.8 oz (77.7 kg)    SpO2 95%    BMI 26.05 kg/m2      Temp (24hrs), Av.9 °F (36.6 °C), Min:97.6 °F (36.4 °C), Max:98.1 °F (36.7 °C)     O2 Flow Rate (L/min): 2 l/min   O2 Device: Room air    I/O:    Intake/Output Summary (Last 24 hours) at 18  Last data filed at 18 1938   Gross per 24 hour   Intake           2112.5 ml   Output              450 ml   Net           1662.5 ml     Last 3 shifts:     0701 -  1900  In: 3283.3 [P.O.:600;  I.V.:2683.3]  Out: 1050 [Urine:1050]    Physical Exam:    General appearance - well appearing, lying comfortably on her bed and in no distress  Chest - clear to auscultation, no wheezes, rales or rhonchi, symmetric air entry  Heart - Irregularly irregular rhythm, tachycardiac  Neurological - Alert but not oriented      Labs and Data:    Recent Labs      18   0115  18   0517  18   0120   WBC  8.2  7.8  8.2   HGB  13.2  12.9  12.0   HCT  39.0  40.1  36.5   PLT  283  246  195 Recent Labs      08/18/18   0115  08/17/18   0650  08/17/18   0517  08/16/18   0120   NA  138   --   138  137   K  4.1   --   4.0  3.1*   CL  103   --   101  101   CO2  31   --   26  30   GLU  106*   --   108*  103*   BUN  20   --   15  14   CREA  0.93   --   0.78  0.91   CA  9.1   --   9.3  9.1   INR   --   2.6*   --   3.3*       Signed by: Tevin Milton MD  Resident, Family Medicine  08/18/18       Attending Note:    [address] [facility] Medicine Residency Attending Addendum:  I saw and evaluated the patient, performing the key elements of the service. I discussed the findings, assessment and plan with the resident and agree with the resident's findings and plan as documented in the resident's note.     The patient was seen on 8/18/18  Needed a sitter last night, otherwise no acute events    Vitals:    08/21/18 0700 08/21/18 0759 08/21/18 1129 08/21/18 1301   BP:  (!) 149/92 130/81    Pulse: (!) 113 99 95    Resp:  16 16    Temp:  98 °F (36.7 °C) 97.8 °F (36.6 °C)    SpO2:  92% 95% 97%   Weight:       Height:         Afebrile  NAD  Right wrist in splint      Assessment/Plan:   Right wrist fracture- in splint'  Awaiting placement for patient    Hospital Problems  Date Reviewed: 8/1/2018          Codes Class Noted POA    * (Principal)Frequent falls ICD-10-CM: R29.6  ICD-9-CM: V15.88  8/14/2018 Unknown        Wrist fracture, right ICD-10-CM: S62.101A  ICD-9-CM: 814.00  8/14/2018 Unknown        Dyslipidemia ICD-10-CM: E78.5  ICD-9-CM: 272.4  5/24/2018 Yes        Controlled type 2 diabetes mellitus without complication, without long-term current use of insulin ([facility] ICD-10-CM: E11.9  ICD-9-CM: 250.00  5/9/2017 Yes        Iron deficiency anemia ICD-10-CM: D50.9  ICD-9-CM: 280.9  8/11/2016 Yes        HTN (hypertension) (Chronic) ICD-10-CM: I10  ICD-9-CM: 401.9  4/1/2010 Yes        GERD (gastroesophageal reflux disease) (Chronic) ICD-10-CM: K21.9  ICD-9-CM: 530.81  4/1/2010 Yes

## 2018-08-19 NOTE — PROGRESS NOTES
Bedside and Verbal shift change report given to Emmanuel Pompa RN (oncoming nurse) by Bethany Mercer RN (offgoing nurse). Report included the following information SBAR, Kardex, ED Summary, Intake/Output, MAR, Recent Results, Med Rec Status and Cardiac Rhythm A-Fib. 19:02: Pt sleeping, lethargic, wakes with sternal rub. 2053: Pt continues to sleep, responds to voice, pt lethargic. Patient Vitals for the past 12 hrs:   Temp Pulse Resp BP SpO2   08/19/18 0719 96.7 °F (35.9 °C) (!) 108 16 (!) 151/105 93 %   08/19/18 0700 - (!) 115 - - -   08/19/18 0531 98.2 °F (36.8 °C) 86 18 151/86 93 %   08/18/18 2358 98.5 °F (36.9 °C) (!) 106 18 (!) 146/98 95 %   08/18/18 2246 - (!) 104 - (!) 163/106 92 %   08/18/18 2209 - 94 - - -   08/18/18 2053 97.8 °F (36.6 °C) (!) 110 16 (!) 171/124 94 %     Held scheduled Morphine IV and PO Tylenol due to pt lethargy. 0000: Pt drowsy, responds to voice; Held scheduled morphine and tylenol due to pt lethargy. 0245: Pt awake, alert, disoriented, confused, pulling off telemetry leads, attempting to pull off rt wrist splint with ace wrap bandage. 0300: Administered Haldol IM to Left arm. Pt continues to try to remove rt arm splint; placed sultana on left hand only. Pt trying to pull mitt off using rt foot toes. Sitter placed at pt bedside. Pt becoming combative, threatening to hit and kick the nurses. 0400: Pt sleeping; sitter at pt bedside. 0530: Pt IV infiltrated, consulted with ED nurse to start new IV and draw morning labs. 8967: ED nurse successful in starting new IV and drawing morning labs. Pt sleeping. Bedside and Verbal shift change report given to Bethany Mercer RN (oncoming nurse) by Emmanuel Pompa RN (offgoing nurse). Report included the following information SBAR, Kardex, ED Summary, Intake/Output, Recent Results, Med Rec Status and Cardiac Rhythm A-Fib.

## 2018-08-19 NOTE — PROGRESS NOTES
[address] [facility]  in the room. [id number]  talked to the [facility]. Bedside and Verbal shift change report given to [address] (oncoming nurse) by Carlie Márquez RN (offgoing nurse). Report included the following information SBAR, Kardex, Procedure Summary, Intake/Output, MAR, Recent Results and Med Rec Status.

## 2018-08-20 LAB
ANION GAP SERPL CALC-SCNC: 9 MMOL/L (ref 5–15)
BUN SERPL-MCNC: 25 MG/DL (ref 6–20)
BUN/CREAT SERPL: 24 (ref 12–20)
CALCIUM SERPL-MCNC: 9.9 MG/DL (ref 8.5–10.1)
CHLORIDE SERPL-SCNC: 102 MMOL/L (ref 97–108)
CO2 SERPL-SCNC: 29 MMOL/L (ref 21–32)
CREAT SERPL-MCNC: 1.04 MG/DL (ref 0.55–1.02)
ERYTHROCYTE [DISTWIDTH] IN BLOOD BY AUTOMATED COUNT: 12.7 % (ref 11.5–14.5)
GLUCOSE BLD STRIP.AUTO-MCNC: 101 MG/DL (ref 65–100)
GLUCOSE BLD STRIP.AUTO-MCNC: 112 MG/DL (ref 65–100)
GLUCOSE BLD STRIP.AUTO-MCNC: 113 MG/DL (ref 65–100)
GLUCOSE BLD STRIP.AUTO-MCNC: 122 MG/DL (ref 65–100)
GLUCOSE SERPL-MCNC: 114 MG/DL (ref 65–100)
HCT VFR BLD AUTO: 42.6 % (ref 35–47)
HGB BLD-MCNC: 13.5 G/DL (ref 11.5–16)
MCH RBC QN AUTO: 31.3 PG (ref 26–34)
MCHC RBC AUTO-ENTMCNC: 31.7 G/DL (ref 30–36.5)
MCV RBC AUTO: 98.8 FL (ref 80–99)
NRBC # BLD: 0 K/UL (ref 0–0.01)
NRBC BLD-RTO: 0 PER 100 WBC
PLATELET # BLD AUTO: 277 K/UL (ref 150–400)
PMV BLD AUTO: 10.1 FL (ref 8.9–12.9)
POTASSIUM SERPL-SCNC: 4.1 MMOL/L (ref 3.5–5.1)
RBC # BLD AUTO: 4.31 M/UL (ref 3.8–5.2)
SERVICE CMNT-IMP: ABNORMAL
SODIUM SERPL-SCNC: 140 MMOL/L (ref 136–145)
WBC # BLD AUTO: 4.8 K/UL (ref 3.6–11)

## 2018-08-20 PROCEDURE — 85027 COMPLETE CBC AUTOMATED: CPT | Performed by: STUDENT IN AN ORGANIZED HEALTH CARE EDUCATION/TRAINING PROGRAM

## 2018-08-20 PROCEDURE — 74011250637 HC RX REV CODE- 250/637: Performed by: FAMILY MEDICINE

## 2018-08-20 PROCEDURE — 80048 BASIC METABOLIC PNL TOTAL CA: CPT | Performed by: STUDENT IN AN ORGANIZED HEALTH CARE EDUCATION/TRAINING PROGRAM

## 2018-08-20 PROCEDURE — 65660000000 HC RM CCU STEPDOWN

## 2018-08-20 PROCEDURE — 82962 GLUCOSE BLOOD TEST: CPT

## 2018-08-20 PROCEDURE — 74011250637 HC RX REV CODE- 250/637: Performed by: STUDENT IN AN ORGANIZED HEALTH CARE EDUCATION/TRAINING PROGRAM

## 2018-08-20 PROCEDURE — 74011250637 HC RX REV CODE- 250/637: Performed by: NURSE PRACTITIONER

## 2018-08-20 PROCEDURE — 97530 THERAPEUTIC ACTIVITIES: CPT

## 2018-08-20 PROCEDURE — 97116 GAIT TRAINING THERAPY: CPT

## 2018-08-20 PROCEDURE — 77030038269 HC DRN EXT URIN PURWCK BARD -A

## 2018-08-20 PROCEDURE — 77010033678 HC OXYGEN DAILY

## 2018-08-20 PROCEDURE — 36415 COLL VENOUS BLD VENIPUNCTURE: CPT | Performed by: STUDENT IN AN ORGANIZED HEALTH CARE EDUCATION/TRAINING PROGRAM

## 2018-08-20 PROCEDURE — 97535 SELF CARE MNGMENT TRAINING: CPT

## 2018-08-20 RX ORDER — HYDROCODONE BITARTRATE AND ACETAMINOPHEN 7.5; 325 MG/1; MG/1
1 TABLET ORAL
Qty: 15 TAB | Refills: 0 | Status: SHIPPED | OUTPATIENT
Start: 2018-08-20 | End: 2018-09-06

## 2018-08-20 RX ORDER — DOCUSATE SODIUM 100 MG/1
100 CAPSULE, LIQUID FILLED ORAL DAILY
Qty: 30 CAP | Refills: 2 | Status: SHIPPED | OUTPATIENT
Start: 2018-08-21 | End: 2018-09-28

## 2018-08-20 RX ORDER — DOCUSATE SODIUM 100 MG/1
100 CAPSULE, LIQUID FILLED ORAL DAILY
Status: DISCONTINUED | OUTPATIENT
Start: 2018-08-20 | End: 2018-08-21 | Stop reason: HOSPADM

## 2018-08-20 RX ORDER — ACETAMINOPHEN 325 MG/1
650 TABLET ORAL
Qty: 20 TAB | Refills: 0 | Status: SHIPPED | OUTPATIENT
Start: 2018-08-20 | End: 2019-11-11 | Stop reason: ALTCHOICE

## 2018-08-20 RX ADMIN — ACETAMINOPHEN 650 MG: 325 TABLET ORAL at 05:07

## 2018-08-20 RX ADMIN — Medication 10 ML: at 22:59

## 2018-08-20 RX ADMIN — ACETAMINOPHEN 650 MG: 325 TABLET ORAL at 13:18

## 2018-08-20 RX ADMIN — GABAPENTIN 600 MG: 300 CAPSULE ORAL at 18:05

## 2018-08-20 RX ADMIN — NYSTATIN: 100000 POWDER TOPICAL at 18:06

## 2018-08-20 RX ADMIN — Medication 10 ML: at 05:07

## 2018-08-20 RX ADMIN — HYDROCODONE BITARTRATE AND ACETAMINOPHEN 1 TABLET: 7.5; 325 TABLET ORAL at 05:07

## 2018-08-20 RX ADMIN — GABAPENTIN 600 MG: 300 CAPSULE ORAL at 08:37

## 2018-08-20 RX ADMIN — Medication 10 ML: at 13:20

## 2018-08-20 RX ADMIN — CITALOPRAM HYDROBROMIDE 20 MG: 20 TABLET ORAL at 08:36

## 2018-08-20 RX ADMIN — ACETAMINOPHEN 650 MG: 325 TABLET ORAL at 18:05

## 2018-08-20 RX ADMIN — HYDROCODONE BITARTRATE AND ACETAMINOPHEN 1 TABLET: 7.5; 325 TABLET ORAL at 22:59

## 2018-08-20 RX ADMIN — GABAPENTIN 600 MG: 300 CAPSULE ORAL at 22:59

## 2018-08-20 RX ADMIN — DILTIAZEM HYDROCHLORIDE 240 MG: 240 CAPSULE, COATED, EXTENDED RELEASE ORAL at 08:36

## 2018-08-20 RX ADMIN — NYSTATIN: 100000 POWDER TOPICAL at 08:37

## 2018-08-20 RX ADMIN — DOCUSATE SODIUM 100 MG: 100 CAPSULE, LIQUID FILLED ORAL at 08:37

## 2018-08-20 NOTE — PROGRESS NOTES
Problem: Self Care Deficits Care Plan (Adult)  Goal: *Acute Goals and Plan of Care (Insert Text)  Occupational Therapy Goals  Initiated 8/15/2018  1. Patient will perform grooming with moderate assistance  within 7 day(s). 2.  Patient will perform upper body dressing and bathing with moderate assistance  within 7 day(s). 3.  Patient will perform toilet transfers to [facility] with moderate assistance within 7 day(s). 4.  Patient will perform all aspects of toileting with moderate assistance within 7 day(s). 5.  Patient will participate in upper extremity therapeutic exercise/activities with moderate assistance  for 10 minutes within 7 day(s). 6.  Patient will follow 25% of commands during functional activities within 7 days. Occupational Therapy TREATMENT  Patient: Som Andrade80 y.o. female)  Date: 8/20/2018  Diagnosis: Wrist fracture, right Frequent falls       Precautions:    Chart, occupational therapy assessment, plan of care, and goals were reviewed. ASSESSMENT:  Pt agreeable to OT. Total assist to don socks. Total assist to don sling however not verbalizing any pain. Once seated edge of bed she was able to tolerate static sit for approx 7 min in prep for functional task. Pt stood with hand held assist on L and moderate assist on right to transfer to chair. Once in chair she was able to comb her hair with moderate assist with L UE. Pts  present for treatment. Recommend SNF. Progression toward goals:  []       Improving appropriately and progressing toward goals  [x]       Improving slowly and progressing toward goals  []       Not making progress toward goals and plan of care will be adjusted     PLAN:  Patient continues to benefit from skilled intervention to address the above impairments. Continue treatment per established plan of care.   Discharge Recommendations:  SNF  Further Equipment Recommendations for Discharge:  None     SUBJECTIVE:   Patient stated I was named after my Daddy's first wife.     OBJECTIVE DATA SUMMARY:   Cognitive/Behavioral Status:  Neurologic State: Alert  Orientation Level: Disoriented to place  Cognition: Decreased attention/concentration             Functional Mobility and Transfers for ADLs:  Bed Mobility:     Max assist x 2 supine to sit to the left. Transfers:  Sit to Stand: Moderate assistance;Assist x2          Balance:  Sitting: High guard (seated edge of bed)  Sitting - Static: Fair (occasional)    ADL Intervention:       Grooming  Brushing/Combing Hair: Moderate assistance (used left hand)                   Lower Body Dressing Assistance  Socks: Total assistance (dependent)     Pain:  Pain Scale 1: Numeric (0 - 10)  Pain Intensity 1: 0              Activity Tolerance:   Fair  Please refer to the flowsheet for vital signs taken during this treatment.   After treatment:   [x] Patient left in no apparent distress sitting up in chair  [] Patient left in no apparent distress in bed  [x] Call bell left within reach  [x] Nursing notified  [x] Caregiver present  [x] Chair alarm activated    COMMUNICATION/COLLABORATION:   The patients plan of care was discussed with: Physical Therapist, Occupational Therapist and Registered Nurse    URVASHI Hilario  Time Calculation: 25 mins

## 2018-08-20 NOTE — DISCHARGE INSTRUCTIONS
[facility] / Lanie Idol / [id number] : 1933    Admission date: 2018 Discharge date: 2018       Primary care provider: Lesly Knight MD    Discharging provider:  Tomer Lutz MD  - Family Medicine Resident  Dr. Becca Ortiz - Attending, Family Medicine   . . . . . . . . . . . . . . . . . . . . . . . . . . . . . . . . . . . . . . . . . . . . . . . . . . . . . . . . . . . . . . . . . . . . . . . Tatyana Alberto FINAL DIAGNOSES & HOSPITAL COURSE:  Niko Beckwith is a 80 y.o. female with a history of Dementia, recurrent falls, HTN, Osteoarthritis, HLD, pulmonary embolism, iron deficiency anemia, Afib, DM type 2 with neuropathy and fibromyalgia who presented to the ED via EMS complaining Right Wrist pain after a ground level fall (GLF).    History obtained from patient's  and ED provider as pt has memory issues.     Pt was seen in the ED on 18 after a GLF and found to have a non-displaced wrist fracture of the right distal radius. Pt was splinted with a sugar tongue splint and discharged with outpatient ortho follow-up. Pt then returned to the ED on  following another GLF and worsening right wrist pain. Per 's report, pt fell last night while trying to go to the bathroom without her walker. Pt has not had any fever/chills, diarrhea, abd pain or vomiting. She has frequent hallucinations about  trying to poison her and refuses to take her pain medications at home.  reports that this is patient's 3rd fall in about a month. She was admitted for a right wrist fracture.        Hospital Course  Right Wrist fracture: The patient's non-displaced right wrist fracture was confirmed on x-ray. No acute pelvic fracture noted on pelv Xray. A CT head was normal. Ortho was consulted and recommended conservative treatment of the fracture.  Right upper extremity was placed in a sling and the forearm remained in a sling as it was too edematous to cast. Patient needs strict elevation due to severity of hand/ finger swelling. She will follow up outpatient with Dr Lisa Parikh. The patient received Morphine 1 mg q4h for 3 days. This was transitioned to Tylenol 625mg q6h and Norco 7.5-325 mg q8h prn for pain. Pain was well controlled at time of discharge.       Paroxysmal Afib: Patient's home warfarin dose was held during this hospital stay per Hematology and Cardiology. The patient's recurring falls places her at a high risk of bleeding complications and this outweighs the benefit of anticoagulation at this time.       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       Asymptomatic Bacteriuria: On admission, a urinalysis showed 2+ bacteria and neg LE/nitrites. Urine culture resulted Lactobacillus spp. The patient completed a course of IV Rocephin for 3 days.       DM type 2 with neuropathy: The patient received her home dose of Gabapentin 600mg TID for neuropathic pain.       Hypertension: The patient's home dose of Losartan/HCTZ 50/12.5mg was held and her blood pressure monitored.         Depression: The patient continued to receive her home Celexa 20mg daily.       Hx of Iron deficiency anemia: Normal Hb(12.2). Pt was initially on Iron but has since been discontinued by Dr. Palma Escobar (Heme/onc) given persistent constipation and dark stools. Her Hb on discharge was stable.       Muscle Spasms: The patient continued to receive her home Flexeril 5mg prn.      FOLLOW-UP CARE RECOMMENDATIONS:  Follow-up Information     Follow up With Details Comments Contact Evelyn Quarles MD In 7 days  [address]      Tami Dalal MD On 8/24/2018 at 1:10pm [address] [facility]  [phone]          It is very important that you keep follow-up appointment(s).   Bring discharge papers, medication list (and/or medication bottles) to follow-up appointments for review by outpatient provider(s). MEDICATION CHANGES:  Stop taking Warfarin 3mg daily due to risk of falls and bleeding. Continue Tylenol 650mg q6hrs and Norco 7.5-325mg q8hrs for pain     FOLLOW-UP TESTS RECOMMENDED:   None    ONGOING TREATMENT PLAN:   For right undisplaced distal radius fracture, patient needs strict elevation due to severity of hand/ finger swelling. The patient's right upper extremity is in a sling and hand needs to be propped up to be higher than level of heart. Non weight bearing/exercises with Right arm until evaluated by Ortho. PENDING TEST RESULTS:  At the time of discharge the following test results are still pending: None  Please review these results as they become available. Specific symptoms to watch for: chest pain, shortness of breath, fever, chills, nausea, vomiting, diarrhea, change in mentation, falling, weakness, bleeding. DIET:  Diabetic Diet    ACTIVITY:  Ambulate with assistance    WOUND CARE: None needed    EQUIPMENT needed:  None    INCIDENTAL FINDINGS:  None    GOALS OF CARE:    Eventual return to home/independent/assisted living   x  Long term SNF      Hospice     No rehospitalization     Patient condition at discharge:   Functional status    Poor    x  Deconditioned      Independent   Cognition    Lucid     Forgetful (some sensescence)   x  Dementia   Catheters/lines (plus indication)    Gar     PICC      PEG         Code status  x  Full code      DNR    . . . . . . . . . . . . . . . . . . . . . . . . . . . . . . . . . . . . . . . . . . . . . . . . . . . . . . . . . . . . . . . . . . . . . . . Kathleen Olivares      CHRONIC MEDICAL CONDITIONS:  Problem List as of 8/21/2018  Date Reviewed: 8/1/2018          Codes Class Noted - Resolved    * (Principal)Frequent falls ICD-10-CM: R29.6  ICD-9-CM: V15.88  8/14/2018 - Present        Wrist fracture, right ICD-10-CM: S62.101A  ICD-9-CM: 814.00  8/14/2018 - Present        A-fib ([facility] ICD-10-CM: I48.91  ICD-9-CM: 427.31  5/24/2018 - Present Dyslipidemia ICD-10-CM: E78.5  ICD-9-CM: 272.4  5/24/2018 - Present        Chronic anticoagulation ICD-10-CM: Z79.01  ICD-9-CM: V58.61  5/24/2018 - Present        Constipation ICD-10-CM: K59.00  ICD-9-CM: 564.00  5/24/2018 - Present        History of pulmonary embolism ICD-10-CM: Q47.189  ICD-9-CM: V12.55  5/24/2018 - Present        Type 2 diabetes with nephropathy ([facility] ICD-10-CM: E11.21  ICD-9-CM: 250.40, 583.81  2/27/2018 - Present        Advanced care planning/counseling discussion ICD-10-CM: Z71.89  ICD-9-CM: V65.49  5/9/2017 - Present    Overview Signed 5/9/2017  8:03 AM by Phu Wick MD     Has no completed, dwp importance             Controlled type 2 diabetes mellitus without complication, without long-term current use of insulin ([facility] ICD-10-CM: E11.9  ICD-9-CM: 250.00  5/9/2017 - Present        Iron deficiency anemia ICD-10-CM: D50.9  ICD-9-CM: 280.9  8/11/2016 - Present        Advance care planning ICD-10-CM: Z71.89  ICD-9-CM: V65.49  4/13/2016 - Present    Overview Signed 4/13/2016  8:49 AM by Phu Wick MD     Has a LW             Pulmonary embolism ([facility] ICD-10-CM: I26.99  ICD-9-CM: 415.19  2/25/2015 - Present        Itching ICD-10-CM: L29.9  ICD-9-CM: 698.9  2/7/2014 - Present        Elevated liver enzymes ICD-10-CM: R74.8  ICD-9-CM: 790.5  1/2/2014 - Present        S/P cholecystectomy ICD-10-CM: Z90.49  ICD-9-CM: V45.79  1/2/2014 - Present    Overview Signed 1/2/2014 11:42 AM by Aster Weeks MD     11/2013             S/P knee replacement ICD-10-CM: M49.001  ICD-9-CM: V43.65  1/2/2014 - Present    Overview Signed 1/2/2014 11:43 AM by Aster Weeks MD     bilaterally             S/P shoulder surgery ICD-10-CM: U12.847  ICD-9-CM: V45.89  1/2/2014 - Present    Overview Signed 1/2/2014 11:43 AM by Aster Weeks MD     Left             H/O carpal tunnel repair ICD-10-CM: A52.781  ICD-9-CM: V15.29  1/2/2014 - Present    Overview Signed 1/2/2014 11:43 AM by Jennifer Morris Adam Aguilar MD     Right hand             Chronic pain ICD-10-CM: G89.29  ICD-9-CM: 338.29  11/16/2010 - Present        HTN (hypertension) (Chronic) ICD-10-CM: I10  ICD-9-CM: 401.9  4/1/2010 - Present        OA (osteoarthritis) (Chronic) ICD-10-CM: M19.90  ICD-9-CM: 715.90  4/1/2010 - Present        Fibromyalgia (Chronic) ICD-10-CM: M79.7  ICD-9-CM: 729.1  4/1/2010 - Present        GERD (gastroesophageal reflux disease) (Chronic) ICD-10-CM: K21.9  ICD-9-CM: 530.81  4/1/2010 - Present        High cholesterol (Chronic) ICD-10-CM: E78.00  ICD-9-CM: 272.0  4/1/2010 - Present        Hiatal hernia (Chronic) ICD-10-CM: K44.9  ICD-9-CM: 553.3  4/1/2010 - Present              Information obtained by :   I understand that if any problems occur once I am at home I am to contact my physician. I understand and acknowledge receipt of the instructions indicated above.                                                                                                                                              Physician's or R.N.'s Signature                                                                  Date/Time                                                                                                                                              Patient or Representative Signature                                                          Date/Time

## 2018-08-20 NOTE — PROGRESS NOTES
Bedside and Verbal shift change report given to Jett (oncoming nurse) by Daron Yo (offgoing nurse). Report included the following information SBAR, Kardex, Procedure Summary, Intake/Output and MAR. Patient RUE elevated on pillows.

## 2018-08-20 NOTE — PROGRESS NOTES
8- CASE MANAGEMENT NOTE:  I sent updated therapy and MD notes to [facility] thru [employer] and will await their response.  Buddy Alexander, BSW, CM

## 2018-08-20 NOTE — PROGRESS NOTES
Problem: Mobility Impaired (Adult and Pediatric)  Goal: *Acute Goals and Plan of Care (Insert Text)  Physical Therapy Goals  Initiated 8/15/2018  1. Patient will move from supine to sit and sit to supine  in bed with minimal assistance/contact guard assist within 7 day(s). 2.  Patient will transfer from bed to chair and chair to bed with minimal assistance/contact guard assist using the least restrictive device within 7 day(s). 3.  Patient will perform sit to stand with minimal assistance/contact guard assist within 7 day(s). 4.  Patient will ambulate with minimal assistance/contact guard assist for 100 feet with the least restrictive device within 7 day(s). physical Therapy TREATMENT  Patient: Orlando Sanchez [de-identified]80 y.o. female)  Date: 8/20/2018  Diagnosis: Wrist fracture, right Frequent falls       Precautions:   right UE NWB on a cast.  Chart, physical therapy assessment, plan of care and goals were reviewed. ASSESSMENT:  Based on the objective data described below, patient participated well with treatment today. Patient more awake today and able to follow command. Sit on the edge of bed mod assist x 2, sit to stand mod assist x 2, sitting balance fair standing balance poor. Ambulate  With hand held assit towards the recilner mod assist x 2 noted patient leaning towards the left side when standing and during ambulation. OOB to chair as tolerated. Performed some active range of motion exercise on both EL all planes. Activated chair alarm and notified nurse who agreed to monitor patient    Progression toward goals:  [x]    Improving appropriately and progressing toward goals  []    Improving slowly and progressing toward goals  []    Not making progress toward goals and plan of care will be adjusted     PLAN:  Patient continues to benefit from skilled intervention to address the above impairments. Continue treatment per established plan of care.   Discharge Recommendations:  Skilled Nursing Facility  Further Equipment Recommendations for Discharge:  TBD     SUBJECTIVE:   Patient stated ok we can sit up.     OBJECTIVE DATA SUMMARY:   Critical Behavior:  Neurologic State: Alert  Orientation Level: Disoriented to place  Cognition: Decreased attention/concentration  Safety/Judgement: Lack of insight into deficits, Decreased awareness of environment  Functional Mobility Training:  Bed Mobility:  Rolling: Moderate assistance; Additional time;Assist x2  Supine to Sit: Moderate assistance; Additional time;Assist x2  Sit to Supine: Moderate assistance; Additional time;Assist x2  Scooting: Moderate assistance; Additional time;Assist x2        Transfers:  Sit to Stand: Moderate assistance; Additional time;Assist x2  Stand to Sit: Moderate assistance; Additional time;Assist x2  Stand Pivot Transfers: Assist x2     Bed to Chair: Moderate assistance; Additional time;Assist x2                    Balance:  Sitting: High guard (seated edge of bed)  Sitting - Static: Fair (occasional)  Sitting - Dynamic: Fair (occasional)  Standing: Impaired;Pull to stand; With support  Standing - Static: Poor;Constant support  Standing - Dynamic : Poor  Ambulation/Gait Training:  Distance (ft): 10 Feet (ft)     Ambulation - Level of Assistance: Moderate assistance; Additional time;Assist x2 (hand held assist)     Gait Description (WDL): Exceptions to WDL  Gait Abnormalities: Path deviations; Step to gait; Antalgic        Base of Support: Widened     Speed/Patricia: Fluctuations; Slow             Therapeutic Exercises:    Instructed patient to continue active range of motion exercise on both legs while up on chair or on bed. Pain:  Pain Scale 1: Numeric (0 - 10)  Pain Intensity 1: 0              Activity Tolerance:   Good. Please refer to the flowsheet for vital signs taken during this treatment.   After treatment:   [x]    Patient left in no apparent distress sitting up in chair  []    Patient left in no apparent distress in bed  [x]    Call knight left within reach  [x]    Nursing notified  [x]    Caregiver present  [x]    Chair alarm activated    COMMUNICATION/COLLABORATION:   The patients plan of care was discussed with: Certified Occupational Therapy Assistant, Registered Nurse, Physician,  and patient    Mandi Bernal PT,[facility].    Time Calculation: 26 mins

## 2018-08-20 NOTE — PROGRESS NOTES
8- CASE MANAGEMENT:   I have been following this pt for discharge planning and sent a referral to [facility] last week for private pay care. They just denied her as they don't feel she is meeting a level of care for their dementia unit or skilled unit. Today, however, the pt's mental status has much improved and as of 4:30 AM today she no longer required a sitter. I have spoken with the admissions liaison at [facility] and told her PT/OT is scheduled to see the pt and hopefully the pt will cooperate and begin to make some progress. Per her , she was able to ambulate with a walker at home.  Buddy Alexander, BSW, CM

## 2018-08-20 NOTE — PROGRESS NOTES
[facility]   Daily Progress Note    Date: 8/20/2018  PCP: Gladis Vaughan MD     Assessment/Plan:   Shawna Grimaldo is a 80 y.o. female admitted for right wrist fracture following recurrent falls. She has spent 6 night(s) in the hospital.     24 Hour Events: No acute events overnight.      Right Wrist fracture: Secondary to GLF on right outstretch hand. This is 3rd fall in the past month. Non-displaced right wrist fracture confirmed on x-ray. No acute pelvic fracture noted on pelv Xray. Normal CT head  - Ortho consult, appreciate recs. Plan is conservative management for now. Will follow outpt with Dr. Joaquín Bansal  - Continue Tylenol 650mg q6h, Norco 7.5mg q8hrs  - CM working with Ms Valencia Jones family regarding her placement       Paroxysmal Afib: Currently in Afib with RVR (rates 110s).  Underwent an EP study (5/25/18) with GULSHAN guided DCCV and successfully converted to NSR. Pt however converted back to Afib at subsequent visits. Pt of Dr. 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 Per Heme/onc note, this was unprovoked and pt will be on life long anti-coag  -D/c warfarin 3mg per heme/onc      Asymptomatic Bacteriuria: 2+ bacteria and neg LE/nitrites. -Urine culture: Lactobacillus spp. - Repeat urine given possible contaminant: Enterococcus  - Completed 3 days of IV rocephin      DM type 2 with neuropathy: Last A1c 6.8 (5/25/18). Diet controlled  - Insulin Sliding Scale normal sensitivity with AC&HS glucose checks. - Hypoglycemia protocols ordered. - Cont Gabapentin 600mg TID for neuropathic pain      Hypertension: BP's have been very labile and generally runs high when pt gets agitated.    - Cont home medications of Losartan/HCTZ 50/12.5 mg for SBP >170 per cards  - Will continue to monitor at this time and readjust as BP's trend.      Hyperlipidemia: Lipid panel (4/16/18) - Tchol 228, HDL 55,  and TG 1886. Diet controlled, pt not on any medication   - Continue to monitor      Depression:   -Continue home Celexa 20mg daily      Hx of Iron deficiency anemia: Normal Hb(12.2). Pt was initially on Iron but has since been discontinued by Dr. Naheed Richard (Heme/onc) given persistent constipation and dark stools  -Continue to monitor      Muscle Spasms:  -Continue home flexeril 5mg prn       Constipation:  -Miralax prn      FEN/GI - Diabetic Diet  Activity - Fall precaution, ambulate with assistance  DVT prophylaxis - Holding coumadin per heme/onc and Cards due to frequent falls  GI prophylaxis -  None  Disposition - CM consulted for placing.       CODE STATUS:  Full, discussed with     cell is [phone], name is Shon Fuentes.      Pt was discussed with Dr. Omari Vasquez (supervising provider)    I appreciate the opportunity to participate in the care of this patient,    Ginny Frost MD  [facility] Medicine Resident       Subjective  No acute events overnight. Pt does have any pain in her right forearm and fingers. Patient denies chest pain, shortness of breath, abdominal pain.      Inpatient Medications  Current Facility-Administered Medications   Medication Dose Route Frequency    morphine injection 1 mg  1 mg IntraVENous Q4H PRN    HYDROcodone-acetaminophen (NORCO) 7.5-325 mg per tablet 1 Tab  1 Tab Oral Q8H    losartan (COZAAR) tablet 50 mg  50 mg Oral DAILY PRN    And    hydroCHLOROthiazide (HYDRODIURIL) tablet 12.5 mg  12.5 mg Oral DAILY PRN    acetaminophen (TYLENOL) tablet 650 mg  650 mg Oral Q6H    dilTIAZem CD (CARDIZEM CD) capsule 240 mg  240 mg Oral DAILY    haloperidol lactate (HALDOL) injection 2 mg  2 mg IntraMUSCular Q4H PRN    sodium chloride (NS) flush 5-10 mL  5-10 mL IntraVENous Q8H    sodium chloride (NS) flush 5-10 mL  5-10 mL IntraVENous PRN    sodium chloride (NS) flush 5-10 mL  5-10 mL IntraVENous Q8H    sodium chloride (NS) flush 5-10 mL  5-10 mL IntraVENous PRN    polyethylene glycol (MIRALAX) packet 17 g  17 g Oral DAILY PRN    citalopram (CELEXA) tablet 20 mg  20 mg Oral DAILY    gabapentin (NEURONTIN) capsule 600 mg  600 mg Oral TID    cyclobenzaprine (FLEXERIL) tablet 5 mg  5 mg Oral DAILY PRN    insulin lispro (HUMALOG) injection   SubCUTAneous AC&HS    glucose chewable tablet 16 g  4 Tab Oral PRN    dextrose (D50W) injection syrg 12.5-25 g  12.5-25 g IntraVENous PRN    glucagon (GLUCAGEN) injection 1 mg  1 mg IntraMUSCular PRN    nystatin (MYCOSTATIN) 100,000 unit/gram powder   Topical BID     Allergies  Allergies   Allergen Reactions    Angiotensin Ii,Human Other (comments)     States does not remember      Avelox [Moxifloxacin] Other (comments)     States does not remember      Demerol [Meperidine] Hives     Objective  Vitals:  Visit Vitals    /78 (BP 1 Location: Left arm, BP Patient Position: At rest;Supine; Head of bed elevated (Comment degrees))    Pulse 81    Temp 97.4 °F (36.3 °C)    Resp 16    Ht 5' 8\" (1.727 m)    Wt 165 lb 6.4 oz (75 kg)    SpO2 98%    BMI 25.15 kg/m2      Temp (24hrs), Av.9 °F (36.6 °C), Min:96.7 °F (35.9 °C), Max:98.9 °F (37.2 °C)     O2 Flow Rate (L/min): 2 l/min   O2 Device: Nasal cannula    I/O:    Intake/Output Summary (Last 24 hours) at 18 0716  Last data filed at 18 0511   Gross per 24 hour   Intake              830 ml   Output              450 ml   Net              380 ml     Last 3 shifts:     1901 -  0700  In: 6257 [P.O.:1230]  Out: 850 [Urine:850]    Physical Exam:    General appearance - well appearing, lying comfortably on her bed and in no distress. She is conversational today. Chest - clear to auscultation, symmetric air entry  Heart - RRR, no murmurs  Extremities - Left forearm has reduced swelling, no erythema, not tender to palpation. Brusing on right fingers is improving. Patient has good range of motion. Her right arm is wrapped in a splint. No lower extremity swelling. Neurological - Alert but not oriented      Labs and Data:  Recent Labs      08/20/18   0448  08/19/18   0623  08/18/18   0115   WBC  4.8  6.4  8.2   HGB  13.5  13.2  13.2   HCT  42.6  39.4  39.0   PLT  277  299  283     Recent Labs      08/20/18   0448  08/19/18   0623  08/18/18   0115   NA  140  136  138   K  4.1  4.0  4.1   CL  102  102  103   CO2  29  26  31   GLU  114*  101*  106*   BUN  25*  21*  20   CREA  1.04*  0.80  0.93   CA  9.9  9.1  9.1       Signed by: Francisca Khan MD  Resident, Family Medicine  08/20/18 2[address] [facility] Medicine Residency Attending Addendum:  I saw and evaluated the patient, performing the key elements of the service. I discussed the findings, assessment and plan with the resident and agree with the resident's findings and plan as documented in the resident's note.     The patient was seen on 8/20/18  No acute events overnight    Vitals:    08/21/18 0700 08/21/18 0759 08/21/18 1129 08/21/18 1301   BP:  (!) 149/92 130/81    Pulse: (!) 113 99 95    Resp:  16 16    Temp:  98 °F (36.7 °C) 97.8 °F (36.6 °C)    SpO2:  92% 95% 97%   Weight:       Height:         NAD  VS-afebrile      Assessment/Plan:   Right wrist fracture-in splint  Awaiting placement      Hospital Problems  Date Reviewed: 8/1/2018          Codes Class Noted POA    * (Principal)Frequent falls ICD-10-CM: R29.6  ICD-9-CM: V15.88  8/14/2018 Unknown        Wrist fracture, right ICD-10-CM: S62.101A  ICD-9-CM: 814.00  8/14/2018 Unknown        Dyslipidemia ICD-10-CM: E78.5  ICD-9-CM: 272.4  5/24/2018 Yes        Controlled type 2 diabetes mellitus without complication, without long-term current use of insulin (HCC) ICD-10-CM: E11.9  ICD-9-CM: 250.00  5/9/2017 Yes        Iron deficiency anemia ICD-10-CM: D50.9  ICD-9-CM: 280.9  8/11/2016 Yes        HTN (hypertension) (Chronic) ICD-10-CM: I10  ICD-9-CM: 401.9  4/1/2010 Yes GERD (gastroesophageal reflux disease) (Chronic) ICD-10-CM: K21.9  ICD-9-CM: 530.81  4/1/2010 Yes

## 2018-08-20 NOTE — PROGRESS NOTES
Bedside and Verbal shift change report given to Rosemarie Ruby RN (oncoming nurse) by Alona Farias RN (offgoing nurse). Report included the following information SBAR, Kardex, ED Summary, Intake/Output, Med Rec Status, Cardiac Rhythm A-Flutter and Alarm Parameters . 2000: Pt sleeping; lethargic, responds to voice. Sitter at bedside. 0430: Pt awake; alert to self, pleasant, cooperative, follows commands. Sitter not needed at this time. Minh morning labs; pt  Sitting up in bed, drinking cup of decaf coffee. Pt conversing in conversation; responds appropriately. Alert to self only. Bed alarm set to sensitive; door open, window shade open, checking on pt frequently. Bedside and Verbal shift change report given to Dewayne Montalvo RN and Diana Salcido RN (oncoming nurse) by Rosemarie Ruby RN (offgoing nurse). Report included the following information SBAR, Kardex, ED Summary, Intake/Output, Recent Results, Med Rec Status and Cardiac Rhythm A-Fib.

## 2018-08-21 ENCOUNTER — PATIENT OUTREACH (OUTPATIENT)
Dept: FAMILY MEDICINE CLINIC | Age: 83
End: 2018-08-21

## 2018-08-21 VITALS
HEIGHT: 68 IN | OXYGEN SATURATION: 97 % | WEIGHT: 167.8 LBS | DIASTOLIC BLOOD PRESSURE: 81 MMHG | SYSTOLIC BLOOD PRESSURE: 130 MMHG | RESPIRATION RATE: 16 BRPM | BODY MASS INDEX: 25.43 KG/M2 | TEMPERATURE: 97.8 F | HEART RATE: 95 BPM

## 2018-08-21 LAB
ANION GAP SERPL CALC-SCNC: 2 MMOL/L (ref 5–15)
BUN SERPL-MCNC: 19 MG/DL (ref 6–20)
BUN/CREAT SERPL: 22 (ref 12–20)
CALCIUM SERPL-MCNC: 9.1 MG/DL (ref 8.5–10.1)
CHLORIDE SERPL-SCNC: 102 MMOL/L (ref 97–108)
CO2 SERPL-SCNC: 30 MMOL/L (ref 21–32)
CREAT SERPL-MCNC: 0.88 MG/DL (ref 0.55–1.02)
ERYTHROCYTE [DISTWIDTH] IN BLOOD BY AUTOMATED COUNT: 12.9 % (ref 11.5–14.5)
GLUCOSE BLD STRIP.AUTO-MCNC: 107 MG/DL (ref 65–100)
GLUCOSE BLD STRIP.AUTO-MCNC: 132 MG/DL (ref 65–100)
GLUCOSE SERPL-MCNC: 111 MG/DL (ref 65–100)
HCT VFR BLD AUTO: 36.9 % (ref 35–47)
HGB BLD-MCNC: 11.8 G/DL (ref 11.5–16)
MCH RBC QN AUTO: 31.8 PG (ref 26–34)
MCHC RBC AUTO-ENTMCNC: 32 G/DL (ref 30–36.5)
MCV RBC AUTO: 99.5 FL (ref 80–99)
NRBC # BLD: 0 K/UL (ref 0–0.01)
NRBC BLD-RTO: 0 PER 100 WBC
PLATELET # BLD AUTO: 187 K/UL (ref 150–400)
PMV BLD AUTO: 11 FL (ref 8.9–12.9)
POTASSIUM SERPL-SCNC: 3.6 MMOL/L (ref 3.5–5.1)
RBC # BLD AUTO: 3.71 M/UL (ref 3.8–5.2)
SERVICE CMNT-IMP: ABNORMAL
SERVICE CMNT-IMP: ABNORMAL
SODIUM SERPL-SCNC: 134 MMOL/L (ref 136–145)
WBC # BLD AUTO: 5.8 K/UL (ref 3.6–11)

## 2018-08-21 PROCEDURE — 74011250637 HC RX REV CODE- 250/637: Performed by: NURSE PRACTITIONER

## 2018-08-21 PROCEDURE — 74011250637 HC RX REV CODE- 250/637: Performed by: STUDENT IN AN ORGANIZED HEALTH CARE EDUCATION/TRAINING PROGRAM

## 2018-08-21 PROCEDURE — 74011250637 HC RX REV CODE- 250/637: Performed by: FAMILY MEDICINE

## 2018-08-21 PROCEDURE — 82962 GLUCOSE BLOOD TEST: CPT

## 2018-08-21 PROCEDURE — 77030038269 HC DRN EXT URIN PURWCK BARD -A

## 2018-08-21 PROCEDURE — 80048 BASIC METABOLIC PNL TOTAL CA: CPT | Performed by: STUDENT IN AN ORGANIZED HEALTH CARE EDUCATION/TRAINING PROGRAM

## 2018-08-21 PROCEDURE — 36415 COLL VENOUS BLD VENIPUNCTURE: CPT | Performed by: STUDENT IN AN ORGANIZED HEALTH CARE EDUCATION/TRAINING PROGRAM

## 2018-08-21 PROCEDURE — 85027 COMPLETE CBC AUTOMATED: CPT | Performed by: STUDENT IN AN ORGANIZED HEALTH CARE EDUCATION/TRAINING PROGRAM

## 2018-08-21 RX ADMIN — ACETAMINOPHEN 650 MG: 325 TABLET ORAL at 07:07

## 2018-08-21 RX ADMIN — ACETAMINOPHEN 650 MG: 325 TABLET ORAL at 13:11

## 2018-08-21 RX ADMIN — Medication 10 ML: at 07:07

## 2018-08-21 RX ADMIN — DILTIAZEM HYDROCHLORIDE 240 MG: 240 CAPSULE, COATED, EXTENDED RELEASE ORAL at 09:25

## 2018-08-21 RX ADMIN — CITALOPRAM HYDROBROMIDE 20 MG: 20 TABLET ORAL at 09:24

## 2018-08-21 RX ADMIN — HYDROCODONE BITARTRATE AND ACETAMINOPHEN 1 TABLET: 7.5; 325 TABLET ORAL at 07:07

## 2018-08-21 RX ADMIN — NYSTATIN: 100000 POWDER TOPICAL at 09:27

## 2018-08-21 RX ADMIN — HYDROCODONE BITARTRATE AND ACETAMINOPHEN 1 TABLET: 7.5; 325 TABLET ORAL at 13:56

## 2018-08-21 RX ADMIN — GABAPENTIN 600 MG: 300 CAPSULE ORAL at 09:24

## 2018-08-21 RX ADMIN — DOCUSATE SODIUM 100 MG: 100 CAPSULE, LIQUID FILLED ORAL at 09:25

## 2018-08-21 NOTE — PROGRESS NOTES
[address]  [phone]  Fax [phone]    Patient Name: Josefina Rod  YOB: 1933    Hospital Discharge Follow-Up      Date/Time:  8/21/2018 3:30 PM     Patient was admitted to [facility] on 8/14/18 and discharged on 8/21/18 for frequent falls; GLF fractured right wrist. Dementia and paranoia, 3rd fall in a month. The physician discharge summary was available at the time of outreach. Patient was discharged to [address] [place] [address] for Rehab. Goals of care on discharge summary show long term SNF. NN contacted  and appt with PCP was rescheduled. Call placed to SN[facility]. LM on  for coordination closer to discharge and a copy of her current medications. Inpatient RRAT score: 29  Was this a readmission? no       Disease Specific:   N/A    Durable Medical Equipment ordered/company: None    Advance Care Planning:   Does patient have an Advance Directive:  reviewed and current     Medication(s):   New Medications at Discharge: Tylenol 625 mg every 6 hours for pain and Norco 7.5/325 mg every 8 hours  Discontinued Medications at Discharge: Warfarin 3 mg    Medication reconciliation was deferred until receipt of MAR from SNF    Current Outpatient Prescriptions   Medication Sig    HYDROcodone-acetaminophen (Elieser Dolphin) 7.5-325 mg per tablet Take 1 Tab by mouth every eight (8) hours as needed for Pain. Max Daily Amount: 3 Tabs.  acetaminophen (TYLENOL) 325 mg tablet Take 2 Tabs by mouth every six (6) hours as needed for Pain.  docusate sodium (COLACE) 100 mg capsule Take 1 Cap by mouth daily for 90 days.  cyclobenzaprine (FLEXERIL) 5 mg tablet Take 5 mg by mouth daily as needed for Muscle Spasm(s).  dilTIAZem CD (CARTIA XT) 240 mg ER capsule Take 240 mg by mouth daily.  losartan-hydroCHLOROthiazide (HYZAAR) 100-25 mg per tablet Take 0.5 Tabs by mouth daily as needed (SBP greater than 170).     OTHER Check INR in 2 weeks    polyethylene glycol (MIRALAX) 17 gram packet Take 17 g by mouth as needed (Constipation).  OTHER Folding Wheelchair  Dx: OA    citalopram (CELEXA) 20 mg tablet TAKE 1 TABLET BY MOUTH DAILY    gabapentin (NEURONTIN) 600 mg tablet TAKE 1 TABLET BY MOUTH 3 TIMES A DAY     No current facility-administered medications for this visit.       Facility-Administered Medications Ordered in Other Visits   Medication Dose Route Frequency    docusate sodium (COLACE) capsule 100 mg  100 mg Oral DAILY    morphine injection 1 mg  1 mg IntraVENous Q4H PRN    HYDROcodone-acetaminophen (NORCO) 7.5-325 mg per tablet 1 Tab  1 Tab Oral Q8H    losartan (COZAAR) tablet 50 mg  50 mg Oral DAILY PRN    And    hydroCHLOROthiazide (HYDRODIURIL) tablet 12.5 mg  12.5 mg Oral DAILY PRN    acetaminophen (TYLENOL) tablet 650 mg  650 mg Oral Q6H    dilTIAZem CD (CARDIZEM CD) capsule 240 mg  240 mg Oral DAILY    haloperidol lactate (HALDOL) injection 2 mg  2 mg IntraMUSCular Q4H PRN    sodium chloride (NS) flush 5-10 mL  5-10 mL IntraVENous Q8H    sodium chloride (NS) flush 5-10 mL  5-10 mL IntraVENous PRN    sodium chloride (NS) flush 5-10 mL  5-10 mL IntraVENous Q8H    sodium chloride (NS) flush 5-10 mL  5-10 mL IntraVENous PRN    polyethylene glycol (MIRALAX) packet 17 g  17 g Oral DAILY PRN    citalopram (CELEXA) tablet 20 mg  20 mg Oral DAILY    gabapentin (NEURONTIN) capsule 600 mg  600 mg Oral TID    cyclobenzaprine (FLEXERIL) tablet 5 mg  5 mg Oral DAILY PRN    insulin lispro (HUMALOG) injection   SubCUTAneous AC&HS    glucose chewable tablet 16 g  4 Tab Oral PRN    dextrose (D50W) injection syrg 12.5-25 g  12.5-25 g IntraVENous PRN    glucagon (GLUCAGEN) injection 1 mg  1 mg IntraMUSCular PRN    nystatin (MYCOSTATIN) 100,000 unit/gram powder   Topical BID     [facility] follow up appointment(s): Future Appointments  Date Time Provider Beena Adler   8/22/2018 1:15 PM Denilson Lyn MD [facility] [address] 9/4/2018 9:30 AM Nba Byers MD [facility] SCHED   10/23/2018 10:40 AM Rodolfo Mujica MD [facility] SCHED      [facility] follow up appointment(s): Dr Ida Harmon.  Orthopedic Surgeon  8/24/18  [facility]:  n/a

## 2018-08-21 NOTE — PROGRESS NOTES
Bedside and Verbal shift change report given to Jignesh Castro RN (oncoming nurse) by CALIXTO Frausto (offgoing nurse). Report included the following information SBAR, Kardex, ED Summary, Intake/Output, Recent Results, Med Rec Status and Cardiac Rhythm A-fib/A-Flutter. 2100: Pt alert, confused, cooperative. Pt talkative, pleasant, taking scheduled medications without difficulty. 2259: Pt alert, confused, tearful. Pt asking for ; assured pt  will be in to see pt in morning. Pt appeared agreeable. Pt repositioned in bed for comfort. 2315: Pt watching TV; resting comfortably. 0645: Pt awake, confused, agitated; pulling off telemetry leads, pt removed rt arm sling; pulling off ace wrap on rt arm, trying to remove arm cast. Pt pulled off purewick. Attempted to reorient pt; pt stated; \"you are just trying to kill me\". Stayed at pt bedside until Pt  arrived at 0700. Pt continues to resist care; Pt  supportive; calming pt with conversation. Pt cooperative, but confused. Bedside and Verbal shift change report given to CALIXTO Frausto (oncoming nurse) by Jignesh Castro RN (offgoing nurse). Report included the following information SBAR, Kardex, ED Summary, Intake/Output, Recent Results, Med Rec Status and Cardiac Rhythm A-Fib/A-Flutter.

## 2018-08-21 NOTE — PROGRESS NOTES
Bedside and Verbal shift change report given to Jett (oncoming nurse) by Mehdi Graham (offgoing nurse). Report included the following information SBAR, Kardex, Procedure Summary, Intake/Output and MAR. If you experience chest pain call 911. Do not drive yourself. I have reviewed discharge instructions with the patient and spouse. The patient and spouse verbalized understanding. TRANSFER - OUT REPORT:    Verbal report given to RANDA Rushing(name) on [facility]  being transferred to [facility]) for routine progression of care       Report consisted of patients Situation, Background, Assessment and   Recommendations(SBAR). Information from the following report(s) SBAR, Kardex, Procedure Summary, Intake/Output and MAR was reviewed with the receiving nurse. Lines:       Opportunity for questions and clarification was provided.       Patient transported with:

## 2018-08-21 NOTE — PALLIATIVE CARE DISCHARGE
Goals of Care/Treatment Preferences    The Palliative Medicine team was consulted as part of your/your loved one's care in the hospital. Our team is a supportive service; we strive to relieve suffering and improve quality of life. We reviewed advance care planning information, which includes the following:  Patient's Healthcare Decision Maker is[de-identified] Legal Next of Steven 69  Primary Decision Maker Name: Kyleigh Christopher  Primary Decision Maker Phone Number: [phone]  Primary Decision Maker Relationship to Patient: Spouse  Confirm Advance Directive: None  Patient Would Like to Complete Advance Directive: Unable  Does the patient have other document types: [employer] [address] [place] [address] Proxy Stated Goals:  (full restorative measures in an attempt to recover, short term placement until home has been restored and then return home)    We reviewed / discussed your code status as: Full Code     Full Code means perform CPR in the event of cardiac arrest.      DNR means do NOT perform CPR in the event of cardiac arrest.      Partial Code means you have specific preferences, please discuss with your healthcare team.      Herlene Heft means this issue was not addressed / resolved during your stay    Medical Interventions: Full interventions    Because of the importance of this information, we are providing you with a printed copy to share with other healthcare providers after this hospitalization is complete.

## 2018-08-21 NOTE — PROGRESS NOTES
[facility] FAMILY MEDICINE RESIDENCY PROGRAM   Daily Progress Note    Date: 8/21/2018  PCP: Loy Triplett MD     Assessment/Plan:   Dm Turner is a 80 y.o. female admitted for right wrist fracture following recurrent falls. She has spent 6 night(s) in the hospital.     24 Hour Events: No acute events overnight.      Right Wrist fracture: Secondary to GLF on right outstretch hand. This is 3rd fall in the past month. Non-displaced right wrist fracture confirmed on x-ray. No acute pelvic fracture noted on pelv Xray. Normal CT head  - Ortho consult, appreciate recs. Plan is conservative management for now. Will follow outpt with Dr. Zhou Almaraz  - Continue Tylenol 650mg q6h, Norco 7.5mg q8hrs  - CM working with Ms Nelly Suarez family regarding her placement       Paroxysmal Afib: Currently in Afib with RVR (rates 110s).  Underwent an EP study (5/25/18) with GULSHAN guided DCCV and successfully converted to NSR. Pt however converted back to Afib at subsequent visits. Pt of Dr. 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 Per Heme/onc note, this was unprovoked and pt will be on life long anti-coag  -D/c warfarin 3mg per heme/onc      Asymptomatic Bacteriuria: 2+ bacteria and neg LE/nitrites. -Urine culture: Lactobacillus spp. - Repeat urine given possible contaminant: Enterococcus  - Completed 3 days of IV rocephin      DM type 2 with neuropathy: Last A1c 6.8 (5/25/18). Diet controlled  - Insulin Sliding Scale normal sensitivity with AC&HS glucose checks. - Hypoglycemia protocols ordered. - Cont Gabapentin 600mg TID for neuropathic pain      Hypertension: BP's have been very labile and generally runs high when pt gets agitated.    - Cont home medications of Losartan/HCTZ 50/12.5 mg for SBP >170 per cards  - Will continue to monitor at this time and readjust as BP's trend.      Hyperlipidemia: Lipid panel (4/16/18) - Tchol 228, HDL 55,  and TG 1886. Diet controlled, pt not on any medication   - Continue to monitor      Depression:   -Continue home Celexa 20mg daily      Hx of Iron deficiency anemia: Normal Hb(12.2). Pt was initially on Iron but has since been discontinued by Dr. Fiif Sierra (Heme/onc) given persistent constipation and dark stools  -Continue to monitor      Muscle Spasms:  -Continue home flexeril 5mg prn       Constipation:  -Miralax prn      FEN/GI - Diabetic Diet  Activity - Fall precaution, ambulate with assistance  DVT prophylaxis - Holding coumadin per heme/onc and Cards due to frequent falls  GI prophylaxis -  None  Disposition - CM consulted for placing.       CODE STATUS:  Full, discussed with     cell is [phone], name is Sarah Olivier.      Pt was discussed with Dr. Royce Garcia (supervising provider)    I appreciate the opportunity to participate in the care of this patient,    Anisa Suarez MD  [facility] Medicine Resident       Subjective  No acute events overnight. Pt does have any pain in her right forearm and fingers. Patient denies chest pain, shortness of breath, abdominal pain.      Inpatient Medications  Current Facility-Administered Medications   Medication Dose Route Frequency    docusate sodium (COLACE) capsule 100 mg  100 mg Oral DAILY    morphine injection 1 mg  1 mg IntraVENous Q4H PRN    HYDROcodone-acetaminophen (NORCO) 7.5-325 mg per tablet 1 Tab  1 Tab Oral Q8H    losartan (COZAAR) tablet 50 mg  50 mg Oral DAILY PRN    And    hydroCHLOROthiazide (HYDRODIURIL) tablet 12.5 mg  12.5 mg Oral DAILY PRN    acetaminophen (TYLENOL) tablet 650 mg  650 mg Oral Q6H    dilTIAZem CD (CARDIZEM CD) capsule 240 mg  240 mg Oral DAILY    haloperidol lactate (HALDOL) injection 2 mg  2 mg IntraMUSCular Q4H PRN    sodium chloride (NS) flush 5-10 mL  5-10 mL IntraVENous Q8H    sodium chloride (NS) flush 5-10 mL  5-10 mL IntraVENous PRN    sodium chloride (NS) flush 5-10 mL  5-10 mL IntraVENous Q8H    sodium chloride (NS) flush 5-10 mL  5-10 mL IntraVENous PRN    polyethylene glycol (MIRALAX) packet 17 g  17 g Oral DAILY PRN    citalopram (CELEXA) tablet 20 mg  20 mg Oral DAILY    gabapentin (NEURONTIN) capsule 600 mg  600 mg Oral TID    cyclobenzaprine (FLEXERIL) tablet 5 mg  5 mg Oral DAILY PRN    insulin lispro (HUMALOG) injection   SubCUTAneous AC&HS    glucose chewable tablet 16 g  4 Tab Oral PRN    dextrose (D50W) injection syrg 12.5-25 g  12.5-25 g IntraVENous PRN    glucagon (GLUCAGEN) injection 1 mg  1 mg IntraMUSCular PRN    nystatin (MYCOSTATIN) 100,000 unit/gram powder   Topical BID     Allergies  Allergies   Allergen Reactions    Angiotensin Ii,Human Other (comments)     States does not remember      Avelox [Moxifloxacin] Other (comments)     States does not remember      Demerol [Meperidine] Hives     Objective  Vitals:  Visit Vitals    /87 (BP 1 Location: Left arm, BP Patient Position: At rest;Supine; Head of bed elevated (Comment degrees))    Pulse 83    Temp 97.6 °F (36.4 °C)    Resp 16    Ht 5' 8\" (1.727 m)    Wt 167 lb 12.8 oz (76.1 kg)    SpO2 97%    BMI 25.51 kg/m2      Temp (24hrs), Av.8 °F (36.6 °C), Min:97.6 °F (36.4 °C), Max:98.1 °F (36.7 °C)     O2 Flow Rate (L/min): 1 l/min   O2 Device: Nasal cannula    I/O:    Intake/Output Summary (Last 24 hours) at 18 0546  Last data filed at 18 0424   Gross per 24 hour   Intake              590 ml   Output             1450 ml   Net             -860 ml     Last 3 shifts:     0701 -  1900  In: 0731 [P.O.:1410; I.V.:10]  Out: 1150 [Urine:1150]    Physical Exam:    General appearance - well appearing, lying comfortably on her bed and in no distress. She is conversational today.    Chest - clear to auscultation, symmetric air entry  Heart - RRR, no murmurs  Extremities - Left forearm has reduced swelling, no erythema, not tender to palpation. Brusing on right fingers is improving. Patient has good range of motion. Her right forearm and wrist is in a splint and wrapped in a sling. No lower extremity swelling. Neurological - Alert but not oriented      Labs and Data:  Recent Labs      08/21/18   0138  08/20/18   0448  08/19/18   0623   WBC  5.8  4.8  6.4   HGB  11.8  13.5  13.2   HCT  36.9  42.6  39.4   PLT  187  277  299     Recent Labs      08/21/18   0138  08/20/18   0448  08/19/18   0623   NA  134*  140  136   K  3.6  4.1  4.0   CL  102  102  102   CO2  30  29  26   GLU  111*  114*  101*   BUN  19  25*  21*   CREA  0.88  1.04*  0.80   CA  9.1  9.9  9.1       Signed by: Husam Paris MD  Resident, Family Medicine  08/21/18 [facility] Medicine Residency Attending Addendum:  I saw and evaluated the patient, performing the key elements of the service. I discussed the findings, assessment and plan with the resident and agree with the resident's findings and plan as documented in the resident's note.     The patient was seen on 8/21/18  Was able to sleep sitter free  No complaints    Vitals:    08/21/18 0700 08/21/18 0759 08/21/18 1129 08/21/18 1301   BP:  (!) 149/92 130/81    Pulse: (!) 113 99 95    Resp:  16 16    Temp:  98 °F (36.7 °C) 97.8 °F (36.6 °C)    SpO2:  92% 95% 97%   Weight:       Height:         NAD  VSS  Right wrist in splint      Assessment/Plan:   Right wrist fracture  Okay to discharge today    Hospital Problems  Date Reviewed: 8/1/2018          Codes Class Noted POA    * (Principal)Frequent falls ICD-10-CM: R29.6  ICD-9-CM: V15.88  8/14/2018 Unknown        Wrist fracture, right ICD-10-CM: S62.101A  ICD-9-CM: 814.00  8/14/2018 Unknown        Dyslipidemia ICD-10-CM: E78.5  ICD-9-CM: 272.4  5/24/2018 Yes        Controlled type 2 diabetes mellitus without complication, without long-term current use of insulin (HCC) ICD-10-CM: E11.9  ICD-9-CM: 250.00  5/9/2017 Yes        Iron deficiency anemia ICD-10-CM: D50.9  ICD-9-CM: 280.9  8/11/2016 Yes        HTN (hypertension) (Chronic) ICD-10-CM: I10  ICD-9-CM: 401.9  4/1/2010 Yes        GERD (gastroesophageal reflux disease) (Chronic) ICD-10-CM: K21.9  ICD-9-CM: 530.81  4/1/2010 Yes

## 2018-08-21 NOTE — PROGRESS NOTES
Bedside and Verbal shift change report given to Pia (oncoming nurse) by Sonia Camilo (offgoing nurse). Report included the following information SBAR, Procedure Summary, Intake/Output, MAR and Recent Results.

## 2018-08-21 NOTE — DISCHARGE SUMMARY
[facility] [address] [place] [address]   Office [phone]  Fax [phone]       Discharge / Transfer / Off-Service Note     Name: Niko Beckwith MRN: [id number]  Sex: Female   YOB: 1933  Age: 80 y.o. PCP: Lesly Knight MD     Date of admission: 8/14/2018  Date of discharge/transfer: 8/21/2018    Attending physician at admission: Dr. Becca Ortiz    Attending physician at discharge/transfer: Dr. Becca Ortiz    Resident physician at discharge/transfer: Tomer Lutz MD, PGY1      Consultants during hospitalization  Dr. Silver Sorenson (Cardiology)  Adriana Flores. [facility]   Dr Hieu Salgado (Hematology)   Galilea Peters NP (Palliative Medicine)      Admission diagnoses   Wrist fracture, right    Recommended follow-up after discharge  1. Orthopedic Surgery - Dr Luis Turner  2. Cardiology - Dr Yari Mendoza   3. PCP - Dr Colleen Harp     Things to follow up on with PCP:  Hospital follow up      Medication Changes:  1. New Medications:  Continue taking Tylenol 625 mg q6hrs for pain   Continue taking Norco 7.5-325 mg q8hrs for pain       2. Modified Medications: None    3. Discontinued Medications: None    History of Present Illness  Per admitting provider, Dr Ezio Salomon is a 80 y.o. female with a history of Dementia, recurrent falls, HTN, Osteoarthritis, HLD, pulmonary embolism, Iron deficiency anemia, Afib, DM type 2 with neuropathy and fibromyalgia who presents to the ED via EMS complaining Right Wrist pain after a GLF.      History obtained from patient's  and ED provider as pt has memory issues.     Pt was seen in the ED on 08/13/18 after GLF and found to have a non-displaced wrist fracture of the right distal radius. Pt was splinted with a sugar tongue splint and discharged with outpatient ortho follow-up. Pt then returned to the ED this morning following another GLF and worsening right wrist pain.  Per 's report, pt fell last night while trying to go to the bathroom without her walker. Pt has not had any fever/chills, diarrhea, abd pain or vomiting. She has frequent hallucinations about  trying to poison her and refuses to take her pain medications at home.  reports that this is patient's 3rd fall in about a month.      Per chart review, pt has a history of PE on chronic anticoagulation. She also a history of paroxysmal Afib  and underwent an EP study (5/25/18) with GULSHAN guided DCCV and successfully converted to NSR. Pt however converted back into Afib. She follows Dr. Oreilly  outpatient. HOSPITAL COURSE  Right Wrist fracture: The patient's non-displaced right wrist fracture was confirmed on x-ray. No acute pelvic fracture noted on pelv Xray. A CT head was normal. Ortho was consulted and recommended conservative treatment of the fracture. Right upper extremity was placed in a sling and the forearm remained in a splint as it was too edematous to cast. Patient needs strict elevation due to severity of hand/ finger swelling. She will follow up outpatient with Dr Arelis Vo. The patient received Morphine 1 mg q4h for 3 days. This was transitioned to Tylenol 625mg q6h and Norco 7.5-325 mg q8h prn for pain. Pain was well controlled at time of discharge.       Paroxysmal Afib: Patient's home warfarin dose was held during this hospital stay per Hematology and Cardiology. The patient's recurring falls place her at a high risk of bleeding complications and this outweighs the benefit of anticoagulation at this time.       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       Asymptomatic Bacteriuria: On admission, a urinalysis showed 2+ bacteria and neg LE/nitrites. Urine culture resulted Lactobacillus spp.  The patient completed a course of IV Rocephin for 3 days.       DM type 2 with neuropathy: The patient received her home dose of Gabapentin 600mg TID for neuropathic pain.       Hypertension: The patient's home dose of Losartan/HCTZ 50/12.5mg was held and her blood pressure monitored.       Depression: The patient continued to receive her home Celexa 20mg daily.       Hx of Iron deficiency anemia: Normal Hb(12.2). Pt was initially on Iron but has since been discontinued by Dr. Albesa Mortimer (Heme/onc) given persistent constipation and dark stools. Her Hb on discharge was stable.       Muscle Spasms: The patient continued to receive her home Flexeril 5mg prn. Physical exam at discharge:    Vitals Reviewed. General Appears well-nourished, no distress. Not diaphoretic. Neck No thyromegaly present. No cervical adenopathy. Cardio Normal rate, regular rhythm. Exam reveals no gallop and no friction rub. No murmur heard. No chest wall tenderness. Pulmonary Effort normal and breath sounds normal. No respiratory distress. No wheezes, no rales. Abdominal Soft. Bowel sounds normal. No distension. No tenderness.  Deferred. Extremities Left forearm has reduced swelling, no erythema, not tender to palpation. Brusing on right fingers is improving. Patient has good range of motion. Her right forearm and wrist is in a splint and right upper extremity wrapped in a sling. No lower extremity swelling. Neurological Alert and oriented to person   Dermatology Skin is warm and dry. No rash noted. No erythema or pallor. Psychiatric Affect and judgment normal.      Condition at discharge: Stable.     Labs  Recent Labs      08/21/18   0138  08/20/18   0448  08/19/18   0623   WBC  5.8  4.8  6.4   HGB  11.8  13.5  13.2   HCT  36.9  42.6  39.4   PLT  187  277  299     Recent Labs      08/21/18   0138  08/20/18   0448  08/19/18   0623   NA  134*  140  136   K  3.6  4.1  4.0   CL  102  102  102   CO2  30  29  26   BUN  19  25*  21*   CREA  0.88  1.04*  0.80   GLU  111*  114*  101*   CA  9.1  9.9  9.1     Recent Labs      08/21/18   1108  08/21/18   0700  08/20/18   2100  08/20/18   1615  08/20/18   1115   GLUCPOC  132* 107*  113*  122*  101*     Cultures  · Urine culture: Lactobacillus   · Repeat urine culture: Enterococcus faecalis     Procedures / Diagnostic Studies  Left duplex upper extremity: No deep vein thrombosis or thrombophlebitis. No evidence of thrombus in contralateral right subclavian vein. Imaging  Results from Hospital Encounter encounter on 08/14/18   XR WRIST RT AP/LAT/OBL MIN 3V   Narrative EXAM: XR WRIST RT AP/LAT/OBL MIN 3V  Clinical history: Fall, broken wrist  INDICATION:  fall, broken wrist.    COMPARISON: None. FINDINGS: Three  views of the right wrist demonstrate postreduction images  distal radial fracture. .  Soft tissue edema. .         Impression IMPRESSION:      Postreduction images distal radial fracture. Ivy Riser Results from [place] encounter on 08/14/18   CT HEAD WO CONT   Narrative EXAM:  CT HEAD WO CONT  Clinical history: Fall, fractured wrist, increased pain  INDICATION:   fall    COMPARISON: 7/20/2018. CONTRAST:  None. TECHNIQUE: Unenhanced CT of the head was performed using 5 mm images. Brain and  bone windows were generated. CT dose reduction was achieved through use of a  standardized protocol tailored for this examination and automatic exposure  control for dose modulation. FINDINGS:  The ventricles and sulci are normal in size, shape and configuration and  midline. Mild scattered hypodensities in the cerebral white matter. There is no  intracranial hemorrhage, extra-axial collection, mass, mass effect or midline  shift. The basilar cisterns are open. No acute infarct is identified. The bone  windows demonstrate no abnormalities. The visualized portions of the paranasal  sinuses and mastoid air cells are clear.          Impression IMPRESSION:     No acute cranial process           Chronic diagnoses   Problem List as of 8/21/2018  Date Reviewed: 8/1/2018          Codes Class Noted - Resolved    * (Principal)Frequent falls ICD-10-CM: R29.6  ICD-9-CM: V15.88 8/14/2018 - Present        Wrist fracture, right ICD-10-CM: S62.101A  ICD-9-CM: 814.00  8/14/2018 - Present        A-fib ([facility] ICD-10-CM: I48.91  ICD-9-CM: 427.31  5/24/2018 - Present        Dyslipidemia ICD-10-CM: E78.5  ICD-9-CM: 272.4  5/24/2018 - Present        Chronic anticoagulation ICD-10-CM: Z79.01  ICD-9-CM: V58.61  5/24/2018 - Present        Constipation ICD-10-CM: K59.00  ICD-9-CM: 564.00  5/24/2018 - Present        History of pulmonary embolism ICD-10-CM: V76.186  ICD-9-CM: V12.55  5/24/2018 - Present        Type 2 diabetes with nephropathy ([facility] ICD-10-CM: E11.21  ICD-9-CM: 250.40, 583.81  2/27/2018 - Present        Advanced care planning/counseling discussion ICD-10-CM: Z71.89  ICD-9-CM: V65.49  5/9/2017 - Present    Overview Signed 5/9/2017  8:03 AM by Denilson Lyn MD     Has no completed, dwp importance             Controlled type 2 diabetes mellitus without complication, without long-term current use of insulin ([facility] ICD-10-CM: E11.9  ICD-9-CM: 250.00  5/9/2017 - Present        Iron deficiency anemia ICD-10-CM: D50.9  ICD-9-CM: 280.9  8/11/2016 - Present        Advance care planning ICD-10-CM: Z71.89  ICD-9-CM: V65.49  4/13/2016 - Present    Overview Signed 4/13/2016  8:49 AM by Denilson Lyn MD     Has a LW             Pulmonary embolism ([facility] ICD-10-CM: I26.99  ICD-9-CM: 415.19  2/25/2015 - Present        Itching ICD-10-CM: L29.9  ICD-9-CM: 698.9  2/7/2014 - Present        Elevated liver enzymes ICD-10-CM: R74.8  ICD-9-CM: 790.5  1/2/2014 - Present        S/P cholecystectomy ICD-10-CM: Z90.49  ICD-9-CM: V45.79  1/2/2014 - Present    Overview Signed 1/2/2014 11:42 AM by Becca Nelson MD     11/2013             S/P knee replacement ICD-10-CM: N14.723  ICD-9-CM: V43.65  1/2/2014 - Present    Overview Signed 1/2/2014 11:43 AM by Becca Nelson MD     [facility]             S/P shoulder surgery ICD-10-CM: B95.730  ICD-9-CM: V45.89  1/2/2014 - Present    Overview Signed 1/2/2014 11:43 AM by Becca Nleson MD     Left             H/O carpal tunnel repair ICD-10-CM: G48.698  ICD-9-CM: V15.29  1/2/2014 - Present    Overview Signed 1/2/2014 11:43 AM by Becca Nelson MD     Right hand             Chronic pain ICD-10-CM: H20.57  ICD-9-CM: 338.29  11/16/2010 - Present        HTN (hypertension) (Chronic) ICD-10-CM: I10  ICD-9-CM: 401.9  4/1/2010 - Present        OA (osteoarthritis) (Chronic) ICD-10-CM: M19.90  ICD-9-CM: 715.90  4/1/2010 - Present        Fibromyalgia (Chronic) ICD-10-CM: M79.7  ICD-9-CM: 729.1  4/1/2010 - Present        GERD (gastroesophageal reflux disease) (Chronic) ICD-10-CM: K21.9  ICD-9-CM: 530.81  4/1/2010 - Present        High cholesterol (Chronic) ICD-10-CM: E78.00  ICD-9-CM: 272.0  4/1/2010 - Present        Hiatal hernia (Chronic) ICD-10-CM: K44.9  ICD-9-CM: 553.3  4/1/2010 - Present            Discharge/Transfer Medications  Discharge Medication List as of 8/21/2018  2:16 PM      START taking these medications    Details   acetaminophen (TYLENOL) 325 mg tablet Take 2 Tabs by mouth every six (6) hours as needed for Pain., Print, Disp-20 Tab, R-0      docusate sodium (COLACE) 100 mg capsule Take 1 Cap by mouth daily for 90 days. , Print, Disp-30 Cap, R-2         CONTINUE these medications which have CHANGED    Details   HYDROcodone-acetaminophen (NORCO) 7.5-325 mg per tablet Take 1 Tab by mouth every eight (8) hours as needed for Pain. Max Daily Amount: 3 Tabs., Print, Disp-15 Tab, R-0         CONTINUE these medications which have NOT CHANGED    Details   cyclobenzaprine (FLEXERIL) 5 mg tablet Take 5 mg by mouth daily as needed for Muscle Spasm(s). , Historical Med      dilTIAZem CD (CARTIA XT) 240 mg ER capsule Take 240 mg by mouth daily. , Historical Med      losartan-hydroCHLOROthiazide (HYZAAR) 100-25 mg per tablet Take 0.5 Tabs by mouth daily as needed (SBP greater than 170). , Historical Med      citalopram (CELEXA) 20 mg tablet TAKE 1 TABLET BY MOUTH DAILY, Normal, Disp-90 Tab, R-2      gabapentin (NEURONTIN) 600 mg tablet TAKE 1 TABLET BY MOUTH 3 TIMES A DAY, Normal, Disp-270 Tab, R-2      !! OTHER Check INR in 2 weeks, Print, Disp-1 Each, R-0      polyethylene glycol (MIRALAX) 17 gram packet Take 17 g by mouth as needed (Constipation). , Historical Med      !! OTHER Folding Wheelchair  Dx: OA, Print, Disp-1 Each, R-0       !! - Potential duplicate medications found. Please discuss with provider. STOP taking these medications       warfarin (JANTOVEN) 3 mg tablet Comments:   Reason for Stopping:             Diet:  Diabetic diet.     Activity:  As tolerated     Disposition: [place]    Discharge instructions to patient/family  Please seek medical attention for any new or worsening symptoms particularly fever, chest pain, shortness of breath, abdominal pain, nausea, vomiting    Follow up plans/appointments  Follow-up Information     Follow up With Details Comments Contact Info    Carla Barraza MD In 7 days  [address] [facility]  [address] [place] [address].      Nba Byers MD On 8/24/2018 at 1:10pm [address] [place] [address]  [phone]      [address]  [phone]        Lakisha Nogueira MD  Family Medicine Resident     For Billing    Chief Complaint   Patient presents with   [facility] Wrist Pain       Hospital Problems  Date Reviewed: 8/1/2018          Codes Class Noted POA    * (Principal)Frequent falls ICD-10-CM: R29.6  ICD-9-CM: V15.88  8/14/2018 Unknown        Wrist fracture, right ICD-10-CM: S62.101A  ICD-9-CM: 814.00  8/14/2018 Unknown        Dyslipidemia ICD-10-CM: E78.5  ICD-9-CM: 272.4  5/24/2018 Yes        Controlled type 2 diabetes mellitus without complication, without long-term current use of insulin ([facility] ICD-10-CM: E11.9  ICD-9-CM: 250.00  5/9/2017 Yes        Iron deficiency anemia ICD-10-CM: D50.9  ICD-9-CM: 280.9  8/11/2016 Yes        HTN (hypertension) (Chronic) ICD-10-CM: I10  ICD-9-CM: 401.9  4/1/2010 Yes        GERD (gastroesophageal reflux disease) (Chronic) ICD-10-CM: K21.9  ICD-9-CM: 530.81  4/1/2010 Yes               I saw and evaluated the patient, performing the key elements of the service. I discussed the findings, assessment and plan with the resident and agree with the resident's findings and plan as documented in the resident's note.

## 2018-08-21 NOTE — PROGRESS NOTES
8- CASE MANAGEMENT NOTE:  The pt has been accepted by [facility] and her  will transport her there today. I sent the discharge instructions to them thru [employer] and they will also accompany her. I also sent a MAP 95 to [facility] thru [employer]. The RN has been instructed to call report and the PCP's nurse navigators were notified. Care Management Interventions  PCP Verified by CM:  Yes (Dr. Constantin Muñiz navigators notified of discharge )  Transition of Care Consult (CM Consult): SNF  Partner SNF: No  Reason Why Partner SNF Not Chosen: Friend/family recommendation  Discharge Durable Medical Equipment: No  Physical Therapy Consult: Yes  Occupational Therapy Consult: Yes  Current Support Network: Lives with Spouse, Own Home  Confirm Follow Up Transport: Family  Plan discussed with Pt/Family/Caregiver: Yes  Freedom of Choice Offered: Yes  Discharge Location  Discharge Placement:  (To [facility])    AMANDA Montalvo, CM

## 2018-08-21 NOTE — PROGRESS NOTES
Pt to be discharged to [facility]. Removed 2L NC and monitored on room air. Pt maintains O2 saturations of 97% on room air.

## 2018-08-22 ENCOUNTER — HOSPITAL ENCOUNTER (INPATIENT)
Age: 83
LOS: 13 days | Discharge: HOME | DRG: 057 | End: 2018-09-05
Attending: EMERGENCY MEDICINE
Payer: MEDICARE

## 2018-08-22 ENCOUNTER — APPOINTMENT (OUTPATIENT)
Dept: CT IMAGING | Age: 83
DRG: 057 | End: 2018-08-22
Attending: EMERGENCY MEDICINE
Payer: MEDICARE

## 2018-08-22 DIAGNOSIS — Z71.89 GOALS OF CARE, COUNSELING/DISCUSSION: ICD-10-CM

## 2018-08-22 DIAGNOSIS — R41.0 DELIRIUM: ICD-10-CM

## 2018-08-22 DIAGNOSIS — I48.91 ATRIAL FIBRILLATION WITH RVR (HCC): Primary | ICD-10-CM

## 2018-08-22 DIAGNOSIS — F03.91 DEMENTIA WITH BEHAVIORAL DISTURBANCE, UNSPECIFIED DEMENTIA TYPE: ICD-10-CM

## 2018-08-22 DIAGNOSIS — R45.1 AGITATION: ICD-10-CM

## 2018-08-22 DIAGNOSIS — R53.81 DEBILITY: ICD-10-CM

## 2018-08-22 PROCEDURE — 99285 EMERGENCY DEPT VISIT HI MDM: CPT

## 2018-08-22 PROCEDURE — 90791 PSYCH DIAGNOSTIC EVALUATION: CPT

## 2018-08-22 PROCEDURE — 74011250636 HC RX REV CODE- 250/636: Performed by: EMERGENCY MEDICINE

## 2018-08-22 PROCEDURE — 96372 THER/PROPH/DIAG INJ SC/IM: CPT

## 2018-08-22 RX ORDER — HALOPERIDOL 5 MG/ML
5 INJECTION INTRAMUSCULAR
Status: COMPLETED | OUTPATIENT
Start: 2018-08-22 | End: 2018-08-22

## 2018-08-22 RX ORDER — DILTIAZEM HYDROCHLORIDE 5 MG/ML
20 INJECTION INTRAVENOUS
Status: ACTIVE | OUTPATIENT
Start: 2018-08-22 | End: 2018-08-23

## 2018-08-22 RX ADMIN — HALOPERIDOL LACTATE 5 MG: 5 INJECTION, SOLUTION INTRAMUSCULAR at 21:56

## 2018-08-22 NOTE — ED NOTES
Pt resting on stretcher, bed railings up. Family members arrived. Diskin to speak with them shortly.

## 2018-08-22 NOTE — ED TRIAGE NOTES
Pt to ED from [facility] for aggressive behavior. Pt discharged from [address] [place] [address] yesterday where seen for same issue. Per staff, pt has acted out since returning to the facility. Pt calm but uncooperative in triage.  Pulse 125, /117
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
## 2018-08-23 ENCOUNTER — APPOINTMENT (OUTPATIENT)
Dept: CT IMAGING | Age: 83
DRG: 057 | End: 2018-08-23
Attending: EMERGENCY MEDICINE
Payer: MEDICARE

## 2018-08-23 ENCOUNTER — PATIENT OUTREACH (OUTPATIENT)
Dept: FAMILY MEDICINE CLINIC | Age: 83
End: 2018-08-23

## 2018-08-23 PROBLEM — F29 PSYCHOSIS (HCC): Status: ACTIVE | Noted: 2018-08-23

## 2018-08-23 LAB
ALBUMIN SERPL-MCNC: 3.4 G/DL (ref 3.5–5)
ALBUMIN/GLOB SERPL: 0.8 {RATIO} (ref 1.1–2.2)
ALP SERPL-CCNC: 113 U/L (ref 45–117)
ALT SERPL-CCNC: 17 U/L (ref 12–78)
AMPHET UR QL SCN: NEGATIVE
ANION GAP SERPL CALC-SCNC: 9 MMOL/L (ref 5–15)
APPEARANCE UR: ABNORMAL
AST SERPL-CCNC: 21 U/L (ref 15–37)
BACTERIA URNS QL MICRO: NEGATIVE /HPF
BARBITURATES UR QL SCN: NEGATIVE
BASOPHILS # BLD: 0.1 K/UL (ref 0–0.1)
BASOPHILS NFR BLD: 1 % (ref 0–1)
BENZODIAZ UR QL: NEGATIVE
BILIRUB SERPL-MCNC: 0.7 MG/DL (ref 0.2–1)
BILIRUB UR QL: NEGATIVE
BUN SERPL-MCNC: 15 MG/DL (ref 6–20)
BUN/CREAT SERPL: 18 (ref 12–20)
CALCIUM SERPL-MCNC: 9.6 MG/DL (ref 8.5–10.1)
CANNABINOIDS UR QL SCN: NEGATIVE
CHLORIDE SERPL-SCNC: 102 MMOL/L (ref 97–108)
CO2 SERPL-SCNC: 28 MMOL/L (ref 21–32)
COCAINE UR QL SCN: NEGATIVE
COLOR UR: ABNORMAL
COMMENT, HOLDF: NORMAL
CREAT SERPL-MCNC: 0.82 MG/DL (ref 0.55–1.02)
DIFFERENTIAL METHOD BLD: NORMAL
DRUG SCRN COMMENT,DRGCM: ABNORMAL
EOSINOPHIL # BLD: 0 K/UL (ref 0–0.4)
EOSINOPHIL NFR BLD: 1 % (ref 0–7)
EPITH CASTS URNS QL MICRO: ABNORMAL /LPF
ERYTHROCYTE [DISTWIDTH] IN BLOOD BY AUTOMATED COUNT: 12.7 % (ref 11.5–14.5)
GLOBULIN SER CALC-MCNC: 4.2 G/DL (ref 2–4)
GLUCOSE BLD STRIP.AUTO-MCNC: 117 MG/DL (ref 65–100)
GLUCOSE SERPL-MCNC: 124 MG/DL (ref 65–100)
GLUCOSE UR STRIP.AUTO-MCNC: NEGATIVE MG/DL
HCT VFR BLD AUTO: 38.3 % (ref 35–47)
HGB BLD-MCNC: 12.7 G/DL (ref 11.5–16)
HGB UR QL STRIP: NEGATIVE
HYALINE CASTS URNS QL MICRO: ABNORMAL /LPF (ref 0–5)
IMM GRANULOCYTES # BLD: 0 K/UL (ref 0–0.04)
IMM GRANULOCYTES NFR BLD AUTO: 0 % (ref 0–0.5)
KETONES UR QL STRIP.AUTO: NEGATIVE MG/DL
LEUKOCYTE ESTERASE UR QL STRIP.AUTO: NEGATIVE
LYMPHOCYTES # BLD: 1.3 K/UL (ref 0.8–3.5)
LYMPHOCYTES NFR BLD: 19 % (ref 12–49)
MAGNESIUM SERPL-MCNC: 1.3 MG/DL (ref 1.6–2.4)
MCH RBC QN AUTO: 32.8 PG (ref 26–34)
MCHC RBC AUTO-ENTMCNC: 33.2 G/DL (ref 30–36.5)
MCV RBC AUTO: 99 FL (ref 80–99)
METHADONE UR QL: NEGATIVE
MONOCYTES # BLD: 0.6 K/UL (ref 0–1)
MONOCYTES NFR BLD: 9 % (ref 5–13)
NEUTS SEG # BLD: 4.6 K/UL (ref 1.8–8)
NEUTS SEG NFR BLD: 70 % (ref 32–75)
NITRITE UR QL STRIP.AUTO: NEGATIVE
NRBC # BLD: 0 K/UL (ref 0–0.01)
NRBC BLD-RTO: 0 PER 100 WBC
OPIATES UR QL: POSITIVE
PCP UR QL: NEGATIVE
PH UR STRIP: 7.5 [PH] (ref 5–8)
PLATELET # BLD AUTO: 284 K/UL (ref 150–400)
PMV BLD AUTO: 10.4 FL (ref 8.9–12.9)
POTASSIUM SERPL-SCNC: 3.4 MMOL/L (ref 3.5–5.1)
PROT SERPL-MCNC: 7.6 G/DL (ref 6.4–8.2)
PROT UR STRIP-MCNC: 30 MG/DL
RBC # BLD AUTO: 3.87 M/UL (ref 3.8–5.2)
RBC #/AREA URNS HPF: ABNORMAL /HPF (ref 0–5)
SAMPLES BEING HELD,HOLD: NORMAL
SERVICE CMNT-IMP: ABNORMAL
SODIUM SERPL-SCNC: 139 MMOL/L (ref 136–145)
SP GR UR REFRACTOMETRY: 1.01 (ref 1–1.03)
TROPONIN I SERPL-MCNC: <0.05 NG/ML
UR CULT HOLD, URHOLD: NORMAL
UROBILINOGEN UR QL STRIP.AUTO: 0.2 EU/DL (ref 0.2–1)
WBC # BLD AUTO: 6.6 K/UL (ref 3.6–11)
WBC URNS QL MICRO: ABNORMAL /HPF (ref 0–4)

## 2018-08-23 PROCEDURE — 85025 COMPLETE CBC W/AUTO DIFF WBC: CPT | Performed by: EMERGENCY MEDICINE

## 2018-08-23 PROCEDURE — 81001 URINALYSIS AUTO W/SCOPE: CPT | Performed by: EMERGENCY MEDICINE

## 2018-08-23 PROCEDURE — 74011250637 HC RX REV CODE- 250/637: Performed by: EMERGENCY MEDICINE

## 2018-08-23 PROCEDURE — 80053 COMPREHEN METABOLIC PANEL: CPT | Performed by: EMERGENCY MEDICINE

## 2018-08-23 PROCEDURE — 65220000003 HC RM SEMIPRIVATE PSYCH

## 2018-08-23 PROCEDURE — 82962 GLUCOSE BLOOD TEST: CPT

## 2018-08-23 PROCEDURE — 80307 DRUG TEST PRSMV CHEM ANLYZR: CPT | Performed by: EMERGENCY MEDICINE

## 2018-08-23 PROCEDURE — 83735 ASSAY OF MAGNESIUM: CPT | Performed by: EMERGENCY MEDICINE

## 2018-08-23 PROCEDURE — 74011250637 HC RX REV CODE- 250/637: Performed by: NURSE PRACTITIONER

## 2018-08-23 PROCEDURE — 84484 ASSAY OF TROPONIN QUANT: CPT | Performed by: EMERGENCY MEDICINE

## 2018-08-23 PROCEDURE — 36415 COLL VENOUS BLD VENIPUNCTURE: CPT | Performed by: EMERGENCY MEDICINE

## 2018-08-23 PROCEDURE — 70450 CT HEAD/BRAIN W/O DYE: CPT

## 2018-08-23 RX ORDER — DOCUSATE SODIUM 100 MG/1
100 CAPSULE, LIQUID FILLED ORAL DAILY
Status: DISCONTINUED | OUTPATIENT
Start: 2018-08-24 | End: 2018-09-05 | Stop reason: HOSPADM

## 2018-08-23 RX ORDER — MAGNESIUM SULFATE HEPTAHYDRATE 40 MG/ML
2 INJECTION, SOLUTION INTRAVENOUS ONCE
Status: DISCONTINUED | OUTPATIENT
Start: 2018-08-23 | End: 2018-08-23

## 2018-08-23 RX ORDER — LOSARTAN POTASSIUM 50 MG/1
50 TABLET ORAL DAILY
Status: DISCONTINUED | OUTPATIENT
Start: 2018-08-23 | End: 2018-08-24

## 2018-08-23 RX ORDER — ACETAMINOPHEN 325 MG/1
650 TABLET ORAL
Status: DISCONTINUED | OUTPATIENT
Start: 2018-08-23 | End: 2018-08-24 | Stop reason: DRUGHIGH

## 2018-08-23 RX ORDER — HYDROCHLOROTHIAZIDE 25 MG/1
12.5 TABLET ORAL DAILY
Status: DISCONTINUED | OUTPATIENT
Start: 2018-08-23 | End: 2018-08-24

## 2018-08-23 RX ORDER — LANOLIN ALCOHOL/MO/W.PET/CERES
400 CREAM (GRAM) TOPICAL
Status: COMPLETED | OUTPATIENT
Start: 2018-08-23 | End: 2018-08-23

## 2018-08-23 RX ORDER — DILTIAZEM HYDROCHLORIDE 240 MG/1
240 CAPSULE, COATED, EXTENDED RELEASE ORAL DAILY
Status: DISCONTINUED | OUTPATIENT
Start: 2018-08-24 | End: 2018-09-05 | Stop reason: HOSPADM

## 2018-08-23 RX ORDER — POLYETHYLENE GLYCOL 3350 17 G/17G
17 POWDER, FOR SOLUTION ORAL DAILY PRN
Status: DISCONTINUED | OUTPATIENT
Start: 2018-08-23 | End: 2018-09-05 | Stop reason: HOSPADM

## 2018-08-23 RX ORDER — DILTIAZEM HYDROCHLORIDE 240 MG/1
240 CAPSULE, COATED, EXTENDED RELEASE ORAL
Status: COMPLETED | OUTPATIENT
Start: 2018-08-23 | End: 2018-08-23

## 2018-08-23 RX ADMIN — Medication 400 MG: at 03:02

## 2018-08-23 RX ADMIN — LOSARTAN POTASSIUM 50 MG: 50 TABLET ORAL at 17:00

## 2018-08-23 RX ADMIN — HYDROCHLOROTHIAZIDE 12.5 MG: 25 TABLET ORAL at 17:39

## 2018-08-23 RX ADMIN — DILTIAZEM HYDROCHLORIDE 240 MG: 240 CAPSULE, EXTENDED RELEASE ORAL at 12:42

## 2018-08-23 NOTE — [employer] NOTE
Writer went to assess patient at the request of Dr. Iman Fitzpatrick. Writer met with patient face to face in her hospital room at [facility]. The patient was unable to answer questions or participate in assessment. She was unable to tell writer her name, where she was, or what day it is. She spoke incoherently. The only discernable statement made by the patient was expressing her displeasure about the shirt writer is wearing. Writer spoke with Dr. Iman Fitzpatrick who requested a Temporary custodial Order  prescreening. Tommie bravo was called by this writer and they are en route to evaluate the patient. Per nurses and Dr. Iman Fitzpatrick, family is in agreement with the plan.

## 2018-08-23 NOTE — PROGRESS NOTES
NUTRITION       Nutrition screening referral was triggered based on results obtained during nursing admission assessment for \"unsure wt loss. \"    The patient's chart was reviewed and nutrition assessment is not indicated at this time. Wt has been stable. Wt Readings from Last 10 Encounters:   08/21/18 76.1 kg (167 lb 12.8 oz)   08/13/18 77.6 kg (171 lb)   08/13/18 77.6 kg (171 lb)   08/01/18 77.6 kg (171 lb)   07/24/18 76.2 kg (168 lb)   07/20/18 77.1 kg (170 lb)   06/28/18 78.5 kg (173 lb)   06/22/18 77 kg (169 lb 12.8 oz)   06/20/18 76.2 kg (167 lb 15.9 oz)   05/30/18 76.2 kg (168 lb)     Please consult if needed. Thank you.      Rhonda Wolff RD

## 2018-08-23 NOTE — PROGRESS NOTES
SPEECH THERAPY SCREENING:  SERVICES ARE NOT INDICATED AT THIS TIME    An [facility] screening referral was triggered for speech therapy based on results obtained during the nursing admission assessment. The patients chart was reviewed and the patient is not appropriate for a skilled therapy evaluation at this time. Please consult speech therapy if any therapy needs arise. Thank you.     Guillermo Waterman, SLP

## 2018-08-23 NOTE — CONSULTS
Hospitalist Consult Note  JULIA Tolbert  Phone [phone]  Call physician on-call through the  7pm-7am    Primary Care Provider: Gabo Méndez MD  Source of Information: Patient/Chart     History of Presenting Illness:   Pt presented from [facility] via EMS with chief complaint of aggressive behavior. Pt was just admitted in through [facility] ED earlier today. Hospitalists are now consulted for hypertension. Pt a very poor historian, most of history obtained through prior chart notes. Discussed BP management with pharmacist, pt arrived and was seen through ED last night and only received her cardizem today. Meds have been verified in the ED and her BP meds resumed. Review of Systems: pt very drowsy and unable to answer questions \"I don't know what I'm saying\"    Past Medical History:   Diagnosis Date    Anemia     Atrial fibrillation ([address]     Cancer ([address]     basal cell on nose    Chronic pain     back pain    Fibromyalgia 4/1/2010    GERD (gastroesophageal reflux disease) 4/1/2010    Hiatal hernia 4/1/2010    High cholesterol 4/1/2010    HTN (hypertension) 4/1/2010    Ill-defined condition     A. Fib    OA (osteoarthritis) 4/1/2010    back    Pulmonary emboli ([address] 2015      Past Surgical History:   Procedure Laterality Date    BREAST SURGERY PROCEDURE UNLISTED      Breast im-plants x 2    ENLARGE BREAST WITH IMPLANT      HX APPENDECTOMY      HX BREAST BIOPSY      HX BREAST RECONSTRUCTION      Breast implants removed 1992    HX CATARACT REMOVAL      HX CHOLECYSTECTOMY  9/11/2013    HX HYSTERECTOMY      HX KNEE REPLACEMENT  2008    bilateral    HX ORTHOPAEDIC  2007    Bilateral TKR    HX PARTIAL HYSTERECTOMY      HX SHOULDER REPLACEMENT  2009    left    HX TONSILLECTOMY       Prior to Admission medications    Medication Sig Start Date End Date Taking?  Authorizing Provider   HYDROcodone-acetaminophen (NORCO) 7.5-325 mg per tablet Take 1 Tab by mouth every eight (8) hours as needed for Pain. Max Daily Amount: 3 Tabs. 8/20/18  Yes Kayla Lenz MD   acetaminophen (TYLENOL) 325 mg tablet Take 2 Tabs by mouth every six (6) hours as needed for Pain. 8/20/18  Yes Kayla Lenz MD   docusate sodium (COLACE) 100 mg capsule Take 1 Cap by mouth daily for 90 days. 8/21/18 11/19/18 Yes Kayla Lenz MD   cyclobenzaprine (FLEXERIL) 5 mg tablet Take 5 mg by mouth daily as needed for Muscle Spasm(s). Yes Historical Provider   dilTIAZem CD (CARTIA XT) 240 mg ER capsule Take 240 mg by mouth daily. Yes Historical Provider   losartan-hydroCHLOROthiazide (HYZAAR) 100-25 mg per tablet Take 0.5 Tabs by mouth daily as needed (SBP greater than 170). Yes Historical Provider   polyethylene glycol (MIRALAX) 17 gram packet Take 17 g by mouth as needed (Constipation). Yes Historical Provider   citalopram (CELEXA) 20 mg tablet TAKE 1 TABLET BY MOUTH DAILY 2/27/18  Yes Abhishek Flores MD   gabapentin (NEURONTIN) 600 mg tablet TAKE 1 TABLET BY MOUTH 3 TIMES A DAY 2/27/18  Yes Abhishek Flores MD   WARFARIN INFORMATION NOTE (COUMADIN) by Other route as needed. WARFARIN ON HOLD AT DISCHARGE FROM [facility] 8/21 DUE TO RISK OF FALLS. Historical Provider     Allergies   Allergen Reactions    Angiotensin Ii,Human Other (comments)     States does not remember      Avelox [Moxifloxacin] Other (comments)     States does not remember      Demerol [Meperidine] Hives      CODE STATUS: Not on file. DNR    Full    Other      Objective:   Physical Exam:   Visit Vitals    BP (!) 145/105    Pulse 76    Temp 98.1 °F (36.7 °C)    Resp 18    Ht 5' 8\" (1.727 m)    SpO2 96%    Breastfeeding No      O2 Device: Room air    General:  Alert, cooperative, no distress. Appears drowsy    HEENT:  Normocephalic, atraumatic. Lungs:   Clear to auscultation bilaterally. No Wheezing. No SOB, no accessory muscle use and on RA    Heart:  Irregular rate.  No murmur noted    Abdomen:   Soft, non-tender. Bowel sounds nl. Extremities: RUE in splint. Fingers are bruised and swollen. Difficult to ascertain cap refill due to bruising. Can move fingers and they are warm. Skin: Skin color, texture, turgor nl. (other than mentioned above)    Psych: Cooperative, not anxious or agitated. Alert but not oriented    Neurologic:  GANDARA. Speech clear. Follows commands      EKG:  None new. Data Review:   24 Hour Results:  Recent Results (from the past 24 hour(s))   CBC WITH AUTOMATED DIFF    Collection Time: 08/23/18 12:21 AM   Result Value Ref Range    WBC 6.6 3.6 - 11.0 K/uL    RBC 3.87 3.80 - 5.20 M/uL    HGB 12.7 11.5 - 16.0 g/dL    HCT 38.3 35.0 - 47.0 %    MCV 99.0 80.0 - 99.0 FL    MCH 32.8 26.0 - 34.0 PG    MCHC 33.2 30.0 - 36.5 g/dL    RDW 12.7 11.5 - 14.5 %    PLATELET 275 825 - 063 K/uL    MPV 10.4 8.9 - 12.9 FL    NRBC 0.0 0  WBC    ABSOLUTE NRBC 0.00 0.00 - 0.01 K/uL    NEUTROPHILS 70 32 - 75 %    LYMPHOCYTES 19 12 - 49 %    MONOCYTES 9 5 - 13 %    EOSINOPHILS 1 0 - 7 %    BASOPHILS 1 0 - 1 %    IMMATURE GRANULOCYTES 0 0.0 - 0.5 %    ABS. NEUTROPHILS 4.6 1.8 - 8.0 K/UL    ABS. LYMPHOCYTES 1.3 0.8 - 3.5 K/UL    ABS. MONOCYTES 0.6 0.0 - 1.0 K/UL    ABS. EOSINOPHILS 0.0 0.0 - 0.4 K/UL    ABS. BASOPHILS 0.1 0.0 - 0.1 K/UL    ABS. IMM. GRANS. 0.0 0.00 - 0.04 K/UL    DF AUTOMATED     METABOLIC PANEL, COMPREHENSIVE    Collection Time: 08/23/18 12:21 AM   Result Value Ref Range    Sodium 139 136 - 145 mmol/L    Potassium 3.4 (L) 3.5 - 5.1 mmol/L    Chloride 102 97 - 108 mmol/L    CO2 28 21 - 32 mmol/L    Anion gap 9 5 - 15 mmol/L    Glucose 124 (H) 65 - 100 mg/dL    BUN 15 6 - 20 MG/DL    Creatinine 0.82 0.55 - 1.02 MG/DL    BUN/Creatinine ratio 18 12 - 20      GFR est AA >60 >60 ml/min/1.73m2    GFR est non-AA >60 >60 ml/min/1.73m2    Calcium 9.6 8.5 - 10.1 MG/DL    Bilirubin, total 0.7 0.2 - 1.0 MG/DL    ALT (SGPT) 17 12 - 78 U/L    AST (SGOT) 21 15 - 37 U/L    Alk.  phosphatase 113 45 - 117 U/L Protein, total 7.6 6.4 - 8.2 g/dL    Albumin 3.4 (L) 3.5 - 5.0 g/dL    Globulin 4.2 (H) 2.0 - 4.0 g/dL    A-G Ratio 0.8 (L) 1.1 - 2.2     TROPONIN I    Collection Time: 18 12:21 AM   Result Value Ref Range    Troponin-I, Qt. <0.05 <0.05 ng/mL   MAGNESIUM    Collection Time: 18 12:21 AM   Result Value Ref Range    Magnesium 1.3 (L) 1.6 - 2.4 mg/dL   SAMPLES BEING HELD    Collection Time: 18 12:21 AM   Result Value Ref Range    SAMPLES BEING HELD RD,KASEY     COMMENT        Add-on orders for these samples will be processed based on acceptable specimen integrity and analyte stability, which may vary by analyte. URINALYSIS W/MICROSCOPIC    Collection Time: 18  4:27 AM   Result Value Ref Range    Color YELLOW/STRAW      Appearance CLOUDY (A) CLEAR      Specific gravity 1.014 1.003 - 1.030      pH (UA) 7.5 5.0 - 8.0      Protein 30 (A) NEG mg/dL    Glucose NEGATIVE  NEG mg/dL    Ketone NEGATIVE  NEG mg/dL    Bilirubin NEGATIVE  NEG      Blood NEGATIVE  NEG      Urobilinogen 0.2 0.2 - 1.0 EU/dL    Nitrites NEGATIVE  NEG      Leukocyte Esterase NEGATIVE  NEG      WBC 0-4 0 - 4 /hpf    RBC 0-5 0 - 5 /hpf    Epithelial cells FEW FEW /lpf    Bacteria NEGATIVE  NEG /hpf    Hyaline cast 2-5 0 - 5 /lpf   URINE CULTURE HOLD SAMPLE    Collection Time: 18  4:27 AM   Result Value Ref Range    Urine culture hold        URINE ON HOLD IN MICROBIOLOGY DEPT FOR 3 DAYS. IF UNPRESERVED URINE IS SUBMITTED, IT CANNOT BE USED FOR ADDITIONAL TESTING AFTER 24 HRS, RECOLLECTION WILL BE REQUIRED.    DRUG SCREEN, URINE    Collection Time: 18 11:09 AM   Result Value Ref Range    AMPHETAMINES NEGATIVE  NEG      BARBITURATES NEGATIVE  NEG      BENZODIAZEPINES NEGATIVE  NEG      COCAINE NEGATIVE  NEG      METHADONE NEGATIVE  NEG      OPIATES POSITIVE (A) NEG      PCP(PHENCYCLIDINE) NEGATIVE  NEG      THC (TH-CANNABINOL) NEGATIVE  NEG      Drug screen comment (NOTE)      Imagin/23 CT Head: no acute intracranial abnormality. Assessment/Plan     Aggressive behavior with hx Depression: T  - as per primary team     Right Wrist fracture:   - R wrist fracture was confirmed on x-ray. - conservative treatment of the fracture.    - pain management with tylenol  - ice extremity and keep elevated      Hx Paroxysmal Afib:   - was decided NOT to place pt back on coumadin due to her high risk of falls per d/c summary 8/21  - continue cardizem      Hx of Pulmonary Embolism:   - not on [address] as pt high risk for falls       Hx DM type 2 with neuropathy:   -  diabetic diet   - blood sugar monitoring BID (had been normal range on BMP at outside hospital recently)  - did not re-order gabapentin (for neuropathy)due to drowsiness      Hx Hypertension:   - resume home meds, monitor      Hx of Iron deficiency anemia:   - not on iron due to constipation  - Hgb normal      Code Status: not on file  Dispo: as per primary team       Signed By: Dominga Guerrero, JOZEF     August 23, 2018

## 2018-08-23 NOTE — ROUTINE PROCESS
TRANSFER - OUT REPORT:    Verbal report given to Jose Juan Reeves RN(name) on Tray Regan  being transferred to 7(unit) for routine progression of care       Report consisted of patients Situation, Background, Assessment and   Recommendations(SBAR). Information from the following report(s) SBAR, ED Summary, [facility] and Recent Results was reviewed with the receiving nurse. Opportunity for questions and clarification was provided.       Patient transported with:   [facility]

## 2018-08-23 NOTE — PROGRESS NOTES
Date of previous inpatient admission/ ED visit? Patient was last admitted at [facility] 8/14-8/21 for Wrist Fracture, Right. Patient with 5 ED visits in the past 6 months and 1 IP admission in the past 12 months. What brought the patient back to ED? Chart reviewed. Patient to ED from [facility] for aggressive behavior. Patient discharged from [facility] yesterday where seen for same issue. Per staff, patient has acted out since returning to the facility. Patient calm but uncooperative in triage. Pulse 125, /117    Did patient decline recommended services during last admission/ ED visit (if yes, what)? NO    Has patient seen a provider since their last inpatient admission/ED visit (if yes, when)? YES, followed at [facility] for services. CM Interventions:  From previous inpatient admission/ED visit:   Patient discharged to [facility] for skilled services. Spouse transported patient to facility. Follow-up appointments were scheduled and placed on AVS.    From current inpatient admission/ED visit:  Patient currently in the ED being evaluated and needs assessed. [employer] was consulted for agitation. [place] crisis was called by [facility] and they are en route to evaluate the patient. Per nurses and Dr. Sarah Mitchell, family is in agreement with the plan.      BARRINGTON Lujan/KYE  9:18 AM

## 2018-08-23 NOTE — ED NOTES
Report received form CALIXTO Zepeda    0100: Pt resting in bed. Restrains in place. Pt refusing to get IV line in place. Pt only becomes aggressive when staff attempts to get IV line or providing with incontinent care. 0200: Pt resting in bed. Performed ROM.    0300: Pt is anxious and agitated. Performed ROM. 0400: Pt is resting quietly. Restraints removed.

## 2018-08-23 NOTE — PROGRESS NOTES
Admission Medication Reconciliation:    Information obtained from: discharge summary from [facility] 8/21/18, chart review of notes    Significant PMH/Disease States:   Past Medical History:   Diagnosis Date    Anemia     Atrial fibrillation ([facility]     Cancer ([facility]     basal cell on nose    Chronic pain     back pain    Fibromyalgia 4/1/2010    GERD (gastroesophageal reflux disease) 4/1/2010    Hiatal hernia 4/1/2010    High cholesterol 4/1/2010    HTN (hypertension) 4/1/2010    Ill-defined condition     A. Fib    OA (osteoarthritis) 4/1/2010    back    Pulmonary emboli [facility]) 2015       Chief Complaint for this Admission:  aggressive behavior    Allergies:  Angiotensin ii,human; Avelox [moxifloxacin]; and Demerol [meperidine]    Prior to Admission Medications:   Prior to Admission Medications   Prescriptions Last Dose Informant Patient Reported? Taking? HYDROcodone-acetaminophen (NORCO) 7.5-325 mg per tablet   No Yes   Sig: Take 1 Tab by mouth every eight (8) hours as needed for Pain. Max Daily Amount: 3 Tabs. WARFARIN INFORMATION NOTE (COUMADIN) Not Taking at Unknown time  Yes No   Sig: by Other route as needed. WARFARIN ON HOLD AT DISCHARGE FROM [facility] 8/21 DUE TO RISK OF FALLS. acetaminophen (TYLENOL) 325 mg tablet   No Yes   Sig: Take 2 Tabs by mouth every six (6) hours as needed for Pain. citalopram (CELEXA) 20 mg tablet  Significant Other No Yes   Sig: TAKE 1 TABLET BY MOUTH DAILY   cyclobenzaprine (FLEXERIL) 5 mg tablet  Significant Other Yes Yes   Sig: Take 5 mg by mouth daily as needed for Muscle Spasm(s). dilTIAZem CD (CARTIA XT) 240 mg ER capsule  Significant Other Yes Yes   Sig: Take 240 mg by mouth daily. docusate sodium (COLACE) 100 mg capsule   No Yes   Sig: Take 1 Cap by mouth daily for 90 days.    gabapentin (NEURONTIN) 600 mg tablet  Significant Other No Yes   Sig: TAKE 1 TABLET BY MOUTH 3 TIMES A DAY   losartan-hydroCHLOROthiazide (HYZAAR) 100-25 mg per tablet  Significant Other Yes Yes   Sig: Take 0.5 Tabs by mouth daily as needed (SBP greater than 170). polyethylene glycol (MIRALAX) 17 gram packet  Significant Other Yes Yes   Sig: Take 17 g by mouth as needed (Constipation). Facility-Administered Medications: None         Comments/Recommendations: Of note, patient's warfarin is currently on hold per [facility] record during recent admission. Patient is on several medications that can increase risk of falls for this geriatric patient; these include gabapentin and cyclobenzaprine. In addition, blood pressure should be closely monitored as anti-hypertensives can potentially increase risk of falls secondary to hypotension.     Radha Salazar, PharmD  ED EXT [phone]

## 2018-08-23 NOTE — PROGRESS NOTES
Called facility to discuss medication compliance and any prns given in last 48 hours. Message left. Awaiting call back. Nursing made aware.

## 2018-08-23 NOTE — PROGRESS NOTES
Report received prior to coming up to the floor from [facility] in the ED. Patient received to the floor via stretcher at approx 1320. Patient was drowsy,but opened eyes when transferred to the bed. Remains resting  In bed,sleeping quietly.

## 2018-08-23 NOTE — ED NOTES
Pt has been accepted to  behavioral health bed 746-1. Awaiting for Tommie lee to serve TDO paperwork.

## 2018-08-23 NOTE — BSMART NOTE
Writer spoke with Dr. Madyson Leyva and he agrees with the need for TDO prescreening. RN informed this writer that the patient had become aggressive with staff and will be medicated and restrained per Dr. Ksenia Carlson order. Kimberly Trujillo from crisis informed writer that she will petition for a TDO pending medical clearance.        BARRINGTON Gonzáles, Supervisee in Social Work

## 2018-08-23 NOTE — PROGRESS NOTES
[address]  [phone]  Fax [phone]    Patient Name: Dm Turner  YOB: 1933    Outpatient Nurse Navigator notified by Admissions of pending inpatient status onto [facility]. Patient was discharged yesterday to [facility]. This is her 6th ED visit since May and will be her 3rd admission. NN Will continue to monitor chart for discharge plans.

## 2018-08-24 PROBLEM — F02.818 LATE ONSET ALZHEIMER'S DISEASE WITH BEHAVIORAL DISTURBANCE (HCC): Status: ACTIVE | Noted: 2018-08-24

## 2018-08-24 PROBLEM — G30.1 LATE ONSET ALZHEIMER'S DISEASE WITH BEHAVIORAL DISTURBANCE (HCC): Status: ACTIVE | Noted: 2018-08-24

## 2018-08-24 LAB
ETHANOL SERPL-MCNC: <10 MG/DL
GLUCOSE BLD STRIP.AUTO-MCNC: 109 MG/DL (ref 65–100)
SERVICE CMNT-IMP: ABNORMAL

## 2018-08-24 PROCEDURE — 74011250637 HC RX REV CODE- 250/637: Performed by: PSYCHIATRY & NEUROLOGY

## 2018-08-24 PROCEDURE — 74011250637 HC RX REV CODE- 250/637: Performed by: NURSE PRACTITIONER

## 2018-08-24 PROCEDURE — 65220000003 HC RM SEMIPRIVATE PSYCH

## 2018-08-24 PROCEDURE — 82962 GLUCOSE BLOOD TEST: CPT

## 2018-08-24 RX ORDER — ZOLPIDEM TARTRATE 5 MG/1
5 TABLET ORAL
Status: DISCONTINUED | OUTPATIENT
Start: 2018-08-24 | End: 2018-08-25

## 2018-08-24 RX ORDER — BENZTROPINE MESYLATE 1 MG/ML
1 INJECTION INTRAMUSCULAR; INTRAVENOUS
Status: DISCONTINUED | OUTPATIENT
Start: 2018-08-24 | End: 2018-09-05 | Stop reason: HOSPADM

## 2018-08-24 RX ORDER — CITALOPRAM 20 MG/1
20 TABLET, FILM COATED ORAL DAILY
Status: DISCONTINUED | OUTPATIENT
Start: 2018-08-25 | End: 2018-08-31

## 2018-08-24 RX ORDER — LOSARTAN POTASSIUM 50 MG/1
50 TABLET ORAL
Status: COMPLETED | OUTPATIENT
Start: 2018-08-24 | End: 2018-08-24

## 2018-08-24 RX ORDER — OLANZAPINE 2.5 MG/1
2.5 TABLET ORAL
Status: DISCONTINUED | OUTPATIENT
Start: 2018-08-24 | End: 2018-09-05 | Stop reason: HOSPADM

## 2018-08-24 RX ORDER — HYDROCHLOROTHIAZIDE 25 MG/1
25 TABLET ORAL DAILY
Status: DISCONTINUED | OUTPATIENT
Start: 2018-08-25 | End: 2018-09-05 | Stop reason: HOSPADM

## 2018-08-24 RX ORDER — LOSARTAN POTASSIUM 50 MG/1
100 TABLET ORAL DAILY
Status: DISCONTINUED | OUTPATIENT
Start: 2018-08-25 | End: 2018-09-05 | Stop reason: HOSPADM

## 2018-08-24 RX ORDER — ONDANSETRON 4 MG/1
4 TABLET, ORALLY DISINTEGRATING ORAL
Status: DISCONTINUED | OUTPATIENT
Start: 2018-08-24 | End: 2018-09-05 | Stop reason: HOSPADM

## 2018-08-24 RX ORDER — BENZTROPINE MESYLATE 1 MG/1
1 TABLET ORAL
Status: DISCONTINUED | OUTPATIENT
Start: 2018-08-24 | End: 2018-09-05 | Stop reason: HOSPADM

## 2018-08-24 RX ORDER — ACETAMINOPHEN 325 MG/1
650 TABLET ORAL
Status: DISCONTINUED | OUTPATIENT
Start: 2018-08-24 | End: 2018-09-05 | Stop reason: HOSPADM

## 2018-08-24 RX ORDER — DONEPEZIL HYDROCHLORIDE 5 MG/1
5 TABLET, FILM COATED ORAL DAILY
Status: DISCONTINUED | OUTPATIENT
Start: 2018-08-25 | End: 2018-08-27

## 2018-08-24 RX ORDER — ADHESIVE BANDAGE
30 BANDAGE TOPICAL DAILY PRN
Status: DISCONTINUED | OUTPATIENT
Start: 2018-08-24 | End: 2018-09-05 | Stop reason: HOSPADM

## 2018-08-24 RX ADMIN — ONDANSETRON 4 MG: 4 TABLET, ORALLY DISINTEGRATING ORAL at 11:55

## 2018-08-24 RX ADMIN — LOSARTAN POTASSIUM 50 MG: 50 TABLET ORAL at 09:25

## 2018-08-24 RX ADMIN — DILTIAZEM HYDROCHLORIDE 240 MG: 240 CAPSULE, COATED, EXTENDED RELEASE ORAL at 09:25

## 2018-08-24 RX ADMIN — ACETAMINOPHEN 650 MG: 325 TABLET ORAL at 11:43

## 2018-08-24 RX ADMIN — LOSARTAN POTASSIUM 50 MG: 50 TABLET ORAL at 12:56

## 2018-08-24 RX ADMIN — OLANZAPINE 2.5 MG: 2.5 TABLET, FILM COATED ORAL at 23:02

## 2018-08-24 RX ADMIN — DOCUSATE SODIUM 100 MG: 100 CAPSULE, LIQUID FILLED ORAL at 09:25

## 2018-08-24 RX ADMIN — ACETAMINOPHEN 650 MG: 325 TABLET ORAL at 23:02

## 2018-08-24 RX ADMIN — ZOLPIDEM TARTRATE 5 MG: 5 TABLET ORAL at 21:28

## 2018-08-24 RX ADMIN — HYDROCHLOROTHIAZIDE 12.5 MG: 25 TABLET ORAL at 09:25

## 2018-08-24 NOTE — BH NOTES
PSYCHOSOCIAL ASSESSMENT  :Patient identifying info:  Cottie Gerardine Hodgkins is a 80 y.o., female admitted 2018  5:22 PM     Presenting problem and precipitating factors: She was admitted on a [facility] TDO due to inablty to care for herself and agitated behavior. She was a resident of [facility] and was sent here due to agiated behavior. She had her hearing today and was committed to the hospital.      Mental status assessment:she is pleasant , disoriented to all spheres,  Thought content is impaired and insight and judgement are impaired     Current psychiatric providers and contact info: no current provider     Previous psychiatric services/providers and response to treatment: son reports she was at [facility] in the s 2 times for a suicide attempt and depression after her divorce     Family history of mental illness :unknown     Substance abuse history:    Social History   Substance Use Topics    Smoking status: Never Smoker    Smokeless tobacco: Never Used    Alcohol use No       Family constellation: ,  4 adult children from a previous marriage 3 daughters and one son     Is significant other involved?        Describe support system:     Describe living arrangements and home environment:lives at [facility] issues:   Hospital Problems  Date Reviewed: 2018          Codes Class Noted POA    Psychosis ICD-10-CM: F29  ICD-9-CM: 298.9  2018 Unknown              Trauma history: none noted     Legal issues: no    History of  service: no    Financial status: [facility]     Mormon/cultural factors: none noted     Education/work history: high school education    Have you been licensed as a aliyah care professional (current or ): no  Leisure and recreation preferences: unknown   Describe coping skills:ineffectual     Manuel Morales  2018

## 2018-08-24 NOTE — PROGRESS NOTES
Problem: Altered Thought Process (Adult/Pediatric)  Goal: *STG: Attends activities and groups  Outcome: Progressing Towards Goal  Patient is sitting in the dinning room. Family members present. Will continue to monitor. 1900 - Patient consumed 180 ml ensure and two ounces of apple sauce. 2128 - PRN Medication Documentation    Specific patient behavior that led to need for PRN medication: Insomnia  Staff interventions attempted prior to PRN being given: Offered PRN meds  PRN medication given: Ambien 5 mg PO  Patient response/effectiveness of PRN medication: Will continue to monitor.

## 2018-08-24 NOTE — PROGRESS NOTES
Problem: Discharge Planning  Goal: *Discharge to safe environment  Outcome: Progressing Towards Goal  She will need nursing home level of care    is supportive     Goal: *Knowledge of medication management  Outcome: Not Progressing Towards Goal  She is very confused   Goal: *Knowledge of discharge instructions  Outcome: Progressing Towards Goal  She is able to verbalize discharge instructions

## 2018-08-24 NOTE — BH NOTES
GROUP THERAPY PROGRESS NOTE    Tray Regan partially participated in a Morning Process Group on the Geriatric Unit, with a focus identifying feelings, planning for the day, and discussing a handout on The Grieving Process. Group time: 40 minutes. Personal goal for participation: To increase the capacity to shift ones mood, prepare for the day, and to review aspects of The Grieving Process. Goal orientation: The patient will be able to prepare for the day through sharing and listening. Group therapy participation: When prompted, this patient actively participated in the group, once she joined it about retirement through. Therapeutic interventions reviewed and discussed: The group members were introduce themselves by first names, share how they were feelings, and participate in a review of key elements of The Grieving Process. They were asked to consider how much they have valued in their lives and how they have also had to feel losses along the way, sometimes more as one ages. Grief was defined as a natural process that sometimes becomes complicated. The losses in our lives take integration in order to begin to return to everyday living. Holidays and other events may trigger the loss for many years to come. Impression of participation: The patient said was smiling and responded with a brief laugh when she joined the group. She was was alert, partially oriented, and pleasant. She did not know her age and she seemed to have more trouble remembering recent events but felt comfortable sharing about her life growing up in [place], playing on the [employer] and admiring her grandfather who worked in [address] at [employer]. She expressed no current SI/HI or overt psychosis. Her affect was non-dysphoric and euphoric. Her mood matched her affect. This was the patient's first process group with the undersigned.

## 2018-08-24 NOTE — PROGRESS NOTES
Problem: Altered Thought Process (Adult/Pediatric)  Goal: *STG: Complies with medication therapy  Outcome: Progressing Towards Goal  Received resting quietly in bed. On q 15 min. Safety checks. BP remains elevated. NP, Asiya Mc saw patient, ordered BP meds and were given. Patient able to take oral tablets with applesauce without difficulty. NPShy said she would see patient tomorrow.  and son visited this evening. Patient was very happy to see family, smiling. She was not joey to call them by name. She declined to eat dinner. Poor appetite. Did drink Diet Ginger Ale  221 ml. Patient has been incontinent of urine three times and stool twice. Patient's  [facility]leTDO Hearing has been scheduled for Friday at 0730.  and son plan to attend TDO Hearing in the AM.          Problem: Falls - Risk of  Goal: *Absence of Falls  Document Govind Reynolds Fall Risk and appropriate interventions in the flowsheet. Outcome: Progressing Towards Goal  Fall Risk Interventions:  Mobility Interventions: Assess mobility with egress test, Bed/chair exit alarm, Communicate number of staff needed for ambulation/transfer, Patient to call before getting OOB, Utilize walker, cane, or other assistive device    Mentation Interventions: Adequate sleep, hydration, pain control, Bed/chair exit alarm, Door open when patient unattended, Reorient patient    Medication Interventions: Bed/chair exit alarm, Patient to call before getting OOB, Teach patient to arise slowly, Utilize gait belt for transfers/ambulation    Elimination Interventions: Bed/chair exit alarm, Patient to call for help with toileting needs, Toilet paper/wipes in reach, Toileting schedule/hourly rounds    History of Falls Interventions: Bed/chair exit alarm, Door open when patient unattended, Room close to nurse's station     No falls. Two person assist.  Patient not able to ambulate due to confusion. Rested in bed all shift.

## 2018-08-24 NOTE — PROGRESS NOTES
Hospitalist Progress Note  Brent Philippe NP  Answering service: [phone] OR [phone] from in house phone  Cell: [phone]   Date of Service:  2018  NAME:  Dm Turner  :  1933  MRN:  [id number]    Admission Summary:   Pt presented from [facility] via EMS with chief complaint of aggressive behavior.  Pt was just admitted in through [facility] ED earlier today. Hospitalists are now consulted for hypertension. Pt a very poor historian, most of history obtained through prior chart notes. Discussed BP management with pharmacist, pt arrived and was seen through ED last night and only received her cardizem today. Meds have been verified in the ED     Interval history / Subjective:   Pt in bed, drowsy but opens eyes to voice - oriented to self only. Denies any pain at present time. Following up for BP today     Assessment & Plan:     Aggressive behavior with hx Depression: T  - as per primary team      Hx Hypertension with current uncontolled htn:   - home medications resumed yesterday  - BP remains elevated  - increased cozaar and hctz (will give another 50 mg cozaar this afternoon)    Right Wrist fracture:   - R wrist fracture was confirmed on x-ray. - conservative treatment of the fracture.    - pain management with tylenol  - ice extremity and keep elevated      Hx Paroxysmal Afib:   - was decided NOT to place pt back on coumadin due to her high risk of falls per d/c summary   - continue cardizem, HR per VS hx is controlled      Hx of Pulmonary Embolism:  not on [facility] as pt high risk for falls       Hx DM type 2 with neuropathy:   - diabetic diet   - blood sugar monitoring BID (had been normal range on BMP at outside hospital recently)  - did not re-order gabapentin (for neuropathy)due to drowsiness        Hx of Iron deficiency anemia:   - not on iron due to constipation  - Hgb normal      Code status: Full  Care Plan discussed with: patient, nurse  Disposition: to be determined, as per primary team.      Hospital Problems  Date Reviewed: 8/1/2018          Codes Class Noted POA    Psychosis ICD-10-CM: F29  ICD-9-CM: 298.9  8/23/2018 Unknown            Review of Systems:   Difficult to assess as pt drowsy. Denies pain or SOB. Vital Signs:    Last 24hrs VS reviewed since prior progress note. Most recent are:  Visit Vitals    BP (!) 159/91    Pulse 80    Temp 97.8 °F (36.6 °C)    Resp 18    Ht 5' 8\" (1.727 m)    SpO2 96%    Breastfeeding No     No intake or output data in the 24 hours ending 08/24/18 1248     Physical Examination:         General:  Alert, cooperative, no distress. Drowsy but opens eyes to voice     HEENT:  Normocephalic, atraumatic.      Lungs:   CTA. No Wheezing. No SOB, on RA     Heart:  Irregular rate. No murmur noted     Abdomen:   Soft, non-tender. Bowel sounds nl.      Extremities: RUE in splint. Fingers are bruised and swollen (appear better today). Difficult to ascertain cap refill due to bruising. Can move fingers and they are warm.      Skin: Skin color, texture, turgor nl. (other than mentioned above)     Psych: Cooperative, not anxious or agitated. Alert but not oriented     Neurologic:  GANDARA. Speech clear. Follows commands          Data Review:   Review and/or order of clinical lab test  Review and/or order of tests in the medicine section of [facility]    Labs:     Recent Labs      08/23/18   0021   WBC  6.6   HGB  12.7   HCT  38.3   PLT  284     Recent Labs      08/23/18   0021   NA  139   K  3.4*   CL  102   CO2  28   BUN  15   CREA  0.82   GLU  124*   CA  9.6   MG  1.3*     Recent Labs      08/23/18   0021   SGOT  21   ALT  17   AP  113   TBILI  0.7   TP  7.6   ALB  3.4*   GLOB  4.2*     No results for input(s): INR, PTP, APTT in the last 72 hours. No lab exists for component: INREXT   No results for input(s): FE, TIBC, PSAT, FERR in the last 72 hours.    No results found for: FOL, RBCF   No results for input(s): PH, PCO2, PO2 in the last 72 hours.   Recent Labs      08/23/18   0021   TROIQ  <0.05     Lab Results   Component Value Date/Time    Cholesterol, total 228 (H) 04/16/2018 09:16 AM    HDL Cholesterol 55 04/16/2018 09:16 AM    LDL, calculated 136 (H) 04/16/2018 09:16 AM    Triglyceride 186 (H) 04/16/2018 09:16 AM     Lab Results   Component Value Date/Time    Glucose (POC) 117 (H) 08/23/2018 06:20 PM    Glucose (POC) 132 (H) 08/21/2018 11:08 AM    Glucose (POC) 107 (H) 08/21/2018 07:00 AM    Glucose (POC) 113 (H) 08/20/2018 09:00 PM    Glucose (POC) 122 (H) 08/20/2018 04:15 PM     Lab Results   Component Value Date/Time    Color YELLOW/STRAW 08/23/2018 04:27 AM    Appearance CLOUDY (A) 08/23/2018 04:27 AM    Specific gravity 1.014 08/23/2018 04:27 AM    pH (UA) 7.5 08/23/2018 04:27 AM    Protein 30 (A) 08/23/2018 04:27 AM    Glucose NEGATIVE  08/23/2018 04:27 AM    Ketone NEGATIVE  08/23/2018 04:27 AM    Bilirubin NEGATIVE  08/23/2018 04:27 AM    Urobilinogen 0.2 08/23/2018 04:27 AM    Nitrites NEGATIVE  08/23/2018 04:27 AM    Leukocyte Esterase NEGATIVE  08/23/2018 04:27 AM    Epithelial cells FEW 08/23/2018 04:27 AM    Bacteria NEGATIVE  08/23/2018 04:27 AM    WBC 0-4 08/23/2018 04:27 AM    RBC 0-5 08/23/2018 04:27 AM     Medications Reviewed:     Current Facility-Administered Medications   Medication Dose Route Frequency    OLANZapine (ZyPREXA) tablet 2.5 mg  2.5 mg Oral Q6H PRN    benztropine (COGENTIN) tablet 1 mg  1 mg Oral BID PRN    benztropine (COGENTIN) injection 1 mg  1 mg IntraMUSCular BID PRN    zolpidem (AMBIEN) tablet 5 mg  5 mg Oral QHS PRN    acetaminophen (TYLENOL) tablet 650 mg  650 mg Oral Q4H PRN    magnesium hydroxide (MILK OF MAGNESIA) 400 mg/5 mL oral suspension 30 mL  30 mL Oral DAILY PRN    ondansetron (ZOFRAN ODT) tablet 4 mg  4 mg Oral Q6H PRN    [START ON 8/25/2018] citalopram (CELEXA) tablet 20 mg  20 mg Oral DAILY    [START ON 8/25/2018] donepezil (ARICEPT) tablet 5 mg  5 mg Oral DAILY    [START ON 8/25/2018] losartan (COZAAR) tablet 100 mg  100 mg Oral DAILY    And    [START ON 8/25/2018] hydroCHLOROthiazide (HYDRODIURIL) tablet 25 mg  25 mg Oral DAILY    losartan (COZAAR) tablet 50 mg  50 mg Oral NOW    dilTIAZem CD (CARDIZEM CD) capsule 240 mg  240 mg Oral DAILY    polyethylene glycol (MIRALAX) packet 17 g  17 g Oral DAILY PRN    docusate sodium (COLACE) capsule 100 mg  100 mg Oral DAILY   ______________________________________________________________________  EXPECTED LENGTH OF STAY: - - -  ACTUAL LENGTH OF STAY:          1               Giuliana Schuster NP

## 2018-08-24 NOTE — BH NOTES
1155- Pt complained of nausea. Gingerale offered. Po 4mg zofran given. Pt resting comfortably in bed denies nausea. Pt complained of pain 7/10 to right wrist. Onset now. Extremity elevated with pillows. PO 650mg  Tylenol given. Pt resting comfortably in bed.

## 2018-08-24 NOTE — PROGRESS NOTES
[address] [facility] 60  Master Treatment Plan for [employer]    Date Treatment Plan Initiated: 8/24/18    Treatment Plan Modalities:  Type of Modality Amount  (x minutes) Frequency (x/week) Duration (x days) Name of Responsible Staff   Community & wrap-up meetings to encourage peer interactions 15 7 1 Nba COYNE     Group psychotherapy to assist in building coping skills and internal controls 60 7 1 Jim Todd   Therapeutic activity groups to build coping skills 60 7 1 Jim Todd   Psychoeducation in group setting to address:   Medication education   15 7 1 Dorothy DE LA GARZA   Coping skills         Relaxation techniques         Symptom management         Discharge planning   60 2 Inga Berrios 115   60 1 1 volunteer   Recovery/AA/NA      volunteer   Physician medication management   15 7 1 Dr. Serafin Fuentes meeting/discharge planning   15 2 1 Peter Mead and Estelita Horn                                      Problem: Altered Thought Process (Adult/Pediatric)  Goal: *STG: Participates in treatment plan  Outcome: Progressing Towards Goal  Up in bed cooperative and polite. Confused and disoriented to time and situation. Aware of her confusion to time. MMSE 14. Daily goal is to have controlled pain  Goal: *STG: Seeks staff when feelings of anxiety and fear arise  Outcome: Progressing Towards Goal  States when she gets confused she is slightly anxious, frustrated over not being able to remember correctly time line of admission event and her recent fall. Goal: *STG: Complies with medication therapy  Outcome: Progressing Towards Goal  compliant  Goal: *STG: Attends activities and groups  Outcome: Progressing Towards Goal  Q2 turn at this point not able to attend groups  Goal: *STG: Demonstrates ability to understand and use improved judgment in daily activities and relationships  Outcome: Progressing Towards Goal  Illogical thinking, impaired memory at this time. Goal: Interventions  Outcome: Progressing Towards Goal  Staff focus is on offering support and reassurance, structure daily activities and orient to reality    Problem: Falls - Risk of  Goal: *Absence of Falls  Document Kizzy Fall Risk and appropriate interventions in the flowsheet. Outcome: Progressing Towards Goal  Fall Risk Interventions:  Mobility Interventions: Communicate number of staff needed for ambulation/transfer, Patient to call before getting OOB, Bed/chair exit alarm    Mentation Interventions: Adequate sleep, hydration, pain control, Door open when patient unattended, Bed/chair exit alarm    Medication Interventions: Teach patient to arise slowly, Patient to call before getting OOB, Evaluate medications/consider consulting pharmacy    Elimination Interventions: Call light in reach, Bed/chair exit alarm    History of Falls Interventions: Door open when patient unattended        Problem: Pain  Goal: *Control of Pain  Outcome: Progressing Towards Goal  Pt reports pain as tolerable and describes movement as trigger.

## 2018-08-24 NOTE — PROGRESS NOTES
Problem: Altered Thought Process (Adult/Pediatric)           Meet by 8/31/18  Goal: *STG: Participates in treatment plan  Outcome: Progressing Towards Goal  A&O to self. Pleasantly confused. Poor safety awareness. Pt recognized family that visited this morning. Memory impairment short and long term. Impulsive. Involuntarily committed today with family at bedside. Plan to meet with treatment team today to discuss treatment plan. Anjum Rizo to fax over [facility] list.  Goal: *STG: Attends activities and groups  Outcome: Progressing Towards Goal  Pt present for process group and activities: tolerated fair. Responds fair to one on one therapeutic interaction. Pt is pleasantly confused and has short and long term memory impairment. Problem: Falls - Risk of  Goal: *Absence of Falls  Document Kizzy Fall Risk and appropriate interventions in the flowsheet. Outcome: Progressing Towards Goal  Fall Risk Interventions:  Mobility Interventions: Communicate number of staff needed for ambulation/transfer    Mentation Interventions: Door open when patient unattended    Medication Interventions: Evaluate medications/consider consulting pharmacy    Elimination Interventions: Toileting schedule/hourly rounds    History of Falls Interventions: Bed/chair exit alarm        Problem: Pressure Injury - Risk of  Goal: *Prevention of pressure injury  Document Oumar Scale and appropriate interventions in the flowsheet. Outcome: Progressing Towards Goal  Pressure Injury Interventions:  Sensory Interventions: Assess changes in LOC    Moisture Interventions: Absorbent underpads    Activity Interventions: Increase time out of bed    Mobility Interventions: HOB 30 degrees or less    Nutrition Interventions: Offer support with meals,snacks and hydration    Friction and Shear Interventions: Lift sheet     education provided. Pt frequently repositioning self. 2 person assist to stand, ambulate and toilet ; tolerated fair.   Skin intact, ecchymosis RUE with edema; ace bandage applied RUE elevated with pillows. Pain medication offered. Pt denied pain. Will continue to assess and monitor. Comments: Pt turning and repositioning self frequently. Pt on q2hr turn and reposition. Able to stand and ambulate approximately 2 feet with 2 person assist multiple times during shift. Education provided.           100 [facility] 60  Master Treatment Plan for [employer]    Date Treatment Plan Initiated: 8/24/18    Treatment Plan Modalities:  Type of Modality Amount  (x minutes) Frequency (x/week) Duration (x days) Name of Responsible Staff   Community & wrap-up meetings to encourage peer interactions 15 7 1 [facility]     Group psychotherapy to assist in building coping skills and internal controls 60 7 1 Jim Todd   Therapeutic activity groups to build coping skills 60 7 1 Jim Todd   Psychoeducation in group setting to address:   Medication education   15 7 1 [address] Jaffrey Arnoldo skills         Relaxation techniques         Symptom management         Discharge planning   60 2 [place] 115   60 1 1 volunteer   Recovery/AA/NA      volunteer   Physician medication management   15 7 1 Dr. Natasha Stewartr   Family meeting/discharge planning   15 2 [facility] and Shira Chaparro

## 2018-08-24 NOTE — INTERDISCIPLINARY ROUNDS
Behavioral Health Interdisciplinary Rounds     Patient Name: Ronit Dean  Age: 80 y.o. Room/Bed:  742/  Primary Diagnosis: <principal problem not specified>   Admission Status: Involuntary Commitment    Readmission within 30 days: no  Power of  in place: yes (family bringing paperwork)  Patient requires a blocked bed: yes          Reason for blocked bed: aggression/agitation     VTE Prophylaxis: Not indicated    Mobility needs/Fall risk: yes    Nutritional Plan: yes  Consults: hospitalist, dietary following          Labs/Testing due today?: no    Sleep hours: 6.5       Participation in Care/Groups:  no  Medication Compliant?: Yes  PRNS (last 24 hours): None    Restraints (last 24 hours):  no     CIWA (range last 24 hours):     COWS (range last 24 hours):      Alcohol screening (AUDIT) completed -   AUDIT Score: 0     If applicable, date SBIRT discussed in treatment team AND documented:     Tobacco - patient is a smoker: Have You Used Tobacco in the Past 30 Days: Never a smoker  Illegal Drugs use: Have You Used Any Illegal Substances Over the Past 12 Months: No    24 hour chart check complete: yes     Patient goal(s) for today:   Treatment team focus/goals: Plan to assess for medications and discharge needs. Progress note - she has been pleasant and compliant on the unit. LOS:  1  Expected LOS: TBD    Financial concerns/prescription coverage:  Medicare   Date of last family contact:  SW will call her       Family requesting physician contact today:    Discharge plan: she will return to [address] [place] [address] in the home: no      Outpatient provider(s): TBD     Participating treatment team members: Damian Vuong, PharmD.

## 2018-08-24 NOTE — CONSULTS
[address] [facility]: [phone] ([phone])    Patient Name: Arlen Conn  YOB: 1933    Date of Initial Consult: 8/24/18  Reason for Consult: care decisions  Requesting Provider: Dr. Evan Lake   Primary Care Physician: Phu Wick MD     SUMMARY:   Arlen Conn is a 80 y.o. with a past history of dementia, htn, afib, chronic back pain, FM, PE, who was admitted on 8/22/2018 from SNF with a diagnosis of agitation / combative behavior. She has had multiple recent admissions for similar and frequent falls, just recent dc in last days from [facility] to SNF. Current medical issues leading to Palliative Medicine involvement include: goals of care, care decisions. PALLIATIVE DIAGNOSES:   1. Agitation - improved  2. Dementia with behavioral disturbance  3. Debility  4. Goals of care       PLAN:   1. Pt at this time is calm; she indicates no pain, nausea, sob; limited historian; confused; continue close assessment for distressing sx; pt lacks capacity for major medical decisions such as code status  2. Defer pharmacologic management to psychiatry, close observation / PRNs for now  3. I spoke with pt's  and son Madison Perez  4. Discussed about pt condition and treatments, including behavioral issues, current status, medical workup including CTH, UA, blood work, managing medical issues  5. Discussed about managing behavioral disturbance with dementia, very challenging at times, may need pharmacologic intervention either scheduled or PRN  6. Discussed about goals; family is hopeful to get pt behavior stabilized and eventually return to home perhaps with some assistance from home health; they are concerned that if her behavior is not stabilized they will not be able to care for her needs  7. Discussed about code status, risks / benefits / alternatives to full code; both agreed DNR is appropriate for pt, will leave dDNR for them to sign when able  8.  I do not think pt is a candidate for hospice care at this time; as dementia becomes more advanced, this could be introduced as a care option to family; I did not discuss today  9. Communicated plan of care with: Palliative IDT, RN       GOALS OF CARE / TREATMENT PREFERENCES:     GOALS OF CARE:  Patient/Health Care Proxy Stated Goals: Other (comment) (continue active treatment for acute medical conditions, goal is stabilize behavior, return home; if cardiac arrest do not attempt resuscitation)      TREATMENT PREFERENCES:   Code Status: DNR    Advance Care Planning:  Advance Care Planning 8/24/2018   Patient's Healthcare Decision Maker is: Legal Next of Steven 69   Primary Decision Maker Name Damon Flores   Primary Decision Maker Phone Number [phone]   Primary Decision Maker Relationship to Patient Spouse   Confirm Advance Directive -   Patient Would Like to Complete Advance Directive -   Does the patient have other document types -       Medical Interventions: Limited additional interventions   Other Instructions: Other:    As far as possible, the palliative care team has discussed with patient / health care proxy about goals of care / treatment preferences for patient. HISTORY:     History obtained from: minimally from pt, chart, family    CHIEF COMPLAINT: combative, agitation    HPI/SUBJECTIVE:    The patient is:   [] Verbal and participatory  [x] Non-participatory due to: dementia  80 yof with above history and current issues. On assessment, pt is in recliner, calm, nad by adult nonverbal scale. Answers simple questions, follows simple commands. Denies pain, sob, nausea. Not oriented to place / year.      Clinical Pain Assessment (nonverbal scale for severity on nonverbal patients):   Clinical Pain Assessment  Severity: 0     Activity (Movement): Lying quietly, normal position    Duration: for how long has pt been experiencing pain (e.g., 2 days, 1 month, years)  Frequency: how often pain is an issue (e.g., several times per day, once every few days, constant)     FUNCTIONAL ASSESSMENT:     Palliative Performance Scale (PPS):  PPS: 40       PSYCHOSOCIAL/SPIRITUAL SCREENING:     Palliative IDT has assessed this patient for cultural preferences / practices and a referral made as appropriate to needs (Cultural Services, Patient Advocacy, Ethics, etc.)    Advance Care Planning:  Advance Care Planning 8/24/2018   Patient's Healthcare Decision Maker is: Legal Next of Steven 69   Primary Decision Maker Name Rigo Menendez   Primary Decision Maker Phone Number [phone]   Primary Decision Maker Relationship to Patient Spouse   Confirm Advance Directive -   Patient Would Like to Complete Advance Directive -   Does the patient have other document types -       Any spiritual / Druze concerns:  [] Yes /  [] No    Caregiver Burnout:  [] Yes /  [x] No /  [] No Caregiver Present      Anticipatory grief assessment:   [x] Normal  / [] Maladaptive    On family    ESAS Anxiety:      ESAS Depression:       cannot assess due to pt factors for multiple above     REVIEW OF SYSTEMS:     Positive and pertinent negative findings in ROS are noted above in HPI. The following systems were [] reviewed / [x] unable to be reviewed as noted in HPI  Other findings are noted below. Systems: constitutional, ears/nose/mouth/throat, respiratory, gastrointestinal, genitourinary, musculoskeletal, integumentary, neurologic, psychiatric, endocrine. Positive findings noted below. Modified ESAS Completed by: provider           Pain: 0     Nausea: 0     Dyspnea: 0           Stool Occurrence(s): 1        PHYSICAL EXAM:     From RN flowsheet:  Wt Readings from Last 3 Encounters:   08/21/18 76.1 kg (167 lb 12.8 oz)   08/13/18 77.6 kg (171 lb)   08/13/18 77.6 kg (171 lb)     Blood pressure 114/78, pulse 95, temperature 98.1 °F (36.7 °C), resp. rate 18, height 5' 8\" (1.727 m), SpO2 94 %, not currently breastfeeding.     Pain Scale 1: Numeric (0 - 10)  Pain Intensity 1: 0  Pain Onset 1: now  Pain Location 1: Wrist  Pain Orientation 1: Right  Pain Description 1: Aching  Pain Intervention(s) 1: Medication (see MAR)  Last bowel movement, if known:     Constitutional: aa, nad, elderly, frail  Eyes: pupils equal, anicteric  ENMT: no nasal discharge, moist mucous membranes  Cardiovascular: regular rhythm, distal pulses intact  Respiratory: breathing not labored, symmetric  Gastrointestinal: soft non-tender, +bowel sounds  Musculoskeletal: no deformity, no tenderness to palpation, right wrist spling  Skin: warm, dry  Neurologic: following commands, moving all extremities  Psychiatric: full affect, no obvious hallucinations  Other:       HISTORY:     Active Problems:    Psychosis (8/23/2018)      Past Medical History:   Diagnosis Date    Anemia     Atrial fibrillation (HCC)     Cancer (HCC)     basal cell on nose    Chronic pain     back pain    Fibromyalgia 4/1/2010    GERD (gastroesophageal reflux disease) 4/1/2010    Hiatal hernia 4/1/2010    High cholesterol 4/1/2010    HTN (hypertension) 4/1/2010    Ill-defined condition     A. Fib    OA (osteoarthritis) 4/1/2010    back    Pulmonary emboli ([address] 2015      Past Surgical History:   Procedure Laterality Date    BREAST SURGERY PROCEDURE UNLISTED      Breast im-plants x 2    ENLARGE BREAST WITH IMPLANT      HX APPENDECTOMY      HX BREAST BIOPSY      HX BREAST RECONSTRUCTION      Breast implants removed 1992    HX CATARACT REMOVAL      HX CHOLECYSTECTOMY  9/11/2013    HX HYSTERECTOMY      HX KNEE REPLACEMENT  2008    bilateral    HX ORTHOPAEDIC  2007    Bilateral TKR    HX PARTIAL HYSTERECTOMY      HX SHOULDER REPLACEMENT  2009    left    HX TONSILLECTOMY        Family History   Problem Relation Age of Onset   Neptali Carolyn Arthritis-rheumatoid Mother     Dementia Mother     Coronary Artery Disease Father     Cancer Brother      lung cancer      History reviewed, no pertinent family history.   Social History   Substance Use Topics    Smoking status: Never Smoker    Smokeless tobacco: Never Used    Alcohol use No     Allergies   Allergen Reactions    Angiotensin Ii,Human Other (comments)     States does not remember      Avelox [Moxifloxacin] Other (comments)     States does not remember      Demerol [Meperidine] Hives      Current Facility-Administered Medications   Medication Dose Route Frequency    OLANZapine (ZyPREXA) tablet 2.5 mg  2.5 mg Oral Q6H PRN    benztropine (COGENTIN) tablet 1 mg  1 mg Oral BID PRN    benztropine (COGENTIN) injection 1 mg  1 mg IntraMUSCular BID PRN    zolpidem (AMBIEN) tablet 5 mg  5 mg Oral QHS PRN    acetaminophen (TYLENOL) tablet 650 mg  650 mg Oral Q4H PRN    magnesium hydroxide (MILK OF MAGNESIA) 400 mg/5 mL oral suspension 30 mL  30 mL Oral DAILY PRN    ondansetron (ZOFRAN ODT) tablet 4 mg  4 mg Oral Q6H PRN    [START ON 8/25/2018] citalopram (CELEXA) tablet 20 mg  20 mg Oral DAILY    [START ON 8/25/2018] donepezil (ARICEPT) tablet 5 mg  5 mg Oral DAILY    [START ON 8/25/2018] losartan (COZAAR) tablet 100 mg  100 mg Oral DAILY    And    [START ON 8/25/2018] hydroCHLOROthiazide (HYDRODIURIL) tablet 25 mg  25 mg Oral DAILY    dilTIAZem CD (CARDIZEM CD) capsule 240 mg  240 mg Oral DAILY    polyethylene glycol (MIRALAX) packet 17 g  17 g Oral DAILY PRN    docusate sodium (COLACE) capsule 100 mg  100 mg Oral DAILY          LAB AND IMAGING FINDINGS:     Lab Results   Component Value Date/Time    WBC 6.6 08/23/2018 12:21 AM    HGB 12.7 08/23/2018 12:21 AM    PLATELET 119 80/61/0294 12:21 AM     Lab Results   Component Value Date/Time    Sodium 139 08/23/2018 12:21 AM    Potassium 3.4 (L) 08/23/2018 12:21 AM    Chloride 102 08/23/2018 12:21 AM    CO2 28 08/23/2018 12:21 AM    BUN 15 08/23/2018 12:21 AM    Creatinine 0.82 08/23/2018 12:21 AM    Calcium 9.6 08/23/2018 12:21 AM    Magnesium 1.3 (L) 08/23/2018 12:21 AM      Lab Results   Component Value Date/Time    AST (SGOT) 21 08/23/2018 12:21 AM    Alk. phosphatase 113 08/23/2018 12:21 AM    Protein, total 7.6 08/23/2018 12:21 AM    Albumin 3.4 (L) 08/23/2018 12:21 AM    Globulin 4.2 (H) 08/23/2018 12:21 AM     Lab Results   Component Value Date/Time    INR 2.6 (H) 08/17/2018 06:50 AM    Prothrombin time 25.6 (H) 08/17/2018 06:50 AM    aPTT 59.5 (H) 08/14/2018 04:31 AM      Lab Results   Component Value Date/Time    Iron 211 (H) 04/16/2018 09:16 AM    TIBC 303 04/16/2018 09:16 AM    Iron % saturation 70 (H) 04/16/2018 09:16 AM    Ferritin 87 04/16/2018 09:16 AM      No results found for: PH, PCO2, PO2  No components found for: [place]   Lab Results   Component Value Date/Time    CK 74 06/20/2018 05:49 AM    CK - MB 1.8 06/20/2018 05:49 AM                Total time:   Counseling / coordination time, spent as noted above:   > 50% counseling / coordination?:     Prolonged service was provided for  []30 min   []75 min in face to face time in the presence of the patient, spent as noted above. Time Start:   Time End:   Note: this can only be billed with 05793 (initial) or 64001 (follow up). If multiple start / stop times, list each separately.

## 2018-08-24 NOTE — PROGRESS NOTES
Problem: Falls - Risk of  Goal: *Absence of Falls  Document Kizzy Fall Risk and appropriate interventions in the flowsheet. Outcome: Progressing Towards Goal  Fall Risk Interventions:  Mobility Interventions: Communicate number of staff needed for ambulation/transfer, Patient to call before getting OOB, Bed/chair exit alarm  Mentation Interventions: Adequate sleep, hydration, pain control, Door open when patient unattended, Bed/chair exit alarm  Medication Interventions: Teach patient to arise slowly, Patient to call before getting OOB, Evaluate medications/consider consulting pharmacy  Elimination Interventions: Call light in reach, Bed/chair exit alarm  History of Falls Interventions: Door open when patient unattended    2315 Pt appears asleep in bed. Respirations even and unlabored. Will monitor with Q 15 safety checks.

## 2018-08-24 NOTE — BH NOTES
Care Management Note:    SW spoke to patients  and son. Both very supportive. SW will continue to follow for discharge needs.

## 2018-08-24 NOTE — ACP (ADVANCE CARE PLANNING)
GOALS OF CARE:  Patient/Health Care Proxy Stated Goals:  Other (comment) (continue active treatment for acute medical conditions, goal is stabilize behavior, return home; if cardiac arrest do not attempt resuscitation)    TREATMENT PREFERENCES:   Code Status: DNR    Advance Care Planning:  Advance Care Planning 8/24/2018   Patient's Healthcare Decision Maker is: Legal Next of Kin   Primary Decision Maker Name Homero Mayer   Primary Decision Maker Phone Number [phone]   Primary Decision Maker Relationship to Patient Spouse   Confirm Advance Directive -   Patient Would Like to Complete Advance Directive -   Does the patient have other document types -       Medical Interventions: Limited additional interventions

## 2018-08-25 LAB
ALBUMIN SERPL-MCNC: 3.5 G/DL (ref 3.5–5)
ALBUMIN/GLOB SERPL: 0.9 {RATIO} (ref 1.1–2.2)
ALP SERPL-CCNC: 113 U/L (ref 45–117)
ALT SERPL-CCNC: 18 U/L (ref 12–78)
ANION GAP SERPL CALC-SCNC: 8 MMOL/L (ref 5–15)
AST SERPL-CCNC: 18 U/L (ref 15–37)
BILIRUB SERPL-MCNC: 0.6 MG/DL (ref 0.2–1)
BUN SERPL-MCNC: 20 MG/DL (ref 6–20)
BUN/CREAT SERPL: 21 (ref 12–20)
CALCIUM SERPL-MCNC: 9.6 MG/DL (ref 8.5–10.1)
CHLORIDE SERPL-SCNC: 100 MMOL/L (ref 97–108)
CHOLEST SERPL-MCNC: 217 MG/DL
CO2 SERPL-SCNC: 29 MMOL/L (ref 21–32)
CREAT SERPL-MCNC: 0.96 MG/DL (ref 0.55–1.02)
ERYTHROCYTE [DISTWIDTH] IN BLOOD BY AUTOMATED COUNT: 12.4 % (ref 11.5–14.5)
GLOBULIN SER CALC-MCNC: 4.1 G/DL (ref 2–4)
GLUCOSE BLD STRIP.AUTO-MCNC: 131 MG/DL (ref 65–100)
GLUCOSE BLD STRIP.AUTO-MCNC: 133 MG/DL (ref 65–100)
GLUCOSE P FAST SERPL-MCNC: 119 MG/DL (ref 65–100)
GLUCOSE SERPL-MCNC: 119 MG/DL (ref 65–100)
HCT VFR BLD AUTO: 39.4 % (ref 35–47)
HDLC SERPL-MCNC: 50 MG/DL
HDLC SERPL: 4.3 {RATIO} (ref 0–5)
HGB BLD-MCNC: 13.3 G/DL (ref 11.5–16)
LDLC SERPL CALC-MCNC: 140 MG/DL (ref 0–100)
LIPID PROFILE,FLP: ABNORMAL
MCH RBC QN AUTO: 32.6 PG (ref 26–34)
MCHC RBC AUTO-ENTMCNC: 33.8 G/DL (ref 30–36.5)
MCV RBC AUTO: 96.6 FL (ref 80–99)
NRBC # BLD: 0 K/UL (ref 0–0.01)
NRBC BLD-RTO: 0 PER 100 WBC
PLATELET # BLD AUTO: 313 K/UL (ref 150–400)
PMV BLD AUTO: 10.5 FL (ref 8.9–12.9)
POTASSIUM SERPL-SCNC: 3.5 MMOL/L (ref 3.5–5.1)
PROT SERPL-MCNC: 7.6 G/DL (ref 6.4–8.2)
RBC # BLD AUTO: 4.08 M/UL (ref 3.8–5.2)
SERVICE CMNT-IMP: ABNORMAL
SERVICE CMNT-IMP: ABNORMAL
SODIUM SERPL-SCNC: 137 MMOL/L (ref 136–145)
TRIGL SERPL-MCNC: 135 MG/DL (ref ?–150)
TSH SERPL DL<=0.05 MIU/L-ACNC: 1.05 UIU/ML (ref 0.36–3.74)
VLDLC SERPL CALC-MCNC: 27 MG/DL
WBC # BLD AUTO: 7 K/UL (ref 3.6–11)

## 2018-08-25 PROCEDURE — 82962 GLUCOSE BLOOD TEST: CPT | Performed by: PSYCHIATRY & NEUROLOGY

## 2018-08-25 PROCEDURE — 82947 ASSAY GLUCOSE BLOOD QUANT: CPT | Performed by: PSYCHIATRY & NEUROLOGY

## 2018-08-25 PROCEDURE — 85027 COMPLETE CBC AUTOMATED: CPT | Performed by: PSYCHIATRY & NEUROLOGY

## 2018-08-25 PROCEDURE — 84443 ASSAY THYROID STIM HORMONE: CPT | Performed by: PSYCHIATRY & NEUROLOGY

## 2018-08-25 PROCEDURE — 36415 COLL VENOUS BLD VENIPUNCTURE: CPT | Performed by: PSYCHIATRY & NEUROLOGY

## 2018-08-25 PROCEDURE — 74011250637 HC RX REV CODE- 250/637: Performed by: PSYCHIATRY & NEUROLOGY

## 2018-08-25 PROCEDURE — 74011250637 HC RX REV CODE- 250/637: Performed by: NURSE PRACTITIONER

## 2018-08-25 PROCEDURE — 82962 GLUCOSE BLOOD TEST: CPT

## 2018-08-25 PROCEDURE — 65220000003 HC RM SEMIPRIVATE PSYCH

## 2018-08-25 PROCEDURE — 74011250637 HC RX REV CODE- 250/637

## 2018-08-25 PROCEDURE — 80061 LIPID PANEL: CPT | Performed by: PSYCHIATRY & NEUROLOGY

## 2018-08-25 PROCEDURE — 80053 COMPREHEN METABOLIC PANEL: CPT | Performed by: PSYCHIATRY & NEUROLOGY

## 2018-08-25 RX ORDER — TRAZODONE HYDROCHLORIDE 50 MG/1
25 TABLET ORAL
Status: DISCONTINUED | OUTPATIENT
Start: 2018-08-25 | End: 2018-09-05 | Stop reason: HOSPADM

## 2018-08-25 RX ADMIN — DONEPEZIL HYDROCHLORIDE 5 MG: 5 TABLET, FILM COATED ORAL at 15:10

## 2018-08-25 RX ADMIN — OLANZAPINE 2.5 MG: 2.5 TABLET, FILM COATED ORAL at 08:33

## 2018-08-25 RX ADMIN — ACETAMINOPHEN 650 MG: 325 TABLET ORAL at 20:58

## 2018-08-25 RX ADMIN — CITALOPRAM HYDROBROMIDE 20 MG: 20 TABLET ORAL at 15:10

## 2018-08-25 RX ADMIN — TRAZODONE HYDROCHLORIDE 25 MG: 50 TABLET ORAL at 20:58

## 2018-08-25 RX ADMIN — ACETAMINOPHEN 650 MG: 325 TABLET ORAL at 15:44

## 2018-08-25 NOTE — PROGRESS NOTES
Hospitalist Progress Note  Alecia Mosley NP  Answering service: [phone] OR 2003 from in house phone  Cell: [phone]   Date of Service:  2018  NAME:  Donell Ortega  :  1933  MRN:  [id number]    Admission Summary:   Pt presented from [facility] [employer] via EMS with chief complaint of aggressive behavior.  Pt was just admitted in through [facility] ED earlier today. Hospitalists are now consulted for hypertension. Pt a very poor historian, most of history obtained through prior chart notes. Discussed BP management with pharmacist, pt arrived and was seen through ED last night and only received her cardizem today. Meds have been verified in the ED     Interval history / Subjective:   Pt in bed, more awake and responsive today. Nurses had called about decreased po intakes but evening nurse reports she ate better today at lunch. Assessment & Plan:     Aggressive behavior with hx Depression: T  - as per primary team   - has not been aggressive     Hx Hypertension with current uncontolled htn:   - home medications resumed and increased cozaar and hctz  - BP overall better    Right Wrist fracture:   - R wrist fracture was confirmed on x-ray. - conservative treatment of the fracture. - ACE wrap/splint was off today! Could not find warp - nursing to re-apply.    - due for Ortho follow up/re-eval to cast beginning of next week  - pain management with tylenol  - ice extremity and keep elevated      Hx Paroxysmal Afib:   - was decided NOT to place pt back on coumadin due to her high risk of falls per d/c summary   - continue cardizem, HR per VS hx is controlled      Hx of Pulmonary Embolism:  not on [address] as pt high risk for falls       Hx DM type 2 with neuropathy:   - diabetic diet   - blood sugar monitoring BID: range 109-133  - did not re-order gabapentin (for neuropathy) due to drowsiness        Hx of Iron deficiency anemia:   - not on iron due to constipation  - Hgb normal      Poor Po Intake:   - better this afternoon when family was present  - supplements ordered  - encourage pt to eat, socially if able    Code status: DNR - Palliative care evaluation done 8/24  Care Plan discussed with: patient, nurse, Ortho  Disposition: to be determined, as per primary team.      Hospital Problems  Date Reviewed: 8/1/2018          Codes Class Noted POA    * (Principal)Late onset Alzheimer's disease with behavioral disturbance ICD-10-CM: G30.1, F02.81  ICD-9-CM: 331.0, 294.11  8/24/2018 Yes        Psychosis ICD-10-CM: P61  ICD-9-CM: 298.9  8/23/2018 Unknown            Review of Systems:   Denies and SOB or pain. Vital Signs:    Last 24hrs VS reviewed since prior progress note. Most recent are:  Visit Vitals    /87    Pulse 85    Temp 97.4 °F (36.3 °C)    Resp 16    Ht 5' 8\" (1.727 m)    SpO2 90%    Breastfeeding No       Intake/Output Summary (Last 24 hours) at 08/25/18 1646  Last data filed at 08/25/18 1300   Gross per 24 hour   Intake              100 ml   Output                0 ml   Net              100 ml      Physical Examination:         General:  Alert, cooperative, no distress. More awake today     HEENT:  Normocephalic, atraumatic.      Lungs:   CTA. No Wheezing. No SOB, on RA     Heart:  Irregular rate. No murmur noted     Abdomen:   Soft, non-tender. Bowel sounds nl. BM 8/24     Extremities: RUE without splint. Fingers are bruised and swollen (appear better today - middle finger most swollen). Difficult to ascertain cap refill due to bruising. Can move fingers and they are warm.      Skin: Skin color, texture, turgor nl. (other than mentioned above)     Psych: Cooperative, not anxious or agitated. Alert, oriented to self and city - was able to produce presidents name after first name disclosed     Neurologic:  GANDARA. Speech clear.  Follows commands         Data Review:   Review and/or order of clinical lab test  Review and/or order of tests in the medicine section of [facility]    Labs:     Recent Labs      08/25/18   0600  08/23/18   0021   WBC  7.0  6.6   HGB  13.3  12.7   HCT  39.4  38.3   PLT  313  284     Recent Labs      08/25/18   0600  08/23/18   0021   NA  137  139   K  3.5  3.4*   CL  100  102   CO2  29  28   BUN  20  15   CREA  0.96  0.82   GLU  119*  119*  124*   CA  9.6  9.6   MG   --   1.3*     Recent Labs      08/25/18   0600  08/23/18   0021   SGOT  18  21   ALT  18  17   AP  113  113   TBILI  0.6  0.7   TP  7.6  7.6   ALB  3.5  3.4*   GLOB  4.1*  4.2*     No results for input(s): INR, PTP, APTT in the last 72 hours. No lab exists for component: INREXT, INREXT   No results for input(s): FE, TIBC, PSAT, FERR in the last 72 hours. No results found for: FOL, RBCF   No results for input(s): PH, PCO2, PO2 in the last 72 hours.   Recent Labs      08/23/18   0021   TROIQ  <0.05     Lab Results   Component Value Date/Time    Cholesterol, total 217 (H) 08/25/2018 06:00 AM    HDL Cholesterol 50 08/25/2018 06:00 AM    LDL, calculated 140 (H) 08/25/2018 06:00 AM    Triglyceride 135 08/25/2018 06:00 AM    CHOL/HDL Ratio 4.3 08/25/2018 06:00 AM     Lab Results   Component Value Date/Time    Glucose (POC) 131 (H) 08/25/2018 04:05 PM    Glucose (POC) 133 (H) 08/25/2018 07:55 AM    Glucose (POC) 109 (H) 08/24/2018 04:26 PM    Glucose (POC) 117 (H) 08/23/2018 06:20 PM    Glucose (POC) 132 (H) 08/21/2018 11:08 AM     Lab Results   Component Value Date/Time    Color YELLOW/STRAW 08/23/2018 04:27 AM    Appearance CLOUDY (A) 08/23/2018 04:27 AM    Specific gravity 1.014 08/23/2018 04:27 AM    pH (UA) 7.5 08/23/2018 04:27 AM    Protein 30 (A) 08/23/2018 04:27 AM    Glucose NEGATIVE  08/23/2018 04:27 AM    Ketone NEGATIVE  08/23/2018 04:27 AM    Bilirubin NEGATIVE  08/23/2018 04:27 AM    Urobilinogen 0.2 08/23/2018 04:27 AM    Nitrites NEGATIVE  08/23/2018 04:27 AM    Leukocyte Esterase NEGATIVE  08/23/2018 04:27 AM    Epithelial cells FEW 08/23/2018 04:27 AM Bacteria NEGATIVE  08/23/2018 04:27 AM    WBC 0-4 08/23/2018 04:27 AM    RBC 0-5 08/23/2018 04:27 AM     Medications Reviewed:     Current Facility-Administered Medications   Medication Dose Route Frequency    traZODone (DESYREL) tablet 25 mg  25 mg Oral QHS PRN    OLANZapine (ZyPREXA) tablet 2.5 mg  2.5 mg Oral Q6H PRN    benztropine (COGENTIN) tablet 1 mg  1 mg Oral BID PRN    benztropine (COGENTIN) injection 1 mg  1 mg IntraMUSCular BID PRN    acetaminophen (TYLENOL) tablet 650 mg  650 mg Oral Q4H PRN    magnesium hydroxide (MILK OF MAGNESIA) 400 mg/5 mL oral suspension 30 mL  30 mL Oral DAILY PRN    ondansetron (ZOFRAN ODT) tablet 4 mg  4 mg Oral Q6H PRN    citalopram (CELEXA) tablet 20 mg  20 mg Oral DAILY    donepezil (ARICEPT) tablet 5 mg  5 mg Oral DAILY    losartan (COZAAR) tablet 100 mg  100 mg Oral DAILY    And    hydroCHLOROthiazide (HYDRODIURIL) tablet 25 mg  25 mg Oral DAILY    dilTIAZem CD (CARDIZEM CD) capsule 240 mg  240 mg Oral DAILY    polyethylene glycol (MIRALAX) packet 17 g  17 g Oral DAILY PRN    docusate sodium (COLACE) capsule 100 mg  100 mg Oral DAILY   ______________________________________________________________________  EXPECTED LENGTH OF STAY: - - -  ACTUAL LENGTH OF STAY:          2               Azucena oRn, NP

## 2018-08-25 NOTE — BH NOTES
GROUP THERAPY PROGRESS NOTE    Tray Regan is participating in [employer] Group.      Group time: 30 minutes    Personal goal for participation: Listening to music    Goal orientation: relaxation    Group therapy participation: active    Therapeutic interventions reviewed and discussed: provided music on [employer]    Impression of participation: relaxed in recliner, smiled,able to listen to music in DR, sitting near staff
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
## 2018-08-25 NOTE — PROGRESS NOTES
Spiritual Care Assessment/Progress Note  [address]      NAME: Benedicto Rodriguez      MRN: [id number]  AGE: 80 y.o.  SEX: female  Sikh Affiliation: Mosque   Language: English     8/25/2018     Total Time (in minutes): 5     Spiritual Assessment begun in [address] through conversation with:         [x]Patient        [] Family    [] Friend(s)        Reason for Consult: Palliative Care, Initial/Spiritual Assessment     Spiritual beliefs: (Please include comment if needed)     [x] Identifies with a kelly tradition:  Mosque        [] Supported by a kelly community:            [] Claims no spiritual orientation:           [] Seeking spiritual identity:                [] Adheres to an individual form of spirituality:           [] Not able to assess:                           Identified resources for coping:      [x] Prayer                               [] Music                  [] Guided Imagery     [x] Family/friends                 [] Pet visits     [] Devotional reading                         [] Unknown     [] Other:                                               Interventions offered during this visit: (See comments for more details)    Patient Interventions: Affirmation of kelly, Catharsis/review of pertinent events in supportive environment, Coping skills reviewed/reinforced, Initial/Spiritual assessment, patient floor, Prayer (actual), Prayer (assurance of)           Plan of Care:     [x] Support spiritual and/or cultural needs    [] Support AMD and/or advance care planning process      [] Support grieving process   [] Coordinate Rites and/or Rituals    [] Coordination with community clergy   [] No spiritual needs identified at this time   [] Detailed Plan of Care below (See Comments)  [] Make referral to Music Therapy  [] Make referral to Pet Therapy     [] Make referral to Addiction services  [] Make referral to [facility]  [] Make referral to Spiritual Care Partner  [] No future visits requested        [x] Follow up visits as needed     Comments: Provided support to this pt in [facility]. [address] consulted pt's RN due to diagnosis. [address] introduced self to pt and explained role in care team.  [address] engaged pt in life review and offered listening presence. Anna is important to pt. Pt attended a [employer] until her health prevented her from going. Pt is energized by her family and had just received a visit from them and was feeling particularly good about all of the attention she was getting.  offered prayer for pt and concluded by affirming ongoing availability of support.     Desiderio Kawasaki, Staff   Please call [phone] to page  if needed

## 2018-08-25 NOTE — INTERDISCIPLINARY ROUNDS
Behavioral Health Interdisciplinary Rounds     Patient Name: Shira Reed  Age: 80 y.o. Room/Bed:  746/  Primary Diagnosis: Late onset Alzheimer's disease with behavioral disturbance   Admission Status: Involuntary Commitment     Readmission within 30 days: no  Power of  in place: yes  Patient requires a blocked bed: yes          Reason for blocked bed: contact precautions     VTE Prophylaxis: Not indicated    Mobility needs/Fall risk: yes    Nutritional Plan: yes  Consults:          Labs/Testing due today?: yes , cooperative w/ labs    Sleep hours: 3+      Participation in Care/Groups:  no  Medication Compliant?: Yes  PRNS (last 24 hours):  Antipsychotic (PO), Sleep Aid and Pain    Restraints (last 24 hours):  no     CIWA (range last 24 hours):     COWS (range last 24 hours):      Alcohol screening (AUDIT) completed -   AUDIT Score: 0     If applicable, date SBIRT discussed in treatment team AND documented:     Tobacco - patient is a smoker: Have You Used Tobacco in the Past 30 Days: Never a smoker  Illegal Drugs use: Have You Used Any Illegal Substances Over the Past 12 Months: No    24 hour chart check complete: yes     Patient goal(s) for today:   Treatment team focus/goals:   Progress note     LOS:  2  Expected LOS:     Financial concerns/prescription coverage:    Date of last family contact:     Family requesting physician contact today:   Discharge plan:   Guns in the home:        Outpatient provider(s):     Participating treatment team members: Tray Regan, * (assigned SW),

## 2018-08-25 NOTE — PROGRESS NOTES
Problem: Altered Thought Process (Adult/Pediatric)  Goal: *STG: Participates in treatment plan  Outcome: Progressing Towards Goal  Patient has been confused. Restless and agitated at times. Patient received prn for this and it was helpful. Per RN on nightshift the patient did not sleep well. She has been resting in bed today. She did not take her scheduled meds. Patient did not eat breakfast. Patient ate a few bites of lunch and drank about 100 ml glucerna. Staff goal: to encourage patient to eat and drink. Patient goal: Does not state  Goal: *STG: Complies with medication therapy  Outcome: Not Progressing Towards Goal  Variance: Patient Condition  Comments: Confused    Refused meds  Goal: *STG: Attends activities and groups  Outcome: Progressing Towards Goal  Attends groups   Goal: *STG: Decreased delusional thinking  Outcome: Progressing Towards Goal  Medication management. Q 15 min safety rounds. Group therapy  Goal: Interventions  Outcome: Progressing Towards Goal  Medication management. Q 15 min safety rounds. Group therapy. Paged at [phone]: Dr. Dee Galvin for Team 1 who explained that Blanquita Emmanuel would be seeing this patient. This is in regard to patient not taking in a lot of fluids or nutrition.
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
## 2018-08-25 NOTE — PROGRESS NOTES
Problem: Falls - Risk of  Goal: *Absence of Falls  Document Kizzy Fall Risk and appropriate interventions in the flowsheet. Outcome: Progressing Towards Goal  Fall Risk Interventions:  Lying in bed awake ,  restless making frequent attempts to get out of bed , quiet , oriented to name only . Requires constant redirection , bed moved to nurses station to be in line of sight of staff side rails up x3, bed alarm on, Not cooperative with  wearing cast .   Mobility Interventions: Communicate number of staff needed for ambulation/transfer    Mentation Interventions: Adequate sleep, hydration, pain control    Medication Interventions: Evaluate medications/consider consulting pharmacy    Elimination Interventions: Call light in reach    History of Falls Interventions: Door open when patient unattended      0600- asked to go to the bathroom. , steadier and stronger on her feet . Assisted to chair and wheeled to bathroom . Voided and had BM . Cooperative w/ lab draw .  Calmly  sitting in emiliano chair , able to reapply cast . Not oriented to to time, place , date

## 2018-08-25 NOTE — BH NOTES
PRN Medication Documentation    Specific patient behavior that led to need for PRN medication: Patient complained of right arm pain  Staff interventions attempted prior to PRN being given: Offered PRN meds  PRN medication given: Tylenol 650 mg PO   Patient response/effectiveness of PRN medication: Will continue to monitor. 2058 - PRN Medication Documentation    Specific patient behavior that led to need for PRN medication: Patient complained of right arm pain  Staff interventions attempted prior to PRN being given: Offered PRN medication  PRN medication given: Tylenol 650 mg PO  Patient response/effectiveness of PRN medication: Will continue to monitor. 2058 - PRN Medication Documentation    Specific patient behavior that led to need for PRN medication: Insomnia  Staff interventions attempted prior to PRN being given: Offered PRN meds  PRN medication given: Trazodone 25 mg PO  Patient response/effectiveness of PRN medication: Will continue to monitor.

## 2018-08-25 NOTE — PROGRESS NOTES
Problem: Altered Thought Process (Adult/Pediatric)  Goal: *STG: Decreased delusional thinking  Outcome: Not Progressing Towards Goal  Received patient visiting with family in bedroom. Confused, however, alert to name and hospital.   She asked Kee Mom you seen my daddy today\". Also could not name her current . Remains delusional.      VSS. Med compliant. Received PRN Tylenol twice for wrist pain and Trazodone to promote sleep. Problem: Falls - Risk of  Goal: *Absence of Falls  Document Kizzy Fall Risk and appropriate interventions in the flowsheet. Outcome: Progressing Towards Goal  Fall Risk Interventions:  Mobility Interventions: Bed/chair exit alarm    Mentation Interventions: Adequate sleep, hydration, pain control    Medication Interventions: Bed/chair exit alarm    Elimination Interventions: Bed/chair exit alarm    History of Falls Interventions: Bed/chair exit alarm    No falls. Sits in ini [address] near staff in  to eat dinner and listen to music with peers. Smiling. Remains pleasantly confused.

## 2018-08-26 LAB
GLUCOSE BLD STRIP.AUTO-MCNC: 123 MG/DL (ref 65–100)
GLUCOSE BLD STRIP.AUTO-MCNC: 125 MG/DL (ref 65–100)
SERVICE CMNT-IMP: ABNORMAL
SERVICE CMNT-IMP: ABNORMAL

## 2018-08-26 PROCEDURE — 74011250637 HC RX REV CODE- 250/637: Performed by: NURSE PRACTITIONER

## 2018-08-26 PROCEDURE — 82962 GLUCOSE BLOOD TEST: CPT

## 2018-08-26 PROCEDURE — 74011250637 HC RX REV CODE- 250/637: Performed by: PSYCHIATRY & NEUROLOGY

## 2018-08-26 PROCEDURE — 65220000003 HC RM SEMIPRIVATE PSYCH

## 2018-08-26 RX ADMIN — CITALOPRAM HYDROBROMIDE 20 MG: 20 TABLET ORAL at 09:45

## 2018-08-26 RX ADMIN — ACETAMINOPHEN 650 MG: 325 TABLET ORAL at 06:20

## 2018-08-26 RX ADMIN — DONEPEZIL HYDROCHLORIDE 5 MG: 5 TABLET, FILM COATED ORAL at 09:46

## 2018-08-26 RX ADMIN — LOSARTAN POTASSIUM 100 MG: 50 TABLET ORAL at 09:46

## 2018-08-26 RX ADMIN — DILTIAZEM HYDROCHLORIDE 240 MG: 240 CAPSULE, COATED, EXTENDED RELEASE ORAL at 09:46

## 2018-08-26 RX ADMIN — HYDROCHLOROTHIAZIDE 25 MG: 25 TABLET ORAL at 09:45

## 2018-08-26 NOTE — BH NOTES
PRN Medication [email or web address]  Specific patient behavior that led to need for PRN medication: Tearful , I just don't know what happened to me \" c/o of pain   Staff interventions attempted prior to PRN being given: ,  Cast fitted on rt arm w/ ace bandage  , hand elevated on pillow , oriented to place, date and time   PRN medication given: tylenol 650 mg po   Patient response/effectiveness of PRN medication: pending

## 2018-08-26 NOTE — INTERDISCIPLINARY ROUNDS
Behavioral Health Interdisciplinary Rounds     Patient Name: Louis Augustine  Age: 80 y.o. Room/Bed:  742/  Primary Diagnosis: Late onset Alzheimer's disease with behavioral disturbance   Admission Status: Involuntary Commitment     Readmission within 30 days: no  Power of  in place: yes  Patient requires a blocked bed:  no        Reason for blocked bed:     VTE Prophylaxis: No    Mobility needs/Fall risk: yes    Nutritional Plan: yes  Consults:        Labs/Testing due today?: no    Sleep hours:        Participation in Care/Groups:  no  Medication Compliant?: Refusing Psychiatric Medications and Refusing Medical Medications  PRNS (last 24 hours):  Antipsychotic (PO) and Antianxiety    Restraints (last 24 hours):  no     CIWA (range last 24 hours):     COWS (range last 24 hours):      Alcohol screening (AUDIT) completed -   AUDIT Score: 0     If applicable, date SBIRT discussed in treatment team AND documented:     Tobacco - patient is a smoker: Have You Used Tobacco in the Past 30 Days: Never a smoker  Illegal Drugs use: Have You Used Any Illegal Substances Over the Past 12 Months: No    24 hour chart check complete: yes     Patient goal(s) for today:   Treatment team focus/goals:   Progress note     LOS:  3  Expected LOS:     Financial concerns/prescription coverage:    Date of last family contact:     Family requesting physician contact today  Discharge plan:   Guns in the home:        Outpatient provider(s):     Participating treatment team members: Tray Regan, * (assigned SW),

## 2018-08-26 NOTE — PROGRESS NOTES
Problem: Falls - Risk of  Goal: *Absence of Falls  Document Kizzy Fall Risk and appropriate interventions in the flowsheet. Outcome: Progressing Towards Goal  Fall Risk Interventions:  Lying in bed moved to nurses station due  To restlessness - pt kicking legs out of bed . Monitoring line of sight . Not wearing cast on rt arm , side rails up x3 , bed alarm on .    Mobility Interventions: Bed/chair exit alarm    Mentation Interventions: Adequate sleep, hydration, pain control    Medication Interventions: Bed/chair exit alarm    Elimination Interventions: Bed/chair exit alarm    History of Falls Interventions: Bed/chair exit alarm, Door open when patient unattended, Room close to nurse's station

## 2018-08-26 NOTE — PROGRESS NOTES
Problem: Altered Thought Process (Adult/Pediatric)  Goal: *STG: Participates in treatment plan  Outcome: Progressing Towards Goal  Confused. Did not eat breakfast and drank about 75 ml of glucerna. Accepted meds this morning. Tearful and sad.
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
## 2018-08-26 NOTE — BH NOTES
1530: Greeted patient on unit in room in bed . Visiting with several family members. Appears in no acute distress. Will continue to monitor on Q 15 minute safety checks. Alert, vitals stable, wnl.   Medication and meal compliant. Ate 50% of dinner tray. Patient able to stand and pivot with two person assist   Large BM noted. Patient remains confused. Will continue to monitor.

## 2018-08-26 NOTE — BH NOTES
GROUP THERAPY PROGRESS NOTE    Tray Regan is participating in [employer]. Group time: 30 minutes    Personal goal for participation: Listening to music    Goal orientation: relaxation    Group therapy participation: active    Therapeutic interventions reviewed and discussed: Provided Music and [employer]      Impression of participation: Patient was able to sit in  DR listening to Music. Smiled. Relaxed.

## 2018-08-26 NOTE — PROGRESS NOTES
Hospitalist Progress Note  Margot Alas NP  Answering service: [phone] OR [phone] from in house phone  Cell: [phone]   Date of Service:  2018  NAME:  Al Rodriguez  :  1933  MRN:  [id number]    Admission Summary:   Pt presented from [facility] via EMS with chief complaint of aggressive behavior.  Pt was just admitted in through [address] [place] [address] ED earlier today. Hospitalists are now consulted for hypertension. Pt a very poor historian, most of history obtained through prior chart notes. Discussed BP management with pharmacist, pt arrived and was seen through ED last night and only received her cardizem today. Meds have been verified in the ED     Interval history / Subjective:   Pt in bed, family at bedside. Pleasantly confused and talking about nonsense today. Splint is off, it is in the bed with her - she just wont keep it on. Assessment & Plan:     Aggressive behavior with hx Depression: T  - as per primary team   - has not been aggressive     Hx Hypertension with current uncontolled htn:   - home medications resumed and increased cozaar and hctz  - BP overall better - will continue with current dose, may see some fluctuations depending on her moods/agitation    Right Wrist fracture:   - R wrist fracture was confirmed on x-ray from . Will get repeat film in am.  - conservative treatment of the fracture. - on reading the ortho notes, she was supposed to have followed for cast re-evaluation ~ and should have seen Orthopedics for follow up by now.   - pain management with tylenol  - ice extremity and keep elevated  - will not keep splint on, recommend consulting Orthopedics for cast      Hx Paroxysmal Afib:   - was decided NOT to place pt back on coumadin due to her high risk of falls per d/c summary   - continue cardizem, HR per VS hx is controlled      Hx of Pulmonary Embolism:  not on [address] as pt high risk for falls       Hx DM type 2 with neuropathy:   - diabetic diet   - blood sugar monitoring BID: range 109-133  - did not re-order gabapentin (for neuropathy) due to drowsiness        Hx of Iron deficiency anemia:   - not on iron due to constipation  - Hgb normal      Poor Po Intake:   - supplements ordered  - encourage pt to eat, socially if able    Code status: DNR - Palliative care evaluation done 8/24  Care Plan discussed with: patient, nurse, Ortho  Disposition: to be determined, as per primary team.     Thank you for giving us opportunity to participate in this patients care. Pts BP stable. In regards to her appetite and intakes, will need encouragement and note that she is already being offered supplements. Will sign off at this time, please re-consult if there are any further medical management needs or questions       Hospital Problems  Date Reviewed: 8/1/2018          Codes Class Noted POA    * (Principal)Late onset Alzheimer's disease with behavioral disturbance ICD-10-CM: G30.1, F02.81  ICD-9-CM: 331.0, 294.11  8/24/2018 Yes        Psychosis ICD-10-CM: L59  ICD-9-CM: 298.9  8/23/2018 Unknown            Review of Systems:   Hard to get a straight answer from pt. She is not making much sense when she talks. Vital Signs:    Last 24hrs VS reviewed since prior progress note. Most recent are:  Visit Vitals    BP (!) 147/97 (BP 1 Location: Right arm, BP Patient Position: At rest;Sitting)    Pulse 94    Temp 97.4 °F (36.3 °C)    Resp 16    Ht 5' 8\" (1.727 m)    SpO2 92%    Breastfeeding No       Intake/Output Summary (Last 24 hours) at 08/26/18 1505  Last data filed at 08/25/18 2100   Gross per 24 hour   Intake              610 ml   Output                0 ml   Net              610 ml      Physical Examination:         General:  Alert, cooperative, no distress but talking nonsense     HEENT:  Normocephalic, atraumatic.      Lungs:   No accessory muscle use No SOB, on RA     Abdomen:   Soft, non-tender. Bowel sounds nl.  BM 8/25     Extremities: RUE without splint. Fingers are bruised and swollen (appear better today - middle finger most swollen). Difficult to ascertain cap refill due to bruising. Can move fingers and they are warm.      Skin: Skin color, texture, turgor nl. (other than mentioned above)     Psych: Alert, oriented to self/family. Not able to stay on topic.      Neurologic:  GANDARA. Speech clear. Follows commands         Data Review:   Review and/or order of clinical lab test  Review and/or order of tests in the medicine section of [facility]    Labs:     Recent Labs      08/25/18 0600   WBC  7.0   HGB  13.3   HCT  39.4   PLT  313     Recent Labs      08/25/18 0600   NA  137   K  3.5   CL  100   CO2  29   BUN  20   CREA  0.96   GLU  119*  119*   CA  9.6     Recent Labs      08/25/18 0600   SGOT  18   ALT  18   AP  113   TBILI  0.6   TP  7.6   ALB  3.5   GLOB  4.1*     No results for input(s): INR, PTP, APTT in the last 72 hours. No lab exists for component: INREXT, INREXT   No results for input(s): FE, TIBC, PSAT, FERR in the last 72 hours. No results found for: FOL, RBCF   No results for input(s): PH, PCO2, PO2 in the last 72 hours. No results for input(s): CPK, CKNDX, TROIQ in the last 72 hours.     No lab exists for component: CPKMB  Lab Results   Component Value Date/Time    Cholesterol, total 217 (H) 08/25/2018 06:00 AM    HDL Cholesterol 50 08/25/2018 06:00 AM    LDL, calculated 140 (H) 08/25/2018 06:00 AM    Triglyceride 135 08/25/2018 06:00 AM    CHOL/HDL Ratio 4.3 08/25/2018 06:00 AM     Lab Results   Component Value Date/Time    Glucose (POC) 123 (H) 08/26/2018 08:00 AM    Glucose (POC) 131 (H) 08/25/2018 04:05 PM    Glucose (POC) 133 (H) 08/25/2018 07:55 AM    Glucose (POC) 109 (H) 08/24/2018 04:26 PM    Glucose (POC) 117 (H) 08/23/2018 06:20 PM     Lab Results   Component Value Date/Time    Color YELLOW/STRAW 08/23/2018 04:27 AM    Appearance CLOUDY (A) 08/23/2018 04:27 AM    Specific gravity 1.014 08/23/2018 04:27 AM    pH (UA) 7.5 08/23/2018 04:27 AM    Protein 30 (A) 08/23/2018 04:27 AM    Glucose NEGATIVE  08/23/2018 04:27 AM    Ketone NEGATIVE  08/23/2018 04:27 AM    Bilirubin NEGATIVE  08/23/2018 04:27 AM    Urobilinogen 0.2 08/23/2018 04:27 AM    Nitrites NEGATIVE  08/23/2018 04:27 AM    Leukocyte Esterase NEGATIVE  08/23/2018 04:27 AM    Epithelial cells FEW 08/23/2018 04:27 AM    Bacteria NEGATIVE  08/23/2018 04:27 AM    WBC 0-4 08/23/2018 04:27 AM    RBC 0-5 08/23/2018 04:27 AM     Medications Reviewed:     Current Facility-Administered Medications   Medication Dose Route Frequency    traZODone (DESYREL) tablet 25 mg  25 mg Oral QHS PRN    OLANZapine (ZyPREXA) tablet 2.5 mg  2.5 mg Oral Q6H PRN    benztropine (COGENTIN) tablet 1 mg  1 mg Oral BID PRN    benztropine (COGENTIN) injection 1 mg  1 mg IntraMUSCular BID PRN    acetaminophen (TYLENOL) tablet 650 mg  650 mg Oral Q4H PRN    magnesium hydroxide (MILK OF MAGNESIA) 400 mg/5 mL oral suspension 30 mL  30 mL Oral DAILY PRN    ondansetron (ZOFRAN ODT) tablet 4 mg  4 mg Oral Q6H PRN    citalopram (CELEXA) tablet 20 mg  20 mg Oral DAILY    donepezil (ARICEPT) tablet 5 mg  5 mg Oral DAILY    losartan (COZAAR) tablet 100 mg  100 mg Oral DAILY    And    hydroCHLOROthiazide (HYDRODIURIL) tablet 25 mg  25 mg Oral DAILY    dilTIAZem CD (CARDIZEM CD) capsule 240 mg  240 mg Oral DAILY    polyethylene glycol (MIRALAX) packet 17 g  17 g Oral DAILY PRN    docusate sodium (COLACE) capsule 100 mg  100 mg Oral DAILY   ______________________________________________________________________  EXPECTED LENGTH OF STAY: - - -  ACTUAL LENGTH OF STAY:          3               Armando Wilson NP

## 2018-08-26 NOTE — PROGRESS NOTES
Problem: Falls - Risk of  Goal: *Absence of Falls  Document Ikzzy Fall Risk and appropriate interventions in the flowsheet.    Fall Risk Interventions:  Mobility Interventions: Assess mobility with egress test    Mentation Interventions: Adequate sleep, hydration, pain control    Medication Interventions: Bed/chair exit alarm    Elimination Interventions: Bed/chair exit alarm    History of Falls Interventions: Bed/chair exit alarm

## 2018-08-27 ENCOUNTER — HOSPITAL ENCOUNTER (OUTPATIENT)
Dept: GENERAL RADIOLOGY | Age: 83
Discharge: HOME | End: 2018-08-27
Attending: NURSE PRACTITIONER
Payer: MEDICARE

## 2018-08-27 LAB
GLUCOSE BLD STRIP.AUTO-MCNC: 105 MG/DL (ref 65–100)
GLUCOSE BLD STRIP.AUTO-MCNC: 119 MG/DL (ref 65–100)
GLUCOSE BLD STRIP.AUTO-MCNC: 126 MG/DL (ref 65–100)
GLUCOSE BLD STRIP.AUTO-MCNC: 131 MG/DL (ref 65–100)
SERVICE CMNT-IMP: ABNORMAL

## 2018-08-27 PROCEDURE — 74011250636 HC RX REV CODE- 250/636

## 2018-08-27 PROCEDURE — 65220000003 HC RM SEMIPRIVATE PSYCH

## 2018-08-27 PROCEDURE — 82962 GLUCOSE BLOOD TEST: CPT

## 2018-08-27 PROCEDURE — 74011250637 HC RX REV CODE- 250/637: Performed by: NURSE PRACTITIONER

## 2018-08-27 PROCEDURE — 73100 X-RAY EXAM OF WRIST: CPT

## 2018-08-27 PROCEDURE — 74011250637 HC RX REV CODE- 250/637: Performed by: PSYCHIATRY & NEUROLOGY

## 2018-08-27 PROCEDURE — 74011250637 HC RX REV CODE- 250/637

## 2018-08-27 PROCEDURE — 76450000000

## 2018-08-27 RX ORDER — HALOPERIDOL 5 MG/ML
1 INJECTION INTRAMUSCULAR ONCE
Status: COMPLETED | OUTPATIENT
Start: 2018-08-27 | End: 2018-08-27

## 2018-08-27 RX ORDER — LORAZEPAM 2 MG/ML
0.5 INJECTION INTRAMUSCULAR ONCE
Status: COMPLETED | OUTPATIENT
Start: 2018-08-27 | End: 2018-08-27

## 2018-08-27 RX ORDER — LORAZEPAM 2 MG/ML
INJECTION INTRAMUSCULAR
Status: COMPLETED
Start: 2018-08-27 | End: 2018-08-27

## 2018-08-27 RX ORDER — HALOPERIDOL 5 MG/ML
INJECTION INTRAMUSCULAR
Status: COMPLETED
Start: 2018-08-27 | End: 2018-08-27

## 2018-08-27 RX ORDER — ACETAMINOPHEN 325 MG/1
325 TABLET ORAL 3 TIMES DAILY
Status: DISCONTINUED | OUTPATIENT
Start: 2018-08-27 | End: 2018-09-05 | Stop reason: HOSPADM

## 2018-08-27 RX ORDER — DONEPEZIL HYDROCHLORIDE 10 MG/1
10 TABLET, FILM COATED ORAL DAILY
Status: DISCONTINUED | OUTPATIENT
Start: 2018-08-28 | End: 2018-09-05 | Stop reason: HOSPADM

## 2018-08-27 RX ADMIN — HALOPERIDOL LACTATE 1 MG: 5 INJECTION, SOLUTION INTRAMUSCULAR at 04:54

## 2018-08-27 RX ADMIN — HALOPERIDOL 1 MG: 5 INJECTION INTRAMUSCULAR at 04:54

## 2018-08-27 RX ADMIN — TRAZODONE HYDROCHLORIDE 25 MG: 50 TABLET ORAL at 01:48

## 2018-08-27 RX ADMIN — CITALOPRAM HYDROBROMIDE 20 MG: 20 TABLET ORAL at 08:56

## 2018-08-27 RX ADMIN — DONEPEZIL HYDROCHLORIDE 5 MG: 5 TABLET, FILM COATED ORAL at 08:57

## 2018-08-27 RX ADMIN — HYDROCHLOROTHIAZIDE 25 MG: 25 TABLET ORAL at 08:56

## 2018-08-27 RX ADMIN — LORAZEPAM 0.5 MG: 2 INJECTION INTRAMUSCULAR at 04:56

## 2018-08-27 RX ADMIN — DILTIAZEM HYDROCHLORIDE 240 MG: 240 CAPSULE, COATED, EXTENDED RELEASE ORAL at 08:55

## 2018-08-27 RX ADMIN — LOSARTAN POTASSIUM 100 MG: 50 TABLET ORAL at 08:57

## 2018-08-27 RX ADMIN — LORAZEPAM 0.5 MG: 2 INJECTION INTRAMUSCULAR; INTRAVENOUS at 04:56

## 2018-08-27 NOTE — BH NOTES
Care Management [facility]:    OSEAS met with patient's  and her son. Ernestina Crenshaw [phone] )  had questions about medications and diagnosis. I took his number and stated I would ask Dr. Jayashree Osborn to give him a call tomorrow.

## 2018-08-27 NOTE — PROGRESS NOTES
Problem: Altered Thought Process (Adult/Pediatric)  Goal: *STG: Participates in treatment plan  Outcome: Progressing Towards Goal  Received this morning out on the unit laying in the bed. Is  drowsy,but easily awakens and noted to be restless-trying to get out of bed. Staff provided patient with  am cares and patient became irritable at times-pushing staff away and yelling out  vulgarities. Is confused and falls back to sleep after  transferred to emiliano chair and am cares were completed. Has remained out on the unit sleeping,staff at side. Was medication compliant,but not meal compliant. Staff to continue to monitor patient for safety during hospitalization.

## 2018-08-27 NOTE — BH NOTES
[facility] called and message left for consult. Dr Sylvain Saunders to be calling back,writter awaiting call back.

## 2018-08-27 NOTE — PROGRESS NOTES
NUTRITION COMPLETE ASSESSMENT    RECOMMENDATIONS:   1. Continue to encourage and assist with meals   - appreciate documentation in flowsheet and progress notes!   - could consider trial of appetite stimulant   - would NOT recommend feeding tube even if intake inadequate  2. Use supplements to give medications to maximize intake  3. Obtain measured weight - no weight this admission     Interventions/Plan:   - Ensure Compact TID  - Magic Cup BID  - Boost Pudding BID    Assessment:   Reason for Assessment: [x] Provider Consult    Diet: Regular  Supplements: Glucerna QID, Magic Cup TID  Nutritionally Significant Medications: [x] Reviewed & Includes: celexa, cardizem, colace, HCTZ  Meal Intake:   Patient Vitals for the past 100 hrs:   % Diet Eaten   08/26/18 2154 40 %   08/25/18 1920 80 %   08/25/18 1300 0 %   08/25/18 1200 10 %   08/25/18 1000 0 %   08/25/18 0800 0 %     Subjective: Pt sleeping soundly at visit. Spoke with RN who notes pt just taking sips/bites of supplements and meals. Slept through all meals today. Objective:  Pt admitted with TDO for aggressive behavior. PMHx: depression, dementia, DM, HTN, fibromyalgia, GERD, hypercholesterolemia, hiatal hernia, cancer, OA, anemia, atrial fibrillation. Decline over past 4 months noted. More agitated and confused. Intake has been poor for duration of admit. Taking some of supplements so will continue but adjust frequency/timing. Supplements (if 100% consumed) will provide:  1720kcal, 59g protein. 8/24 - drank 180ml Glucerna and 2oz applesauce  8/25 - no breakfast, few bites of lunch and 100ml Glucerna  8/26 - no breakfast, drank 75ml Glucerna  8/27 - not meal complaint    Predict mental status the biggest barrier to oral intake with confusion and then medication causing missing of meals. Recommend continued encouragement with meals and supplements.  Predict pt would not be appropriate for feeding tube since at high risk for pulling, and also with hx of dementia. Could consider trial of appetite stimulant if poor intake continues. Wt stable at 168-170# prior to admit but no measured weight this admission. Please obtain a measured weight. Will continue to follow for goals of care, PO intake, supplement consumption, and wt trends. Estimated Nutrition Needs:   Kcals/day: 1510 Kcals/day (1510-1635kcal)  Protein: 77 g (77-85g (1-1.1g/kg))  Fluid: 1635 ml (1ml/kcal)  Based On: Min [address] (x 1.2-1.3)  Weight Used: UBW (77.3kg)    Pt expected to meet estimated nutrient needs:  []   Yes     [x]  No [] Unable to predict at this time  Nutrition Diagnosis:   1. Inadequate protein-energy intake related to AMS as evidenced by confusion/agitation with hx of dementia; less than 50% meals consumed x 4 days    Goals:     Consumption of at least 50% of meals and 2 supplements/day in 5-7 days; wt maintenance     Monitoring & Evaluation:    - Liquid meal replacement, Total energy intake, Protein intake   - Weight/weight change    Previous Nutrition Goals Met:   N/A  Previous Recommendations:    N/A    Education & Discharge Needs:   [x] None Identified   [] Identified and addressed    [] Participated in care plan, discharge planning, and/or interdisciplinary rounds        Cultural, Temple and ethnic food preferences identified: None    Skin Integrity: [x]Intact  []Other  Edema: [x]None []Other  Last BM: 8/26  Food Allergies: [x]None []Other  Diet Restrictions: Cultural/Adventism Preference(s): None     Anthropometrics:    Weight Loss Metrics 8/21/2018 8/13/2018 8/13/2018 8/1/2018 7/24/2018 7/20/2018 6/28/2018   Today's Wt 167 lb 12.8 oz 171 lb 171 lb 171 lb 168 lb 170 lb 173 lb   BMI 25.51 kg/m2 27.6 kg/m2 27.6 kg/m2 26.78 kg/m2 26.31 kg/m2 26.63 kg/m2 27.92 kg/m2         Height: 5' 8\" (172.7 cm),    There is no height or weight on file to calculate BMI.   IBW : 63.5 kg (140 lb),    Usual Body Weight: 77.1 kg (170 lb),      Labs:    Lab Results   Component Value Date/Time Sodium 137 08/25/2018 06:00 AM    Potassium 3.5 08/25/2018 06:00 AM    Chloride 100 08/25/2018 06:00 AM    CO2 29 08/25/2018 06:00 AM    Glucose 119 (H) 08/25/2018 06:00 AM    Glucose 119 (H) 08/25/2018 06:00 AM    BUN 20 08/25/2018 06:00 AM    Creatinine 0.96 08/25/2018 06:00 AM    Calcium 9.6 08/25/2018 06:00 AM    Magnesium 1.3 (L) 08/23/2018 12:21 AM    Albumin 3.5 08/25/2018 06:00 AM     Lab Results   Component Value Date/Time    Hemoglobin A1c 6.8 (H) 05/25/2018 12:16 PM     Kmiberly Ackerman RD Pager #[phone]

## 2018-08-27 NOTE — CONSULTS
Ortho Consult    84yo patient with advancing dementia seen at [facility] ED for wrist fracture two weeks ago and splinted then subsequently transferred here for inpatient Psychiatric care. Since admission has had trouble maintaining compliance with splint due to agitation. Has removed several times and floor staff have replaced. We have been asked to see patient regarding need for splint or conversion to cast. Patient minimally conversant or participatory with interview.  and son are present for some additional history. Right arm in sugar tong splint which while suboptimally placed appears to be tolerated well by patient.  Hand with significant bruising and swelling in all fingers  Moves arm actively and moves all fingers  Does not follow sensory or motor function requests    Imaging shows maintenance of alignment of her distal radius fracture without significant displacement    At this time she appears comfortable in splint - recommend leaving this alone and adding additional tape to help prevent taking off if shows predilection to do so  Per Dr Darren Dias if patient continues to take splint off she could be fit for velcro wrist splint as the fracture is stable in appearance; this may give her additional material to play with and possibly remove but might be more comfortable and would not involve the elbow  Will reevaluate in a few days or if floor staff say she has removed splint again  Needs outpatient follow-up for continued fracture management    Kaylene Dias and Dong Lutz aware of patient and agree with current plan of care    Renard Diaz PA-C  Orthopedic Trauma Service  [address] [facility]

## 2018-08-27 NOTE — BH NOTES
GROUP THERAPY PROGRESS NOTE    Tray Regan did not participate in a 40 minute Morning Process Group on the Geriatric Unit, with a focus identifying feelings, planning for the day, with music. She sat by a supporting pillar in the room near the nursing station and slept through the bulk of the session without participating.

## 2018-08-27 NOTE — PROGRESS NOTES
Problem: Altered Thought Process (Adult/Pediatric)  Goal: *STG: Seeks staff when feelings of anxiety and fear arise  Outcome: Progressing Towards Goal  Pt received asleep in day room. Respirations even and unlabored. Staff will continue q15 checks and fall precautions.

## 2018-08-27 NOTE — BH NOTES
GROUP THERAPY PROGRESS NOTE    Tray Regan is participating in relaxation.      Group time: 30 minutes    Personal goal for participation: quiet time for relaxation /soft music    Goal orientation: relaxation    Group therapy participation: passive    Therapeutic interventions reviewed and discussed: relaxation    Impression of participation: pt resting in day room

## 2018-08-27 NOTE — INTERDISCIPLINARY ROUNDS
Behavioral Health Interdisciplinary Rounds     Patient Name: Eloise Martinez  Age: 80 y.o. Room/Bed:  742/02  Primary Diagnosis: Late onset Alzheimer's disease with behavioral disturbance   Admission Status: Involuntary Commitment     Readmission within 30 days: no  Power of  in place: yes  Patient requires a blocked bed: no          Reason for blocked bed:    VTE Prophylaxis: No    Mobility needs/Fall risk: yes    Nutritional Plan: yes  Consults:       Labs/Testing due today?: no    Sleep hours:        Participation in Care/Groups:  no  Medication Compliant?: Selective  PRNS (last 24 hours): Antipsychotic (IM), Antianxiety and Sleep Aid    Restraints (last 24 hours):  no     CIWA (range last 24 hours):     COWS (range last 24 hours):      Alcohol screening (AUDIT) completed -   AUDIT Score: 0     If applicable, date SBIRT discussed in treatment team AND documented:     Tobacco - patient is a smoker: Have You Used Tobacco in the Past 30 Days: Never a smoker  Illegal Drugs use: Have You Used Any Illegal Substances Over the Past 12 Months: No    24 hour chart check complete: yes     Patient goal(s) for today:   Treatment team focus/goals:Plan to continue to titrate her medications  Progress note - She has been sleeping most of the day due to IM medications last night. LOS:  4  Expected LOS: TBD    Financial concerns/prescription coverage: medicare   Date of last family contact:  SW meet with her family on Friday. Family requesting physician contact today:   Discharge plan:  She will return home with her    Guns in the home: no       Outpatient provider(s): TBD      Participating treatment team members: Pearl Tolbert

## 2018-08-27 NOTE — BH NOTES
[facility] PA called back   Informed him about patients fractured right wrist, non complaint with keeping cast on and xray on patient which was done today. Verbalized he will come see patient.

## 2018-08-27 NOTE — PROGRESS NOTES
Problem: Altered Thought Process (Adult/Pediatric)  Goal: *STG: Decreased delusional thinking  Outcome: Not Progressing Towards Goal  Received pt in [facility] in dining room with family at bedside. She is quiet and confused. Mumbled to self at times. Staff will continue fall precautions, decrease stimuli, orient as needed, give emotional support for family and continue q15 checks.

## 2018-08-27 NOTE — PROGRESS NOTES
Problem: Falls - Risk of  Goal: *Absence of Falls  Document Kizzy Fall Risk and appropriate interventions in the flowsheet.    Outcome: Progressing Towards Goal  Fall Risk Interventions:   Lying quietly in bed with eyes closed, respirations even and unlabored , NAD noted   Q15 min safety rounds continue , bed in nurses station within line of sight of staff , not wearing cast at this time   Mobility Interventions: Assess mobility with egress test    Mentation Interventions: Adequate sleep, hydration, pain control    Medication Interventions: Bed/chair exit alarm    Elimination Interventions: Bed/chair exit alarm    History of Falls Interventions: Bed/chair exit alarm

## 2018-08-27 NOTE — PROGRESS NOTES
Problem: Falls - Risk of  Goal: *Absence of Falls  Document Kizzy Fall Risk and appropriate interventions in the flowsheet. Outcome: Progressing Towards Goal  Fall Risk Interventions:  Mobility Interventions: Bed/chair exit alarm, Communicate number of staff needed for ambulation/transfer    Mentation Interventions: Adequate sleep, hydration, pain control, Bed/chair exit alarm, Door open when patient unattended    Medication Interventions: Bed/chair exit alarm    Elimination Interventions: Bed/chair exit alarm, Toileting schedule/hourly rounds    History of Falls Interventions: Bed/chair exit alarm, Door open when patient unattended, Room close to nurse's station    Free of falls. Falls tool completed and accurate. Bed alarm on the bed. Staff to monitor patient safety during hospitalization.

## 2018-08-27 NOTE — BH NOTES
PRN Medication [email or web address]  Specific patient behavior that led to need for PRN medication: not sleeping , attempting to get up out of bed . Poor safety awareness ,   Staff interventions attempted prior to PRN being given: frequent  redirection   PRN medication given: Trazodone 25 mg po   Patient response/effectiveness of PRN medication: minimal results     0330-increase agitated w/ staff redirection , offered po Zyprexa and declined .  Continues line of sight to staff   0112- agitated , paranoid , seeing cats , difficult to redirect , becky back fist   Dr Himanshu Lane notified of the above and new order obtained     0600- calmer , resting in bed , continue line of sight

## 2018-08-28 LAB
GLUCOSE BLD STRIP.AUTO-MCNC: 114 MG/DL (ref 65–100)
GLUCOSE BLD STRIP.AUTO-MCNC: 134 MG/DL (ref 65–100)
SERVICE CMNT-IMP: ABNORMAL
SERVICE CMNT-IMP: ABNORMAL

## 2018-08-28 PROCEDURE — 65220000003 HC RM SEMIPRIVATE PSYCH

## 2018-08-28 PROCEDURE — 74011250637 HC RX REV CODE- 250/637: Performed by: PSYCHIATRY & NEUROLOGY

## 2018-08-28 PROCEDURE — 74011250637 HC RX REV CODE- 250/637: Performed by: NURSE PRACTITIONER

## 2018-08-28 PROCEDURE — 82962 GLUCOSE BLOOD TEST: CPT

## 2018-08-28 RX ADMIN — LOSARTAN POTASSIUM 100 MG: 50 TABLET ORAL at 08:33

## 2018-08-28 RX ADMIN — DOCUSATE SODIUM 100 MG: 100 CAPSULE, LIQUID FILLED ORAL at 08:33

## 2018-08-28 RX ADMIN — HYDROCHLOROTHIAZIDE 25 MG: 25 TABLET ORAL at 08:33

## 2018-08-28 RX ADMIN — DILTIAZEM HYDROCHLORIDE 240 MG: 240 CAPSULE, COATED, EXTENDED RELEASE ORAL at 08:33

## 2018-08-28 RX ADMIN — DONEPEZIL HYDROCHLORIDE 10 MG: 10 TABLET, FILM COATED ORAL at 08:33

## 2018-08-28 RX ADMIN — ACETAMINOPHEN 325 MG: 325 TABLET ORAL at 08:33

## 2018-08-28 RX ADMIN — ACETAMINOPHEN 325 MG: 325 TABLET ORAL at 16:37

## 2018-08-28 RX ADMIN — CITALOPRAM HYDROBROMIDE 20 MG: 20 TABLET ORAL at 08:33

## 2018-08-28 RX ADMIN — ACETAMINOPHEN 325 MG: 325 TABLET ORAL at 21:05

## 2018-08-28 NOTE — PROGRESS NOTES
Problem: Falls - Risk of  Goal: *Absence of Falls  Document Kizzy Fall Risk and appropriate interventions in the flowsheet. Outcome: Progressing Towards Goal  Fall Risk Interventions:  Mobility Interventions: Bed/chair exit alarm    Mentation Interventions: Bed/chair exit alarm    Medication Interventions: Evaluate medications/consider consulting pharmacy    Elimination Interventions: Toileting schedule/hourly rounds    History of Falls Interventions: Door open when patient unattended    Free of falls. Falls tool completed and accurate. Bed alarm on the bed. Patient able to get up and ambulate with one person assistance. Staff to continue to provide safe environment during hospitalization.
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
## 2018-08-28 NOTE — BH NOTES
Pt disoriented. Blood sugar= 105. Pt given fluids and offered scheduled tylenol and prn trazadone crushed in pudding. She spit the pudding out and wouldn't take any further food from staff. Spit out repeatedly.

## 2018-08-28 NOTE — PROGRESS NOTES
Problem: Discharge Planning  Goal: *Discharge to safe environment  Outcome: Progressing Towards Goal  She will return home when ready for discharge   Goal: *Knowledge of medication management  Outcome: Progressing Towards Goal  She is compliant with medications   Goal: *Knowledge of discharge instructions  Outcome: Progressing Towards Goal  She is able to verbalized discharge instruction

## 2018-08-28 NOTE — PROGRESS NOTES
Problem: Altered Thought Process (Adult/Pediatric)  Goal: *STG: Participates in treatment plan  Outcome: Progressing Towards Goal   Staff provided patient with  arm care. Pt is confused. Has remained out on the unit. Was medication compliant,and meal compliant. Pt  Attended and participated in process group. Pt had some interaction with other patients. Staff to continue to monitor patient for safety during hospitalization.

## 2018-08-28 NOTE — PROGRESS NOTES
Problem: Falls - Risk of  Goal: *Absence of Falls  Document Kizzy Fall Risk and appropriate interventions in the flowsheet.    Outcome: Progressing Towards Goal  Fall Risk Interventions:  Mobility Interventions: Bed/chair exit alarm, Communicate number of staff needed for ambulation/transfer, Utilize walker, cane, or other assistive device    Mentation Interventions: Adequate sleep, hydration, pain control, Bed/chair exit alarm, Door open when patient unattended, Reorient patient, More frequent rounding    Medication Interventions: Bed/chair exit alarm, Teach patient to arise slowly    Elimination Interventions: Bed/chair exit alarm, Patient to call for help with toileting needs    History of Falls Interventions: Bed/chair exit alarm, Door open when patient unattended

## 2018-08-28 NOTE — BH NOTES
GROUP THERAPY PROGRESS NOTE    Tray Regan is participating in Reality orientation. Group time: 15 minutes    Personal goal for participation: Identify her name and [facility]    Goal orientation: personal    Group therapy participation: active    Therapeutic interventions reviewed and discussed: Introduced patient to group, [facility] and dinner    Impression of participation: Patient was able to identify her name ,but not [facility]. She was able to feed self.

## 2018-08-28 NOTE — INTERDISCIPLINARY ROUNDS
Behavioral Health Interdisciplinary Rounds     Patient Name: Caryle Dire  Age: 80 y.o. Room/Bed:  742/02  Primary Diagnosis: Late onset Alzheimer's disease with behavioral disturbance   Admission Status: Involuntary Commitment     Readmission within 30 days: no  Power of  in place: yes  Patient requires a blocked bed: no          Reason for blocked bed:     VTE Prophylaxis: No    Mobility needs/Fall risk: yes    Nutritional Plan: yes  Consults:          Labs/Testing due today?: no    Sleep hours: 7.75       Participation in Care/Groups:  no  Medication Compliant?: Selective and did not take tylenol or colace  PRNS (last 24 hours): Sleep Aid    Restraints (last 24 hours):  no     CIWA (range last 24 hours):     COWS (range last 24 hours):      Alcohol screening (AUDIT) completed -   AUDIT Score: 0     If applicable, date SBIRT discussed in treatment team AND documented:     Tobacco - patient is a smoker: Have You Used Tobacco in the Past 30 Days: Never a smoker  Illegal Drugs use: Have You Used Any Illegal Substances Over the Past 12 Months: No    24 hour chart check complete: yes     Patient goal(s) for today:   Treatment team focus/goals: Plan to titrate her medications  Progress note She is more alert today, still somewhat confused and disoriented.   She has been compliant with her treatment     LOS:  5  Expected LOS: TBD     Financial concerns/prescription coverage:  medicare  Date of last family contact:   SW spoke to her  and son yesterday     Family requesting physician contact today:  Dr. Trinh Sweeney spoke to her son yesterday   Discharge plan: she will return home with her family   Guns in the home:  no       Outpatient provider(s): TBD     Participating treatment team members: Sandra Oh RN -

## 2018-08-28 NOTE — BH NOTES
GROUP THERAPY PROGRESS NOTE    Tray Regan participated in a Morning Process Group on the Geriatric Unit, with a focus identifying feelings, planning for the day, with music. Group time: 30 minutes. Personal goal for participation: To increase the capacity to shift ones mood, prepare for the day, and share in group singing. Goal orientation: The patient will be able to prepare for the day through group singing. Group therapy participation: When prompted, this patient participated in the group. Therapeutic interventions reviewed and discussed: The group members were introduce themselves by first names   and participate in group singing as a way to increase their oxygen and blood flow and begin their day on a positive note. They were also asked to join in singing several songs. Impression of participation: The patient said she was doing \"alright\" and that she wanted to hear a joke. One of her peers  provided a short and silly one. The patient was alert, very generally oriented, and pleasant. She smiled as she sang along  with many of the songs that she knew the words to. The patient expressed no current SI/HI or overt psychosis. She   had difficulty with recent memory and needed to be reminded of place and date. Her affect was non-dysphoric, even  with her concern about her right arm. Her mood reflected her affect.

## 2018-08-29 LAB
GLUCOSE BLD STRIP.AUTO-MCNC: 114 MG/DL (ref 65–100)
GLUCOSE BLD STRIP.AUTO-MCNC: 114 MG/DL (ref 65–100)
SERVICE CMNT-IMP: ABNORMAL
SERVICE CMNT-IMP: ABNORMAL

## 2018-08-29 PROCEDURE — 74011250637 HC RX REV CODE- 250/637: Performed by: PSYCHIATRY & NEUROLOGY

## 2018-08-29 PROCEDURE — 74011250637 HC RX REV CODE- 250/637: Performed by: NURSE PRACTITIONER

## 2018-08-29 PROCEDURE — 82962 GLUCOSE BLOOD TEST: CPT

## 2018-08-29 PROCEDURE — 65220000003 HC RM SEMIPRIVATE PSYCH

## 2018-08-29 RX ORDER — RISPERIDONE 0.5 MG/1
0.25 TABLET, FILM COATED ORAL 2 TIMES DAILY
Status: DISCONTINUED | OUTPATIENT
Start: 2018-08-29 | End: 2018-08-31

## 2018-08-29 RX ADMIN — ACETAMINOPHEN 325 MG: 325 TABLET ORAL at 20:55

## 2018-08-29 RX ADMIN — DONEPEZIL HYDROCHLORIDE 10 MG: 10 TABLET, FILM COATED ORAL at 09:37

## 2018-08-29 RX ADMIN — LOSARTAN POTASSIUM 100 MG: 50 TABLET ORAL at 09:37

## 2018-08-29 RX ADMIN — OLANZAPINE 2.5 MG: 2.5 TABLET, FILM COATED ORAL at 17:07

## 2018-08-29 RX ADMIN — RISPERIDONE 0.25 MG: 0.5 TABLET, FILM COATED ORAL at 20:56

## 2018-08-29 RX ADMIN — ACETAMINOPHEN 325 MG: 325 TABLET ORAL at 09:38

## 2018-08-29 RX ADMIN — CITALOPRAM HYDROBROMIDE 20 MG: 20 TABLET ORAL at 09:37

## 2018-08-29 RX ADMIN — DILTIAZEM HYDROCHLORIDE 240 MG: 240 CAPSULE, COATED, EXTENDED RELEASE ORAL at 09:37

## 2018-08-29 RX ADMIN — ACETAMINOPHEN 325 MG: 325 TABLET ORAL at 17:07

## 2018-08-29 RX ADMIN — HYDROCHLOROTHIAZIDE 25 MG: 25 TABLET ORAL at 09:37

## 2018-08-29 RX ADMIN — OLANZAPINE 2.5 MG: 2.5 TABLET, FILM COATED ORAL at 11:56
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
## 2018-08-29 NOTE — BH NOTES
GROUP THERAPY PROGRESS NOTE    Tray Regan did not participate in a 35 minute Morning Process Group on the Geriatric Unit, with a focus identifying feelings, planning for the day, with music.

## 2018-08-29 NOTE — PROGRESS NOTES
Problem: Falls - Risk of  Goal: *Absence of Falls  Document Kizzy Fall Risk and appropriate interventions in the flowsheet. Outcome: Progressing Towards Goal  Fall Risk Interventions:  Mobility Interventions: Bed/chair exit alarm, Patient to call before getting OOB, Utilize walker, cane, or other assistive device    Mentation Interventions: Adequate sleep, hydration, pain control, Bed/chair exit alarm, Door open when patient unattended    Medication Interventions: Bed/chair exit alarm, Teach patient to arise slowly    Elimination Interventions: Bed/chair exit alarm, Patient to call for help with toileting needs    History of Falls Interventions: Bed/chair exit alarm, Door open when patient unattended    Free of falls. Falls tool completed and accurate. Bed alarm on the bed. Patient able to ambulate with 2 person assistance. Staff to continue to monitor patient for safety.

## 2018-08-29 NOTE — BH NOTES
PRN Medication Documentation    Specific patient behavior that led to need for PRN medication: PT STATES\"I MADE ASS OF MYSELF,IM GOING OUT THERE TO MAKE A SPEECH!!!!! Staff interventions attempted prior to PRN being given: REDIRECTIONPRN medication given: ZYPREXA  Patient response/effectiveness of PRN medication:TL AWARE,GOOD

## 2018-08-29 NOTE — INTERDISCIPLINARY ROUNDS
Behavioral Health Interdisciplinary Rounds     Patient Name: Niko Beckwith  Age: 80 y.o. Room/Bed:  742/02  Primary Diagnosis: Late onset Alzheimer's disease with behavioral disturbance   Admission Status: Involuntary Commitment     Readmission within 30 days: no  Power of  in place: yes  Patient requires a blocked bed: no          Reason for blocked bed:     VTE Prophylaxis: Not indicated    Mobility needs/Fall risk: yes    Nutritional Plan: yes  Consults:          Labs/Testing due today?: no    Sleep hours:  5.75      Participation in Care/Groups:  yes  Medication Compliant?: Yes  PRNS (last 24 hours): None    Restraints (last 24 hours):  no     CIWA (range last 24 hours):     COWS (range last 24 hours):      Alcohol screening (AUDIT) completed -   AUDIT Score: 0     If applicable, date SBIRT discussed in treatment team AND documented:     Tobacco - patient is a smoker: Have You Used Tobacco in the Past 30 Days: Never a smoker  Illegal Drugs use: Have You Used Any Illegal Substances Over the Past 12 Months: No    24 hour chart check complete: yes     Patient goal(s) for today:   Treatment team focus/goals: Plan to titrate her medications   Progress note - she has been up today and has been participating in unit activities     LOS:  6  Expected LOS: TBD     Financial concerns/prescription coverage:  Medicare   Date of last family contact:       Family requesting physician contact today:    Discharge plan:  She will return home with her    Guns in the home:  no       Outpatient provider(s):TBD      Participating treatment team members: Kat Barton [facility]

## 2018-08-29 NOTE — PROGRESS NOTES
Problem: Altered Thought Process (Adult/Pediatric)  Goal: *STG: Participates in treatment plan  Outcome: Progressing Towards Goal  Received this morning resting in bed. Was calm and cooperative. Agreeable to taking a shower with staff assistance. Is alert and disoriented x3. Remains confused,talking about her brother mowing the lawn out side and wanting to go out to see him. Becoming more irritable/labile as the day progresses, noted to have a more angry edge. Refusing breakfast and lunch. Becoming more difficult in following directions. Medicated with Zyprexa at 12noon. Remains sitting a staff side. Staff to provide a safe environment during hospitalization.

## 2018-08-29 NOTE — PROGRESS NOTES
Problem: Altered Thought Process (Adult/Pediatric)  Goal: *STG: Complies with medication therapy  Outcome: Progressing Towards Goal  Pt took all scheduled medications. Pleasant and cooperative. Oriented to self only. Smiling often. Denies arm pain. Visited with family this afternoon. No agreesion or inappropriate behaviors noted.

## 2018-08-29 NOTE — BH NOTES
PRN Medication Documentation    Specific patient behavior that led to need for PRN medication: psychosis, escalating behaviors  Staff interventions attempted prior to PRN being given: distraction  PRN medication given: 2.5 mg Zyprexa PO  Patient response/effectiveness of PRN medication:

## 2018-08-29 NOTE — PROGRESS NOTES
Problem: Falls - Risk of  Goal: *Absence of Falls  Document Kizzy Fall Risk and appropriate interventions in the flowsheet.    Outcome: Progressing Towards Goal  Fall Risk Interventions:  Mobility Interventions: Bed/chair exit alarm, Communicate number of staff needed for ambulation/transfer, Utilize walker, cane, or other assistive device    Mentation Interventions: Adequate sleep, hydration, pain control, Bed/chair exit alarm, More frequent rounding    Medication Interventions: Bed/chair exit alarm, Teach patient to arise slowly    Elimination Interventions: Bed/chair exit alarm, Toileting schedule/hourly rounds    History of Falls Interventions: Bed/chair exit alarm, Room close to nurse's station

## 2018-08-29 NOTE — BH NOTES
Received pt resting in bed,  Note family at bedside.   No voiced complaints or acute distress at present

## 2018-08-30 LAB
GLUCOSE BLD STRIP.AUTO-MCNC: 115 MG/DL (ref 65–100)
GLUCOSE BLD STRIP.AUTO-MCNC: 117 MG/DL (ref 65–100)
GLUCOSE BLD STRIP.AUTO-MCNC: 120 MG/DL (ref 65–100)
SERVICE CMNT-IMP: ABNORMAL

## 2018-08-30 PROCEDURE — 74011250637 HC RX REV CODE- 250/637: Performed by: NURSE PRACTITIONER

## 2018-08-30 PROCEDURE — 74011250637 HC RX REV CODE- 250/637: Performed by: PSYCHIATRY & NEUROLOGY

## 2018-08-30 PROCEDURE — 65220000003 HC RM SEMIPRIVATE PSYCH

## 2018-08-30 PROCEDURE — 82962 GLUCOSE BLOOD TEST: CPT

## 2018-08-30 RX ADMIN — ACETAMINOPHEN 325 MG: 325 TABLET ORAL at 09:30

## 2018-08-30 RX ADMIN — CITALOPRAM HYDROBROMIDE 20 MG: 20 TABLET ORAL at 09:28

## 2018-08-30 RX ADMIN — HYDROCHLOROTHIAZIDE 25 MG: 25 TABLET ORAL at 09:29

## 2018-08-30 RX ADMIN — ACETAMINOPHEN 325 MG: 325 TABLET ORAL at 16:45

## 2018-08-30 RX ADMIN — DOCUSATE SODIUM 100 MG: 100 CAPSULE, LIQUID FILLED ORAL at 09:31

## 2018-08-30 RX ADMIN — ACETAMINOPHEN 325 MG: 325 TABLET ORAL at 21:08

## 2018-08-30 RX ADMIN — RISPERIDONE 0.25 MG: 0.5 TABLET, FILM COATED ORAL at 21:08

## 2018-08-30 RX ADMIN — DONEPEZIL HYDROCHLORIDE 10 MG: 10 TABLET, FILM COATED ORAL at 09:28

## 2018-08-30 RX ADMIN — RISPERIDONE 0.25 MG: 0.5 TABLET, FILM COATED ORAL at 09:32

## 2018-08-30 RX ADMIN — LOSARTAN POTASSIUM 100 MG: 50 TABLET ORAL at 09:31

## 2018-08-30 RX ADMIN — DILTIAZEM HYDROCHLORIDE 240 MG: 240 CAPSULE, COATED, EXTENDED RELEASE ORAL at 09:27

## 2018-08-30 NOTE — PROGRESS NOTES
Occupational Therapy Screening:  Services are indicated. Order is required. An InBasket screening referral was triggered for occupational therapy based on results obtained during the nursing admission assessment. The patients chart was reviewed and the patient is appropriate for a skilled therapy evaluation. Please order a consult for occupational therapy if you are in agreement and would like an evaluation to be completed. Thank you. Carson Radha        8/15/18 OT evaluation: PLOF:   Home Situation  Home Environment: Apartment (currently living in appartment while house is being repaired)  One/Two Story Residence: One story  Living Alone: No  Support Systems: Spouse/Significant Other/Partner  Patient Expects to be Discharged to[de-identified] Unknown  Current DME Used/Available at Home: Wheelchair, Walker, [address] [employer] chair   8/14 for R wrist pain. Patient was admitted to the ED and discharged the previous day for R non displaced distal radius fx. Patient has experienced x3 falls the day leading up to admission and has a significant fall history. Orthopedic services have evaluated patient; She has a sugartong splint intact, recommended strict elevation of R UE d/t swelling, and currently being treated conservatively. Patient has dementia. She presents with significant confusion, intermittent agitation, impulsivity, poor safety awareness, and very minimal command following. Patient does best with tactile + manual cuing and simple one-step cues.

## 2018-08-30 NOTE — INTERDISCIPLINARY ROUNDS
Behavioral Health Interdisciplinary Rounds     Patient Name: Natalya Valle  Age: 80 y.o. Room/Bed:  742/02  Primary Diagnosis: Late onset Alzheimer's disease with behavioral disturbance   Admission Status: Involuntary Commitment     Readmission within 30 days: no  Power of  in place: yes  Patient requires a blocked bed: no          Reason for blocked bed: n/a    VTE Prophylaxis: Not indicated    Mobility needs/Fall risk: yes    Nutritional Plan: yes  Consults: [facility] following          Labs/Testing due today?: no    Sleep hours: 6.5        Participation in Care/Groups:  yes  Medication Compliant?: Yes  PRNS (last 24 hours): Antipsychotic (PO)    Restraints (last 24 hours):  no     CIWA (range last 24 hours):     COWS (range last 24 hours):      Alcohol screening (AUDIT) completed -   AUDIT Score: 0     If applicable, date SBIRT discussed in treatment team AND documented:     Tobacco - patient is a smoker: Have You Used Tobacco in the Past 30 Days: Never a smoker  Illegal Drugs use: Have You Used Any Illegal Substances Over the Past 12 Months: No    24 hour chart check complete: yes     Patient goal(s) for today:   Treatment team focus/goals: Plan to titrate her medications. Progress note - she has been doing well on the unit during the day, but her agitation increases in the evenings     LOS:  7  Expected LOS: TBD     Financial concerns/prescription coverage:  Medicare   Date of last family contact:       Family requesting physician contact today:    Discharge plan:  She will return home with her    Guns in the home:  no       Outpatient provider(s): TBD    Participating treatment team members: Arnol Cerrato - Ceci Jones RN

## 2018-08-30 NOTE — PROGRESS NOTES
Problem: Falls - Risk of  Goal: *Absence of Falls  Document Kizzy Fall Risk and appropriate interventions in the flowsheet. Outcome: Progressing Towards Goal  Fall Risk Interventions:  Mobility Interventions: Patient to call before getting OOB, Bed/chair exit alarm  Mentation Interventions: Adequate sleep, hydration, pain control, Bed/chair exit alarm, Door open when patient unattended  Medication Interventions: Teach patient to arise slowly, Patient to call before getting OOB, Bed/chair exit alarm  Elimination Interventions: Bed/chair exit alarm, Call light in reach  History of Falls Interventions: Door open when patient unattended, Room close to nurse's station    2320 Pt appears asleep in bed. Respirations even and unlabored. Will monitor with Q 15 safety checks.
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
## 2018-08-30 NOTE — PROGRESS NOTES
Problem: Discharge Planning  Goal: *Discharge to safe environment  Outcome: Progressing Towards Goal  Planning to go home with her  and possible home health   Goal: *Knowledge of medication management  Outcome: Progressing Towards Goal  She is compliant with her medications   Goal: *Knowledge of discharge instructions  Outcome: Not Progressing Towards Goal  She is not able to verbalize discharge plans     Problem: Patient Education: Go to Patient Education Activity  Goal: Patient/Family Education  Outcome: Progressing Towards Goal  Educate family on discharge needs

## 2018-08-30 NOTE — PROGRESS NOTES
Laboratory Monitoring for Antipsychotics: This patient is currently prescribed the following medication(s):   Current Facility-Administered Medications   Medication Dose Route Frequency    risperiDONE (RisperDAL) tablet 0.25 mg  0.25 mg Oral BID    acetaminophen (TYLENOL) tablet 325 mg  325 mg Oral TID    donepezil (ARICEPT) tablet 10 mg  10 mg Oral DAILY    citalopram (CELEXA) tablet 20 mg  20 mg Oral DAILY    losartan (COZAAR) tablet 100 mg  100 mg Oral DAILY    And    hydroCHLOROthiazide (HYDRODIURIL) tablet 25 mg  25 mg Oral DAILY    dilTIAZem CD (CARDIZEM CD) capsule 240 mg  240 mg Oral DAILY    docusate sodium (COLACE) capsule 100 mg  100 mg Oral DAILY     The following labs have been completed for monitoring of antipsychotics and/or mood stabilizers:    Height, Weight, BMI Estimation  Estimated body mass index is 24.63 kg/(m^2) as calculated from the following:    Height as of this encounter: 172.7 cm (68\"). Weight as of this encounter: 73.5 kg (162 lb). Vital Signs/Blood Pressure  Visit Vitals    /78    Pulse 88    Temp 98.2 °F (36.8 °C)    Resp 16    Ht 172.7 cm (68\")    Wt 73.5 kg (162 lb)    SpO2 93%    Breastfeeding No    BMI 24.63 kg/m2     Renal Function, Hepatic Function and Chemistry  Estimated Creatinine Clearance: 43.2 mL/min (based on Cr of 0.96). Lab Results   Component Value Date/Time    Sodium 137 08/25/2018 06:00 AM    Potassium 3.5 08/25/2018 06:00 AM    Chloride 100 08/25/2018 06:00 AM    CO2 29 08/25/2018 06:00 AM    Anion gap 8 08/25/2018 06:00 AM    BUN 20 08/25/2018 06:00 AM    Creatinine 0.96 08/25/2018 06:00 AM    BUN/Creatinine ratio 21 (H) 08/25/2018 06:00 AM    Bilirubin, total 0.6 08/25/2018 06:00 AM    Protein, total 7.6 08/25/2018 06:00 AM    Albumin 3.5 08/25/2018 06:00 AM    Globulin 4.1 (H) 08/25/2018 06:00 AM    A-G Ratio 0.9 (L) 08/25/2018 06:00 AM    ALT (SGPT) 18 08/25/2018 06:00 AM    Alk.  phosphatase 113 08/25/2018 06:00 AM     Lab Results   Component Value Date/Time    Glucose 119 (H) 08/25/2018 06:00 AM    Glucose 119 (H) 08/25/2018 06:00 AM    Glucose (POC) 120 (H) 08/30/2018 07:24 AM     Lab Results   Component Value Date/Time    Hemoglobin A1c 6.8 (H) 05/25/2018 12:16 PM     Hematology  Lab Results   Component Value Date/Time    WBC 7.0 08/25/2018 06:00 AM    RBC 4.08 08/25/2018 06:00 AM    HGB 13.3 08/25/2018 06:00 AM    HCT 39.4 08/25/2018 06:00 AM    MCV 96.6 08/25/2018 06:00 AM    MCH 32.6 08/25/2018 06:00 AM    MCHC 33.8 08/25/2018 06:00 AM    RDW 12.4 08/25/2018 06:00 AM    PLATELET 196 54/11/1736 06:00 AM     Lipids  Lab Results   Component Value Date/Time    Cholesterol, total 217 (H) 08/25/2018 06:00 AM    HDL Cholesterol 50 08/25/2018 06:00 AM    LDL, calculated 140 (H) 08/25/2018 06:00 AM    Triglyceride 135 08/25/2018 06:00 AM    CHOL/HDL Ratio 4.3 08/25/2018 06:00 AM     Thyroid Function  Lab Results   Component Value Date/Time    TSH 1.05 08/25/2018 06:00 AM     Assessment/Plan:  Recommended baseline laboratory monitoring has been completed based on this patient's current medication regimen.      Amos Fuentes, PharmD, BCPP, [facility], [address] [place] [address]

## 2018-08-30 NOTE — BH NOTES
GROUP THERAPY PROGRESS NOTE    Tray Regan participated in a Morning Process Group on the Geriatric Unit, with a focus identifying feelings, planning for the day, with music. Group time: 35 minutes. Personal goal for participation: To increase the capacity to shift ones mood, prepare for the day, and share in group singing. Goal orientation: The patient will be able to prepare for the day through group singing. Group therapy participation: When prompted, this patient participated in the group. Therapeutic interventions reviewed and discussed: The group members were introduce themselves by first names and participate in group singing as a way to increase their oxygen and blood flow and begin their day on a positive note. They were also asked to join in singing several songs. Impression of participation: The patient smiled and said, \"I plan to be going home in a couple of days. \" She was alert, generally oriented, and pleasant. She listened attentively to the music without singing and did add to the music with rhythmic hand clapping. She smiled at the conclusion of most of the songs and asked when this type of group was going to be held again. She expressed no current SI/HI or overt psychosis, although this was not validated in this group. Her affect was mildly euphoric and her mood matched her affect.

## 2018-08-31 LAB
GLUCOSE BLD STRIP.AUTO-MCNC: 120 MG/DL (ref 65–100)
GLUCOSE BLD STRIP.AUTO-MCNC: 125 MG/DL (ref 65–100)
SERVICE CMNT-IMP: ABNORMAL
SERVICE CMNT-IMP: ABNORMAL

## 2018-08-31 PROCEDURE — 74011250637 HC RX REV CODE- 250/637: Performed by: NURSE PRACTITIONER

## 2018-08-31 PROCEDURE — 82962 GLUCOSE BLOOD TEST: CPT

## 2018-08-31 PROCEDURE — 65220000003 HC RM SEMIPRIVATE PSYCH

## 2018-08-31 PROCEDURE — 74011250637 HC RX REV CODE- 250/637: Performed by: PSYCHIATRY & NEUROLOGY

## 2018-08-31 RX ORDER — CITALOPRAM 20 MG/1
20 TABLET, FILM COATED ORAL
Status: DISCONTINUED | OUTPATIENT
Start: 2018-09-01 | End: 2018-09-05 | Stop reason: HOSPADM

## 2018-08-31 RX ORDER — RISPERIDONE 0.5 MG/1
0.25 TABLET, FILM COATED ORAL DAILY
Status: DISCONTINUED | OUTPATIENT
Start: 2018-09-01 | End: 2018-09-02

## 2018-08-31 RX ADMIN — ACETAMINOPHEN 325 MG: 325 TABLET ORAL at 16:53

## 2018-08-31 RX ADMIN — DOCUSATE SODIUM 100 MG: 100 CAPSULE, LIQUID FILLED ORAL at 09:34

## 2018-08-31 RX ADMIN — ACETAMINOPHEN 325 MG: 325 TABLET ORAL at 09:36

## 2018-08-31 RX ADMIN — RISPERIDONE 0.25 MG: 0.5 TABLET, FILM COATED ORAL at 09:35

## 2018-08-31 RX ADMIN — DONEPEZIL HYDROCHLORIDE 10 MG: 10 TABLET, FILM COATED ORAL at 09:36

## 2018-08-31 RX ADMIN — CITALOPRAM HYDROBROMIDE 20 MG: 20 TABLET ORAL at 09:36

## 2018-08-31 RX ADMIN — ACETAMINOPHEN 325 MG: 325 TABLET ORAL at 22:05

## 2018-08-31 NOTE — BH NOTES
Patient's cardizem, cozaar and hydrodiuril held this morning as patient's BP was low. Charge nurse and MD aware.
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
## 2018-08-31 NOTE — PROGRESS NOTES
Problem: Altered Thought Process (Adult/Pediatric)  Goal: *STG: Complies with medication therapy  Outcome: Progressing Towards Goal    Received patient resting quietly in bed. NAD. On q 15 min. Safety checks. VSS. Ate 40% dinner, 50% supplement, fluids and applesauce with meds. Incontinent of urine twice. Compliant with meds. Pleasantly confused. Problem: Falls - Risk of  Goal: *Absence of Falls  Document Kizzy Fall Risk and appropriate interventions in the flowsheet. Outcome: Progressing Towards Goal  Fall Risk Interventions:  Mobility Interventions: Bed/chair exit alarm, Communicate number of staff needed for ambulation/transfer, Patient to call before getting OOB, Utilize walker, cane, or other assistive device, Utilize gait belt for transfers/ambulation    Mentation Interventions: Adequate sleep, hydration, pain control, Bed/chair exit alarm, Door open when patient unattended, Increase mobility, Reorient patient, Toileting rounds    Medication Interventions: Bed/chair exit alarm, Patient to call before getting OOB, Teach patient to arise slowly, Utilize gait belt for transfers/ambulation    Elimination Interventions: Bed/chair exit alarm, Patient to call for help with toileting needs, Toilet paper/wipes in reach, Toileting schedule/hourly rounds    History of Falls Interventions: Bed/chair exit alarm, Door open when patient unattended, Utilize gait belt for transfer/ambulation     No falls.
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
## 2018-08-31 NOTE — PROGRESS NOTES
Problem: Falls - Risk of  Goal: *Absence of Falls  Document Kizzy Fall Risk and appropriate interventions in the flowsheet.    Outcome: Progressing Towards Goal  Fall Risk Interventions:  Mobility Interventions: Bed/chair exit alarm, Patient to call before getting OOB, Utilize walker, cane, or other assistive device    Mentation Interventions: Adequate sleep, hydration, pain control, Bed/chair exit alarm, Door open when patient unattended, Room close to nurse's station    Medication Interventions: Bed/chair exit alarm, Patient to call before getting OOB, Teach patient to arise slowly    Elimination Interventions: Bed/chair exit alarm, Patient to call for help with toileting needs    History of Falls Interventions: Bed/chair exit alarm, Door open when patient unattended, Room close to nurse's station

## 2018-08-31 NOTE — BH NOTES
GROUP THERAPY PROGRESS NOTE    Tray Regan participated in a Morning Process Group on the Geriatric Unit, with a focus identifying feelings, planning for the day, with music. Group time: 30 minutes. Personal goal for participation: To increase the capacity to shift ones mood, prepare for the day, and share in group singing. Goal orientation: The patient will be able to prepare for the day through group singing. Group therapy participation: When prompted, this patient partially participated in the group. Therapeutic interventions reviewed and discussed: The group members were introduce themselves by first names and participate in group singing as a way to increase their oxygen and blood flow and begin their day on a positive note. They were also asked to join in singing several songs. Impression of participation: The patient joined the group about alf through and smiled as she sat down. She indicated that she was looking forward to some music. She The patient was alert, generally oriented, and cooperative. She preferred to listen to a few songs played both instrumentally and sung. She teared at [employer] and said she was thinking of members of family who were . She also mentioned that she was looking forward to seeing her brother and his wife and possibly leaving the hospital in a couple of days. She expressed no current SI/HI or overt psychosis. Her affect was labile. Her mood reflected her affect.

## 2018-08-31 NOTE — PROGRESS NOTES
Problem: Altered Thought Process (Adult/Pediatric)  Goal: *STG: Attends activities and groups  Outcome: Progressing Towards Goal  Patient attends and participates in groups, smiling, pleasant, medication compliant, patient did not eat breakfast, ate a few bites of lunch.

## 2018-08-31 NOTE — INTERDISCIPLINARY ROUNDS
Behavioral Health Interdisciplinary Rounds     Patient Name: Hali Landry  Age: 80 y.o. Room/Bed:  746/  Primary Diagnosis: Late onset Alzheimer's disease with behavioral disturbance   Admission Status: Involuntary Commitment     Readmission within 30 days: no  Power of  in place: yes  Patient requires a blocked bed: no          Reason for blocked bed: n/a    VTE Prophylaxis: Not indicated    Mobility needs/Fall risk: yes    Nutritional Plan: yes  Cons[facility]         Labs/Testing due today?: no    Sleep hours: 6.75       Participation in Care/Groups:  yes  Medication Compliant?: Yes  PRNS (last 24 hours): Antipsychotic (PO)    Restraints (last 24 hours):  no     CIWA (range last 24 hours):     COWS (range last 24 hours):      Alcohol screening (AUDIT) completed -   AUDIT Score: 0     If applicable, date SBIRT discussed in treatment team AND documented:     Tobacco - patient is a smoker: Have You Used Tobacco in the Past 30 Days: Never a smoker  Illegal Drugs use: Have You Used Any Illegal Substances Over the Past 12 Months: No    24 hour chart check complete: yes     Patient goal(s) for today:   Treatment team focus/goals: Plan to titrate her medications. Progress note - She remains pleasantly confused. She at times requires prn's. LOS:  8  Expected LOS: TBD     Financial concerns/prescription coverage:  Medicare   Date of last family contact:       Family requesting physician contact today:    Discharge plan: Plan to discharge home with her  .    Guns in the home:  no       Outpatient provider(s): TBD     Participating treatment team members: Oswald Gin Dr. Kevan Kocher -

## 2018-09-01 LAB
GLUCOSE BLD STRIP.AUTO-MCNC: 124 MG/DL (ref 65–100)
GLUCOSE BLD STRIP.AUTO-MCNC: 134 MG/DL (ref 65–100)
SERVICE CMNT-IMP: ABNORMAL
SERVICE CMNT-IMP: ABNORMAL

## 2018-09-01 PROCEDURE — 82962 GLUCOSE BLOOD TEST: CPT

## 2018-09-01 PROCEDURE — 74011250637 HC RX REV CODE- 250/637: Performed by: NURSE PRACTITIONER

## 2018-09-01 PROCEDURE — 65220000003 HC RM SEMIPRIVATE PSYCH

## 2018-09-01 PROCEDURE — 74011250637 HC RX REV CODE- 250/637: Performed by: PSYCHIATRY & NEUROLOGY

## 2018-09-01 PROCEDURE — 74011250637 HC RX REV CODE- 250/637

## 2018-09-01 RX ORDER — LORAZEPAM 0.5 MG/1
0.5 TABLET ORAL
Status: COMPLETED | OUTPATIENT
Start: 2018-09-01 | End: 2018-09-01

## 2018-09-01 RX ADMIN — ACETAMINOPHEN 325 MG: 325 TABLET ORAL at 20:08

## 2018-09-01 RX ADMIN — ACETAMINOPHEN 325 MG: 325 TABLET ORAL at 09:52

## 2018-09-01 RX ADMIN — DONEPEZIL HYDROCHLORIDE 10 MG: 10 TABLET, FILM COATED ORAL at 09:52

## 2018-09-01 RX ADMIN — CITALOPRAM HYDROBROMIDE 20 MG: 20 TABLET ORAL at 20:08

## 2018-09-01 RX ADMIN — RISPERIDONE 0.25 MG: 0.5 TABLET, FILM COATED ORAL at 17:14

## 2018-09-01 RX ADMIN — DOCUSATE SODIUM 100 MG: 100 CAPSULE, LIQUID FILLED ORAL at 09:52

## 2018-09-01 RX ADMIN — LOSARTAN POTASSIUM 100 MG: 50 TABLET ORAL at 09:52

## 2018-09-01 RX ADMIN — DILTIAZEM HYDROCHLORIDE 240 MG: 240 CAPSULE, COATED, EXTENDED RELEASE ORAL at 09:52

## 2018-09-01 RX ADMIN — LORAZEPAM 0.5 MG: 0.5 TABLET ORAL at 13:50

## 2018-09-01 RX ADMIN — HYDROCHLOROTHIAZIDE 25 MG: 25 TABLET ORAL at 09:52

## 2018-09-01 RX ADMIN — ACETAMINOPHEN 325 MG: 325 TABLET ORAL at 13:50

## 2018-09-01 RX ADMIN — OLANZAPINE 2.5 MG: 2.5 TABLET, FILM COATED ORAL at 09:52

## 2018-09-01 NOTE — PROGRESS NOTES
Problem: Falls - Risk of  Goal: *Absence of Falls  Document Kizzy Fall Risk and appropriate interventions in the flowsheet. Outcome: Progressing Towards Goal  Fall Risk Interventions:  Mobility Interventions: Bed/chair exit alarm, Communicate number of staff needed for ambulation/transfer, Patient to call before getting OOB, Utilize walker, cane, or other assistive device  In bed asleep with respirations noted as even and unlabored as chest was rising and falling. Will continue to monitor for safety and 15 minute checks throughout shift.   Mentation Interventions: Adequate sleep, hydration, pain control, Bed/chair exit alarm, Door open when patient unattended, Increase mobility, Reorient patient    Medication Interventions: Bed/chair exit alarm, Patient to call before getting OOB, Teach patient to arise slowly    Elimination Interventions: Bed/chair exit alarm, Patient to call for help with toileting needs, Toilet paper/wipes in reach, Toileting schedule/hourly rounds    History of Falls Interventions: Bed/chair exit alarm, Door open when patient unattended

## 2018-09-01 NOTE — INTERDISCIPLINARY ROUNDS
Behavioral Health Interdisciplinary Rounds     Patient Name: Pam Mckeon  Age: 80 y.o. Room/Bed:  746/01  Primary Diagnosis: Late onset Alzheimer's disease with behavioral disturbance   Admission Status: Involuntary Commitment     Readmission within 30 days: no  Power of  in place: yes  Patient requires a blocked bed: no          Reason for blocked bed: n/a    VTE Prophylaxis: Not indicated    Mobility needs/Fall risk: yes    Nutritional Plan: yes  [facility]         Labs/Testing due today?: no    Sleep hours: 1.75       Participation in Care/Groups:  yes  Medication Compliant?: Yes  PRNS (last 24 hours): Antipsychotic (IM)    Restraints (last 24 hours):  no     CIWA (range last 24 hours):     COWS (range last 24 hours):      Alcohol screening (AUDIT) completed -   AUDIT Score: 0     If applicable, date SBIRT discussed in treatment team AND documented:     Tobacco - patient is a smoker: Have You Used Tobacco in the Past 30 Days: Never a smoker  Illegal Drugs use: Have You Used Any Illegal Substances Over the Past 12 Months: No    24 hour chart check complete: yes     Patient goal(s) for today:   Treatment team focus/goals: Continued medication and program compliance  Progress note Patient has been cuevas and repeatedly taking off cast. Patient has been inappropriate with staff.      LOS:  9  Expected LOS: TBD    [facility] concerns[facility]pres[facility]  Date of last family contact:       Family requesting physician contact today:  NO  Discharge plan: TBD  Guns in the home:   NO      Outpatient provider(s):     Participating treatment team members: BARRINGTON Díaz; Dr. Ebony Garza; Ajit Fan RN

## 2018-09-01 NOTE — PROGRESS NOTES
Problem: Altered Thought Process (Adult/Pediatric)  Goal: *STG: Participates in treatment plan  Outcome: Not Progressing Towards Goal  Pt is pleasently confused. Anxious restless and irritable. PRN given   Med/Meal compliant   Poor appetite. NAD noted   Will continue to monitor.

## 2018-09-01 NOTE — PROGRESS NOTES
Problem: Altered Thought Process (Adult/Pediatric)  Goal: *STG: Participates in treatment plan  Outcome: Not Progressing Towards Goal  Pt is out in the DR still anxious and irritable  Med/meal compliant   NAD   Will continue to monitor
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
## 2018-09-01 NOTE — PROGRESS NOTES
Problem: Altered Thought Process (Adult/Pediatric)  Goal: *STG: Complies with medication therapy  Outcome: Progressing Towards Goal  Patient remains pleasantly confused. Talks to people not present in room. Poor appetite. Ate 25 % dinner, drank fluids. Incontinent of urine twice. Problem: Falls - Risk of  Goal: *Absence of Falls  Document Kizzy Fall Risk and appropriate interventions in the flowsheet. Outcome: Progressing Towards Goal  Fall Risk Interventions:  Mobility Interventions: Bed/chair exit alarm, Patient to call before getting OOB    Mentation Interventions: Adequate sleep, hydration, pain control, More frequent rounding, Reorient patient    Medication Interventions: Teach patient to arise slowly    Elimination Interventions: Bed/chair exit alarm, Patient to call for help with toileting needs, Toileting schedule/hourly rounds    History of Falls Interventions: Bed/chair exit alarm, Door open when patient unattended, Evaluate medications/consider consulting pharmacy     No falls.

## 2018-09-02 LAB
GLUCOSE BLD STRIP.AUTO-MCNC: 127 MG/DL (ref 65–100)
GLUCOSE BLD STRIP.AUTO-MCNC: 170 MG/DL (ref 65–100)
SERVICE CMNT-IMP: ABNORMAL
SERVICE CMNT-IMP: ABNORMAL

## 2018-09-02 PROCEDURE — 65220000003 HC RM SEMIPRIVATE PSYCH

## 2018-09-02 PROCEDURE — 82962 GLUCOSE BLOOD TEST: CPT

## 2018-09-02 PROCEDURE — 74011250637 HC RX REV CODE- 250/637: Performed by: PSYCHIATRY & NEUROLOGY

## 2018-09-02 PROCEDURE — 74011250637 HC RX REV CODE- 250/637

## 2018-09-02 RX ORDER — RISPERIDONE 0.5 MG/1
0.25 TABLET, FILM COATED ORAL 2 TIMES DAILY
Status: DISCONTINUED | OUTPATIENT
Start: 2018-09-02 | End: 2018-09-05 | Stop reason: HOSPADM

## 2018-09-02 RX ADMIN — DONEPEZIL HYDROCHLORIDE 10 MG: 10 TABLET, FILM COATED ORAL at 09:50

## 2018-09-02 RX ADMIN — CITALOPRAM HYDROBROMIDE 20 MG: 20 TABLET ORAL at 20:15

## 2018-09-02 RX ADMIN — LOSARTAN POTASSIUM 100 MG: 50 TABLET ORAL at 09:51

## 2018-09-02 RX ADMIN — DILTIAZEM HYDROCHLORIDE 240 MG: 240 CAPSULE, COATED, EXTENDED RELEASE ORAL at 09:49

## 2018-09-02 RX ADMIN — RISPERIDONE 0.25 MG: 0.5 TABLET, FILM COATED ORAL at 14:05

## 2018-09-02 RX ADMIN — ACETAMINOPHEN 325 MG: 325 TABLET ORAL at 16:13

## 2018-09-02 RX ADMIN — HYDROCHLOROTHIAZIDE 25 MG: 25 TABLET ORAL at 09:51

## 2018-09-02 RX ADMIN — RISPERIDONE 0.25 MG: 0.5 TABLET, FILM COATED ORAL at 17:09

## 2018-09-02 RX ADMIN — ACETAMINOPHEN 325 MG: 325 TABLET ORAL at 20:15
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
## 2018-09-02 NOTE — PROGRESS NOTES
Problem: Altered Thought Process (Adult/Pediatric)  Goal: *STG: Participates in treatment plan  Outcome: Progressing Towards Goal  Pt is cooperative less anxious and irritable today   Pleasantly confused. Visiting with family   Med/meal compliant   NAD noted.    Will continue to monitor

## 2018-09-02 NOTE — PROGRESS NOTES
Problem: Falls - Risk of  Goal: *Absence of Falls  Document Kizzy Fall Risk and appropriate interventions in the flowsheet.    Outcome: Progressing Towards Goal  Fall Risk Interventions:  Mobility Interventions: Bed/chair exit alarm, Communicate number of staff needed for ambulation/transfer, Utilize walker, cane, or other assistive device    Mentation Interventions: Adequate sleep, hydration, pain control, Bed/chair exit alarm, Door open when patient unattended, More frequent rounding    Medication Interventions: Bed/chair exit alarm, Teach patient to arise slowly    Elimination Interventions: Bed/chair exit alarm    History of Falls Interventions: Bed/chair exit alarm, Door open when patient unattended

## 2018-09-02 NOTE — INTERDISCIPLINARY ROUNDS
Behavioral Health Interdisciplinary Rounds      Patient Name: Donell Ortega                                    Age: 80 y.o. Room/Bed:  746/  Primary Diagnosis: Late onset Alzheimer's disease with behavioral disturbance             Admission Status: Involuntary Commitment                                                          Readmission within 30 days: no  Power of  in place: yes  Patient requires a blocked bed: no          Reason for blocked bed: n/a     VTE Prophylaxis: Not indicated     Mobility needs/Fall risk: yes                                      Nutritional Plan: yes  Consults: [facility] following                                                                                                                            Labs/Testing due today?: no     Sleep hours: 7.25                                                                                               Participation in Care/Groups:  no  Medication Compliant?: Yes  PRNS (last 24 hours):  Antipsychotic (PO)                                         Restraints (last 24 hours):  no      CIWA (range last 24 hours):               COWS (range last 24 hours):       Alcohol screening (AUDIT) completed -   AUDIT Score: 0     If applicable, date SBIRT discussed in treatment team AND documented:      Tobacco - patient is a smoker: Have You Used Tobacco in the Past 30 Days: Never a smoker  Illegal Drugs use: Have You Used Any Illegal Substances Over the Past 12 Months: No     24 hour chart check complete: yes      Patient goal(s) for today:   Treatment team focus/goals: Continued medication and program compliance  Progress note Patient has been cuevas and repeatedly taking off cast. Patient has been inappropriate with staff.      LOS:  9                                            Expected LOS: TBD     Financial concerns/prescription [place]  [facility]  Date of last family contact: Family requesting physician contact today:  NO  Discharge plan: TBD  Guns in the home:                NO                                                                                     Outpatient provider(s):      Participating treatment team members: BARRINGTON Evans; Dr. Christian Emmanuel; Demetrius Arias RN

## 2018-09-02 NOTE — PROGRESS NOTES
Problem: Altered Thought Process (Adult/Pediatric)  Goal: *STG: Seeks staff when feelings of anxiety and fear arise  Outcome: Progressing Towards Goal  Received pt lying in bed. She is pleasantly confused upon approach and smiling. VSS. Pt had bm and good pericare provided. Pt was assisted to gerichair and her weight was taken on standing scale= 142.2lb. Pt given dinner. Required some assistance to eat. Staff give emotional support and encouragement.

## 2018-09-03 LAB
GLUCOSE BLD STRIP.AUTO-MCNC: 120 MG/DL (ref 65–100)
GLUCOSE BLD STRIP.AUTO-MCNC: 192 MG/DL (ref 65–100)
SERVICE CMNT-IMP: ABNORMAL
SERVICE CMNT-IMP: ABNORMAL

## 2018-09-03 PROCEDURE — 74011250637 HC RX REV CODE- 250/637: Performed by: PSYCHIATRY & NEUROLOGY

## 2018-09-03 PROCEDURE — 74011250637 HC RX REV CODE- 250/637: Performed by: NURSE PRACTITIONER

## 2018-09-03 PROCEDURE — 65220000003 HC RM SEMIPRIVATE PSYCH

## 2018-09-03 PROCEDURE — 82962 GLUCOSE BLOOD TEST: CPT

## 2018-09-03 PROCEDURE — 74011250637 HC RX REV CODE- 250/637

## 2018-09-03 RX ADMIN — HYDROCHLOROTHIAZIDE 25 MG: 25 TABLET ORAL at 09:27

## 2018-09-03 RX ADMIN — RISPERIDONE 0.25 MG: 0.5 TABLET, FILM COATED ORAL at 17:38

## 2018-09-03 RX ADMIN — LOSARTAN POTASSIUM 100 MG: 50 TABLET ORAL at 09:25

## 2018-09-03 RX ADMIN — ACETAMINOPHEN 325 MG: 325 TABLET ORAL at 20:17

## 2018-09-03 RX ADMIN — ACETAMINOPHEN 325 MG: 325 TABLET ORAL at 17:38

## 2018-09-03 RX ADMIN — RISPERIDONE 0.25 MG: 0.5 TABLET, FILM COATED ORAL at 09:26

## 2018-09-03 RX ADMIN — DOCUSATE SODIUM 100 MG: 100 CAPSULE, LIQUID FILLED ORAL at 09:28

## 2018-09-03 RX ADMIN — ACETAMINOPHEN 325 MG: 325 TABLET ORAL at 09:28

## 2018-09-03 RX ADMIN — DONEPEZIL HYDROCHLORIDE 10 MG: 10 TABLET, FILM COATED ORAL at 09:27

## 2018-09-03 RX ADMIN — DILTIAZEM HYDROCHLORIDE 240 MG: 240 CAPSULE, COATED, EXTENDED RELEASE ORAL at 09:27

## 2018-09-03 RX ADMIN — CITALOPRAM HYDROBROMIDE 20 MG: 20 TABLET ORAL at 20:17

## 2018-09-03 NOTE — PROGRESS NOTES
Problem: Falls - Risk of  Goal: *Absence of Falls  Document Kizzy Fall Risk and appropriate interventions in the flowsheet. Outcome: Progressing Towards Goal  Fall Risk Interventions:  Mobility Interventions: Bed/chair exit alarm, Communicate number of staff needed for ambulation/transfer, Patient to call before getting OOB  Mentation Interventions: Adequate sleep, hydration, pain control, Bed/chair exit alarm, Door open when patient unattended  Medication Interventions: Patient to call before getting OOB, Teach patient to arise slowly  Elimination Interventions: Bed/chair exit alarm, Call light in reach  History of Falls Interventions: Bed/chair exit alarm, Door open when patient unattended    2320 Pt appears asleep in bed. Respirations even and unlabored. Will monitor with Q 15 safety checks.
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
## 2018-09-03 NOTE — PROGRESS NOTES
Problem: Altered Thought Process (Adult/Pediatric)  Goal: *STG: Participates in treatment plan  Outcome: Progressing Towards Goal  Patient confused and oriented to self only. Patient med and meal compliant. Up to stand and pivot with 1 assist .   Goal: Interventions  Outcome: Progressing Towards Goal  Medication management,. Q 15 min safety rounds. Group therapy.

## 2018-09-03 NOTE — PROGRESS NOTES
Problem: Altered Thought Process (Adult/Pediatric)  Goal: *STG: Remains safe in hospital  Outcome: Progressing Towards Goal  Pt resting quietly in bed, appears to be asleep.  q15 min safety checks continued by staff  Goal: *STG: Complies with medication therapy  Outcome: Progressing Towards Goal  Pt is medication compliant  Goal: *STG: Decreased hallucinations  Outcome: Progressing Towards Goal  Pt does not appear to be experiencing hallucinations

## 2018-09-03 NOTE — BH NOTES
GROUP THERAPY PROGRESS NOTE    Tray Regan did not participate in a Morning Process Group on the Geriatric Unit because there were not enough patients available for a group at the normal group meeting time. I will check back with the unit this afternoon.

## 2018-09-03 NOTE — BH NOTES
GROUP THERAPY PROGRESS NOTE    Tray Regan did not participate in a 45 minute afternoon Process Group on the Geriatric Unit with a focus on shifting ones feelings to a positive note and preparing for the rest of the day through group singing.

## 2018-09-03 NOTE — INTERDISCIPLINARY ROUNDS
Behavioral Health Interdisciplinary Rounds     Patient Name: Caryle Dire  Age: 80 y.o. Room/Bed:  746/01  Primary Diagnosis: Late onset Alzheimer's disease with behavioral disturbance   Admission Status: Involuntary Commitment     Readmission within 30 days: no  Power of  in place: yes  Patient requires a blocked bed: no          Reason for blocked bed: n/a    VTE Prophylaxis: Not indicated    Mobility needs/Fall risk: yes    Nutritional Plan: yes  Consults: [facility] following          Labs/Testing due today?: no    Sleep hours: 6.0        Participation in Care/Groups:  no  Medication Compliant?: Yes (refused Colace)  PRNS (last 24 hours): None    Restraints (last 24 hours):  no     CIWA (range last 24 hours):     COWS (range last 24 hours):      Alcohol screening (AUDIT) completed -   AUDIT Score: 0     If applicable, date SBIRT discussed in treatment team AND documented:     Tobacco - patient is a smoker: Have You Used Tobacco in the Past 30 Days: Never a smoker  Illegal Drugs use: Have You Used Any Illegal Substances Over the Past 12 Months: No    24 hour chart check complete: yes     Patient goal(s) for today:   Treatment team focus/goals: Meet with daughter  Progress note: Patient in a good mood; happy to see family.   SW to call daughter tomorrow with discharge plans    LOS:  11  Expected LOS: TBD    Financial concerns/prescription coverage:  [facility]  Date of last family contact:  9/3 SW met with daughter and      Family requesting physician contact today:  No  Discharge plan: TBD  Guns in the home: No        Outpatient provider(s): TBD    Participating treatment team members: BARRINGTON Dominguez; Dr. Tee Florez MD; Mike Ross RN

## 2018-09-04 LAB
GLUCOSE BLD STRIP.AUTO-MCNC: 122 MG/DL (ref 65–100)
GLUCOSE BLD STRIP.AUTO-MCNC: 122 MG/DL (ref 65–100)
SERVICE CMNT-IMP: ABNORMAL
SERVICE CMNT-IMP: ABNORMAL

## 2018-09-04 PROCEDURE — 74011250637 HC RX REV CODE- 250/637

## 2018-09-04 PROCEDURE — 74011250637 HC RX REV CODE- 250/637: Performed by: PSYCHIATRY & NEUROLOGY

## 2018-09-04 PROCEDURE — 65220000003 HC RM SEMIPRIVATE PSYCH

## 2018-09-04 PROCEDURE — 74011250637 HC RX REV CODE- 250/637: Performed by: NURSE PRACTITIONER

## 2018-09-04 PROCEDURE — 82962 GLUCOSE BLOOD TEST: CPT

## 2018-09-04 RX ADMIN — DILTIAZEM HYDROCHLORIDE 240 MG: 240 CAPSULE, COATED, EXTENDED RELEASE ORAL at 08:55

## 2018-09-04 RX ADMIN — DONEPEZIL HYDROCHLORIDE 10 MG: 10 TABLET, FILM COATED ORAL at 08:55

## 2018-09-04 RX ADMIN — CITALOPRAM HYDROBROMIDE 20 MG: 20 TABLET ORAL at 21:36

## 2018-09-04 RX ADMIN — RISPERIDONE 0.25 MG: 0.5 TABLET, FILM COATED ORAL at 17:11

## 2018-09-04 RX ADMIN — ACETAMINOPHEN 325 MG: 325 TABLET ORAL at 17:14

## 2018-09-04 RX ADMIN — DOCUSATE SODIUM 100 MG: 100 CAPSULE, LIQUID FILLED ORAL at 08:55

## 2018-09-04 RX ADMIN — RISPERIDONE 0.25 MG: 0.5 TABLET, FILM COATED ORAL at 08:56

## 2018-09-04 RX ADMIN — ACETAMINOPHEN 325 MG: 325 TABLET ORAL at 21:36

## 2018-09-04 RX ADMIN — LOSARTAN POTASSIUM 100 MG: 50 TABLET ORAL at 08:55

## 2018-09-04 RX ADMIN — HYDROCHLOROTHIAZIDE 25 MG: 25 TABLET ORAL at 08:55

## 2018-09-04 RX ADMIN — ACETAMINOPHEN 325 MG: 325 TABLET ORAL at 08:55

## 2018-09-04 NOTE — PROGRESS NOTES
Problem: Altered Thought Process (Adult/Pediatric)  Goal: *STG: Seeks staff when feelings of anxiety and fear arise  Outcome: Progressing Towards Goal  Patient denies SI. Patient did not eat dinner. Mood is confused, irritable.
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
## 2018-09-04 NOTE — PROGRESS NOTES
Problem: Falls - Risk of  Goal: *Absence of Falls  Document Kizzy Fall Risk and appropriate interventions in the flowsheet. Outcome: Progressing Towards Goal  Fall Risk Interventions:  Mobility Interventions: Bed/chair exit alarm, Patient to call before getting OOB  Mentation Interventions: Adequate sleep, hydration, pain control, Bed/chair exit alarm, Door open when patient unattended  Medication Interventions: Patient to call before getting OOB, Teach patient to arise slowly  Elimination Interventions: Bed/chair exit alarm, Call light in reach  History of Falls Interventions: Bed/chair exit alarm    2355 Pt resting quietly in bed. Respirations even and unlabored. Will monitor with Q 15 safety checks.

## 2018-09-04 NOTE — BH NOTES
GROUP THERAPY PROGRESS NOTE    Tray Regan participated in a Morning Process Group on the Geriatric Unit, with a focus identifying feelings, planning for the day, with music. Group time: 45 minutes. Personal goal for participation: To increase the capacity to shift ones mood, prepare for the day, and share in group singing. Goal orientation: The patient will be able to prepare for the day through group singing. Group therapy participation: When prompted, this patient partially participated in the group. Therapeutic interventions reviewed and discussed: The group members were introduce themselves by first names and participate in group singing as a way to increase their oxygen and blood flow and begin their day on a positive note. They were also asked to join in singing several songs. Impression of participation: The patient said she was feeling \"cold\" and had a blanket brought to her. She added that she wanted to go home and that, \"I need to get ready for Zoroastrianism. \" The patient was alert, very generally oriented, and tired looking. Her eyelids were drooping occassionally. She expressed no current SI/HI but some of her comments, like wanting to get ready for Zoroastrianism, seemed out of context. She did not sing along this morning. Her affect was sad and depressed. Her mood matched her affect. By the end of group she said she wanted to go back to her room.

## 2018-09-04 NOTE — PROGRESS NOTES
Problem: Discharge Planning  Goal: *Discharge to safe environment  Outcome: Progressing Towards Goal  She will go home with Home Health   Her  and family are supportive   Goal: *Knowledge of medication management  Outcome: Progressing Towards Goal  She is medications complaint   Goal: *Knowledge of discharge instructions  Outcome: Not Progressing Towards Goal  She is not able to verbalize discharge plans. Problem: Patient Education: Go to Patient Education Activity  Goal: Patient/Family Education  Outcome: Not Progressing Towards Goal  SW will educate family on a discharge plan.

## 2018-09-04 NOTE — INTERDISCIPLINARY ROUNDS
Behavioral Health Interdisciplinary Rounds     Patient Name: Caroline Jones  Age: 80 y.o. Room/Bed:  746/01  Primary Diagnosis: Late onset Alzheimer's disease with behavioral disturbance   Admission Status: Involuntary Commitment     Readmission within 30 days: no  Power of  in place: yes  Patient requires a blocked bed: no          Reason for blocked bed: n/a    VTE Prophylaxis: Not indicated    Mobility needs/Fall risk: yes    Nutritional Plan: yes  Consults: [facility] following          Labs/Testing due today?: no    Sleep hours: 6.0      Participation in Care/Groups:  no  Medication Compliant?: Yes  PRNS (last 24 hours): None    Restraints (last 24 hours):  no     CIWA (range last 24 hours):     COWS (range last 24 hours):      Alcohol screening (AUDIT) completed -   AUDIT Score: 0     If applicable, date SBIRT discussed in treatment team AND documented:     Tobacco - patient is a smoker: Have You Used Tobacco in the Past 30 Days: Never a smoker  Illegal Drugs use: Have You Used Any Illegal Substances Over the Past 12 Months: No    24 hour chart check complete: yes     Patient goal(s) for today:   Treatment team focus/goals:Plan to encourage patient to participate in unit activities and self care. Progress note : She has been pleasant and complaint on the unit.       LOS:  12  Expected LOS: TBD     Participating treatment team members: Miladys Lepe -

## 2018-09-04 NOTE — PROGRESS NOTES
Problem: Falls - Risk of  Goal: *Absence of Falls  Document Kizzy Fall Risk and appropriate interventions in the flowsheet. Outcome: Progressing Towards Goal  Fall Risk Interventions:  Mobility Interventions: Bed/chair exit alarm    Mentation Interventions: Door open when patient unattended    Medication Interventions: Evaluate medications/consider consulting pharmacy    Elimination Interventions: Toileting schedule/hourly rounds    History of Falls Interventions: Bed/chair exit alarm        Comments: Fall tool up to date and accurate. Pt alert. Two staff assisting pt with mobility. Pt free from falls.

## 2018-09-05 ENCOUNTER — HOSPITAL ENCOUNTER (OUTPATIENT)
Age: 83
Setting detail: OBSERVATION
Discharge: PSYCHIATRIC OR SUBSTANCE USE FACILITY | End: 2018-09-06
Attending: INTERNAL MEDICINE | Admitting: INTERNAL MEDICINE
Payer: MEDICARE

## 2018-09-05 VITALS
BODY MASS INDEX: 21.55 KG/M2 | OXYGEN SATURATION: 93 % | HEART RATE: 68 BPM | TEMPERATURE: 98.1 F | SYSTOLIC BLOOD PRESSURE: 103 MMHG | RESPIRATION RATE: 16 BRPM | WEIGHT: 142.2 LBS | HEIGHT: 68 IN | DIASTOLIC BLOOD PRESSURE: 63 MMHG

## 2018-09-05 PROBLEM — R55 SYNCOPE, VASOVAGAL: Status: ACTIVE | Noted: 2018-09-05

## 2018-09-05 LAB
GLUCOSE BLD STRIP.AUTO-MCNC: 103 MG/DL (ref 65–100)
GLUCOSE BLD STRIP.AUTO-MCNC: 111 MG/DL (ref 65–100)
GLUCOSE BLD STRIP.AUTO-MCNC: 227 MG/DL (ref 65–100)
SERVICE CMNT-IMP: ABNORMAL

## 2018-09-05 PROCEDURE — 74011250637 HC RX REV CODE- 250/637: Performed by: PSYCHIATRY & NEUROLOGY

## 2018-09-05 PROCEDURE — 74011250636 HC RX REV CODE- 250/636: Performed by: INTERNAL MEDICINE

## 2018-09-05 PROCEDURE — 99218 HC RM OBSERVATION: CPT

## 2018-09-05 PROCEDURE — 74011250637 HC RX REV CODE- 250/637

## 2018-09-05 PROCEDURE — 93005 ELECTROCARDIOGRAM TRACING: CPT

## 2018-09-05 PROCEDURE — 96360 HYDRATION IV INFUSION INIT: CPT

## 2018-09-05 PROCEDURE — 74011250637 HC RX REV CODE- 250/637: Performed by: NURSE PRACTITIONER

## 2018-09-05 PROCEDURE — 96361 HYDRATE IV INFUSION ADD-ON: CPT

## 2018-09-05 PROCEDURE — 82962 GLUCOSE BLOOD TEST: CPT

## 2018-09-05 RX ORDER — DONEPEZIL HYDROCHLORIDE 10 MG/1
10 TABLET, FILM COATED ORAL DAILY
Status: CANCELLED | OUTPATIENT
Start: 2018-09-06

## 2018-09-05 RX ORDER — DOCUSATE SODIUM 100 MG/1
100 CAPSULE, LIQUID FILLED ORAL DAILY
Status: DISCONTINUED | OUTPATIENT
Start: 2018-09-06 | End: 2018-09-06 | Stop reason: HOSPADM

## 2018-09-05 RX ORDER — DOCUSATE SODIUM 100 MG/1
100 CAPSULE, LIQUID FILLED ORAL DAILY
Status: CANCELLED | OUTPATIENT
Start: 2018-09-06

## 2018-09-05 RX ORDER — CITALOPRAM 20 MG/1
20 TABLET, FILM COATED ORAL
Status: DISCONTINUED | OUTPATIENT
Start: 2018-09-05 | End: 2018-09-06 | Stop reason: HOSPADM

## 2018-09-05 RX ORDER — SODIUM CHLORIDE 0.9 % (FLUSH) 0.9 %
5-10 SYRINGE (ML) INJECTION AS NEEDED
Status: CANCELLED | OUTPATIENT
Start: 2018-09-05

## 2018-09-05 RX ORDER — SODIUM CHLORIDE 0.9 % (FLUSH) 0.9 %
5-10 SYRINGE (ML) INJECTION AS NEEDED
Status: DISCONTINUED | OUTPATIENT
Start: 2018-09-05 | End: 2018-09-06 | Stop reason: HOSPADM

## 2018-09-05 RX ORDER — CITALOPRAM 20 MG/1
20 TABLET, FILM COATED ORAL
Status: CANCELLED | OUTPATIENT
Start: 2018-09-05

## 2018-09-05 RX ORDER — SODIUM CHLORIDE 0.9 % (FLUSH) 0.9 %
5-10 SYRINGE (ML) INJECTION EVERY 8 HOURS
Status: CANCELLED | OUTPATIENT
Start: 2018-09-05

## 2018-09-05 RX ORDER — DILTIAZEM HYDROCHLORIDE 240 MG/1
240 CAPSULE, COATED, EXTENDED RELEASE ORAL DAILY
Status: DISCONTINUED | OUTPATIENT
Start: 2018-09-06 | End: 2018-09-06

## 2018-09-05 RX ORDER — ACETAMINOPHEN 325 MG/1
325 TABLET ORAL 3 TIMES DAILY
Status: CANCELLED | OUTPATIENT
Start: 2018-09-05

## 2018-09-05 RX ORDER — MAGNESIUM SULFATE 100 %
4 CRYSTALS MISCELLANEOUS AS NEEDED
Status: DISCONTINUED | OUTPATIENT
Start: 2018-09-05 | End: 2018-09-06

## 2018-09-05 RX ORDER — SODIUM CHLORIDE 0.9 % (FLUSH) 0.9 %
5-10 SYRINGE (ML) INJECTION EVERY 8 HOURS
Status: DISCONTINUED | OUTPATIENT
Start: 2018-09-05 | End: 2018-09-06 | Stop reason: HOSPADM

## 2018-09-05 RX ORDER — DONEPEZIL HYDROCHLORIDE 10 MG/1
10 TABLET, FILM COATED ORAL DAILY
Status: DISCONTINUED | OUTPATIENT
Start: 2018-09-06 | End: 2018-09-06 | Stop reason: HOSPADM

## 2018-09-05 RX ORDER — RISPERIDONE 0.25 MG/1
0.25 TABLET, FILM COATED ORAL 2 TIMES DAILY
Status: DISCONTINUED | OUTPATIENT
Start: 2018-09-05 | End: 2018-09-06 | Stop reason: HOSPADM

## 2018-09-05 RX ORDER — DEXTROSE 50 % IN WATER (D50W) INTRAVENOUS SYRINGE
12.5-25 AS NEEDED
Status: DISCONTINUED | OUTPATIENT
Start: 2018-09-05 | End: 2018-09-06

## 2018-09-05 RX ORDER — DILTIAZEM HYDROCHLORIDE 240 MG/1
240 CAPSULE, COATED, EXTENDED RELEASE ORAL DAILY
Status: CANCELLED | OUTPATIENT
Start: 2018-09-06

## 2018-09-05 RX ORDER — ADHESIVE BANDAGE
30 BANDAGE TOPICAL DAILY PRN
Status: CANCELLED | OUTPATIENT
Start: 2018-09-05

## 2018-09-05 RX ORDER — POLYETHYLENE GLYCOL 3350 17 G/17G
17 POWDER, FOR SOLUTION ORAL DAILY PRN
Status: DISCONTINUED | OUTPATIENT
Start: 2018-09-05 | End: 2018-09-06 | Stop reason: HOSPADM

## 2018-09-05 RX ORDER — POLYETHYLENE GLYCOL 3350 17 G/17G
17 POWDER, FOR SOLUTION ORAL DAILY PRN
Status: CANCELLED | OUTPATIENT
Start: 2018-09-05

## 2018-09-05 RX ORDER — ACETAMINOPHEN 325 MG/1
325 TABLET ORAL 3 TIMES DAILY
Status: DISCONTINUED | OUTPATIENT
Start: 2018-09-05 | End: 2018-09-06 | Stop reason: HOSPADM

## 2018-09-05 RX ORDER — SODIUM CHLORIDE 9 MG/ML
100 INJECTION, SOLUTION INTRAVENOUS CONTINUOUS
Status: DISCONTINUED | OUTPATIENT
Start: 2018-09-05 | End: 2018-09-06 | Stop reason: HOSPADM

## 2018-09-05 RX ORDER — INSULIN LISPRO 100 [IU]/ML
INJECTION, SOLUTION INTRAVENOUS; SUBCUTANEOUS
Status: DISCONTINUED | OUTPATIENT
Start: 2018-09-05 | End: 2018-09-06

## 2018-09-05 RX ORDER — ADHESIVE BANDAGE
30 BANDAGE TOPICAL DAILY PRN
Status: DISCONTINUED | OUTPATIENT
Start: 2018-09-05 | End: 2018-09-06 | Stop reason: HOSPADM

## 2018-09-05 RX ORDER — RISPERIDONE 0.5 MG/1
0.25 TABLET, FILM COATED ORAL 2 TIMES DAILY
Status: CANCELLED | OUTPATIENT
Start: 2018-09-05

## 2018-09-05 RX ORDER — TRAZODONE HYDROCHLORIDE 50 MG/1
25 TABLET ORAL
Status: CANCELLED | OUTPATIENT
Start: 2018-09-05

## 2018-09-05 RX ORDER — TRAZODONE HYDROCHLORIDE 50 MG/1
25 TABLET ORAL
Status: DISCONTINUED | OUTPATIENT
Start: 2018-09-05 | End: 2018-09-06 | Stop reason: HOSPADM

## 2018-09-05 RX ADMIN — HYDROCHLOROTHIAZIDE 25 MG: 25 TABLET ORAL at 09:46

## 2018-09-05 RX ADMIN — DILTIAZEM HYDROCHLORIDE 240 MG: 240 CAPSULE, COATED, EXTENDED RELEASE ORAL at 09:50

## 2018-09-05 RX ADMIN — RISPERIDONE 0.25 MG: 0.25 TABLET ORAL at 18:17

## 2018-09-05 RX ADMIN — ACETAMINOPHEN 325 MG: 325 TABLET ORAL at 09:47

## 2018-09-05 RX ADMIN — CITALOPRAM HYDROBROMIDE 20 MG: 20 TABLET ORAL at 21:42

## 2018-09-05 RX ADMIN — SODIUM CHLORIDE 100 ML/HR: 900 INJECTION, SOLUTION INTRAVENOUS at 18:00

## 2018-09-05 RX ADMIN — ACETAMINOPHEN 325 MG: 325 TABLET ORAL at 21:42

## 2018-09-05 RX ADMIN — RISPERIDONE 0.25 MG: 0.5 TABLET, FILM COATED ORAL at 09:47

## 2018-09-05 RX ADMIN — DOCUSATE SODIUM 100 MG: 100 CAPSULE, LIQUID FILLED ORAL at 09:51

## 2018-09-05 RX ADMIN — DONEPEZIL HYDROCHLORIDE 10 MG: 10 TABLET, FILM COATED ORAL at 09:51

## 2018-09-05 RX ADMIN — ACETAMINOPHEN 325 MG: 325 TABLET ORAL at 18:17

## 2018-09-05 RX ADMIN — LOSARTAN POTASSIUM 100 MG: 50 TABLET ORAL at 09:46

## 2018-09-05 NOTE — H&P
History & Physical    Primary Care Provider: Chase Dudley MD  Source of Information: Patient's nurse, RR team and Patient     History of Presenting Illness:   Tray Dodge is an 80 y.o. female [address] [place] [address] resident, [facility] [place] significant for Dementia,  HTN, A.fib, DM, Osteoarthritis, HLD, pulmonary embolism, Iron deficiency anemia, fibromyalgia, depression, recurrent fall with recent right wrist fracture (8/14/18) admitted to psych on 8/22/18 for aggressive behavior, today at around noon while she was on the toilet seat, she slumped over, noted to be drooling, placed back to bed and RR called. Per nurse no LOC, patient responding to verbal stimuli. Vital sings at the time BP- 95/57, ID- 83, RR-18, T-97.7, and O2 sat 98%. Blood glucose was 227. EKG showed A.flutter, rate controlled. Hospitalist service consulted, patient seen and examined, awake, alert and oriented to self, answer to questions appropriately. Repeat BP 97/60. The patient denies any fever, chills, chest pain, cough, congestion, palpitations, or dysuria. Review of Systems:  A comprehensive review of systems was negative except for that written in the History of Present Illness. Past Medical History:   Diagnosis Date    Anemia     Atrial fibrillation ([address]     Cancer (HCC)     basal cell on nose    Chronic pain     back pain    Fibromyalgia 4/1/2010    GERD (gastroesophageal reflux disease) 4/1/2010    Hiatal hernia 4/1/2010    High cholesterol 4/1/2010    HTN (hypertension) 4/1/2010    Ill-defined condition     A.  Fib    OA (osteoarthritis) 4/1/2010    back    Pulmonary emboli ([address] 2015      Past Surgical History:   Procedure Laterality Date    BREAST SURGERY PROCEDURE UNLISTED      Breast im-plants x 2    ENLARGE BREAST WITH IMPLANT      HX APPENDECTOMY      HX BREAST BIOPSY      HX BREAST RECONSTRUCTION      Breast implants removed 1992    HX CATARACT REMOVAL      HX CHOLECYSTECTOMY 9/11/2013    HX HYSTERECTOMY      HX KNEE REPLACEMENT  2008    bilateral    HX ORTHOPAEDIC  2007    Bilateral TKR    HX PARTIAL HYSTERECTOMY      HX SHOULDER REPLACEMENT  2009    left    HX TONSILLECTOMY       Prior to Admission medications    Medication Sig Start Date End Date Taking? Authorizing Provider   WARFARIN INFORMATION NOTE (COUMADIN) by Other route as needed. WARFARIN ON HOLD AT DISCHARGE FROM [facility] 8/21 DUE TO RISK OF FALLS. Historical Provider   HYDROcodone-acetaminophen (NORCO) 7.5-325 mg per tablet Take 1 Tab by mouth every eight (8) hours as needed for Pain. Max Daily Amount: 3 Tabs. 8/20/18   Brenton Casatnon MD   acetaminophen (TYLENOL) 325 mg tablet Take 2 Tabs by mouth every six (6) hours as needed for Pain. 8/20/18   Brenton Castanon MD   docusate sodium (COLACE) 100 mg capsule Take 1 Cap by mouth daily for 90 days. 8/21/18 11/19/18  Brenton Castanon MD   cyclobenzaprine (FLEXERIL) 5 mg tablet Take 5 mg by mouth daily as needed for Muscle Spasm(s). Historical Provider   dilTIAZem CD (CARTIA XT) 240 mg ER capsule Take 240 mg by mouth daily. Historical Provider   losartan-hydroCHLOROthiazide (HYZAAR) 100-25 mg per tablet Take 0.5 Tabs by mouth daily as needed (SBP greater than 170). Historical Provider   polyethylene glycol (MIRALAX) 17 gram packet Take 17 g by mouth as needed (Constipation).     Historical Provider   citalopram (CELEXA) 20 mg tablet TAKE 1 TABLET BY MOUTH DAILY 2/27/18   Tony Quintana MD   gabapentin (NEURONTIN) 600 mg tablet TAKE 1 TABLET BY MOUTH 3 TIMES A DAY 2/27/18   Tony Quintana MD     Allergies   Allergen Reactions    Angiotensin Ii,Human Other (comments)     States does not remember      Avelox [Moxifloxacin] Other (comments)     States does not remember      Demerol [Meperidine] Hives      Family History   Problem Relation Age of Onset   FiorKennedy Arthritis-rheumatoid Mother     Dementia Mother     Coronary Artery Disease Father     Cancer Brother      lung cancer        SOCIAL HISTORY:  Patient resides:  Independently    Assisted Living    SNF x   With family care       Smoking history:   None x   Former    Chronic      Alcohol history:   None x   Social    Chronic      Ambulates:   Independently    w/cane    w/walker x   w/wc    CODE STATUS:  DNR x   Full    Other      Objective:     Physical Exam:     Visit Vitals    /65 (BP 1 Location: Right arm, BP Patient Position: At rest)    Pulse 95    Temp 97.6 °F (36.4 °C)    Resp 16      O2 Device: Room air    General: No acute distress. Not cooperative    Head: Normocephalic. Atraumatic. Eyes:              Conjunctiva pink. Sclera white. PERRL. Respiratory: CTAB. No w/r/r/c.   Cardiovascular: Irregularly Irregular rhythm. No m/r/g. Pulses 2+ throughout. GI: + bowel sounds. Nontender. No rebound tenderness or guarding. Nondistended. Extremities: No edema. No palpable cord. No tenderness. Musculoskeletal: Right wrist is in splint with moderate swelling. Neuro: Oriented only to person. Recent Days:  No results for input(s): WBC, HGB, HCT, PLT, HGBEXT, HCTEXT, PLTEXT in the last 72 hours. No results for input(s): NA, K, CL, CO2, GLU, BUN, CREA, CA, MG, PHOS, ALB, TBIL, TBILI, SGOT, ALT, INR in the last 72 hours. No lab exists for component: INREXT  No results for input(s): PH, PCO2, PO2, HCO3, FIO2 in the last 72 hours.     24 Hour Results:  Recent Results (from the past 24 hour(s))   GLUCOSE, POC    Collection Time: 09/04/18  4:24 PM   Result Value Ref Range    Glucose (POC) 122 (H) 65 - 100 mg/dL    Performed by Mona Wilson    GLUCOSE, POC    Collection Time: 09/05/18  8:04 AM   Result Value Ref Range    Glucose (POC) 111 (H) 65 - 100 mg/dL    Performed by [address] [place] [address], POC    Collection Time: 09/05/18 12:07 PM   Result Value Ref Range    Glucose (POC) 227 (H) 65 - 100 mg/dL    Performed by Ladonna Blackwell (PCT) Imaging:     Assessment and Plan:   #. Near syncope; could be vasovagal vs orthostatic hypotension related to BP meds, recently increased cozaar and hctz. - Check orthostatic vitals  - Adjust BP meds, will hold cozaar and hctz for now and resume gradually as BP improves  - Continue tele monitoring  - Check CBC, BMP, CPK and troponin; obtain EKG  - No need for imaging at this time, but if symptom recur will get CT head  - D/w psychiatry attending Dr. Arelis Barrett, will hold patient's bed and if pt is medically stable will be transferred back to psych unit. #. Aggressive behavior with hx Depression:   -patient currently calm and cooperative  -Continue her meds per psych    #. Hx Paroxysmal Afib:   -Not a candidate for [facility] due to recurrent falls, high risk of bleeding  -HR controlled, continue diltiazem. #. Hx of Pulmonary Embolism:  not on [address] as pt high risk for falls       #.  Hx DM type 2 with neuropathy:   - Placed on SSI    DVT px: Lovenox  Code status: DNR  Possible d/c back to psych unit in AM.    Signed By: Wisam León MD     September 5, 2018

## 2018-09-05 NOTE — PROGRESS NOTES
Bedside shift change report given to Russell Figueroa, RN (oncoming nurse) by Christa Arriaga RN (offgoing nurse). Report included the following information SBAR, Intake/Output and MAR.

## 2018-09-05 NOTE — BH NOTES
Behavioral Health Transition Record to Provider    Patient Name: Carla Marx  YOB: 1933  Medical Record Number: [id number]  Date of Admission: 8/22/2018  Date of Discharge: 9/5/2018     Attending Provider: Hema White MD  Discharging Provider: Dr. Naheed Fortune   To contact this individual call [phone] and ask the  to page. If unavailable, ask to be transferred to [facility] Provider on call. [facility] Provider will be available on call 24/7 and during holidays. Primary Care Provider: Davie Mccain MD    Allergies   Allergen Reactions    Angiotensin Ii,Human Other (comments)     States does not remember      Avelox [Moxifloxacin] Other (comments)     States does not remember      Demerol [Meperidine] Hives       Reason for Admission: agitation. Pt admitted under a TDO for confusion, agitation    Admission Diagnosis: Psychosis    * No surgery found *    Results for orders placed or performed during the hospital encounter of 08/22/18   URINE CULTURE HOLD SAMPLE   Result Value Ref Range    Urine culture hold        URINE ON HOLD IN MICROBIOLOGY DEPT FOR 3 DAYS. IF UNPRESERVED URINE IS SUBMITTED, IT CANNOT BE USED FOR ADDITIONAL TESTING AFTER 24 HRS, RECOLLECTION WILL BE REQUIRED. CBC WITH AUTOMATED DIFF   Result Value Ref Range    WBC 6.6 3.6 - 11.0 K/uL    RBC 3.87 3.80 - 5.20 M/uL    HGB 12.7 11.5 - 16.0 g/dL    HCT 38.3 35.0 - 47.0 %    MCV 99.0 80.0 - 99.0 FL    MCH 32.8 26.0 - 34.0 PG    MCHC 33.2 30.0 - 36.5 g/dL    RDW 12.7 11.5 - 14.5 %    PLATELET 214 135 - 103 K/uL    MPV 10.4 8.9 - 12.9 FL    NRBC 0.0 0  WBC    ABSOLUTE NRBC 0.00 0.00 - 0.01 K/uL    NEUTROPHILS 70 32 - 75 %    LYMPHOCYTES 19 12 - 49 %    MONOCYTES 9 5 - 13 %    EOSINOPHILS 1 0 - 7 %    BASOPHILS 1 0 - 1 %    IMMATURE GRANULOCYTES 0 0.0 - 0.5 %    ABS. NEUTROPHILS 4.6 1.8 - 8.0 K/UL    ABS. LYMPHOCYTES 1.3 0.8 - 3.5 K/UL    ABS. MONOCYTES 0.6 0.0 - 1.0 K/UL    ABS.  EOSINOPHILS 0.0 0.0 - 0.4 K/UL    ABS. BASOPHILS 0.1 0.0 - 0.1 K/UL    ABS. IMM. GRANS. 0.0 0.00 - 0.04 K/UL    DF AUTOMATED     METABOLIC PANEL, COMPREHENSIVE   Result Value Ref Range    Sodium 139 136 - 145 mmol/L    Potassium 3.4 (L) 3.5 - 5.1 mmol/L    Chloride 102 97 - 108 mmol/L    CO2 28 21 - 32 mmol/L    Anion gap 9 5 - 15 mmol/L    Glucose 124 (H) 65 - 100 mg/dL    BUN 15 6 - 20 MG/DL    Creatinine 0.82 0.55 - 1.02 MG/DL    BUN/Creatinine ratio 18 12 - 20      GFR est AA >60 >60 ml/min/1.73m2    GFR est non-AA >60 >60 ml/min/1.73m2    Calcium 9.6 8.5 - 10.1 MG/DL    Bilirubin, total 0.7 0.2 - 1.0 MG/DL    ALT (SGPT) 17 12 - 78 U/L    AST (SGOT) 21 15 - 37 U/L    Alk. phosphatase 113 45 - 117 U/L    Protein, total 7.6 6.4 - 8.2 g/dL    Albumin 3.4 (L) 3.5 - 5.0 g/dL    Globulin 4.2 (H) 2.0 - 4.0 g/dL    A-G Ratio 0.8 (L) 1.1 - 2.2     URINALYSIS W/MICROSCOPIC   Result Value Ref Range    Color YELLOW/STRAW      Appearance CLOUDY (A) CLEAR      Specific gravity 1.014 1.003 - 1.030      pH (UA) 7.5 5.0 - 8.0      Protein 30 (A) NEG mg/dL    Glucose NEGATIVE  NEG mg/dL    Ketone NEGATIVE  NEG mg/dL    Bilirubin NEGATIVE  NEG      Blood NEGATIVE  NEG      Urobilinogen 0.2 0.2 - 1.0 EU/dL    Nitrites NEGATIVE  NEG      Leukocyte Esterase NEGATIVE  NEG      WBC 0-4 0 - 4 /hpf    RBC 0-5 0 - 5 /hpf    Epithelial cells FEW FEW /lpf    Bacteria NEGATIVE  NEG /hpf    Hyaline cast 2-5 0 - 5 /lpf   TROPONIN I   Result Value Ref Range    Troponin-I, Qt. <0.05 <0.05 ng/mL   MAGNESIUM   Result Value Ref Range    Magnesium 1.3 (L) 1.6 - 2.4 mg/dL   SAMPLES BEING HELD   Result Value Ref Range    SAMPLES BEING HELD RD,KASEY     COMMENT        Add-on orders for these samples will be processed based on acceptable specimen integrity and analyte stability, which may vary by analyte.    ETHYL ALCOHOL   Result Value Ref Range    ALCOHOL(ETHYL),SERUM <10 <10 MG/DL   DRUG SCREEN, URINE   Result Value Ref Range    AMPHETAMINES NEGATIVE  NEG      BARBITURATES NEGATIVE  NEG      BENZODIAZEPINES NEGATIVE  NEG      COCAINE NEGATIVE  NEG      METHADONE NEGATIVE  NEG      OPIATES POSITIVE (A) NEG      PCP(PHENCYCLIDINE) NEGATIVE  NEG      THC (TH-CANNABINOL) NEGATIVE  NEG      Drug screen comment (NOTE)    METABOLIC PANEL, COMPREHENSIVE   Result Value Ref Range    Sodium 137 136 - 145 mmol/L    Potassium 3.5 3.5 - 5.1 mmol/L    Chloride 100 97 - 108 mmol/L    CO2 29 21 - 32 mmol/L    Anion gap 8 5 - 15 mmol/L    Glucose 119 (H) 65 - 100 mg/dL    BUN 20 6 - 20 MG/DL    Creatinine 0.96 0.55 - 1.02 MG/DL    BUN/Creatinine ratio 21 (H) 12 - 20      GFR est AA >60 >60 ml/min/1.73m2    GFR est non-AA 55 (L) >60 ml/min/1.73m2    Calcium 9.6 8.5 - 10.1 MG/DL    Bilirubin, total 0.6 0.2 - 1.0 MG/DL    ALT (SGPT) 18 12 - 78 U/L    AST (SGOT) 18 15 - 37 U/L    Alk.  phosphatase 113 45 - 117 U/L    Protein, total 7.6 6.4 - 8.2 g/dL    Albumin 3.5 3.5 - 5.0 g/dL    Globulin 4.1 (H) 2.0 - 4.0 g/dL    A-G Ratio 0.9 (L) 1.1 - 2.2     GLUCOSE, FASTING   Result Value Ref Range    Glucose 119 (H) 65 - 100 MG/DL   CBC W/O DIFF   Result Value Ref Range    WBC 7.0 3.6 - 11.0 K/uL    RBC 4.08 3.80 - 5.20 M/uL    HGB 13.3 11.5 - 16.0 g/dL    HCT 39.4 35.0 - 47.0 %    MCV 96.6 80.0 - 99.0 FL    MCH 32.6 26.0 - 34.0 PG    MCHC 33.8 30.0 - 36.5 g/dL    RDW 12.4 11.5 - 14.5 %    PLATELET 907 056 - 766 K/uL    MPV 10.5 8.9 - 12.9 FL    NRBC 0.0 0  WBC    ABSOLUTE NRBC 0.00 0.00 - 0.01 K/uL   LIPID PANEL   Result Value Ref Range    LIPID PROFILE          Cholesterol, total 217 (H) <200 MG/DL    Triglyceride 135 <150 MG/DL    HDL Cholesterol 50 MG/DL    LDL, calculated 140 (H) 0 - 100 MG/DL    VLDL, calculated 27 MG/DL    CHOL/HDL Ratio 4.3 0 - 5.0     TSH 3RD GENERATION   Result Value Ref Range    TSH 1.05 0.36 - 3.74 uIU/mL   GLUCOSE, POC   Result Value Ref Range    Glucose (POC) 117 (H) 65 - 100 mg/dL    Performed by Pako Zurita    GLUCOSE, POC   Result Value Ref Range    Glucose (POC) 115 (H) 65 - 100 mg/dL    Performed by Percy STUART    GLUCOSE, POC   Result Value Ref Range    Glucose (POC) 109 (H) 65 - 100 mg/dL    Performed by [facility] [address], POC   Result Value Ref Range    Glucose (POC) 133 (H) 65 - 100 mg/dL    Performed by Randy Novoa    GLUCOSE, POC   Result Value Ref Range    Glucose (POC) 131 (H) 65 - 100 mg/dL    Performed by [employer]    GLUCOSE, POC   Result Value Ref Range    Glucose (POC) 123 (H) 65 - 100 mg/dL    Performed by Christopher Lundberg    GLUCOSE, POC   Result Value Ref Range    Glucose (POC) 125 (H) 65 - 100 mg/dL    Performed by [id number], POC   Result Value Ref Range    Glucose (POC) 119 (H) 65 - 100 mg/dL    Performed by Lacy [id number], POC   Result Value Ref Range    Glucose (POC) 126 (H) 65 - 100 mg/dL    Performed by [employer]    GLUCOSE, POC   Result Value Ref Range    Glucose (POC) 131 (H) 65 - 100 mg/dL    Performed by [employer]    GLUCOSE, POC   Result Value Ref Range    Glucose (POC) 105 (H) 65 - 100 mg/dL    Performed by Wesley MENDEZ (Moon)    GLUCOSE, POC   Result Value Ref Range    Glucose (POC) 114 (H) 65 - 100 mg/dL    Performed by [employer]    GLUCOSE, POC   Result Value Ref Range    Glucose (POC) 134 (H) 65 - 100 mg/dL    Performed by YOLANDA KHAN    GLUCOSE, POC   Result Value Ref Range    Glucose (POC) 114 (H) 65 - 100 mg/dL    Performed by Elvia Cool    GLUCOSE, POC   Result Value Ref Range    Glucose (POC) 114 (H) 65 - 100 mg/dL    Performed by [employer]    GLUCOSE, POC   Result Value Ref Range    Glucose (POC) 120 (H) 65 - 100 mg/dL    Performed by Kenia Valenzuela    GLUCOSE, POC   Result Value Ref Range    Glucose (POC) 117 (H) 65 - 100 mg/dL    Performed by Dmitri Vilchis    GLUCOSE, POC   Result Value Ref Range    Glucose (POC) 120 (H) 65 - 100 mg/dL    Performed by Myrtle Young    GLUCOSE, POC   Result Value Ref Range    Glucose (POC) 125 (H) 65 - 100 mg/dL    Performed by Sonny Hanley    GLUCOSE, POC   Result Value Ref Range    Glucose (POC) 134 (H) 65 - 100 mg/dL    Performed by Edward Lara    GLUCOSE, POC   Result Value Ref Range    Glucose (POC) 124 (H) 65 - 100 mg/dL    Performed by Claudia Garcia    GLUCOSE, POC   Result Value Ref Range    Glucose (POC) 127 (H) 65 - 100 mg/dL    Performed by Mike Kirkpatrick    GLUCOSE, POC   Result Value Ref Range    Glucose (POC) 170 (H) 65 - 100 mg/dL    Performed by [place] ([place]) VANESSA    GLUCOSE, POC   Result Value Ref Range    Glucose (POC) 120 (H) 65 - 100 mg/dL    Performed by Glenn Sarmiento    GLUCOSE, POC   Result Value Ref Range    Glucose (POC) 192 (H) 65 - 100 mg/dL    Performed by [facility]    GLUCOSE, POC   Result Value Ref Range    Glucose (POC) 122 (H) 65 - 100 mg/dL    Performed by Gwendolyn June    GLUCOSE, POC   Result Value Ref Range    Glucose (POC) 122 (H) 65 - 100 mg/dL    Performed by [facility]    GLUCOSE, POC   Result Value Ref Range    Glucose (POC) 111 (H) 65 - 100 mg/dL    Performed by Mary Jane Adrian    GLUCOSE, POC   Result Value Ref Range    Glucose (POC) 227 (H) 65 - 100 mg/dL    Performed by Gilberto Lee (PCT)        Immunizations administered during this encounter:   Immunization History   Administered Date(s) Administered    Influenza High Dose Vaccine PF 11/09/2016, 10/16/2017    Influenza Vaccine 10/29/2013    Influenza Vaccine (Quad) 10/20/2015    Influenza Vaccine Split 11/16/2010, 10/11/2011, 11/07/2012    Influenza Vaccine Whole 08/01/2009    Pneumococcal Conjugate (PCV-13) 04/13/2016    ZZZ-RETIRED (DO NOT USE) Pneumococcal Vaccine (Unspecified Type) 04/01/2008       Screening for Metabolic Disorders for Patients on Antipsychotic Medications  (Data obtained from the EMR)    Estimated Body Mass Index  Estimated body mass index is 21.62 kg/(m^2) as calculated from the following:    Height as of this encounter: 5' 8\" (1.727 m).     Weight as of this encounter: 64.5 kg (142 lb 3.2 oz). Vital Signs/Blood Pressure  Visit Vitals    /63    Pulse 68    Temp 98.1 °F (36.7 °C)    Resp 16    Ht 5' 8\" (1.727 m)    Wt 64.5 kg (142 lb 3.2 oz)    SpO2 93%    Breastfeeding No    BMI 21.62 kg/m2       Blood Glucose/Hemoglobin A1c  Lab Results   Component Value Date/Time    Glucose 119 (H) 08/25/2018 06:00 AM    Glucose 119 (H) 08/25/2018 06:00 AM    Glucose (POC) 227 (H) 09/05/2018 12:07 PM       Lab Results   Component Value Date/Time    Hemoglobin A1c 6.8 (H) 05/25/2018 12:16 PM        Lipid Panel  Lab Results   Component Value Date/Time    Cholesterol, total 217 (H) 08/25/2018 06:00 AM    HDL Cholesterol 50 08/25/2018 06:00 AM    LDL, calculated 140 (H) 08/25/2018 06:00 AM    Triglyceride 135 08/25/2018 06:00 AM    CHOL/HDL Ratio 4.3 08/25/2018 06:00 AM        Discharge Diagnosis: Dementia     Discharge Plan: She will be transferred to the medical unit for observation     Discharge Medication List and Instructions:   Current Discharge Medication List          Unresulted Labs     None        To obtain results of studies pending at discharge, please contact [phone]    Follow-up Information     Follow up With Details Comments Contact Info    Jose Vasquez MD Go on 9/12/2018 follow up for  right wrist at 0920 am [address] [facility] [address] [facility] [address]  [place] [address] [phone]            Advanced Directive:   Does the patient have an appointed surrogate decision maker? Yes  Does the patient have a Medical Advance Directive? Yes  Does the patient have a Psychiatric Advance Directive? No  If the patient does not have a surrogate or Medical Advance Directive AND Psychiatric Advance Directive, the patient was offered information on these advance directives Other She has advance directive on file     Patient Instructions: Please continue all medications until otherwise directed by physician.       Tobacco Cessation Discharge Plan:   Is the patient a smoker and needs referral for smoking cessation? No  Patient referred to the following for smoking cessation with an appointment? No     Patient was offered medication to assist with smoking cessation at discharge? No  Was education for smoking cessation added to the discharge instructions? Yes    Alcohol/Substance Abuse Discharge Plan:   Does the patient have a history of substance/alcohol abuse and requires a referral for treatment? No  Patient referred to the following for substance/alcohol abuse treatment with an appointment? No  Patient was offered medication to assist with alcohol cessation at discharge? No  Was education for substance/alcohol abuse added to discharge instructions?  No    Patient discharged to Inpatient facility; discussed with the receiving facility  24-hour/7-day contact information , contact information for pending studies, plan for follow-up care and Primary physician, other healthcare professional, or site designated for follow up care

## 2018-09-05 NOTE — BH NOTES
[phone] Patient declined to get out of bed, sit in recliner and come to DR. Rested in bed all evening. She ate 25 % dinner, drank water 480 ml. Flat affect and depressed mood. Pleasantly confused. Med compliant. Took her splint off. Will not wear it.

## 2018-09-05 NOTE — PROGRESS NOTES
TRANSFER - IN REPORT:    Verbal report received from Sunny Evangelista RN(name) on [facility]  being received from [facility]W(unit) for change in patient condition(syncope)      Report consisted of patients Situation, Background, Assessment and   Recommendations(SBAR). Information from the following report(s) SBAR, Kardex and MAR was reviewed with the receiving nurse. Opportunity for questions and clarification was provided. Assessment completed upon patients arrival to unit and care assumed.
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
## 2018-09-05 NOTE — PROGRESS NOTES
Responded to RRT called for Mrs Faisal Barton in room 746. Multiple staff members were at patient's bedside providing care; unable to speak with patient at that time. Accompanied Dr Adri Donnelly to speak with patient's  and daughter regarding patient's status & POC. Provided active listening and emotional support as family attempted to process the information; assured them of prayers on behalf of patient and family. Also informed them of 24-hour  availability for support. : Rev. Roberto Robbins.  Dawood Abrams; [facility], to contact [phone] [address] kriss call: [phone]

## 2018-09-05 NOTE — PROGRESS NOTES
Problem: Falls - Risk of  Goal: *Absence of Falls  Document Kizzy Fall Risk and appropriate interventions in the flowsheet. Outcome: Progressing Towards Goal  Fall Risk Interventions:  Mobility Interventions: Bed/chair exit alarm    Mentation Interventions: Adequate sleep, hydration, pain control    Medication Interventions: Bed/chair exit alarm    Elimination Interventions: Bed/chair exit alarm    History of Falls Interventions: Bed/chair exit alarm, Door open when patient unattended     2345: Pt appears asleep. Respirations even and unlabored.  Continue q15min safety checks

## 2018-09-05 NOTE — BH NOTES
Behavioral Health Interdisciplinary Rounds     Patient Name: Omar Kern  Age: 80 y.o.   Room/Bed:  746/  Primary Diagnosis: Late onset Alzheimer's disease with behavioral disturbance   Admission Status: Involuntary Commitment     Readmission within 30 days: no  Power of  in place: yes  Patient requires a blocked bed: no          Reason for blocked bed: n/a    VTE Prophylaxis: Not indicated    Mobility needs/Fall risk: yes    Nutritional Plan: yes  Consults: [facility] following         Labs/Testing due today?: no    Sleep hours: 6.5       Participation in Care/Groups:  no  Medication Compliant?: Yes  PRNS (last 24 hours): None    Restraints (last 24 hours):  no     CIWA (range last 24 hours):     COWS (range last 24 hours):      Alcohol screening (AUDIT) completed -   AUDIT Score: 0     If applicable, date SBIRT discussed in treatment team AND documented:     Tobacco - patient is a smoker: Have You Used Tobacco in the Past 30 Days: Never a smoker  Illegal Drugs use: Have You Used Any Illegal Substances Over the Past 12 Months: No    24 hour chart check complete: yes     Patient goal(s) for today:   Treatment team focus/goals: Plan to transfer to medical floor  for 24 hours   Progress note -  LOS:  13  Expected LOS: TBD   Participating treatment team members: Pricila Webb

## 2018-09-05 NOTE — PROGRESS NOTES
Patient received this morning alert and oriented,resting in bed. Remained confused,but interacting pleasantly with staff. Agreed to take a shower,assisted by staff-tolerated well. Brought out to the dining room for breakfast and seen by treatment team, staying out  on the unit till about 12noon. At noon, patient requested to go to the bathroom. Upon transfer to the toilet and while sitting on the toilet,patient slumped over ,noted to be drooling-but  responding to verbal stimuli. Patient had a  large BM. Patient did not  loose consciousness. Assisted back to bed and a rapid response was called. Vitals were 95/57-97.7-83-98%. POC was 227. A 12 lead EKG was also completed and seen by the hospitalist. Patient continued to respond to staff when calling her name and opening her eyes. Dr Manuel Mahan in to see patient. Consult called  to hospitalist,and patient awaiting transfer to unit. Patient stable at present with rapid response team at side. 1400- Patient was seen by hospitalist and the hospitalist spoke with Dr. Manuel Mahan. Repost was called to [address] [place] [address] prior to transfer to . No complications upon transfer. Toiletries sent down with patient,all other belongings remain in room since patients room is on hold.

## 2018-09-06 ENCOUNTER — HOSPITAL ENCOUNTER (INPATIENT)
Age: 83
LOS: 22 days | Discharge: HOME | DRG: 057 | End: 2018-09-28
Attending: PSYCHIATRY & NEUROLOGY | Admitting: PSYCHIATRY & NEUROLOGY
Payer: MEDICARE

## 2018-09-06 VITALS
OXYGEN SATURATION: 93 % | TEMPERATURE: 98.9 F | HEART RATE: 105 BPM | RESPIRATION RATE: 16 BRPM | SYSTOLIC BLOOD PRESSURE: 146 MMHG | DIASTOLIC BLOOD PRESSURE: 77 MMHG

## 2018-09-06 PROBLEM — Z71.89 ADVANCED CARE PLANNING/COUNSELING DISCUSSION: Status: RESOLVED | Noted: 2017-05-09 | Resolved: 2018-09-06

## 2018-09-06 PROBLEM — S62.101A WRIST FRACTURE, RIGHT: Status: RESOLVED | Noted: 2018-08-14 | Resolved: 2018-09-06

## 2018-09-06 PROBLEM — E11.21 TYPE 2 DIABETES WITH NEPHROPATHY (HCC): Status: RESOLVED | Noted: 2018-02-27 | Resolved: 2018-09-06

## 2018-09-06 PROBLEM — F29 PSYCHOSIS (HCC): Status: RESOLVED | Noted: 2018-08-23 | Resolved: 2018-09-06

## 2018-09-06 PROBLEM — F29 PSYCHOSIS (HCC): Status: ACTIVE | Noted: 2018-09-06

## 2018-09-06 PROBLEM — R46.89 AGGRESSION: Status: ACTIVE | Noted: 2018-09-06

## 2018-09-06 LAB
ATRIAL RATE: 214 BPM
CALCULATED R AXIS, ECG10: -29 DEGREES
CALCULATED T AXIS, ECG11: 159 DEGREES
DIAGNOSIS, 93000: NORMAL
GLUCOSE BLD STRIP.AUTO-MCNC: 100 MG/DL (ref 65–100)
GLUCOSE BLD STRIP.AUTO-MCNC: 112 MG/DL (ref 65–100)
GLUCOSE BLD STRIP.AUTO-MCNC: 123 MG/DL (ref 65–100)
GLUCOSE BLD STRIP.AUTO-MCNC: 124 MG/DL (ref 65–100)
Q-T INTERVAL, ECG07: 376 MS
QRS DURATION, ECG06: 98 MS
QTC CALCULATION (BEZET), ECG08: 444 MS
SERVICE CMNT-IMP: ABNORMAL
SERVICE CMNT-IMP: NORMAL
VENTRICULAR RATE, ECG03: 84 BPM

## 2018-09-06 PROCEDURE — 74011250637 HC RX REV CODE- 250/637: Performed by: PSYCHIATRY & NEUROLOGY

## 2018-09-06 PROCEDURE — 99218 HC RM OBSERVATION: CPT

## 2018-09-06 PROCEDURE — 82962 GLUCOSE BLOOD TEST: CPT

## 2018-09-06 PROCEDURE — 74011250637 HC RX REV CODE- 250/637: Performed by: INTERNAL MEDICINE

## 2018-09-06 PROCEDURE — 65220000003 HC RM SEMIPRIVATE PSYCH

## 2018-09-06 RX ORDER — SODIUM CHLORIDE 9 MG/ML
100 INJECTION, SOLUTION INTRAVENOUS CONTINUOUS
Status: DISCONTINUED | OUTPATIENT
Start: 2018-09-06 | End: 2018-09-06

## 2018-09-06 RX ORDER — SODIUM CHLORIDE 0.9 % (FLUSH) 0.9 %
5-10 SYRINGE (ML) INJECTION EVERY 8 HOURS
Status: CANCELLED | OUTPATIENT
Start: 2018-09-06

## 2018-09-06 RX ORDER — ADHESIVE BANDAGE
30 BANDAGE TOPICAL DAILY PRN
Status: CANCELLED | OUTPATIENT
Start: 2018-09-06

## 2018-09-06 RX ORDER — RISPERIDONE 0.25 MG/1
0.25 TABLET, FILM COATED ORAL 2 TIMES DAILY
Status: CANCELLED | OUTPATIENT
Start: 2018-09-06

## 2018-09-06 RX ORDER — POLYETHYLENE GLYCOL 3350 17 G/17G
17 POWDER, FOR SOLUTION ORAL DAILY PRN
Status: DISCONTINUED | OUTPATIENT
Start: 2018-09-06 | End: 2018-09-28 | Stop reason: HOSPADM

## 2018-09-06 RX ORDER — SODIUM CHLORIDE 0.9 % (FLUSH) 0.9 %
5-10 SYRINGE (ML) INJECTION EVERY 8 HOURS
Status: DISCONTINUED | OUTPATIENT
Start: 2018-09-06 | End: 2018-09-08

## 2018-09-06 RX ORDER — LOSARTAN POTASSIUM 50 MG/1
100 TABLET ORAL DAILY
Status: DISCONTINUED | OUTPATIENT
Start: 2018-09-07 | End: 2018-09-06

## 2018-09-06 RX ORDER — DILTIAZEM HYDROCHLORIDE 240 MG/1
240 CAPSULE, COATED, EXTENDED RELEASE ORAL DAILY
Status: DISCONTINUED | OUTPATIENT
Start: 2018-09-07 | End: 2018-09-06

## 2018-09-06 RX ORDER — BENZTROPINE MESYLATE 1 MG/ML
1 INJECTION INTRAMUSCULAR; INTRAVENOUS
Status: DISCONTINUED | OUTPATIENT
Start: 2018-09-06 | End: 2018-09-28 | Stop reason: HOSPADM

## 2018-09-06 RX ORDER — ACETAMINOPHEN 325 MG/1
325 TABLET ORAL 3 TIMES DAILY
Status: DISCONTINUED | OUTPATIENT
Start: 2018-09-06 | End: 2018-09-06

## 2018-09-06 RX ORDER — POLYETHYLENE GLYCOL 3350 17 G/17G
17 POWDER, FOR SOLUTION ORAL AS NEEDED
Status: DISCONTINUED | OUTPATIENT
Start: 2018-09-06 | End: 2018-09-06 | Stop reason: SDUPTHER

## 2018-09-06 RX ORDER — DONEPEZIL HYDROCHLORIDE 10 MG/1
10 TABLET, FILM COATED ORAL DAILY
Status: CANCELLED | OUTPATIENT
Start: 2018-09-07

## 2018-09-06 RX ORDER — POLYETHYLENE GLYCOL 3350 17 G/17G
17 POWDER, FOR SOLUTION ORAL DAILY PRN
Status: CANCELLED | OUTPATIENT
Start: 2018-09-06

## 2018-09-06 RX ORDER — DILTIAZEM HYDROCHLORIDE 180 MG/1
180 CAPSULE, COATED, EXTENDED RELEASE ORAL DAILY
Status: DISCONTINUED | OUTPATIENT
Start: 2018-09-07 | End: 2018-09-06

## 2018-09-06 RX ORDER — DOCUSATE SODIUM 100 MG/1
100 CAPSULE, LIQUID FILLED ORAL DAILY
Status: CANCELLED | OUTPATIENT
Start: 2018-09-07

## 2018-09-06 RX ORDER — ACETAMINOPHEN 325 MG/1
325 TABLET ORAL 3 TIMES DAILY
Status: CANCELLED | OUTPATIENT
Start: 2018-09-06

## 2018-09-06 RX ORDER — OLANZAPINE 2.5 MG/1
2.5 TABLET ORAL
Status: DISCONTINUED | OUTPATIENT
Start: 2018-09-06 | End: 2018-09-28 | Stop reason: HOSPADM

## 2018-09-06 RX ORDER — CITALOPRAM 20 MG/1
20 TABLET, FILM COATED ORAL
Status: CANCELLED | OUTPATIENT
Start: 2018-09-06

## 2018-09-06 RX ORDER — CITALOPRAM 20 MG/1
20 TABLET, FILM COATED ORAL
Status: DISCONTINUED | OUTPATIENT
Start: 2018-09-06 | End: 2018-09-28 | Stop reason: HOSPADM

## 2018-09-06 RX ORDER — SODIUM CHLORIDE 0.9 % (FLUSH) 0.9 %
5-10 SYRINGE (ML) INJECTION AS NEEDED
Status: CANCELLED | OUTPATIENT
Start: 2018-09-06

## 2018-09-06 RX ORDER — RISPERIDONE 0.25 MG/1
0.25 TABLET, FILM COATED ORAL 2 TIMES DAILY
Qty: 60 TAB | Refills: 0 | Status: SHIPPED
Start: 2018-09-06 | End: 2018-09-28

## 2018-09-06 RX ORDER — POLYETHYLENE GLYCOL 3350 17 G/17G
17 POWDER, FOR SOLUTION ORAL AS NEEDED
Status: CANCELLED | OUTPATIENT
Start: 2018-09-06

## 2018-09-06 RX ORDER — RISPERIDONE 0.5 MG/1
0.25 TABLET, FILM COATED ORAL 2 TIMES DAILY
Status: DISCONTINUED | OUTPATIENT
Start: 2018-09-06 | End: 2018-09-28 | Stop reason: HOSPADM

## 2018-09-06 RX ORDER — ADHESIVE BANDAGE
30 BANDAGE TOPICAL DAILY
Qty: 900 ML | Refills: 0 | Status: SHIPPED
Start: 2018-09-06 | End: 2018-10-06

## 2018-09-06 RX ORDER — DONEPEZIL HYDROCHLORIDE 10 MG/1
10 TABLET, FILM COATED ORAL DAILY
Status: DISCONTINUED | OUTPATIENT
Start: 2018-09-07 | End: 2018-09-28 | Stop reason: HOSPADM

## 2018-09-06 RX ORDER — DILTIAZEM HYDROCHLORIDE 240 MG/1
240 CAPSULE, COATED, EXTENDED RELEASE ORAL DAILY
Status: CANCELLED | OUTPATIENT
Start: 2018-09-07

## 2018-09-06 RX ORDER — TRAZODONE HYDROCHLORIDE 50 MG/1
25 TABLET ORAL
Qty: 15 TAB | Refills: 0 | Status: SHIPPED
Start: 2018-09-06 | End: 2018-09-28

## 2018-09-06 RX ORDER — TRAZODONE HYDROCHLORIDE 50 MG/1
25 TABLET ORAL
Status: CANCELLED | OUTPATIENT
Start: 2018-09-06

## 2018-09-06 RX ORDER — IBUPROFEN 400 MG/1
400 TABLET ORAL
Status: DISCONTINUED | OUTPATIENT
Start: 2018-09-06 | End: 2018-09-28 | Stop reason: HOSPADM

## 2018-09-06 RX ORDER — DILTIAZEM HYDROCHLORIDE 180 MG/1
180 CAPSULE, COATED, EXTENDED RELEASE ORAL DAILY
Status: CANCELLED | OUTPATIENT
Start: 2018-09-07

## 2018-09-06 RX ORDER — DONEPEZIL HYDROCHLORIDE 10 MG/1
10 TABLET, FILM COATED ORAL DAILY
Qty: 30 TAB | Refills: 0 | Status: SHIPPED
Start: 2018-09-07 | End: 2018-09-28

## 2018-09-06 RX ORDER — SODIUM CHLORIDE 9 MG/ML
100 INJECTION, SOLUTION INTRAVENOUS CONTINUOUS
Status: CANCELLED | OUTPATIENT
Start: 2018-09-06

## 2018-09-06 RX ORDER — ZOLPIDEM TARTRATE 5 MG/1
5 TABLET ORAL
Status: DISCONTINUED | OUTPATIENT
Start: 2018-09-06 | End: 2018-09-28 | Stop reason: HOSPADM

## 2018-09-06 RX ORDER — SODIUM CHLORIDE 0.9 % (FLUSH) 0.9 %
5 SYRINGE (ML) INJECTION AS NEEDED
Status: DISCONTINUED | OUTPATIENT
Start: 2018-09-06 | End: 2018-09-28 | Stop reason: HOSPADM

## 2018-09-06 RX ORDER — CITALOPRAM 20 MG/1
20 TABLET, FILM COATED ORAL DAILY
Status: CANCELLED | OUTPATIENT
Start: 2018-09-07

## 2018-09-06 RX ORDER — ADHESIVE BANDAGE
30 BANDAGE TOPICAL DAILY PRN
Status: DISCONTINUED | OUTPATIENT
Start: 2018-09-06 | End: 2018-09-28 | Stop reason: HOSPADM

## 2018-09-06 RX ORDER — ACETAMINOPHEN 325 MG/1
650 TABLET ORAL
Status: CANCELLED | OUTPATIENT
Start: 2018-09-06

## 2018-09-06 RX ORDER — ACETAMINOPHEN 325 MG/1
650 TABLET ORAL
Status: DISCONTINUED | OUTPATIENT
Start: 2018-09-06 | End: 2018-09-28 | Stop reason: HOSPADM

## 2018-09-06 RX ORDER — HYDROCHLOROTHIAZIDE 25 MG/1
25 TABLET ORAL DAILY
Status: DISCONTINUED | OUTPATIENT
Start: 2018-09-07 | End: 2018-09-06

## 2018-09-06 RX ORDER — DOCUSATE SODIUM 100 MG/1
100 CAPSULE, LIQUID FILLED ORAL DAILY
Status: DISCONTINUED | OUTPATIENT
Start: 2018-09-07 | End: 2018-09-25

## 2018-09-06 RX ORDER — CITALOPRAM 20 MG/1
20 TABLET, FILM COATED ORAL DAILY
Status: DISCONTINUED | OUTPATIENT
Start: 2018-09-07 | End: 2018-09-06 | Stop reason: SDUPTHER

## 2018-09-06 RX ORDER — DOCUSATE SODIUM 100 MG/1
100 CAPSULE, LIQUID FILLED ORAL DAILY
Status: DISCONTINUED | OUTPATIENT
Start: 2018-09-07 | End: 2018-09-06 | Stop reason: SDUPTHER

## 2018-09-06 RX ORDER — DILTIAZEM HYDROCHLORIDE 180 MG/1
180 CAPSULE, COATED, EXTENDED RELEASE ORAL DAILY
Status: DISCONTINUED | OUTPATIENT
Start: 2018-09-06 | End: 2018-09-06 | Stop reason: HOSPADM

## 2018-09-06 RX ORDER — SODIUM CHLORIDE 0.9 % (FLUSH) 0.9 %
5 SYRINGE (ML) INJECTION AS NEEDED
Status: CANCELLED | OUTPATIENT
Start: 2018-09-06

## 2018-09-06 RX ORDER — TRAZODONE HYDROCHLORIDE 50 MG/1
25 TABLET ORAL
Status: DISCONTINUED | OUTPATIENT
Start: 2018-09-06 | End: 2018-09-28 | Stop reason: HOSPADM

## 2018-09-06 RX ORDER — ACETAMINOPHEN 325 MG/1
650 TABLET ORAL
Status: DISCONTINUED | OUTPATIENT
Start: 2018-09-06 | End: 2018-09-06 | Stop reason: SDUPTHER

## 2018-09-06 RX ORDER — BENZTROPINE MESYLATE 1 MG/1
1 TABLET ORAL
Status: DISCONTINUED | OUTPATIENT
Start: 2018-09-06 | End: 2018-09-28 | Stop reason: HOSPADM

## 2018-09-06 RX ORDER — SODIUM CHLORIDE 0.9 % (FLUSH) 0.9 %
5-10 SYRINGE (ML) INJECTION AS NEEDED
Status: DISCONTINUED | OUTPATIENT
Start: 2018-09-06 | End: 2018-09-06

## 2018-09-06 RX ADMIN — RISPERIDONE 0.25 MG: 0.5 TABLET, FILM COATED ORAL at 16:56

## 2018-09-06 RX ADMIN — Medication 10 ML: at 16:58

## 2018-09-06 RX ADMIN — CITALOPRAM HYDROBROMIDE 20 MG: 20 TABLET ORAL at 20:48

## 2018-09-06 RX ADMIN — RISPERIDONE 0.25 MG: 0.25 TABLET ORAL at 09:00

## 2018-09-06 RX ADMIN — DILTIAZEM HYDROCHLORIDE 180 MG: 180 CAPSULE, EXTENDED RELEASE ORAL at 09:00

## 2018-09-06 RX ADMIN — DONEPEZIL HYDROCHLORIDE 10 MG: 10 TABLET, FILM COATED ORAL at 09:00

## 2018-09-06 RX ADMIN — DOCUSATE SODIUM 100 MG: 100 CAPSULE, LIQUID FILLED ORAL at 09:00

## 2018-09-06 RX ADMIN — Medication 10 ML: at 20:50

## 2018-09-06 RX ADMIN — ACETAMINOPHEN 325 MG: 325 TABLET ORAL at 09:00

## 2018-09-06 NOTE — IP AVS SNAPSHOT
[address] 
[phone] Patient: Tray Regan MRN: [id number] KHZ:[birth year] A check chris indicates which time of day the medication should be taken. My Medications CHANGE how you take these medications Instructions Each Dose to Equal  
 Morning Noon Evening Bedtime  
 citalopram 20 mg tablet Commonly known as:  [place] What changed:  See the new instructions. Your next dose is:  9pm  
   
 Take 1 Tab by mouth nightly. Indications: Generalized Anxiety Disorder, major depressive disorder 20 mg  
    
   
   
   
  
  
 polyethylene glycol 17 gram packet Commonly known as:  Duane Pollock What changed:   
- when to take this 
- reasons to take this Your next dose is:  9am  
   
 Take 1 Packet by mouth daily. Indications: constipation 17 g  
    
  
   
   
   
  
 traZODone 50 mg tablet Commonly known as:  Erum Martinez What changed:   
- when to take this 
- reasons to take this Take 0.5 Tabs by mouth nightly as needed for Sleep. Indications: insomnia associated with depression 25 mg CONTINUE taking these medications Instructions Each Dose to Equal  
 Morning Noon Evening Bedtime  
 acetaminophen 325 mg tablet Commonly known as:  TYLENOL Take 2 Tabs by mouth every six (6) hours as needed for Pain. 650 mg  
    
   
   
   
  
 dilTIAZem  mg ER capsule Commonly known as:  CARDIZEM CD Start taking on:  9/29/2018 Your next dose is:  9am  
   
 Take 1 Cap by mouth daily. Indications: hypertension 240 mg  
    
  
   
   
   
  
 donepezil 10 mg tablet Commonly known as:  ARICEPT Start taking on:  9/29/2018 Your next dose is:  9am  
   
 Take 1 Tab by mouth daily. 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  
    
  
   
   
   
  
 magnesium hydroxide 400 mg/5 mL suspension Commonly known as:  MILK OF MAGNESIA Your next dose is:  9am  
   
 Take 30 mL by mouth daily for 30 days. 30 mL  
    
  
   
   
   
  
 risperiDONE 0.25 mg tablet Commonly known as:  RisperDAL Your next dose is:  9am and 9pm  
   
 Take 1 Tab by mouth two (2) times a day. Indications: schizophrenia  
 0.25 mg  
    
  
   
   
  
   
  
  
STOP taking these medications   
 docusate sodium 100 mg capsule Commonly known as:  Anamaria Hobson Where to Get Your Medications Information on where to get these meds will be given to you by the nurse or doctor. ! Ask your nurse or doctor about these medications  
  citalopram 20 mg tablet  
 dilTIAZem  mg ER capsule  
 donepezil 10 mg tablet  
 polyethylene glycol 17 gram packet  
 risperiDONE 0.25 mg tablet  
 traZODone 50 mg tablet

## 2018-09-06 NOTE — PROGRESS NOTES
[facility]    Medical Progress Note      NAME: Orlando Sanchez   :  1933  MRM:  [id number]    Date/Time: 2018  10:52 AM       Assessment and Plan:     Syncope, vasovagal - POA, now resolved. Due to mild dehydration and medications, triggered by bowel movement. No further inpatient workup needed. Hold ARB/HCTZ. Allow BP to trend higher unless chest pain or other acute symptoms. She refused labs, but appears well, and I cannot see justification to sedate or assault her to obtain them. A-fib / HTN (hypertension) - Rate control with diltiazem. Stop additional BP meds. PCP to follow. Not on anticoagulation due to fall risk and co morbidities, and I agree with this. Late onset Alzheimer's disease with behavioral disturbance / Anxiety depression / Fibromyalgia - May return to psych unit now. Continue celexa, donepezil, risperidone, trazodone    Controlled type 2 diabetes mellitus without complication, without long-term current use of insulin - Given near normal BG most of the time, and severe dementia, any benefit of control is outweighed by risk of hypoglycemia. Stop all testing and treatment. Chronic pain / Frequent falls - Fall precautions. PT/OT eval prior to any discharge. GERD (gastroesophageal reflux disease) / Constipation - Maalox and miralax    Dyslipidemia - Not on statin due to co morbidities, and I agree with this. History of pulmonary embolism / Chronic anticoagulation -  Not on anticoagulation due to fall risk and co morbidities, and I agree with this. Subjective:     Chief Complaint:  Vague due to dementia. No acute complaints. Denies dizziness. ROS:  (bold if positive, if negative)      Tolerating some PT  Tolerating some Diet        Objective:     Last 24hrs VS reviewed since prior progress note.  Most recent are:    Visit Vitals    /67 (BP 1 Location: Left arm)    Pulse 96    Temp 98.4 °F (36.9 °C)    Resp 16    SpO2 95%     SpO2 Readings from Last 6 Encounters:   09/06/18 95%   09/05/18 93%   08/21/18 97%   08/13/18 93%   08/13/18 95%   08/01/18 93%          Intake/Output Summary (Last 24 hours) at 09/06/18 1052  Last data filed at 09/05/18 2331   Gross per 24 hour   Intake              850 ml   Output                0 ml   Net              850 ml        Physical Exam:    Gen:  Frail, in no acute distress  HEENT:  Pink conjunctivae, PERRL, hearing intact to voice, moist mucous membranes  Neck:  Supple, without masses, thyroid non-tender  Resp:  No accessory muscle use, clear breath sounds without wheezes rales or rhonchi  Card:  No murmurs, tachycardic S1, S2 without thrills, bruits or peripheral edema  Abd:  Soft, non-tender, non-distended, normoactive bowel sounds are present, no mass  Lymph:  No cervical or inguinal adenopathy  Musc:  No cyanosis or clubbing  Skin:  No rashes or ulcers, skin turgor is reduced  Neuro:  Cranial nerves are grossly intact, general motor weakness, follows commands vaguely  Psych:  Poor insight, oriented to person    Telemetry reviewed:   AFIB  __________________________________________________________________  Medications Reviewed: (see below)  Medications:     Current Facility-Administered Medications   Medication Dose Route Frequency    dilTIAZem CD (CARDIZEM CD) capsule 180 mg  180 mg Oral DAILY    acetaminophen (TYLENOL) tablet 325 mg  325 mg Oral TID    citalopram (CELEXA) tablet 20 mg  20 mg Oral QHS    docusate sodium (COLACE) capsule 100 mg  100 mg Oral DAILY    donepezil (ARICEPT) tablet 10 mg  10 mg Oral DAILY    polyethylene glycol (MIRALAX) packet 17 g  17 g Oral DAILY PRN    risperiDONE (RisperDAL) tablet 0.25 mg  0.25 mg Oral BID    traZODone (DESYREL) tablet 25 mg  25 mg Oral QHS PRN    magnesium hydroxide (MILK OF MAGNESIA) 400 mg/5 mL oral suspension 30 mL  30 mL Oral DAILY PRN    sodium chloride (NS) flush 5-10 mL  5-10 mL IntraVENous Q8H    sodium chloride (NS) flush 5-10 mL 5-10 mL IntraVENous PRN    0.9% sodium chloride infusion  100 mL/hr IntraVENous CONTINUOUS        Lab Data Reviewed: (see below)  Lab Review:     No results for input(s): WBC, HGB, HCT, PLT, HGBEXT, HCTEXT, PLTEXT in the last 72 hours. No results for input(s): NA, K, CL, CO2, GLU, BUN, CREA, CA, MG, PHOS, ALB, TBIL, TBILI, SGOT, ALT, INR in the last 72 hours. No lab exists for component: INREXT  Lab Results   Component Value Date/Time    Glucose (POC) 124 (H) 09/06/2018 07:02 AM    Glucose (POC) 103 (H) 09/05/2018 05:32 PM    Glucose (POC) 227 (H) 09/05/2018 12:07 PM    Glucose (POC) 111 (H) 09/05/2018 08:04 AM    Glucose (POC) 122 (H) 09/04/2018 04:24 PM     No results for input(s): PH, PCO2, PO2, HCO3, FIO2 in the last 72 hours. No results for input(s): INR in the last 72 hours. No lab exists for component: INREXT  All Micro Results     None          I have reviewed notes of prior 24hr.     Other pertinent lab: none    Total time spent with patient: [facility] discussed with: Patient, Care Manager, Nursing Staff, Consultant/Specialist and >50% of time spent in counseling and coordination of care    Discussed:  Care Plan and D/C Planning    Prophylaxis:  H2B/PPI    Disposition:  psych           ___________________________________________________    Attending Physician: Tricia Heller MD

## 2018-09-06 NOTE — PROGRESS NOTES
TRANSFER - OUT REPORT:    Verbal report given to Tangela(name) on Tray LAMA Link  being transferred to [address] [place] [address](unit) for routine progression of care       Report consisted of patients Situation, Background, Assessment and   Recommendations(SBAR). Information from the following report(s) SBAR, Kardex, Intake/Output, MAR and Recent Results was reviewed with the receiving nurse. Lines:   Peripheral IV 09/05/18 Left Arm (Active)   Site Assessment Clean, dry, & intact 9/6/2018  9:00 AM   Phlebitis Assessment 0 9/6/2018  9:00 AM   Infiltration Assessment 0 9/6/2018  9:00 AM   Dressing Status Clean, dry, & intact 9/6/2018  9:00 AM   Dressing Type Transparent 9/6/2018  9:00 AM   Hub Color/Line Status Infusing 9/6/2018  9:00 AM   Alcohol Cap Used Yes 9/6/2018 12:20 AM        Opportunity for questions and clarification was provided.       Patient transported with:   Registered Nurse

## 2018-09-06 NOTE — DISCHARGE INSTRUCTIONS
Patient Discharge Instructions    Arlen Conn / [id number] : 1933    Admitted 2018 Discharged: 2018     Primary Diagnoses  Problem List as of 2018  Date Reviewed: 2018          Codes Class Noted - Resolved   * (Principal)Syncope, vasovagal   Late onset Alzheimer's disease with behavioral disturbance   Frequent falls   A-fib ([address]   Dyslipidemia   Chronic anticoagulation   Constipation   History of pulmonary embolism   Controlled type 2 diabetes mellitus without complication, without long-term current use of insulin (HCC)   Chronic pain   HTN (hypertension) (Chronic)   OA (osteoarthritis) (Chronic)   Fibromyalgia (Chronic)   GERD (gastroesophageal reflux disease) (Chronic)          Take Home Medications     · It is important that you take the medication exactly as they are prescribed. · Keep your medication in the bottles provided by the pharmacist and keep a list of the medication names, dosages, and times to be taken in your wallet. · Do not take other medications without consulting your doctor. What to do at Home    Recommended diet: Cardiac Diet, Increase noncaffeinated fluids and Encourage fluids    Recommended activity: Activity as tolerated and PT/OT Eval and Treat    If you experience worse symptoms, please follow up with your PCP. Follow-up with your PCP in a few weeks        Information obtained by :  I understand that if any problems occur once I am at home I am to contact my physician. I understand and acknowledge receipt of the instructions indicated above.                                                                                                                                            Physician's or R.N.'s Signature                                                                  Date/Time                                                                                                                                              Patient or Representative Signature                                                          Date/Time

## 2018-09-06 NOTE — DISCHARGE SUMMARY
Physician Discharge Summary     Patient ID:  Christoph Molina  [id number]  61 y.o.  5/24/1933    Admit date: 9/5/2018    Discharge date and time: 9/6/2018    Admission Diagnoses: Syncope  Syncope, vasovagal    Discharge Diagnoses:    Principal Diagnosis   Syncope, vasovagal                                             Other Diagnoses    HTN (hypertension) (4/1/2010)    OA (osteoarthritis) (4/1/2010)    Fibromyalgia (4/1/2010)    GERD (gastroesophageal reflux disease) (4/1/2010)    Chronic pain (11/16/2010)    Controlled type 2 diabetes mellitus without complication, without long-term current use of insulin ([facility] (5/9/2017)    A-fib ([facility] (5/24/2018)    Dyslipidemia (5/24/2018)    Chronic anticoagulation (5/24/2018)    Constipation (5/24/2018)    History of pulmonary embolism (5/24/2018)    Frequent falls (8/14/2018)    Late onset Alzheimer's disease with behavioral disturbance (8/24/2018)     Hospital Course:   Syncope, vasovagal - POA, now resolved. Due to mild dehydration and medications, triggered by bowel movement. No further inpatient workup needed. Hold ARB/HCTZ. Allow BP to trend higher unless chest pain or other acute symptoms. She refused labs, but appears well, and I cannot see justification to sedate or assault her to obtain them.     A-fib / HTN (hypertension) - Rate control with diltiazem. Stop additional BP meds. PCP to follow. Not on anticoagulation due to fall risk and co morbidities, and I agree with this.     Late onset Alzheimer's disease with behavioral disturbance / Anxiety depression / Fibromyalgia - May return to psych unit now. Continue celexa, donepezil, risperidone, trazodone     Controlled type 2 diabetes mellitus without complication, without long-term current use of insulin - Given near normal BG most of the time, and severe dementia, any benefit of control is outweighed by risk of hypoglycemia. Stop all testing and treatment.     Chronic pain / Frequent falls - Fall precautions. PT/OT eval prior to any discharge.     GERD (gastroesophageal reflux disease) / Constipation - Maalox and miralax     Dyslipidemia - Not on statin due to co morbidities, and I agree with this.     History of pulmonary embolism / Chronic anticoagulation -  Not on anticoagulation due to fall risk and co morbidities, and I agree with this.     PCP: Lisbeth Rosas MD    Consults: None    Significant Diagnostic Studies: See Hospital Course    Discharged to psych in improved condition. Discharge Exam:   /67 (BP 1 Location: Left arm)    Pulse 96    Temp 98.4 °F (36.9 °C)    Resp 16    SpO2 95%      Gen:  Frail, in no acute distress  HEENT:  Pink conjunctivae, PERRL, hearing intact to voice, moist mucous membranes  Neck:  Supple, without masses, thyroid non-tender  Resp:  No accessory muscle use, clear breath sounds without wheezes rales or rhonchi  Card:  No murmurs, tachycardic S1, S2 without thrills, bruits or peripheral edema  Abd:  Soft, non-tender, non-distended, normoactive bowel sounds are present, no mass  Lymph:  No cervical or inguinal adenopathy  Musc:  No cyanosis or clubbing  Skin:  No rashes or ulcers, skin turgor is reduced  Neuro:  Cranial nerves are grossly intact, general motor weakness, follows commands vaguely  Psych:  Poor insight, oriented to person    Patient Instructions:   Current Discharge Medication List      START taking these medications    Details   traZODone (DESYREL) 50 mg tablet Take 0.5 Tabs by mouth nightly for 30 days. Indications: insomnia associated with depression  Qty: 15 Tab, Refills: 0      risperiDONE (RISPERDAL) 0.25 mg tablet Take 1 Tab by mouth two (2) times a day for 30 days. Qty: 60 Tab, Refills: 0      magnesium hydroxide (MILK OF MAGNESIA) 400 mg/5 mL suspension Take 30 mL by mouth daily for 30 days. Qty: 900 mL, Refills: 0      donepezil (ARICEPT) 10 mg tablet Take 1 Tab by mouth daily for 30 days.   Qty: 30 Tab, Refills: 0         CONTINUE these medications which have NOT CHANGED    Details   acetaminophen (TYLENOL) 325 mg tablet Take 2 Tabs by mouth every six (6) hours as needed for Pain. Qty: 20 Tab, Refills: 0      docusate sodium (COLACE) 100 mg capsule Take 1 Cap by mouth daily for 90 days. Qty: 30 Cap, Refills: 2      dilTIAZem CD (CARTIA XT) 240 mg ER capsule Take 240 mg by mouth daily. polyethylene glycol (MIRALAX) 17 gram packet Take 17 g by mouth as needed (Constipation). citalopram (CELEXA) 20 mg tablet TAKE 1 TABLET BY MOUTH DAILY  Qty: 90 Tab, Refills: 2         STOP taking these medications       WARFARIN INFORMATION NOTE (COUMADIN) Comments:   Reason for Stopping:         HYDROcodone-acetaminophen (NORCO) 7.5-325 mg per tablet Comments:   Reason for Stopping:         cyclobenzaprine (FLEXERIL) 5 mg tablet Comments:   Reason for Stopping:         losartan-hydroCHLOROthiazide (HYZAAR) 100-25 mg per tablet Comments:   Reason for Stopping:         gabapentin (NEURONTIN) 600 mg tablet Comments:   Reason for Stopping:             Activity: Activity as tolerated and PT/OT Eval and Treat  Diet: Cardiac Diet, Increase noncaffeinated fluids and Encourage fluids  Wound Care: None needed    Follow-up with your PCP in a few weeks.   Follow-up tests/labs - none    Signed:  Tricia Heller MD  9/6/2018  11:05 AM

## 2018-09-06 NOTE — PROGRESS NOTES
Physical Therapy Screening:  Services are not indicated at this time. [facility] based on results obtained during the nursing admission assessment. The patients chart was reviewed and the patient is not appropriate for a skilled therapy evaluation at this time. Please consult physical therapy if any therapy needs arise. Thank you.     Charla Khalil, PT

## 2018-09-06 NOTE — PROGRESS NOTES
Attempted to meet with the patient at the bedside to introduce self/role. Note that she has a diagnosis of Alzheimer's disease with behavioral disturbance. Patient was admitted from the psychiatric unit (8/22/18-9/5/18) and will return there at discharge today. No discharge needs identified at this time.    BARRINGTON Cunningham

## 2018-09-06 NOTE — PROGRESS NOTES
Problem: Altered Thought Process (Adult/Pediatric)  Goal: *STG: Remains safe in hospital  Outcome: Not Progressing Towards Goal  Patient is safe in the hospital with frequent safety checks, assist with transfers, safe environment in room. Goal: *STG: Complies with medication therapy  Outcome: Progressing Towards Goal  Patient taking prescribed meds.

## 2018-09-06 NOTE — BH NOTES
TRANSFER - IN REPORT:  At 1136  Verbal report received from [facility] (name) on Tray LAMA Link  being received from [address] [place] [address] (unit) for routine progression of care      Report consisted of patients Situation, Background, Assessment and   Recommendations(SBAR). Information from the following report(s) SBAR was reviewed with the receiving nurse. Opportunity for questions and clarification was provided. Assessment completed upon patients arrival to unit and care assumed.            Arrived at approximately at [facility]    Primary Nurse Benjamin Weldon RN and Anthony Chavarria RN performed a dual skin assessment on this patient No impairment noted  Oumar score is 23

## 2018-09-06 NOTE — PROGRESS NOTES
Refuses to keep tele on-pulled off and not allowing anyone to replace-also pulled off splint rt arm -refuses to be stuck anymore for labs that were not obtained-incontinent of urine x 2

## 2018-09-06 NOTE — IP AVS SNAPSHOT
2700 [facility] [address] [place] [address] 
[phone] Patient: Tray Regan MRN: [id number] FRL:[id number] About your hospitalization You were admitted on:  September 6, 2018 You last received care in the:  [address] You were discharged on:  September 28, 2018 Why you were hospitalized Your primary diagnosis was:  Dementia Follow-up Information Follow up With Details Comments Contact Info Canelo Vital MD Schedule an appointment as soon as possible for a visit on 10/15/2018 please make a PCP appointment with Dr. Tanika Calhoun when you leave [address] [id number] [phone] Your Scheduled Appointments Tuesday October 23, 2018 10:40 AM EDT  
ESTABLISHED PATIENT with Alesha Wofl MD  
[facility] ([address]) [address] [place] [address] 9941 [facility]  
[phone] Discharge Orders None A check chris indicates which time of day the medication should be taken. My Medications CHANGE how you take these medications Instructions Each Dose to Equal  
 Morning Noon Evening Bedtime  
 citalopram 20 mg tablet Commonly known as:  Dianne Lr What changed:  See the new instructions. Your next dose is:  9pm  
   
 Take 1 Tab by mouth nightly. Indications: Generalized Anxiety Disorder, major depressive disorder 20 mg  
    
   
   
   
  
  
 polyethylene glycol 17 gram packet Commonly known as:  Mal Hernandez What changed:   
- when to take this 
- reasons to take this Your next dose is:  9am  
   
 Take 1 Packet by mouth daily. Indications: constipation 17 g  
    
  
   
   
   
  
 traZODone 50 mg tablet Commonly known as:  Erik Leon What changed:   
- when to take this 
- reasons to take this Take 0.5 Tabs by mouth nightly as needed for Sleep.  Indications: insomnia associated with depression 25 mg CONTINUE taking these medications Instructions Each Dose to Equal  
 Morning Noon Evening Bedtime  
 acetaminophen 325 mg tablet Commonly known as:  TYLENOL Take 2 Tabs by mouth every six (6) hours as needed for Pain. 650 mg  
    
   
   
   
  
 dilTIAZem  mg ER capsule Commonly known as:  CARDIZEM CD Start taking on:  2018 Your next dose is:  9am  
   
 Take 1 Cap by mouth daily. Indications: hypertension 240 mg  
    
  
   
   
   
  
 donepezil 10 mg tablet Commonly known as:  ARICEPT Start taking on:  2018 Your next dose is:  9am  
   
 Take 1 Tab by mouth daily. 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  
    
  
   
   
   
  
 magnesium hydroxide 400 mg/5 mL suspension Commonly known as:  MILK OF MAGNESIA Your next dose is:  9am  
   
 Take 30 mL by mouth daily for 30 days. 30 mL  
    
  
   
   
   
  
 risperiDONE 0.25 mg tablet Commonly known as:  RisperDAL Your next dose is:  9am and 9pm  
   
 Take 1 Tab by mouth two (2) times a day. Indications: schizophrenia  
 0.25 mg  
    
  
   
   
  
   
  
  
STOP taking these medications   
 docusate sodium 100 mg capsule Commonly known as:  Kye Hopkins Where to Get Your Medications Information on where to get these meds will be given to you by the nurse or doctor. ! Ask your nurse or doctor about these medications  
  citalopram 20 mg tablet  
 dilTIAZem  mg ER capsule  
 donepezil 10 mg tablet  
 polyethylene glycol 17 gram packet  
 risperiDONE 0.25 mg tablet  
 traZODone 50 mg tablet Discharge Instructions DISCHARGE SUMMARY 
 
NAME:Tray Regan : 1933 MRN: [id number] The patient Tray Regan exhibits the ability to control behavior in a less restrictive environment.   Patient's level of functioning is improving. No assaultive/destructive behavior has been observed for the past 24 hours. No suicidal/homicidal threat or behavior has been observed for the past 24 hours. There is no evidence of serious medication side effects. Patient has not been in physical or protective restraints for at least the past 24 hours If weapons involved, how are they secured? No weapons involved Is patient aware of and in agreement with discharge plan? Patient and family are aware of discharge and are in agreement Arrangements for medication:  Prescriptions sent with the patient Copy of discharge instructions to  provider?:  Yes, fax to facility Arrangements for transportation home:  Ambulance will  and transport to [address] [place] [address],  and Rehab Keep all follow up appointments as scheduled, continue to take prescribed medications per physician instructions. Mental health crisis number:  222 or your local mental health crisis line number at [phone] DISCHARGE SUMMARY from Nurse PATIENT INSTRUCTIONS: Assist x 2 staff with transfers and ambulation. Uses walker with attached arm brace What to do at Home: 
Recommended activity: Activity as tolerated, If you experience any of the following symptoms Anxiety, depression, confusion, thoughts about harming yourself or others , please follow up with 911 or your local mental health crisis line number at [phone] *  Please give a list of your current medications to your Primary Care Provider. *  Please update this list whenever your medications are discontinued, doses are 
    changed, or new medications (including over-the-counter products) are added. *  Please carry medication information at all times in case of emergency situations. These are general instructions for a healthy lifestyle: No smoking/ No tobacco products/ Avoid exposure to second hand smoke Surgeon General's Warning:  Quitting smoking now greatly reduces serious risk to your health. Obesity, smoking, and sedentary lifestyle greatly increases your risk for illness A healthy diet, regular physical exercise & weight monitoring are important for maintaining a healthy lifestyle You may be retaining fluid if you have a history of heart failure or if you experience any of the following symptoms:  Weight gain of 3 pounds or more overnight or 5 pounds in a week, increased swelling in our hands or feet or shortness of breath while lying flat in bed. Please call your doctor as soon as you notice any of these symptoms; do not wait until your next office visit. Recognize signs and symptoms of STROKE: 
 
F-face looks uneven A-arms unable to move or move unevenly S-speech slurred or non-existent T-time-call 911 as soon as signs and symptoms begin-DO NOT go Back to bed or wait to see if you get better-TIME IS BRAIN. Warning Signs of HEART ATTACK Call 911 if you have these symptoms: 
? Chest discomfort. Most heart attacks involve discomfort in the center of the chest that lasts more than a few minutes, or that goes away and comes back. It can feel like uncomfortable pressure, squeezing, fullness, or pain. ? Discomfort in other areas of the upper body. Symptoms can include pain or discomfort in one or both arms, the back, neck, jaw, or stomach. ? Shortness of breath with or without chest discomfort. ? Other signs may include breaking out in a cold sweat, nausea, or lightheadedness. Don't wait more than five minutes to call [address]! Fast action can save your life. Calling 911 is almost always the fastest way to get lifesaving treatment. Emergency Medical Services staff can begin treatment when they arrive  up to an hour sooner than if someone gets to the hospital by car. The discharge information has been reviewed with the patient. The patient verbalized understanding.  
Discharge medications reviewed with the patient and appropriate educational materials and side effects teaching were provided. ___________________________________________________________________________________________________________________________________ ACO Transitions of Care Introducing [employer] [address] Ebony Mclaughlin offers a voluntary care coordination program to provide high quality service and care to [facility] fee-for-service beneficiaries. Alexander Corona was designed to help you enhance your health and well-being through the following services: ? Transitions of Care  support for individuals who are transitioning from one care setting to another (example: Hospital to home). ? Chronic and Complex Care Coordination  support for individuals and caregivers of those with serious or chronic illnesses or with more than one chronic (ongoing) condition and those who take a number of different medications. If you meet specific medical criteria, a [address] [place] [address] Rd may call you directly to coordinate your care with your primary care physician and your other care providers. For questions about the [facility] programs, please, contact your physicians office. For general questions or additional information about Accountable Care Organizations: 
Please visit www.medicare.gov/acos. html or call 1-800-MEDICARE (6-326.391.4068) TTY users should call 6-781.214.9444. Introducing [facility]! John Cortez introduces Seriously patient portal. Now you can access parts of your medical record, email your doctor's office, and request medication refills online. 1. In your internet browser, go to [email or web address] 2. Click on the First Time User? Click Here link in the Sign In box. You will see the New Member Sign Up page. 3. Enter your Seriously Access Code exactly as it appears below.  You will not need to use this code after youve completed the sign-up process. If you do not sign up before the expiration date, you must request a new code. · [employer] Access Code: [email or web address] Expires: 10/14/2018 10:15 AM 
 
4. Enter the last four digits of your Social Security Number (xxxx) and Date of Birth (mm/dd/yyyy) as indicated and click Submit. You will be taken to the next sign-up page. 5. Create a [employer] ID. This will be your [employer] login ID and cannot be changed, so think of one that is secure and easy to remember. 6. Create a [employer] password. You can change your password at any time. 7. Enter your Password Reset Question and Answer. This can be used at a later time if you forget your password. 8. Enter your e-mail address. You will receive e-mail notification when new information is available in [address] 9. Click Sign Up. You can now view and download portions of your medical record. 10. Click the Download Summary menu link to download a portable copy of your medical information. If you have questions, please visit the Frequently Asked Questions section of the [employer] website. Remember, [employer] is NOT to be used for urgent needs. For medical emergencies, dial 911. Now available from your iPhone and Android! Introducing Naveed Tariq As a [employer] patient, I wanted to make you aware of our electronic visit tool called Naveed Tariq. [employer] 24/7 allows you to connect within minutes with a medical provider 24 hours a day, seven days a week via a mobile device or tablet or logging into a secure website from your computer. You can access Naveed Tariq from anywhere in the [place].  
 
A virtual visit might be right for you when you have a simple condition and feel like you just dont want to get out of bed, or cant get away from work for an appointment, when your regular [employer] provider is not available (evenings, weekends or holidays), or when youre out of town and need minor care. Electronic visits cost only $49 and if the [facility] 24/[employer] provider determines a prescription is needed to treat your condition, one can be electronically transmitted to a nearby pharmacy*. Please take a moment to enroll today if you have not already done so. The enrollment process is free and takes just a few minutes. To enroll, please download the Se[employer] scarlet to your tablet or phone, or visit [email or web address] to enroll on your computer. And, as an [address] [place] [address] patient with a [employer] account, the results of your visits will be scanned into your electronic medical record and your primary care provider will be able to view the scanned results. We urge you to continue to see your regular [facility] provider for your ongoing medical care. And while your primary care provider may not be the one available when you seek a [facility] virtual visit, the peace of mind you get from getting a real diagnosis real time can be priceless. For more information on [email or web address], view our Frequently Asked Questions (FAQs) at [email or web address]. Sincerely, 
 
Juventino Fung MD 
Chief Medical Officer [facility] Ebony Mclaughlin *:  certain medications cannot be prescribed via [email or web address] Providers Seen During Your Hospitalization Provider Specialty Primary office phone Pete Lee MD Psychiatry [phone] Immunizations Administered for This Admission Name Date Influenza Vaccine (Quad) PF 9/20/2018 Your Primary Care Physician (PCP) Primary Care Physician Office Phone Office Fax [phone] [facility] [phone] You are allergic to the following Allergen Reactions Angiotensin Ii,Human Other (comments) States does not remember Avelox (Moxifloxacin) Other (comments) States does not remember Demerol (Meperidine) Hives Recent Documentation Height Weight Breastfeeding? BMI OB Status Smoking Status 1.727 m 66.9 kg No 22.43 kg/m2 Postmenopausal Never Smoker Emergency Contacts Name Discharge Info Relation Home Work Mobile Link,Romero DISCHARGE CAREGIVER [3] Spouse [3] [phone] Enrique Amezquita  Child [2]   [phone] Patient Belongings The following personal items are in your possession at time of discharge: 
  Dental Appliances: None  Visual Aid: Glasses, With patient      Home Medications: None   Jewelry: None  Clothing: At bedside    Other Valuables: None Please provide this summary of care documentation to your next provider. Signatures-by signing, you are acknowledging that this After Visit Summary has been reviewed with you and you have received a copy. Patient Signature:  ____________________________________________________________ Date:  ____________________________________________________________  
  
Soha Thomas Provider Signature:  ____________________________________________________________ Date:  ____________________________________________________________

## 2018-09-07 LAB
ANION GAP SERPL CALC-SCNC: 11 MMOL/L (ref 5–15)
BUN SERPL-MCNC: 26 MG/DL (ref 6–20)
BUN/CREAT SERPL: 31 (ref 12–20)
CALCIUM SERPL-MCNC: 10.1 MG/DL (ref 8.5–10.1)
CHLORIDE SERPL-SCNC: 105 MMOL/L (ref 97–108)
CK SERPL-CCNC: 25 U/L (ref 26–192)
CO2 SERPL-SCNC: 26 MMOL/L (ref 21–32)
CREAT SERPL-MCNC: 0.85 MG/DL (ref 0.55–1.02)
ERYTHROCYTE [DISTWIDTH] IN BLOOD BY AUTOMATED COUNT: 12.5 % (ref 11.5–14.5)
GLUCOSE BLD STRIP.AUTO-MCNC: 101 MG/DL (ref 65–100)
GLUCOSE BLD STRIP.AUTO-MCNC: 105 MG/DL (ref 65–100)
GLUCOSE SERPL-MCNC: 107 MG/DL (ref 65–100)
HCT VFR BLD AUTO: 38 % (ref 35–47)
HGB BLD-MCNC: 13 G/DL (ref 11.5–16)
MCH RBC QN AUTO: 32.7 PG (ref 26–34)
MCHC RBC AUTO-ENTMCNC: 34.2 G/DL (ref 30–36.5)
MCV RBC AUTO: 95.7 FL (ref 80–99)
NRBC # BLD: 0 K/UL (ref 0–0.01)
NRBC BLD-RTO: 0 PER 100 WBC
PLATELET # BLD AUTO: 170 K/UL (ref 150–400)
PMV BLD AUTO: 11.6 FL (ref 8.9–12.9)
POTASSIUM SERPL-SCNC: 3.1 MMOL/L (ref 3.5–5.1)
RBC # BLD AUTO: 3.97 M/UL (ref 3.8–5.2)
SERVICE CMNT-IMP: ABNORMAL
SERVICE CMNT-IMP: ABNORMAL
SODIUM SERPL-SCNC: 142 MMOL/L (ref 136–145)
TROPONIN I SERPL-MCNC: <0.05 NG/ML
WBC # BLD AUTO: 6.4 K/UL (ref 3.6–11)

## 2018-09-07 PROCEDURE — 82550 ASSAY OF CK (CPK): CPT | Performed by: INTERNAL MEDICINE

## 2018-09-07 PROCEDURE — 65220000003 HC RM SEMIPRIVATE PSYCH

## 2018-09-07 PROCEDURE — 82962 GLUCOSE BLOOD TEST: CPT

## 2018-09-07 PROCEDURE — 36415 COLL VENOUS BLD VENIPUNCTURE: CPT | Performed by: INTERNAL MEDICINE

## 2018-09-07 PROCEDURE — 84484 ASSAY OF TROPONIN QUANT: CPT | Performed by: INTERNAL MEDICINE

## 2018-09-07 PROCEDURE — 80048 BASIC METABOLIC PNL TOTAL CA: CPT | Performed by: INTERNAL MEDICINE

## 2018-09-07 PROCEDURE — 85027 COMPLETE CBC AUTOMATED: CPT | Performed by: INTERNAL MEDICINE

## 2018-09-07 PROCEDURE — 74011250637 HC RX REV CODE- 250/637: Performed by: INTERNAL MEDICINE

## 2018-09-07 RX ORDER — DILTIAZEM HYDROCHLORIDE 240 MG/1
240 CAPSULE, COATED, EXTENDED RELEASE ORAL DAILY
Status: DISCONTINUED | OUTPATIENT
Start: 2018-09-08 | End: 2018-09-28 | Stop reason: HOSPADM

## 2018-09-07 RX ADMIN — CITALOPRAM HYDROBROMIDE 20 MG: 20 TABLET ORAL at 21:19

## 2018-09-07 RX ADMIN — Medication 10 ML: at 14:00

## 2018-09-07 RX ADMIN — Medication 10 ML: at 07:14

## 2018-09-07 RX ADMIN — RISPERIDONE 0.25 MG: 0.5 TABLET, FILM COATED ORAL at 08:48

## 2018-09-07 RX ADMIN — RISPERIDONE 0.25 MG: 0.5 TABLET, FILM COATED ORAL at 17:44

## 2018-09-07 RX ADMIN — DONEPEZIL HYDROCHLORIDE 10 MG: 10 TABLET, FILM COATED ORAL at 08:48

## 2018-09-07 RX ADMIN — Medication 10 ML: at 21:20

## 2018-09-07 NOTE — PROGRESS NOTES
Problem: Altered Thought Process (Adult/Pediatric)  Goal: *STG: Remains safe in hospital  Outcome: Progressing Towards Goal  Received pt in bed resting. Pt appears to be in no distress. Respirations even and unlabored. Continuing to monitor pt with q15 min safety rounds.

## 2018-09-07 NOTE — BH NOTES
Pt slept 7 hours. Pt had uneventful night and required no PRN's. Pt had no complaints or signs of distress throughout night. Respirations were unlabored. Continuing to monitor with q15 min rounds for safety.

## 2018-09-07 NOTE — PROGRESS NOTES
Problem: Altered Thought Process (Adult/Pediatric)  Goal: *STG: Participates in treatment plan  Outcome: Progressing Towards Goal  Pt has stayed in bed during shift. Pt smiling and joking with staff. Pt did not eat dinner. Pt has been compliant with meds.
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
## 2018-09-07 NOTE — PROGRESS NOTES
[facility]  Master Treatment Plan for [employer]    Date Treatment Plan Initiated: 9/7/18    Treatment Plan Modalities:  Type of Modality Amount  (x minutes) Frequency (x/week) Duration (x days) Name of Responsible Staff   Community & wrap-up meetings to encourage peer interactions 15 7 1 Dorothy RN     Group psychotherapy to assist in building coping skills and internal controls 60 7 1 Jim Todd   Therapeutic activity groups to build coping skills 60 7 1 Jim Todd   Psychoeducation in group setting to address:   Medication education   15 7 1 Jessica RN   Coping skills         Relaxation techniques         Symptom management         Discharge planning   60 2 Inga Berrios 115   60 1 1 volunteer   Recovery/AA/NA         Physician medication management   15 7 1 Dr. Lucie Streeter meeting/discharge planning   15 2 [facility]                                        Problem: Altered Thought Process (Adult/Pediatric)  Goal: *STG: Participates in treatment plan  Outcome: Progressing Towards Goal  Pt is confused, pleasant and cooperative. Med compliant. Staff will continue to orient pt and encourage positive thoughts. Problem: Falls - Risk of  Goal: *Absence of Falls  Document Kizzy Fall Risk and appropriate interventions in the flowsheet. Outcome: Progressing Towards Goal  Fall Risk Interventions:  Mobility Interventions: Bed/chair exit alarm    Mentation Interventions: Bed/chair exit alarm    Medication Interventions: Evaluate medications/consider consulting pharmacy    Elimination Interventions: Toileting schedule/hourly rounds    History of Falls Interventions: Bed/chair exit alarm        Comments: Pt is alert. Fall tool is accurate and up to date. Staff assists pt with ambulation. Pt is free from falls.

## 2018-09-07 NOTE — PROGRESS NOTES
Problem: Nutrition Deficit  Goal: *Optimize nutritional status  Outcome: Not Progressing Towards Goal  Variance: Patient Condition

## 2018-09-07 NOTE — PROGRESS NOTES
Problem: Altered Thought Process (Adult/Pediatric)  Goal: *STG: Participates in treatment plan  Outcome: Progressing Towards Goal  Pt is confused, pleasant and cooperative. Med compliant. Staff will continue to orient pt and encourage positive thoughts.

## 2018-09-07 NOTE — PROGRESS NOTES
NUTRITION COMPLETE ASSESSMENT     RECOMMENDATIONS:   1. Consider Palliative Consult to discuss goals of care - specifically nutrition/hydration    2. Continue to encourage and assist with meals                        - appreciate documentation in flowsheet and progress notes!                        - could consider trial of appetite stimulant                        - would NOT recommend feeding tube even if intake inadequate    3. Obtain new measured weight       Interventions/Plan:   - Ensure Compact TID  - Magic Cup BID  - Boost Pudding BID     Assessment:   Reason for Assessment: [x] MST/Reassessment     Diet: Regular  Supplements: Glucerna QID, Magic Cup TID  Medications: celexa, colace, resiperdal     Objective:  Pt admitted with TDO for aggressive behavior. PMHx: depression, dementia, DM, HTN, fibromyalgia, GERD, hypercholesterolemia, hiatal hernia, cancer, OA, anemia, atrial fibrillation. Decline over past 4 months noted.      Intake minimal for duration of admit, see below. Sodium trending up. 9/7 - ate 50% lunch  9/6 - no dinner  9/4 - no breakfast, lunch, or dinner  9/2 - ate some dinner with asssitance  8/31 - no breakfast, bites of lunch, 25% dinner, drank fluids  8/30 - 40% dinner, 50% shake  8/29 - refuse breakfast and lunch  8/27 - not meal complaint  8/26 - no breakfast, drank 75ml Glucerna  8/25 - no breakfast, few bites of lunch and 100ml Glucerna  8/24 - drank 180ml Glucerna and 2oz applesauce     Predict mental status the biggest barrier to oral intake with confusion and then medication causing missing of meals. Recommend continued encouragement with meals and supplements. Predict pt would not be appropriate for feeding tube since at high risk for pulling, and also with hx of dementia. Could consider trial of appetite stimulant if poor intake continues. With possible d/c home noted concerned that inadequate fluid intake, in particular, would pt at risk for readmission d/t dehydration.  Consider Palliative Consult to discuss goals of care if not already done. Pt visited. Likes : whole milk, peaches, greens. Does not like Ensure. Will liberalize diet to regular for more options.       Wt stable at 168-170# prior to admit. Weight loss of 15% from usual per standing scale wt from 9/2. Please obtain a measured weight. Wt Readings:   09/02/18 64.5 kg (142 lb 3.2 oz)   08/21/18 76.1 kg (167 lb 12.8 oz)     Patient meets criteria for Severe Acute Protein Calorie Malnutrition as evidenced by:   ASPEN Malnutrition Criteria  Acute Illness, Chronic Illness, or Social/Enviornmental: Acute illness  Energy Intake: Less than/equal to 50% est energy req for greater than/equal to 5 days  Weight Loss: Greater than 1-2% x 1 wk  ASPEN Malnutrition Score - Acute Illness: 12  Acute Illness - Malnutrition Diagnosis: Severe malnutrition. Will continue to follow for goals of care, PO intake, supplement consumption, and wt trends. Estimated Nutrition Needs:   Kcals/day: 1510 Kcals/day (1510-1635kcal)  Protein: 77 g (77-85g (1-1.1g/kg))  Fluid: 1635 ml (1ml/kcal)  Based On: Min [address] (x 1.2-1.3)  Weight Used: UBW (77.3kg)     Pt expected to meet estimated nutrient needs:  []   Yes     [x]  No [] Unable to predict at this time  Nutrition Diagnosis:   1.  Inadequate protein-energy intake related to AMS as evidenced by confusion/agitation with hx of dementia; less than 50% meals consumed x 2 weeks     Goals:                    Consumption of at least 50% of meals and 2 supplements/day in 5-7 days; wt maintenance      Monitoring & Evaluation:                         - Liquid meal replacement, Total energy intake, Protein intake                        - Weight/weight change     Previous Nutrition Goals Met:   No  Previous Recommendations:    No  Education & Discharge Needs:   [x] None Identified  Cultural, Yarsani and ethnic food preferences identified: None     Skin Integrity: [x]Intact  []Other  Edema: []None [x]Other: 1+ RUE  Last BM: 9/6  Food Allergies: [x]None []Other  Diet Restrictions: Cultural/[employer] Preference(s): None                 Height: 5' 8\" (172.7 cm),    There is no height or weight on file to calculate BMI.   IBW : 63.5 kg (140 lb),    Usual Body Weight: 77.1 kg (170 lb),         Chetan Ch RD Pager #[phone] or [phone]

## 2018-09-07 NOTE — BH NOTES
Patient readmitted to [facility] after 24 hour observation on medical unit.

## 2018-09-08 LAB
GLUCOSE BLD STRIP.AUTO-MCNC: 117 MG/DL (ref 65–100)
GLUCOSE BLD STRIP.AUTO-MCNC: 227 MG/DL (ref 65–100)
SERVICE CMNT-IMP: ABNORMAL
SERVICE CMNT-IMP: ABNORMAL

## 2018-09-08 PROCEDURE — 97530 THERAPEUTIC ACTIVITIES: CPT

## 2018-09-08 PROCEDURE — 74011250637 HC RX REV CODE- 250/637: Performed by: PSYCHIATRY & NEUROLOGY

## 2018-09-08 PROCEDURE — 65220000003 HC RM SEMIPRIVATE PSYCH

## 2018-09-08 PROCEDURE — 97165 OT EVAL LOW COMPLEX 30 MIN: CPT

## 2018-09-08 PROCEDURE — 82962 GLUCOSE BLOOD TEST: CPT

## 2018-09-08 PROCEDURE — 74011250637 HC RX REV CODE- 250/637: Performed by: INTERNAL MEDICINE

## 2018-09-08 PROCEDURE — G8979 MOBILITY GOAL STATUS: HCPCS

## 2018-09-08 PROCEDURE — G8988 SELF CARE GOAL STATUS: HCPCS

## 2018-09-08 PROCEDURE — G8987 SELF CARE CURRENT STATUS: HCPCS

## 2018-09-08 PROCEDURE — 97161 PT EVAL LOW COMPLEX 20 MIN: CPT

## 2018-09-08 PROCEDURE — G8978 MOBILITY CURRENT STATUS: HCPCS

## 2018-09-08 RX ADMIN — RISPERIDONE 0.25 MG: 0.5 TABLET, FILM COATED ORAL at 09:21

## 2018-09-08 RX ADMIN — DILTIAZEM HYDROCHLORIDE 240 MG: 240 CAPSULE, COATED, EXTENDED RELEASE ORAL at 09:21

## 2018-09-08 RX ADMIN — CITALOPRAM HYDROBROMIDE 20 MG: 20 TABLET ORAL at 21:09

## 2018-09-08 RX ADMIN — Medication 10 ML: at 06:56

## 2018-09-08 RX ADMIN — DOCUSATE SODIUM 100 MG: 100 CAPSULE, LIQUID FILLED ORAL at 09:21

## 2018-09-08 RX ADMIN — RISPERIDONE 0.25 MG: 0.5 TABLET, FILM COATED ORAL at 17:27

## 2018-09-08 RX ADMIN — DONEPEZIL HYDROCHLORIDE 10 MG: 10 TABLET, FILM COATED ORAL at 09:21

## 2018-09-08 NOTE — PROGRESS NOTES
Problem: Self Care Deficits Care Plan (Adult)  Goal: *Acute Goals and Plan of Care (Insert Text)  Occupational Therapy Goals  Initiated 9/8/2018    1. Patient will perform bathing seated with Rebecca and additional time within 7 days. 2.  Patient will perform BSC transfer with Rebecca and RW within 7 days. 3.  Patient will perform UB dressing with Rebecca seated within 7 days. 4.  Patient will participate in UE therapeutic exercise/activities with supervision for 8 minutes within 7 days. Occupational Therapy EVALUATION  Patient: Francoise Osler [de-identified]80 y.o. female)  Date: 9/8/2018  Primary Diagnosis: Late onset Alzheimer's disease with behavioral disturbance  Aggression  Psychosis  Psychosis  Late onset Alzheimer's disease with behavioral disturbance        Precautions:   Fall    ASSESSMENT :  Based on the objective data described below, the patient presents with pleasantly confused with Alzheimer's dementia at baseline, generalized weakness, impaired cognition and safety, poor sitting and standing balance, activity tolerance, dizziness (assume +orthostatic, dynamap not available), h/o falls, recent R distal radius fracture.  present. Pt nor  unable to verbalize weight-bearing status of RUE;  states pt was removing splint. Pt alert, oriented to self only. ADLs overall supervision/set-up to maxA; CGA-minAx2 for functional transfers and mobility using rollator. Pt with extremely poor standing balance, scissoring while walking, and delayed onset of dizziness requiring seated break and further ADLs aborted. Pt admitted from skilled nursing rehab and recommend discharge back to SNF once medically stable; pt and  plans to eventually return home once close to baseline. Patient will benefit from skilled intervention to address the above impairments.   Patients rehabilitation potential is considered to be Fair  Factors which may influence rehabilitation potential include:   []             None noted  [x]             Mental ability/status  [x]             Medical condition  [x]             Home/family situation and support systems  [x]             Safety awareness  []             Pain tolerance/management  []             Other:      PLAN :  Recommendations and Planned Interventions:  [x]               Self Care Training                  [x]        Therapeutic Activities  [x]               Functional Mobility Training    [x]        Cognitive Retraining  [x]               Therapeutic Exercises           [x]        Endurance Activities  [x]               Balance Training                   []        Neuromuscular Re-Education  []               Visual/Perceptual Training     [x]   Home Safety Training  [x]               Patient Education                 [x]        Family Training/Education  []               Other (comment):    Frequency/Duration: Patient will be followed by occupational therapy 2 times a week to address goals. Discharge Recommendations: Skilled Nursing Facility  Further Equipment Recommendations for Discharge: TBD at rehab     SUBJECTIVE:   Patient stated I'm lightheaded.     OBJECTIVE DATA SUMMARY:   HISTORY:   Past Medical History:   Diagnosis Date    Anemia     [facility]rial [facility] [address]     Cancer (HCC)     basal cell on nose    Chronic pain     back pain    Fibromyalgia 4/1/2010    GERD (gastroesophageal reflux disease) 4/1/2010    Hiatal hernia 4/1/2010    High cholesterol 4/1/2010    HTN (hypertension) 4/1/2010    Ill-defined condition     A.  Fib    OA (osteoarthritis) 4/1/2010    back    Pulmonary emboli ([facility] [address] 2015     Past Surgical History:   Procedure Laterality Date    BREAST SURGERY PROCEDURE UNLISTED      Breast im-plants x 2    ENLARGE BREAST WITH IMPLANT      HX APPENDECTOMY      HX BREAST BIOPSY      HX BREAST RECONSTRUCTION      Breast implants removed 1992    HX CATARACT REMOVAL      HX CHOLECYSTECTOMY  9/11/2013    HX HYSTERECTOMY      HX KNEE REPLACEMENT  2008    bilateral    HX ORTHOPAEDIC  2007    Bilateral TKR    HX PARTIAL HYSTERECTOMY      HX SHOULDER REPLACEMENT  2009    left    HX TONSILLECTOMY         Prior Level of Function/Environment/Context: Pt admitted from [facility]. Recent R distal radial fracture on 8/14/18. Uses rollator. Syncopal episode 9/5/18. Prior was living at home with , Sunita Sotomayor, was [employer] and mowing lawn. Occupations in which the patient is/was successful, what are the barriers preventing that success:   Performance Patterns (routines, roles, habits, and rituals):   Personal Interests and/or values:   Expanded or extensive additional review of patient history:     Home Situation  Home Environment: [address] [place] [address] Name: [facility]  # Steps to Enter: 0  One/Two Story Residence: One story  Living Alone: No  Support Systems: Spouse/Significant Other/Partner, Family member(s)  Patient Expects to be Discharged to[de-identified] Skilled nursing facility  Current DME Used/Available at Home: Indira Severe, rolling  Tub or Shower Type: Tub/Shower combination    Hand dominance: Right    EXAMINATION OF PERFORMANCE DEFICITS:  Cognitive/Behavioral Status:  Neurologic State: Alert;Confused  Orientation Level: Oriented to person;Disoriented to place; Disoriented to situation;Disoriented to time  Cognition: Follows commands; Impaired decision making; Impulsive;Memory loss  Perception: Appears intact  Perseveration: No perseveration noted  Safety/Judgement: Decreased awareness of environment;Decreased awareness of need for assistance;Decreased awareness of need for safety;Decreased insight into deficits    Skin: please see nursing notes  RUE digits bruised greatest in digit III    Edema: mild swelling/bruising in R hand    Hearing:   Auditory  Auditory Impairment: None    Vision/Perceptual:    Tracking: Able to track stimulus in all quadrants w/o difficulty                 Diplopia: No              Range of Motion:    AROM: Generally decreased, functional  PROM: Generally decreased, functional                      Strength:    Strength: Generally decreased, functional                Coordination:  Coordination: Generally decreased, functional  Fine Motor Skills-Upper: Left Intact; Right Impaired    Gross Motor Skills-Upper: Left Intact; Right Intact    Tone & Sensation:    Tone: Normal  Sensation: Intact                      Balance:  Sitting: Impaired; Without support  Sitting - Static: Fair (occasional)  Standing: Impaired; With support  Standing - Static: Fair;Constant support  Standing - Dynamic : Poor    Functional Mobility and Transfers for ADLs:  Bed Mobility:  Supine to Sit:  (NT received in chair)    Transfers:  Sit to Stand: Contact guard assistance;Assist x2  Stand to Sit: Minimum assistance  Toilet Transfer : Minimum assistance (inferred)    ADL Assessment:  Feeding: Supervision;Setup    Oral Facial Hygiene/Grooming: Supervision;Setup    Bathing: Moderate assistance; Adaptive equipment; Additional time;Assist x1    Upper Body Dressing: Moderate assistance    Lower Body Dressing: Maximum assistance    Toileting: Maximum assistance                ADL Intervention and task modifications:                                     Cognitive Retraining  Safety/Judgement: Decreased awareness of environment;Decreased awareness of need for assistance;Decreased awareness of need for safety;Decreased insight into deficits     Functional Measure:  Barthel Index:    Bathin  Bladder: 5  Bowels: 5  Groomin  Dressin  Feedin  Mobility: 0  Stairs: 0  Toilet Use: 5  Transfer (Bed to Chair and Back): 5  Total: 35       Barthel and G-code impairment scale:  Percentage of impairment CH  0% CI  1-19% CJ  20-39% CK  40-59% CL  60-79% CM  80-99% CN  100%   Barthel Score 0-100 100 99-80 79-60 59-40 20-39 1-19   0   Barthel Score 0-20 20 17-19 13-16 9-12 5-8 1-4 0      The Barthel ADL Index: Guidelines  1.  The index should be used as a record of what a patient does, not as a record of what a patient could do. 2. The main aim is to establish degree of independence from any help, physical or verbal, however minor and for whatever reason. 3. The need for supervision renders the patient not independent. 4. A patient's performance should be established using the best available evidence. Asking the patient, friends/relatives and nurses are the usual sources, but direct observation and common sense are also important. However direct testing is not needed. 5. Usually the patient's performance over the preceding 24-48 hours is important, but occasionally longer periods will be relevant. 6. Middle categories imply that the patient supplies over 50 per cent of the effort. 7. Use of aids to be independent is allowed. Mona Alfonso., Barthel, DIsacW. (8310). Functional evaluation: the Barthel Index. [address] [facility] (14)2. LES Motley, Sha Galvan., Som Doughertyow., Pep, [address] (1999). Measuring the change indisability after inpatient rehabilitation; comparison of the responsiveness of the Barthel Index and Functional [place] Measure. Journal of Neurology, Neurosurgery, and Psychiatry, 66(4), 986-706. Say Hoskins, N.J.A, Kristin Doty,  BLAINEJ.JO-ANN, & Erica Saenz, M.FREDERIC. (2004.) Assessment of post-stroke quality of life in cost-effectiveness studies: The usefulness of the Barthel Index and the EuroQoL-5D. Quality of Life Research, 13, 269-43         G codes: In compliance with CMSs Claims Based Outcome Reporting, the following G-code set was chosen for this patient based on their primary functional limitation being treated: The outcome measure chosen to determine the severity of the functional limitation was the Barthel Index with a score of 35/100 which was correlated with the impairment scale. ?  Self Care:     - CURRENT STATUS: CL - 60%-79% impaired, limited or restricted    - GOAL STATUS: CK - 40%-59% impaired, limited or restricted    - D/C STATUS:  ---------------To be determined---------------     Occupational Therapy Evaluation Charge Determination   History Examination Decision-Making   MEDIUM Complexity : Expanded review of history including physical, cognitive and psychosocial  history  MEDIUM Complexity : 3-5 performance deficits relating to physical, cognitive , or psychosocial skils that result in activity limitations and / or participation restrictions MEDIUM Complexity : Patient may present with comorbidities that affect occupational performnce. Miniml to moderate modification of tasks or assistance (eg, physical or verbal ) with assesment(s) is necessary to enable patient to complete evaluation       Based on the above components, the patient evaluation is determined to be of the following complexity level: MEDIUM     Activity Tolerance:   Pt c/o lightheaded in standing, appears fatigued, pale. Please refer to the flowsheet for vital signs taken during this treatment. After treatment:   [x] Patient left in no apparent distress sitting up in chair  [] Patient left in no apparent distress in bed  [x] Call bell left within reach  [x] Nursing notified  [x] Caregiver present  [] Bed alarm activated    COMMUNICATION/EDUCATION:   The patients plan of care was discussed with: Physical Therapist and Registered Nurse. [x] Home safety education was provided and the patient/caregiver indicated understanding. [x] Patient/family have participated as able in goal setting and plan of care. [x] Patient/family agree to work toward stated goals and plan of care. [] Patient understands intent and goals of therapy, but is neutral about his/her participation. [] Patient is unable to participate in goal setting and plan of care. This patients plan of care is appropriate for delegation to [facility].     Thank you for this referral.  Nery Sidhu, OT  Time Calculation: 20 mins

## 2018-09-08 NOTE — PROGRESS NOTES
Problem: Altered Thought Process (Adult/Pediatric)  Goal: *STG: Participates in treatment plan  Outcome: Progressing Towards Goal  Pt is pleasantly confused. Assisted by staff with standing and pivot. Eating well  Goal: *STG: Complies with medication therapy  Outcome: Progressing Towards Goal  Medication compliant    Problem: Falls - Risk of  Goal: *Absence of Falls  Document Kizzy Fall Risk and appropriate interventions in the flowsheet. Outcome: Progressing Towards Goal  Fall Risk Interventions:  Mobility Interventions: Bed/chair exit alarm    Mentation Interventions: Adequate sleep, hydration, pain control, Bed/chair exit alarm, Door open when patient unattended    Medication Interventions: Bed/chair exit alarm    Elimination Interventions: Bed/chair exit alarm    History of Falls Interventions: Bed/chair exit alarm, Door open when patient unattended    IV infiltrated. Removed. No redness or swelling    Problem: Pressure Injury - Risk of  Goal: *Prevention of pressure injury  Document Oumar Scale and appropriate interventions in the flowsheet.    Pressure Injury Interventions:  Sensory Interventions: Assess changes in LOC    Moisture Interventions: Absorbent underpads    Activity Interventions: Chair cushion    Mobility Interventions: Chair cushion    Nutrition Interventions: Offer support with meals,snacks and hydration    Friction and Shear Interventions: Apply protective barrier, creams and emollients, HOB 30 degrees or less, Minimize layers

## 2018-09-08 NOTE — PROGRESS NOTES
Problem: Altered Thought Process (Adult/Pediatric)  Goal: *STG: Attends activities and groups  Outcome: Progressing Towards Goal  Visible in DR eating dinner, fluids and snacks; watching TV and listening to music with peers. Ate 50% dinner and fluids. Med compliant. Problem: Falls - Risk of  Goal: *Absence of Falls  Document Kizzy Fall Risk and appropriate interventions in the flowsheet. Outcome: Progressing Towards Goal  Fall Risk Interventions:  Mobility Interventions: Bed/chair exit alarm, Communicate number of staff needed for ambulation/transfer, Patient to call before getting OOB, Utilize walker, cane, or other assistive device, Utilize gait belt for transfers/ambulation    Mentation Interventions: Adequate sleep, hydration, pain control    Medication Interventions: Bed/chair exit alarm    Elimination Interventions: Bed/chair exit alarm    History of Falls Interventions: Bed/chair exit alarm     No falls. Requires a one staff assist with.

## 2018-09-08 NOTE — BH NOTES
PSYCHIATRIC PROGRESS NOTE         Patient Name  Ruel Oden   Date of Birth 5/24/1933   [facility] [id number]   Medical Record Number  [id number]      Age  80 y.o. PCP Tony Quintana MD   Admit date:  9/6/2018    Room Number  746/01  @ [facility]   Date of Service  9/8/2018          PSYCHOTHERAPY SESSION NOTE:  Length of psychotherapy session: 20 minutes    Main condition/diagnosis/issues treated during session today, 9/8/2018 : I employed Cognitive Behavioral therapy techniques, Reality-Oriented psychotherapy, as well as supportive psychotherapy in regards to various ongoing psychosocial stressors, including the following: pre-admission and current problems; housing issues;   medical issues; and stress of hospitalization. Interpersonal relationship issues and psychodynamic conflicts explored. Attempts made to alleviate maladaptive patterns. Overall, patient is not progressing    Treatment Plan Update (reviewed an updated 9/8/2018) : I will modify psychotherapy tx plan by implementing more stress management strategies, building upon cognitive behavioral techniques, increasing coping skills, as well as shoring up psychological defenses). An extended energy and skill set was needed to engage pt in psychotherapy due to some of the following: resistiveness, complexity, negativity, confrontational nature, hostile behaviors, and/or severe abnormalities in thought processes/psychosis resulting in the loss of expressive/receptive language communication skills. E & M PROGRESS NOTE:         HISTORY         HISTORY OF PRESENT ILLNESS/INTERVAL HISTORY:  (reviewed/updated 9/8/2018). per initial evaluation: The patient, Tray Che, is a 80 y.o.   WHITE [place]  female with a past psychiatric history significant for Dementia, MDD who was treated on [facility] for several weeks, until she had a syncopal episode on 9/5 and transferred to the medical floor for observation for 24 hours. She now returns medically stable. Patient's mood and presesentation hs improved compared to original presentation on admission. She remains very confused secondary to advanced dementia. No agitation or aggression noted. Compliant with her medications. Awaiting placement vs. Return home. 9/8/18. \"Am I going home today\". No complaints. Seems calmer. SIDE EFFECTS: (reviewed/updated 9/8/2018)  None reported or admitted to. ALLERGIES:(reviewed/updated 9/8/2018)  Allergies   Allergen Reactions    Angiotensin Ii,Human Other (comments)     States does not remember      Avelox [Moxifloxacin] Other (comments)     States does not remember      Demerol [Meperidine] Hives      MEDICATIONS PRIOR TO ADMISSION:(reviewed/updated 9/8/2018)  Prescriptions Prior to Admission   Medication Sig    traZODone (DESYREL) 50 mg tablet Take 0.5 Tabs by mouth nightly for 30 days. Indications: insomnia associated with depression    risperiDONE (RISPERDAL) 0.25 mg tablet Take 1 Tab by mouth two (2) times a day for 30 days.  magnesium hydroxide (MILK OF MAGNESIA) 400 mg/5 mL suspension Take 30 mL by mouth daily for 30 days.  donepezil (ARICEPT) 10 mg tablet Take 1 Tab by mouth daily for 30 days.  acetaminophen (TYLENOL) 325 mg tablet Take 2 Tabs by mouth every six (6) hours as needed for Pain.  docusate sodium (COLACE) 100 mg capsule Take 1 Cap by mouth daily for 90 days.  dilTIAZem CD (CARTIA XT) 240 mg ER capsule Take 240 mg by mouth daily.  polyethylene glycol (MIRALAX) 17 gram packet Take 17 g by mouth as needed (Constipation).  citalopram (CELEXA) 20 mg tablet TAKE 1 TABLET BY MOUTH DAILY      PAST MEDICAL HISTORY: Past medical history from the initial psychiatric evaluation has been reviewed (reviewed/updated 9/8/2018) with no additional updates (I asked patient and no additional past medical history provided).  Past Medical History:   Diagnosis Date    Anemia     [facility]rial fibrillation [facility] [address]  Cancer ([facility] [address]     basal cell on nose    Chronic pain     back pain    Fibromyalgia 4/1/2010    GERD (gastroesophageal reflux disease) 4/1/2010    Hiatal hernia 4/1/2010    High cholesterol 4/1/2010    HTN (hypertension) 4/1/2010    Ill-defined condition     A. Fib    OA (osteoarthritis) 4/1/2010    back    Pulmonary emboli ([facility] [address] 2015     Past Surgical History:   Procedure Laterality Date    BREAST SURGERY PROCEDURE UNLISTED      Breast im-plants x 2    ENLARGE BREAST WITH IMPLANT      HX APPENDECTOMY      HX BREAST BIOPSY      HX BREAST RECONSTRUCTION      Breast implants removed 1992    HX CATARACT REMOVAL      HX CHOLECYSTECTOMY  9/11/2013    HX HYSTERECTOMY      HX KNEE REPLACEMENT  2008    bilateral    HX ORTHOPAEDIC  2007    Bilateral TKR    HX PARTIAL HYSTERECTOMY      HX SHOULDER REPLACEMENT  2009    left    HX TONSILLECTOMY        SOCIAL HISTORY: Social history from the initial psychiatric evaluation has been reviewed (reviewed/updated 9/8/2018) with no additional updates (I asked patient and no additional social history provided). Social History     Social History    Marital status:      Spouse name: N/A    Number of children: N/A    Years of education: N/A     Occupational History    Not on file. Social History Main Topics    Smoking status: Never Smoker    Smokeless tobacco: Never Used    Alcohol use No    Drug use: No    Sexual activity: No     Other Topics Concern    Not on file     Social History Narrative      FAMILY HISTORY: Family history from the initial psychiatric evaluation has been reviewed (reviewed/updated 9/8/2018) with no additional updates (I asked patient and no additional family history provided).  Family History   Problem Relation Age of Onset   Ata Arthritis-rheumatoid Mother     Dementia Mother     Coronary Artery Disease Father     Cancer Brother      lung cancer       REVIEW OF SYSTEMS: (reviewed/updated 9/8/2018)  Appetite:improved Sleep: improved   All other Review of Systems: General ROS: negative         MENTAL STATUS EXAM & VITALS     MENTAL STATUS EXAM (MSE):    MSE FINDINGS ARE WITHIN NORMAL LIMITS (WNL) UNLESS OTHERWISE STATED BELOW. ( ALL OF THE BELOW CATEGORIES OF THE MSE HAVE BEEN REVIEWED (reviewed 9/8/2018) AND UPDATED AS DEEMED APPROPRIATE )  General Presentation age appropriate and older than stated age, cooperative   Orientation not oriented to situation   Vital Signs  See below (reviewed 9/8/2018); Vital Signs (BP, Pulse, & Temp) are within normal limits if not listed below.    Gait and Station Stable/steady, no ataxia   Musculoskeletal System No extrapyramidal symptoms (EPS); no abnormal muscular movements or Tardive Dyskinesia (TD); muscle strength and tone are within normal limits   Language No aphasia or dysarthria   Speech:  soft   Thought Processes illogical; slow rate of thoughts; poor abstract reasoning/computation   Thought Associations loose associations   Thought Content free of delusions   Suicidal Ideations no plan    Homicidal Ideations no plan    Mood:  anxious    Affect:  anxious   Memory recent  impaired   Memory remote:  impaired   Concentration/Attention:  distractable   Fund of Knowledge below average   Insight:  limited   Reliability limited   Judgment:  limited          VITALS:     Patient Vitals for the past 24 hrs:   Temp Pulse Resp BP SpO2   09/08/18 1635 98.1 °F (36.7 °C) 100 16 138/76 95 %   09/08/18 1225 97.9 °F (36.6 °C) 89 16 100/65 98 %   09/08/18 0818 98 °F (36.7 °C) 100 16 (!) 172/102 93 %   09/07/18 1932 97.2 °F (36.2 °C) 96 16 121/76 94 %     Wt Readings from Last 3 Encounters:   09/02/18 64.5 kg (142 lb 3.2 oz)   08/21/18 76.1 kg (167 lb 12.8 oz)   08/13/18 77.6 kg (171 lb)     Temp Readings from Last 3 Encounters:   09/08/18 98.1 °F (36.7 °C)   09/06/18 98.9 °F (37.2 °C)   09/05/18 98.1 °F (36.7 °C)     BP Readings from Last 3 Encounters:   09/08/18 138/76   09/06/18 146/77   09/05/18 103/63     Pulse Readings from Last 3 Encounters:   09/08/18 100   09/06/18 (!) 105   09/05/18 68            DATA     LABORATORY DATA:(reviewed/updated 9/8/2018)  Recent Results (from the past 24 hour(s))   GLUCOSE, POC    Collection Time: 09/08/18  8:01 AM   Result Value Ref Range    Glucose (POC) 117 (H) 65 - 100 mg/dL    Performed by Josef Pellet    GLUCOSE, POC    Collection Time: 09/08/18  4:43 PM   Result Value Ref Range    Glucose (POC) 227 (H) 65 - 100 mg/dL    Performed by [facility]      No results found for: VALF2, VALAC, VALP, VALPR, DS6, CRBAM, CRBAMP, CARB2, XCRBAM  No results found for: LITHM   RADIOLOGY REPORTS:(reviewed/updated 9/8/2018)  Xr Pelv Ap Only    Addendum Date: 8/14/2018    Addendum: No fracture. Result Date: 8/14/2018  Clinical history: Fall yesterday FINDINGS: Single frontal view of the pelvis is obtained. There is no fracture or dislocation identified. There is no significant soft tissue abnormality. IMPRESSION: No acute fracture. Xr Forearm Rt Ap/lat    Result Date: 8/13/2018  EXAM:  XR FOREARM RT AP/LAT Clinical history: Distal radial fracture. INDICATION:   fall. COMPARISON: None. FINDINGS: Two views of the right radius and ulna demonstrate distal radial fracture. Extensive degenerative change at the radiocarpal and carpometacarpal rows. .     IMPRESSION:  Distal radial fracture with minimal displacement. Severe degenerative change at the wrist..     Xr Wrist Rt Ap/lat    Result Date: 8/27/2018  EXAM: XR WRIST RT AP/LAT INDICATION:  R wrist fracture follow up, was not casted and pt not leaving splint on. COMPARISON: 8/14/2018. FINDINGS: Two  views of the right wrist demonstrate no appreciable change in the T-shaped interarticular distal radial fracture and ulnar styloid fracture in fiberglass. There is chondrocalcinosis of the radiocarpal joint and TFC. Lateral carpal arthritis is noted. Osteopenia is present.      IMPRESSION:  No significant change in the right T-shaped interarticular distal radial and ulna styloid fractures in the splint. Xr Wrist Rt Ap/lat/obl Min 3v    Result Date: 8/14/2018  EXAM: XR WRIST RT AP/LAT/OBL MIN 3V Clinical history: Fall, broken wrist INDICATION:  fall, broken wrist. COMPARISON: None. FINDINGS: Three  views of the right wrist demonstrate postreduction images distal radial fracture. .  Soft tissue edema. .     IMPRESSION:  Postreduction images distal radial fracture. .     Xr Wrist Rt Ap/lat/obl Min 3v    Result Date: 8/13/2018  EXAM: XR WRIST RT AP/LAT/OBL MIN 3V HISTORY: Fall, fell in bathroom, right arm INDICATION:  fall. COMPARISON: None. FINDINGS: Three  views of the right wrist demonstrate nondisplaced distal radius fracture. Severe degenerative change of the radiocarpal and carpometacarpal rows. No ulnar fracture. .  The soft tissues are within normal limits. IMPRESSION:  Nondisplaced fracture of the distal radius. 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     Ct Head Wo Cont    Result Date: 8/23/2018  EXAM:  CT head without contrast INDICATION: Altered mental status COMPARISON: CT head 8/14/2018 TECHNIQUE: Axial noncontrast head CT from foramen magnum to vertex. Coronal and sagittal reformatted images were obtained. CT dose reduction was achieved through use of a standardized protocol tailored for this examination and automatic exposure control for dose modulation. Adaptive statistical iterative reconstruction (ASIR) was utilized. FINDINGS:  There is diffuse age-related parenchymal volume loss. The ventricles and sulci are age-appropriate without hydrocephalus. There is no mass effect or midline shift. There is no intracranial hemorrhage or extra-axial fluid collection. Scattered foci of low attenuation in the periventricular white matter represent stable chronic microvascular ischemic changes. There is no new abnormal parenchymal attenuation. The gray-white matter differentiation is maintained.  The basal cisterns are patent. 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 The osseous structures are intact. The visualized paranasal sinuses and mastoid air cells are clear. IMPRESSION: There is no acute intracranial abnormality. Ct Head Wo Cont    Result Date: 8/14/2018  EXAM:  CT HEAD WO CONT Clinical history: Fall, fractured wrist, increased pain INDICATION:   fall COMPARISON: 7/20/2018. CONTRAST:  None. TECHNIQUE: Unenhanced CT of the head was performed using 5 mm images. Brain and bone windows were generated. CT dose reduction was achieved through use of a standardized protocol tailored for this examination and automatic exposure control for dose modulation. FINDINGS: The ventricles and sulci are normal in size, shape and configuration and midline. Mild scattered hypodensities in the cerebral white matter. There is no intracranial hemorrhage, extra-axial collection, mass, mass effect or midline shift. The basilar cisterns are open. No acute infarct is identified. The bone windows demonstrate no abnormalities. The visualized portions of the paranasal sinuses and mastoid air cells are clear. IMPRESSION: No acute cranial process     Ct Head Wo Cont    Result Date: 7/20/2018  EXAM:  CT HEAD WO CONT INDICATION: Fall going to the bathroom and had a ground level fall and hit head on the floor. COMPARISON: MRI brain 11/12/2012. [place] CONTRAST:  None. TECHNIQUE: Unenhanced CT of the head was performed using 5 mm images. Brain and bone windows were generated. CT dose reduction was achieved through use of a standardized protocol tailored for this examination and automatic exposure control for dose modulation. FINDINGS: There is a stable 9 x 12 mm partially calcified extra-axial lesion in the left posterior fossa compatible with meningioma. There is no acute intracranial hemorrhage, other mass, mass effect or herniation.  Ventricles and sulci show stable, proportionate, and symmetric pattern. There is a stable pattern of periventricular white matter hypodensity. The gray-white matter differentiation is well-preserved. Atherosclerotic calcifications are seen within the carotid siphons and vertebral arteries. The mastoid air cells are well pneumatized. The visualized paranasal sinuses are normal.     IMPRESSION: No acute intracranial hemorrhage or infarct. Stable left posterior fossa meningioma and chronic white matter change most compatible with small vessel ischemic and/or senescent change. Intracranial atherosclerosis. Ct Abd Pelv Wo Cont    Result Date: 6/20/2018  EXAM:  CT ABD PELV WO CONT INDICATION: abd pain  2 days of abdominal pain COMPARISON: 2013 CONTRAST:  None. TECHNIQUE: Thin axial images were obtained through the abdomen and pelvis. Coronal and sagittal reconstructions were generated. Oral contrast was not administered. CT dose reduction was achieved through use of a standardized protocol tailored for this examination and automatic exposure control for dose modulation. The absence of intravenous contrast material reduces the sensitivity for evaluation of the solid parenchymal organs of the abdomen. FINDINGS: LUNG BASES: Clear. INCIDENTALLY IMAGED HEART AND MEDIASTINUM: Unremarkable. LIVER: No mass or biliary dilatation. GALLBLADDER: Surgically removed. SPLEEN: No mass. PANCREAS: No mass or ductal dilatation. ADRENALS: Unremarkable. KIDNEYS/URETERS: Malrotated right kidney. Hyperdense right renal cyst. Right nephrolithiasis. STOMACH: Hiatal hernia. SMALL BOWEL: No dilatation or wall thickening. COLON: No dilatation or wall thickening. Moderate colonic stool. APPENDIX: Surgically removed PERITONEUM: No ascites or pneumoperitoneum. RETROPERITONEUM: No lymphadenopathy or aortic aneurysm. REPRODUCTIVE ORGANS: Surgically removed. URINARY BLADDER: No mass or calculus. BONES: No destructive bone lesion. Multilevel degenerative changes.  ADDITIONAL COMMENTS: N/A     IMPRESSION: No acute findings. Xr Chest Port    Result Date: 6/20/2018  EXAM:  XR CHEST PORT INDICATION:  Chest Pain COMPARISON:  May 24 FINDINGS: A portable AP radiograph of the chest was obtained at 0555 hours. There is a left shoulder replacement in place. The patient is on a cardiac monitor. The lungs are clear. The cardiac and mediastinal contours and pulmonary vascularity are normal.  The bones and soft tissues are grossly within normal limits. IMPRESSION: No acute findings.           MEDICATIONS     ALL MEDICATIONS:   Current Facility-Administered Medications   Medication Dose Route Frequency    dilTIAZem CD (CARDIZEM CD) capsule 240 mg  240 mg Oral DAILY    magnesium hydroxide (MILK OF MAGNESIA) 400 mg/5 mL oral suspension 30 mL  30 mL Oral DAILY PRN    citalopram (CELEXA) tablet 20 mg  20 mg Oral QHS    docusate sodium (COLACE) capsule 100 mg  100 mg Oral DAILY    donepezil (ARICEPT) tablet 10 mg  10 mg Oral DAILY    polyethylene glycol (MIRALAX) packet 17 g  17 g Oral DAILY PRN    risperiDONE (RisperDAL) tablet 0.25 mg  0.25 mg Oral BID    saline peripheral flush soln 5 mL  5 mL InterCATHeter PRN    traZODone (DESYREL) tablet 25 mg  25 mg Oral QHS PRN    OLANZapine (ZyPREXA) tablet 2.5 mg  2.5 mg Oral Q6H PRN    ziprasidone (GEODON) 10 mg in sterile water (preservative free) 0.5 mL injection  10 mg IntraMUSCular BID PRN    benztropine (COGENTIN) tablet 1 mg  1 mg Oral BID PRN    benztropine (COGENTIN) injection 1 mg  1 mg IntraMUSCular BID PRN    zolpidem (AMBIEN) tablet 5 mg  5 mg Oral QHS PRN    acetaminophen (TYLENOL) tablet 650 mg  650 mg Oral Q4H PRN    ibuprofen (MOTRIN) tablet 400 mg  400 mg Oral Q8H PRN      SCHEDULED MEDICATIONS:   Current Facility-Administered Medications   Medication Dose Route Frequency    dilTIAZem CD (CARDIZEM CD) capsule 240 mg  240 mg Oral DAILY    citalopram (CELEXA) tablet 20 mg  20 mg Oral QHS    docusate sodium (COLACE) capsule 100 mg  100 mg Oral DAILY  donepezil (ARICEPT) tablet 10 mg  10 mg Oral DAILY    risperiDONE (RisperDAL) tablet 0.25 mg  0.25 mg Oral BID          ASSESSMENT & PLAN     DIAGNOSES REQUIRING ACTIVE TREATMENT AND MONITORING: (reviewed/updated 9/8/2018)  Patient Active Hospital Problem List:    The patient, Tray Velasco, is a 80 y.o.  female who presents at this time for treatment of the following diagnoses:  Patient Active Hospital Problem List:   Late onset Alzheimer's disease with behavioral disturbance (8/24/2018)    Assessment: severe, advanced    Plan: Agree with inpatient hospitalization for further stabilization, safety monitoring and medication management  Medications- Aricept and risperdal         In summary, Tray Regan, is a 80 y.o.  female who presents with a severe exacerbation of the principal diagnosis of Late onset Alzheimer's disease with behavioral disturbance  Patient's condition is worsening/not improving/not stable improving. Patient requires continued inpatient hospitalization for further stabilization, safety monitoring and medication management. I will continue to coordinate the provision of individual, milieu, occupational, group, and substance abuse therapies to address target symptoms/diagnoses as deemed appropriate for the individual patient. A coordinated, multidisplinary treatment team round was conducted with the patient (this team consists of the nurse, psychiatric unit pharmcist,  and writer). Complete current electronic health record for patient has been reviewed today including consultant notes, ancillary staff notes, nurses and psychiatric tech notes. Suicide risk assessment completed and patient deemed to be of low risk for suicide at this time. The following regarding medications was addressed during rounds with patient:   the risks and benefits of the proposed medication. The patient was given the opportunity to ask questions.  Informed consent given to the use of the above medications. Will continue to adjust psychiatric and non-psychiatric medications (see above \"medication\" section and orders section for details) as deemed appropriate & based upon diagnoses and response to treatment. I will continue to order blood tests/labs and diagnostic tests as deemed appropriate and review results as they become available (see orders for details and above listed lab/test results). I will order psychiatric records from previous [facility] hospitals to further elucidate the nature of patient's psychopathology and review once available. I will gather additional collateral information from friends, family and o/p treatment team to further elucidate the nature of patient's psychopathology and baselline level of psychiatric functioning. I certify that this patient's inpatient psychiatric hospital services furnished since the previous certification were, and continue to be, required for treatment that could reasonably be expected to improve the patient's condition, or for diagnostic study, and that the patient continues to need, on a daily basis, active treatment furnished directly by or requiring the supervision of inpatient psychiatric facility personnel. In addition, the hospital records show that services furnished were intensive treatment services, admission or related services, or equivalent services.     EXPECTED DISCHARGE DATE/DAY: TBD     DISPOSITION: Home       Signed By:   Shira Ramos MD  9/8/2018

## 2018-09-08 NOTE — PROGRESS NOTES
Problem: Mobility Impaired (Adult and Pediatric)  Goal: *Acute Goals and Plan of Care (Insert Text)  Physical Therapy Goals  Initiated 9/8/2018  1. Patient will move from supine to sit and sit to supine , scoot up and down and roll side to side in bed with minimal assistance/contact guard assist within 7 day(s). 2.  Patient will transfer from bed to chair and chair to bed with minimal assistance/contact guard assist using the least restrictive device within 7 day(s). 3.  Patient will perform sit to stand with minimal assistance/contact guard assist within 7 day(s). 4.  Patient will ambulate with minimal assistance/contact guard assist for 50 feet with the least restrictive device within 7 day(s). physical Therapy EVALUATION  Patient: Shawna Grimaldo [de-identified]80 y.o. female)  Date: 9/8/2018  Primary Diagnosis: Late onset Alzheimer's disease with behavioral disturbance  Aggression  Psychosis  Psychosis  Late onset Alzheimer's disease with behavioral disturbance        Precautions:   Fall    ASSESSMENT :   Based on the objective data described below, the patient presents with confusion, impaired safety awareness, poor balance, dizziness (likely hypotensive-dynamap not available), history of falls, R wrist fracture, and generalized weakness. Weight bearing status of R UE unknown. Patient  present for assessment. Prior to admission this patient was at [facility] for rehab. Per , patient uses rollator for mobility. Patient pleasant and follows commands. Overall CGA for transfers and CGA/min for ambulation. Scissoring pattern observed and delayed onset of lightheadedness/dizziness. RN reports BP has not been taken yet this morning. Cognition appears to be a limiting factor > physical debility at present but she remains a high fall risk. Recommend discharge to a rehab setting pending progress towards goals. .    Patient will benefit from skilled intervention to address the above impairments.   Patients rehabilitation potential is considered to be Fair  Factors which may influence rehabilitation potential include:   []         None noted  [x]         Mental ability/status  [x]         Medical condition  [x]         Home/family situation and support systems  [x]         Safety awareness  []         Pain tolerance/management  []         Other:      PLAN :  Recommendations and Planned Interventions:  [x]           Bed Mobility Training             []    Neuromuscular Re-Education  [x]           Transfer Training                   []    Orthotic/Prosthetic Training  [x]           Gait Training                         []    Modalities  [x]           Therapeutic Exercises           []    Edema Management/Control  [x]           Therapeutic Activities            [x]    Patient and Family Training/Education  []           Other (comment):    Frequency/Duration: Patient will be followed by physical therapy  3 times a week to address goals. Discharge Recommendations: Skilled Nursing Facility  Further Equipment Recommendations for Discharge: rolling walker     SUBJECTIVE:   Patient stated You want to have some food here tomorrow? Rom Elizabeth    OBJECTIVE DATA SUMMARY:   HISTORY:    Past Medical History:   Diagnosis Date    Anemia     [facility]     Cancer ([facility]     basal cell on nose    Chronic pain     back pain    Fibromyalgia 4/1/2010    GERD (gastroesophageal reflux disease) 4/1/2010    Hiatal hernia 4/1/2010    High cholesterol 4/1/2010    HTN (hypertension) 4/1/2010    Ill-defined condition     A.  Fib    OA (osteoarthritis) 4/1/2010    back    Pulmonary emboli ([facility] 2015     Past Surgical History:   Procedure Laterality Date    BREAST SURGERY PROCEDURE UNLISTED      Breast im-plants x 2    ENLARGE BREAST WITH IMPLANT      HX APPENDECTOMY      HX BREAST BIOPSY      HX BREAST RECONSTRUCTION      Breast implants removed 1992    HX CATARACT REMOVAL      HX CHOLECYSTECTOMY  9/11/2013    HX HYSTERECTOMY      HX KNEE REPLACEMENT  2008    bilateral    HX ORTHOPAEDIC  2007    Bilateral TKR    HX PARTIAL HYSTERECTOMY      HX SHOULDER REPLACEMENT  2009    left    HX TONSILLECTOMY       Prior Level of Function/Home Situation: SNF  Personal factors and/or comorbidities impacting plan of care: cognition    Home Situation  Home Environment: [address] [place] [address] Name: Gold Chambers  # Steps to Enter: 0  One/Two Story Residence: One story  Living Alone: No  Support Systems: Spouse/Significant Other/Partner, Family member(s)  Patient Expects to be Discharged to[de-identified] Skilled nursing facility  Current DME Used/Available at Home: Cm Bis, rolling  Tub or Shower Type: Tub/Shower combination    EXAMINATION/PRESENTATION/DECISION MAKING:   Critical Behavior:  Neurologic State: Alert, Confused  Orientation Level: Oriented to person, Disoriented to place, Disoriented to situation, Disoriented to time  Cognition: Follows commands, Impaired decision making, Impulsive, Memory loss  Safety/Judgement: Decreased awareness of environment, Decreased awareness of need for assistance, Decreased awareness of need for safety, Decreased insight into deficits  Hearing:   Auditory  Auditory Impairment: None    Range Of Motion:  AROM: Generally decreased, functional           PROM: Generally decreased, functional           Strength:    Strength: Generally decreased, functional                    Tone & Sensation:   Tone: Normal              Sensation: Intact               Coordination:  Coordination: Generally decreased, functional  Vision:   Tracking: Able to track stimulus in all quadrants w/o difficulty  Diplopia: No  Functional Mobility:  Bed Mobility:              Transfers:  Sit to Stand: Contact guard assistance  Stand to Sit: Contact guard assistance  Stand Pivot Transfers: Contact guard assistance                    Balance:      Ambulation/Gait Training:  Distance (ft): 25 Feet (ft)  Assistive Device: Gait belt;Walker, rollator  Ambulation - Level of Assistance: Minimal assistance;Contact guard assistance     Gait Description (WDL): Exceptions to WDL  Gait Abnormalities: Scissoring;Path deviations;Decreased step clearance        Base of Support: Narrowed     Speed/Patricia: Slow;Shuffled  Step Length: Right shortened;Left shortened                  Functional Measure:  Tinetti test:    Sitting Balance: 1  Arises: 1  Attempts to Rise: 2  Immediate Standing Balance: 1  Standing Balance: 1  Nudged: 0  Eyes Closed: 0  Turn 360 Degrees - Continuous/Discontinuous: 0  Turn 360 Degrees - Steady/Unsteady: 0  Sitting Down: 1  Balance Score: 7  Indication of Gait: 0  R Step Length/Height: 0  L Step Length/Height: 0  R Foot Clearance: 1  L Foot Clearance: 1  Step Symmetry: 0  Step Continuity: 0  Path: 0  Trunk: 0  Walking Time: 0  Gait Score: 2  Total Score: 9       Tinetti Test and G-code impairment scale:  Percentage of Impairment CH    0%   CI    1-19% CJ    20-39% CK    40-59% CL    60-79% CM    80-99% CN     100%   Tinetti  Score 0-28 28 23-27 17-22 12-16 6-11 1-5 0       Tinetti Tool Score Risk of Falls  <19 = High Fall Risk  19-24 = Moderate Fall Risk  25-28 = Low Fall Risk  Tinetti ME. Performance-Oriented Assessment of Mobility Problems in Elderly Patients. [address] 66; [id number]. (Scoring Description: PT Bulletin Feb. 10, 1993)    Older adults: Daquan Edwards et al, 2009; n = 1000 [facility] elderly evaluated with ABC, GILBERTO, ADL, and IADL)  · Mean GILBERTO score for males aged 69-68 years = 26.21(3.40)  · Mean GILBERTO score for females age 69-68 years = 25.16(4.30)  · Mean GILBERTO score for males over 80 years = 23.29(6.02)  · Mean GILBERTO score for females over 80 years = 17.20(8.32)         G codes: In compliance with CMSs Claims Based Outcome Reporting, the following G-code set was chosen for this patient based on their primary functional limitation being treated:     The outcome measure chosen to determine the severity of the functional limitation was the Tinetti with a score of 9/28 which was correlated with the impairment scale. ? Mobility - Walking and Moving Around:     - CURRENT STATUS: CL - 60%-79% impaired, limited or restricted    - GOAL STATUS: CK - 40%-59% impaired, limited or restricted    - D/C STATUS:  ---------------To be determined---------------      Physical Therapy Evaluation Charge Determination   History Examination Presentation Decision-Making   HIGH Complexity :3+ comorbidities / personal factors will impact the outcome/ POC  HIGH Complexity : 4+ Standardized tests and measures addressing body structure, function, activity limitation and / or participation in recreation  LOW Complexity : Stable, uncomplicated  MEDIUM Complexity : FOTO score of 26-74      Based on the above components, the patient evaluation is determined to be of the following complexity level: LOW     Pain:  Pain Scale 1: Numeric (0 - 10)  Pain Intensity 1: 0              Activity Tolerance:   Assumed orthostatic  Please refer to the flowsheet for vital signs taken during this treatment. After treatment:   [x]         Patient left in no apparent distress sitting up in chair  []         Patient left in no apparent distress in bed  [x]         Call bell left within reach  [x]         Nursing notified  [x]         Caregiver present  []         Bed alarm activated    COMMUNICATION/EDUCATION:   The patients plan of care was discussed with: Occupational Therapist and Registered Nurse. [x]         Fall prevention education was provided and the patient/caregiver indicated understanding. [x]         Patient/family have participated as able in goal setting and plan of care. [x]         Patient/family agree to work toward stated goals and plan of care. []         Patient understands intent and goals of therapy, but is neutral about his/her participation. [x]         Patient is unable to participate in goal setting and plan of care.     Thank you for this referral.  Serene Florez, PT , DPT   Time Calculation: 18 mins

## 2018-09-08 NOTE — BH NOTES
Pt slept 6 1/2 hours. Pt had uneventful night and required no PRN's. Pt had no complaints or signs of distress throughout night. Respirations were unlabored. Continuing to monitor with q15 min rounds for safety.

## 2018-09-08 NOTE — BH NOTES
GROUP THERAPY PROGRESS NOTE    Tray Regan is participating in [facility].      Group time: 15 minutes    Personal goal for participation: decrease anxiety    Goal orientation: relaxation    Group therapy participation: active    Therapeutic interventions reviewed and discussed:     Impression of participation: good

## 2018-09-09 LAB
GLUCOSE BLD STRIP.AUTO-MCNC: 106 MG/DL (ref 65–100)
GLUCOSE BLD STRIP.AUTO-MCNC: 121 MG/DL (ref 65–100)
SERVICE CMNT-IMP: ABNORMAL
SERVICE CMNT-IMP: ABNORMAL

## 2018-09-09 PROCEDURE — 82962 GLUCOSE BLOOD TEST: CPT

## 2018-09-09 PROCEDURE — 74011250637 HC RX REV CODE- 250/637: Performed by: PSYCHIATRY & NEUROLOGY

## 2018-09-09 PROCEDURE — 65220000003 HC RM SEMIPRIVATE PSYCH

## 2018-09-09 PROCEDURE — 74011250637 HC RX REV CODE- 250/637: Performed by: INTERNAL MEDICINE

## 2018-09-09 RX ADMIN — RISPERIDONE 0.25 MG: 0.5 TABLET, FILM COATED ORAL at 17:42

## 2018-09-09 RX ADMIN — CITALOPRAM HYDROBROMIDE 20 MG: 20 TABLET ORAL at 21:24

## 2018-09-09 RX ADMIN — DILTIAZEM HYDROCHLORIDE 240 MG: 240 CAPSULE, COATED, EXTENDED RELEASE ORAL at 09:14

## 2018-09-09 RX ADMIN — RISPERIDONE 0.25 MG: 0.5 TABLET, FILM COATED ORAL at 09:14

## 2018-09-09 RX ADMIN — DOCUSATE SODIUM 100 MG: 100 CAPSULE, LIQUID FILLED ORAL at 09:14

## 2018-09-09 RX ADMIN — DONEPEZIL HYDROCHLORIDE 10 MG: 10 TABLET, FILM COATED ORAL at 09:14

## 2018-09-09 NOTE — INTERDISCIPLINARY ROUNDS
Behavioral Health Interdisciplinary Rounds     Patient Name: Frank Chavez  Age: 80 y.o.   Room/Bed:  746/  Primary Diagnosis: Late onset Alzheimer's disease with behavioral disturbance   Admission Status: Voluntary     Readmission within 30 days: yes  Power of  in place: no  Patient requires a blocked bed: no          Reason for blocked bed: n/a    VTE Prophylaxis: Not indicated    Mobility needs/Fall risk: yes    Nutritional Plan: no  Consults: no         Labs/Testing due today?: no    Sleep hours:  8+      Participation in Care/Groups:  no  Medication Compliant?: Yes  PRNS (last 24 hours): None    Restraints (last 24 hours):  no     CIWA (range last 24 hours):     COWS (range last 24 hours):      Alcohol screening (AUDIT) completed -   AUDIT Score: 0     If applicable, date SBIRT discussed in treatment team AND documented:     Tobacco - patient is a smoker: Have You Used Tobacco in the Past 30 Days: No  Illegal Drugs use: Have You Used Any Illegal Substances Over the Past 12 Months: No    24 hour chart check complete: yes     Patient goal(s) for today:   Treatment team focus/goals:   Progress note     LOS:  3  Expected LOS:     Financial concerns/prescription coverage:    Date of last family contact:       Family requesting physician contact today:    Discharge plan:   Guns in the home:         Outpatient provider(s):     Participating treatment team members: Tray Regan, * (assigned SW),

## 2018-09-09 NOTE — PROGRESS NOTES
Problem: Falls - Risk of  Goal: *Absence of Falls  Document Kizzy Fall Risk and appropriate interventions in the flowsheet. Outcome: Progressing Towards Goal  Fall Risk Interventions:  Mobility Interventions: Bed/chair exit alarm, Communicate number of staff needed for ambulation/transfer, Patient to call before getting OOB, Utilize walker, cane, or other assistive device, Utilize gait belt for transfers/ambulation   In bed asleep with respirations noted as even and unlabored as chest was rising and falling. Will continue to monitor for safety and 15 minute checks throughout shift.   Mentation Interventions: Adequate sleep, hydration, pain control    Medication Interventions: Bed/chair exit alarm    Elimination Interventions: Bed/chair exit alarm    History of Falls Interventions: Bed/chair exit alarm

## 2018-09-09 NOTE — PROGRESS NOTES
Problem: Altered Thought Process (Adult/Pediatric)  Goal: *STG: Participates in treatment plan  Outcome: Progressing Towards Goal  Up in bed. Alert to name and place. Mood and affect smiling and euthymic. Illogical thinking remains increase in logical conversation and oriented to reality. Assist staff with ADLs. Poor appetite, requiring encouragement to accept po fluids. Denies SI pain or discomfort. Daily goal is to sit up. Staff focus is on offering support, q2 turns and encouraging po intake. Problem: Falls - Risk of  Goal: *Absence of Falls  Document Kizzy Fall Risk and appropriate interventions in the flowsheet. Outcome: Progressing Towards Goal  Fall Risk Interventions:  Mobility Interventions: Utilize walker, cane, or other assistive device, Bed/chair exit alarm, Patient to call before getting OOB    Mentation Interventions: Bed/chair exit alarm, Room close to nurse's station, Toileting rounds, Increase mobility, Door open when patient unattended, Reorient patient, More frequent rounding, Familiar objects from home    Medication Interventions: Teach patient to arise slowly, Bed/chair exit alarm    Elimination Interventions: Toileting schedule/hourly rounds, Toilet paper/wipes in reach, Bed/chair exit alarm, Call light in reach    History of Falls Interventions: Room close to nurse's station, Bed/chair exit alarm, Door open when patient unattended        Problem: Pressure Injury - Risk of  Goal: *Prevention of pressure injury  Document Oumar Scale and appropriate interventions in the flowsheet.    Outcome: Progressing Towards Goal  Pressure Injury Interventions:  Sensory Interventions: Assess changes in LOC, Assess need for specialty bed, Float heels, Chair cushion, Suspension boots, Check visual cues for pain    Moisture Interventions: Assess need for specialty bed, Maintain skin hydration (lotion/cream), Check for incontinence Q2 hours and as needed, Limit adult briefs, Offer toileting Q_hr, Moisture barrier    Activity Interventions: Assess need for specialty bed, Chair cushion, Increase time out of bed, Pressure redistribution bed/mattress(bed type)    Mobility Interventions: Chair cushion, Float heels, HOB 30 degrees or less, Suspension boots, Assess need for specialty bed, Turn and reposition approx.  every two hours(pillow and wedges)    Nutrition Interventions: Document food/fluid/supplement intake, Offer support with meals,snacks and hydration, Discuss nutritional consult with provider    Friction and Shear Interventions: Lift sheet, Feet elevated on foot rest, HOB 30 degrees or less

## 2018-09-10 LAB
GLUCOSE BLD STRIP.AUTO-MCNC: 102 MG/DL (ref 65–100)
GLUCOSE BLD STRIP.AUTO-MCNC: 102 MG/DL (ref 65–100)
SERVICE CMNT-IMP: ABNORMAL
SERVICE CMNT-IMP: ABNORMAL

## 2018-09-10 PROCEDURE — 65220000003 HC RM SEMIPRIVATE PSYCH

## 2018-09-10 PROCEDURE — 74011250637 HC RX REV CODE- 250/637: Performed by: PSYCHIATRY & NEUROLOGY

## 2018-09-10 PROCEDURE — 74011250637 HC RX REV CODE- 250/637: Performed by: INTERNAL MEDICINE

## 2018-09-10 PROCEDURE — 82962 GLUCOSE BLOOD TEST: CPT

## 2018-09-10 RX ADMIN — CITALOPRAM HYDROBROMIDE 20 MG: 20 TABLET ORAL at 21:04

## 2018-09-10 RX ADMIN — DILTIAZEM HYDROCHLORIDE 240 MG: 240 CAPSULE, COATED, EXTENDED RELEASE ORAL at 09:13

## 2018-09-10 RX ADMIN — RISPERIDONE 0.25 MG: 0.5 TABLET, FILM COATED ORAL at 17:28

## 2018-09-10 RX ADMIN — RISPERIDONE 0.25 MG: 0.5 TABLET, FILM COATED ORAL at 09:14

## 2018-09-10 RX ADMIN — DONEPEZIL HYDROCHLORIDE 10 MG: 10 TABLET, FILM COATED ORAL at 09:14

## 2018-09-10 NOTE — BH NOTES
PSYCHIATRIC PROGRESS NOTE         Patient Name  Francoise Osler   Date of Birth 5/24/1933   [facility] [id number]   Medical Record Number  [id number]      Age  80 y.o. PCP Marimar Estrada MD   Admit date:  9/6/2018    Room Number  746/01  @ [facility]   Date of Service  9/9/2018          PSYCHOTHERAPY SESSION NOTE:  Length of psychotherapy session: 20 minutes    Main condition/diagnosis/issues treated during session today, 9/9/2018 : I employed Cognitive Behavioral therapy techniques, Reality-Oriented psychotherapy, as well as supportive psychotherapy in regards to various ongoing psychosocial stressors, including the following: pre-admission and current problems; housing issues;   medical issues; and stress of hospitalization. Interpersonal relationship issues and psychodynamic conflicts explored. Attempts made to alleviate maladaptive patterns. Overall, patient is not progressing    Treatment Plan Update (reviewed an updated 9/9/2018) : I will modify psychotherapy tx plan by implementing more stress management strategies, building upon cognitive behavioral techniques, increasing coping skills, as well as shoring up psychological defenses). An extended energy and skill set was needed to engage pt in psychotherapy due to some of the following: resistiveness, complexity, negativity, confrontational nature, hostile behaviors, and/or severe abnormalities in thought processes/psychosis resulting in the loss of expressive/receptive language communication skills. E & M PROGRESS NOTE:         HISTORY         HISTORY OF PRESENT ILLNESS/INTERVAL HISTORY:  (reviewed/updated 9/9/2018). per initial evaluation: The patient, Tray Paige, is a 80 y.o.   WHITE OR  female with a past psychiatric history significant for Dementia, MDD who was treated on [facility] for several weeks, until she had a syncopal episode on 9/5 and transferred to the medical floor for observation for 24 hours. She now returns medically stable. Patient's mood and presesentation hs improved compared to original presentation on admission. She remains very confused secondary to advanced dementia. No agitation or aggression noted. Compliant with her medications. Awaiting placement vs. Return home. 9/8/18. \"Am I going home today\". No complaints. Seems calmer. 9/9/18. No complaints. No agitation. Slept ok        SIDE EFFECTS: (reviewed/updated 9/9/2018)  None reported or admitted to. ALLERGIES:(reviewed/updated 9/9/2018)  Allergies   Allergen Reactions    Angiotensin Ii,Human Other (comments)     States does not remember      Avelox [Moxifloxacin] Other (comments)     States does not remember      Demerol [Meperidine] Hives      MEDICATIONS PRIOR TO ADMISSION:(reviewed/updated 9/9/2018)  Prescriptions Prior to Admission   Medication Sig    traZODone (DESYREL) 50 mg tablet Take 0.5 Tabs by mouth nightly for 30 days. Indications: insomnia associated with depression    risperiDONE (RISPERDAL) 0.25 mg tablet Take 1 Tab by mouth two (2) times a day for 30 days.  magnesium hydroxide (MILK OF MAGNESIA) 400 mg/5 mL suspension Take 30 mL by mouth daily for 30 days.  donepezil (ARICEPT) 10 mg tablet Take 1 Tab by mouth daily for 30 days.  acetaminophen (TYLENOL) 325 mg tablet Take 2 Tabs by mouth every six (6) hours as needed for Pain.  docusate sodium (COLACE) 100 mg capsule Take 1 Cap by mouth daily for 90 days.  dilTIAZem CD (CARTIA XT) 240 mg ER capsule Take 240 mg by mouth daily.  polyethylene glycol (MIRALAX) 17 gram packet Take 17 g by mouth as needed (Constipation).  citalopram (CELEXA) 20 mg tablet TAKE 1 TABLET BY MOUTH DAILY      PAST MEDICAL HISTORY: Past medical history from the initial psychiatric evaluation has been reviewed (reviewed/updated 9/9/2018) with no additional updates (I asked patient and no additional past medical history provided).    Past Medical History:   Diagnosis Date    Anemia     Atrial fibrillation ([facility] [address]     Cancer (HCC)     basal cell on nose    Chronic pain     back pain    Fibromyalgia 4/1/2010    GERD (gastroesophageal reflux disease) 4/1/2010    Hiatal hernia 4/1/2010    High cholesterol 4/1/2010    HTN (hypertension) 4/1/2010    Ill-defined condition     A. Fib    OA (osteoarthritis) 4/1/2010    back    Pulmonary emboli ([facility] [address] 2015     Past Surgical History:   Procedure Laterality Date    BREAST SURGERY PROCEDURE UNLISTED      Breast im-plants x 2    ENLARGE BREAST WITH IMPLANT      HX APPENDECTOMY      HX BREAST BIOPSY      HX BREAST RECONSTRUCTION      Breast implants removed 1992    HX CATARACT REMOVAL      HX CHOLECYSTECTOMY  9/11/2013    HX HYSTERECTOMY      HX KNEE REPLACEMENT  2008    bilateral    HX ORTHOPAEDIC  2007    Bilateral TKR    HX PARTIAL HYSTERECTOMY      HX SHOULDER REPLACEMENT  2009    left    HX TONSILLECTOMY        SOCIAL HISTORY: Social history from the initial psychiatric evaluation has been reviewed (reviewed/updated 9/9/2018) with no additional updates (I asked patient and no additional social history provided). Social History     Social History    Marital status:      Spouse name: N/A    Number of children: N/A    Years of education: N/A     Occupational History    Not on file. Social History Main Topics    Smoking status: Never Smoker    Smokeless tobacco: Never Used    Alcohol use No    Drug use: No    Sexual activity: No     Other Topics Concern    Not on file     Social History Narrative      FAMILY HISTORY: Family history from the initial psychiatric evaluation has been reviewed (reviewed/updated 9/9/2018) with no additional updates (I asked patient and no additional family history provided).    Family History   Problem Relation Age of Onset   Alyne Angle Arthritis-rheumatoid Mother     Dementia Mother     Coronary Artery Disease Father     Cancer Brother      lung cancer       REVIEW OF SYSTEMS: (reviewed/updated 9/9/2018)  Appetite:improved   Sleep: improved   All other Review of Systems: General ROS: negative         MENTAL STATUS EXAM & VITALS     MENTAL STATUS EXAM (MSE):    MSE FINDINGS ARE WITHIN NORMAL LIMITS (WNL) UNLESS OTHERWISE STATED BELOW. ( ALL OF THE BELOW CATEGORIES OF THE MSE HAVE BEEN REVIEWED (reviewed 9/9/2018) AND UPDATED AS DEEMED APPROPRIATE )  General Presentation age appropriate and older than stated age, cooperative   Orientation not oriented to situation   Vital Signs  See below (reviewed 9/9/2018); Vital Signs (BP, Pulse, & Temp) are within normal limits if not listed below.    Gait and Station Stable/steady, no ataxia   Musculoskeletal System No extrapyramidal symptoms (EPS); no abnormal muscular movements or Tardive Dyskinesia (TD); muscle strength and tone are within normal limits   Language No aphasia or dysarthria   Speech:  soft   Thought Processes illogical; slow rate of thoughts; poor abstract reasoning/computation   Thought Associations loose associations   Thought Content free of delusions   Suicidal Ideations no plan    Homicidal Ideations no plan    Mood:  anxious    Affect:  anxious   Memory recent  impaired   Memory remote:  impaired   Concentration/Attention:  distractable   Fund of Knowledge below average   Insight:  limited   Reliability limited   Judgment:  limited          VITALS:     Patient Vitals for the past 24 hrs:   Temp Pulse Resp BP SpO2   09/09/18 2001 98 °F (36.7 °C) 85 18 123/78 94 %   09/09/18 1554 98.4 °F (36.9 °C) (!) 101 18 146/79 95 %   09/09/18 0747 97.8 °F (36.6 °C) 75 18 151/80 95 %     Wt Readings from Last 3 Encounters:   09/02/18 64.5 kg (142 lb 3.2 oz)   08/21/18 76.1 kg (167 lb 12.8 oz)   08/13/18 77.6 kg (171 lb)     Temp Readings from Last 3 Encounters:   09/09/18 98 °F (36.7 °C)   09/06/18 98.9 °F (37.2 °C)   09/05/18 98.1 °F (36.7 °C)     BP Readings from Last 3 Encounters:   09/09/18 123/78   09/06/18 146/77   09/05/18 103/63 Pulse Readings from Last 3 Encounters:   09/09/18 85   09/06/18 (!) 105   09/05/18 68            DATA     LABORATORY DATA:(reviewed/updated 9/9/2018)  Recent Results (from the past 24 hour(s))   GLUCOSE, POC    Collection Time: 09/09/18  7:43 AM   Result Value Ref Range    Glucose (POC) 106 (H) 65 - 100 mg/dL    Performed by [address] [place] [address], POC    Collection Time: 09/09/18  4:48 PM   Result Value Ref Range    Glucose (POC) 121 (H) 65 - 100 mg/dL    Performed by Surendra Mcgovern      No results found for: VALF2, VALAC, VALP, VALPR, DS6, CRBAM, CRBAMP, CARB2, XCRBAM  No results found for: LITHM   RADIOLOGY REPORTS:(reviewed/updated 9/9/2018)  Xr Pelv Ap Only    Addendum Date: 8/14/2018    Addendum: No fracture. Result Date: 8/14/2018  Clinical history: Fall yesterday FINDINGS: Single frontal view of the pelvis is obtained. There is no fracture or dislocation identified. There is no significant soft tissue abnormality. IMPRESSION: No acute fracture. Xr Forearm Rt Ap/lat    Result Date: 8/13/2018  EXAM:  XR FOREARM RT AP/LAT Clinical history: Distal radial fracture. INDICATION:   fall. COMPARISON: None. FINDINGS: Two views of the right radius and ulna demonstrate distal radial fracture. Extensive degenerative change at the radiocarpal and carpometacarpal rows. .     IMPRESSION:  Distal radial fracture with minimal displacement. Severe degenerative change at the wrist..     Xr Wrist Rt Ap/lat    Result Date: 8/27/2018  EXAM: XR WRIST RT AP/LAT INDICATION:  R wrist fracture follow up, was not casted and pt not leaving splint on. COMPARISON: 8/14/2018. FINDINGS: Two  views of the right wrist demonstrate no appreciable change in the T-shaped interarticular distal radial fracture and ulnar styloid fracture in fiberglass. There is chondrocalcinosis of the radiocarpal joint and TFC. Lateral carpal arthritis is noted. Osteopenia is present.      IMPRESSION:  No significant change in the right T-shaped interarticular distal radial and ulna styloid fractures in the splint. Xr Wrist Rt Ap/lat/obl Min 3v    Result Date: 8/14/2018  EXAM: XR WRIST RT AP/LAT/OBL MIN 3V Clinical history: Fall, broken wrist INDICATION:  fall, broken wrist. COMPARISON: None. FINDINGS: Three  views of the right wrist demonstrate postreduction images distal radial fracture. .  Soft tissue edema. .     IMPRESSION:  Postreduction images distal radial fracture. .     Xr Wrist Rt Ap/lat/obl Min 3v    Result Date: 8/13/2018  EXAM: XR WRIST RT AP/LAT/OBL MIN 3V HISTORY: Fall, fell in bathroom, right arm INDICATION:  fall. COMPARISON: None. FINDINGS: Three  views of the right wrist demonstrate nondisplaced distal radius fracture. Severe degenerative change of the radiocarpal and carpometacarpal rows. No ulnar fracture. .  The soft tissues are within normal limits. IMPRESSION:  Nondisplaced fracture of the distal radius. 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     Ct Head Wo Cont    Result Date: 8/23/2018  EXAM:  CT head without contrast INDICATION: Altered mental status COMPARISON: CT head 8/14/2018 TECHNIQUE: Axial noncontrast head CT from foramen magnum to vertex. Coronal and sagittal reformatted images were obtained. CT dose reduction was achieved through use of a standardized protocol tailored for this examination and automatic exposure control for dose modulation. Adaptive statistical iterative reconstruction (ASIR) was utilized. FINDINGS:  There is diffuse age-related parenchymal volume loss. The ventricles and sulci are age-appropriate without hydrocephalus. There is no mass effect or midline shift. There is no intracranial hemorrhage or extra-axial fluid collection. Scattered foci of low attenuation in the periventricular white matter represent stable chronic microvascular ischemic changes. There is no new abnormal parenchymal attenuation. The gray-white matter differentiation is maintained. The basal cisterns are patent.  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 The osseous structures are intact. The visualized paranasal sinuses and mastoid air cells are clear. IMPRESSION: There is no acute intracranial abnormality. Ct Head Wo Cont    Result Date: 8/14/2018  EXAM:  CT HEAD WO CONT Clinical history: Fall, fractured wrist, increased pain INDICATION:   fall COMPARISON: 7/20/2018. CONTRAST:  None. TECHNIQUE: Unenhanced CT of the head was performed using 5 mm images. Brain and bone windows were generated. CT dose reduction was achieved through use of a standardized protocol tailored for this examination and automatic exposure control for dose modulation. FINDINGS: The ventricles and sulci are normal in size, shape and configuration and midline. Mild scattered hypodensities in the cerebral white matter. There is no intracranial hemorrhage, extra-axial collection, mass, mass effect or midline shift. The basilar cisterns are open. No acute infarct is identified. The bone windows demonstrate no abnormalities. The visualized portions of the paranasal sinuses and mastoid air cells are clear. IMPRESSION: No acute cranial process     Ct Head Wo Cont    Result Date: 7/20/2018  EXAM:  CT HEAD WO CONT INDICATION: Fall going to the bathroom and had a ground level fall and hit head on the floor. COMPARISON: MRI brain 11/12/2012. Candance Huger CONTRAST:  None. TECHNIQUE: Unenhanced CT of the head was performed using 5 mm images. Brain and bone windows were generated. CT dose reduction was achieved through use of a standardized protocol tailored for this examination and automatic exposure control for dose modulation. FINDINGS: There is a stable 9 x 12 mm partially calcified extra-axial lesion in the left posterior fossa compatible with meningioma. There is no acute intracranial hemorrhage, other mass, mass effect or herniation.  Ventricles and sulci show stable, proportionate, and symmetric pattern. There is a stable pattern of periventricular white matter hypodensity. The gray-white matter differentiation is well-preserved. Atherosclerotic calcifications are seen within the carotid siphons and vertebral arteries. The mastoid air cells are well pneumatized. The visualized paranasal sinuses are normal.     IMPRESSION: No acute intracranial hemorrhage or infarct. Stable left posterior fossa meningioma and chronic white matter change most compatible with small vessel ischemic and/or senescent change. Intracranial atherosclerosis. Ct Abd Pelv Wo Cont    Result Date: 6/20/2018  EXAM:  CT ABD PELV WO CONT INDICATION: abd pain  2 days of abdominal pain COMPARISON: 2013 CONTRAST:  None. TECHNIQUE: Thin axial images were obtained through the abdomen and pelvis. Coronal and sagittal reconstructions were generated. Oral contrast was not administered. CT dose reduction was achieved through use of a standardized protocol tailored for this examination and automatic exposure control for dose modulation. The absence of intravenous contrast material reduces the sensitivity for evaluation of the solid parenchymal organs of the abdomen. FINDINGS: LUNG BASES: Clear. INCIDENTALLY IMAGED HEART AND MEDIASTINUM: Unremarkable. LIVER: No mass or biliary dilatation. GALLBLADDER: Surgically removed. SPLEEN: No mass. PANCREAS: No mass or ductal dilatation. ADRENALS: Unremarkable. KIDNEYS/URETERS: Malrotated right kidney. Hyperdense right renal cyst. Right nephrolithiasis. STOMACH: Hiatal hernia. SMALL BOWEL: No dilatation or wall thickening. COLON: No dilatation or wall thickening. Moderate colonic stool. APPENDIX: Surgically removed PERITONEUM: No ascites or pneumoperitoneum. RETROPERITONEUM: No lymphadenopathy or aortic aneurysm. REPRODUCTIVE ORGANS: Surgically removed. URINARY BLADDER: No mass or calculus. BONES: No destructive bone lesion. Multilevel degenerative changes.  ADDITIONAL COMMENTS: N/A     IMPRESSION: No acute findings. Xr Chest Port    Result Date: 6/20/2018  EXAM:  XR CHEST PORT INDICATION:  Chest Pain COMPARISON:  May 24 FINDINGS: A portable AP radiograph of the chest was obtained at 0555 hours. There is a left shoulder replacement in place. The patient is on a cardiac monitor. The lungs are clear. The cardiac and mediastinal contours and pulmonary vascularity are normal.  The bones and soft tissues are grossly within normal limits. IMPRESSION: No acute findings.           MEDICATIONS     ALL MEDICATIONS:   Current Facility-Administered Medications   Medication Dose Route Frequency    dilTIAZem CD (CARDIZEM CD) capsule 240 mg  240 mg Oral DAILY    magnesium hydroxide (MILK OF MAGNESIA) 400 mg/5 mL oral suspension 30 mL  30 mL Oral DAILY PRN    citalopram (CELEXA) tablet 20 mg  20 mg Oral QHS    docusate sodium (COLACE) capsule 100 mg  100 mg Oral DAILY    donepezil (ARICEPT) tablet 10 mg  10 mg Oral DAILY    polyethylene glycol (MIRALAX) packet 17 g  17 g Oral DAILY PRN    risperiDONE (RisperDAL) tablet 0.25 mg  0.25 mg Oral BID    saline peripheral flush soln 5 mL  5 mL InterCATHeter PRN    traZODone (DESYREL) tablet 25 mg  25 mg Oral QHS PRN    OLANZapine (ZyPREXA) tablet 2.5 mg  2.5 mg Oral Q6H PRN    ziprasidone (GEODON) 10 mg in sterile water (preservative free) 0.5 mL injection  10 mg IntraMUSCular BID PRN    benztropine (COGENTIN) tablet 1 mg  1 mg Oral BID PRN    benztropine (COGENTIN) injection 1 mg  1 mg IntraMUSCular BID PRN    zolpidem (AMBIEN) tablet 5 mg  5 mg Oral QHS PRN    acetaminophen (TYLENOL) tablet 650 mg  650 mg Oral Q4H PRN    ibuprofen (MOTRIN) tablet 400 mg  400 mg Oral Q8H PRN      SCHEDULED MEDICATIONS:   Current Facility-Administered Medications   Medication Dose Route Frequency    dilTIAZem CD (CARDIZEM CD) capsule 240 mg  240 mg Oral DAILY    citalopram (CELEXA) tablet 20 mg  20 mg Oral QHS    docusate sodium (COLACE) capsule 100 mg  100 mg Oral DAILY    donepezil (ARICEPT) tablet 10 mg  10 mg Oral DAILY    risperiDONE (RisperDAL) tablet 0.25 mg  0.25 mg Oral BID          ASSESSMENT & PLAN     DIAGNOSES REQUIRING ACTIVE TREATMENT AND MONITORING: (reviewed/updated 9/9/2018)  Patient Active Hospital Problem List:    The patient, Tray Walton, is a 80 y.o.  female who presents at this time for treatment of the following diagnoses:  Patient Active Hospital Problem List:   Late onset Alzheimer's disease with behavioral disturbance (8/24/2018)    Assessment: severe, advanced    Plan: Agree with inpatient hospitalization for further stabilization, safety monitoring and medication management  Medications- Aricept and risperdal         In summary, Tray Regan, is a 80 y.o.  female who presents with a severe exacerbation of the principal diagnosis of Late onset Alzheimer's disease with behavioral disturbance  Patient's condition is worsening/not improving/not stable improving. Patient requires continued inpatient hospitalization for further stabilization, safety monitoring and medication management. I will continue to coordinate the provision of individual, milieu, occupational, group, and substance abuse therapies to address target symptoms/diagnoses as deemed appropriate for the individual patient. A coordinated, multidisplinary treatment team round was conducted with the patient (this team consists of the nurse, psychiatric unit pharmcist,  and writer). Complete current electronic health record for patient has been reviewed today including consultant notes, ancillary staff notes, nurses and psychiatric tech notes. Suicide risk assessment completed and patient deemed to be of low risk for suicide at this time. The following regarding medications was addressed during rounds with patient:   the risks and benefits of the proposed medication. The patient was given the opportunity to ask questions.  Informed consent given to the use of the above medications. Will continue to adjust psychiatric and non-psychiatric medications (see above \"medication\" section and orders section for details) as deemed appropriate & based upon diagnoses and response to treatment. I will continue to order blood tests/labs and diagnostic tests as deemed appropriate and review results as they become available (see orders for details and above listed lab/test results). I will order psychiatric records from previous [facility] hospitals to further elucidate the nature of patient's psychopathology and review once available. I will gather additional collateral information from friends, family and o/p treatment team to further elucidate the nature of patient's psychopathology and baselline level of psychiatric functioning. I certify that this patient's inpatient psychiatric hospital services furnished since the previous certification were, and continue to be, required for treatment that could reasonably be expected to improve the patient's condition, or for diagnostic study, and that the patient continues to need, on a daily basis, active treatment furnished directly by or requiring the supervision of inpatient psychiatric facility personnel. In addition, the hospital records show that services furnished were intensive treatment services, admission or related services, or equivalent services.     EXPECTED DISCHARGE DATE/DAY: TBD     DISPOSITION: Home       Signed By:   Dylan Lima MD  9/9/2018

## 2018-09-10 NOTE — BH NOTES
GROUP THERAPY PROGRESS NOTE Tray Regan is participating in [employer]. Group time: 30 minutes Personal goal for participation: relaxation Goal orientation: personal 
 
Group therapy participation: passive Therapeutic interventions reviewed and discussed: listening to classical music Impression of participation: Seems in fair spirits.

## 2018-09-10 NOTE — PROGRESS NOTES
Problem: Falls - Risk of  Goal: *Absence of Falls  Document Kizzy Fall Risk and appropriate interventions in the flowsheet. Outcome: Progressing Towards Goal  Fall Risk Interventions:  Mobility Interventions: Bed/chair exit alarm, Communicate number of staff needed for ambulation/transfer, Patient to call before getting OOB, Utilize walker, cane, or other assistive device  In bed asleep with respirations noted as even and unlabored as chest was rising and falling.  Will continue to monitor for safety and 15 minute checks throughout shift  Mentation Interventions: Adequate sleep, hydration, pain control, Bed/chair exit alarm, Door open when patient unattended, Reorient patient    Medication Interventions: Bed/chair exit alarm, Patient to call before getting OOB, Teach patient to arise slowly    Elimination Interventions: Bed/chair exit alarm, Patient to call for help with toileting needs, Toilet paper/wipes in reach, Toileting schedule/hourly rounds    History of Falls Interventions: Bed/chair exit alarm, Door open when patient unattended, Utilize gait belt for transfer/ambulation

## 2018-09-10 NOTE — BH NOTES
GROUP THERAPY PROGRESS NOTE    Tray Regan is participating in Le[employer] Group.      Group time: 15 minutes    Personal goal for participation:listening to music  Goal orientation: relaxation    Group therapy participation: active    Therapeutic interventions reviewed and discussed:     Impression of participation:good

## 2018-09-10 NOTE — BH NOTES
Care Management Note:    [facility] ecined out information to skilled nursing home facilities on the [address] Plan to continue to search for skilled nursing home beds.

## 2018-09-10 NOTE — PROGRESS NOTES
Problem: Altered Thought Process (Adult/Pediatric)  Goal: *STG: Participates in treatment plan  Outcome: Progressing Towards Goal  Out on unit social w peers and staff. Alert and more engaged. Thought organization improved with decrease in illogical content. No agitation noted, visiting with family. Smiling and pleasant. Daily goal is to work with PT.  Staff focus is on offering support and reassurance, assist with PT excersxise

## 2018-09-10 NOTE — INTERDISCIPLINARY ROUNDS
Behavioral Health Interdisciplinary Rounds     Patient Name: Louis Augustine  Age: 80 y.o.   Room/Bed:  746/  Primary Diagnosis: Late onset Alzheimer's disease with behavioral disturbance   Admission Status: Voluntary     Readmission within 30 days: yes  Power of  in place: no  Patient requires a blocked bed: no          Reason for blocked bed: n/a    VTE Prophylaxis: Not indicated    Mobility needs/Fall risk: yes    Nutritional Plan: no  Consults: no         Labs/Testing due today?: no    Sleep hours: 6.50       Participation in Care/Groups:  no  Medication Compliant?: Yes  PRNS (last 24 hours): None    Restraints (last 24 hours):  no     CIWA (range last 24 hours):     COWS (range last 24 hours):      Alcohol screening (AUDIT) completed -   AUDIT Score: 0     If applicable, date SBIRT discussed in treatment team AND documented:     Tobacco - patient is a smoker: Have You Used Tobacco in the Past 30 Days: No  Illegal Drugs use: Have You Used Any Illegal Substances Over the Past 12 Months: No    24 hour chart check complete: yes     Patient goal(s) for today:   Treatment team focus/goals: Plan to search for a skilled nursing home placement   Progress note She remains pleasantly confused and disoriented     LOS:  4  Expected LOS: TBD     Financial concerns/prescription coverage:  Medicare   Date of last family contact:      Family requesting physician contact today:    Discharge plan:Skilled Nursing Home Placement    Guns in the home:  no       Outpatient provider(s): skilled care in nursing home     Participating treatment team members: Ashley Rowe -

## 2018-09-10 NOTE — BH NOTES
GROUP THERAPY PROGRESS NOTE    Tray Regan participated in a Morning Process Group on the Geriatric Unit, with a focus identifying feelings, planning for the day, with music. Group time: 45 minutes. Personal goal for participation: To increase the capacity to shift ones mood, prepare for the day, and share in group singing. Goal orientation: The patient will be able to prepare for the day through group singing. Group therapy participation: When prompted, this patient actively participated in the group, after joining. Therapeutic interventions reviewed and discussed: The group members were introduce themselves by first names and participate in group singing as a way to increase their oxygen and blood flow and begin their day on a positive note. They were also asked to join in singing several songs. Impression of participation: The patient joined the group about [employer] through the session and smiled broadly to her peers. She said, \"I feel great. \" She sang along with many of the tunes that were familiar to her. She was was alert, generally oriented, and pleasant. She expressed no current SI/HI or overt psychosis. Her affect was mildly euphoric and her mood matched her affect. The patient may be close to her baseline, although this may need to be verified by someone who knew her prior to this hospitalization.

## 2018-09-10 NOTE — PROGRESS NOTES
Problem: Altered Thought Process (Adult/Pediatric)  Goal: *STG: Participates in treatment plan  Outcome: Progressing Towards Goal  Visible in DR watching TV, listening to music and eating dinner with peers. Ate 50% dinner. Sits in recliner. Med compliant. Problem: Falls - Risk of  Goal: *Absence of Falls  Document Kizzy Fall Risk and appropriate interventions in the flowsheet. Outcome: Progressing Towards Goal  Fall Risk Interventions:  Mobility Interventions: Bed/chair exit alarm, Communicate number of staff needed for ambulation/transfer, Patient to call before getting OOB, Utilize walker, cane, or other assistive device    Mentation Interventions: Adequate sleep, hydration, pain control, Bed/chair exit alarm, Door open when patient unattended, Reorient patient    Medication Interventions: Bed/chair exit alarm, Patient to call before getting OOB, Teach patient to arise slowly    Elimination Interventions: Bed/chair exit alarm, Patient to call for help with toileting needs, Toilet paper/wipes in reach, Toileting schedule/hourly rounds    History of Falls Interventions: Bed/chair exit alarm, Door open when patient unattended, Utilize gait belt for transfer/ambulation     No falls. Requires one staff assist to toilet and sit in recliner.

## 2018-09-10 NOTE — BH NOTES
PSYCHIATRIC PROGRESS NOTE         Patient Name  Omar Kern   Date of Birth 5/24/1933   [facility] [id number]   Medical Record Number  [id number]      Age  80 y.o. PCP Mor Mckeon MD   Admit date:  9/6/2018    [facility] Number  746/01  [facility]   Date of Service  9/10/2018          PSYCHOTHERAPY SESSION NOTE:  Length of psychotherapy session: 20 minutes    Main condition/diagnosis/issues treated during session today, 9/10/2018 : I employed Cognitive Behavioral therapy techniques, Reality-Oriented psychotherapy, as well as supportive psychotherapy in regards to various ongoing psychosocial stressors, including the following: pre-admission and current problems; housing issues;   medical issues; and stress of hospitalization. Interpersonal relationship issues and psychodynamic conflicts explored. Attempts made to alleviate maladaptive patterns. Overall, patient is not progressing    Treatment Plan Update (reviewed an updated 9/10/2018) : I will modify psychotherapy tx plan by implementing more stress management strategies, building upon cognitive behavioral techniques, increasing coping skills, as well as shoring up psychological defenses). An extended energy and skill set was needed to engage pt in psychotherapy due to some of the following: resistiveness, complexity, negativity, confrontational nature, hostile behaviors, and/or severe abnormalities in thought processes/psychosis resulting in the loss of expressive/receptive language communication skills. E & M PROGRESS NOTE:         HISTORY         HISTORY OF PRESENT ILLNESS/INTERVAL HISTORY:  (reviewed/updated 9/10/2018). per initial evaluation: The patient, Tray Gamboa, is a 80 y.o.   WHITE [place]  female with a past psychiatric history significant for Dementia, MDD who was treated on [facility] for several weeks, until she had a syncopal episode on 9/5 and transferred to the medical floor for observation for 24 hours. She now returns medically stable. Patient's mood and presesentation hs improved compared to original presentation on admission. She remains very confused secondary to advanced dementia. No agitation or aggression noted. Compliant with her medications. Awaiting placement vs. Return home. 9/8/18. \"Am I going home today\". No complaints. Seems calmer. 9/9/18. No complaints. No agitation. Slept ok  9/10/18- Funny and engaging this morning. She enjoyed singing in group this morning. Sleeping well at night. SIDE EFFECTS: (reviewed/updated 9/10/2018)  None reported or admitted to. ALLERGIES:(reviewed/updated 9/10/2018)  Allergies   Allergen Reactions    Angiotensin Ii,Human Other (comments)     States does not remember      Avelox [Moxifloxacin] Other (comments)     States does not remember      Demerol [Meperidine] Hives      MEDICATIONS PRIOR TO ADMISSION:(reviewed/updated 9/10/2018)  Prescriptions Prior to Admission   Medication Sig    traZODone (DESYREL) 50 mg tablet Take 0.5 Tabs by mouth nightly for 30 days. Indications: insomnia associated with depression    risperiDONE (RISPERDAL) 0.25 mg tablet Take 1 Tab by mouth two (2) times a day for 30 days.  magnesium hydroxide (MILK OF MAGNESIA) 400 mg/5 mL suspension Take 30 mL by mouth daily for 30 days.  donepezil (ARICEPT) 10 mg tablet Take 1 Tab by mouth daily for 30 days.  acetaminophen (TYLENOL) 325 mg tablet Take 2 Tabs by mouth every six (6) hours as needed for Pain.  docusate sodium (COLACE) 100 mg capsule Take 1 Cap by mouth daily for 90 days.  dilTIAZem CD (CARTIA XT) 240 mg ER capsule Take 240 mg by mouth daily.  polyethylene glycol (MIRALAX) 17 gram packet Take 17 g by mouth as needed (Constipation).     citalopram (CELEXA) 20 mg tablet TAKE 1 TABLET BY MOUTH DAILY      PAST MEDICAL HISTORY: Past medical history from the initial psychiatric evaluation has been reviewed (reviewed/updated 9/10/2018) with no additional updates (I asked patient and no additional past medical history provided). Past Medical History:   Diagnosis Date    Anemia     Atrial fibrillation ([facility] [address]     Cancer (HCC)     basal cell on nose    Chronic pain     back pain    Fibromyalgia 4/1/2010    GERD (gastroesophageal reflux disease) 4/1/2010    Hiatal hernia 4/1/2010    High cholesterol 4/1/2010    HTN (hypertension) 4/1/2010    Ill-defined condition     A. Fib    OA (osteoarthritis) 4/1/2010    back    Pulmonary emboli ([facility] [address] 2015     Past Surgical History:   Procedure Laterality Date    BREAST SURGERY PROCEDURE UNLISTED      Breast im-plants x 2    ENLARGE BREAST WITH IMPLANT      HX APPENDECTOMY      HX BREAST BIOPSY      HX BREAST RECONSTRUCTION      Breast implants removed 1992    HX CATARACT REMOVAL      HX CHOLECYSTECTOMY  9/11/2013    HX HYSTERECTOMY      HX KNEE REPLACEMENT  2008    bilateral    HX ORTHOPAEDIC  2007    Bilateral TKR    HX PARTIAL HYSTERECTOMY      HX SHOULDER REPLACEMENT  2009    left    HX TONSILLECTOMY        SOCIAL HISTORY: Social history from the initial psychiatric evaluation has been reviewed (reviewed/updated 9/10/2018) with no additional updates (I asked patient and no additional social history provided). Social History     Social History    Marital status:      Spouse name: N/A    Number of children: N/A    Years of education: N/A     Occupational History    Not on file. Social History Main Topics    Smoking status: Never Smoker    Smokeless tobacco: Never Used    Alcohol use No    Drug use: No    Sexual activity: No     Other Topics Concern    Not on file     Social History Narrative      FAMILY HISTORY: Family history from the initial psychiatric evaluation has been reviewed (reviewed/updated 9/10/2018) with no additional updates (I asked patient and no additional family history provided).    Family History   Problem Relation Age of Onset    Arthritis-rheumatoid Mother     Dementia Mother     Coronary Artery Disease Father     Cancer Brother      lung cancer       REVIEW OF SYSTEMS: (reviewed/updated 9/10/2018)  Appetite:improved   Sleep: improved   All other Review of Systems: General ROS: negative         MENTAL STATUS EXAM & VITALS     MENTAL STATUS EXAM (MSE):    MSE FINDINGS ARE WITHIN NORMAL LIMITS (WNL) UNLESS OTHERWISE STATED BELOW. ( ALL OF THE BELOW CATEGORIES OF THE MSE HAVE BEEN REVIEWED (reviewed 9/10/2018) AND UPDATED AS DEEMED APPROPRIATE )  General Presentation age appropriate and older than stated age, cooperative   Orientation not oriented to situation   Vital Signs  See below (reviewed 9/10/2018); Vital Signs (BP, Pulse, & Temp) are within normal limits if not listed below.    Gait and Station Stable/steady, no ataxia   Musculoskeletal System No extrapyramidal symptoms (EPS); no abnormal muscular movements or Tardive Dyskinesia (TD); muscle strength and tone are within normal limits   Language No aphasia or dysarthria   Speech:  soft   Thought Processes illogical; slow rate of thoughts; poor abstract reasoning/computation   Thought Associations loose associations   Thought Content free of delusions   Suicidal Ideations no plan    Homicidal Ideations no plan    Mood:  euthymic   Affect:  euthymic   Memory recent  impaired   Memory remote:  impaired   Concentration/Attention:  distractable   Fund of Knowledge below average   Insight:  limited   Reliability limited   Judgment:  limited          VITALS:     Patient Vitals for the past 24 hrs:   Temp Pulse Resp BP SpO2   09/10/18 0913 - 100 - 167/79 -   09/10/18 0820 97.6 °F (36.4 °C) 100 18 167/79 95 %   09/09/18 2001 98 °F (36.7 °C) 85 18 123/78 94 %   09/09/18 1554 98.4 °F (36.9 °C) (!) 101 18 146/79 95 %     Wt Readings from Last 3 Encounters:   09/02/18 64.5 kg (142 lb 3.2 oz)   08/21/18 76.1 kg (167 lb 12.8 oz)   08/13/18 77.6 kg (171 lb)     Temp Readings from Last 3 Encounters:   09/10/18 97.6 °F (36.4 °C)   09/06/18 98.9 °F (37.2 °C)   09/05/18 98.1 °F (36.7 °C)     BP Readings from Last 3 Encounters:   09/10/18 167/79   09/06/18 146/77   09/05/18 103/63     Pulse Readings from Last 3 Encounters:   09/10/18 100   09/06/18 (!) 105   09/05/18 68            DATA     LABORATORY DATA:(reviewed/updated 9/10/2018)  Recent Results (from the past 24 hour(s))   GLUCOSE, POC    Collection Time: 09/09/18  4:48 PM   Result Value Ref Range    Glucose (POC) 121 (H) 65 - 100 mg/dL    Performed by Lata Goddard, POC    Collection Time: 09/10/18  8:33 AM   Result Value Ref Range    Glucose (POC) 102 (H) 65 - 100 mg/dL    Performed by Moishe Gottron      No results found for: VALF2, VALAC, VALP, VALPR, DS6, CRBAM, CRBAMP, CARB2, XCRBAM  No results found for: LITHM   RADIOLOGY REPORTS:(reviewed/updated 9/10/2018)  Xr Pelv Ap Only    Addendum Date: 8/14/2018    Addendum: No fracture. Result Date: 8/14/2018  Clinical history: Fall yesterday FINDINGS: Single frontal view of the pelvis is obtained. There is no fracture or dislocation identified. There is no significant soft tissue abnormality. IMPRESSION: No acute fracture. Xr Forearm Rt Ap/lat    Result Date: 8/13/2018  EXAM:  XR FOREARM RT AP/LAT Clinical history: Distal radial fracture. INDICATION:   fall. COMPARISON: None. FINDINGS: Two views of the right radius and ulna demonstrate distal radial fracture. Extensive degenerative change at the radiocarpal and carpometacarpal rows. .     IMPRESSION:  Distal radial fracture with minimal displacement. Severe degenerative change at the wrist..     Xr Wrist Rt Ap/lat    Result Date: 8/27/2018  EXAM: XR WRIST RT AP/LAT INDICATION:  R wrist fracture follow up, was not casted and pt not leaving splint on. COMPARISON: 8/14/2018. FINDINGS: Two  views of the right wrist demonstrate no appreciable change in the T-shaped interarticular distal radial fracture and ulnar styloid fracture in fiberglass.  There is chondrocalcinosis of the radiocarpal joint and TFC. Lateral carpal arthritis is noted. Osteopenia is present. IMPRESSION:  No significant change in the right T-shaped interarticular distal radial and ulna styloid fractures in the splint. Xr Wrist Rt Ap/lat/obl Min 3v    Result Date: 8/14/2018  EXAM: XR WRIST RT AP/LAT/OBL MIN 3V Clinical history: Fall, broken wrist INDICATION:  fall, broken wrist. COMPARISON: None. FINDINGS: Three  views of the right wrist demonstrate postreduction images distal radial fracture. .  Soft tissue edema. .     IMPRESSION:  Postreduction images distal radial fracture. .     Xr Wrist Rt Ap/lat/obl Min 3v    Result Date: 8/13/2018  EXAM: XR WRIST RT AP/LAT/OBL MIN 3V HISTORY: Fall, fell in bathroom, right arm INDICATION:  fall. COMPARISON: None. FINDINGS: Three  views of the right wrist demonstrate nondisplaced distal radius fracture. Severe degenerative change of the radiocarpal and carpometacarpal rows. No ulnar fracture. .  The soft tissues are within normal limits. IMPRESSION:  Nondisplaced fracture of the distal radius. 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     Ct Head Wo Cont    Result Date: 8/23/2018  EXAM:  CT head without contrast INDICATION: Altered mental status COMPARISON: CT head 8/14/2018 TECHNIQUE: Axial noncontrast head CT from foramen magnum to vertex. Coronal and sagittal reformatted images were obtained. CT dose reduction was achieved through use of a standardized protocol tailored for this examination and automatic exposure control for dose modulation. Adaptive statistical iterative reconstruction (ASIR) was utilized. FINDINGS:  There is diffuse age-related parenchymal volume loss. The ventricles and sulci are age-appropriate without hydrocephalus. There is no mass effect or midline shift. There is no intracranial hemorrhage or extra-axial fluid collection.  Scattered foci of low attenuation in the periventricular white matter represent stable chronic microvascular ischemic changes. There is no new abnormal parenchymal attenuation. The gray-white matter differentiation is maintained. The basal cisterns are patent. 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 The osseous structures are intact. The visualized paranasal sinuses and mastoid air cells are clear. IMPRESSION: There is no acute intracranial abnormality. Ct Head Wo Cont    Result Date: 8/14/2018  EXAM:  CT HEAD WO CONT Clinical history: Fall, fractured wrist, increased pain INDICATION:   fall COMPARISON: 7/20/2018. CONTRAST:  None. TECHNIQUE: Unenhanced CT of the head was performed using 5 mm images. Brain and bone windows were generated. CT dose reduction was achieved through use of a standardized protocol tailored for this examination and automatic exposure control for dose modulation. FINDINGS: The ventricles and sulci are normal in size, shape and configuration and midline. Mild scattered hypodensities in the cerebral white matter. There is no intracranial hemorrhage, extra-axial collection, mass, mass effect or midline shift. The basilar cisterns are open. No acute infarct is identified. The bone windows demonstrate no abnormalities. The visualized portions of the paranasal sinuses and mastoid air cells are clear. IMPRESSION: No acute cranial process     Ct Head Wo Cont    Result Date: 7/20/2018  EXAM:  CT HEAD WO CONT INDICATION: Fall going to the bathroom and had a ground level fall and hit head on the floor. COMPARISON: MRI brain 11/12/2012. [place] CONTRAST:  None. TECHNIQUE: Unenhanced CT of the head was performed using 5 mm images. Brain and bone windows were generated. CT dose reduction was achieved through use of a standardized protocol tailored for this examination and automatic exposure control for dose modulation.   FINDINGS: There is a stable 9 x 12 mm partially calcified extra-axial lesion in the left posterior fossa compatible with meningioma. There is no acute intracranial hemorrhage, other mass, mass effect or herniation. Ventricles and sulci show stable, proportionate, and symmetric pattern. There is a stable pattern of periventricular white matter hypodensity. The gray-white matter differentiation is well-preserved. Atherosclerotic calcifications are seen within the carotid siphons and vertebral arteries. The mastoid air cells are well pneumatized. The visualized paranasal sinuses are normal.     IMPRESSION: No acute intracranial hemorrhage or infarct. Stable left posterior fossa meningioma and chronic white matter change most compatible with small vessel ischemic and/or senescent change. Intracranial atherosclerosis. Ct Abd Pelv Wo Cont    Result Date: 6/20/2018  EXAM:  CT ABD PELV WO CONT INDICATION: abd pain  2 days of abdominal pain COMPARISON: 2013 CONTRAST:  None. TECHNIQUE: Thin axial images were obtained through the abdomen and pelvis. Coronal and sagittal reconstructions were generated. Oral contrast was not administered. CT dose reduction was achieved through use of a standardized protocol tailored for this examination and automatic exposure control for dose modulation. The absence of intravenous contrast material reduces the sensitivity for evaluation of the solid parenchymal organs of the abdomen. FINDINGS: LUNG BASES: Clear. INCIDENTALLY IMAGED HEART AND MEDIASTINUM: Unremarkable. LIVER: No mass or biliary dilatation. GALLBLADDER: Surgically removed. SPLEEN: No mass. PANCREAS: No mass or ductal dilatation. ADRENALS: Unremarkable. KIDNEYS/URETERS: Malrotated right kidney. Hyperdense right renal cyst. Right nephrolithiasis. STOMACH: Hiatal hernia. SMALL BOWEL: No dilatation or wall thickening. COLON: No dilatation or wall thickening. Moderate colonic stool. APPENDIX: Surgically removed PERITONEUM: No ascites or pneumoperitoneum. RETROPERITONEUM: No lymphadenopathy or aortic aneurysm.  REPRODUCTIVE ORGANS: Surgically removed. URINARY BLADDER: No mass or calculus. BONES: No destructive bone lesion. Multilevel degenerative changes. ADDITIONAL COMMENTS: N/A     IMPRESSION: No acute findings. Xr Chest Port    Result Date: 6/20/2018  EXAM:  XR CHEST PORT INDICATION:  Chest Pain COMPARISON:  May 24 FINDINGS: A portable AP radiograph of the chest was obtained at 0555 hours. There is a left shoulder replacement in place. The patient is on a cardiac monitor. The lungs are clear. The cardiac and mediastinal contours and pulmonary vascularity are normal.  The bones and soft tissues are grossly within normal limits. IMPRESSION: No acute findings.           MEDICATIONS     ALL MEDICATIONS:   Current Facility-Administered Medications   Medication Dose Route Frequency    dilTIAZem CD (CARDIZEM CD) capsule 240 mg  240 mg Oral DAILY    magnesium hydroxide (MILK OF MAGNESIA) 400 mg/5 mL oral suspension 30 mL  30 mL Oral DAILY PRN    citalopram (CELEXA) tablet 20 mg  20 mg Oral QHS    docusate sodium (COLACE) capsule 100 mg  100 mg Oral DAILY    donepezil (ARICEPT) tablet 10 mg  10 mg Oral DAILY    polyethylene glycol (MIRALAX) packet 17 g  17 g Oral DAILY PRN    risperiDONE (RisperDAL) tablet 0.25 mg  0.25 mg Oral BID    saline peripheral flush soln 5 mL  5 mL InterCATHeter PRN    traZODone (DESYREL) tablet 25 mg  25 mg Oral QHS PRN    OLANZapine (ZyPREXA) tablet 2.5 mg  2.5 mg Oral Q6H PRN    ziprasidone (GEODON) 10 mg in sterile water (preservative free) 0.5 mL injection  10 mg IntraMUSCular BID PRN    benztropine (COGENTIN) tablet 1 mg  1 mg Oral BID PRN    benztropine (COGENTIN) injection 1 mg  1 mg IntraMUSCular BID PRN    zolpidem (AMBIEN) tablet 5 mg  5 mg Oral QHS PRN    acetaminophen (TYLENOL) tablet 650 mg  650 mg Oral Q4H PRN    ibuprofen (MOTRIN) tablet 400 mg  400 mg Oral Q8H PRN      SCHEDULED MEDICATIONS:   Current Facility-Administered Medications   Medication Dose Route Frequency    dilTIAZem CD (CARDIZEM CD) capsule 240 mg  240 mg Oral DAILY    citalopram (CELEXA) tablet 20 mg  20 mg Oral QHS    docusate sodium (COLACE) capsule 100 mg  100 mg Oral DAILY    donepezil (ARICEPT) tablet 10 mg  10 mg Oral DAILY    risperiDONE (RisperDAL) tablet 0.25 mg  0.25 mg Oral BID          ASSESSMENT & PLAN     DIAGNOSES REQUIRING ACTIVE TREATMENT AND MONITORING: (reviewed/updated 9/10/2018)  Patient Active Hospital Problem List:    The patient, Chema Bauer, is a 80 y.o.  female who presents at this time for treatment of the following diagnoses:  Patient Active Hospital Problem List:   Late onset Alzheimer's disease with behavioral disturbance (8/24/2018)    Assessment: severe, advanced    Plan: Agree with inpatient hospitalization for further stabilization, safety monitoring and medication management  Medications- Aricept and risperdal         In summary, Tray Regan, is a 80 y.o.  female who presents with a severe exacerbation of the principal diagnosis of Late onset Alzheimer's disease with behavioral disturbance  Patient's condition is worsening/not improving/not stable improving. Patient requires continued inpatient hospitalization for further stabilization, safety monitoring and medication management. I will continue to coordinate the provision of individual, milieu, occupational, group, and substance abuse therapies to address target symptoms/diagnoses as deemed appropriate for the individual patient. A coordinated, multidisplinary treatment team round was conducted with the patient (this team consists of the nurse, psychiatric unit pharmcist,  and writer). Complete current electronic health record for patient has been reviewed today including consultant notes, ancillary staff notes, nurses and psychiatric tech notes. Suicide risk assessment completed and patient deemed to be of low risk for suicide at this time.      The following regarding medications was addressed during rounds with patient:   the risks and benefits of the proposed medication. The patient was given the opportunity to ask questions. Informed consent given to the use of the above medications. Will continue to adjust psychiatric and non-psychiatric medications (see above \"medication\" section and orders section for details) as deemed appropriate & based upon diagnoses and response to treatment. I will continue to order blood tests/labs and diagnostic tests as deemed appropriate and review results as they become available (see orders for details and above listed lab/test results). I will order psychiatric records from previous [facility] hospitals to further elucidate the nature of patient's psychopathology and review once available. I will gather additional collateral information from friends, family and o/p treatment team to further elucidate the nature of patient's psychopathology and baselline level of psychiatric functioning. I certify that this patient's inpatient psychiatric hospital services furnished since the previous certification were, and continue to be, required for treatment that could reasonably be expected to improve the patient's condition, or for diagnostic study, and that the patient continues to need, on a daily basis, active treatment furnished directly by or requiring the supervision of inpatient psychiatric facility personnel. In addition, the hospital records show that services furnished were intensive treatment services, admission or related services, or equivalent services.     EXPECTED DISCHARGE DATE/DAY: TBD     DISPOSITION: Home       Signed By:   Silver Trorez MD  9/10/2018

## 2018-09-10 NOTE — PROGRESS NOTES
Problem: Altered Thought Process (Adult/Pediatric)  Goal: *STG: Participates in treatment plan  Outcome: Progressing Towards Goal  Pt in bed resting at the start of shift. Pt smiling and pleasant. Pt came out to the dining area for dinner. Pt drank milk and cranberry juice, ate soup and a bite of cookie. Pt compliant with meds.

## 2018-09-11 LAB
GLUCOSE BLD STRIP.AUTO-MCNC: 104 MG/DL (ref 65–100)
GLUCOSE BLD STRIP.AUTO-MCNC: 116 MG/DL (ref 65–100)
SERVICE CMNT-IMP: ABNORMAL
SERVICE CMNT-IMP: ABNORMAL

## 2018-09-11 PROCEDURE — 65220000003 HC RM SEMIPRIVATE PSYCH

## 2018-09-11 PROCEDURE — 97116 GAIT TRAINING THERAPY: CPT

## 2018-09-11 PROCEDURE — 74011250637 HC RX REV CODE- 250/637: Performed by: PSYCHIATRY & NEUROLOGY

## 2018-09-11 PROCEDURE — 74011250637 HC RX REV CODE- 250/637: Performed by: INTERNAL MEDICINE

## 2018-09-11 PROCEDURE — 82962 GLUCOSE BLOOD TEST: CPT

## 2018-09-11 RX ADMIN — DOCUSATE SODIUM 100 MG: 100 CAPSULE, LIQUID FILLED ORAL at 11:06

## 2018-09-11 RX ADMIN — RISPERIDONE 0.25 MG: 0.5 TABLET, FILM COATED ORAL at 16:54

## 2018-09-11 RX ADMIN — DONEPEZIL HYDROCHLORIDE 10 MG: 10 TABLET, FILM COATED ORAL at 11:05

## 2018-09-11 RX ADMIN — DILTIAZEM HYDROCHLORIDE 240 MG: 240 CAPSULE, COATED, EXTENDED RELEASE ORAL at 11:05

## 2018-09-11 RX ADMIN — RISPERIDONE 0.25 MG: 0.5 TABLET, FILM COATED ORAL at 11:05

## 2018-09-11 NOTE — BH NOTES
PSYCHIATRIC PROGRESS NOTE         Patient Name  Gaye Smith   Date of Birth 5/24/1933   [facility] [id number]   Medical Record Number  [id number]      Age  80 y.o. PCP Virginia Fox MD   Admit date:  9/6/2018    Room Number  746/01  @ [facility]   Date of Service  9/11/2018          PSYCHOTHERAPY SESSION NOTE:  Length of psychotherapy session: 20 minutes    Main condition/diagnosis/issues treated during session today, 9/11/2018 : I employed Cognitive Behavioral therapy techniques, Reality-Oriented psychotherapy, as well as supportive psychotherapy in regards to various ongoing psychosocial stressors, including the following: pre-admission and current problems; housing issues;   medical issues; and stress of hospitalization. Interpersonal relationship issues and psychodynamic conflicts explored. Attempts made to alleviate maladaptive patterns. Overall, patient is not progressing    Treatment Plan Update (reviewed an updated 9/11/2018) : I will modify psychotherapy tx plan by implementing more stress management strategies, building upon cognitive behavioral techniques, increasing coping skills, as well as shoring up psychological defenses). An extended energy and skill set was needed to engage pt in psychotherapy due to some of the following: resistiveness, complexity, negativity, confrontational nature, hostile behaviors, and/or severe abnormalities in thought processes/psychosis resulting in the loss of expressive/receptive language communication skills. E & M PROGRESS NOTE:         HISTORY         HISTORY OF PRESENT ILLNESS/INTERVAL HISTORY:  (reviewed/updated 9/11/2018). per initial evaluation: The patient, Tray Augustin, is a 80 y.o.   WHITE [place]  female with a past psychiatric history significant for Dementia, MDD who was treated on [facility] for several weeks, until she had a syncopal episode on 9/5 and transferred to the medical floor for observation for 24 hours. She now returns medically stable. Patient's mood and presesentation hs improved compared to original presentation on admission. She remains very confused secondary to advanced dementia. No agitation or aggression noted. Compliant with her medications. Awaiting placement vs. Return home. 9/8/18. \"Am I going home today\". No complaints. Seems calmer. 9/9/18. No complaints. No agitation. Slept ok  9/10/18- Funny and engaging this morning. She enjoyed singing in group this morning. Sleeping well at night. 9/11/18- Patient remains stable. SIDE EFFECTS: (reviewed/updated 9/11/2018)  None reported or admitted to. ALLERGIES:(reviewed/updated 9/11/2018)  Allergies   Allergen Reactions    Angiotensin Ii,Human Other (comments)     States does not remember      Avelox [Moxifloxacin] Other (comments)     States does not remember      Demerol [Meperidine] Hives      MEDICATIONS PRIOR TO ADMISSION:(reviewed/updated 9/11/2018)  Prescriptions Prior to Admission   Medication Sig    traZODone (DESYREL) 50 mg tablet Take 0.5 Tabs by mouth nightly for 30 days. Indications: insomnia associated with depression    risperiDONE (RISPERDAL) 0.25 mg tablet Take 1 Tab by mouth two (2) times a day for 30 days.  magnesium hydroxide (MILK OF MAGNESIA) 400 mg/5 mL suspension Take 30 mL by mouth daily for 30 days.  donepezil (ARICEPT) 10 mg tablet Take 1 Tab by mouth daily for 30 days.  acetaminophen (TYLENOL) 325 mg tablet Take 2 Tabs by mouth every six (6) hours as needed for Pain.  docusate sodium (COLACE) 100 mg capsule Take 1 Cap by mouth daily for 90 days.  dilTIAZem CD (CARTIA XT) 240 mg ER capsule Take 240 mg by mouth daily.  polyethylene glycol (MIRALAX) 17 gram packet Take 17 g by mouth as needed (Constipation).     citalopram (CELEXA) 20 mg tablet TAKE 1 TABLET BY MOUTH DAILY      PAST MEDICAL HISTORY: Past medical history from the initial psychiatric evaluation has been reviewed (reviewed/updated 9/11/2018) with no additional updates (I asked patient and no additional past medical history provided). Past Medical History:   Diagnosis Date    Anemia     Atrial fibrillation ([address]     Cancer (HCC)     basal cell on nose    Chronic pain     back pain    Fibromyalgia 4/1/2010    GERD (gastroesophageal reflux disease) 4/1/2010    Hiatal hernia 4/1/2010    High cholesterol 4/1/2010    HTN (hypertension) 4/1/2010    Ill-defined condition     A. Fib    OA (osteoarthritis) 4/1/2010    back    Pulmonary emboli ([address] 2015     Past Surgical History:   Procedure Laterality Date    BREAST SURGERY PROCEDURE UNLISTED      Breast im-plants x 2    ENLARGE BREAST WITH IMPLANT      HX APPENDECTOMY      HX BREAST BIOPSY      HX BREAST RECONSTRUCTION      Breast implants removed 1992    HX CATARACT REMOVAL      HX CHOLECYSTECTOMY  9/11/2013    HX HYSTERECTOMY      HX KNEE REPLACEMENT  2008    bilateral    HX ORTHOPAEDIC  2007    Bilateral TKR    HX PARTIAL HYSTERECTOMY      HX SHOULDER REPLACEMENT  2009    left    HX TONSILLECTOMY        SOCIAL HISTORY: Social history from the initial psychiatric evaluation has been reviewed (reviewed/updated 9/11/2018) with no additional updates (I asked patient and no additional social history provided). Social History     Social History    Marital status:      Spouse name: N/A    Number of children: N/A    Years of education: N/A     Occupational History    Not on file. Social History Main Topics    Smoking status: Never Smoker    Smokeless tobacco: Never Used    Alcohol use No    Drug use: No    Sexual activity: No     Other Topics Concern    Not on file     Social History Narrative      FAMILY HISTORY: Family history from the initial psychiatric evaluation has been reviewed (reviewed/updated 9/11/2018) with no additional updates (I asked patient and no additional family history provided).    Family History   Problem Relation Age of Onset   24 [facility] Arthritis-rheumatoid Mother     Dementia Mother     Coronary Artery Disease Father     Cancer Brother      lung cancer       REVIEW OF SYSTEMS: (reviewed/updated 9/11/2018)  Appetite:improved   Sleep: improved   All other Review of Systems: General ROS: negative         MENTAL STATUS EXAM & VITALS     MENTAL STATUS EXAM (MSE):    MSE FINDINGS ARE WITHIN NORMAL LIMITS (WNL) UNLESS OTHERWISE STATED BELOW. ( ALL OF THE BELOW CATEGORIES OF THE MSE HAVE BEEN REVIEWED (reviewed 9/11/2018) AND UPDATED AS DEEMED APPROPRIATE )  General Presentation age appropriate and older than stated age, cooperative   Orientation not oriented to situation   Vital Signs  See below (reviewed 9/11/2018); Vital Signs (BP, Pulse, & Temp) are within normal limits if not listed below.    Gait and Station Stable/steady, no ataxia   Musculoskeletal System No extrapyramidal symptoms (EPS); no abnormal muscular movements or Tardive Dyskinesia (TD); muscle strength and tone are within normal limits   Language No aphasia or dysarthria   Speech:  soft   Thought Processes illogical; slow rate of thoughts; poor abstract reasoning/computation   Thought Associations loose associations   Thought Content free of delusions   Suicidal Ideations no plan    Homicidal Ideations no plan    Mood:  euthymic   Affect:  euthymic   Memory recent  impaired   Memory remote:  impaired   Concentration/Attention:  distractable   Fund of Knowledge below average   Insight:  limited   Reliability limited   Judgment:  limited          VITALS:     Patient Vitals for the past 24 hrs:   Temp Pulse Resp BP SpO2   09/11/18 0755 98.1 °F (36.7 °C) 89 16 142/86 95 %   09/10/18 2039 98.2 °F (36.8 °C) 85 16 109/71 91 %     Wt Readings from Last 3 Encounters:   09/02/18 64.5 kg (142 lb 3.2 oz)   08/21/18 76.1 kg (167 lb 12.8 oz)   08/13/18 77.6 kg (171 lb)     Temp Readings from Last 3 Encounters:   09/11/18 98.1 °F (36.7 °C)   09/06/18 98.9 °F (37.2 °C)   09/05/18 98.1 °F (36.7 °C)     BP Readings from Last 3 Encounters:   09/11/18 142/86   09/06/18 146/77   09/05/18 103/63     Pulse Readings from Last 3 Encounters:   09/11/18 89   09/06/18 (!) 105   09/05/18 68            DATA     LABORATORY DATA:(reviewed/updated 9/11/2018)  Recent Results (from the past 24 hour(s))   GLUCOSE, POC    Collection Time: 09/11/18  8:34 AM   Result Value Ref Range    Glucose (POC) 104 (H) 65 - 100 mg/dL    Performed by Demetri Díaz, POC    Collection Time: 09/11/18  3:58 PM   Result Value Ref Range    Glucose (POC) 116 (H) 65 - 100 mg/dL    Performed by Kimmie Muse      No results found for: VALF2, VALAC, VALP, VALPR, DS6, CRBAM, CRBAMP, CARB2, XCRBAM  No results found for: LITHM   RADIOLOGY REPORTS:(reviewed/updated 9/11/2018)  Xr Pelv Ap Only    Addendum Date: 8/14/2018    Addendum: No fracture. Result Date: 8/14/2018  Clinical history: Fall yesterday FINDINGS: Single frontal view of the pelvis is obtained. There is no fracture or dislocation identified. There is no significant soft tissue abnormality. IMPRESSION: No acute fracture. Xr Forearm Rt Ap/lat    Result Date: 8/13/2018  EXAM:  XR FOREARM RT AP/LAT Clinical history: Distal radial fracture. INDICATION:   fall. COMPARISON: None. FINDINGS: Two views of the right radius and ulna demonstrate distal radial fracture. Extensive degenerative change at the radiocarpal and carpometacarpal rows. .     IMPRESSION:  Distal radial fracture with minimal displacement. Severe degenerative change at the wrist..     Xr Wrist Rt Ap/lat    Result Date: 8/27/2018  EXAM: XR WRIST RT AP/LAT INDICATION:  R wrist fracture follow up, was not casted and pt not leaving splint on. COMPARISON: 8/14/2018. FINDINGS: Two  views of the right wrist demonstrate no appreciable change in the T-shaped interarticular distal radial fracture and ulnar styloid fracture in fiberglass. There is chondrocalcinosis of the radiocarpal joint and TFC.  Lateral carpal arthritis is noted. Osteopenia is present. IMPRESSION:  No significant change in the right T-shaped interarticular distal radial and ulna styloid fractures in the splint. Xr Wrist Rt Ap/lat/obl Min 3v    Result Date: 8/14/2018  EXAM: XR WRIST RT AP/LAT/OBL MIN 3V Clinical history: Fall, broken wrist INDICATION:  fall, broken wrist. COMPARISON: None. FINDINGS: Three  views of the right wrist demonstrate postreduction images distal radial fracture. .  Soft tissue edema. .     IMPRESSION:  Postreduction images distal radial fracture. .     Xr Wrist Rt Ap/lat/obl Min 3v    Result Date: 8/13/2018  EXAM: XR WRIST RT AP/LAT/OBL MIN 3V HISTORY: Fall, fell in bathroom, right arm INDICATION:  fall. COMPARISON: None. FINDINGS: Three  views of the right wrist demonstrate nondisplaced distal radius fracture. Severe degenerative change of the radiocarpal and carpometacarpal rows. No ulnar fracture. .  The soft tissues are within normal limits. IMPRESSION:  Nondisplaced fracture of the distal radius. 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     Ct Head Wo Cont    Result Date: 8/23/2018  EXAM:  CT head without contrast INDICATION: Altered mental status COMPARISON: CT head 8/14/2018 TECHNIQUE: Axial noncontrast head CT from foramen magnum to vertex. Coronal and sagittal reformatted images were obtained. CT dose reduction was achieved through use of a standardized protocol tailored for this examination and automatic exposure control for dose modulation. Adaptive statistical iterative reconstruction (ASIR) was utilized. FINDINGS:  There is diffuse age-related parenchymal volume loss. The ventricles and sulci are age-appropriate without hydrocephalus. There is no mass effect or midline shift. There is no intracranial hemorrhage or extra-axial fluid collection. Scattered foci of low attenuation in the periventricular white matter represent stable chronic microvascular ischemic changes.  There is no new abnormal parenchymal attenuation. The gray-white matter differentiation is maintained. The basal cisterns are patent. 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 The osseous structures are intact. The visualized paranasal sinuses and mastoid air cells are clear. IMPRESSION: There is no acute intracranial abnormality. Ct Head Wo Cont    Result Date: 8/14/2018  EXAM:  CT HEAD WO CONT Clinical history: Fall, fractured wrist, increased pain INDICATION:   fall COMPARISON: 7/20/2018. CONTRAST:  None. TECHNIQUE: Unenhanced CT of the head was performed using 5 mm images. Brain and bone windows were generated. CT dose reduction was achieved through use of a standardized protocol tailored for this examination and automatic exposure control for dose modulation. FINDINGS: The ventricles and sulci are normal in size, shape and configuration and midline. Mild scattered hypodensities in the cerebral white matter. There is no intracranial hemorrhage, extra-axial collection, mass, mass effect or midline shift. The basilar cisterns are open. No acute infarct is identified. The bone windows demonstrate no abnormalities. The visualized portions of the paranasal sinuses and mastoid air cells are clear. IMPRESSION: No acute cranial process     Ct Head Wo Cont    Result Date: 7/20/2018  EXAM:  CT HEAD WO CONT INDICATION: Fall going to the bathroom and had a ground level fall and hit head on the floor. COMPARISON: MRI brain 11/12/2012. Lenny Jin CONTRAST:  None. TECHNIQUE: Unenhanced CT of the head was performed using 5 mm images. Brain and bone windows were generated. CT dose reduction was achieved through use of a standardized protocol tailored for this examination and automatic exposure control for dose modulation. FINDINGS: There is a stable 9 x 12 mm partially calcified extra-axial lesion in the left posterior fossa compatible with meningioma.  There is no acute intracranial hemorrhage, other mass, mass effect or herniation. Ventricles and sulci show stable, proportionate, and symmetric pattern. There is a stable pattern of periventricular white matter hypodensity. The gray-white matter differentiation is well-preserved. Atherosclerotic calcifications are seen within the carotid siphons and vertebral arteries. The mastoid air cells are well pneumatized. The visualized paranasal sinuses are normal.     IMPRESSION: No acute intracranial hemorrhage or infarct. Stable left posterior fossa meningioma and chronic white matter change most compatible with small vessel ischemic and/or senescent change. Intracranial atherosclerosis. Ct Abd Pelv Wo Cont    Result Date: 6/20/2018  EXAM:  CT ABD PELV WO CONT INDICATION: abd pain  2 days of abdominal pain COMPARISON: 2013 CONTRAST:  None. TECHNIQUE: Thin axial images were obtained through the abdomen and pelvis. Coronal and sagittal reconstructions were generated. Oral contrast was not administered. CT dose reduction was achieved through use of a standardized protocol tailored for this examination and automatic exposure control for dose modulation. The absence of intravenous contrast material reduces the sensitivity for evaluation of the solid parenchymal organs of the abdomen. FINDINGS: LUNG BASES: Clear. INCIDENTALLY IMAGED HEART AND MEDIASTINUM: Unremarkable. LIVER: No mass or biliary dilatation. GALLBLADDER: Surgically removed. SPLEEN: No mass. PANCREAS: No mass or ductal dilatation. ADRENALS: Unremarkable. KIDNEYS/URETERS: Malrotated right kidney. Hyperdense right renal cyst. Right nephrolithiasis. STOMACH: Hiatal hernia. SMALL BOWEL: No dilatation or wall thickening. COLON: No dilatation or wall thickening. Moderate colonic stool. APPENDIX: Surgically removed PERITONEUM: No ascites or pneumoperitoneum. RETROPERITONEUM: No lymphadenopathy or aortic aneurysm. REPRODUCTIVE ORGANS: Surgically removed. URINARY BLADDER: No mass or calculus. BONES: No destructive bone lesion. Multilevel degenerative changes. ADDITIONAL COMMENTS: N/A     IMPRESSION: No acute findings. Xr Chest Port    Result Date: 6/20/2018  EXAM:  XR CHEST PORT INDICATION:  Chest Pain COMPARISON:  May 24 FINDINGS: A portable AP radiograph of the chest was obtained at 0555 hours. There is a left shoulder replacement in place. The patient is on a cardiac monitor. The lungs are clear. The cardiac and mediastinal contours and pulmonary vascularity are normal.  The bones and soft tissues are grossly within normal limits. IMPRESSION: No acute findings.           MEDICATIONS     ALL MEDICATIONS:   Current Facility-Administered Medications   Medication Dose Route Frequency    dilTIAZem CD (CARDIZEM CD) capsule 240 mg  240 mg Oral DAILY    magnesium hydroxide (MILK OF MAGNESIA) 400 mg/5 mL oral suspension 30 mL  30 mL Oral DAILY PRN    citalopram (CELEXA) tablet 20 mg  20 mg Oral QHS    docusate sodium (COLACE) capsule 100 mg  100 mg Oral DAILY    donepezil (ARICEPT) tablet 10 mg  10 mg Oral DAILY    polyethylene glycol (MIRALAX) packet 17 g  17 g Oral DAILY PRN    risperiDONE (RisperDAL) tablet 0.25 mg  0.25 mg Oral BID    saline peripheral flush soln 5 mL  5 mL InterCATHeter PRN    traZODone (DESYREL) tablet 25 mg  25 mg Oral QHS PRN    OLANZapine (ZyPREXA) tablet 2.5 mg  2.5 mg Oral Q6H PRN    ziprasidone (GEODON) 10 mg in sterile water (preservative free) 0.5 mL injection  10 mg IntraMUSCular BID PRN    benztropine (COGENTIN) tablet 1 mg  1 mg Oral BID PRN    benztropine (COGENTIN) injection 1 mg  1 mg IntraMUSCular BID PRN    zolpidem (AMBIEN) tablet 5 mg  5 mg Oral QHS PRN    acetaminophen (TYLENOL) tablet 650 mg  650 mg Oral Q4H PRN    ibuprofen (MOTRIN) tablet 400 mg  400 mg Oral Q8H PRN      SCHEDULED MEDICATIONS:   Current Facility-Administered Medications   Medication Dose Route Frequency    dilTIAZem CD (CARDIZEM CD) capsule 240 mg  240 mg Oral DAILY    citalopram (CELEXA) tablet 20 mg  20 mg Oral QHS    docusate sodium (COLACE) capsule 100 mg  100 mg Oral DAILY    donepezil (ARICEPT) tablet 10 mg  10 mg Oral DAILY    risperiDONE (RisperDAL) tablet 0.25 mg  0.25 mg Oral BID          ASSESSMENT & PLAN     DIAGNOSES REQUIRING ACTIVE TREATMENT AND MONITORING: (reviewed/updated 9/11/2018)  Patient Active Hospital Problem List:    The patient, Tray Berg, is a 80 y.o.  female who presents at this time for treatment of the following diagnoses:  Patient Active Hospital Problem List:   Late onset Alzheimer's disease with behavioral disturbance (8/24/2018)    Assessment: severe, advanced    Plan: Agree with inpatient hospitalization for further stabilization, safety monitoring and medication management  Medications- Aricept and risperdal         In summary, Tray Regan, is a 80 y.o.  female who presents with a severe exacerbation of the principal diagnosis of Late onset Alzheimer's disease with behavioral disturbance  Patient's condition is worsening/not improving/not stable improving. Patient requires continued inpatient hospitalization for further stabilization, safety monitoring and medication management. I will continue to coordinate the provision of individual, milieu, occupational, group, and substance abuse therapies to address target symptoms/diagnoses as deemed appropriate for the individual patient. A coordinated, multidisplinary treatment team round was conducted with the patient (this team consists of the nurse, psychiatric unit pharmcist,  and writer). Complete current electronic health record for patient has been reviewed today including consultant notes, ancillary staff notes, nurses and psychiatric tech notes. Suicide risk assessment completed and patient deemed to be of low risk for suicide at this time.      The following regarding medications was addressed during rounds with patient:   the risks and benefits of the proposed medication. The patient was given the opportunity to ask questions. Informed consent given to the use of the above medications. Will continue to adjust psychiatric and non-psychiatric medications (see above \"medication\" section and orders section for details) as deemed appropriate & based upon diagnoses and response to treatment. I will continue to order blood tests/labs and diagnostic tests as deemed appropriate and review results as they become available (see orders for details and above listed lab/test results). I will order psychiatric records from previous [facility] hospitals to further elucidate the nature of patient's psychopathology and review once available. I will gather additional collateral information from friends, family and o/p treatment team to further elucidate the nature of patient's psychopathology and baselline level of psychiatric functioning. I certify that this patient's inpatient psychiatric hospital services furnished since the previous certification were, and continue to be, required for treatment that could reasonably be expected to improve the patient's condition, or for diagnostic study, and that the patient continues to need, on a daily basis, active treatment furnished directly by or requiring the supervision of inpatient psychiatric facility personnel. In addition, the hospital records show that services furnished were intensive treatment services, admission or related services, or equivalent services.     EXPECTED DISCHARGE DATE/DAY: TBD     DISPOSITION: Home       Signed By:   Heriberto León MD  9/11/2018

## 2018-09-11 NOTE — PROGRESS NOTES
Problem: Falls - Risk of  Goal: *Absence of Falls  Document Kizzy Fall Risk and appropriate interventions in the flowsheet. Outcome: Progressing Towards Goal  Fall Risk Interventions:  Mobility Interventions: Bed/chair exit alarm, Patient to call before getting OOB  Mentation Interventions: Adequate sleep, hydration, pain control, Bed/chair exit alarm, Door open when patient unattended  Medication Interventions: Teach patient to arise slowly, Patient to call before getting OOB, Bed/chair exit alarm  Elimination Interventions: Bed/chair exit alarm, Call light in reach, Patient to call for help with toileting needs  History of Falls Interventions: Door open when patient unattended, Bed/chair exit alarm    2345 Pt appears asleep in bed. Respirations even and unlabored. Will monitor with Q 15 safety checks.

## 2018-09-11 NOTE — PROGRESS NOTES
Problem: Mobility Impaired (Adult and Pediatric)  Goal: *Acute Goals and Plan of Care (Insert Text)  Physical Therapy Goals  Initiated 9/8/2018  1. Patient will move from supine to sit and sit to supine , scoot up and down and roll side to side in bed with minimal assistance/contact guard assist within 7 day(s). 2.  Patient will transfer from bed to chair and chair to bed with minimal assistance/contact guard assist using the least restrictive device within 7 day(s). 3.  Patient will perform sit to stand with minimal assistance/contact guard assist within 7 day(s). 4.  Patient will ambulate with minimal assistance/contact guard assist for 50 feet with the least restrictive device within 7 day(s). physical Therapy TREATMENT  Patient: Marcie Branch [de-identified]80 y.o. 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 ASSESSMENT:  Patient continues to progress slowly toward goals. She is overall CGA for transfers with RW. Ambulated 40 feet with RW and mod A. Patient with significant scissoring and path deviations to the R. Requires assistance with RW management especially for turns. Patient very limited by visual impairments limiting independence and safety with gait. Patient tending to stay outside and to the R of RW during gait and transfers. Patient remained OOB in chair eating breakfast at end of session. Recommending SNF at discharge.   Progression toward goals:  []    Improving appropriately and progressing toward goals  [x]    Improving slowly and progressing toward goals  []    Not making progress toward goals and plan of care will be adjusted     PLAN:  Patient continues to benefit from skilled intervention to address the above impairments. Continue treatment per established plan of care. Discharge Recommendations:  Skilled Nursing Facility  Further Equipment Recommendations for Discharge:  TBD     SUBJECTIVE:   Patient stated I can't see very well.     OBJECTIVE DATA SUMMARY:   Critical Behavior:  Neurologic State: Sleeping  Orientation Level: Disoriented to situation, Disoriented to place, Oriented to person  Cognition: Decreased attention/concentration, Impaired decision making  Safety/Judgement: Decreased awareness of environment, Decreased awareness of need for assistance, Decreased awareness of need for safety, Decreased insight into deficits  Functional Mobility Training:  Bed Mobility:     Supine to Sit:  (received up in chair)              Transfers:  Sit to Stand: Contact guard assistance  Stand to Sit: Minimum assistance  Stand Pivot Transfers: Minimum assistance                          Balance:  Sitting: Intact  Standing: Impaired  Standing - Static: Fair;Constant support  Standing - Dynamic : Poor  Ambulation/Gait Training:  Distance (ft): 40 Feet (ft)  Assistive Device: Gait belt;Walker, rolling  Ambulation - Level of Assistance: Minimal assistance; Moderate assistance        Gait Abnormalities: Scissoring;Path deviations;Decreased step clearance; Antalgic        Base of Support: Narrowed     Speed/Patricia: Pace decreased (<100 feet/min)  Step Length: Right shortened;Left shortened                    Stairs:              Neuro Re-Education:    Therapeutic Exercises:     Pain:  Pain Scale 1:  (Pt appears asleep)                 Activity Tolerance:   Improving   Please refer to the flowsheet for vital signs taken during this treatment.   After treatment:   [x]    Patient left in no apparent distress sitting up in chair  []    Patient left in no apparent distress in bed  [x]    Call bell left within reach  [x]    Nursing notified  []    Caregiver present  []    Bed alarm activated    COMMUNICATION/COLLABORATION:   The patients plan of care was discussed with: Registered Nurse    Aster Lua, PT   Time Calculation: 10 mins

## 2018-09-11 NOTE — PROGRESS NOTES
Problem: Altered Thought Process (Adult/Pediatric)  Goal: *STG: Participates in treatment plan  Outcome: Progressing Towards Goal  Patient confused, however alert to self. Thoughts are more organized and logical today. Patient up with 2 assist and walker. Patient has decreased appetite. Patient ate 50 % breakfast this am.   Goal: Interventions  Outcome: Progressing Towards Goal  Medication management. Q 15 min safety rounds. Group therapy.

## 2018-09-11 NOTE — PROGRESS NOTES
Problem: Discharge Planning  Goal: *Discharge to safe environment  Outcome: Progressing Towards Goal  Plan for skilled nursing home placement   She is able to ID her family as supportive   She is working with PT and OT on the unit    Goal: *Knowledge of medication management  Outcome: Progressing Towards Goal  She has been medications complaint   Goal: *Knowledge of discharge instructions  Outcome: Not Progressing Towards Goal  She is unable to verbalize discharge instructions

## 2018-09-11 NOTE — INTERDISCIPLINARY ROUNDS
Behavioral Health Interdisciplinary Rounds     Patient Name: Kendra Hardy  Age: 80 y.o. Room/Bed:  746/  Primary Diagnosis: Late onset Alzheimer's disease with behavioral disturbance   Admission Status: Voluntary     Readmission within 30 days: yes  Power of  in place: no  Patient requires a blocked bed: no          Reason for blocked bed: n/a    VTE Prophylaxis: Not indicated    Mobility needs/Fall risk: yes    Nutritional Plan: no  Consults: no         Labs/Testing due today?: no    Sleep hours: 7.0       Participation in Care/Groups:  no  Medication Compliant?: Yes  PRNS (last 24 hours): None    Restraints (last 24 hours):  no     CIWA (range last 24 hours):     COWS (range last 24 hours):      Alcohol screening (AUDIT) completed -   AUDIT Score: 0     If applicable, date SBIRT discussed in treatment team AND documented:     Tobacco - patient is a smoker: Have You Used Tobacco in the Past 30 Days: No  Illegal Drugs use: Have You Used Any Illegal Substances Over the Past 12 Months: No    24 hour chart check complete: yes     Patient goal(s) for today:   Treatment team focus/goals: plan to work on skilled nursing home care   Progress note - she has been up pleasant and compliant with her treatment. Working well with PT staff.    was here today for admission assessment     LOS:  5  Expected LOS: TBD     Financial concerns/prescription coverage:  Medicare   Date of last family contact:  SW will update family today regarding placement      Family requesting physician contact today:    Discharge plan: skilled nursing home placement   Guns in the home: no     Outpatient provider(s): skilled nursing home placement     Participating treatment team members: Chrissy Oh RN

## 2018-09-12 ENCOUNTER — APPOINTMENT (OUTPATIENT)
Dept: GENERAL RADIOLOGY | Age: 83
DRG: 057 | End: 2018-09-12
Attending: PHYSICIAN ASSISTANT
Payer: MEDICARE

## 2018-09-12 LAB
GLUCOSE BLD STRIP.AUTO-MCNC: 105 MG/DL (ref 65–100)
GLUCOSE BLD STRIP.AUTO-MCNC: 108 MG/DL (ref 65–100)
SERVICE CMNT-IMP: ABNORMAL
SERVICE CMNT-IMP: ABNORMAL

## 2018-09-12 PROCEDURE — 65220000003 HC RM SEMIPRIVATE PSYCH

## 2018-09-12 PROCEDURE — 73110 X-RAY EXAM OF WRIST: CPT

## 2018-09-12 PROCEDURE — 82962 GLUCOSE BLOOD TEST: CPT

## 2018-09-12 PROCEDURE — 74011250637 HC RX REV CODE- 250/637: Performed by: INTERNAL MEDICINE

## 2018-09-12 PROCEDURE — 74011250637 HC RX REV CODE- 250/637: Performed by: PSYCHIATRY & NEUROLOGY

## 2018-09-12 RX ADMIN — DONEPEZIL HYDROCHLORIDE 10 MG: 10 TABLET, FILM COATED ORAL at 09:37

## 2018-09-12 RX ADMIN — RISPERIDONE 0.25 MG: 0.5 TABLET, FILM COATED ORAL at 09:37

## 2018-09-12 RX ADMIN — RISPERIDONE 0.25 MG: 0.5 TABLET, FILM COATED ORAL at 17:00

## 2018-09-12 RX ADMIN — CITALOPRAM HYDROBROMIDE 20 MG: 20 TABLET ORAL at 20:27

## 2018-09-12 RX ADMIN — DILTIAZEM HYDROCHLORIDE 240 MG: 240 CAPSULE, COATED, EXTENDED RELEASE ORAL at 09:37

## 2018-09-12 RX ADMIN — DOCUSATE SODIUM 100 MG: 100 CAPSULE, LIQUID FILLED ORAL at 09:37

## 2018-09-12 NOTE — BH NOTES
Care Management Note:    OSEAS ecined out updated notes to [place] of the [facility] nursing [place].

## 2018-09-12 NOTE — PROGRESS NOTES
Problem: Altered Thought Process (Adult/Pediatric)  Goal: *STG: Seeks staff when feelings of anxiety and fear arise  Outcome: Progressing Towards Goal  Pt is calm and cooperative. Med and meal compliant. Staff will continue to encourage positive thoughts and coping skills. Problem: Falls - Risk of  Goal: *Absence of Falls  Document Kizzy Fall Risk and appropriate interventions in the flowsheet. Outcome: Progressing Towards Goal  Fall Risk Interventions:  Mobility Interventions: Bed/chair exit alarm    Mentation Interventions: Door open when patient unattended    Medication Interventions: Evaluate medications/consider consulting pharmacy    Elimination Interventions: Toileting schedule/hourly rounds    History of Falls Interventions: Bed/chair exit alarm    Fall tool is accurate and up to date. Pt is free from falls.

## 2018-09-12 NOTE — PROGRESS NOTES
Ortho Progress Note    Received request to address weight bearing status of right distal radius fracture  Fracture occurred roughly 1 month per chart  Patient has been non-compliant with splint  Generally recommend NWB on upper extremity x 6 weeks  Do not have recent xrays so will order those today  Maintain NWB for now  Will change order if indicated  Follow-up as outpatient      Gali Heading, [address]

## 2018-09-12 NOTE — PROGRESS NOTES
Patient had PT today, physical therapist asked for orders for wt bearing of rt arm. Called [facility] and left message for Dr. Kae Tovar or Tamia Carson to put in weight bearing orders for patient or call so we could discuss.

## 2018-09-12 NOTE — BH NOTES
Behavioral Health Interdisciplinary Rounds     Patient Name: Caryle Dire  Age: 80 y.o. Room/Bed:  746/  Primary Diagnosis: Late onset Alzheimer's disease with behavioral disturbance   Admission Status: Involuntary Commitment     Readmission within 30 days: no  Power of  in place: yes  Patient requires a blocked bed: no          Reason for blocked bed: n/a    VTE Prophylaxis: Not indicated    Mobility needs/Fall risk: yes    Nutritional Plan: no  Consults: no         Labs/Testing due today?: no    Sleep hours:  7.25      Participation in Care/Groups:  no  Medication Compliant?: Yes  PRNS (last 24 hours): None    Restraints (last 24 hours):  no     CIWA (range last 24 hours):     COWS (range last 24 hours):      Alcohol screening (AUDIT) completed -   AUDIT Score: 0     If applicable, date SBIRT discussed in treatment team AND documented:     Tobacco - patient is a smoker: Have You Used Tobacco in the Past 30 Days: No  Illegal Drugs use: Have You Used Any Illegal Substances Over the Past 12 Months: No    24 hour chart check complete: yes     Plan to continue to work on skilled nursing home placement.         LOS:  6  Expected LOS: TBD       Participating treatment team members: Leann Sweeney

## 2018-09-12 NOTE — PROGRESS NOTES
Problem: Falls - Risk of  Goal: *Absence of Falls  Document Kizzy Fall Risk and appropriate interventions in the flowsheet. Outcome: Progressing Towards Goal  Fall Risk Interventions:  Mobility Interventions: Bed/chair exit alarm    Mentation Interventions: Adequate sleep, hydration, pain control, Bed/chair exit alarm    Medication Interventions: Bed/chair exit alarm    Elimination Interventions: Bed/chair exit alarm, Toileting schedule/hourly rounds    History of Falls Interventions: Bed/chair exit alarm     Pt received in bed, appears to be asleep. No pain/stress noted. Respirations even and unlabored. Will cont to monitor q15 min.

## 2018-09-12 NOTE — PROGRESS NOTES
Attempted to contact Renard BARCENAS regarding weight bearing status for patients RUE. Left message for him to f/u with nursing on patients unit.  Yasmany Gagnon, PT

## 2018-09-12 NOTE — PROGRESS NOTES
Problem: Altered Thought Process (Adult/Pediatric)  Goal: *STG: Participates in treatment plan  Outcome: Progressing Towards Goal  Pt in bed resting at the start of shift. Pt came out to the dining area for dinner and ate about 10% of her meal. Pt then was assisted in the restroom. Pt then requested to go back to bed. Pt has been smiling and pleasant during shift. Pt not compliant with bedtime med. Pt was compliant with dinner med.
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
## 2018-09-13 LAB
GLUCOSE BLD STRIP.AUTO-MCNC: 110 MG/DL (ref 65–100)
GLUCOSE BLD STRIP.AUTO-MCNC: 91 MG/DL (ref 65–100)
SERVICE CMNT-IMP: ABNORMAL
SERVICE CMNT-IMP: NORMAL

## 2018-09-13 PROCEDURE — 65220000003 HC RM SEMIPRIVATE PSYCH

## 2018-09-13 PROCEDURE — 74011250637 HC RX REV CODE- 250/637: Performed by: PSYCHIATRY & NEUROLOGY

## 2018-09-13 PROCEDURE — 74011250637 HC RX REV CODE- 250/637: Performed by: INTERNAL MEDICINE

## 2018-09-13 PROCEDURE — 82962 GLUCOSE BLOOD TEST: CPT

## 2018-09-13 RX ADMIN — CITALOPRAM HYDROBROMIDE 20 MG: 20 TABLET ORAL at 20:41

## 2018-09-13 RX ADMIN — DONEPEZIL HYDROCHLORIDE 10 MG: 10 TABLET, FILM COATED ORAL at 10:52

## 2018-09-13 RX ADMIN — DILTIAZEM HYDROCHLORIDE 240 MG: 240 CAPSULE, COATED, EXTENDED RELEASE ORAL at 10:51

## 2018-09-13 RX ADMIN — DOCUSATE SODIUM 100 MG: 100 CAPSULE, LIQUID FILLED ORAL at 10:52

## 2018-09-13 RX ADMIN — RISPERIDONE 0.25 MG: 0.5 TABLET, FILM COATED ORAL at 10:51

## 2018-09-13 RX ADMIN — RISPERIDONE 0.25 MG: 0.5 TABLET, FILM COATED ORAL at 17:02
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
## 2018-09-13 NOTE — PROGRESS NOTES
Physical Therapy  9/13/2018    Attempted to see pt for PT session however pt sleeping and only briefly keeping eyes open before falling back to sleep. Will follow up next treatment day when pt is more awake and able to participate.      Thank Isai Marques, PT, DPT

## 2018-09-13 NOTE — BH NOTES
GROUP THERAPY PROGRESS NOTE    Tray Regan participated in a Morning Process Group on the Geriatric Unit, with a focus identifying feelings, planning for the day, with music. Group time: 45 minutes. Personal goal for participation: To increase the capacity to shift ones mood, prepare for the day, and share in group singing. Goal orientation: The patient will be able to prepare for the day through group singing. Group therapy participation: When prompted, this patient actively participated in the group, after she arrived. Therapeutic interventions reviewed and discussed: The group members were introduce themselves by first names and participate in group singing as a way to increase their oxygen and blood flow and begin their day on a positive note. They were also asked to join in singing several songs. Impression of participation: The patient joined the group with about fifteen minutes left in the session. She entered with a big smile and joined in the singing. She also spoke cordially between the numbers. She was alert, generally oriented, and pleasant. She expressed no current SI/HI or overt psychosis. She implied that she often feels \"better in the mornings. \" Her affect was mildly euphoric and her mood reflected her affect.

## 2018-09-13 NOTE — BH NOTES
PSYCHIATRIC PROGRESS NOTE         Patient Name  Angelia Marin   Date of Birth 5/24/1933   [facility] [id number]   Medical Record Number  [id number]      Age  80 y.o. PCP Nba Byers MD   Admit date:  9/6/2018    Room Number  746/01  @ [facility]   Date of Service  9/12/2018          PSYCHOTHERAPY SESSION NOTE:  Length of psychotherapy session: 20 minutes    Main condition/diagnosis/issues treated during session today, 9/12/2018 : I employed Cognitive Behavioral therapy techniques, Reality-Oriented psychotherapy, as well as supportive psychotherapy in regards to various ongoing psychosocial stressors, including the following: pre-admission and current problems; housing issues;   medical issues; and stress of hospitalization. Interpersonal relationship issues and psychodynamic conflicts explored. Attempts made to alleviate maladaptive patterns. Overall, patient is not progressing    Treatment Plan Update (reviewed an updated 9/12/2018) : I will modify psychotherapy tx plan by implementing more stress management strategies, building upon cognitive behavioral techniques, increasing coping skills, as well as shoring up psychological defenses). An extended energy and skill set was needed to engage pt in psychotherapy due to some of the following: resistiveness, complexity, negativity, confrontational nature, hostile behaviors, and/or severe abnormalities in thought processes/psychosis resulting in the loss of expressive/receptive language communication skills. E & M PROGRESS NOTE:         HISTORY         HISTORY OF PRESENT ILLNESS/INTERVAL HISTORY:  (reviewed/updated 9/12/2018). per initial evaluation: The patient, Tray Barton, is a 80 y.o.   WHITE [place]  female with a past psychiatric history significant for Dementia, MDD who was treated on [facility] for several weeks, until she had a syncopal episode on 9/5 and transferred to the medical floor for observation for 24 hours. She now returns medically stable. Patient's mood and presesentation hs improved compared to original presentation on admission. She remains very confused secondary to advanced dementia. No agitation or aggression noted. Compliant with her medications. Awaiting placement vs. Return home. 9/8/18. \"Am I going home today\". No complaints. Seems calmer. 9/9/18. No complaints. No agitation. Slept ok  9/10/18- Funny and engaging this morning. She enjoyed singing in group this morning. Sleeping well at night. 9/11/18- Patient remains stable. 9/12/18- No complaints, no acute behviors. Bright and euthymic affect. Eating meals, sleeping well. SIDE EFFECTS: (reviewed/updated 9/12/2018)  None reported or admitted to. ALLERGIES:(reviewed/updated 9/12/2018)  Allergies   Allergen Reactions    Angiotensin Ii,Human Other (comments)     States does not remember      Avelox [Moxifloxacin] Other (comments)     States does not remember      Demerol [Meperidine] Hives      MEDICATIONS PRIOR TO ADMISSION:(reviewed/updated 9/12/2018)  Prescriptions Prior to Admission   Medication Sig    traZODone (DESYREL) 50 mg tablet Take 0.5 Tabs by mouth nightly for 30 days. Indications: insomnia associated with depression    risperiDONE (RISPERDAL) 0.25 mg tablet Take 1 Tab by mouth two (2) times a day for 30 days.  magnesium hydroxide (MILK OF MAGNESIA) 400 mg/5 mL suspension Take 30 mL by mouth daily for 30 days.  donepezil (ARICEPT) 10 mg tablet Take 1 Tab by mouth daily for 30 days.  acetaminophen (TYLENOL) 325 mg tablet Take 2 Tabs by mouth every six (6) hours as needed for Pain.  docusate sodium (COLACE) 100 mg capsule Take 1 Cap by mouth daily for 90 days.  dilTIAZem CD (CARTIA XT) 240 mg ER capsule Take 240 mg by mouth daily.  polyethylene glycol (MIRALAX) 17 gram packet Take 17 g by mouth as needed (Constipation).     citalopram (CELEXA) 20 mg tablet TAKE 1 TABLET BY MOUTH DAILY      PAST MEDICAL HISTORY: Past medical history from the initial psychiatric evaluation has been reviewed (reviewed/updated 9/12/2018) with no additional updates (I asked patient and no additional past medical history provided). Past Medical History:   Diagnosis Date    Anemia     Atrial fibrillation ([facility] [address]     Cancer (HCC)     basal cell on nose    Chronic pain     back pain    Fibromyalgia 4/1/2010    GERD (gastroesophageal reflux disease) 4/1/2010    Hiatal hernia 4/1/2010    High cholesterol 4/1/2010    HTN (hypertension) 4/1/2010    Ill-defined condition     A. Fib    OA (osteoarthritis) 4/1/2010    back    Pulmonary emboli ([facility] [address] 2015     Past Surgical History:   Procedure Laterality Date    BREAST SURGERY PROCEDURE UNLISTED      Breast im-plants x 2    ENLARGE BREAST WITH IMPLANT      HX APPENDECTOMY      HX BREAST BIOPSY      HX BREAST RECONSTRUCTION      Breast implants removed 1992    HX CATARACT REMOVAL      HX CHOLECYSTECTOMY  9/11/2013    HX HYSTERECTOMY      HX KNEE REPLACEMENT  2008    bilateral    HX ORTHOPAEDIC  2007    Bilateral TKR    HX PARTIAL HYSTERECTOMY      HX SHOULDER REPLACEMENT  2009    left    HX TONSILLECTOMY        SOCIAL HISTORY: Social history from the initial psychiatric evaluation has been reviewed (reviewed/updated 9/12/2018) with no additional updates (I asked patient and no additional social history provided). Social History     Social History    Marital status:      Spouse name: N/A    Number of children: N/A    Years of education: N/A     Occupational History    Not on file.      Social History Main Topics    Smoking status: Never Smoker    Smokeless tobacco: Never Used    Alcohol use No    Drug use: No    Sexual activity: No     Other Topics Concern    Not on file     Social History Narrative      FAMILY HISTORY: Family history from the initial psychiatric evaluation has been reviewed (reviewed/updated 9/12/2018) with no additional updates (I asked patient and no additional family history provided). Family History   Problem Relation Age of Onset   [facility] Arthritis-rheumatoid Mother     Dementia Mother     Coronary Artery Disease Father     Cancer Brother      lung cancer       REVIEW OF SYSTEMS: (reviewed/updated 9/12/2018)  Appetite:improved   Sleep: improved   All other Review of Systems: General ROS: negative         MENTAL STATUS EXAM & VITALS     MENTAL STATUS EXAM (MSE):    MSE FINDINGS ARE WITHIN NORMAL LIMITS (WNL) UNLESS OTHERWISE STATED BELOW. ( ALL OF THE BELOW CATEGORIES OF THE MSE HAVE BEEN REVIEWED (reviewed 9/12/2018) AND UPDATED AS DEEMED APPROPRIATE )  General Presentation age appropriate and older than stated age, cooperative   Orientation not oriented to situation   Vital Signs  See below (reviewed 9/12/2018); Vital Signs (BP, Pulse, & Temp) are within normal limits if not listed below.    Gait and Station Stable/steady, no ataxia   Musculoskeletal System No extrapyramidal symptoms (EPS); no abnormal muscular movements or Tardive Dyskinesia (TD); muscle strength and tone are within normal limits   Language No aphasia or dysarthria   Speech:  soft   Thought Processes illogical; slow rate of thoughts; poor abstract reasoning/computation   Thought Associations loose associations   Thought Content free of delusions   Suicidal Ideations no plan    Homicidal Ideations no plan    Mood:  euthymic   Affect:  euthymic   Memory recent  impaired   Memory remote:  impaired   Concentration/Attention:  distractable   Fund of Knowledge below average   Insight:  limited   Reliability limited   Judgment:  limited          VITALS:     Patient Vitals for the past 24 hrs:   Temp Pulse Resp BP SpO2   09/12/18 2144 98.3 °F (36.8 °C) 94 16 128/67 95 %   09/12/18 1752 98.5 °F (36.9 °C) 85 14 117/72 95 %   09/12/18 0930 97.6 °F (36.4 °C) 89 14 144/84 96 %     Wt Readings from Last 3 Encounters:   09/02/18 64.5 kg (142 lb 3.2 oz)   08/21/18 76.1 kg (167 lb 12.8 oz) 08/13/18 77.6 kg (171 lb)     Temp Readings from Last 3 Encounters:   09/12/18 98.3 °F (36.8 °C)   09/06/18 98.9 °F (37.2 °C)   09/05/18 98.1 °F (36.7 °C)     BP Readings from Last 3 Encounters:   09/12/18 128/67   09/06/18 146/77   09/05/18 103/63     Pulse Readings from Last 3 Encounters:   09/12/18 94   09/06/18 (!) 105   09/05/18 68            DATA     LABORATORY DATA:(reviewed/updated 9/12/2018)  Recent Results (from the past 24 hour(s))   GLUCOSE, POC    Collection Time: 09/12/18  7:31 AM   Result Value Ref Range    Glucose (POC) 105 (H) 65 - 100 mg/dL    Performed by Greyson Lomas, POC    Collection Time: 09/12/18  4:16 PM   Result Value Ref Range    Glucose (POC) 108 (H) 65 - 100 mg/dL    Performed by Mychal Watkins      No results found for: VALF2, VALAC, VALP, VALPR, DS6, CRBAM, CRBAMP, CARB2, XCRBAM  No results found for: LITHM   RADIOLOGY REPORTS:(reviewed/updated 9/12/2018)  Xr Pelv Ap Only    Addendum Date: 8/14/2018    Addendum: No fracture. Result Date: 8/14/2018  Clinical history: Fall yesterday FINDINGS: Single frontal view of the pelvis is obtained. There is no fracture or dislocation identified. There is no significant soft tissue abnormality. IMPRESSION: No acute fracture. Xr Forearm Rt Ap/lat    Result Date: 8/13/2018  EXAM:  XR FOREARM RT AP/LAT Clinical history: Distal radial fracture. INDICATION:   fall. COMPARISON: None. FINDINGS: Two views of the right radius and ulna demonstrate distal radial fracture. Extensive degenerative change at the radiocarpal and carpometacarpal rows. .     IMPRESSION:  Distal radial fracture with minimal displacement. Severe degenerative change at the wrist..     Xr Wrist Rt Ap/lat    Result Date: 8/27/2018  EXAM: XR WRIST RT AP/LAT INDICATION:  R wrist fracture follow up, was not casted and pt not leaving splint on. COMPARISON: 8/14/2018.  FINDINGS: Two  views of the right wrist demonstrate no appreciable change in the T-shaped interarticular distal radial fracture and ulnar styloid fracture in fiberglass. There is chondrocalcinosis of the radiocarpal joint and TFC. Lateral carpal arthritis is noted. Osteopenia is present. IMPRESSION:  No significant change in the right T-shaped interarticular distal radial and ulna styloid fractures in the splint. Xr Wrist Rt Ap/lat/obl Min 3v    Result Date: 8/14/2018  EXAM: XR WRIST RT AP/LAT/OBL MIN 3V Clinical history: Fall, broken wrist INDICATION:  fall, broken wrist. COMPARISON: None. FINDINGS: Three  views of the right wrist demonstrate postreduction images distal radial fracture. .  Soft tissue edema. .     IMPRESSION:  Postreduction images distal radial fracture. .     Xr Wrist Rt Ap/lat/obl Min 3v    Result Date: 8/13/2018  EXAM: XR WRIST RT AP/LAT/OBL MIN 3V HISTORY: Fall, fell in bathroom, right arm INDICATION:  fall. COMPARISON: None. FINDINGS: Three  views of the right wrist demonstrate nondisplaced distal radius fracture. Severe degenerative change of the radiocarpal and carpometacarpal rows. No ulnar fracture. .  The soft tissues are within normal limits. IMPRESSION:  Nondisplaced fracture of the distal radius. 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     Ct Head Wo Cont    Result Date: 8/23/2018  EXAM:  CT head without contrast INDICATION: Altered mental status COMPARISON: CT head 8/14/2018 TECHNIQUE: Axial noncontrast head CT from foramen magnum to vertex. Coronal and sagittal reformatted images were obtained. CT dose reduction was achieved through use of a standardized protocol tailored for this examination and automatic exposure control for dose modulation. Adaptive statistical iterative reconstruction (ASIR) was utilized. FINDINGS:  There is diffuse age-related parenchymal volume loss. The ventricles and sulci are age-appropriate without hydrocephalus. There is no mass effect or midline shift. There is no intracranial hemorrhage or extra-axial fluid collection. Scattered foci of low attenuation in the periventricular white matter represent stable chronic microvascular ischemic changes. There is no new abnormal parenchymal attenuation. The gray-white matter differentiation is maintained. The basal cisterns are patent. 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 The osseous structures are intact. The visualized paranasal sinuses and mastoid air cells are clear. IMPRESSION: There is no acute intracranial abnormality. Ct Head Wo Cont    Result Date: 8/14/2018  EXAM:  CT HEAD WO CONT Clinical history: Fall, fractured wrist, increased pain INDICATION:   fall COMPARISON: 7/20/2018. CONTRAST:  None. TECHNIQUE: Unenhanced CT of the head was performed using 5 mm images. Brain and bone windows were generated. CT dose reduction was achieved through use of a standardized protocol tailored for this examination and automatic exposure control for dose modulation. FINDINGS: The ventricles and sulci are normal in size, shape and configuration and midline. Mild scattered hypodensities in the cerebral white matter. There is no intracranial hemorrhage, extra-axial collection, mass, mass effect or midline shift. The basilar cisterns are open. No acute infarct is identified. The bone windows demonstrate no abnormalities. The visualized portions of the paranasal sinuses and mastoid air cells are clear. IMPRESSION: No acute cranial process     Ct Head Wo Cont    Result Date: 7/20/2018  EXAM:  CT HEAD WO CONT INDICATION: Fall going to the bathroom and had a ground level fall and hit head on the floor. COMPARISON: MRI brain 11/12/2012. Dianna Fairbanks CONTRAST:  None. TECHNIQUE: Unenhanced CT of the head was performed using 5 mm images. Brain and bone windows were generated. CT dose reduction was achieved through use of a standardized protocol tailored for this examination and automatic exposure control for dose modulation.   FINDINGS: There is a stable 9 x 12 mm partially calcified extra-axial lesion in the left posterior fossa compatible with meningioma. There is no acute intracranial hemorrhage, other mass, mass effect or herniation. Ventricles and sulci show stable, proportionate, and symmetric pattern. There is a stable pattern of periventricular white matter hypodensity. The gray-white matter differentiation is well-preserved. Atherosclerotic calcifications are seen within the carotid siphons and vertebral arteries. The mastoid air cells are well pneumatized. The visualized paranasal sinuses are normal.     IMPRESSION: No acute intracranial hemorrhage or infarct. Stable left posterior fossa meningioma and chronic white matter change most compatible with small vessel ischemic and/or senescent change. Intracranial atherosclerosis. Ct Abd Pelv Wo Cont    Result Date: 6/20/2018  EXAM:  CT ABD PELV WO CONT INDICATION: abd pain  2 days of abdominal pain COMPARISON: 2013 CONTRAST:  None. TECHNIQUE: Thin axial images were obtained through the abdomen and pelvis. Coronal and sagittal reconstructions were generated. Oral contrast was not administered. CT dose reduction was achieved through use of a standardized protocol tailored for this examination and automatic exposure control for dose modulation. The absence of intravenous contrast material reduces the sensitivity for evaluation of the solid parenchymal organs of the abdomen. FINDINGS: LUNG BASES: Clear. INCIDENTALLY IMAGED HEART AND MEDIASTINUM: Unremarkable. LIVER: No mass or biliary dilatation. GALLBLADDER: Surgically removed. SPLEEN: No mass. PANCREAS: No mass or ductal dilatation. ADRENALS: Unremarkable. KIDNEYS/URETERS: Malrotated right kidney. Hyperdense right renal cyst. Right nephrolithiasis. STOMACH: Hiatal hernia. SMALL BOWEL: No dilatation or wall thickening. COLON: No dilatation or wall thickening. Moderate colonic stool.  APPENDIX: Surgically removed PERITONEUM: No ascites or pneumoperitoneum. RETROPERITONEUM: No lymphadenopathy or aortic aneurysm. REPRODUCTIVE ORGANS: Surgically removed. URINARY BLADDER: No mass or calculus. BONES: No destructive bone lesion. Multilevel degenerative changes. ADDITIONAL COMMENTS: N/A     IMPRESSION: No acute findings. Xr Chest Port    Result Date: 6/20/2018  EXAM:  XR CHEST PORT INDICATION:  Chest Pain COMPARISON:  May 24 FINDINGS: A portable AP radiograph of the chest was obtained at 0555 hours. There is a left shoulder replacement in place. The patient is on a cardiac monitor. The lungs are clear. The cardiac and mediastinal contours and pulmonary vascularity are normal.  The bones and soft tissues are grossly within normal limits. IMPRESSION: No acute findings.           MEDICATIONS     ALL MEDICATIONS:   Current Facility-Administered Medications   Medication Dose Route Frequency    dilTIAZem CD (CARDIZEM CD) capsule 240 mg  240 mg Oral DAILY    magnesium hydroxide (MILK OF MAGNESIA) 400 mg/5 mL oral suspension 30 mL  30 mL Oral DAILY PRN    citalopram (CELEXA) tablet 20 mg  20 mg Oral QHS    docusate sodium (COLACE) capsule 100 mg  100 mg Oral DAILY    donepezil (ARICEPT) tablet 10 mg  10 mg Oral DAILY    polyethylene glycol (MIRALAX) packet 17 g  17 g Oral DAILY PRN    risperiDONE (RisperDAL) tablet 0.25 mg  0.25 mg Oral BID    saline peripheral flush soln 5 mL  5 mL InterCATHeter PRN    traZODone (DESYREL) tablet 25 mg  25 mg Oral QHS PRN    OLANZapine (ZyPREXA) tablet 2.5 mg  2.5 mg Oral Q6H PRN    ziprasidone (GEODON) 10 mg in sterile water (preservative free) 0.5 mL injection  10 mg IntraMUSCular BID PRN    benztropine (COGENTIN) tablet 1 mg  1 mg Oral BID PRN    benztropine (COGENTIN) injection 1 mg  1 mg IntraMUSCular BID PRN    zolpidem (AMBIEN) tablet 5 mg  5 mg Oral QHS PRN    acetaminophen (TYLENOL) tablet 650 mg  650 mg Oral Q4H PRN    ibuprofen (MOTRIN) tablet 400 mg  400 mg Oral Q8H PRN      SCHEDULED MEDICATIONS:   Current Facility-Administered Medications   Medication Dose Route Frequency    dilTIAZem CD (CARDIZEM CD) capsule 240 mg  240 mg Oral DAILY    citalopram (CELEXA) tablet 20 mg  20 mg Oral QHS    docusate sodium (COLACE) capsule 100 mg  100 mg Oral DAILY    donepezil (ARICEPT) tablet 10 mg  10 mg Oral DAILY    risperiDONE (RisperDAL) tablet 0.25 mg  0.25 mg Oral BID          ASSESSMENT & PLAN     DIAGNOSES REQUIRING ACTIVE TREATMENT AND MONITORING: (reviewed/updated 9/12/2018)  Patient Active Hospital Problem List:    The patient, Arlen Conn, is a 80 y.o.  female who presents at this time for treatment of the following diagnoses:  Patient Active Hospital Problem List:   Late onset Alzheimer's disease with behavioral disturbance (8/24/2018)    Assessment: severe, advanced    Plan: Agree with inpatient hospitalization for further stabilization, safety monitoring and medication management  Medications- Aricept and risperdal         In summary, Tray Regan, is a 80 y.o.  female who presents with a severe exacerbation of the principal diagnosis of Late onset Alzheimer's disease with behavioral disturbance  Patient's condition is worsening/not improving/not stable improving. Patient requires continued inpatient hospitalization for further stabilization, safety monitoring and medication management. I will continue to coordinate the provision of individual, milieu, occupational, group, and substance abuse therapies to address target symptoms/diagnoses as deemed appropriate for the individual patient. A coordinated, multidisplinary treatment team round was conducted with the patient (this team consists of the nurse, psychiatric unit pharmcist,  and writer). Complete current electronic health record for patient has been reviewed today including consultant notes, ancillary staff notes, nurses and psychiatric tech notes.     Suicide risk assessment completed and patient deemed to be of low risk for suicide at this time. The following regarding medications was addressed during rounds with patient:   the risks and benefits of the proposed medication. The patient was given the opportunity to ask questions. Informed consent given to the use of the above medications. Will continue to adjust psychiatric and non-psychiatric medications (see above \"medication\" section and orders section for details) as deemed appropriate & based upon diagnoses and response to treatment. I will continue to order blood tests/labs and diagnostic tests as deemed appropriate and review results as they become available (see orders for details and above listed lab/test results). I will order psychiatric records from previous [facility] hospitals to further elucidate the nature of patient's psychopathology and review once available. I will gather additional collateral information from friends, family and o/p treatment team to further elucidate the nature of patient's psychopathology and baselline level of psychiatric functioning. I certify that this patient's inpatient psychiatric hospital services furnished since the previous certification were, and continue to be, required for treatment that could reasonably be expected to improve the patient's condition, or for diagnostic study, and that the patient continues to need, on a daily basis, active treatment furnished directly by or requiring the supervision of inpatient psychiatric facility personnel. In addition, the hospital records show that services furnished were intensive treatment services, admission or related services, or equivalent services.     EXPECTED DISCHARGE DATE/DAY: TBD     DISPOSITION: Home       Signed By:   Rufino Roblero MD  9/12/2018

## 2018-09-13 NOTE — PROGRESS NOTES
[address] [facility] 60  Master Treatment Plan for [employer]    Date Treatment Plan Initiated: 09/13/18    Treatment Plan Modalities:  Type of Modality Amount  (x minutes) Frequency (x/week) Duration (x days) Name of Responsible Staff   [address] [facility] meetings to encourage peer interactions 15 7 1 Diane HOLDER      Group psychotherapy to assist in building coping skills and internal controls 60 7 1 Jim Todd   Therapeutic activity groups to build coping skills 60 7 1 Jim Todd   Psychoeducation in group setting to address:   Medication education   15 7 1 Charla DE LUNA    Coping skills         Relaxation techniques         Symptom management         Discharge planning   Inga Berrios 115   60 1 1 volunteer   Recovery/AA/NA   61 1 1 volunteer   Physician medication management   15 7 1 Dr. Taylor Cluster meeting/discharge planning   15 2 [address] [facility] and [id number] [facility] MET BY: 09/20/18      Problem: Altered Thought Process (Adult/Pediatric)  Goal: *STG: Participates in treatment plan  Outcome: Progressing Towards Goal  Patient confused. Alert and oriented to self. Thoughts are logical at times. Patient has delusions. Patient cooperative. Mood is euthymic. Calm. Patient does not state goal. Staff goal: to assist with ADLs and encourage patient to come to groups. Goal: *STG: Seeks staff when feelings of anxiety and fear arise  Outcome: Progressing Towards Goal  Patient does let staff know when she feels tired and would like to go  Back to bed. Goal: *STG: Demonstrates ability to understand and use improved judgment in daily activities and relationships  Outcome: Progressing Towards Goal  Variance: Patient Condition  Comments: Patient is confused. Patients thoughts are however more logical at times. Goal: Interventions  Outcome: Progressing Towards Goal  Medication management.  Q 15 min safety rounds. Group therapy. Problem: Nutrition Deficit  Goal: *Optimize nutritional status  Outcome: Progressing Towards Goal  Patients appetite has improved. However needs encouragement to eat and drink. Problem: Pressure Injury - Risk of  Goal: *Prevention of pressure injury  Document Oumar Scale and appropriate interventions in the flowsheet. Outcome: Progressing Towards Goal  No pressure injury noted.  Increasing time out of bed and walking with assist.       Pressure Injury Interventions:  Sensory Interventions: Assess changes in LOC    Moisture Interventions: Absorbent underpads    Activity Interventions: Chair cushion, Increase time out of bed, Pressure redistribution bed/mattress(bed type)    Mobility Interventions: Float heels    Nutrition Interventions: Offer support with meals,snacks and hydration    Friction and Shear Interventions: Apply protective barrier, creams and emollients, Foam dressings/transparent film/skin sealants, Minimize layers

## 2018-09-13 NOTE — PROGRESS NOTES
Problem: Altered Thought Process (Adult/Pediatric)  Goal: *STG: Participates in treatment plan  Outcome: Progressing Towards Goal  Visible in DR watching TV, listening to music and eating dinner and snacks. Ate 75% dinner and 100% snacks. Med compliant. Pleasantly confused. Wears splint on right wrist. Needs reminders to keep splint on wrist.    Problem: Falls - Risk of  Goal: *Absence of Falls  Document Kizzy Fall Risk and appropriate interventions in the flowsheet. Outcome: Progressing Towards Goal  Fall Risk Interventions:  Mobility Interventions: Bed/chair exit alarm, Communicate number of staff needed for ambulation/transfer, Patient to call before getting OOB, Utilize walker, cane, or other assistive device    Mentation Interventions: Adequate sleep, hydration, pain control, Bed/chair exit alarm, Door open when patient unattended, Reorient patient    Medication Interventions: Bed/chair exit alarm, Patient to call before getting OOB, Teach patient to arise slowly    Elimination Interventions: Bed/chair exit alarm, Patient to call for help with toileting needs, Toilet paper/wipes in reach, Toileting schedule/hourly rounds    History of Falls Interventions: Bed/chair exit alarm, Door open when patient unattended     No falls. One staff assist. Sit sin recliner.

## 2018-09-13 NOTE — BH NOTES
Behavioral Health Interdisciplinary Rounds     Patient Name: Pam Mckeon  Age: 80 y.o. Room/Bed:  746/01  Primary Diagnosis: Late onset Alzheimer's disease with behavioral disturbance   Admission Status: Involuntary Commitment     Readmission within 30 days: no  Power of  in place: yes  Patient requires a blocked bed: no          Reason for blocked bed: n/a    VTE Prophylaxis: No    Mobility needs/Fall risk: yes    Nutritional Plan: no  Consults:          Labs/Testing due today?: no    Sleep hours:  5.25      Participation in Care/Groups:  yes  Medication Compliant?: Yes  PRNS (last 24 hours): None    Restraints (last 24 hours):  no     CIWA (range last 24 hours):     COWS (range last 24 hours):      Alcohol screening (AUDIT) completed -   AUDIT Score: 0     If applicable, date SBIRT discussed in treatment team AND documented:     Tobacco - patient is a smoker: Have You Used Tobacco in the Past 30 Days: No  Illegal Drugs use: Have You Used Any Illegal Substances Over the Past 12 Months: No    24 hour chart check complete: yes     LOS:  7  Expected LOS: TBD     Plan -  to continue to work on skilled nursing home placement . SW updated patients son regarding denial  at [address] [facility]. I have sent out information to other area skilled facilities.             Participating treatment team members: Dr. Tere Hassan, RN Scott Mullen

## 2018-09-13 NOTE — PROGRESS NOTES
Problem: Discharge Planning  Goal: *Discharge to safe environment  Outcome: Progressing Towards Goal  She requires nursing home placement for skilled care   Family is very supportive and visits daily   Still needs assistance with her care   Goal: *Knowledge of medication management  Outcome: Progressing Towards Goal  She is compliant with her medications   Goal: *Knowledge of discharge instructions  Outcome: Not Progressing Towards Goal  She is not able to verbalize discharge instructions     Problem: Patient Education: Go to Patient Education Activity  Goal: Patient/Family Education  Outcome: Progressing Towards Goal  SW will educate on discharge plans

## 2018-09-14 LAB
GLUCOSE BLD STRIP.AUTO-MCNC: 103 MG/DL (ref 65–100)
GLUCOSE BLD STRIP.AUTO-MCNC: 114 MG/DL (ref 65–100)
SERVICE CMNT-IMP: ABNORMAL
SERVICE CMNT-IMP: ABNORMAL

## 2018-09-14 PROCEDURE — 65220000003 HC RM SEMIPRIVATE PSYCH

## 2018-09-14 PROCEDURE — 82962 GLUCOSE BLOOD TEST: CPT

## 2018-09-14 PROCEDURE — 74011250637 HC RX REV CODE- 250/637: Performed by: INTERNAL MEDICINE

## 2018-09-14 PROCEDURE — 74011250637 HC RX REV CODE- 250/637: Performed by: PSYCHIATRY & NEUROLOGY

## 2018-09-14 RX ADMIN — RISPERIDONE 0.25 MG: 0.5 TABLET, FILM COATED ORAL at 09:41

## 2018-09-14 RX ADMIN — DILTIAZEM HYDROCHLORIDE 240 MG: 240 CAPSULE, COATED, EXTENDED RELEASE ORAL at 09:40

## 2018-09-14 RX ADMIN — DOCUSATE SODIUM 100 MG: 100 CAPSULE, LIQUID FILLED ORAL at 09:40

## 2018-09-14 RX ADMIN — CITALOPRAM HYDROBROMIDE 20 MG: 20 TABLET ORAL at 20:53

## 2018-09-14 RX ADMIN — RISPERIDONE 0.25 MG: 0.5 TABLET, FILM COATED ORAL at 17:24

## 2018-09-14 RX ADMIN — DONEPEZIL HYDROCHLORIDE 10 MG: 10 TABLET, FILM COATED ORAL at 09:41

## 2018-09-14 RX ADMIN — ZOLPIDEM TARTRATE 5 MG: 5 TABLET ORAL at 20:56

## 2018-09-14 NOTE — PROGRESS NOTES
Problem: Falls - Risk of  Goal: *Absence of Falls  Document Kizzy Fall Risk and appropriate interventions in the flowsheet. Outcome: Progressing Towards Goal  Fall Risk Interventions:  Mobility Interventions: Bed/chair exit alarm, Communicate number of staff needed for ambulation/transfer, Patient to call before getting OOB, Utilize walker, cane, or other assistive device    Mentation Interventions: Adequate sleep, hydration, pain control, Bed/chair exit alarm, Door open when patient unattended    Medication Interventions: Bed/chair exit alarm, Patient to call before getting OOB, Teach patient to arise slowly    Elimination Interventions: Bed/chair exit alarm, Patient to call for help with toileting needs, Toilet paper/wipes in reach, Toileting schedule/hourly rounds    History of Falls Interventions: Bed/chair exit alarm, Door open when patient unattended     No falls. One staff assist in and out of bed,  recliner and to toilet. Remains confused. Problem: Nutrition Deficit  Goal: *Optimize nutritional status  Outcome: Not Progressing Towards Goal  Visible in DR for dinner and listening to music with peers. Reported \"feeling tired and want to go back to bed\". Patient continues to have poor appetite. Ate 25% dinner and 100% fluids. Med compliant. Sat in recliner in DR for dinner and listening to music. Encouraged to eat and drink fluids.

## 2018-09-14 NOTE — PROGRESS NOTES
1622 Problem: Altered Thought Process (Adult/Pediatric)  Goal: *STG: Seeks staff when feelings of anxiety and fear arise  Outcome: Progressing Towards Goal  Pt rests quietly. Pleasantly confused. Staff will continue q15 checks, fall precautions, encourage pt to share feelings. 1800 pt initially refused to come out of room for dinner. She then agreed to come out with nurse's assistance to [facility] and is feeding herself dinner.

## 2018-09-14 NOTE — PROGRESS NOTES
Problem: Altered Thought Process (Adult/Pediatric)  Goal: *STG: Participates in treatment plan  Outcome: Progressing Towards Goal  Patient denies SI. Med and meal compliant. Cooperative. Confused. Mood is euthymic. Goal: Interventions  Outcome: Progressing Towards Goal  Medication management. Q 15 min safety rounds. Group therapy.

## 2018-09-14 NOTE — INTERDISCIPLINARY ROUNDS
Behavioral Health Interdisciplinary Rounds     Patient Name: Benedicto Rodriguez  Age: 80 y.o.   Room/Bed:  746/  Primary Diagnosis: Late onset Alzheimer's disease with behavioral disturbance   Admission Status: Involuntary Commitment     Readmission within 30 days: no  Power of  in place: yes  Patient requires a blocked bed: no          Reason for blocked bed:     VTE Prophylaxis: No    Mobility needs/Fall risk: yes  Flu Vaccine : no   Nutritional Plan: no  Consults:          Labs/Testing due today?: no    Sleep hours:  7      Participation in Care/Groups:  yes  Medication Compliant?: Yes  PRNS (last 24 hours): None    Restraints (last 24 hours):  no     CIWA (range last 24 hours):     COWS (range last 24 hours):      Alcohol screening (AUDIT) completed -   AUDIT Score: 0     If applicable, date SBIRT discussed in treatment team AND documented:     Tobacco - patient is a smoker: Have You Used Tobacco in the Past 30 Days: No  Illegal Drugs use: Have You Used Any Illegal Substances Over the Past 12 Months: No    24 hour chart check complete: yes     Patient goal(s) for today:   Treatment team focus/goals:   Progress note     LOS:  8  Expected LOS: TBD    Financial concerns/prescription coverage:medicare   Date of last family contact:      Family requesting physician contact today:   Discharge plan: SNF placement   Guns in the home: No      Outpatient provider(s): requires skilled nursing home care     Participating treatment team members: Constantin Fierro RN

## 2018-09-14 NOTE — BH NOTES
GROUP THERAPY PROGRESS NOTE    Tray Regan participated in a Morning Process Group on the Geriatric Unit, with a focus identifying feelings, planning for the day, with music. Group time: 45 minutes. Personal goal for participation: To increase the capacity to shift ones mood, prepare for the day, and share in group singing. Goal orientation: The patient will be able to prepare for the day through group singing. Group therapy participation: When prompted, this patient participated in the group. Therapeutic interventions reviewed and discussed: The group members were introduce themselves by first names and participate in group singing as a way to increase their oxygen and blood flow and begin their day on a positive note. They were also asked to join in singing several songs. Impression of participation: The patient said was, \"Alright, I guess. \" She yawned and acknowledged that she was tired. She said she woke up last evening around 8 PM and was unable to go back to sleep. She joined the conversation, sang with familiar songs, and stayed awake the entire group. She was alert, generally oriented, and pleasant. She expressed no current SI/HI or overt psychosis. She also mentioned that she was missing \"home\" and she was wondering how her son was doing installing \"electric heat. \" Her affect was labile, she smiled in mild euphoria, and cried when discussing how much she missed home and how long she has been in the hospital. Her mood matched her affect.

## 2018-09-14 NOTE — PROGRESS NOTES
Problem: Falls - Risk of  Goal: *Absence of Falls  Document Kizzy Fall Risk and appropriate interventions in the flowsheet. Outcome: Progressing Towards Goal  Fall Risk Interventions:  Mobility Interventions: Bed/chair exit alarm, Communicate number of staff needed for ambulation/transfer, Patient to call before getting OOB, Utilize walker, cane, or other assistive device    Mentation Interventions: Adequate sleep, hydration, pain control, Bed/chair exit alarm, Door open when patient unattended    Medication Interventions: Bed/chair exit alarm, Patient to call before getting OOB, Teach patient to arise slowly    Elimination Interventions: Bed/chair exit alarm, Patient to call for help with toileting needs, Toilet paper/wipes in reach, Toileting schedule/hourly rounds    History of Falls Interventions: Bed/chair exit alarm, Door open when patient unattended    Resting in bed with eyes closed, no complaints, no distress noted. Safety measures in place, will continue to monitor.

## 2018-09-14 NOTE — BH NOTES
PSYCHIATRIC PROGRESS NOTE         Patient Name  Shira Reed   Date of Birth 5/24/1933   [facility] [id number]   Medical Record Number  [id number]      Age  80 y.o. PCP Teresa Awad MD   Admit date:  9/6/2018    Room Number  746/01  @ [facility]   Date of Service  9/13/18          PSYCHOTHERAPY SESSION NOTE:  Length of psychotherapy session: 20 minutes    Main condition/diagnosis/issues treated during session today, 9/14/2018 : I employed Cognitive Behavioral therapy techniques, Reality-Oriented psychotherapy, as well as supportive psychotherapy in regards to various ongoing psychosocial stressors, including the following: pre-admission and current problems; housing issues;   medical issues; and stress of hospitalization. Interpersonal relationship issues and psychodynamic conflicts explored. Attempts made to alleviate maladaptive patterns. Overall, patient is not progressing    Treatment Plan Update (reviewed an updated 9/14/2018) : I will modify psychotherapy tx plan by implementing more stress management strategies, building upon cognitive behavioral techniques, increasing coping skills, as well as shoring up psychological defenses). An extended energy and skill set was needed to engage pt in psychotherapy due to some of the following: resistiveness, complexity, negativity, confrontational nature, hostile behaviors, and/or severe abnormalities in thought processes/psychosis resulting in the loss of expressive/receptive language communication skills. E & M PROGRESS NOTE:         HISTORY         HISTORY OF PRESENT ILLNESS/INTERVAL HISTORY:  (reviewed/updated 9/14/2018). per initial evaluation: The patient, Tray Riddle, is a 80 y.o.   WHITE [place]  female with a past psychiatric history significant for Dementia, MDD who was treated on [facility] for several weeks, until she had a syncopal episode on 9/5 and transferred to the medical floor for observation for 24 hours. She now returns medically stable. Patient's mood and presesentation hs improved compared to original presentation on admission. She remains very confused secondary to advanced dementia. No agitation or aggression noted. Compliant with her medications. Awaiting placement vs. Return home. 9/8/18. \"Am I going home today\". No complaints. Seems calmer. 9/9/18. No complaints. No agitation. Slept ok  9/10/18- Funny and engaging this morning. She enjoyed singing in group this morning. Sleeping well at night. 9/11/18- Patient remains stable. 9/12/18- No complaints, no acute behviors. Bright and euthymic affect. Eating meals, sleeping well. 9/13- patient remains stable  9/14/18- euthymic mood, talkative. Pleasant and cooperative. SIDE EFFECTS: (reviewed/updated 9/14/2018)  None reported or admitted to. ALLERGIES:(reviewed/updated 9/14/2018)  Allergies   Allergen Reactions    Angiotensin Ii,Human Other (comments)     States does not remember      Avelox [Moxifloxacin] Other (comments)     States does not remember      Demerol [Meperidine] Hives      MEDICATIONS PRIOR TO ADMISSION:(reviewed/updated 9/14/2018)  Prescriptions Prior to Admission   Medication Sig    traZODone (DESYREL) 50 mg tablet Take 0.5 Tabs by mouth nightly for 30 days. Indications: insomnia associated with depression    risperiDONE (RISPERDAL) 0.25 mg tablet Take 1 Tab by mouth two (2) times a day for 30 days.  magnesium hydroxide (MILK OF MAGNESIA) 400 mg/5 mL suspension Take 30 mL by mouth daily for 30 days.  donepezil (ARICEPT) 10 mg tablet Take 1 Tab by mouth daily for 30 days.  acetaminophen (TYLENOL) 325 mg tablet Take 2 Tabs by mouth every six (6) hours as needed for Pain.  docusate sodium (COLACE) 100 mg capsule Take 1 Cap by mouth daily for 90 days.  dilTIAZem CD (CARTIA XT) 240 mg ER capsule Take 240 mg by mouth daily.     polyethylene glycol (MIRALAX) 17 gram packet Take 17 g by mouth as needed (Constipation).  citalopram (CELEXA) 20 mg tablet TAKE 1 TABLET BY MOUTH DAILY      PAST MEDICAL HISTORY: Past medical history from the initial psychiatric evaluation has been reviewed (reviewed/updated 9/14/2018) with no additional updates (I asked patient and no additional past medical history provided). Past Medical History:   Diagnosis Date    Anemia     Atrial fibrillation ([facility] [address]     Cancer (HCC)     basal cell on nose    Chronic pain     back pain    Fibromyalgia 4/1/2010    GERD (gastroesophageal reflux disease) 4/1/2010    Hiatal hernia 4/1/2010    High cholesterol 4/1/2010    HTN (hypertension) 4/1/2010    Ill-defined condition     A. Fib    OA (osteoarthritis) 4/1/2010    back    Pulmonary emboli ([facility] [address] 2015     Past Surgical History:   Procedure Laterality Date    BREAST SURGERY PROCEDURE UNLISTED      Breast im-plants x 2    ENLARGE BREAST WITH IMPLANT      HX APPENDECTOMY      HX BREAST BIOPSY      HX BREAST RECONSTRUCTION      Breast implants removed 1992    HX CATARACT REMOVAL      HX CHOLECYSTECTOMY  9/11/2013    HX HYSTERECTOMY      HX KNEE REPLACEMENT  2008    bilateral    HX ORTHOPAEDIC  2007    Bilateral TKR    HX PARTIAL HYSTERECTOMY      HX SHOULDER REPLACEMENT  2009    left    HX TONSILLECTOMY        SOCIAL HISTORY: Social history from the initial psychiatric evaluation has been reviewed (reviewed/updated 9/14/2018) with no additional updates (I asked patient and no additional social history provided). Social History     Social History    Marital status:      Spouse name: N/A    Number of children: N/A    Years of education: N/A     Occupational History    Not on file.      Social History Main Topics    Smoking status: Never Smoker    Smokeless tobacco: Never Used    Alcohol use No    Drug use: No    Sexual activity: No     Other Topics Concern    Not on file     Social History Narrative      FAMILY HISTORY: Family history from the initial psychiatric evaluation has been reviewed (reviewed/updated 9/14/2018) with no additional updates (I asked patient and no additional family history provided). Family History   Problem Relation Age of Onset   [address] [facility] [address] Arthritis-rheumatoid Mother     Dementia Mother     Coronary Artery Disease Father     Cancer Brother      lung cancer       REVIEW OF SYSTEMS: (reviewed/updated 9/14/2018)  Appetite:improved   Sleep: improved   All other Review of Systems: General ROS: negative         MENTAL STATUS EXAM & VITALS     MENTAL STATUS EXAM (MSE):    MSE FINDINGS ARE WITHIN NORMAL LIMITS (WNL) UNLESS OTHERWISE STATED BELOW. ( ALL OF THE BELOW CATEGORIES OF THE MSE HAVE BEEN REVIEWED (reviewed 9/14/2018) AND UPDATED AS DEEMED APPROPRIATE )  General Presentation age appropriate and older than stated age, cooperative   Orientation not oriented to situation   Vital Signs  See below (reviewed 9/14/2018); Vital Signs (BP, Pulse, & Temp) are within normal limits if not listed below.    Gait and Station Stable/steady, no ataxia   Musculoskeletal System No extrapyramidal symptoms (EPS); no abnormal muscular movements or Tardive Dyskinesia (TD); muscle strength and tone are within normal limits   Language No aphasia or dysarthria   Speech:  soft   Thought Processes illogical; slow rate of thoughts; poor abstract reasoning/computation   Thought Associations loose associations   Thought Content free of delusions   Suicidal Ideations no plan    Homicidal Ideations no plan    Mood:  euthymic   Affect:  euthymic   Memory recent  impaired   Memory remote:  impaired   Concentration/Attention:  distractable   Fund of Knowledge below average   Insight:  limited   Reliability limited   Judgment:  limited          VITALS:     Patient Vitals for the past 24 hrs:   Temp Pulse Resp BP SpO2   09/14/18 1544 97.9 °F (36.6 °C) 98 18 150/90 93 %   09/14/18 0813 98 °F (36.7 °C) 73 18 127/85 95 %   09/13/18 2039 97.7 °F (36.5 °C) 98 16 130/79 -     Wt Readings from Last 3 Encounters:   09/02/18 64.5 kg (142 lb 3.2 oz)   08/21/18 76.1 kg (167 lb 12.8 oz)   08/13/18 77.6 kg (171 lb)     Temp Readings from Last 3 Encounters:   09/14/18 97.9 °F (36.6 °C)   09/06/18 98.9 °F (37.2 °C)   09/05/18 98.1 °F (36.7 °C)     BP Readings from Last 3 Encounters:   09/14/18 150/90   09/06/18 146/77   09/05/18 103/63     Pulse Readings from Last 3 Encounters:   09/14/18 98   09/06/18 (!) 105   09/05/18 68            DATA     LABORATORY DATA:(reviewed/updated 9/14/2018)  Recent Results (from the past 24 hour(s))   GLUCOSE, POC    Collection Time: 09/14/18  7:53 AM   Result Value Ref Range    Glucose (POC) 103 (H) 65 - 100 mg/dL    Performed by [address] [place] [address], POC    Collection Time: 09/14/18  4:49 PM   Result Value Ref Range    Glucose (POC) 114 (H) 65 - 100 mg/dL    Performed by Miguel A Friend      No results found for: VALF2, VALAC, VALP, VALPR, DS6, CRBAM, CRBAMP, CARB2, XCRBAM  No results found for: LITHM   RADIOLOGY REPORTS:(reviewed/updated 9/14/2018)  Xr Pelv Ap Only    Addendum Date: 8/14/2018    Addendum: No fracture. Result Date: 8/14/2018  Clinical history: Fall yesterday FINDINGS: Single frontal view of the pelvis is obtained. There is no fracture or dislocation identified. There is no significant soft tissue abnormality. IMPRESSION: No acute fracture. Xr Forearm Rt Ap/lat    Result Date: 8/13/2018  EXAM:  XR FOREARM RT AP/LAT Clinical history: Distal radial fracture. INDICATION:   fall. COMPARISON: None. FINDINGS: Two views of the right radius and ulna demonstrate distal radial fracture. Extensive degenerative change at the radiocarpal and carpometacarpal rows. .     IMPRESSION:  Distal radial fracture with minimal displacement. Severe degenerative change at the wrist..     Xr Wrist Rt Ap/lat    Result Date: 8/27/2018  EXAM: XR WRIST RT AP/LAT INDICATION:  R wrist fracture follow up, was not casted and pt not leaving splint on. COMPARISON: 8/14/2018. FINDINGS: Two  views of the right wrist demonstrate no appreciable change in the T-shaped interarticular distal radial fracture and ulnar styloid fracture in fiberglass. There is chondrocalcinosis of the radiocarpal joint and TFC. Lateral carpal arthritis is noted. Osteopenia is present. IMPRESSION:  No significant change in the right T-shaped interarticular distal radial and ulna styloid fractures in the splint. Xr Wrist Rt Ap/lat/obl Min 3v    Result Date: 8/14/2018  EXAM: XR WRIST RT AP/LAT/OBL MIN 3V Clinical history: Fall, broken wrist INDICATION:  fall, broken wrist. COMPARISON: None. FINDINGS: Three  views of the right wrist demonstrate postreduction images distal radial fracture. .  Soft tissue edema. .     IMPRESSION:  Postreduction images distal radial fracture. .     Xr Wrist Rt Ap/lat/obl Min 3v    Result Date: 8/13/2018  EXAM: XR WRIST RT AP/LAT/OBL MIN 3V HISTORY: Fall, fell in bathroom, right arm INDICATION:  fall. COMPARISON: None. FINDINGS: Three  views of the right wrist demonstrate nondisplaced distal radius fracture. Severe degenerative change of the radiocarpal and carpometacarpal rows. No ulnar fracture. .  The soft tissues are within normal limits. IMPRESSION:  Nondisplaced fracture of the distal radius. 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     Ct Head Wo Cont    Result Date: 8/23/2018  EXAM:  CT head without contrast INDICATION: Altered mental status COMPARISON: CT head 8/14/2018 TECHNIQUE: Axial noncontrast head CT from foramen magnum to vertex. Coronal and sagittal reformatted images were obtained. CT dose reduction was achieved through use of a standardized protocol tailored for this examination and automatic exposure control for dose modulation. Adaptive statistical iterative reconstruction (ASIR) was utilized. FINDINGS:  There is diffuse age-related parenchymal volume loss. The ventricles and sulci are age-appropriate without hydrocephalus.  There is no mass effect or midline shift. There is no intracranial hemorrhage or extra-axial fluid collection. Scattered foci of low attenuation in the periventricular white matter represent stable chronic microvascular ischemic changes. There is no new abnormal parenchymal attenuation. The gray-white matter differentiation is maintained. The basal cisterns are patent. 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 The osseous structures are intact. The visualized paranasal sinuses and mastoid air cells are clear. IMPRESSION: There is no acute intracranial abnormality. Ct Head Wo Cont    Result Date: 8/14/2018  EXAM:  CT HEAD WO CONT Clinical history: Fall, fractured wrist, increased pain INDICATION:   fall COMPARISON: 7/20/2018. CONTRAST:  None. TECHNIQUE: Unenhanced CT of the head was performed using 5 mm images. Brain and bone windows were generated. CT dose reduction was achieved through use of a standardized protocol tailored for this examination and automatic exposure control for dose modulation. FINDINGS: The ventricles and sulci are normal in size, shape and configuration and midline. Mild scattered hypodensities in the cerebral white matter. There is no intracranial hemorrhage, extra-axial collection, mass, mass effect or midline shift. The basilar cisterns are open. No acute infarct is identified. The bone windows demonstrate no abnormalities. The visualized portions of the paranasal sinuses and mastoid air cells are clear. IMPRESSION: No acute cranial process     Ct Head Wo Cont    Result Date: 7/20/2018  EXAM:  CT HEAD WO CONT INDICATION: Fall going to the bathroom and had a ground level fall and hit head on the floor. COMPARISON: MRI brain 11/12/2012. Sherine Munguia CONTRAST:  None. TECHNIQUE: Unenhanced CT of the head was performed using 5 mm images. Brain and bone windows were generated.   CT dose reduction was achieved through use of a standardized protocol tailored for this examination and automatic exposure control for dose modulation. FINDINGS: There is a stable 9 x 12 mm partially calcified extra-axial lesion in the left posterior fossa compatible with meningioma. There is no acute intracranial hemorrhage, other mass, mass effect or herniation. Ventricles and sulci show stable, proportionate, and symmetric pattern. There is a stable pattern of periventricular white matter hypodensity. The gray-white matter differentiation is well-preserved. Atherosclerotic calcifications are seen within the carotid siphons and vertebral arteries. The mastoid air cells are well pneumatized. The visualized paranasal sinuses are normal.     IMPRESSION: No acute intracranial hemorrhage or infarct. Stable left posterior fossa meningioma and chronic white matter change most compatible with small vessel ischemic and/or senescent change. Intracranial atherosclerosis. Ct Abd Pelv Wo Cont    Result Date: 6/20/2018  EXAM:  CT ABD PELV WO CONT INDICATION: abd pain  2 days of abdominal pain COMPARISON: 2013 CONTRAST:  None. TECHNIQUE: Thin axial images were obtained through the abdomen and pelvis. Coronal and sagittal reconstructions were generated. Oral contrast was not administered. CT dose reduction was achieved through use of a standardized protocol tailored for this examination and automatic exposure control for dose modulation. The absence of intravenous contrast material reduces the sensitivity for evaluation of the solid parenchymal organs of the abdomen. FINDINGS: LUNG BASES: Clear. INCIDENTALLY IMAGED HEART AND MEDIASTINUM: Unremarkable. LIVER: No mass or biliary dilatation. GALLBLADDER: Surgically removed. SPLEEN: No mass. PANCREAS: No mass or ductal dilatation. ADRENALS: Unremarkable. KIDNEYS/URETERS: Malrotated right kidney. Hyperdense right renal cyst. Right nephrolithiasis. STOMACH: Hiatal hernia. SMALL BOWEL: No dilatation or wall thickening. COLON: No dilatation or wall thickening. Moderate colonic stool. APPENDIX: Surgically removed PERITONEUM: No ascites or pneumoperitoneum. RETROPERITONEUM: No lymphadenopathy or aortic aneurysm. REPRODUCTIVE ORGANS: Surgically removed. URINARY BLADDER: No mass or calculus. BONES: No destructive bone lesion. Multilevel degenerative changes. ADDITIONAL COMMENTS: N/A     IMPRESSION: No acute findings. Xr Chest Port    Result Date: 6/20/2018  EXAM:  XR CHEST PORT INDICATION:  Chest Pain COMPARISON:  May 24 FINDINGS: A portable AP radiograph of the chest was obtained at 0555 hours. There is a left shoulder replacement in place. The patient is on a cardiac monitor. The lungs are clear. The cardiac and mediastinal contours and pulmonary vascularity are normal.  The bones and soft tissues are grossly within normal limits. IMPRESSION: No acute findings.           MEDICATIONS     ALL MEDICATIONS:   Current Facility-Administered Medications   Medication Dose Route Frequency    dilTIAZem CD (CARDIZEM CD) capsule 240 mg  240 mg Oral DAILY    magnesium hydroxide (MILK OF MAGNESIA) 400 mg/5 mL oral suspension 30 mL  30 mL Oral DAILY PRN    citalopram (CELEXA) tablet 20 mg  20 mg Oral QHS    docusate sodium (COLACE) capsule 100 mg  100 mg Oral DAILY    donepezil (ARICEPT) tablet 10 mg  10 mg Oral DAILY    polyethylene glycol (MIRALAX) packet 17 g  17 g Oral DAILY PRN    risperiDONE (RisperDAL) tablet 0.25 mg  0.25 mg Oral BID    saline peripheral flush soln 5 mL  5 mL InterCATHeter PRN    traZODone (DESYREL) tablet 25 mg  25 mg Oral QHS PRN    OLANZapine (ZyPREXA) tablet 2.5 mg  2.5 mg Oral Q6H PRN    ziprasidone (GEODON) 10 mg in sterile water (preservative free) 0.5 mL injection  10 mg IntraMUSCular BID PRN    benztropine (COGENTIN) tablet 1 mg  1 mg Oral BID PRN    benztropine (COGENTIN) injection 1 mg  1 mg IntraMUSCular BID PRN    zolpidem (AMBIEN) tablet 5 mg  5 mg Oral QHS PRN    acetaminophen (TYLENOL) tablet 650 mg  650 mg Oral Q4H PRN    ibuprofen (MOTRIN) tablet 400 mg  400 mg Oral Q8H PRN      SCHEDULED MEDICATIONS:   Current Facility-Administered Medications   Medication Dose Route Frequency    dilTIAZem CD (CARDIZEM CD) capsule 240 mg  240 mg Oral DAILY    citalopram (CELEXA) tablet 20 mg  20 mg Oral QHS    docusate sodium (COLACE) capsule 100 mg  100 mg Oral DAILY    donepezil (ARICEPT) tablet 10 mg  10 mg Oral DAILY    risperiDONE (RisperDAL) tablet 0.25 mg  0.25 mg Oral BID          ASSESSMENT & PLAN     DIAGNOSES REQUIRING ACTIVE TREATMENT AND MONITORING: (reviewed/updated 9/14/2018)  Patient Active Hospital Problem List:    The patient, Angelia Marin, is a 80 y.o.  female who presents at this time for treatment of the following diagnoses:  Patient Active Hospital Problem List:   Late onset Alzheimer's disease with behavioral disturbance (8/24/2018)    Assessment: severe, advanced    Plan: Agree with inpatient hospitalization for further stabilization, safety monitoring and medication management  Medications- Aricept and risperdal         In summary, Tray Regan, is a 80 y.o.  female who presents with a severe exacerbation of the principal diagnosis of Late onset Alzheimer's disease with behavioral disturbance  Patient's condition is worsening/not improving/not stable improving. Patient requires continued inpatient hospitalization for further stabilization, safety monitoring and medication management. I will continue to coordinate the provision of individual, milieu, occupational, group, and substance abuse therapies to address target symptoms/diagnoses as deemed appropriate for the individual patient. A coordinated, multidisplinary treatment team round was conducted with the patient (this team consists of the nurse, psychiatric unit pharmcist,  and writer).      Complete current electronic health record for patient has been reviewed today including consultant notes, ancillary staff notes, nurses and psychiatric tech notes.    Suicide risk assessment completed and patient deemed to be of low risk for suicide at this time. The following regarding medications was addressed during rounds with patient:   the risks and benefits of the proposed medication. The patient was given the opportunity to ask questions. Informed consent given to the use of the above medications. Will continue to adjust psychiatric and non-psychiatric medications (see above \"medication\" section and orders section for details) as deemed appropriate & based upon diagnoses and response to treatment. I will continue to order blood tests/labs and diagnostic tests as deemed appropriate and review results as they become available (see orders for details and above listed lab/test results). I will order psychiatric records from previous [facility] hospitals to further elucidate the nature of patient's psychopathology and review once available. I will gather additional collateral information from friends, family and o/p treatment team to further elucidate the nature of patient's psychopathology and baselline level of psychiatric functioning. I certify that this patient's inpatient psychiatric hospital services furnished since the previous certification were, and continue to be, required for treatment that could reasonably be expected to improve the patient's condition, or for diagnostic study, and that the patient continues to need, on a daily basis, active treatment furnished directly by or requiring the supervision of inpatient psychiatric facility personnel. In addition, the hospital records show that services furnished were intensive treatment services, admission or related services, or equivalent services.     EXPECTED DISCHARGE DATE/DAY: TBD     DISPOSITION: Home       Signed By:   Lukas Brewer MD  9/14/2018

## 2018-09-14 NOTE — BH NOTES
PSYCHIATRIC PROGRESS NOTE         Patient Name  Benedicto Rodriguez   Date of Birth 5/24/1933   [facility] [id number]   Medical Record Number  [id number]      Age  80 y.o. PCP Cristi Thomas MD   Admit date:  9/6/2018    Room Number  746/01  @ [facility]   Date of Service  9/13/18          PSYCHOTHERAPY SESSION NOTE:  Length of psychotherapy session: 20 minutes    Main condition/diagnosis/issues treated during session today, 9/14/2018 : I employed Cognitive Behavioral therapy techniques, Reality-Oriented psychotherapy, as well as supportive psychotherapy in regards to various ongoing psychosocial stressors, including the following: pre-admission and current problems; housing issues;   medical issues; and stress of hospitalization. Interpersonal relationship issues and psychodynamic conflicts explored. Attempts made to alleviate maladaptive patterns. Overall, patient is not progressing    Treatment Plan Update (reviewed an updated 9/14/2018) : I will modify psychotherapy tx plan by implementing more stress management strategies, building upon cognitive behavioral techniques, increasing coping skills, as well as shoring up psychological defenses). An extended energy and skill set was needed to engage pt in psychotherapy due to some of the following: resistiveness, complexity, negativity, confrontational nature, hostile behaviors, and/or severe abnormalities in thought processes/psychosis resulting in the loss of expressive/receptive language communication skills. E & M PROGRESS NOTE:         HISTORY         HISTORY OF PRESENT ILLNESS/INTERVAL HISTORY:  (reviewed/updated 9/14/2018). per initial evaluation: The patient, Tray Jernigan, is a 80 y.o.   WHITE [place]  female with a past psychiatric history significant for Dementia, MDD who was treated on [facility] for several weeks, until she had a syncopal episode on 9/5 and transferred to the medical floor for observation for 24 hours. She now returns medically stable. Patient's mood and presesentation hs improved compared to original presentation on admission. She remains very confused secondary to advanced dementia. No agitation or aggression noted. Compliant with her medications. Awaiting placement vs. Return home. 9/8/18. \"Am I going home today\". No complaints. Seems calmer. 9/9/18. No complaints. No agitation. Slept ok  9/10/18- Funny and engaging this morning. She enjoyed singing in group this morning. Sleeping well at night. 9/11/18- Patient remains stable. 9/12/18- No complaints, no acute behviors. Bright and euthymic affect. Eating meals, sleeping well. 9/13- patient remains stable      SIDE EFFECTS: (reviewed/updated 9/14/2018)  None reported or admitted to. ALLERGIES:(reviewed/updated 9/14/2018)  Allergies   Allergen Reactions    Angiotensin Ii,Human Other (comments)     States does not remember      Avelox [Moxifloxacin] Other (comments)     States does not remember      Demerol [Meperidine] Hives      MEDICATIONS PRIOR TO ADMISSION:(reviewed/updated 9/14/2018)  Prescriptions Prior to Admission   Medication Sig    traZODone (DESYREL) 50 mg tablet Take 0.5 Tabs by mouth nightly for 30 days. Indications: insomnia associated with depression    risperiDONE (RISPERDAL) 0.25 mg tablet Take 1 Tab by mouth two (2) times a day for 30 days.  magnesium hydroxide (MILK OF MAGNESIA) 400 mg/5 mL suspension Take 30 mL by mouth daily for 30 days.  donepezil (ARICEPT) 10 mg tablet Take 1 Tab by mouth daily for 30 days.  acetaminophen (TYLENOL) 325 mg tablet Take 2 Tabs by mouth every six (6) hours as needed for Pain.  docusate sodium (COLACE) 100 mg capsule Take 1 Cap by mouth daily for 90 days.  dilTIAZem CD (CARTIA XT) 240 mg ER capsule Take 240 mg by mouth daily.  polyethylene glycol (MIRALAX) 17 gram packet Take 17 g by mouth as needed (Constipation).     citalopram (CELEXA) 20 mg tablet TAKE 1 TABLET BY MOUTH DAILY      PAST MEDICAL HISTORY: Past medical history from the initial psychiatric evaluation has been reviewed (reviewed/updated 9/14/2018) with no additional updates (I asked patient and no additional past medical history provided). Past Medical History:   Diagnosis Date    Anemia     Atrial fibrillation ([facility] [address]     Cancer (HCC)     basal cell on nose    Chronic pain     back pain    Fibromyalgia 4/1/2010    GERD (gastroesophageal reflux disease) 4/1/2010    Hiatal hernia 4/1/2010    High cholesterol 4/1/2010    HTN (hypertension) 4/1/2010    Ill-defined condition     A. Fib    OA (osteoarthritis) 4/1/2010    back    Pulmonary emboli ([facility] [address] 2015     Past Surgical History:   Procedure Laterality Date    BREAST SURGERY PROCEDURE UNLISTED      Breast im-plants x 2    ENLARGE BREAST WITH IMPLANT      HX APPENDECTOMY      HX BREAST BIOPSY      HX BREAST RECONSTRUCTION      Breast implants removed 1992    HX CATARACT REMOVAL      HX CHOLECYSTECTOMY  9/11/2013    HX HYSTERECTOMY      HX KNEE REPLACEMENT  2008    bilateral    HX ORTHOPAEDIC  2007    Bilateral TKR    HX PARTIAL HYSTERECTOMY      HX SHOULDER REPLACEMENT  2009    left    HX TONSILLECTOMY        SOCIAL HISTORY: Social history from the initial psychiatric evaluation has been reviewed (reviewed/updated 9/14/2018) with no additional updates (I asked patient and no additional social history provided). Social History     Social History    Marital status:      Spouse name: N/A    Number of children: N/A    Years of education: N/A     Occupational History    Not on file.      Social History Main Topics    Smoking status: Never Smoker    Smokeless tobacco: Never Used    Alcohol use No    Drug use: No    Sexual activity: No     Other Topics Concern    Not on file     Social History Narrative      FAMILY HISTORY: Family history from the initial psychiatric evaluation has been reviewed (reviewed/updated 9/14/2018) with no additional updates (I asked patient and no additional family history provided). Family History   Problem Relation Age of Onset   [facility] Arthritis-rheumatoid Mother     Dementia Mother     Coronary Artery Disease Father     Cancer Brother      lung cancer       REVIEW OF SYSTEMS: (reviewed/updated 9/14/2018)  Appetite:improved   Sleep: improved   All other Review of Systems: General ROS: negative         MENTAL STATUS EXAM & VITALS     MENTAL STATUS EXAM (MSE):    MSE FINDINGS ARE WITHIN NORMAL LIMITS (WNL) UNLESS OTHERWISE STATED BELOW. ( ALL OF THE BELOW CATEGORIES OF THE MSE HAVE BEEN REVIEWED (reviewed 9/14/2018) AND UPDATED AS DEEMED APPROPRIATE )  General Presentation age appropriate and older than stated age, cooperative   Orientation not oriented to situation   Vital Signs  See below (reviewed 9/14/2018); Vital Signs (BP, Pulse, & Temp) are within normal limits if not listed below.    Gait and Station Stable/steady, no ataxia   Musculoskeletal System No extrapyramidal symptoms (EPS); no abnormal muscular movements or Tardive Dyskinesia (TD); muscle strength and tone are within normal limits   Language No aphasia or dysarthria   Speech:  soft   Thought Processes illogical; slow rate of thoughts; poor abstract reasoning/computation   Thought Associations loose associations   Thought Content free of delusions   Suicidal Ideations no plan    Homicidal Ideations no plan    Mood:  euthymic   Affect:  euthymic   Memory recent  impaired   Memory remote:  impaired   Concentration/Attention:  distractable   Fund of Knowledge below average   Insight:  limited   Reliability limited   Judgment:  limited          VITALS:     Patient Vitals for the past 24 hrs:   Temp Pulse Resp BP SpO2   09/14/18 1544 97.9 °F (36.6 °C) 98 18 150/90 93 %   09/14/18 0813 98 °F (36.7 °C) 73 18 127/85 95 %   09/13/18 2039 97.7 °F (36.5 °C) 98 16 130/79 -     Wt Readings from Last 3 Encounters:   09/02/18 64.5 kg (142 lb 3.2 oz)   08/21/18 76.1 kg (167 lb 12.8 oz)   08/13/18 77.6 kg (171 lb)     Temp Readings from Last 3 Encounters:   09/14/18 97.9 °F (36.6 °C)   09/06/18 98.9 °F (37.2 °C)   09/05/18 98.1 °F (36.7 °C)     BP Readings from Last 3 Encounters:   09/14/18 150/90   09/06/18 146/77   09/05/18 103/63     Pulse Readings from Last 3 Encounters:   09/14/18 98   09/06/18 (!) 105   09/05/18 68            DATA     LABORATORY DATA:(reviewed/updated 9/14/2018)  Recent Results (from the past 24 hour(s))   GLUCOSE, POC    Collection Time: 09/14/18  7:53 AM   Result Value Ref Range    Glucose (POC) 103 (H) 65 - 100 mg/dL    Performed by [address] [place] [address], POC    Collection Time: 09/14/18  4:49 PM   Result Value Ref Range    Glucose (POC) 114 (H) 65 - 100 mg/dL    Performed by Barb Arias      No results found for: VALF2, VALAC, VALP, VALPR, DS6, CRBAM, CRBAMP, CARB2, XCRBAM  No results found for: LITHM   RADIOLOGY REPORTS:(reviewed/updated 9/14/2018)  Xr Pelv Ap Only    Addendum Date: 8/14/2018    Addendum: No fracture. Result Date: 8/14/2018  Clinical history: Fall yesterday FINDINGS: Single frontal view of the pelvis is obtained. There is no fracture or dislocation identified. There is no significant soft tissue abnormality. IMPRESSION: No acute fracture. Xr Forearm Rt Ap/lat    Result Date: 8/13/2018  EXAM:  XR FOREARM RT AP/LAT Clinical history: Distal radial fracture. INDICATION:   fall. COMPARISON: None. FINDINGS: Two views of the right radius and ulna demonstrate distal radial fracture. Extensive degenerative change at the radiocarpal and carpometacarpal rows. .     IMPRESSION:  Distal radial fracture with minimal displacement. Severe degenerative change at the wrist..     Xr Wrist Rt Ap/lat    Result Date: 8/27/2018  EXAM: XR WRIST RT AP/LAT INDICATION:  R wrist fracture follow up, was not casted and pt not leaving splint on. COMPARISON: 8/14/2018.  FINDINGS: Two  views of the right wrist demonstrate no appreciable change in the T-shaped interarticular distal radial fracture and ulnar styloid fracture in fiberglass. There is chondrocalcinosis of the radiocarpal joint and TFC. Lateral carpal arthritis is noted. Osteopenia is present. IMPRESSION:  No significant change in the right T-shaped interarticular distal radial and ulna styloid fractures in the splint. Xr Wrist Rt Ap/lat/obl Min 3v    Result Date: 8/14/2018  EXAM: XR WRIST RT AP/LAT/OBL MIN 3V Clinical history: Fall, broken wrist INDICATION:  fall, broken wrist. COMPARISON: None. FINDINGS: Three  views of the right wrist demonstrate postreduction images distal radial fracture. .  Soft tissue edema. .     IMPRESSION:  Postreduction images distal radial fracture. .     Xr Wrist Rt Ap/lat/obl Min 3v    Result Date: 8/13/2018  EXAM: XR WRIST RT AP/LAT/OBL MIN 3V HISTORY: Fall, fell in bathroom, right arm INDICATION:  fall. COMPARISON: None. FINDINGS: Three  views of the right wrist demonstrate nondisplaced distal radius fracture. Severe degenerative change of the radiocarpal and carpometacarpal rows. No ulnar fracture. .  The soft tissues are within normal limits. IMPRESSION:  Nondisplaced fracture of the distal radius. 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     Ct Head Wo Cont    Result Date: 8/23/2018  EXAM:  CT head without contrast INDICATION: Altered mental status COMPARISON: CT head 8/14/2018 TECHNIQUE: Axial noncontrast head CT from foramen magnum to vertex. Coronal and sagittal reformatted images were obtained. CT dose reduction was achieved through use of a standardized protocol tailored for this examination and automatic exposure control for dose modulation. Adaptive statistical iterative reconstruction (ASIR) was utilized. FINDINGS:  There is diffuse age-related parenchymal volume loss. The ventricles and sulci are age-appropriate without hydrocephalus. There is no mass effect or midline shift.  There is no intracranial hemorrhage or extra-axial fluid collection. Scattered foci of low attenuation in the periventricular white matter represent stable chronic microvascular ischemic changes. There is no new abnormal parenchymal attenuation. The gray-white matter differentiation is maintained. The basal cisterns are patent. 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 The osseous structures are intact. The visualized paranasal sinuses and mastoid air cells are clear. IMPRESSION: There is no acute intracranial abnormality. Ct Head Wo Cont    Result Date: 8/14/2018  EXAM:  CT HEAD WO CONT Clinical history: Fall, fractured wrist, increased pain INDICATION:   fall COMPARISON: 7/20/2018. CONTRAST:  None. TECHNIQUE: Unenhanced CT of the head was performed using 5 mm images. Brain and bone windows were generated. CT dose reduction was achieved through use of a standardized protocol tailored for this examination and automatic exposure control for dose modulation. FINDINGS: The ventricles and sulci are normal in size, shape and configuration and midline. Mild scattered hypodensities in the cerebral white matter. There is no intracranial hemorrhage, extra-axial collection, mass, mass effect or midline shift. The basilar cisterns are open. No acute infarct is identified. The bone windows demonstrate no abnormalities. The visualized portions of the paranasal sinuses and mastoid air cells are clear. IMPRESSION: No acute cranial process     Ct Head Wo Cont    Result Date: 7/20/2018  EXAM:  CT HEAD WO CONT INDICATION: Fall going to the bathroom and had a ground level fall and hit head on the floor. COMPARISON: MRI brain 11/12/2012. Louie Self CONTRAST:  None. TECHNIQUE: Unenhanced CT of the head was performed using 5 mm images. Brain and bone windows were generated. CT dose reduction was achieved through use of a standardized protocol tailored for this examination and automatic exposure control for dose modulation. FINDINGS: There is a stable 9 x 12 mm partially calcified extra-axial lesion in the left posterior fossa compatible with meningioma. There is no acute intracranial hemorrhage, other mass, mass effect or herniation. Ventricles and sulci show stable, proportionate, and symmetric pattern. There is a stable pattern of periventricular white matter hypodensity. The gray-white matter differentiation is well-preserved. Atherosclerotic calcifications are seen within the carotid siphons and vertebral arteries. The mastoid air cells are well pneumatized. The visualized paranasal sinuses are normal.     IMPRESSION: No acute intracranial hemorrhage or infarct. Stable left posterior fossa meningioma and chronic white matter change most compatible with small vessel ischemic and/or senescent change. Intracranial atherosclerosis. Ct Abd Pelv Wo Cont    Result Date: 6/20/2018  EXAM:  CT ABD PELV WO CONT INDICATION: abd pain  2 days of abdominal pain COMPARISON: 2013 CONTRAST:  None. TECHNIQUE: Thin axial images were obtained through the abdomen and pelvis. Coronal and sagittal reconstructions were generated. Oral contrast was not administered. CT dose reduction was achieved through use of a standardized protocol tailored for this examination and automatic exposure control for dose modulation. The absence of intravenous contrast material reduces the sensitivity for evaluation of the solid parenchymal organs of the abdomen. FINDINGS: LUNG BASES: Clear. INCIDENTALLY IMAGED HEART AND MEDIASTINUM: Unremarkable. LIVER: No mass or biliary dilatation. GALLBLADDER: Surgically removed. SPLEEN: No mass. PANCREAS: No mass or ductal dilatation. ADRENALS: Unremarkable. KIDNEYS/URETERS: Malrotated right kidney. Hyperdense right renal cyst. Right nephrolithiasis. STOMACH: Hiatal hernia. SMALL BOWEL: No dilatation or wall thickening. COLON: No dilatation or wall thickening. Moderate colonic stool.  APPENDIX: Surgically removed PERITONEUM: No ascites or pneumoperitoneum. RETROPERITONEUM: No lymphadenopathy or aortic aneurysm. REPRODUCTIVE ORGANS: Surgically removed. URINARY BLADDER: No mass or calculus. BONES: No destructive bone lesion. Multilevel degenerative changes. ADDITIONAL COMMENTS: N/A     IMPRESSION: No acute findings. Xr Chest Port    Result Date: 6/20/2018  EXAM:  XR CHEST PORT INDICATION:  Chest Pain COMPARISON:  May 24 FINDINGS: A portable AP radiograph of the chest was obtained at 0555 hours. There is a left shoulder replacement in place. The patient is on a cardiac monitor. The lungs are clear. The cardiac and mediastinal contours and pulmonary vascularity are normal.  The bones and soft tissues are grossly within normal limits. IMPRESSION: No acute findings.           MEDICATIONS     ALL MEDICATIONS:   Current Facility-Administered Medications   Medication Dose Route Frequency    dilTIAZem CD (CARDIZEM CD) capsule 240 mg  240 mg Oral DAILY    magnesium hydroxide (MILK OF MAGNESIA) 400 mg/5 mL oral suspension 30 mL  30 mL Oral DAILY PRN    citalopram (CELEXA) tablet 20 mg  20 mg Oral QHS    docusate sodium (COLACE) capsule 100 mg  100 mg Oral DAILY    donepezil (ARICEPT) tablet 10 mg  10 mg Oral DAILY    polyethylene glycol (MIRALAX) packet 17 g  17 g Oral DAILY PRN    risperiDONE (RisperDAL) tablet 0.25 mg  0.25 mg Oral BID    saline peripheral flush soln 5 mL  5 mL InterCATHeter PRN    traZODone (DESYREL) tablet 25 mg  25 mg Oral QHS PRN    OLANZapine (ZyPREXA) tablet 2.5 mg  2.5 mg Oral Q6H PRN    ziprasidone (GEODON) 10 mg in sterile water (preservative free) 0.5 mL injection  10 mg IntraMUSCular BID PRN    benztropine (COGENTIN) tablet 1 mg  1 mg Oral BID PRN    benztropine (COGENTIN) injection 1 mg  1 mg IntraMUSCular BID PRN    zolpidem (AMBIEN) tablet 5 mg  5 mg Oral QHS PRN    acetaminophen (TYLENOL) tablet 650 mg  650 mg Oral Q4H PRN    ibuprofen (MOTRIN) tablet 400 mg  400 mg Oral Q8H PRN      SCHEDULED MEDICATIONS:   Current Facility-Administered Medications   Medication Dose Route Frequency    dilTIAZem CD (CARDIZEM CD) capsule 240 mg  240 mg Oral DAILY    citalopram (CELEXA) tablet 20 mg  20 mg Oral QHS    docusate sodium (COLACE) capsule 100 mg  100 mg Oral DAILY    donepezil (ARICEPT) tablet 10 mg  10 mg Oral DAILY    risperiDONE (RisperDAL) tablet 0.25 mg  0.25 mg Oral BID          ASSESSMENT & PLAN     DIAGNOSES REQUIRING ACTIVE TREATMENT AND MONITORING: (reviewed/updated 9/14/2018)  Patient Active Hospital Problem List:    The patient, Ronit Dean, is a 80 y.o.  female who presents at this time for treatment of the following diagnoses:  Patient Active Hospital Problem List:   Late onset Alzheimer's disease with behavioral disturbance (8/24/2018)    Assessment: severe, advanced    Plan: Agree with inpatient hospitalization for further stabilization, safety monitoring and medication management  Medications- Aricept and risperdal         In summary, Tray Regan, is a 80 y.o.  female who presents with a severe exacerbation of the principal diagnosis of Late onset Alzheimer's disease with behavioral disturbance  Patient's condition is worsening/not improving/not stable improving. Patient requires continued inpatient hospitalization for further stabilization, safety monitoring and medication management. I will continue to coordinate the provision of individual, milieu, occupational, group, and substance abuse therapies to address target symptoms/diagnoses as deemed appropriate for the individual patient. A coordinated, multidisplinary treatment team round was conducted with the patient (this team consists of the nurse, psychiatric unit pharmcist,  and writer). Complete current electronic health record for patient has been reviewed today including consultant notes, ancillary staff notes, nurses and psychiatric tech notes.     Suicide risk assessment completed and patient deemed to be of low risk for suicide at this time. The following regarding medications was addressed during rounds with patient:   the risks and benefits of the proposed medication. The patient was given the opportunity to ask questions. Informed consent given to the use of the above medications. Will continue to adjust psychiatric and non-psychiatric medications (see above \"medication\" section and orders section for details) as deemed appropriate & based upon diagnoses and response to treatment. I will continue to order blood tests/labs and diagnostic tests as deemed appropriate and review results as they become available (see orders for details and above listed lab/test results). I will order psychiatric records from previous [facility] to further elucidate the nature of patient's psychopathology and review once available. I will gather additional collateral information from friends, family and o/p treatment team to further elucidate the nature of patient's psychopathology and baselline level of psychiatric functioning. I certify that this patient's inpatient psychiatric hospital services furnished since the previous certification were, and continue to be, required for treatment that could reasonably be expected to improve the patient's condition, or for diagnostic study, and that the patient continues to need, on a daily basis, active treatment furnished directly by or requiring the supervision of inpatient psychiatric facility personnel. In addition, the hospital records show that services furnished were intensive treatment services, admission or related services, or equivalent services.     EXPECTED DISCHARGE DATE/DAY: TBD     DISPOSITION: Home       Signed By:   Ivana Lr MD  9/14/2018

## 2018-09-15 LAB
GLUCOSE BLD STRIP.AUTO-MCNC: 114 MG/DL (ref 65–100)
GLUCOSE BLD STRIP.AUTO-MCNC: 96 MG/DL (ref 65–100)
SERVICE CMNT-IMP: ABNORMAL
SERVICE CMNT-IMP: NORMAL

## 2018-09-15 PROCEDURE — 74011250637 HC RX REV CODE- 250/637: Performed by: INTERNAL MEDICINE

## 2018-09-15 PROCEDURE — 65220000003 HC RM SEMIPRIVATE PSYCH

## 2018-09-15 PROCEDURE — 82962 GLUCOSE BLOOD TEST: CPT

## 2018-09-15 PROCEDURE — 74011250637 HC RX REV CODE- 250/637: Performed by: PSYCHIATRY & NEUROLOGY

## 2018-09-15 RX ADMIN — DONEPEZIL HYDROCHLORIDE 10 MG: 10 TABLET, FILM COATED ORAL at 09:10

## 2018-09-15 RX ADMIN — DILTIAZEM HYDROCHLORIDE 240 MG: 240 CAPSULE, COATED, EXTENDED RELEASE ORAL at 09:10

## 2018-09-15 RX ADMIN — CITALOPRAM HYDROBROMIDE 20 MG: 20 TABLET ORAL at 21:18

## 2018-09-15 RX ADMIN — RISPERIDONE 0.25 MG: 0.5 TABLET, FILM COATED ORAL at 09:10

## 2018-09-15 RX ADMIN — DOCUSATE SODIUM 100 MG: 100 CAPSULE, LIQUID FILLED ORAL at 09:09

## 2018-09-15 RX ADMIN — RISPERIDONE 0.25 MG: 0.5 TABLET, FILM COATED ORAL at 17:18

## 2018-09-15 NOTE — PROGRESS NOTES
Problem: Altered Thought Process (Adult/Pediatric)  Goal: *STG: Participates in treatment plan  Outcome: Progressing Towards Goal  Pt is calm cooperative and appropriate with staff and peers   Pleasantly confused  Med/meal complaint   NAD noted   Will continue to monitor.

## 2018-09-15 NOTE — BH NOTES
Behavioral Health Interdisciplinary Rounds     Patient Name: Hali Landry  Age: 80 y.o. Room/Bed:  746/  Primary Diagnosis: Late onset Alzheimer's disease with behavioral disturbance   Admission Status: Involuntary Commitment     Readmission within 30 days: no  Power of  in place: yes  Patient requires a blocked bed: no          Reason for blocked bed: n/a    VTE Prophylaxis: No    Mobility needs/Fall risk: yes  Flu Vaccine : no   Nutritional Plan: no  Consults:          Labs/Testing due today?: no    Sleep hours: 8      Participation in Care/Groups:  yes  Medication Compliant?: Yes  PRNS (last 24 hours): Sleep Aid    Restraints (last 24 hours):  no     CIWA (range last 24 hours):     COWS (range last 24 hours):      Alcohol screening (AUDIT) completed -   AUDIT Score: 0     If applicable, date SBIRT discussed in treatment team AND documented:     Tobacco - patient is a smoker: Have You Used Tobacco in the Past 30 Days: No  Illegal Drugs use: Have You Used Any Illegal Substances Over the Past 12 Months: No    24 hour chart check complete: yes     Patient goal(s) for today:   Treatment team focus/goals: Continued medication and program compliance  Progress note: Doing well.  Needs a would care consult    LOS:  9  Expected LOS: TBD    Financial concerns/prescription [place]  Date of last family contact:       Family requesting physician contact today:  No  Discharge plan: TBD  Guns in the home: no        Outpatient provider(s):     Participating treatment team members: BARRINGTON Hoang; Dr. Ronny Riley; Evin Alas RN

## 2018-09-15 NOTE — PROGRESS NOTES
Problem: Altered Thought Process (Adult/Pediatric)  Goal: *STG: Participates in treatment plan  Outcome: Progressing Towards Goal  Pt in the milieu on the phone with  at the start of shift. Pt smiling and pleasant. Pt stated \"I guess I have to stay 1 more night\". Pt stayed out in milieu until dinner. Pt did not want dinner and requested to go back to her room. Pt was assisted to bed. Pt has been compliant with meds.

## 2018-09-15 NOTE — BH NOTES
PRN Medication Documentation    Specific patient behavior that led to need for PRN medication:Patient stated that she needed something to help her sleep  Staff interventions attempted prior to PRN being given: therapeutic listening, assessment. PRN medication given: Ambien 5 mg  Patient response/effectiveness of PRN medication: Patient in bed, resting; will continue to monitor.

## 2018-09-15 NOTE — PROGRESS NOTES
Problem: Falls - Risk of  Goal: *Absence of Falls  Document Kizzy Fall Risk and appropriate interventions in the flowsheet. Outcome: Progressing Towards Goal  Fall Risk Interventions:  Mobility Interventions: Bed/chair exit alarm    Mentation Interventions: Adequate sleep, hydration, pain control, Reorient patient, Door open when patient unattended    Medication Interventions: Bed/chair exit alarm    Elimination Interventions: Bed/chair exit alarm    History of Falls Interventions: Room close to nurse's station, Door open when patient unattended, Bed/chair exit alarm  Patient asleep in bed at this time. respirations regular and even. Continue to monitor q 15 minutes for safety.

## 2018-09-15 NOTE — BH NOTES
PSYCHIATRIC PROGRESS NOTE         Patient Name  Pam Mckeon   Date of Birth 5/24/1933   [facility] [id number]   Medical Record Number  [id number]      Age  80 y.o. PCP Chari Francisco MD   Admit date:  9/6/2018    Room Number  746/01  @ [facility]   Date of Service  9/13/18          PSYCHOTHERAPY SESSION NOTE:  Length of psychotherapy session: 20 minutes    Main condition/diagnosis/issues treated during session today, 9/15/2018 : I employed Cognitive Behavioral therapy techniques, Reality-Oriented psychotherapy, as well as supportive psychotherapy in regards to various ongoing psychosocial stressors, including the following: pre-admission and current problems; housing issues;   medical issues; and stress of hospitalization. Interpersonal relationship issues and psychodynamic conflicts explored. Attempts made to alleviate maladaptive patterns. Overall, patient is not progressing    Treatment Plan Update (reviewed an updated 9/15/2018) : I will modify psychotherapy tx plan by implementing more stress management strategies, building upon cognitive behavioral techniques, increasing coping skills, as well as shoring up psychological defenses). An extended energy and skill set was needed to engage pt in psychotherapy due to some of the following: resistiveness, complexity, negativity, confrontational nature, hostile behaviors, and/or severe abnormalities in thought processes/psychosis resulting in the loss of expressive/receptive language communication skills. E & M PROGRESS NOTE:         HISTORY         HISTORY OF PRESENT ILLNESS/INTERVAL HISTORY:  (reviewed/updated 9/15/2018). per initial evaluation: The patient, Tray Barragan, is a 80 y.o.   WHITE [place]  female with a past psychiatric history significant for Dementia, MDD who was treated on [facility] for several weeks, until she had a syncopal episode on 9/5 and transferred to the medical floor for observation for 24 hours. She now returns medically stable. Patient's mood and presesentation hs improved compared to original presentation on admission. She remains very confused secondary to advanced dementia. No agitation or aggression noted. Compliant with her medications. Awaiting placement vs. Return home. 9/8/18. \"Am I going home today\". No complaints. Seems calmer. 9/9/18. No complaints. No agitation. Slept ok  9/10/18- Funny and engaging this morning. She enjoyed singing in group this morning. Sleeping well at night. 9/11/18- Patient remains stable. 9/12/18- No complaints, no acute behviors. Bright and euthymic affect. Eating meals, sleeping well. 9/13- patient remains stable  9/14/18- euthymic mood, talkative. Pleasant and cooperative. 9/15/18: Seen today, mood and affect is bright, pleasant,cooperative, complain with med and meals no major behavioral issues. SIDE EFFECTS: (reviewed/updated 9/15/2018)  None reported or admitted to. ALLERGIES:(reviewed/updated 9/15/2018)  Allergies   Allergen Reactions    Angiotensin Ii,Human Other (comments)     States does not remember      Avelox [Moxifloxacin] Other (comments)     States does not remember      Demerol [Meperidine] Hives      MEDICATIONS PRIOR TO ADMISSION:(reviewed/updated 9/15/2018)  Prescriptions Prior to Admission   Medication Sig    traZODone (DESYREL) 50 mg tablet Take 0.5 Tabs by mouth nightly for 30 days. Indications: insomnia associated with depression    risperiDONE (RISPERDAL) 0.25 mg tablet Take 1 Tab by mouth two (2) times a day for 30 days.  magnesium hydroxide (MILK OF MAGNESIA) 400 mg/5 mL suspension Take 30 mL by mouth daily for 30 days.  donepezil (ARICEPT) 10 mg tablet Take 1 Tab by mouth daily for 30 days.  acetaminophen (TYLENOL) 325 mg tablet Take 2 Tabs by mouth every six (6) hours as needed for Pain.  docusate sodium (COLACE) 100 mg capsule Take 1 Cap by mouth daily for 90 days.     dilTIAZem CD (CARTIA XT) 240 mg ER capsule Take 240 mg by mouth daily.  polyethylene glycol (MIRALAX) 17 gram packet Take 17 g by mouth as needed (Constipation).  citalopram (CELEXA) 20 mg tablet TAKE 1 TABLET BY MOUTH DAILY      PAST MEDICAL HISTORY: Past medical history from the initial psychiatric evaluation has been reviewed (reviewed/updated 9/15/2018) with no additional updates (I asked patient and no additional past medical history provided). Past Medical History:   Diagnosis Date    Anemia     Atrial fibrillation ([facility] [address]     Cancer (HCC)     basal cell on nose    Chronic pain     back pain    Fibromyalgia 4/1/2010    GERD (gastroesophageal reflux disease) 4/1/2010    Hiatal hernia 4/1/2010    High cholesterol 4/1/2010    HTN (hypertension) 4/1/2010    Ill-defined condition     A. Fib    OA (osteoarthritis) 4/1/2010    back    Pulmonary emboli ([facility] [address] 2015     Past Surgical History:   Procedure Laterality Date    BREAST SURGERY PROCEDURE UNLISTED      Breast im-plants x 2    ENLARGE BREAST WITH IMPLANT      HX APPENDECTOMY      HX BREAST BIOPSY      HX BREAST RECONSTRUCTION      Breast implants removed 1992    HX CATARACT REMOVAL      HX CHOLECYSTECTOMY  9/11/2013    HX HYSTERECTOMY      HX KNEE REPLACEMENT  2008    bilateral    HX ORTHOPAEDIC  2007    Bilateral TKR    HX PARTIAL HYSTERECTOMY      HX SHOULDER REPLACEMENT  2009    left    HX TONSILLECTOMY        SOCIAL HISTORY: Social history from the initial psychiatric evaluation has been reviewed (reviewed/updated 9/15/2018) with no additional updates (I asked patient and no additional social history provided). Social History     Social History    Marital status:      Spouse name: N/A    Number of children: N/A    Years of education: N/A     Occupational History    Not on file.      Social History Main Topics    Smoking status: Never Smoker    Smokeless tobacco: Never Used    Alcohol use No    Drug use: No    Sexual activity: No     Other Topics Concern    Not on file     Social History Narrative      FAMILY HISTORY: Family history from the initial psychiatric evaluation has been reviewed (reviewed/updated 9/15/2018) with no additional updates (I asked patient and no additional family history provided). Family History   Problem Relation Age of Onset   Kaela Terrazas Arthritis-rheumatoid Mother     Dementia Mother     Coronary Artery Disease Father     Cancer Brother      lung cancer       REVIEW OF SYSTEMS: (reviewed/updated 9/15/2018)  Appetite:improved   Sleep: improved   All other Review of Systems: General ROS: negative         MENTAL STATUS EXAM & VITALS     MENTAL STATUS EXAM (MSE):    MSE FINDINGS ARE WITHIN NORMAL LIMITS (WNL) UNLESS OTHERWISE STATED BELOW. ( ALL OF THE BELOW CATEGORIES OF THE MSE HAVE BEEN REVIEWED (reviewed 9/15/2018) AND UPDATED AS DEEMED APPROPRIATE )  General Presentation age appropriate and older than stated age, cooperative   Orientation not oriented to situation   Vital Signs  See below (reviewed 9/15/2018); Vital Signs (BP, Pulse, & Temp) are within normal limits if not listed below.    Gait and Station Stable/steady, no ataxia   Musculoskeletal System No extrapyramidal symptoms (EPS); no abnormal muscular movements or Tardive Dyskinesia (TD); muscle strength and tone are within normal limits   Language No aphasia or dysarthria   Speech:  soft   Thought Processes illogical; slow rate of thoughts; poor abstract reasoning/computation   Thought Associations loose associations   Thought Content free of delusions   Suicidal Ideations no plan    Homicidal Ideations no plan    Mood:  euthymic   Affect:  euthymic   Memory recent  impaired   Memory remote:  impaired   Concentration/Attention:  distractable   Fund of Knowledge below average   Insight:  limited   Reliability limited   Judgment:  limited          VITALS:     Patient Vitals for the past 24 hrs:   Temp Pulse Resp BP SpO2   09/15/18 1731 98.3 °F (36.8 °C) 72 14 141/83 95 % 09/15/18 1224 98.1 °F (36.7 °C) 99 18 135/82 -   09/15/18 0805 98.2 °F (36.8 °C) 95 18 150/85 96 %     Wt Readings from Last 3 Encounters:   09/02/18 64.5 kg (142 lb 3.2 oz)   08/21/18 76.1 kg (167 lb 12.8 oz)   08/13/18 77.6 kg (171 lb)     Temp Readings from Last 3 Encounters:   09/15/18 98.3 °F (36.8 °C)   09/06/18 98.9 °F (37.2 °C)   09/05/18 98.1 °F (36.7 °C)     BP Readings from Last 3 Encounters:   09/15/18 141/83   09/06/18 146/77   09/05/18 103/63     Pulse Readings from Last 3 Encounters:   09/15/18 72   09/06/18 (!) 105   09/05/18 68            DATA     LABORATORY DATA:(reviewed/updated 9/15/2018)  Recent Results (from the past 24 hour(s))   GLUCOSE, POC    Collection Time: 09/15/18  7:28 AM   Result Value Ref Range    Glucose (POC) 114 (H) 65 - 100 mg/dL    Performed by Gayle Bingham, POC    Collection Time: 09/15/18  4:45 PM   Result Value Ref Range    Glucose (POC) 96 65 - 100 mg/dL    Performed by Doris Willis      No results found for: VALF2, VALAC, VALP, VALPR, DS6, CRBAM, CRBAMP, CARB2, XCRBAM  No results found for: LITHM   RADIOLOGY REPORTS:(reviewed/updated 9/15/2018)  Xr Pelv Ap Only    Addendum Date: 8/14/2018    Addendum: No fracture. Result Date: 8/14/2018  Clinical history: Fall yesterday FINDINGS: Single frontal view of the pelvis is obtained. There is no fracture or dislocation identified. There is no significant soft tissue abnormality. IMPRESSION: No acute fracture. Xr Forearm Rt Ap/lat    Result Date: 8/13/2018  EXAM:  XR FOREARM RT AP/LAT Clinical history: Distal radial fracture. INDICATION:   fall. COMPARISON: None. FINDINGS: Two views of the right radius and ulna demonstrate distal radial fracture. Extensive degenerative change at the radiocarpal and carpometacarpal rows. .     IMPRESSION:  Distal radial fracture with minimal displacement.  Severe degenerative change at the wrist..     Xr Wrist Rt Ap/lat    Result Date: 8/27/2018  EXAM: XR WRIST RT AP/LAT INDICATION:  R wrist fracture follow up, was not casted and pt not leaving splint on. COMPARISON: 8/14/2018. FINDINGS: Two  views of the right wrist demonstrate no appreciable change in the T-shaped interarticular distal radial fracture and ulnar styloid fracture in fiberglass. There is chondrocalcinosis of the radiocarpal joint and TFC. Lateral carpal arthritis is noted. Osteopenia is present. IMPRESSION:  No significant change in the right T-shaped interarticular distal radial and ulna styloid fractures in the splint. Xr Wrist Rt Ap/lat/obl Min 3v    Result Date: 8/14/2018  EXAM: XR WRIST RT AP/LAT/OBL MIN 3V Clinical history: Fall, broken wrist INDICATION:  fall, broken wrist. COMPARISON: None. FINDINGS: Three  views of the right wrist demonstrate postreduction images distal radial fracture. .  Soft tissue edema. .     IMPRESSION:  Postreduction images distal radial fracture. .     Xr Wrist Rt Ap/lat/obl Min 3v    Result Date: 8/13/2018  EXAM: XR WRIST RT AP/LAT/OBL MIN 3V HISTORY: Fall, fell in bathroom, right arm INDICATION:  fall. COMPARISON: None. FINDINGS: Three  views of the right wrist demonstrate nondisplaced distal radius fracture. Severe degenerative change of the radiocarpal and carpometacarpal rows. No ulnar fracture. .  The soft tissues are within normal limits. IMPRESSION:  Nondisplaced fracture of the distal radius. 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     Ct Head Wo Cont    Result Date: 8/23/2018  EXAM:  CT head without contrast INDICATION: Altered mental status COMPARISON: CT head 8/14/2018 TECHNIQUE: Axial noncontrast head CT from foramen magnum to vertex. Coronal and sagittal reformatted images were obtained. CT dose reduction was achieved through use of a standardized protocol tailored for this examination and automatic exposure control for dose modulation. Adaptive statistical iterative reconstruction (ASIR) was utilized.  FINDINGS:  There is diffuse age-related parenchymal volume loss. The ventricles and sulci are age-appropriate without hydrocephalus. There is no mass effect or midline shift. There is no intracranial hemorrhage or extra-axial fluid collection. Scattered foci of low attenuation in the periventricular white matter represent stable chronic microvascular ischemic changes. There is no new abnormal parenchymal attenuation. The gray-white matter differentiation is maintained. The basal cisterns are patent. 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 The osseous structures are intact. The visualized paranasal sinuses and mastoid air cells are clear. IMPRESSION: There is no acute intracranial abnormality. Ct Head Wo Cont    Result Date: 8/14/2018  EXAM:  CT HEAD WO CONT Clinical history: Fall, fractured wrist, increased pain INDICATION:   fall COMPARISON: 7/20/2018. CONTRAST:  None. TECHNIQUE: Unenhanced CT of the head was performed using 5 mm images. Brain and bone windows were generated. CT dose reduction was achieved through use of a standardized protocol tailored for this examination and automatic exposure control for dose modulation. FINDINGS: The ventricles and sulci are normal in size, shape and configuration and midline. Mild scattered hypodensities in the cerebral white matter. There is no intracranial hemorrhage, extra-axial collection, mass, mass effect or midline shift. The basilar cisterns are open. No acute infarct is identified. The bone windows demonstrate no abnormalities. The visualized portions of the paranasal sinuses and mastoid air cells are clear. IMPRESSION: No acute cranial process     Ct Head Wo Cont    Result Date: 7/20/2018  EXAM:  CT HEAD WO CONT INDICATION: Fall going to the bathroom and had a ground level fall and hit head on the floor. COMPARISON: MRI brain 11/12/2012. Magdalene Aguilar CONTRAST:  None. TECHNIQUE: Unenhanced CT of the head was performed using 5 mm images.  Brain and bone windows were generated. CT dose reduction was achieved through use of a standardized protocol tailored for this examination and automatic exposure control for dose modulation. FINDINGS: There is a stable 9 x 12 mm partially calcified extra-axial lesion in the left posterior fossa compatible with meningioma. There is no acute intracranial hemorrhage, other mass, mass effect or herniation. Ventricles and sulci show stable, proportionate, and symmetric pattern. There is a stable pattern of periventricular white matter hypodensity. The gray-white matter differentiation is well-preserved. Atherosclerotic calcifications are seen within the carotid siphons and vertebral arteries. The mastoid air cells are well pneumatized. The visualized paranasal sinuses are normal.     IMPRESSION: No acute intracranial hemorrhage or infarct. Stable left posterior fossa meningioma and chronic white matter change most compatible with small vessel ischemic and/or senescent change. Intracranial atherosclerosis. Ct Abd Pelv Wo Cont    Result Date: 6/20/2018  EXAM:  CT ABD PELV WO CONT INDICATION: abd pain  2 days of abdominal pain COMPARISON: 2013 CONTRAST:  None. TECHNIQUE: Thin axial images were obtained through the abdomen and pelvis. Coronal and sagittal reconstructions were generated. Oral contrast was not administered. CT dose reduction was achieved through use of a standardized protocol tailored for this examination and automatic exposure control for dose modulation. The absence of intravenous contrast material reduces the sensitivity for evaluation of the solid parenchymal organs of the abdomen. FINDINGS: LUNG BASES: Clear. INCIDENTALLY IMAGED HEART AND MEDIASTINUM: Unremarkable. LIVER: No mass or biliary dilatation. GALLBLADDER: Surgically removed. SPLEEN: No mass. PANCREAS: No mass or ductal dilatation. ADRENALS: Unremarkable. KIDNEYS/URETERS: Malrotated right kidney. Hyperdense right renal cyst. Right nephrolithiasis. STOMACH: Hiatal hernia. SMALL BOWEL: No dilatation or wall thickening. COLON: No dilatation or wall thickening. Moderate colonic stool. APPENDIX: Surgically removed PERITONEUM: No ascites or pneumoperitoneum. RETROPERITONEUM: No lymphadenopathy or aortic aneurysm. REPRODUCTIVE ORGANS: Surgically removed. URINARY BLADDER: No mass or calculus. BONES: No destructive bone lesion. Multilevel degenerative changes. ADDITIONAL COMMENTS: N/A     IMPRESSION: No acute findings. Xr Chest Port    Result Date: 6/20/2018  EXAM:  XR CHEST PORT INDICATION:  Chest Pain COMPARISON:  May 24 FINDINGS: A portable AP radiograph of the chest was obtained at 0555 hours. There is a left shoulder replacement in place. The patient is on a cardiac monitor. The lungs are clear. The cardiac and mediastinal contours and pulmonary vascularity are normal.  The bones and soft tissues are grossly within normal limits. IMPRESSION: No acute findings.           MEDICATIONS     ALL MEDICATIONS:   Current Facility-Administered Medications   Medication Dose Route Frequency    dilTIAZem CD (CARDIZEM CD) capsule 240 mg  240 mg Oral DAILY    magnesium hydroxide (MILK OF MAGNESIA) 400 mg/5 mL oral suspension 30 mL  30 mL Oral DAILY PRN    citalopram (CELEXA) tablet 20 mg  20 mg Oral QHS    docusate sodium (COLACE) capsule 100 mg  100 mg Oral DAILY    donepezil (ARICEPT) tablet 10 mg  10 mg Oral DAILY    polyethylene glycol (MIRALAX) packet 17 g  17 g Oral DAILY PRN    risperiDONE (RisperDAL) tablet 0.25 mg  0.25 mg Oral BID    saline peripheral flush soln 5 mL  5 mL InterCATHeter PRN    traZODone (DESYREL) tablet 25 mg  25 mg Oral QHS PRN    OLANZapine (ZyPREXA) tablet 2.5 mg  2.5 mg Oral Q6H PRN    ziprasidone (GEODON) 10 mg in sterile water (preservative free) 0.5 mL injection  10 mg IntraMUSCular BID PRN    benztropine (COGENTIN) tablet 1 mg  1 mg Oral BID PRN    benztropine (COGENTIN) injection 1 mg  1 mg IntraMUSCular BID PRN    zolpidem (AMBIEN) tablet 5 mg  5 mg Oral QHS PRN    acetaminophen (TYLENOL) tablet 650 mg  650 mg Oral Q4H PRN    ibuprofen (MOTRIN) tablet 400 mg  400 mg Oral Q8H PRN      SCHEDULED MEDICATIONS:   Current Facility-Administered Medications   Medication Dose Route Frequency    dilTIAZem CD (CARDIZEM CD) capsule 240 mg  240 mg Oral DAILY    citalopram (CELEXA) tablet 20 mg  20 mg Oral QHS    docusate sodium (COLACE) capsule 100 mg  100 mg Oral DAILY    donepezil (ARICEPT) tablet 10 mg  10 mg Oral DAILY    risperiDONE (RisperDAL) tablet 0.25 mg  0.25 mg Oral BID          ASSESSMENT & PLAN     DIAGNOSES REQUIRING ACTIVE TREATMENT AND MONITORING: (reviewed/updated 9/15/2018)  Patient Active Hospital Problem List:    The patient, Tray Ma, is a 80 y.o.  female who presents at this time for treatment of the following diagnoses:  Patient Active Hospital Problem List:   Late onset Alzheimer's disease with behavioral disturbance (8/24/2018)    Assessment: severe, advanced    Plan: Agree with inpatient hospitalization for further stabilization, safety monitoring and medication management  Medications- Aricept and risperdal     09/15/18: Continue current treatment. In summary, Tray Dodge, is a 80 y.o.  female who presents with a severe exacerbation of the principal diagnosis of Late onset Alzheimer's disease with behavioral disturbance  Patient's condition is worsening/not improving/not stable improving. Patient requires continued inpatient hospitalization for further stabilization, safety monitoring and medication management. I will continue to coordinate the provision of individual, milieu, occupational, group, and substance abuse therapies to address target symptoms/diagnoses as deemed appropriate for the individual patient. A coordinated, multidisplinary treatment team round was conducted with the patient (this team consists of the nurse, psychiatric unit pharmcist,  and writer). Complete current electronic health record for patient has been reviewed today including consultant notes, ancillary staff notes, nurses and psychiatric tech notes. Suicide risk assessment completed and patient deemed to be of low risk for suicide at this time. The following regarding medications was addressed during rounds with patient:   the risks and benefits of the proposed medication. The patient was given the opportunity to ask questions. Informed consent given to the use of the above medications. Will continue to adjust psychiatric and non-psychiatric medications (see above \"medication\" section and orders section for details) as deemed appropriate & based upon diagnoses and response to treatment. I will continue to order blood tests/labs and diagnostic tests as deemed appropriate and review results as they become available (see orders for details and above listed lab/test results). I will order psychiatric records from previous [facility] hospitals to further elucidate the nature of patient's psychopathology and review once available. I will gather additional collateral information from friends, family and o/p treatment team to further elucidate the nature of patient's psychopathology and baselline level of psychiatric functioning. I certify that this patient's inpatient psychiatric hospital services furnished since the previous certification were, and continue to be, required for treatment that could reasonably be expected to improve the patient's condition, or for diagnostic study, and that the patient continues to need, on a daily basis, active treatment furnished directly by or requiring the supervision of inpatient psychiatric facility personnel. In addition, the hospital records show that services furnished were intensive treatment services, admission or related services, or equivalent services.     EXPECTED DISCHARGE DATE/DAY: TBD     DISPOSITION: Home       Signed By:   Nori Mark Jeffrey Rocha MD  9/15/2018

## 2018-09-16 LAB
GLUCOSE BLD STRIP.AUTO-MCNC: 121 MG/DL (ref 65–100)
GLUCOSE BLD STRIP.AUTO-MCNC: 94 MG/DL (ref 65–100)
SERVICE CMNT-IMP: ABNORMAL
SERVICE CMNT-IMP: NORMAL

## 2018-09-16 PROCEDURE — 65220000003 HC RM SEMIPRIVATE PSYCH

## 2018-09-16 PROCEDURE — 82962 GLUCOSE BLOOD TEST: CPT

## 2018-09-16 PROCEDURE — 74011250637 HC RX REV CODE- 250/637: Performed by: PSYCHIATRY & NEUROLOGY

## 2018-09-16 PROCEDURE — 74011250637 HC RX REV CODE- 250/637: Performed by: INTERNAL MEDICINE

## 2018-09-16 RX ADMIN — DILTIAZEM HYDROCHLORIDE 240 MG: 240 CAPSULE, COATED, EXTENDED RELEASE ORAL at 09:01

## 2018-09-16 RX ADMIN — CITALOPRAM HYDROBROMIDE 20 MG: 20 TABLET ORAL at 20:52

## 2018-09-16 RX ADMIN — RISPERIDONE 0.25 MG: 0.5 TABLET, FILM COATED ORAL at 09:01

## 2018-09-16 RX ADMIN — DONEPEZIL HYDROCHLORIDE 10 MG: 10 TABLET, FILM COATED ORAL at 09:00

## 2018-09-16 RX ADMIN — RISPERIDONE 0.25 MG: 0.5 TABLET, FILM COATED ORAL at 17:01

## 2018-09-16 NOTE — PROGRESS NOTES
Problem: Altered Thought Process (Adult/Pediatric)  Goal: *STG: Participates in treatment plan  Outcome: Progressing Towards Goal  Patient denies SI. Med and meal compliant. Confused. Up with 1 assist. Patient did not state goal,. Staff goal: to assist with ADLs. Patient's  and son came to visit with her this am and it went well. Goal: Interventions  Outcome: Progressing Towards Goal  Medication management. Q 15 min safety rounds. Group therapy.

## 2018-09-16 NOTE — PROGRESS NOTES
Problem: Falls - Risk of  Goal: *Absence of Falls  Document Kizzy Fall Risk and appropriate interventions in the flowsheet.    Outcome: Progressing Towards Goal  Fall Risk Interventions:  Mobility Interventions: Bed/chair exit alarm, Communicate number of staff needed for ambulation/transfer, Utilize walker, cane, or other assistive device    Mentation Interventions: Bed/chair exit alarm, Door open when patient unattended, More frequent rounding    Medication Interventions: Teach patient to arise slowly    Elimination Interventions: Bed/chair exit alarm, Patient to call for help with toileting needs    History of Falls Interventions: Door open when patient unattended

## 2018-09-16 NOTE — PROGRESS NOTES
Problem: Altered Thought Process (Adult/Pediatric)  Goal: *STG: Participates in treatment plan  Outcome: Progressing Towards Goal  Pt alert oriented to self. Confused. Pleasant cooperative. Eating 15 % of dinner. BM x1 bedside commode. 2 person assist. Oral care provided. Upper denture soaking in cabinet in nurses station.

## 2018-09-16 NOTE — INTERDISCIPLINARY ROUNDS
Behavioral Health Interdisciplinary Rounds      Patient Name: Sami Larry                                    Age: 80 y.o. Room/Bed:  746/  Primary Diagnosis: Late onset Alzheimer's disease with behavioral disturbance             Admission Status: Involuntary Commitment                                                          Readmission within 30 days: no  Power of  in place: yes  Patient requires a blocked bed: no          Reason for blocked bed: n/a     VTE Prophylaxis: No     Mobility needs/Fall risk: yes  Flu Vaccine : no                      Nutritional Plan: no  Consults:                                                                                                                                   Labs/Testing due today?: no     Sleep hours:  7.5                                                                           Participation in Care/Groups:  yes  Medication Compliant?: Yes  PRNS (last 24 hours): none                                          Restraints (last 24 hours):  no      CIWA (range last 24 hours):               COWS (range last 24 hours):       Alcohol screening (AUDIT) completed -   AUDIT Score: 0     If applicable, date SBIRT discussed in treatment team AND documented:      Tobacco - patient is a smoker: Have You Used Tobacco in the Past 30 Days: No  Illegal Drugs use: Have You Used Any Illegal Substances Over the Past 12 Months: No     24 hour chart check complete: yes      Patient goal(s) for today:   Treatment team focus/goals: Continued medication and program compliance  Progress note: Doing well.  Needs a would care consult     LOS:  9                                            Expected LOS: TBD     Financial concerns/prescription [place]:  [place]  Date of last family contact:                                                             Family requesting physician contact today:  No  Discharge plan: TBD  Guns in the home: no Outpatient provider(s):      Participating treatment team members: BARRINGTON France; Dr. Alex Maya; Fletcher Garcia RN

## 2018-09-16 NOTE — PROGRESS NOTES
Problem: Altered Thought Process (Adult/Pediatric)  Goal: *STG: Participates in treatment plan  Outcome: Progressing Towards Goal  Pt is calm cooperative and pleasantly confused   Sitting out in the DR visiting with family   Med/Meal compliant   Patient states her goal is to be able to walk more   NAD noted   Will continue to monitor.

## 2018-09-16 NOTE — BH NOTES
PSYCHIATRIC PROGRESS NOTE         Patient Name  Marcie Branch   Date of Birth 5/24/1933   [facility] [id number]   Medical Record Number  [id number]      Age  80 y.o. PCP Tami Dalal MD   Admit date:  9/6/2018    Room Number  746/01  @ . [address]   Date of Service  9/13/18          PSYCHOTHERAPY SESSION NOTE:  Length of psychotherapy session: 20 minutes    Main condition/diagnosis/issues treated during session today, 9/16/2018 : I employed Cognitive Behavioral therapy techniques, Reality-Oriented psychotherapy, as well as supportive psychotherapy in regards to various ongoing psychosocial stressors, including the following: pre-admission and current problems; housing issues;   medical issues; and stress of hospitalization. Interpersonal relationship issues and psychodynamic conflicts explored. Attempts made to alleviate maladaptive patterns. Overall, patient is not progressing    Treatment Plan Update (reviewed an updated 9/16/2018) : I will modify psychotherapy tx plan by implementing more stress management strategies, building upon cognitive behavioral techniques, increasing coping skills, as well as shoring up psychological defenses). An extended energy and skill set was needed to engage pt in psychotherapy due to some of the following: resistiveness, complexity, negativity, confrontational nature, hostile behaviors, and/or severe abnormalities in thought processes/psychosis resulting in the loss of expressive/receptive language communication skills. E & M PROGRESS NOTE:         HISTORY         HISTORY OF PRESENT ILLNESS/INTERVAL HISTORY:  (reviewed/updated 9/16/2018). per initial evaluation: The patient, Tray Franks, is a 80 y.o.   WHITE [place]  female with a past psychiatric history significant for Dementia, MDD who was treated on [facility] for several weeks, until she had a syncopal episode on 9/5 and transferred to the medical floor for observation for 24 hours. She now returns medically stable. Patient's mood and presesentation hs improved compared to original presentation on admission. She remains very confused secondary to advanced dementia. No agitation or aggression noted. Compliant with her medications. Awaiting placement vs. Return home. 9/8/18. \"Am I going home today\". No complaints. Seems calmer. 9/9/18. No complaints. No agitation. Slept ok  9/10/18- Funny and engaging this morning. She enjoyed singing in group this morning. Sleeping well at night. 9/11/18- Patient remains stable. 9/12/18- No complaints, no acute behviors. Bright and euthymic affect. Eating meals, sleeping well. 9/13- patient remains stable  9/14/18- euthymic mood, talkative. Pleasant and cooperative. 9/16/18: seen today,funny at times, accepting po med and meals,no aggressin or agitation,requires assistance  with ADL. SIDE EFFECTS: (reviewed/updated 9/16/2018)  None reported or admitted to. ALLERGIES:(reviewed/updated 9/16/2018)  Allergies   Allergen Reactions    Angiotensin Ii,Human Other (comments)     States does not remember      Avelox [Moxifloxacin] Other (comments)     States does not remember      Demerol [Meperidine] Hives      MEDICATIONS PRIOR TO ADMISSION:(reviewed/updated 9/16/2018)  Prescriptions Prior to Admission   Medication Sig    traZODone (DESYREL) 50 mg tablet Take 0.5 Tabs by mouth nightly for 30 days. Indications: insomnia associated with depression    risperiDONE (RISPERDAL) 0.25 mg tablet Take 1 Tab by mouth two (2) times a day for 30 days.  magnesium hydroxide (MILK OF MAGNESIA) 400 mg/5 mL suspension Take 30 mL by mouth daily for 30 days.  donepezil (ARICEPT) 10 mg tablet Take 1 Tab by mouth daily for 30 days.  acetaminophen (TYLENOL) 325 mg tablet Take 2 Tabs by mouth every six (6) hours as needed for Pain.  docusate sodium (COLACE) 100 mg capsule Take 1 Cap by mouth daily for 90 days.     dilTIAZem CD (CARTIA XT) 240 mg ER capsule Take 240 mg by mouth daily.  polyethylene glycol (MIRALAX) 17 gram packet Take 17 g by mouth as needed (Constipation).  citalopram (CELEXA) 20 mg tablet TAKE 1 TABLET BY MOUTH DAILY      PAST MEDICAL HISTORY: Past medical history from the initial psychiatric evaluation has been reviewed (reviewed/updated 9/16/2018) with no additional updates (I asked patient and no additional past medical history provided). Past Medical History:   Diagnosis Date    Anemia     Atrial fibrillation ([facility] [address]     Cancer (HCC)     basal cell on nose    Chronic pain     back pain    Fibromyalgia 4/1/2010    GERD (gastroesophageal reflux disease) 4/1/2010    Hiatal hernia 4/1/2010    High cholesterol 4/1/2010    HTN (hypertension) 4/1/2010    Ill-defined condition     A. Fib    OA (osteoarthritis) 4/1/2010    back    Pulmonary emboli ([facility] [address] 2015     Past Surgical History:   Procedure Laterality Date    BREAST SURGERY PROCEDURE UNLISTED      Breast im-plants x 2    ENLARGE BREAST WITH IMPLANT      HX APPENDECTOMY      HX BREAST BIOPSY      HX BREAST RECONSTRUCTION      Breast implants removed 1992    HX CATARACT REMOVAL      HX CHOLECYSTECTOMY  9/11/2013    HX HYSTERECTOMY      HX KNEE REPLACEMENT  2008    bilateral    HX ORTHOPAEDIC  2007    Bilateral TKR    HX PARTIAL HYSTERECTOMY      HX SHOULDER REPLACEMENT  2009    left    HX TONSILLECTOMY        SOCIAL HISTORY: Social history from the initial psychiatric evaluation has been reviewed (reviewed/updated 9/16/2018) with no additional updates (I asked patient and no additional social history provided). Social History     Social History    Marital status:      Spouse name: N/A    Number of children: N/A    Years of education: N/A     Occupational History    Not on file.      Social History Main Topics    Smoking status: Never Smoker    Smokeless tobacco: Never Used    Alcohol use No    Drug use: No    Sexual activity: No     Other Topics Concern    Not on file     Social History Narrative      FAMILY HISTORY: Family history from the initial psychiatric evaluation has been reviewed (reviewed/updated 9/16/2018) with no additional updates (I asked patient and no additional family history provided). Family History   Problem Relation Age of Onset   [address] [facility] [address] Arthritis-rheumatoid Mother     Dementia Mother     Coronary Artery Disease Father     Cancer Brother      lung cancer       REVIEW OF SYSTEMS: (reviewed/updated 9/16/2018)  Appetite:improved   Sleep: improved   All other Review of Systems: General ROS: negative         MENTAL STATUS EXAM & VITALS     MENTAL STATUS EXAM (MSE):    MSE FINDINGS ARE WITHIN NORMAL LIMITS (WNL) UNLESS OTHERWISE STATED BELOW. ( ALL OF THE BELOW CATEGORIES OF THE MSE HAVE BEEN REVIEWED (reviewed 9/16/2018) AND UPDATED AS DEEMED APPROPRIATE )  General Presentation age appropriate and older than stated age, cooperative   Orientation not oriented to situation   Vital Signs  See below (reviewed 9/16/2018); Vital Signs (BP, Pulse, & Temp) are within normal limits if not listed below.    Gait and Station Stable/steady, no ataxia   Musculoskeletal System No extrapyramidal symptoms (EPS); no abnormal muscular movements or Tardive Dyskinesia (TD); muscle strength and tone are within normal limits   Language No aphasia or dysarthria   Speech:  soft   Thought Processes illogical; slow rate of thoughts; poor abstract reasoning/computation   Thought Associations loose associations   Thought Content free of delusions   Suicidal Ideations no plan    Homicidal Ideations no plan    Mood:  euthymic   Affect:  euthymic   Memory recent  impaired   Memory remote:  impaired   Concentration/Attention:  distractable   Fund of Knowledge below average   Insight:  limited   Reliability limited   Judgment:  limited          VITALS:     Patient Vitals for the past 24 hrs:   Temp Pulse Resp BP SpO2   09/16/18 0808 97.7 °F (36.5 °C) 97 16 148/80 99 % 09/15/18 2028 98.3 °F (36.8 °C) 84 16 (!) 138/91 95 %   09/15/18 1731 98.3 °F (36.8 °C) 72 14 141/83 95 %   09/15/18 1224 98.1 °F (36.7 °C) 99 18 135/82 -     Wt Readings from Last 3 Encounters:   09/02/18 64.5 kg (142 lb 3.2 oz)   08/21/18 76.1 kg (167 lb 12.8 oz)   08/13/18 77.6 kg (171 lb)     Temp Readings from Last 3 Encounters:   09/16/18 97.7 °F (36.5 °C)   09/06/18 98.9 °F (37.2 °C)   09/05/18 98.1 °F (36.7 °C)     BP Readings from Last 3 Encounters:   09/16/18 148/80   09/06/18 146/77   09/05/18 103/63     Pulse Readings from Last 3 Encounters:   09/16/18 97   09/06/18 (!) 105   09/05/18 68            DATA     LABORATORY DATA:(reviewed/updated 9/16/2018)  Recent Results (from the past 24 hour(s))   GLUCOSE, POC    Collection Time: 09/15/18  4:45 PM   Result Value Ref Range    Glucose (POC) 96 65 - 100 mg/dL    Performed by [address] [place] [address], POC    Collection Time: 09/16/18  7:47 AM   Result Value Ref Range    Glucose (POC) 121 (H) 65 - 100 mg/dL    Performed by Tana Marques      No results found for: VALF2, VALAC, VALP, VALPR, DS6, CRBAM, CRBAMP, CARB2, XCRBAM  No results found for: LITHM   RADIOLOGY REPORTS:(reviewed/updated 9/16/2018)  Xr Pelv Ap Only    Addendum Date: 8/14/2018    Addendum: No fracture. Result Date: 8/14/2018  Clinical history: Fall yesterday FINDINGS: Single frontal view of the pelvis is obtained. There is no fracture or dislocation identified. There is no significant soft tissue abnormality. IMPRESSION: No acute fracture. Xr Forearm Rt Ap/lat    Result Date: 8/13/2018  EXAM:  XR FOREARM RT AP/LAT Clinical history: Distal radial fracture. INDICATION:   fall. COMPARISON: None. FINDINGS: Two views of the right radius and ulna demonstrate distal radial fracture. Extensive degenerative change at the radiocarpal and carpometacarpal rows. .     IMPRESSION:  Distal radial fracture with minimal displacement.  Severe degenerative change at the wrist..     Xr Wrist Rt Ap/lat    Result Date: 8/27/2018  EXAM: XR WRIST RT AP/LAT INDICATION:  R wrist fracture follow up, was not casted and pt not leaving splint on. COMPARISON: 8/14/2018. FINDINGS: Two  views of the right wrist demonstrate no appreciable change in the T-shaped interarticular distal radial fracture and ulnar styloid fracture in fiberglass. There is chondrocalcinosis of the radiocarpal joint and TFC. Lateral carpal arthritis is noted. Osteopenia is present. IMPRESSION:  No significant change in the right T-shaped interarticular distal radial and ulna styloid fractures in the splint. Xr Wrist Rt Ap/lat/obl Min 3v    Result Date: 8/14/2018  EXAM: XR WRIST RT AP/LAT/OBL MIN 3V Clinical history: Fall, broken wrist INDICATION:  fall, broken wrist. COMPARISON: None. FINDINGS: Three  views of the right wrist demonstrate postreduction images distal radial fracture. .  Soft tissue edema. .     IMPRESSION:  Postreduction images distal radial fracture. .     Xr Wrist Rt Ap/lat/obl Min 3v    Result Date: 8/13/2018  EXAM: XR WRIST RT AP/LAT/OBL MIN 3V HISTORY: Fall, fell in bathroom, right arm INDICATION:  fall. COMPARISON: None. FINDINGS: Three  views of the right wrist demonstrate nondisplaced distal radius fracture. Severe degenerative change of the radiocarpal and carpometacarpal rows. No ulnar fracture. .  The soft tissues are within normal limits. IMPRESSION:  Nondisplaced fracture of the distal radius. 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     Ct Head Wo Cont    Result Date: 8/23/2018  EXAM:  CT head without contrast INDICATION: Altered mental status COMPARISON: CT head 8/14/2018 TECHNIQUE: Axial noncontrast head CT from foramen magnum to vertex. Coronal and sagittal reformatted images were obtained. CT dose reduction was achieved through use of a standardized protocol tailored for this examination and automatic exposure control for dose modulation.  Adaptive statistical iterative reconstruction (ASIR) was utilized. FINDINGS:  There is diffuse age-related parenchymal volume loss. The ventricles and sulci are age-appropriate without hydrocephalus. There is no mass effect or midline shift. There is no intracranial hemorrhage or extra-axial fluid collection. Scattered foci of low attenuation in the periventricular white matter represent stable chronic microvascular ischemic changes. There is no new abnormal parenchymal attenuation. The gray-white matter differentiation is maintained. The basal cisterns are patent. 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 The osseous structures are intact. The visualized paranasal sinuses and mastoid air cells are clear. IMPRESSION: There is no acute intracranial abnormality. Ct Head Wo Cont    Result Date: 8/14/2018  EXAM:  CT HEAD WO CONT Clinical history: Fall, fractured wrist, increased pain INDICATION:   fall COMPARISON: 7/20/2018. CONTRAST:  None. TECHNIQUE: Unenhanced CT of the head was performed using 5 mm images. Brain and bone windows were generated. CT dose reduction was achieved through use of a standardized protocol tailored for this examination and automatic exposure control for dose modulation. FINDINGS: The ventricles and sulci are normal in size, shape and configuration and midline. Mild scattered hypodensities in the cerebral white matter. There is no intracranial hemorrhage, extra-axial collection, mass, mass effect or midline shift. The basilar cisterns are open. No acute infarct is identified. The bone windows demonstrate no abnormalities. The visualized portions of the paranasal sinuses and mastoid air cells are clear. IMPRESSION: No acute cranial process     Ct Head Wo Cont    Result Date: 7/20/2018  EXAM:  CT HEAD WO CONT INDICATION: Fall going to the bathroom and had a ground level fall and hit head on the floor. COMPARISON: MRI brain 11/12/2012. Peggyann Gang CONTRAST:  None.  TECHNIQUE: Unenhanced CT of the head was performed using 5 mm images. Brain and bone windows were generated. CT dose reduction was achieved through use of a standardized protocol tailored for this examination and automatic exposure control for dose modulation. FINDINGS: There is a stable 9 x 12 mm partially calcified extra-axial lesion in the left posterior fossa compatible with meningioma. There is no acute intracranial hemorrhage, other mass, mass effect or herniation. Ventricles and sulci show stable, proportionate, and symmetric pattern. There is a stable pattern of periventricular white matter hypodensity. The gray-white matter differentiation is well-preserved. Atherosclerotic calcifications are seen within the carotid siphons and vertebral arteries. The mastoid air cells are well pneumatized. The visualized paranasal sinuses are normal.     IMPRESSION: No acute intracranial hemorrhage or infarct. Stable left posterior fossa meningioma and chronic white matter change most compatible with small vessel ischemic and/or senescent change. Intracranial atherosclerosis. Ct Abd Pelv Wo Cont    Result Date: 6/20/2018  EXAM:  CT ABD PELV WO CONT INDICATION: abd pain  2 days of abdominal pain COMPARISON: 2013 CONTRAST:  None. TECHNIQUE: Thin axial images were obtained through the abdomen and pelvis. Coronal and sagittal reconstructions were generated. Oral contrast was not administered. CT dose reduction was achieved through use of a standardized protocol tailored for this examination and automatic exposure control for dose modulation. The absence of intravenous contrast material reduces the sensitivity for evaluation of the solid parenchymal organs of the abdomen. FINDINGS: LUNG BASES: Clear. INCIDENTALLY IMAGED HEART AND MEDIASTINUM: Unremarkable. LIVER: No mass or biliary dilatation. GALLBLADDER: Surgically removed. SPLEEN: No mass. PANCREAS: No mass or ductal dilatation. ADRENALS: Unremarkable.  KIDNEYS/URETERS: Malrotated right kidney. Hyperdense right renal cyst. Right nephrolithiasis. STOMACH: Hiatal hernia. SMALL BOWEL: No dilatation or wall thickening. COLON: No dilatation or wall thickening. Moderate colonic stool. APPENDIX: Surgically removed PERITONEUM: No ascites or pneumoperitoneum. RETROPERITONEUM: No lymphadenopathy or aortic aneurysm. REPRODUCTIVE ORGANS: Surgically removed. URINARY BLADDER: No mass or calculus. BONES: No destructive bone lesion. Multilevel degenerative changes. ADDITIONAL COMMENTS: N/A     IMPRESSION: No acute findings. Xr Chest Port    Result Date: 6/20/2018  EXAM:  XR CHEST PORT INDICATION:  Chest Pain COMPARISON:  May 24 FINDINGS: A portable AP radiograph of the chest was obtained at 0555 hours. There is a left shoulder replacement in place. The patient is on a cardiac monitor. The lungs are clear. The cardiac and mediastinal contours and pulmonary vascularity are normal.  The bones and soft tissues are grossly within normal limits. IMPRESSION: No acute findings.           MEDICATIONS     ALL MEDICATIONS:   Current Facility-Administered Medications   Medication Dose Route Frequency    dilTIAZem CD (CARDIZEM CD) capsule 240 mg  240 mg Oral DAILY    magnesium hydroxide (MILK OF MAGNESIA) 400 mg/5 mL oral suspension 30 mL  30 mL Oral DAILY PRN    citalopram (CELEXA) tablet 20 mg  20 mg Oral QHS    docusate sodium (COLACE) capsule 100 mg  100 mg Oral DAILY    donepezil (ARICEPT) tablet 10 mg  10 mg Oral DAILY    polyethylene glycol (MIRALAX) packet 17 g  17 g Oral DAILY PRN    risperiDONE (RisperDAL) tablet 0.25 mg  0.25 mg Oral BID    saline peripheral flush soln 5 mL  5 mL InterCATHeter PRN    traZODone (DESYREL) tablet 25 mg  25 mg Oral QHS PRN    OLANZapine (ZyPREXA) tablet 2.5 mg  2.5 mg Oral Q6H PRN    ziprasidone (GEODON) 10 mg in sterile water (preservative free) 0.5 mL injection  10 mg IntraMUSCular BID PRN    benztropine (COGENTIN) tablet 1 mg  1 mg Oral BID PRN    benztropine (COGENTIN) injection 1 mg  1 mg IntraMUSCular BID PRN    zolpidem (AMBIEN) tablet 5 mg  5 mg Oral QHS PRN    acetaminophen (TYLENOL) tablet 650 mg  650 mg Oral Q4H PRN    ibuprofen (MOTRIN) tablet 400 mg  400 mg Oral Q8H PRN      SCHEDULED MEDICATIONS:   Current Facility-Administered Medications   Medication Dose Route Frequency    dilTIAZem CD (CARDIZEM CD) capsule 240 mg  240 mg Oral DAILY    citalopram (CELEXA) tablet 20 mg  20 mg Oral QHS    docusate sodium (COLACE) capsule 100 mg  100 mg Oral DAILY    donepezil (ARICEPT) tablet 10 mg  10 mg Oral DAILY    risperiDONE (RisperDAL) tablet 0.25 mg  0.25 mg Oral BID          ASSESSMENT & PLAN     DIAGNOSES REQUIRING ACTIVE TREATMENT AND MONITORING: (reviewed/updated 9/16/2018)  Patient Active Hospital Problem List:    The patient, Tray Estes, is a 80 y.o.  female who presents at this time for treatment of the following diagnoses:  Patient Active Hospital Problem List:   Late onset Alzheimer's disease with behavioral disturbance (8/24/2018)    Assessment: severe, advanced    Plan: Agree with inpatient hospitalization for further stabilization, safety monitoring and medication management  Medications- Aricept and risperdal   09/16/18: Continue current treatment. In summary, Tray Barton, is a 80 y.o.  female who presents with a severe exacerbation of the principal diagnosis of Late onset Alzheimer's disease with behavioral disturbance  Patient's condition is worsening/not improving/not stable improving. Patient requires continued inpatient hospitalization for further stabilization, safety monitoring and medication management. I will continue to coordinate the provision of individual, milieu, occupational, group, and substance abuse therapies to address target symptoms/diagnoses as deemed appropriate for the individual patient.   A coordinated, multidisplinary treatment team round was conducted with the patient (this team consists of the nurse, psychiatric unit pharmcist,  and writer). Complete current electronic health record for patient has been reviewed today including consultant notes, ancillary staff notes, nurses and psychiatric tech notes. Suicide risk assessment completed and patient deemed to be of low risk for suicide at this time. The following regarding medications was addressed during rounds with patient:   the risks and benefits of the proposed medication. The patient was given the opportunity to ask questions. Informed consent given to the use of the above medications. Will continue to adjust psychiatric and non-psychiatric medications (see above \"medication\" section and orders section for details) as deemed appropriate & based upon diagnoses and response to treatment. I will continue to order blood tests/labs and diagnostic tests as deemed appropriate and review results as they become available (see orders for details and above listed lab/test results). I will order psychiatric records from previous [facility] hospitals to further elucidate the nature of patient's psychopathology and review once available. I will gather additional collateral information from friends, family and o/p treatment team to further elucidate the nature of patient's psychopathology and baselline level of psychiatric functioning. I certify that this patient's inpatient psychiatric hospital services furnished since the previous certification were, and continue to be, required for treatment that could reasonably be expected to improve the patient's condition, or for diagnostic study, and that the patient continues to need, on a daily basis, active treatment furnished directly by or requiring the supervision of inpatient psychiatric facility personnel. In addition, the hospital records show that services furnished were intensive treatment services, admission or related services, or equivalent services.     EXPECTED DISCHARGE DATE/DAY: TBD     DISPOSITION: Home       Signed By:   Berenice Yoon MD  9/16/2018

## 2018-09-16 NOTE — BH NOTES
GROUP THERAPY PROGRESS NOTE    Tray Regan is participating in [employer].      Group time: 15 minutes    Personal goal for participation: decreased anxiety    Goal orientation: relaxation    Group therapy participation: active    Therapeutic interventions reviewed and discussed:     Impression of participation: good

## 2018-09-17 LAB
GLUCOSE BLD STRIP.AUTO-MCNC: 102 MG/DL (ref 65–100)
GLUCOSE BLD STRIP.AUTO-MCNC: 143 MG/DL (ref 65–100)
SERVICE CMNT-IMP: ABNORMAL
SERVICE CMNT-IMP: ABNORMAL

## 2018-09-17 PROCEDURE — 65220000003 HC RM SEMIPRIVATE PSYCH

## 2018-09-17 PROCEDURE — 97116 GAIT TRAINING THERAPY: CPT

## 2018-09-17 PROCEDURE — 97530 THERAPEUTIC ACTIVITIES: CPT

## 2018-09-17 PROCEDURE — 97110 THERAPEUTIC EXERCISES: CPT

## 2018-09-17 PROCEDURE — 74011250637 HC RX REV CODE- 250/637: Performed by: PSYCHIATRY & NEUROLOGY

## 2018-09-17 PROCEDURE — 74011250637 HC RX REV CODE- 250/637: Performed by: INTERNAL MEDICINE

## 2018-09-17 PROCEDURE — 82962 GLUCOSE BLOOD TEST: CPT

## 2018-09-17 RX ADMIN — RISPERIDONE 0.25 MG: 0.5 TABLET, FILM COATED ORAL at 18:00

## 2018-09-17 RX ADMIN — DILTIAZEM HYDROCHLORIDE 240 MG: 240 CAPSULE, COATED, EXTENDED RELEASE ORAL at 10:23

## 2018-09-17 RX ADMIN — RISPERIDONE 0.25 MG: 0.5 TABLET, FILM COATED ORAL at 10:22

## 2018-09-17 RX ADMIN — DONEPEZIL HYDROCHLORIDE 10 MG: 10 TABLET, FILM COATED ORAL at 10:23

## 2018-09-17 RX ADMIN — DOCUSATE SODIUM 100 MG: 100 CAPSULE, LIQUID FILLED ORAL at 10:21

## 2018-09-17 RX ADMIN — CITALOPRAM HYDROBROMIDE 20 MG: 20 TABLET ORAL at 20:39

## 2018-09-17 NOTE — PROGRESS NOTES
Problem: Falls - Risk of  Goal: *Absence of Falls  Document Kizzy Fall Risk and appropriate interventions in the flowsheet. Outcome: Progressing Towards Goal  Fall Risk Interventions:  Mobility Interventions: Assess mobility with egress test, Communicate number of staff needed for ambulation/transfer, Patient to call before getting OOB  Mentation Interventions: Adequate sleep, hydration, pain control, Bed/chair exit alarm, Door open when patient unattended  Medication Interventions: Patient to call before getting OOB, Teach patient to arise slowly  Elimination Interventions: Bed/chair exit alarm, Call light in reach, Patient to call for help with toileting needs  History of Falls Interventions: Door open when patient unattended, Bed/chair exit alarm    2330 Pt appears asleep in bed. Respirations even and unlabored. Will monitor with Q 15 safety checks.

## 2018-09-17 NOTE — INTERDISCIPLINARY ROUNDS
Behavioral Health Interdisciplinary Rounds     Patient Name: Som Carpenter  Age: 80 y.o. Room/Bed:  746/01  Primary Diagnosis: Late onset Alzheimer's disease with behavioral disturbance   Admission Status: Involuntary Commitment     Readmission within 30 days: no  Power of  in place: yes  Patient requires a blocked bed: no          Reason for blocked bed: n/a    VTE Prophylaxis: Not indicated    Mobility needs/Fall risk: yes  Flu Vaccine : no   Nutritional Plan: no  Consults:          Labs/Testing due today?: no    Sleep hours: 6.0       Participation in Care/Groups:  yes  Medication Compliant?: Yes  PRNS (last 24 hours): None    Restraints (last 24 hours):  no     CIWA (range last 24 hours):     COWS (range last 24 hours):      Alcohol screening (AUDIT) completed -   AUDIT Score: 0     If applicable, date SBIRT discussed in treatment team AND documented:     Tobacco - patient is a smoker: Have You Used Tobacco in the Past 30 Days: No  Illegal Drugs use: Have You Used Any Illegal Substances Over the Past 12 Months: No    24 hour chart check complete: yes     Patient goal(s) for today:   Treatment team focus/goals: Plan to update her [facility] referral   Progress note - She remains pleasant and complaint with her treatment. LOS:  11  Expected LOS: TBD     Financial concerns/prescription coverage:  Medicare   Date of last family contact:   SW spoke to her son today and updated him on the plan.       Family requesting physician contact today:    Discharge plan: she will need SNF placement   Guns in the home:  no     Outpatient provider(s): TBD     Participating treatment team members: Stevan Moralez RN

## 2018-09-17 NOTE — PROGRESS NOTES
Problem: Self Care Deficits Care Plan (Adult)  Goal: *Acute Goals and Plan of Care (Insert Text)  Occupational Therapy Goals  Reviewed/revised as appropriate 9/17/18  Initiated 9/8/2018    1. Patient will perform bathing seated with Rebecca and additional time within 7 days. CONTINUE  2. Patient will perform BSC transfer with Rebecca and RW within 7 days. CONTINUE  3. Patient will perform UB dressing with Rebecca seated within 7 days. CONTINUE  4.  Patient will participate in UE therapeutic exercise/activities with supervision for 8 minutes within 7 days. MET - upgrade seated unsupported 10 min supervision  Occupational Therapy TREATMENT: WEEKLY REASSESSMENT  Patient: Tray Velasco (80 y.o. 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 ASSESSMENT:  Pt cleared by RN for therapy. Pt received in dayroom sitting at table. Pt pleasant and agreeable to therapy session. Noted per ortho, pt is to be NWB through RUE. Educated pt on this precaution, however, pt required constant cues to maintain adherence d/t cognitive deficits/memory. Pt participated in BUE therapeutic exercise, AAROM/PROM of R digits (noted swelling, bruising and limited AROM; pt without pain during PROM), LB dressing and functional transfers. Pt continues to require min-modA to stand and pivot to chair affecting standing ADLs. Continue to strongly recommend rehab at discharge, skilled nursing level. Spoke with PT and discussed trial of RW with platform for RUE.     Progression toward goals:  [x]            Improving appropriately and progressing toward goals  []            Improving slowly and progressing toward goals  []            Not making progress toward goals and plan of care will be adjusted     PLAN:  Goals have been updated based on progression since last assessment. Patient continues to benefit from skilled intervention to address the above impairments. Continue to follow patient 2 times a week to address goals. Planned Interventions:  [x]                    Self Care Training                  [x]             Therapeutic Activities  [x]                    Functional Mobility Training    []             Cognitive Retraining  [x]                    Therapeutic Exercises           [x]             Endurance Activities  [x]                    Balance Training                   []             Neuromuscular Re-Education  []                    Visual/Perceptual Training     [x]        Home Safety Training  [x]                    Patient Education                 [x]             Family Training/Education  []                    Other (comment):  Discharge Recommendations: Skilled Nursing Facility  Further Equipment Recommendations for Discharge: TBD     SUBJECTIVE:   Patient stated I can't see for one. I need to be able to walk. I can't even get myself to the bathroom and when I try to get up I can't see if my foot is on the floor.     OBJECTIVE DATA SUMMARY:   Cognitive/Behavioral Status:  Neurologic State: Alert;Confused  Orientation Level: Oriented to person  Cognition: Memory loss; Impaired decision making; Follows commands  Perception: Appears intact  Perseveration: No perseveration noted  Safety/Judgement: Decreased awareness of environment;Decreased awareness of need for safety;Decreased insight into deficits    Functional Mobility and Transfers for ADLs:  Bed Mobility:  Sit to Supine: Supervision    Transfers:  Sit to Stand: Minimum assistance           Balance:  Sitting: Intact; Without support    ADL Intervention:                           Lower Body Dressing Assistance  Socks: Supervision/set-up         Cognitive Retraining  Safety/Judgement: Decreased awareness of environment;Decreased awareness of need for safety;Decreased insight into deficits    Therapeutic Exercises:     EXERCISE   Sets   Reps   Active Active Assist   Passive   Comments   Finger flexion/extension 2 10 [x]           [x]           [x]              Elbow flexion/extension 1 10 [x]           []           []              Shoulder flexion 1 10 [x]           []           []              Scapular retraction 1 10 [x]           []           []                        Activity Tolerance:   VSS    Please refer to the flowsheet for vital signs taken during this treatment.   After treatment:   [] Patient left in no apparent distress sitting up in chair  [x] Patient left in no apparent distress in bed  [x] Call bell left within reach  [x] Nursing notified  [] Caregiver present  [x] Bed alarm activated    COMMUNICATION/COLLABORATION:   The patients plan of care was discussed with: Physical Therapist and Registered Nurse    Rolando Palmer OT  Time Calculation: 25 mins

## 2018-09-17 NOTE — PROGRESS NOTES
Problem: Mobility Impaired (Adult and Pediatric)  Goal: *Acute Goals and Plan of Care (Insert Text)  Physical Therapy Goals  Initiated 9/8/2018, re-assessed 9/17/2018 continue progressing towards CGA  1. Patient will move from supine to sit and sit to supine , scoot up and down and roll side to side in bed with minimal assistance/contact guard assist within 7 day(s). 2.  Patient will transfer from bed to chair and chair to bed with minimal assistance/contact guard assist using the least restrictive device within 7 day(s). 3.  Patient will perform sit to stand with minimal assistance/contact guard assist within 7 day(s). 4.  Patient will ambulate with minimal assistance/contact guard assist for 50 feet with the least restrictive device within 7 day(s). physical Therapy TREATMENT: WEEKLY REASSESSMENT  Patient: Tray Mackenzie (80 y.o. 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 ASSESSMENT:  Pt cleared for therapy. Received supine in bed and eager to work with therapy despite being up for hours this morning. Pt fitted for platform walker given NWBing status on RUE. Stood to [employer] x 3 reps with Min A and verbal cueing with hand placement and sequencing. Stood x 45 seconds - 1 min each trial. Marching in place with progressive flexing of knees with fatigue. 3 side steps taken at EOB. Pt performed seated and supine exercises with Min A. Limited by decreased trunk strength with tremors noted with bridging requiring assist at hips. Distal strength > proximal bilaterally. Will continue to follow to progress gait training with platform walker.    Patient's progression toward goals since last assessment: NWBing confirmed. Minimal strength progress     PLAN:  Goals have been updated based on progression since last assessment. Patient continues to benefit from skilled intervention to address the above impairments. Continue to follow the patient 3 times a week to address goals. Planned Interventions:  [x]              Bed Mobility Training             [x]       Neuromuscular Re-Education  [x]              Transfer Training                   []       Orthotic/Prosthetic Training  [x]              Gait Training                         []       Modalities  [x]              Therapeutic Exercises           []       Edema Management/Control  [x]              Therapeutic Activities            [x]       Patient and Family Training/Education  []              Other (comment):  Discharge Recommendations: Skilled Nursing Facility  Further Equipment Recommendations for Discharge: TBD at rehab     SUBJECTIVE:   Patient stated I want to be able to stand up.     OBJECTIVE DATA SUMMARY:   Critical Behavior:  Neurologic State: Alert, Confused  Orientation Level: Oriented to person  Cognition: Memory loss, Impaired decision making, Follows commands  Safety/Judgement: Decreased awareness of environment, Decreased awareness of need for safety, Decreased insight into deficits    Strength:                          Functional Mobility Training:  Bed Mobility:     Supine to Sit: Minimum assistance  Sit to Supine: Contact guard assistance           Transfers:  Sit to Stand: Minimum assistance  Stand to Sit: Contact guard assistance                             Balance:  Sitting: Impaired  Sitting - Static: Good (unsupported)  Sitting - Dynamic: Fair (occasional)  Standing: Impaired  Standing - Static: Fair  Standing - Dynamic : Poor  Ambulation/Gait Training:                                                       Pain:  Pain Scale 1: Numeric (0 - 10)  Pain Intensity 1: 0              Activity Tolerance:   Good  Please refer to the flowsheet for vital signs taken during this treatment.   After treatment:   []  Patient left in no apparent distress sitting up in chair  [x]  Patient left in no apparent distress in bed  [x]  Call bell left within reach  [x]  Nursing notified  []  Caregiver present  [x]  Bed alarm activated    COMMUNICATION/COLLABORATION:   The patients plan of care was discussed with: Registered Nurse    Ana Laura Soliman, PT   Time Calculation: 40 mins

## 2018-09-17 NOTE — PROGRESS NOTES
Problem: Altered Thought Process (Adult/Pediatric)  Goal: *STG: Participates in treatment plan  Outcome: Progressing Towards Goal  Pt is calm cooperative and pleasantly confused   2 person assist pivots well   Med/Meal compliant   Ate 40% breakfast and 20% of lunch   NAD noted   Will continue to monitor.

## 2018-09-18 PROBLEM — F03.90 DEMENTIA (HCC): Status: ACTIVE | Noted: 2018-09-18

## 2018-09-18 LAB
ANION GAP SERPL CALC-SCNC: 10 MMOL/L (ref 5–15)
BUN SERPL-MCNC: 11 MG/DL (ref 6–20)
BUN/CREAT SERPL: 12 (ref 12–20)
CALCIUM SERPL-MCNC: 9.3 MG/DL (ref 8.5–10.1)
CHLORIDE SERPL-SCNC: 107 MMOL/L (ref 97–108)
CO2 SERPL-SCNC: 26 MMOL/L (ref 21–32)
CREAT SERPL-MCNC: 0.89 MG/DL (ref 0.55–1.02)
GLUCOSE BLD STRIP.AUTO-MCNC: 111 MG/DL (ref 65–100)
GLUCOSE BLD STRIP.AUTO-MCNC: 116 MG/DL (ref 65–100)
GLUCOSE SERPL-MCNC: 101 MG/DL (ref 65–100)
POTASSIUM SERPL-SCNC: 3.5 MMOL/L (ref 3.5–5.1)
SERVICE CMNT-IMP: ABNORMAL
SERVICE CMNT-IMP: ABNORMAL
SODIUM SERPL-SCNC: 143 MMOL/L (ref 136–145)

## 2018-09-18 PROCEDURE — 65220000003 HC RM SEMIPRIVATE PSYCH

## 2018-09-18 PROCEDURE — 74011250637 HC RX REV CODE- 250/637: Performed by: PSYCHIATRY & NEUROLOGY

## 2018-09-18 PROCEDURE — 97535 SELF CARE MNGMENT TRAINING: CPT

## 2018-09-18 PROCEDURE — 80048 BASIC METABOLIC PNL TOTAL CA: CPT | Performed by: PSYCHIATRY & NEUROLOGY

## 2018-09-18 PROCEDURE — 36415 COLL VENOUS BLD VENIPUNCTURE: CPT | Performed by: PSYCHIATRY & NEUROLOGY

## 2018-09-18 PROCEDURE — 82962 GLUCOSE BLOOD TEST: CPT

## 2018-09-18 PROCEDURE — 74011250637 HC RX REV CODE- 250/637: Performed by: INTERNAL MEDICINE

## 2018-09-18 RX ADMIN — RISPERIDONE 0.25 MG: 0.5 TABLET, FILM COATED ORAL at 17:06

## 2018-09-18 RX ADMIN — DILTIAZEM HYDROCHLORIDE 240 MG: 240 CAPSULE, COATED, EXTENDED RELEASE ORAL at 10:24

## 2018-09-18 RX ADMIN — DONEPEZIL HYDROCHLORIDE 10 MG: 10 TABLET, FILM COATED ORAL at 10:24

## 2018-09-18 RX ADMIN — RISPERIDONE 0.25 MG: 0.5 TABLET, FILM COATED ORAL at 10:23

## 2018-09-18 RX ADMIN — CITALOPRAM HYDROBROMIDE 20 MG: 20 TABLET ORAL at 20:46

## 2018-09-18 NOTE — BH NOTES
GROUP THERAPY PROGRESS NOTE    Tray Regan is participating in Leisure-Creative Group. Group time: 30 minutes    Personal goal for participation: To engage in creative and relaxing activities. Goal orientation: relaxation    Group therapy participation: passive    Therapeutic interventions reviewed and discussed: Patients were encouraged to select and participate in an activity that they found relaxing, so as to be used as one of their positive coping skills. Patient's chose from journaling, writing poetry, completing puzzles, or working on artwork. Impression of participation: Patient was present in group and listened but stated that she was feeling tired and only felt like listening, not actually participating.

## 2018-09-18 NOTE — PROGRESS NOTES
Problem: Falls - Risk of  Goal: *Absence of Falls  Document Kizzy Fall Risk and appropriate interventions in the flowsheet. Outcome: Progressing Towards Goal  Fall Risk Interventions:  Mobility Interventions: Bed/chair exit alarm, Patient to call before getting OOB  Mentation Interventions: Bed/chair exit alarm, Adequate sleep, hydration, pain control  Medication Interventions: Teach patient to arise slowly, Patient to call before getting OOB, Bed/chair exit alarm  Elimination Interventions: Call light in reach, Bed/chair exit alarm  History of Falls Interventions: Door open when patient unattended, Bed/chair exit alarm    2345 Pt appears asleep in bed. Respirations even and unlabored. Will monitor with Q 15 safety checks.

## 2018-09-18 NOTE — PROGRESS NOTES
Problem: Discharge Planning  Goal: *Discharge to safe environment  Outcome: Progressing Towards Goal  Working on SNF placement   Goal: *Knowledge of medication management  Outcome: Progressing Towards Goal  She is compliant with her care and medications   Goal: *Knowledge of discharge instructions  Outcome: Not Progressing Towards Goal  She is unable to verbalize discharge instructions     Problem: Patient Education: Go to Patient Education Activity  Goal: Patient/Family Education  Outcome: Progressing Towards Goal  SW will educate family on discharge plans.

## 2018-09-18 NOTE — PROGRESS NOTES
Problem: Self Care Deficits Care Plan (Adult)  Goal: *Acute Goals and Plan of Care (Insert Text)  Occupational Therapy Goals  Reviewed/revised as appropriate 9/17/18  Initiated 9/8/2018    1. Patient will perform bathing seated with Rebecca and additional time within 7 days. CONTINUE  2. Patient will perform BSC transfer with Rebecca and RW within 7 days. CONTINUE  3. Patient will perform UB dressing with Rebecca seated within 7 days. CONTINUE  4.  Patient will participate in UE therapeutic exercise/activities with supervision for 8 minutes within 7 days. MET - upgrade seated unsupported 10 min supervision   Occupational Therapy TREATMENT  Patient: Tray Mendoza Oar [de-identified]80 y.o. female)  Date: 9/18/2018  Diagnosis: Late onset Alzheimer's disease with behavioral disturbance  Aggression  Psychosis  Psychosis  Late onset Alzheimer's disease with behavioral disturbance Dementia       Precautions: Fall  Chart, occupational therapy assessment, plan of care, and goals were reviewed. ASSESSMENT:  Patient received in bed. Patient participated in bed mobility, active and simulated self care with min to mod assist, sit to stand with platform walker (platform on right secondary to NWB status of right UE) with min assist to stand and min assist to manage right UE on and off of platform. Patient was able to sidestep for 4 steps in each direction with min assist and intermittent assist to move RW. She is alert, oriented to name and \"[facility]\". Patient follows 100% of commands. Nurse reports patient using BSC with assist of 2 to transfer, but was having difficulty with left knee. Patient reports no pain with left knee at this time, but reports back pain with mobility at times. Recommended to nursing that they do a transfer to [facility] next to bed using 2 people and platform RW. Goal of progression to transfer with assist of 1 and platform RW. Also recommended that patient participate in self care with assist as needed.  Participation impacted by impaired ability to use right UE, impaired memory/orientation, impaired strength and endurance for functional activity, and impaired standing endurance and balance. Recommend rehab. Progression toward goals:  []       Improving appropriately and progressing toward goals  [x]       Improving slowly and progressing toward goals  []       Not making progress toward goals and plan of care will be adjusted     PLAN:  Patient continues to benefit from skilled intervention to address the above impairments. Continue treatment per established plan of care. Discharge Recommendations:  Rehab/SNF  Further Equipment Recommendations for Discharge:  none     SUBJECTIVE:   Patient stated Jermaine Onofre, [facility]? Jaskaran Rodriguez    OBJECTIVE DATA SUMMARY:   Cognitive/Behavioral Status:  Neurologic State: Alert  Orientation Level: Oriented to person (\"[facility]\")  Cognition: Follows commands;Memory loss;Decreased attention/concentration  Perception: Appears intact  Perseveration: No perseveration noted  Safety/Judgement: Decreased awareness of environment    Functional Mobility and Transfers for ADLs:  Bed Mobility:  Supine to Sit: Minimum assistance;Assist x1  Sit to Supine: Supervision;Assist x1    Transfers:  Sit to Stand: Minimum assistance;Assist x1          Balance:  Sitting: Impaired  Sitting - Static: Good (unsupported)  Sitting - Dynamic: Fair (occasional)  Standing: Impaired; With support (platform for right UE attached to RW)  Standing - Static: Fair  Standing - Dynamic : Fair    ADL Intervention:            Upper Body Bathing  Bathing Assistance:  Moderate assistance (in simulation; NWB right UE)  Position Performed: Seated edge of bed    Lower Body Bathing  Lower Body : Minimum assistance (in simulation) not including perineal.  Position Performed: Seated edge of bed  Cues: Physical assistance         Lower Body Dressing Assistance  Pants With Elastic Waist: Moderate assistance (inferred from simulation and mobility)  Socks: Moderate assistance  Leg Crossed Method Used: Yes  Position Performed: Seated edge of bed;Standing  Adaptive Equipment Used: Walker (with platform for right UE)         Cognitive Retraining  Orientation Retraining: Reorienting;Place;Time  Organizing/Sequencing: Breaking task down  Following Commands: Follows one step commands/directions  Safety/Judgement: Decreased awareness of environment  Cues: Verbal cues provided; Tactile cues provided       Activity Tolerance:   Fair  Please refer to the flowsheet for vital signs taken during this treatment.   After treatment:   [] Patient left in no apparent distress sitting up in chair  [x] Patient left in no apparent distress in bed  [x] Call bell left within reach  [x] Nursing notified  [] Caregiver present  [x] Bed alarm activated    COMMUNICATION/COLLABORATION:   The patients plan of care was discussed with: Registered Nurse    EDDIE Zhou  Time Calculation: 27 mins

## 2018-09-18 NOTE — BH NOTES
PSYCHIATRIC PROGRESS NOTE         Patient Name  Francoise Osler   Date of Birth 5/24/1933   [facility] [id number]   Medical Record Number  [id number]      Age  80 y.o. PCP Marimar Estrada MD   Admit date:  9/6/2018    Room Number  746/01  @ [facility]   Date of Service  09/18/18          PSYCHOTHERAPY SESSION NOTE:  Length of psychotherapy session: 20 minutes    Main condition/diagnosis/issues treated during session today, 9/18/2018 : I employed Cognitive Behavioral therapy techniques, Reality-Oriented psychotherapy, as well as supportive psychotherapy in regards to various ongoing psychosocial stressors, including the following: pre-admission and current problems; housing issues;   medical issues; and stress of hospitalization. Interpersonal relationship issues and psychodynamic conflicts explored. Attempts made to alleviate maladaptive patterns. Overall, patient is not progressing    Treatment Plan Update (reviewed an updated 9/18/2018) : I will modify psychotherapy tx plan by implementing more stress management strategies, building upon cognitive behavioral techniques, increasing coping skills, as well as shoring up psychological defenses). An extended energy and skill set was needed to engage pt in psychotherapy due to some of the following: resistiveness, complexity, negativity, confrontational nature, hostile behaviors, and/or severe abnormalities in thought processes/psychosis resulting in the loss of expressive/receptive language communication skills. E & M PROGRESS NOTE:         HISTORY         HISTORY OF PRESENT ILLNESS/INTERVAL HISTORY:  (reviewed/updated 9/18/2018). per initial evaluation: The patient, Tray Paige, is a 80 y.o.   WHITE [place]  female with a past psychiatric history significant for Dementia, MDD who was treated on [facility] for several weeks, until she had a syncopal episode on 9/5 and transferred to the medical floor for observation for 24 hours. She now returns medically stable. Patient's mood and presesentation hs improved compared to original presentation on admission. She remains very confused secondary to advanced dementia. No agitation or aggression noted. Compliant with her medications. Awaiting placement vs. Return home. 9/8/18. \"Am I going home today\". No complaints. Seems calmer. 9/9/18. No complaints. No agitation. Slept ok  9/10/18- Funny and engaging this morning. She enjoyed singing in group this morning. Sleeping well at night. 9/11/18- Patient remains stable. 9/12/18- No complaints, no acute behviors. Bright and euthymic affect. Eating meals, sleeping well. 9/13- patient remains stable  9/14/18- euthymic mood, talkative. Pleasant and cooperative. 9/16/18: seen today,funny at times, accepting po med and meals,no aggressin or agitation,requires assistance  with ADL. 9/17/18- Ms. Regan denies any complaints this morning. Resting comfortably, then easily eating and enjoying her lunch. NO changes. Patient remains stable. 9/18/18- patient presented in upbeat mood, observed sitting and laughing with her . Patient remains compliant and stable. SIDE EFFECTS: (reviewed/updated 9/18/2018)  None reported or admitted to. ALLERGIES:(reviewed/updated 9/18/2018)  Allergies   Allergen Reactions    Angiotensin Ii,Human Other (comments)     States does not remember      Avelox [Moxifloxacin] Other (comments)     States does not remember      Demerol [Meperidine] Hives      MEDICATIONS PRIOR TO ADMISSION:(reviewed/updated 9/18/2018)  Prescriptions Prior to Admission   Medication Sig    traZODone (DESYREL) 50 mg tablet Take 0.5 Tabs by mouth nightly for 30 days. Indications: insomnia associated with depression    risperiDONE (RISPERDAL) 0.25 mg tablet Take 1 Tab by mouth two (2) times a day for 30 days.  magnesium hydroxide (MILK OF MAGNESIA) 400 mg/5 mL suspension Take 30 mL by mouth daily for 30 days.     donepezil (ARICEPT) 10 mg tablet Take 1 Tab by mouth daily for 30 days.  acetaminophen (TYLENOL) 325 mg tablet Take 2 Tabs by mouth every six (6) hours as needed for Pain.  docusate sodium (COLACE) 100 mg capsule Take 1 Cap by mouth daily for 90 days.  dilTIAZem CD (CARTIA XT) 240 mg ER capsule Take 240 mg by mouth daily.  polyethylene glycol (MIRALAX) 17 gram packet Take 17 g by mouth as needed (Constipation).  citalopram (CELEXA) 20 mg tablet TAKE 1 TABLET BY MOUTH DAILY      PAST MEDICAL HISTORY: Past medical history from the initial psychiatric evaluation has been reviewed (reviewed/updated 9/18/2018) with no additional updates (I asked patient and no additional past medical history provided). Past Medical History:   Diagnosis Date    Anemia     Atrial fibrillation ([address]     Cancer (HCC)     basal cell on nose    Chronic pain     back pain    Fibromyalgia 4/1/2010    GERD (gastroesophageal reflux disease) 4/1/2010    Hiatal hernia 4/1/2010    High cholesterol 4/1/2010    HTN (hypertension) 4/1/2010    Ill-defined condition     A. Fib    OA (osteoarthritis) 4/1/2010    back    Pulmonary emboli ([facility] [address] 2015     Past Surgical History:   Procedure Laterality Date    BREAST SURGERY PROCEDURE UNLISTED      Breast im-plants x 2    ENLARGE BREAST WITH IMPLANT      HX APPENDECTOMY      HX BREAST BIOPSY      HX BREAST RECONSTRUCTION      Breast implants removed 1992    HX CATARACT REMOVAL      HX CHOLECYSTECTOMY  9/11/2013    HX HYSTERECTOMY      HX KNEE REPLACEMENT  2008    bilateral    HX ORTHOPAEDIC  2007    Bilateral TKR    HX PARTIAL HYSTERECTOMY      HX SHOULDER REPLACEMENT  2009    left    HX TONSILLECTOMY        SOCIAL HISTORY: Social history from the initial psychiatric evaluation has been reviewed (reviewed/updated 9/18/2018) with no additional updates (I asked patient and no additional social history provided).    Social History     Social History    Marital status:  Spouse name: N/A    Number of children: N/A    Years of education: N/A     Occupational History    Not on file. Social History Main Topics    Smoking status: Never Smoker    Smokeless tobacco: Never Used    Alcohol use No    Drug use: No    Sexual activity: No     Other Topics Concern    Not on file     Social History Narrative      FAMILY HISTORY: Family history from the initial psychiatric evaluation has been reviewed (reviewed/updated 9/18/2018) with no additional updates (I asked patient and no additional family history provided). Family History   Problem Relation Age of Onset   Osmin Bryant Arthritis-rheumatoid Mother     Dementia Mother     Coronary Artery Disease Father     Cancer Brother      lung cancer       REVIEW OF SYSTEMS: (reviewed/updated 9/18/2018)  Appetite:improved   Sleep: improved   All other Review of Systems: General ROS: negative         MENTAL STATUS EXAM & VITALS     MENTAL STATUS EXAM (MSE):    MSE FINDINGS ARE WITHIN NORMAL LIMITS (WNL) UNLESS OTHERWISE STATED BELOW. ( ALL OF THE BELOW CATEGORIES OF THE MSE HAVE BEEN REVIEWED (reviewed 9/18/2018) AND UPDATED AS DEEMED APPROPRIATE )  General Presentation age appropriate and older than stated age, cooperative   Orientation not oriented to situation   Vital Signs  See below (reviewed 9/18/2018); Vital Signs (BP, Pulse, & Temp) are within normal limits if not listed below.    Gait and Station Stable/steady, no ataxia   Musculoskeletal System No extrapyramidal symptoms (EPS); no abnormal muscular movements or Tardive Dyskinesia (TD); muscle strength and tone are within normal limits   Language No aphasia or dysarthria   Speech:  soft   Thought Processes illogical; slow rate of thoughts; poor abstract reasoning/computation   Thought Associations loose associations   Thought Content free of delusions   Suicidal Ideations no plan    Homicidal Ideations no plan    Mood:  euthymic   Affect:  euthymic   Memory recent  impaired   Memory remote: impaired   Concentration/Attention:  distractable   Fund of Knowledge below average   Insight:  limited   Reliability limited   Judgment:  limited          VITALS:     Patient Vitals for the past 24 hrs:   Temp Pulse Resp BP SpO2   09/18/18 0840 97.8 °F (36.6 °C) 90 16 136/67 94 %   09/17/18 2039 97.6 °F (36.4 °C) 82 18 150/66 95 %   09/17/18 1628 98.2 °F (36.8 °C) 74 18 139/86 94 %     Wt Readings from Last 3 Encounters:   09/18/18 66.9 kg (147 lb 8 oz)   09/02/18 64.5 kg (142 lb 3.2 oz)   08/21/18 76.1 kg (167 lb 12.8 oz)     Temp Readings from Last 3 Encounters:   09/18/18 97.8 °F (36.6 °C)   09/06/18 98.9 °F (37.2 °C)   09/05/18 98.1 °F (36.7 °C)     BP Readings from Last 3 Encounters:   09/18/18 136/67   09/06/18 146/77   09/05/18 103/63     Pulse Readings from Last 3 Encounters:   09/18/18 90   09/06/18 (!) 105   09/05/18 68            DATA     LABORATORY DATA:(reviewed/updated 9/18/2018)  Recent Results (from the past 24 hour(s))   GLUCOSE, POC    Collection Time: 09/17/18  4:40 PM   Result Value Ref Range    Glucose (POC) 143 (H) 65 - 100 mg/dL    Performed by [employer]    METABOLIC PANEL, BASIC    Collection Time: 09/18/18  5:30 AM   Result Value Ref Range    Sodium 143 136 - 145 mmol/L    Potassium 3.5 3.5 - 5.1 mmol/L    Chloride 107 97 - 108 mmol/L    CO2 26 21 - 32 mmol/L    Anion gap 10 5 - 15 mmol/L    Glucose 101 (H) 65 - 100 mg/dL    BUN 11 6 - 20 MG/DL    Creatinine 0.89 0.55 - 1.02 MG/DL    BUN/Creatinine ratio 12 12 - 20      GFR est AA >60 >60 ml/min/1.73m2    GFR est non-AA >60 >60 ml/min/1.73m2    Calcium 9.3 8.5 - 10.1 MG/DL   GLUCOSE, POC    Collection Time: 09/18/18  7:37 AM   Result Value Ref Range    Glucose (POC) 116 (H) 65 - 100 mg/dL    Performed by Jasiel Kline      No results found for: VALF2, VALAC, VALP, VALPR, DS6, CRBAM, CRBAMP, CARB2, XCRBAM  No results found for: LITHM   RADIOLOGY REPORTS:(reviewed/updated 9/18/2018)  Xr Pelv Ap Only    Addendum Date: 8/14/2018 Addendum: No fracture. Result Date: 8/14/2018  Clinical history: Fall yesterday FINDINGS: Single frontal view of the pelvis is obtained. There is no fracture or dislocation identified. There is no significant soft tissue abnormality. IMPRESSION: No acute fracture. Xr Forearm Rt Ap/lat    Result Date: 8/13/2018  EXAM:  XR FOREARM RT AP/LAT Clinical history: Distal radial fracture. INDICATION:   fall. COMPARISON: None. FINDINGS: Two views of the right radius and ulna demonstrate distal radial fracture. Extensive degenerative change at the radiocarpal and carpometacarpal rows. .     IMPRESSION:  Distal radial fracture with minimal displacement. Severe degenerative change at the wrist..     Xr Wrist Rt Ap/lat    Result Date: 8/27/2018  EXAM: XR WRIST RT AP/LAT INDICATION:  R wrist fracture follow up, was not casted and pt not leaving splint on. COMPARISON: 8/14/2018. FINDINGS: Two  views of the right wrist demonstrate no appreciable change in the T-shaped interarticular distal radial fracture and ulnar styloid fracture in fiberglass. There is chondrocalcinosis of the radiocarpal joint and TFC. Lateral carpal arthritis is noted. Osteopenia is present. IMPRESSION:  No significant change in the right T-shaped interarticular distal radial and ulna styloid fractures in the splint. Xr Wrist Rt Ap/lat/obl Min 3v    Result Date: 8/14/2018  EXAM: XR WRIST RT AP/LAT/OBL MIN 3V Clinical history: Fall, broken wrist INDICATION:  fall, broken wrist. COMPARISON: None. FINDINGS: Three  views of the right wrist demonstrate postreduction images distal radial fracture. .  Soft tissue edema. .     IMPRESSION:  Postreduction images distal radial fracture. .     Xr Wrist Rt Ap/lat/obl Min 3v    Result Date: 8/13/2018  EXAM: XR WRIST RT AP/LAT/OBL MIN 3V HISTORY: Fall, fell in bathroom, right arm INDICATION:  fall. COMPARISON: None. FINDINGS: Three  views of the right wrist demonstrate nondisplaced distal radius fracture. Severe degenerative change of the radiocarpal and carpometacarpal rows. No ulnar fracture. .  The soft tissues are within normal limits. IMPRESSION:  Nondisplaced fracture of the distal radius. 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     Ct Head Wo Cont    Result Date: 8/23/2018  EXAM:  CT head without contrast INDICATION: Altered mental status COMPARISON: CT head 8/14/2018 TECHNIQUE: Axial noncontrast head CT from foramen magnum to vertex. Coronal and sagittal reformatted images were obtained. CT dose reduction was achieved through use of a standardized protocol tailored for this examination and automatic exposure control for dose modulation. Adaptive statistical iterative reconstruction (ASIR) was utilized. FINDINGS:  There is diffuse age-related parenchymal volume loss. The ventricles and sulci are age-appropriate without hydrocephalus. There is no mass effect or midline shift. There is no intracranial hemorrhage or extra-axial fluid collection. Scattered foci of low attenuation in the periventricular white matter represent stable chronic microvascular ischemic changes. There is no new abnormal parenchymal attenuation. The gray-white matter differentiation is maintained. The basal cisterns are patent. 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 The osseous structures are intact. The visualized paranasal sinuses and mastoid air cells are clear. IMPRESSION: There is no acute intracranial abnormality. Ct Head Wo Cont    Result Date: 8/14/2018  EXAM:  CT HEAD WO CONT Clinical history: Fall, fractured wrist, increased pain INDICATION:   fall COMPARISON: 7/20/2018. CONTRAST:  None. TECHNIQUE: Unenhanced CT of the head was performed using 5 mm images. Brain and bone windows were generated. CT dose reduction was achieved through use of a standardized protocol tailored for this examination and automatic exposure control for dose modulation. FINDINGS: The ventricles and sulci are normal in size, shape and configuration and midline. Mild scattered hypodensities in the cerebral white matter. There is no intracranial hemorrhage, extra-axial collection, mass, mass effect or midline shift. The basilar cisterns are open. No acute infarct is identified. The bone windows demonstrate no abnormalities. The visualized portions of the paranasal sinuses and mastoid air cells are clear. IMPRESSION: No acute cranial process     Ct Head Wo Cont    Result Date: 7/20/2018  EXAM:  CT HEAD WO CONT INDICATION: Fall going to the bathroom and had a ground level fall and hit head on the floor. COMPARISON: MRI brain 11/12/2012. Verna Mera CONTRAST:  None. TECHNIQUE: Unenhanced CT of the head was performed using 5 mm images. Brain and bone windows were generated. CT dose reduction was achieved through use of a standardized protocol tailored for this examination and automatic exposure control for dose modulation. FINDINGS: There is a stable 9 x 12 mm partially calcified extra-axial lesion in the left posterior fossa compatible with meningioma. There is no acute intracranial hemorrhage, other mass, mass effect or herniation. Ventricles and sulci show stable, proportionate, and symmetric pattern. There is a stable pattern of periventricular white matter hypodensity. The gray-white matter differentiation is well-preserved. Atherosclerotic calcifications are seen within the carotid siphons and vertebral arteries. The mastoid air cells are well pneumatized. The visualized paranasal sinuses are normal.     IMPRESSION: No acute intracranial hemorrhage or infarct. Stable left posterior fossa meningioma and chronic white matter change most compatible with small vessel ischemic and/or senescent change. Intracranial atherosclerosis. Ct Abd Pelv Wo Cont    Result Date: 6/20/2018  EXAM:  CT ABD PELV WO CONT INDICATION: abd pain  2 days of abdominal pain COMPARISON: 2013 CONTRAST:  None. TECHNIQUE: Thin axial images were obtained through the abdomen and pelvis. Coronal and sagittal reconstructions were generated. Oral contrast was not administered. CT dose reduction was achieved through use of a standardized protocol tailored for this examination and automatic exposure control for dose modulation. The absence of intravenous contrast material reduces the sensitivity for evaluation of the solid parenchymal organs of the abdomen. FINDINGS: LUNG BASES: Clear. INCIDENTALLY IMAGED HEART AND MEDIASTINUM: Unremarkable. LIVER: No mass or biliary dilatation. GALLBLADDER: Surgically removed. SPLEEN: No mass. PANCREAS: No mass or ductal dilatation. ADRENALS: Unremarkable. KIDNEYS/URETERS: Malrotated right kidney. Hyperdense right renal cyst. Right nephrolithiasis. STOMACH: Hiatal hernia. SMALL BOWEL: No dilatation or wall thickening. COLON: No dilatation or wall thickening. Moderate colonic stool. APPENDIX: Surgically removed PERITONEUM: No ascites or pneumoperitoneum. RETROPERITONEUM: No lymphadenopathy or aortic aneurysm. REPRODUCTIVE ORGANS: Surgically removed. URINARY BLADDER: No mass or calculus. BONES: No destructive bone lesion. Multilevel degenerative changes. ADDITIONAL COMMENTS: N/A     IMPRESSION: No acute findings. Xr Chest Port    Result Date: 6/20/2018  EXAM:  XR CHEST PORT INDICATION:  Chest Pain COMPARISON:  May 24 FINDINGS: A portable AP radiograph of the chest was obtained at 0555 hours. There is a left shoulder replacement in place. The patient is on a cardiac monitor. The lungs are clear. The cardiac and mediastinal contours and pulmonary vascularity are normal.  The bones and soft tissues are grossly within normal limits. IMPRESSION: No acute findings.           MEDICATIONS     ALL MEDICATIONS:   Current Facility-Administered Medications   Medication Dose Route Frequency    [START ON 9/20/2018] influenza vaccine 2018-19 (6 mos+)(PF) (FLUARIX QUAD/FLULAVAL QUAD) injection 0.5 mL 0.5 mL IntraMUSCular PRIOR TO DISCHARGE    dilTIAZem CD (CARDIZEM CD) capsule 240 mg  240 mg Oral DAILY    magnesium hydroxide (MILK OF MAGNESIA) 400 mg/5 mL oral suspension 30 mL  30 mL Oral DAILY PRN    citalopram (CELEXA) tablet 20 mg  20 mg Oral QHS    docusate sodium (COLACE) capsule 100 mg  100 mg Oral DAILY    donepezil (ARICEPT) tablet 10 mg  10 mg Oral DAILY    polyethylene glycol (MIRALAX) packet 17 g  17 g Oral DAILY PRN    risperiDONE (RisperDAL) tablet 0.25 mg  0.25 mg Oral BID    saline peripheral flush soln 5 mL  5 mL InterCATHeter PRN    traZODone (DESYREL) tablet 25 mg  25 mg Oral QHS PRN    OLANZapine (ZyPREXA) tablet 2.5 mg  2.5 mg Oral Q6H PRN    ziprasidone (GEODON) 10 mg in sterile water (preservative free) 0.5 mL injection  10 mg IntraMUSCular BID PRN    benztropine (COGENTIN) tablet 1 mg  1 mg Oral BID PRN    benztropine (COGENTIN) injection 1 mg  1 mg IntraMUSCular BID PRN    zolpidem (AMBIEN) tablet 5 mg  5 mg Oral QHS PRN    acetaminophen (TYLENOL) tablet 650 mg  650 mg Oral Q4H PRN    ibuprofen (MOTRIN) tablet 400 mg  400 mg Oral Q8H PRN      SCHEDULED MEDICATIONS:   Current Facility-Administered Medications   Medication Dose Route Frequency    [START ON 9/20/2018] influenza vaccine 2018-19 (6 mos+)(PF) (FLUARIX QUAD/FLULAVAL QUAD) injection 0.5 mL  0.5 mL IntraMUSCular PRIOR TO DISCHARGE    dilTIAZem CD (CARDIZEM CD) capsule 240 mg  240 mg Oral DAILY    citalopram (CELEXA) tablet 20 mg  20 mg Oral QHS    docusate sodium (COLACE) capsule 100 mg  100 mg Oral DAILY    donepezil (ARICEPT) tablet 10 mg  10 mg Oral DAILY    risperiDONE (RisperDAL) tablet 0.25 mg  0.25 mg Oral BID          ASSESSMENT & PLAN     DIAGNOSES REQUIRING ACTIVE TREATMENT AND MONITORING: (reviewed/updated 9/18/2018)  Patient Active Hospital Problem List:    The patient, Cottie Gerardine Hodgkins, is a 80 y.o.  female who presents at this time for treatment of the following diagnoses:  Patient C/Pollo [address] [id number] Problem List:   Late onset Alzheimer's disease  (8/24/2018)    Assessment: severe, advanced    Plan: Agree with inpatient hospitalization for further stabilization, safety monitoring and medication management  Medications- Aricept and risperdal   09/16/18: Continue current treatment. In summary, Tray Augustin, is a 80 y.o.  female who presents with a severe exacerbation of the principal diagnosis of Dementia  Patient's condition is worsening/not improving/not stable improving. Patient requires continued inpatient hospitalization for further stabilization, safety monitoring and medication management. I will continue to coordinate the provision of individual, milieu, occupational, group, and substance abuse therapies to address target symptoms/diagnoses as deemed appropriate for the individual patient. A coordinated, multidisplinary treatment team round was conducted with the patient (this team consists of the nurse, psychiatric unit pharmcist,  and writer). Complete current electronic health record for patient has been reviewed today including consultant notes, ancillary staff notes, nurses and psychiatric tech notes. Suicide risk assessment completed and patient deemed to be of low risk for suicide at this time. The following regarding medications was addressed during rounds with patient:   the risks and benefits of the proposed medication. The patient was given the opportunity to ask questions. Informed consent given to the use of the above medications. Will continue to adjust psychiatric and non-psychiatric medications (see above \"medication\" section and orders section for details) as deemed appropriate & based upon diagnoses and response to treatment. I will continue to order blood tests/labs and diagnostic tests as deemed appropriate and review results as they become available (see orders for details and above listed lab/test results).     I will order psychiatric records from previous [facility] hospitals to further elucidate the nature of patient's psychopathology and review once available. I will gather additional collateral information from friends, family and o/p treatment team to further elucidate the nature of patient's psychopathology and baselline level of psychiatric functioning. I certify that this patient's inpatient psychiatric hospital services furnished since the previous certification were, and continue to be, required for treatment that could reasonably be expected to improve the patient's condition, or for diagnostic study, and that the patient continues to need, on a daily basis, active treatment furnished directly by or requiring the supervision of inpatient psychiatric facility personnel. In addition, the hospital records show that services furnished were intensive treatment services, admission or related services, or equivalent services.     EXPECTED DISCHARGE DATE/DAY: TBD     DISPOSITION: Home       Signed By:   Gwynneth Nageotte, MD  9/18/2018

## 2018-09-18 NOTE — PROGRESS NOTES
Problem: Altered Thought Process (Adult/Pediatric)  Goal: *STG: Demonstrates ability to understand and use improved judgment in daily activities and relationships  Outcome: Progressing Towards Goal    Received patient resting in bed. NAD. On q 15 min. safety checks. Visible in DR watching TV, listening to music, talking on phone with  and eating dinner 25% and fluids. Patient reported \"I am not eating because I had diahrea today\". Patient did not experience any BM this shift. Assisted to BR and to bed. Med compliant. Pleasantly confused.

## 2018-09-18 NOTE — PROGRESS NOTES
Problem: Altered Thought Process (Adult/Pediatric)  Goal: *STG: Demonstrates ability to understand and use improved judgment in daily activities and relationships  Outcome: Progressing Towards Goal     1515 Received patient working with OT in bed room. NAD. On q 15 min. Safety checks. Visible in DR watching TV, listening to music and eating 75% dinner and 100% snacks and fluids. Pleasantly confused. Smiling. Initiated social interactions with peers and staff. Med compliant. Incontinent of urine twice. Cooperative.

## 2018-09-18 NOTE — PROGRESS NOTES
Problem: Altered Thought Process (Adult/Pediatric)  Goal: *STG: Participates in treatment plan  Outcome: Progressing Towards Goal  Up in bed alert and oriented. Mood and affect smiling and bright. Thoughts organized, engaged and pleasant. Demonstrates ability to follow staff direction and unit routine. Daily goal is to get out of bed and talk to family. Staff focus is on offering support, encouraged progress towards goals and sharing memories. Problem: Falls - Risk of  Goal: *Absence of Falls  Document Kizzy Fall Risk and appropriate interventions in the flowsheet. Outcome: Progressing Towards Goal  Fall Risk Interventions:  Mobility Interventions: Utilize walker, cane, or other assistive device, Bed/chair exit alarm    Mentation Interventions: Bed/chair exit alarm, Adequate sleep, hydration, pain control    Medication Interventions: Teach patient to arise slowly, Bed/chair exit alarm    Elimination Interventions: Toileting schedule/hourly rounds, Toilet paper/wipes in reach, Call light in reach, Bed/chair exit alarm, Patient to call for help with toileting needs    History of Falls Interventions: Bed/chair exit alarm, Door open when patient unattended        Problem: Nutrition Deficit  Goal: *Optimize nutritional status  Outcome: Progressing Towards Goal  Adequate PO intake    Problem: Pressure Injury - Risk of  Goal: *Prevention of pressure injury  Document Oumar Scale and appropriate interventions in the flowsheet.    Outcome: Progressing Towards Goal  Pressure Injury Interventions:  Sensory Interventions: Check visual cues for pain, Keep linens dry and wrinkle-free    Moisture Interventions: Offer toileting Q_hr, Minimize layers, Apply protective barrier, creams and emollients    Activity Interventions: Increase time out of bed, Chair cushion    Mobility Interventions: Chair cushion, Float heels, HOB 30 degrees or less    Nutrition Interventions: Offer support with meals,snacks and hydration, Document food/fluid/supplement intake    Friction and Shear Interventions: Apply protective barrier, creams and emollients

## 2018-09-18 NOTE — BH NOTES
PSYCHIATRIC PROGRESS NOTE         Patient Name  Som Carpenter   Date of Birth 5/24/1933   [facility] [id number]   Medical Record Number  [id number]      Age  80 y.o. PCP Karan Bentley MD   Admit date:  9/6/2018    Room Number  746/01  @ [facility]   Date of Service  9/13/18          PSYCHOTHERAPY SESSION NOTE:  Length of psychotherapy session: 20 minutes    Main condition/diagnosis/issues treated during session today, 9/17/2018 : I employed Cognitive Behavioral therapy techniques, Reality-Oriented psychotherapy, as well as supportive psychotherapy in regards to various ongoing psychosocial stressors, including the following: pre-admission and current problems; housing issues;   medical issues; and stress of hospitalization. Interpersonal relationship issues and psychodynamic conflicts explored. Attempts made to alleviate maladaptive patterns. Overall, patient is not progressing    Treatment Plan Update (reviewed an updated 9/17/2018) : I will modify psychotherapy tx plan by implementing more stress management strategies, building upon cognitive behavioral techniques, increasing coping skills, as well as shoring up psychological defenses). An extended energy and skill set was needed to engage pt in psychotherapy due to some of the following: resistiveness, complexity, negativity, confrontational nature, hostile behaviors, and/or severe abnormalities in thought processes/psychosis resulting in the loss of expressive/receptive language communication skills. E & M PROGRESS NOTE:         HISTORY         HISTORY OF PRESENT ILLNESS/INTERVAL HISTORY:  (reviewed/updated 9/17/2018). per initial evaluation: The patient, Tray Bolaños, is a 80 y.o.   WHITE [place]  female with a past psychiatric history significant for Dementia, MDD who was treated on [facility] for several weeks, until she had a syncopal episode on 9/5 and transferred to the medical floor for observation for 24 hours. She now returns medically stable. Patient's mood and presesentation hs improved compared to original presentation on admission. She remains very confused secondary to advanced dementia. No agitation or aggression noted. Compliant with her medications. Awaiting placement vs. Return home. 9/8/18. \"Am I going home today\". No complaints. Seems calmer. 9/9/18. No complaints. No agitation. Slept ok  9/10/18- Funny and engaging this morning. She enjoyed singing in group this morning. Sleeping well at night. 9/11/18- Patient remains stable. 9/12/18- No complaints, no acute behviors. Bright and euthymic affect. Eating meals, sleeping well. 9/13- patient remains stable  9/14/18- euthymic mood, talkative. Pleasant and cooperative. 9/16/18: seen today,funny at times, accepting po med and meals,no aggressin or agitation,requires assistance  with ADL. 9/17/18- Ms. Regan denies any complaints this morning. Resting comfortably, then easily eating and enjoying her lunch. NO changes. Patient remains stable. SIDE EFFECTS: (reviewed/updated 9/17/2018)  None reported or admitted to. ALLERGIES:(reviewed/updated 9/17/2018)  Allergies   Allergen Reactions    Angiotensin Ii,Human Other (comments)     States does not remember      Avelox [Moxifloxacin] Other (comments)     States does not remember      Demerol [Meperidine] Hives      MEDICATIONS PRIOR TO ADMISSION:(reviewed/updated 9/17/2018)  Prescriptions Prior to Admission   Medication Sig    traZODone (DESYREL) 50 mg tablet Take 0.5 Tabs by mouth nightly for 30 days. Indications: insomnia associated with depression    risperiDONE (RISPERDAL) 0.25 mg tablet Take 1 Tab by mouth two (2) times a day for 30 days.  magnesium hydroxide (MILK OF MAGNESIA) 400 mg/5 mL suspension Take 30 mL by mouth daily for 30 days.  donepezil (ARICEPT) 10 mg tablet Take 1 Tab by mouth daily for 30 days.     acetaminophen (TYLENOL) 325 mg tablet Take 2 Tabs by mouth every six (6) hours as needed for Pain.  docusate sodium (COLACE) 100 mg capsule Take 1 Cap by mouth daily for 90 days.  dilTIAZem CD (CARTIA XT) 240 mg ER capsule Take 240 mg by mouth daily.  polyethylene glycol (MIRALAX) 17 gram packet Take 17 g by mouth as needed (Constipation).  citalopram (CELEXA) 20 mg tablet TAKE 1 TABLET BY MOUTH DAILY      PAST MEDICAL HISTORY: Past medical history from the initial psychiatric evaluation has been reviewed (reviewed/updated 9/17/2018) with no additional updates (I asked patient and no additional past medical history provided). Past Medical History:   Diagnosis Date    Anemia     Atrial fibrillation ([address]     Cancer (HCC)     basal cell on nose    Chronic pain     back pain    Fibromyalgia 4/1/2010    GERD (gastroesophageal reflux disease) 4/1/2010    Hiatal hernia 4/1/2010    High cholesterol 4/1/2010    HTN (hypertension) 4/1/2010    Ill-defined condition     A. Fib    OA (osteoarthritis) 4/1/2010    back    Pulmonary emboli ([facility] [address] 2015     Past Surgical History:   Procedure Laterality Date    BREAST SURGERY PROCEDURE UNLISTED      Breast im-plants x 2    ENLARGE BREAST WITH IMPLANT      HX APPENDECTOMY      HX BREAST BIOPSY      HX BREAST RECONSTRUCTION      Breast implants removed 1992    HX CATARACT REMOVAL      HX CHOLECYSTECTOMY  9/11/2013    HX HYSTERECTOMY      HX KNEE REPLACEMENT  2008    bilateral    HX ORTHOPAEDIC  2007    Bilateral TKR    HX PARTIAL HYSTERECTOMY      HX SHOULDER REPLACEMENT  2009    left    HX TONSILLECTOMY        SOCIAL HISTORY: Social history from the initial psychiatric evaluation has been reviewed (reviewed/updated 9/17/2018) with no additional updates (I asked patient and no additional social history provided). Social History     Social History    Marital status:      Spouse name: N/A    Number of children: N/A    Years of education: N/A     Occupational History    Not on file.      Social History Main Topics    Smoking status: Never Smoker    Smokeless tobacco: Never Used    Alcohol use No    Drug use: No    Sexual activity: No     Other Topics Concern    Not on file     Social History Narrative      FAMILY HISTORY: Family history from the initial psychiatric evaluation has been reviewed (reviewed/updated 9/17/2018) with no additional updates (I asked patient and no additional family history provided). Family History   Problem Relation Age of Onset   [address] [facility] [address] Arthritis-rheumatoid Mother     Dementia Mother     Coronary Artery Disease Father     Cancer Brother      lung cancer       REVIEW OF SYSTEMS: (reviewed/updated 9/17/2018)  Appetite:improved   Sleep: improved   All other Review of Systems: General ROS: negative         MENTAL STATUS EXAM & VITALS     MENTAL STATUS EXAM (MSE):    MSE FINDINGS ARE WITHIN NORMAL LIMITS (WNL) UNLESS OTHERWISE STATED BELOW. ( ALL OF THE BELOW CATEGORIES OF THE MSE HAVE BEEN REVIEWED (reviewed 9/17/2018) AND UPDATED AS DEEMED APPROPRIATE )  General Presentation age appropriate and older than stated age, cooperative   Orientation not oriented to situation   Vital Signs  See below (reviewed 9/17/2018); Vital Signs (BP, Pulse, & Temp) are within normal limits if not listed below.    Gait and Station Stable/steady, no ataxia   Musculoskeletal System No extrapyramidal symptoms (EPS); no abnormal muscular movements or Tardive Dyskinesia (TD); muscle strength and tone are within normal limits   Language No aphasia or dysarthria   Speech:  soft   Thought Processes illogical; slow rate of thoughts; poor abstract reasoning/computation   Thought Associations loose associations   Thought Content free of delusions   Suicidal Ideations no plan    Homicidal Ideations no plan    Mood:  euthymic   Affect:  euthymic   Memory recent  impaired   Memory remote:  impaired   Concentration/Attention:  distractable   Fund of Knowledge below average   Insight:  limited   Reliability limited Judgment:  limited          VITALS:     Patient Vitals for the past 24 hrs:   Temp Pulse Resp BP SpO2   09/17/18 2039 97.6 °F (36.4 °C) 82 18 150/66 95 %   09/17/18 1628 98.2 °F (36.8 °C) 74 18 139/86 94 %   09/17/18 0750 98.3 °F (36.8 °C) 98 18 152/66 94 %     Wt Readings from Last 3 Encounters:   09/02/18 64.5 kg (142 lb 3.2 oz)   08/21/18 76.1 kg (167 lb 12.8 oz)   08/13/18 77.6 kg (171 lb)     Temp Readings from Last 3 Encounters:   09/17/18 97.6 °F (36.4 °C)   09/06/18 98.9 °F (37.2 °C)   09/05/18 98.1 °F (36.7 °C)     BP Readings from Last 3 Encounters:   09/17/18 150/66   09/06/18 146/77   09/05/18 103/63     Pulse Readings from Last 3 Encounters:   09/17/18 82   09/06/18 (!) 105   09/05/18 68            DATA     LABORATORY DATA:(reviewed/updated 9/17/2018)  Recent Results (from the past 24 hour(s))   GLUCOSE, POC    Collection Time: 09/17/18  8:02 AM   Result Value Ref Range    Glucose (POC) 102 (H) 65 - 100 mg/dL    Performed by [address] [place] [address], POC    Collection Time: 09/17/18  4:40 PM   Result Value Ref Range    Glucose (POC) 143 (H) 65 - 100 mg/dL    Performed by [employer]      No results found for: VALF2, VALAC, VALP, VALPR, DS6, CRBAM, CRBAMP, CARB2, XCRBAM  No results found for: LITHM   RADIOLOGY REPORTS:(reviewed/updated 9/17/2018)  Xr Pelv Ap Only    Addendum Date: 8/14/2018    Addendum: No fracture. Result Date: 8/14/2018  Clinical history: Fall yesterday FINDINGS: Single frontal view of the pelvis is obtained. There is no fracture or dislocation identified. There is no significant soft tissue abnormality. IMPRESSION: No acute fracture. Xr Forearm Rt Ap/lat    Result Date: 8/13/2018  EXAM:  XR FOREARM RT AP/LAT Clinical history: Distal radial fracture. INDICATION:   fall. COMPARISON: None. FINDINGS: Two views of the right radius and ulna demonstrate distal radial fracture. Extensive degenerative change at the radiocarpal and carpometacarpal rows. .     IMPRESSION:  Distal radial fracture with minimal displacement. Severe degenerative change at the wrist..     Xr Wrist Rt Ap/lat    Result Date: 8/27/2018  EXAM: XR WRIST RT AP/LAT INDICATION:  R wrist fracture follow up, was not casted and pt not leaving splint on. COMPARISON: 8/14/2018. FINDINGS: Two  views of the right wrist demonstrate no appreciable change in the T-shaped interarticular distal radial fracture and ulnar styloid fracture in fiberglass. There is chondrocalcinosis of the radiocarpal joint and TFC. Lateral carpal arthritis is noted. Osteopenia is present. IMPRESSION:  No significant change in the right T-shaped interarticular distal radial and ulna styloid fractures in the splint. Xr Wrist Rt Ap/lat/obl Min 3v    Result Date: 8/14/2018  EXAM: XR WRIST RT AP/LAT/OBL MIN 3V Clinical history: Fall, broken wrist INDICATION:  fall, broken wrist. COMPARISON: None. FINDINGS: Three  views of the right wrist demonstrate postreduction images distal radial fracture. .  Soft tissue edema. .     IMPRESSION:  Postreduction images distal radial fracture. .     Xr Wrist Rt Ap/lat/obl Min 3v    Result Date: 8/13/2018  EXAM: XR WRIST RT AP/LAT/OBL MIN 3V HISTORY: Fall, fell in bathroom, right arm INDICATION:  fall. COMPARISON: None. FINDINGS: Three  views of the right wrist demonstrate nondisplaced distal radius fracture. Severe degenerative change of the radiocarpal and carpometacarpal rows. No ulnar fracture. .  The soft tissues are within normal limits. IMPRESSION:  Nondisplaced fracture of the distal radius. 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     Ct Head Wo Cont    Result Date: 8/23/2018  EXAM:  CT head without contrast INDICATION: Altered mental status COMPARISON: CT head 8/14/2018 TECHNIQUE: Axial noncontrast head CT from foramen magnum to vertex. Coronal and sagittal reformatted images were obtained.  CT dose reduction was achieved through use of a standardized protocol tailored for this examination and automatic exposure control for dose modulation. Adaptive statistical iterative reconstruction (ASIR) was utilized. FINDINGS:  There is diffuse age-related parenchymal volume loss. The ventricles and sulci are age-appropriate without hydrocephalus. There is no mass effect or midline shift. There is no intracranial hemorrhage or extra-axial fluid collection. Scattered foci of low attenuation in the periventricular white matter represent stable chronic microvascular ischemic changes. There is no new abnormal parenchymal attenuation. The gray-white matter differentiation is maintained. The basal cisterns are patent. 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 The osseous structures are intact. The visualized paranasal sinuses and mastoid air cells are clear. IMPRESSION: There is no acute intracranial abnormality. Ct Head Wo Cont    Result Date: 8/14/2018  EXAM:  CT HEAD WO CONT Clinical history: Fall, fractured wrist, increased pain INDICATION:   fall COMPARISON: 7/20/2018. CONTRAST:  None. TECHNIQUE: Unenhanced CT of the head was performed using 5 mm images. Brain and bone windows were generated. CT dose reduction was achieved through use of a standardized protocol tailored for this examination and automatic exposure control for dose modulation. FINDINGS: The ventricles and sulci are normal in size, shape and configuration and midline. Mild scattered hypodensities in the cerebral white matter. There is no intracranial hemorrhage, extra-axial collection, mass, mass effect or midline shift. The basilar cisterns are open. No acute infarct is identified. The bone windows demonstrate no abnormalities. The visualized portions of the paranasal sinuses and mastoid air cells are clear.      IMPRESSION: No acute cranial process     Ct Head Wo Cont    Result Date: 7/20/2018  EXAM:  CT HEAD WO CONT INDICATION: Fall going to the bathroom and had a ground level fall and hit head on the floor. COMPARISON: MRI brain 11/12/2012. Mabeline Sink CONTRAST:  None. TECHNIQUE: Unenhanced CT of the head was performed using 5 mm images. Brain and bone windows were generated. CT dose reduction was achieved through use of a standardized protocol tailored for this examination and automatic exposure control for dose modulation. FINDINGS: There is a stable 9 x 12 mm partially calcified extra-axial lesion in the left posterior fossa compatible with meningioma. There is no acute intracranial hemorrhage, other mass, mass effect or herniation. Ventricles and sulci show stable, proportionate, and symmetric pattern. There is a stable pattern of periventricular white matter hypodensity. The gray-white matter differentiation is well-preserved. Atherosclerotic calcifications are seen within the carotid siphons and vertebral arteries. The mastoid air cells are well pneumatized. The visualized paranasal sinuses are normal.     IMPRESSION: No acute intracranial hemorrhage or infarct. Stable left posterior fossa meningioma and chronic white matter change most compatible with small vessel ischemic and/or senescent change. Intracranial atherosclerosis. Ct Abd Pelv Wo Cont    Result Date: 6/20/2018  EXAM:  CT ABD PELV WO CONT INDICATION: abd pain  2 days of abdominal pain COMPARISON: 2013 CONTRAST:  None. TECHNIQUE: Thin axial images were obtained through the abdomen and pelvis. Coronal and sagittal reconstructions were generated. Oral contrast was not administered. CT dose reduction was achieved through use of a standardized protocol tailored for this examination and automatic exposure control for dose modulation. The absence of intravenous contrast material reduces the sensitivity for evaluation of the solid parenchymal organs of the abdomen. FINDINGS: LUNG BASES: Clear. INCIDENTALLY IMAGED HEART AND MEDIASTINUM: Unremarkable. LIVER: No mass or biliary dilatation. GALLBLADDER: Surgically removed. SPLEEN: No mass. PANCREAS: No mass or ductal dilatation. ADRENALS: Unremarkable. KIDNEYS/URETERS: Malrotated right kidney. Hyperdense right renal cyst. Right nephrolithiasis. STOMACH: Hiatal hernia. SMALL BOWEL: No dilatation or wall thickening. COLON: No dilatation or wall thickening. Moderate colonic stool. APPENDIX: Surgically removed PERITONEUM: No ascites or pneumoperitoneum. RETROPERITONEUM: No lymphadenopathy or aortic aneurysm. REPRODUCTIVE ORGANS: Surgically removed. URINARY BLADDER: No mass or calculus. BONES: No destructive bone lesion. Multilevel degenerative changes. ADDITIONAL COMMENTS: N/A     IMPRESSION: No acute findings. Xr Chest Port    Result Date: 6/20/2018  EXAM:  XR CHEST PORT INDICATION:  Chest Pain COMPARISON:  May 24 FINDINGS: A portable AP radiograph of the chest was obtained at 0555 hours. There is a left shoulder replacement in place. The patient is on a cardiac monitor. The lungs are clear. The cardiac and mediastinal contours and pulmonary vascularity are normal.  The bones and soft tissues are grossly within normal limits. IMPRESSION: No acute findings.           MEDICATIONS     ALL MEDICATIONS:   Current Facility-Administered Medications   Medication Dose Route Frequency    dilTIAZem CD (CARDIZEM CD) capsule 240 mg  240 mg Oral DAILY    magnesium hydroxide (MILK OF MAGNESIA) 400 mg/5 mL oral suspension 30 mL  30 mL Oral DAILY PRN    citalopram (CELEXA) tablet 20 mg  20 mg Oral QHS    docusate sodium (COLACE) capsule 100 mg  100 mg Oral DAILY    donepezil (ARICEPT) tablet 10 mg  10 mg Oral DAILY    polyethylene glycol (MIRALAX) packet 17 g  17 g Oral DAILY PRN    risperiDONE (RisperDAL) tablet 0.25 mg  0.25 mg Oral BID    saline peripheral flush soln 5 mL  5 mL InterCATHeter PRN    traZODone (DESYREL) tablet 25 mg  25 mg Oral QHS PRN    OLANZapine (ZyPREXA) tablet 2.5 mg  2.5 mg Oral Q6H PRN    ziprasidone (GEODON) 10 mg in sterile water (preservative free) 0.5 mL injection  10 mg IntraMUSCular BID PRN    benztropine (COGENTIN) tablet 1 mg  1 mg Oral BID PRN    benztropine (COGENTIN) injection 1 mg  1 mg IntraMUSCular BID PRN    zolpidem (AMBIEN) tablet 5 mg  5 mg Oral QHS PRN    acetaminophen (TYLENOL) tablet 650 mg  650 mg Oral Q4H PRN    ibuprofen (MOTRIN) tablet 400 mg  400 mg Oral Q8H PRN      SCHEDULED MEDICATIONS:   Current Facility-Administered Medications   Medication Dose Route Frequency    dilTIAZem CD (CARDIZEM CD) capsule 240 mg  240 mg Oral DAILY    citalopram (CELEXA) tablet 20 mg  20 mg Oral QHS    docusate sodium (COLACE) capsule 100 mg  100 mg Oral DAILY    donepezil (ARICEPT) tablet 10 mg  10 mg Oral DAILY    risperiDONE (RisperDAL) tablet 0.25 mg  0.25 mg Oral BID          ASSESSMENT & PLAN     DIAGNOSES REQUIRING ACTIVE TREATMENT AND MONITORING: (reviewed/updated 9/17/2018)  Patient Active Hospital Problem List:    The patient, Tray Jaime, is a 80 y.o.  female who presents at this time for treatment of the following diagnoses:  Patient Active Hospital Problem List:   Late onset Alzheimer's disease with behavioral disturbance (8/24/2018)    Assessment: severe, advanced    Plan: Agree with inpatient hospitalization for further stabilization, safety monitoring and medication management  Medications- Aricept and risperdal   09/16/18: Continue current treatment. In summary, Tray Bolaños, is a 80 y.o.  female who presents with a severe exacerbation of the principal diagnosis of Late onset Alzheimer's disease with behavioral disturbance  Patient's condition is worsening/not improving/not stable improving. Patient requires continued inpatient hospitalization for further stabilization, safety monitoring and medication management. I will continue to coordinate the provision of individual, milieu, occupational, group, and substance abuse therapies to address target symptoms/diagnoses as deemed appropriate for the individual patient.   A coordinated, multidisplinary treatment team round was conducted with the patient (this team consists of the nurse, psychiatric unit pharmcist,  and writer). Complete current electronic health record for patient has been reviewed today including consultant notes, ancillary staff notes, nurses and psychiatric tech notes. Suicide risk assessment completed and patient deemed to be of low risk for suicide at this time. The following regarding medications was addressed during rounds with patient:   the risks and benefits of the proposed medication. The patient was given the opportunity to ask questions. Informed consent given to the use of the above medications. Will continue to adjust psychiatric and non-psychiatric medications (see above \"medication\" section and orders section for details) as deemed appropriate & based upon diagnoses and response to treatment. I will continue to order blood tests/labs and diagnostic tests as deemed appropriate and review results as they become available (see orders for details and above listed lab/test results). I will order psychiatric records from previous [facility] hospitals to further elucidate the nature of patient's psychopathology and review once available. I will gather additional collateral information from friends, family and o/p treatment team to further elucidate the nature of patient's psychopathology and baselline level of psychiatric functioning. I certify that this patient's inpatient psychiatric hospital services furnished since the previous certification were, and continue to be, required for treatment that could reasonably be expected to improve the patient's condition, or for diagnostic study, and that the patient continues to need, on a daily basis, active treatment furnished directly by or requiring the supervision of inpatient psychiatric facility personnel.  In addition, the hospital records show that services furnished were intensive treatment services, admission or related services, or equivalent services.     EXPECTED DISCHARGE DATE/DAY: TBD     DISPOSITION: Home       Signed By:   Isabel Heath MD  9/17/2018

## 2018-09-18 NOTE — INTERDISCIPLINARY ROUNDS
Behavioral Health Interdisciplinary Rounds     Patient Name: Ronit Dean  Age: 80 y.o. Room/Bed:  746/01  Primary Diagnosis: Late onset Alzheimer's disease with behavioral disturbance   Admission Status: Involuntary Commitment     Readmission within 30 days: no  Power of  in place: yes  Patient requires a blocked bed: no          Reason for blocked bed: n/a    VTE Prophylaxis: Not indicated    Mobility needs/Fall risk: yes  Flu Vaccine : no   Nutritional Plan: yes  Consults:          Labs/Testing due today?: yes: completed     Sleep hours: 7.5      Participation in Care/Groups:  yes  Medication Compliant?: Yes  PRNS (last 24 hours): None    Restraints (last 24 hours):  no     CIWA (range last 24 hours):     COWS (range last 24 hours):      Alcohol screening (AUDIT) completed -   AUDIT Score: 0     If applicable, date SBIRT discussed in treatment team AND documented:     Tobacco - patient is a smoker: Have You Used Tobacco in the Past 30 Days: No  Illegal Drugs use: Have You Used Any Illegal Substances Over the Past 12 Months: No    24 hour chart check complete: yes     Patient goal(s) for today:   Treatment team focus/goals: [facility] ecined out updated information to all area nursing homes for placement ( SNF)   Progress note - she has been pleasant and compliant with her treatment. LOS:  12  Expected LOS: TBD     Financial concerns/prescription coverage:  Medicare   Date of last family contact:   [facility] spoke to her  today on the unit. Family requesting physician contact today:  Dr. Andrew Bolanos spoke to her  today on the unit .     Discharge plan: SNF placement   Guns in the home:   no      Outpatient provider(s):TBD    Participating treatment team members: Александр Bolanos - Olesya Kerr

## 2018-09-19 LAB
GLUCOSE BLD STRIP.AUTO-MCNC: 104 MG/DL (ref 65–100)
GLUCOSE BLD STRIP.AUTO-MCNC: 115 MG/DL (ref 65–100)
SERVICE CMNT-IMP: ABNORMAL
SERVICE CMNT-IMP: ABNORMAL

## 2018-09-19 PROCEDURE — 74011250637 HC RX REV CODE- 250/637: Performed by: INTERNAL MEDICINE

## 2018-09-19 PROCEDURE — 82962 GLUCOSE BLOOD TEST: CPT

## 2018-09-19 PROCEDURE — 65220000003 HC RM SEMIPRIVATE PSYCH

## 2018-09-19 PROCEDURE — 74011250637 HC RX REV CODE- 250/637: Performed by: PSYCHIATRY & NEUROLOGY

## 2018-09-19 RX ADMIN — RISPERIDONE 0.25 MG: 0.5 TABLET, FILM COATED ORAL at 08:28

## 2018-09-19 RX ADMIN — DOCUSATE SODIUM 100 MG: 100 CAPSULE, LIQUID FILLED ORAL at 08:28

## 2018-09-19 RX ADMIN — RISPERIDONE 0.25 MG: 0.5 TABLET, FILM COATED ORAL at 17:26

## 2018-09-19 RX ADMIN — DONEPEZIL HYDROCHLORIDE 10 MG: 10 TABLET, FILM COATED ORAL at 08:28

## 2018-09-19 RX ADMIN — CITALOPRAM HYDROBROMIDE 20 MG: 20 TABLET ORAL at 20:25

## 2018-09-19 RX ADMIN — DILTIAZEM HYDROCHLORIDE 240 MG: 240 CAPSULE, COATED, EXTENDED RELEASE ORAL at 08:28

## 2018-09-19 NOTE — BH NOTES
The documentation for this period is being entered following the guidelines as defined in the [employer] downtime policy by Odie Severin, RN.    [id number]

## 2018-09-19 NOTE — PROGRESS NOTES
Problem: Altered Thought Process (Adult/Pediatric)  Goal: *STG: Participates in treatment plan  Outcome: Progressing Towards Goal  Pt was in bed resting at the start of shift. Pt asked to be helped to the restroom. Staff assist pt to the bathroom, pt walked from bed to bathroom with the assistance of staff. Pt smiling and pleasant. Pt came out the the dinner room for dinner. Pt compliant for some dinner. Pt compliant with meds.

## 2018-09-19 NOTE — PROGRESS NOTES
Problem: Altered Thought Process (Adult/Pediatric)  Goal: *STG: Participates in treatment plan  Outcome: Progressing Towards Goal  Pt is pleasant, cooperative. Medication compliant. Showered and visited with . Eating well  Goal: *STG: Demonstrates ability to understand and use improved judgment in daily activities and relationships  Outcome: Progressing Towards Goal  Pt has more lucid thought process, in and out of confusion    Problem: Falls - Risk of  Goal: *Absence of Falls  Document Kizzy Fall Risk and appropriate interventions in the flowsheet.    Fall Risk Interventions:  Mobility Interventions: Bed/chair exit alarm    Mentation Interventions: Adequate sleep, hydration, pain control    Medication Interventions: Patient to call before getting OOB, Teach patient to arise slowly    Elimination Interventions: Call light in reach    History of Falls Interventions: Bed/chair exit alarm, Door open when patient unattended

## 2018-09-19 NOTE — PROGRESS NOTES
Problem: Falls - Risk of  Goal: *Absence of Falls  Document Kizzy Fall Risk and appropriate interventions in the flowsheet. Outcome: Progressing Towards Goal  Fall Risk Interventions:  Mobility Interventions: Bed/chair exit alarm, Patient to call before getting OOB  Mentation Interventions: Adequate sleep, hydration, pain control, Door open when patient unattended, Bed/chair exit alarm  Medication Interventions: Patient to call before getting OOB, Teach patient to arise slowly  Elimination Interventions: Call light in reach, Bed/chair exit alarm  History of Falls Interventions: Bed/chair exit alarm, Door open when patient unattended    2330 Pt appears asleep in bed. Respirations even and unlabored. Will monitor with Q 15 safety checks. 0230 Pt had medium soft bm in bedside commode.

## 2018-09-19 NOTE — INTERDISCIPLINARY ROUNDS
Behavioral Health Interdisciplinary Rounds     Patient Name: Frank Chavez  Age: 80 y.o. Room/Bed:  746/  Primary Diagnosis: Dementia   Admission Status: Involuntary Commitment     Readmission within 30 days: no  Power of  in place: yes  Patient requires a blocked bed: no          Reason for blocked bed: n/a    VTE Prophylaxis: Not indicated    Mobility needs/Fall risk: yes  Flu Vaccine : no   Nutritional Plan: no  Consults: OT following          Labs/Testing due today?: no    Sleep hours: 8.5       Participation in Care/Groups:  yes  Medication Compliant?: Yes  PRNS (last 24 hours): None    Restraints (last 24 hours):  no     CIWA (range last 24 hours):     COWS (range last 24 hours):      Alcohol screening (AUDIT) completed -   AUDIT Score: 0     If applicable, date SBIRT discussed in treatment team AND documented:     Tobacco - patient is a smoker: Have You Used Tobacco in the Past 30 Days: No  Illegal Drugs use: Have You Used Any Illegal Substances Over the Past 12 Months: No    24 hour chart check complete: yes     Patient goal(s) for today:   Treatment team focus/goals: Plan to continue to search for a skilled nursing home bed.     Progress note -she remains pleasant and complaint with her treatment     LOS:  13  Expected LOS: TBD     Participating treatment team members: Nisha Strange

## 2018-09-20 LAB
GLUCOSE BLD STRIP.AUTO-MCNC: 104 MG/DL (ref 65–100)
GLUCOSE BLD STRIP.AUTO-MCNC: 112 MG/DL (ref 65–100)
GLUCOSE BLD STRIP.AUTO-MCNC: 93 MG/DL (ref 65–100)
SERVICE CMNT-IMP: ABNORMAL
SERVICE CMNT-IMP: ABNORMAL
SERVICE CMNT-IMP: NORMAL

## 2018-09-20 PROCEDURE — 90471 IMMUNIZATION ADMIN: CPT

## 2018-09-20 PROCEDURE — 74011250636 HC RX REV CODE- 250/636: Performed by: PSYCHIATRY & NEUROLOGY

## 2018-09-20 PROCEDURE — 82962 GLUCOSE BLOOD TEST: CPT

## 2018-09-20 PROCEDURE — 74011250637 HC RX REV CODE- 250/637: Performed by: INTERNAL MEDICINE

## 2018-09-20 PROCEDURE — 65220000003 HC RM SEMIPRIVATE PSYCH

## 2018-09-20 PROCEDURE — 90686 IIV4 VACC NO PRSV 0.5 ML IM: CPT | Performed by: PSYCHIATRY & NEUROLOGY

## 2018-09-20 PROCEDURE — 74011250637 HC RX REV CODE- 250/637: Performed by: PSYCHIATRY & NEUROLOGY

## 2018-09-20 RX ADMIN — CITALOPRAM HYDROBROMIDE 20 MG: 20 TABLET ORAL at 21:05

## 2018-09-20 RX ADMIN — RISPERIDONE 0.25 MG: 0.5 TABLET, FILM COATED ORAL at 11:23

## 2018-09-20 RX ADMIN — INFLUENZA VIRUS VACCINE 0.5 ML: 15; 15; 15; 15 SUSPENSION INTRAMUSCULAR at 11:43

## 2018-09-20 RX ADMIN — DILTIAZEM HYDROCHLORIDE 240 MG: 240 CAPSULE, COATED, EXTENDED RELEASE ORAL at 11:23

## 2018-09-20 RX ADMIN — DONEPEZIL HYDROCHLORIDE 10 MG: 10 TABLET, FILM COATED ORAL at 11:23

## 2018-09-20 RX ADMIN — RISPERIDONE 0.25 MG: 0.5 TABLET, FILM COATED ORAL at 16:59

## 2018-09-20 NOTE — PROGRESS NOTES
Problem: Altered Thought Process (Adult/Pediatric)  Goal: *STG: Participates in treatment plan  Outcome: Progressing Towards Goal  Out on unit pleasant and cooperative. Encouraged to stand and work on increasing strength and work with PT. Mood and affect is bright and smiling. Demonstrates appropraite behaviors and ability to follow unit routine. Daily goal is to talk with family. Staff focus is on offering support and encouraged reaching her goals    Problem: Falls - Risk of  Goal: *Absence of Falls  Document Kizzy Fall Risk and appropriate interventions in the flowsheet. Outcome: Progressing Towards Goal  Fall Risk Interventions:  Mobility Interventions: Bed/chair exit alarm, Communicate number of staff needed for ambulation/transfer, Utilize walker, cane, or other assistive device    Mentation Interventions: Adequate sleep, hydration, pain control, Bed/chair exit alarm, Door open when patient unattended, More frequent rounding    Medication Interventions: Patient to call before getting OOB    Elimination Interventions: Bed/chair exit alarm    History of Falls Interventions: Bed/chair exit alarm, Door open when patient unattended        Problem: Pressure Injury - Risk of  Goal: *Prevention of pressure injury  Document Oumar Scale and appropriate interventions in the flowsheet.    Outcome: Progressing Towards Goal  Pressure Injury Interventions:  Sensory Interventions: Assess need for specialty bed    Moisture Interventions: Absorbent underpads, Apply protective barrier, creams and emollients, Check for incontinence Q2 hours and as needed    Activity Interventions: Pressure redistribution bed/mattress(bed type)    Mobility Interventions: Pressure redistribution bed/mattress (bed type)    Nutrition Interventions: Document food/fluid/supplement intake    Friction and Shear Interventions: Apply protective barrier, creams and emollients

## 2018-09-20 NOTE — INTERDISCIPLINARY ROUNDS
Behavioral Health Interdisciplinary Rounds     Patient Name: Dm Turner  Age: 80 y.o. Room/Bed:  6/  Primary Diagnosis: Dementia   Admission Status: Involuntary Commitment     Readmission within 30 days: no  Power of  in place: yes  Patient requires a blocked bed: no          Reason for blocked bed:     VTE Prophylaxis: Yes    Mobility needs/Fall risk: no  Flu Vaccine : no   Nutritional Plan: no  Consults: OT following         Labs/Testing due today?: no    Sleep hours:        Participation in Care/Groups:  yes  Medication Compliant?: Yes  PRNS (last 24 hours): None    Restraints (last 24 hours):  no     CIWA (range last 24 hours):     COWS (range last 24 hours):      Alcohol screening (AUDIT) completed -   AUDIT Score: 0     If applicable, date SBIRT discussed in treatment team AND documented:     Tobacco - patient is a smoker: Have You Used Tobacco in the Past 30 Days: No  Illegal Drugs use: Have You Used Any Illegal Substances Over the Past 12 Months: No    24 hour chart check complete: yes     Patient goal(s) for today:   Treatment team focus/goals: Consider home with home health , we have been unable to secure a skilled bed.     Progress note - she has been pleasant and compliant with her medications   LOS:  14  Expected LOS: TBD    Participating treatment team members: Olivier Rivas -

## 2018-09-20 NOTE — PROGRESS NOTES
Problem: Altered Thought Process (Adult/Pediatric)  Goal: *STG: Participates in treatment plan  Outcome: Progressing Towards Goal  Pt has been out in the milieu interacting with others. Pt smiling, cooperative and pleasant. Pt had visit from son and daughter in law. Pt has eaten small amount of meals. Pt has been compliant with meds.

## 2018-09-20 NOTE — BH NOTES
PSYCHIATRIC PROGRESS NOTE         Patient Name  Gisella Choudhury   Date of Birth 5/24/1933   [facility] [id number]   Medical Record Number  [id number]      Age  80 y.o. PCP Lisbeth Rosas MD   Admit date:  9/6/2018    Room Number  746/01  @ [facility]   Date of Service  09/20/18          PSYCHOTHERAPY SESSION NOTE:  Length of psychotherapy session: 20 minutes    Main condition/diagnosis/issues treated during session today, 9/20/2018 : I employed Cognitive Behavioral therapy techniques, Reality-Oriented psychotherapy, as well as supportive psychotherapy in regards to various ongoing psychosocial stressors, including the following: pre-admission and current problems; housing issues;   medical issues; and stress of hospitalization. Interpersonal relationship issues and psychodynamic conflicts explored. Attempts made to alleviate maladaptive patterns. Overall, patient is not progressing    Treatment Plan Update (reviewed an updated 9/20/2018) : I will modify psychotherapy tx plan by implementing more stress management strategies, building upon cognitive behavioral techniques, increasing coping skills, as well as shoring up psychological defenses). An extended energy and skill set was needed to engage pt in psychotherapy due to some of the following: resistiveness, complexity, negativity, confrontational nature, hostile behaviors, and/or severe abnormalities in thought processes/psychosis resulting in the loss of expressive/receptive language communication skills. E & M PROGRESS NOTE:         HISTORY         HISTORY OF PRESENT ILLNESS/INTERVAL HISTORY:  (reviewed/updated 9/20/2018). per initial evaluation: The patient, Tray Quigley, is a 80 y.o.   WHITE [place]  female with a past psychiatric history significant for Dementia, MDD who was treated on [facility] for several weeks, until she had a syncopal episode on 9/5 and transferred to the medical floor for observation for 24 hours. She now returns medically stable. Patient's mood and presesentation hs improved compared to original presentation on admission. She remains very confused secondary to advanced dementia. No agitation or aggression noted. Compliant with her medications. Awaiting placement vs. Return home. 9/8/18. \"Am I going home today\". No complaints. Seems calmer. 9/9/18. No complaints. No agitation. Slept ok  9/10/18- Funny and engaging this morning. She enjoyed singing in group this morning. Sleeping well at night. 9/11/18- Patient remains stable. 9/12/18- No complaints, no acute behviors. Bright and euthymic affect. Eating meals, sleeping well. 9/13- patient remains stable  9/14/18- euthymic mood, talkative. Pleasant and cooperative. 9/16/18: seen today,funny at times, accepting po med and meals,no aggressin or agitation,requires assistance  with ADL. 9/17/18- Ms. Link denies any complaints this morning. Resting comfortably, then easily eating and enjoying her lunch. NO changes. Patient remains stable. 9/18/18- patient presented in upbeat mood, observed sitting and laughing with her . Patient remains compliant and stable. 9/19- unchanged. Bright affect. Funny disposition  9/20- Enjoying visit from her . Smiling. Compliant. Slept through the night. SIDE EFFECTS: (reviewed/updated 9/20/2018)  None reported or admitted to. ALLERGIES:(reviewed/updated 9/20/2018)  Allergies   Allergen Reactions    Angiotensin Ii,Human Other (comments)     States does not remember      Avelox [Moxifloxacin] Other (comments)     States does not remember      Demerol [Meperidine] Hives      MEDICATIONS PRIOR TO ADMISSION:(reviewed/updated 9/20/2018)  Prescriptions Prior to Admission   Medication Sig    traZODone (DESYREL) 50 mg tablet Take 0.5 Tabs by mouth nightly for 30 days.  Indications: insomnia associated with depression    risperiDONE (RISPERDAL) 0.25 mg tablet Take 1 Tab by mouth two (2) times a day for 30 days.  magnesium hydroxide (MILK OF MAGNESIA) 400 mg/5 mL suspension Take 30 mL by mouth daily for 30 days.  donepezil (ARICEPT) 10 mg tablet Take 1 Tab by mouth daily for 30 days.  acetaminophen (TYLENOL) 325 mg tablet Take 2 Tabs by mouth every six (6) hours as needed for Pain.  docusate sodium (COLACE) 100 mg capsule Take 1 Cap by mouth daily for 90 days.  dilTIAZem CD (CARTIA XT) 240 mg ER capsule Take 240 mg by mouth daily.  polyethylene glycol (MIRALAX) 17 gram packet Take 17 g by mouth as needed (Constipation).  citalopram (CELEXA) 20 mg tablet TAKE 1 TABLET BY MOUTH DAILY      PAST MEDICAL HISTORY: Past medical history from the initial psychiatric evaluation has been reviewed (reviewed/updated 9/20/2018) with no additional updates (I asked patient and no additional past medical history provided). Past Medical History:   Diagnosis Date    Anemia     Atrial fibrillation ([facility] [address]     Cancer (HCC)     basal cell on nose    Chronic pain     back pain    Fibromyalgia 4/1/2010    GERD (gastroesophageal reflux disease) 4/1/2010    Hiatal hernia 4/1/2010    High cholesterol 4/1/2010    HTN (hypertension) 4/1/2010    Ill-defined condition     A.  Fib    OA (osteoarthritis) 4/1/2010    back    Pulmonary emboli ([facility] [address] 2015     Past Surgical History:   Procedure Laterality Date    BREAST SURGERY PROCEDURE UNLISTED      Breast im-plants x 2    ENLARGE BREAST WITH IMPLANT      HX APPENDECTOMY      HX BREAST BIOPSY      HX BREAST RECONSTRUCTION      Breast implants removed 1992    HX CATARACT REMOVAL      HX CHOLECYSTECTOMY  9/11/2013    HX HYSTERECTOMY      HX KNEE REPLACEMENT  2008    bilateral    HX ORTHOPAEDIC  2007    Bilateral TKR    HX PARTIAL HYSTERECTOMY      HX SHOULDER REPLACEMENT  2009    left    HX TONSILLECTOMY        SOCIAL HISTORY: Social history from the initial psychiatric evaluation has been reviewed (reviewed/updated 9/20/2018) with no additional updates (I asked patient and no additional social history provided). Social History     Social History    Marital status:      Spouse name: N/A    Number of children: N/A    Years of education: N/A     Occupational History    Not on file. Social History Main Topics    Smoking status: Never Smoker    Smokeless tobacco: Never Used    Alcohol use No    Drug use: No    Sexual activity: No     Other Topics Concern    Not on file     Social History Narrative      FAMILY HISTORY: Family history from the initial psychiatric evaluation has been reviewed (reviewed/updated 9/20/2018) with no additional updates (I asked patient and no additional family history provided). Family History   Problem Relation Age of Onset   Ashok Arthritis-rheumatoid Mother     Dementia Mother     Coronary Artery Disease Father     Cancer Brother      lung cancer       REVIEW OF SYSTEMS: (reviewed/updated 9/20/2018)  Appetite:improved   Sleep: improved   All other Review of Systems: General ROS: negative         MENTAL STATUS EXAM & VITALS     MENTAL STATUS EXAM (MSE):    MSE FINDINGS ARE WITHIN NORMAL LIMITS (WNL) UNLESS OTHERWISE STATED BELOW. ( ALL OF THE BELOW CATEGORIES OF THE MSE HAVE BEEN REVIEWED (reviewed 9/20/2018) AND UPDATED AS DEEMED APPROPRIATE )  General Presentation age appropriate and older than stated age, cooperative   Orientation not oriented to situation   Vital Signs  See below (reviewed 9/20/2018); Vital Signs (BP, Pulse, & Temp) are within normal limits if not listed below.    Gait and Station Stable/steady, no ataxia   Musculoskeletal System No extrapyramidal symptoms (EPS); no abnormal muscular movements or Tardive Dyskinesia (TD); muscle strength and tone are within normal limits   Language No aphasia or dysarthria   Speech:  soft   Thought Processes illogical; slow rate of thoughts; poor abstract reasoning/computation   Thought Associations loose associations   Thought Content free of delusions Suicidal Ideations no plan    Homicidal Ideations no plan    Mood:  euthymic   Affect:  euthymic   Memory recent  impaired   Memory remote:  impaired   Concentration/Attention:  distractable   Fund of Knowledge below average   Insight:  limited   Reliability limited   Judgment:  limited          VITALS:     Patient Vitals for the past 24 hrs:   Temp Pulse Resp BP SpO2   09/20/18 1126 98.3 °F (36.8 °C) 77 16 117/65 -   09/20/18 1123 - 77 - 117/65 -   09/20/18 1119 98.3 °F (36.8 °C) 77 16 117/65 -   09/20/18 0914 98.5 °F (36.9 °C) 98 - 163/79 95 %   09/20/18 0715 - - 18 - -   09/19/18 2010 97.5 °F (36.4 °C) 90 16 142/84 -   09/19/18 1624 98.5 °F (36.9 °C) 90 16 127/79 -     Wt Readings from Last 3 Encounters:   09/18/18 66.9 kg (147 lb 8 oz)   09/02/18 64.5 kg (142 lb 3.2 oz)   08/21/18 76.1 kg (167 lb 12.8 oz)     Temp Readings from Last 3 Encounters:   09/20/18 98.3 °F (36.8 °C)   09/06/18 98.9 °F (37.2 °C)   09/05/18 98.1 °F (36.7 °C)     BP Readings from Last 3 Encounters:   09/20/18 117/65   09/06/18 146/77   09/05/18 103/63     Pulse Readings from Last 3 Encounters:   09/20/18 77   09/06/18 (!) 105   09/05/18 68            DATA     LABORATORY DATA:(reviewed/updated 9/20/2018)  Recent Results (from the past 24 hour(s))   GLUCOSE, POC    Collection Time: 09/19/18  4:10 PM   Result Value Ref Range    Glucose (POC) 104 (H) 65 - 100 mg/dL    Performed by Gayle Bingham, POC    Collection Time: 09/20/18  7:44 AM   Result Value Ref Range    Glucose (POC) 112 (H) 65 - 100 mg/dL    Performed by RAÚL LUI, POC    Collection Time: 09/20/18 11:37 AM   Result Value Ref Range    Glucose (POC) 104 (H) 65 - 100 mg/dL    Performed by RAÚL CHANNEL      No results found for: VALF2, VALAC, VALP, VALPR, DS6, CRBAM, CRBAMP, CARB2, XCRBAM  No results found for: LITHM   RADIOLOGY REPORTS:(reviewed/updated 9/20/2018)  Xr Pelv Ap Only    Addendum Date: 8/14/2018    Addendum: No fracture.     Result Date: 8/14/2018  Clinical history: Fall yesterday FINDINGS: Single frontal view of the pelvis is obtained. There is no fracture or dislocation identified. There is no significant soft tissue abnormality. IMPRESSION: No acute fracture. Xr Forearm Rt Ap/lat    Result Date: 8/13/2018  EXAM:  XR FOREARM RT AP/LAT Clinical history: Distal radial fracture. INDICATION:   fall. COMPARISON: None. FINDINGS: Two views of the right radius and ulna demonstrate distal radial fracture. Extensive degenerative change at the radiocarpal and carpometacarpal rows. .     IMPRESSION:  Distal radial fracture with minimal displacement. Severe degenerative change at the wrist..     Xr Wrist Rt Ap/lat    Result Date: 8/27/2018  EXAM: XR WRIST RT AP/LAT INDICATION:  R wrist fracture follow up, was not casted and pt not leaving splint on. COMPARISON: 8/14/2018. FINDINGS: Two  views of the right wrist demonstrate no appreciable change in the T-shaped interarticular distal radial fracture and ulnar styloid fracture in fiberglass. There is chondrocalcinosis of the radiocarpal joint and TFC. Lateral carpal arthritis is noted. Osteopenia is present. IMPRESSION:  No significant change in the right T-shaped interarticular distal radial and ulna styloid fractures in the splint. Xr Wrist Rt Ap/lat/obl Min 3v    Result Date: 8/14/2018  EXAM: XR WRIST RT AP/LAT/OBL MIN 3V Clinical history: Fall, broken wrist INDICATION:  fall, broken wrist. COMPARISON: None. FINDINGS: Three  views of the right wrist demonstrate postreduction images distal radial fracture. .  Soft tissue edema. .     IMPRESSION:  Postreduction images distal radial fracture. .     Xr Wrist Rt Ap/lat/obl Min 3v    Result Date: 8/13/2018  EXAM: XR WRIST RT AP/LAT/OBL MIN 3V HISTORY: Fall, fell in bathroom, right arm INDICATION:  fall. COMPARISON: None. FINDINGS: Three  views of the right wrist demonstrate nondisplaced distal radius fracture.  Severe degenerative change of the radiocarpal and carpometacarpal rows. No ulnar fracture. .  The soft tissues are within normal limits. IMPRESSION:  Nondisplaced fracture of the distal radius. 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     Ct Head Wo Cont    Result Date: 8/23/2018  EXAM:  CT head without contrast INDICATION: Altered mental status COMPARISON: CT head 8/14/2018 TECHNIQUE: Axial noncontrast head CT from foramen magnum to vertex. Coronal and sagittal reformatted images were obtained. CT dose reduction was achieved through use of a standardized protocol tailored for this examination and automatic exposure control for dose modulation. Adaptive statistical iterative reconstruction (ASIR) was utilized. FINDINGS:  There is diffuse age-related parenchymal volume loss. The ventricles and sulci are age-appropriate without hydrocephalus. There is no mass effect or midline shift. There is no intracranial hemorrhage or extra-axial fluid collection. Scattered foci of low attenuation in the periventricular white matter represent stable chronic microvascular ischemic changes. There is no new abnormal parenchymal attenuation. The gray-white matter differentiation is maintained. The basal cisterns are patent. 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 The osseous structures are intact. The visualized paranasal sinuses and mastoid air cells are clear. IMPRESSION: There is no acute intracranial abnormality. Ct Head Wo Cont    Result Date: 8/14/2018  EXAM:  CT HEAD WO CONT Clinical history: Fall, fractured wrist, increased pain INDICATION:   fall COMPARISON: 7/20/2018. CONTRAST:  None. TECHNIQUE: Unenhanced CT of the head was performed using 5 mm images. Brain and bone windows were generated. CT dose reduction was achieved through use of a standardized protocol tailored for this examination and automatic exposure control for dose modulation.   FINDINGS: The ventricles and sulci are normal in size, shape and configuration and midline. Mild scattered hypodensities in the cerebral white matter. There is no intracranial hemorrhage, extra-axial collection, mass, mass effect or midline shift. The basilar cisterns are open. No acute infarct is identified. The bone windows demonstrate no abnormalities. The visualized portions of the paranasal sinuses and mastoid air cells are clear. IMPRESSION: No acute cranial process     Ct Head Wo Cont    Result Date: 7/20/2018  EXAM:  CT HEAD WO CONT INDICATION: Fall going to the bathroom and had a ground level fall and hit head on the floor. COMPARISON: MRI brain 11/12/2012. [facility] CONTRAST:  None. TECHNIQUE: Unenhanced CT of the head was performed using 5 mm images. Brain and bone windows were generated. CT dose reduction was achieved through use of a standardized protocol tailored for this examination and automatic exposure control for dose modulation. FINDINGS: There is a stable 9 x 12 mm partially calcified extra-axial lesion in the left posterior fossa compatible with meningioma. There is no acute intracranial hemorrhage, other mass, mass effect or herniation. Ventricles and sulci show stable, proportionate, and symmetric pattern. There is a stable pattern of periventricular white matter hypodensity. The gray-white matter differentiation is well-preserved. Atherosclerotic calcifications are seen within the carotid siphons and vertebral arteries. The mastoid air cells are well pneumatized. The visualized paranasal sinuses are normal.     IMPRESSION: No acute intracranial hemorrhage or infarct. Stable left posterior fossa meningioma and chronic white matter change most compatible with small vessel ischemic and/or senescent change. Intracranial atherosclerosis. Ct Abd Pelv Wo Cont    Result Date: 6/20/2018  EXAM:  CT ABD PELV WO CONT INDICATION: abd pain  2 days of abdominal pain COMPARISON: 2013 CONTRAST:  None.  TECHNIQUE: Thin axial images were obtained through the abdomen and pelvis. Coronal and sagittal reconstructions were generated. Oral contrast was not administered. CT dose reduction was achieved through use of a standardized protocol tailored for this examination and automatic exposure control for dose modulation. The absence of intravenous contrast material reduces the sensitivity for evaluation of the solid parenchymal organs of the abdomen. FINDINGS: LUNG BASES: Clear. INCIDENTALLY IMAGED HEART AND MEDIASTINUM: Unremarkable. LIVER: No mass or biliary dilatation. GALLBLADDER: Surgically removed. SPLEEN: No mass. PANCREAS: No mass or ductal dilatation. ADRENALS: Unremarkable. KIDNEYS/URETERS: Malrotated right kidney. Hyperdense right renal cyst. Right nephrolithiasis. STOMACH: Hiatal hernia. SMALL BOWEL: No dilatation or wall thickening. COLON: No dilatation or wall thickening. Moderate colonic stool. APPENDIX: Surgically removed PERITONEUM: No ascites or pneumoperitoneum. RETROPERITONEUM: No lymphadenopathy or aortic aneurysm. REPRODUCTIVE ORGANS: Surgically removed. URINARY BLADDER: No mass or calculus. BONES: No destructive bone lesion. Multilevel degenerative changes. ADDITIONAL COMMENTS: N/A     IMPRESSION: No acute findings. Xr Chest Port    Result Date: 6/20/2018  EXAM:  XR CHEST PORT INDICATION:  Chest Pain COMPARISON:  May 24 FINDINGS: A portable AP radiograph of the chest was obtained at 0555 hours. There is a left shoulder replacement in place. The patient is on a cardiac monitor. The lungs are clear. The cardiac and mediastinal contours and pulmonary vascularity are normal.  The bones and soft tissues are grossly within normal limits. IMPRESSION: No acute findings.           MEDICATIONS     ALL MEDICATIONS:   Current Facility-Administered Medications   Medication Dose Route Frequency    dilTIAZem CD (CARDIZEM CD) capsule 240 mg  240 mg Oral DAILY    magnesium hydroxide (MILK OF MAGNESIA) 400 mg/5 mL oral suspension 30 mL  30 mL Oral DAILY PRN    citalopram (CELEXA) tablet 20 mg  20 mg Oral QHS    docusate sodium (COLACE) capsule 100 mg  100 mg Oral DAILY    donepezil (ARICEPT) tablet 10 mg  10 mg Oral DAILY    polyethylene glycol (MIRALAX) packet 17 g  17 g Oral DAILY PRN    risperiDONE (RisperDAL) tablet 0.25 mg  0.25 mg Oral BID    saline peripheral flush soln 5 mL  5 mL InterCATHeter PRN    traZODone (DESYREL) tablet 25 mg  25 mg Oral QHS PRN    OLANZapine (ZyPREXA) tablet 2.5 mg  2.5 mg Oral Q6H PRN    ziprasidone (GEODON) 10 mg in sterile water (preservative free) 0.5 mL injection  10 mg IntraMUSCular BID PRN    benztropine (COGENTIN) tablet 1 mg  1 mg Oral BID PRN    benztropine (COGENTIN) injection 1 mg  1 mg IntraMUSCular BID PRN    zolpidem (AMBIEN) tablet 5 mg  5 mg Oral QHS PRN    acetaminophen (TYLENOL) tablet 650 mg  650 mg Oral Q4H PRN    ibuprofen (MOTRIN) tablet 400 mg  400 mg Oral Q8H PRN      SCHEDULED MEDICATIONS:   Current Facility-Administered Medications   Medication Dose Route Frequency    dilTIAZem CD (CARDIZEM CD) capsule 240 mg  240 mg Oral DAILY    citalopram (CELEXA) tablet 20 mg  20 mg Oral QHS    docusate sodium (COLACE) capsule 100 mg  100 mg Oral DAILY    donepezil (ARICEPT) tablet 10 mg  10 mg Oral DAILY    risperiDONE (RisperDAL) tablet 0.25 mg  0.25 mg Oral BID          ASSESSMENT & PLAN     DIAGNOSES REQUIRING ACTIVE TREATMENT AND MONITORING: (reviewed/updated 9/20/2018)  Patient Active Hospital Problem List:    The patient, Tray De Oliveira, is a 80 y.o.  female who presents at this time for treatment of the following diagnoses:  Patient Active Hospital Problem List:   Late onset Alzheimer's disease  (8/24/2018)    Assessment: severe, advanced    Plan: Agree with inpatient hospitalization for further stabilization, safety monitoring and medication management  Medications- Aricept and risperdal   09/16/18: Continue current treatment.       In summary, Tray Ralph, is a 80 y.o.  female who presents with a severe exacerbation of the principal diagnosis of Dementia  Patient's condition is worsening/not improving/not stable improving. Patient requires continued inpatient hospitalization for further stabilization, safety monitoring and medication management. I will continue to coordinate the provision of individual, milieu, occupational, group, and substance abuse therapies to address target symptoms/diagnoses as deemed appropriate for the individual patient. A coordinated, multidisplinary treatment team round was conducted with the patient (this team consists of the nurse, psychiatric unit pharmcist,  and writer). Complete current electronic health record for patient has been reviewed today including consultant notes, ancillary staff notes, nurses and psychiatric tech notes. Suicide risk assessment completed and patient deemed to be of low risk for suicide at this time. The following regarding medications was addressed during rounds with patient:   the risks and benefits of the proposed medication. The patient was given the opportunity to ask questions. Informed consent given to the use of the above medications. Will continue to adjust psychiatric and non-psychiatric medications (see above \"medication\" section and orders section for details) as deemed appropriate & based upon diagnoses and response to treatment. I will continue to order blood tests/labs and diagnostic tests as deemed appropriate and review results as they become available (see orders for details and above listed lab/test results). I will order psychiatric records from previous [facility] to further elucidate the nature of patient's psychopathology and review once available. I will gather additional collateral information from friends, family and o/p treatment team to further elucidate the nature of patient's psychopathology and baselline level of psychiatric functioning.          I certify that this patient's inpatient psychiatric hospital services furnished since the previous certification were, and continue to be, required for treatment that could reasonably be expected to improve the patient's condition, or for diagnostic study, and that the patient continues to need, on a daily basis, active treatment furnished directly by or requiring the supervision of inpatient psychiatric facility personnel. In addition, the hospital records show that services furnished were intensive treatment services, admission or related services, or equivalent services.     EXPECTED DISCHARGE DATE/DAY: TBD     DISPOSITION: Home       Signed By:   Martina Urias MD  9/20/2018

## 2018-09-20 NOTE — PROGRESS NOTES
Problem: Falls - Risk of  Goal: *Absence of Falls  Document Kizzy Fall Risk and appropriate interventions in the flowsheet. Outcome: Progressing Towards Goal  Fall Risk Interventions:  Mobility Interventions: Bed/chair exit alarm, Communicate number of staff needed for ambulation/transfer, Utilize walker, cane, or other assistive device  In bed asleep with respirations noted as even and unlabored as chest was rising and falling.  Will continue to monitor for safety and 15 minute checks throughout shift  Mentation Interventions: Adequate sleep, hydration, pain control, Bed/chair exit alarm, Door open when patient unattended, More frequent rounding    Medication Interventions: Patient to call before getting OOB    Elimination Interventions: Bed/chair exit alarm    History of Falls Interventions: Bed/chair exit alarm, Door open when patient unattended

## 2018-09-21 LAB
GLUCOSE BLD STRIP.AUTO-MCNC: 140 MG/DL (ref 65–100)
GLUCOSE BLD STRIP.AUTO-MCNC: 93 MG/DL (ref 65–100)
SERVICE CMNT-IMP: ABNORMAL
SERVICE CMNT-IMP: NORMAL

## 2018-09-21 PROCEDURE — 74011250637 HC RX REV CODE- 250/637: Performed by: INTERNAL MEDICINE

## 2018-09-21 PROCEDURE — 74011250637 HC RX REV CODE- 250/637: Performed by: PSYCHIATRY & NEUROLOGY

## 2018-09-21 PROCEDURE — 82962 GLUCOSE BLOOD TEST: CPT

## 2018-09-21 PROCEDURE — 65220000003 HC RM SEMIPRIVATE PSYCH

## 2018-09-21 PROCEDURE — 97535 SELF CARE MNGMENT TRAINING: CPT

## 2018-09-21 RX ADMIN — DONEPEZIL HYDROCHLORIDE 10 MG: 10 TABLET, FILM COATED ORAL at 09:57

## 2018-09-21 RX ADMIN — RISPERIDONE 0.25 MG: 0.5 TABLET, FILM COATED ORAL at 09:57

## 2018-09-21 RX ADMIN — DILTIAZEM HYDROCHLORIDE 240 MG: 240 CAPSULE, COATED, EXTENDED RELEASE ORAL at 09:57

## 2018-09-21 RX ADMIN — RISPERIDONE 0.25 MG: 0.5 TABLET, FILM COATED ORAL at 17:34

## 2018-09-21 RX ADMIN — CITALOPRAM HYDROBROMIDE 20 MG: 20 TABLET ORAL at 21:44

## 2018-09-21 NOTE — INTERDISCIPLINARY ROUNDS
Behavioral Health Interdisciplinary Rounds     Patient Name: Frank Chavez  Age: 80 y.o. Room/Bed:  746/  Primary Diagnosis: Dementia   Admission Status: Involuntary Commitment     Readmission within 30 days: no  Power of  in place: yes  Patient requires a blocked bed: no          Reason for blocked bed: n/a    VTE Prophylaxis: Not indicated    Mobility needs/Fall risk: yes  Flu Vaccine : no   Nutritional Plan: no  Consults: OT following          Labs/Testing due today?: no    Sleep hours: 9       Participation in Care/Groups:  yes  Medication Compliant?: Yes  PRNS (last 24 hours): None    Restraints (last 24 hours):  no     CIWA (range last 24 hours):     COWS (range last 24 hours):      Alcohol screening (AUDIT) completed -   AUDIT Score: 0     If applicable, date SBIRT discussed in treatment team AND documented:     Tobacco - patient is a smoker: Have You Used Tobacco in the Past 30 Days: No  Illegal Drugs use: Have You Used Any Illegal Substances Over the Past 12 Months: No    24 hour chart check complete: yes     Patient goal(s) for today:   Treatment team focus/goals: Plan to continue to search for a skilled nursing home bed.     Progress note -she remains pleasant and complaint with her treatment     LOS:  15  Expected LOS: TBD     Participating treatment team members: Katerina Bob - Dr. Jignesh Fairbanks

## 2018-09-21 NOTE — PROGRESS NOTES
Problem: Altered Thought Process (Adult/Pediatric)  Goal: *STG: Participates in treatment plan  Outcome: Progressing Towards Goal  Pt is calm and cooperative. Interacting appropriately with staff and peers. Staff will continue to orient and encourage positive thoughts and coping skills. Problem: Falls - Risk of  Goal: *Absence of Falls  Document Kizzy Fall Risk and appropriate interventions in the flowsheet. Outcome: Progressing Towards Goal  Fall Risk Interventions:  Mobility Interventions: Bed/chair exit alarm    Mentation Interventions: Door open when patient unattended    Medication Interventions: Evaluate medications/consider consulting pharmacy    Elimination Interventions: Toileting schedule/hourly rounds    History of Falls Interventions: Door open when patient unattended    Fall tool is accurate and up to date. Pt is free from falls.

## 2018-09-21 NOTE — BH NOTES
Care Management Note:    [facility] ecined out updated information to area nursing home for short term skilled care.

## 2018-09-21 NOTE — PROGRESS NOTES
Problem: Discharge Planning  Goal: *Discharge to safe environment  Outcome: Not Progressing Towards Goal  She requires skilled nursing home care   She has a very supportive family   Goal: *Knowledge of medication management  Outcome: Progressing Towards Goal  She is complaint with her medications   Goal: *Knowledge of discharge instructions  Outcome: Not Progressing Towards Goal  She is unable to verbalize discharge needs and is requesting to go home     Problem: Patient Education: Go to Patient Education Activity  Goal: Patient/Family Education  Outcome: Progressing Towards Goal  SW will educate her family regarding discharge needs

## 2018-09-21 NOTE — PROGRESS NOTES
Problem: Falls - Risk of  Goal: *Absence of Falls  Document Kizzy Fall Risk and appropriate interventions in the flowsheet. Outcome: Progressing Towards Goal  Fall Risk Interventions:  Mobility Interventions: Bed/chair exit alarm, Communicate number of staff needed for ambulation/transfer, Utilize walker, cane, or other assistive device    Mentation Interventions: Bed/chair exit alarm, Door open when patient unattended, More frequent rounding    Medication Interventions: Bed/chair exit alarm, Teach patient to arise slowly    Elimination Interventions: Patient to call for help with toileting needs, Toileting schedule/hourly rounds    History of Falls Interventions: Bed/chair exit alarm, Door open when patient unattended    Received pt in bed, appears to be sleep, no distress noted. q15min rounds, will continue to monitor and maintain safety.

## 2018-09-21 NOTE — PROGRESS NOTES
Problem: Self Care Deficits Care Plan (Adult)  Goal: *Acute Goals and Plan of Care (Insert Text)  Occupational Therapy Goals  Reviewed/revised as appropriate 9/17/18  Initiated 9/8/2018    1. Patient will perform bathing seated with Rebecca and additional time within 7 days. CONTINUE  2. Patient will perform BSC transfer with Rebecca and RW within 7 days. CONTINUE  3. Patient will perform UB dressing with Rebecca seated within 7 days. CONTINUE  4.  Patient will participate in UE therapeutic exercise/activities with supervision for 8 minutes within 7 days. MET - upgrade seated unsupported 10 min supervision   Occupational Therapy TREATMENT  Patient: Tray Blum Pew [de-identified]80 y.o. female)  Date: 9/21/2018  Diagnosis: Late onset Alzheimer's disease with behavioral disturbance  Aggression  Psychosis  Psychosis  Late onset Alzheimer's disease with behavioral disturbance Dementia       Precautions: Fall  Chart, occupational therapy assessment, plan of care, and goals were reviewed. ASSESSMENT:  Patient received in bed and asleep. She awakened to voice and touch. Patient participated in bed mobility, lower body dressing with pants and socks, sit to stand, ambulation to chair approximately 6 ft away, rested, then returned to bed to doff pants and socks and return to supine. She needed min to total assist. RW with platform on right used secondary to NWB status on right UE. (fracture). Performance impacted by report of chronic and present back pain, impaired unsupported sitting and supported standing balance, impaired bed mobility, impaired reach to LEs, right (dominant) UE fracture with NWB orders, impaired strength and endurance for functional activity.    Progression toward goals:  []       Improving appropriately and progressing toward goals  [x]       Improving slowly and progressing toward goals  []       Not making progress toward goals and plan of care will be adjusted     PLAN:  Patient continues to benefit from skilled intervention to address the above impairments. Continue treatment per established plan of care. Discharge Recommendations:  Rehab/SNF  Further Equipment Recommendations for Discharge:  none     SUBJECTIVE:   Patient stated I've been having diarrhea.     OBJECTIVE DATA SUMMARY:   Cognitive/Behavioral Status:  Neurologic State: Lethargic  Orientation Level: Oriented to person;Disoriented to time;Disoriented to situation  Cognition: Memory loss;Decreased attention/concentration; Follows commands (speak up a little louder)  Perception: Cues to maintain midline in standing;Cues to maintain midline in sitting;Verbal;Tactile  Perseveration: No perseveration noted  Safety/Judgement: Decreased awareness of environment    Functional Mobility and Transfers for ADLs:  Bed Mobility:  Supine to Sit: Moderate assistance;Assist x1  Sit to Supine: Minimum assistance;Assist x1    Transfers:  Sit to Stand: Moderate assistance;Assist x1  Functional Transfers  Toilet Transfer : Moderate assistance;Assist x1  Adaptive Equipment: Bedside commode;Walker (comment) (walker with platform for right UE)  Bed to Chair: Moderate assistance;Assist x1    Balance:  Sitting: Impaired; Without support  Sitting - Static: Fair (occasional)  Sitting - Dynamic: Fair (occasional)  Standing: Impaired  Standing - Static: Fair  Standing - Dynamic : Fair    ADL Intervention:   Lower Body Dressing Assistance  Pants With Elastic Waist: Maximum assistance  Socks: Total assistance (dependent)  Leg Crossed Method Used: No  Position Performed: Bending forward method;Seated edge of bed;Standing  Adaptive Equipment Used: Walker (platform for right UE)         Cognitive Retraining  Organizing/Sequencing: Breaking task down  Attention to Task: Distractibility  Following Commands: Follows one step commands/directions  Safety/Judgement: Decreased awareness of environment  Cues: Verbal cues provided; Tactile cues provided;Visual cues provided       Activity Tolerance: fair  Please refer to the flowsheet for vital signs taken during this treatment.   After treatment:   [] Patient left in no apparent distress sitting up in chair  [x] Patient left in no apparent distress in bed  [x] Call bell left within reach  [x] Nursing notified  [] Caregiver present  [x] Bed alarm activated    COMMUNICATION/COLLABORATION:   The patients plan of care was discussed with: Registered Nurse    EDDIE Deleon  Time Calculation: 25 mins

## 2018-09-21 NOTE — PROGRESS NOTES
Problem: Altered Thought Process (Adult/Pediatric)  Goal: *STG: Demonstrates ability to understand and use improved judgment in daily activities and relationships  Outcome: Progressing Towards Goal    1515 Received patient working with OT in her bedroom. NAD. On q 15 min. Safety checks. Visible in DR for dinner. Sitting in [address]. Poor appetite. . Ate 25% dinner and 100% fluids. Med compliant. Requested to return to bed after dinner because \" am tired want to rest in bed\".

## 2018-09-22 LAB
GLUCOSE BLD STRIP.AUTO-MCNC: 103 MG/DL (ref 65–100)
GLUCOSE BLD STRIP.AUTO-MCNC: 112 MG/DL (ref 65–100)
SERVICE CMNT-IMP: ABNORMAL
SERVICE CMNT-IMP: ABNORMAL

## 2018-09-22 PROCEDURE — 82962 GLUCOSE BLOOD TEST: CPT

## 2018-09-22 PROCEDURE — 74011250637 HC RX REV CODE- 250/637: Performed by: PSYCHIATRY & NEUROLOGY

## 2018-09-22 PROCEDURE — 65220000003 HC RM SEMIPRIVATE PSYCH

## 2018-09-22 PROCEDURE — 74011250637 HC RX REV CODE- 250/637: Performed by: INTERNAL MEDICINE

## 2018-09-22 RX ADMIN — RISPERIDONE 0.25 MG: 0.5 TABLET, FILM COATED ORAL at 10:52

## 2018-09-22 RX ADMIN — CITALOPRAM HYDROBROMIDE 20 MG: 20 TABLET ORAL at 21:07

## 2018-09-22 RX ADMIN — DONEPEZIL HYDROCHLORIDE 10 MG: 10 TABLET, FILM COATED ORAL at 10:51

## 2018-09-22 RX ADMIN — DILTIAZEM HYDROCHLORIDE 240 MG: 240 CAPSULE, COATED, EXTENDED RELEASE ORAL at 10:51

## 2018-09-22 RX ADMIN — RISPERIDONE 0.25 MG: 0.5 TABLET, FILM COATED ORAL at 17:04

## 2018-09-22 NOTE — BH NOTES
Behavioral Health Interdisciplinary Rounds     Patient Name: Natalya Valle  Age: 80 y.o.   Room/Bed:  6/  Primary Diagnosis: Dementia   Admission Status: Involuntary Commitment     Readmission within 30 days: no  Power of  in place: yes  Patient requires a blocked bed: no          Reason for blocked bed: n/a    VTE Prophylaxis: Not indicated    Mobility needs/Fall risk: yes  Flu Vaccine : Yes   Nutritional Plan: no  Consults:          Labs/Testing due today?: no    Sleep hours: 7 1/2       Participation in Care/Groups:  yes  Medication Compliant?: Yes  PRNS (last 24 hours): None    Restraints (last 24 hours):  no     CIWA (range last 24 hours):     COWS (range last 24 hours):      Alcohol screening (AUDIT) completed -   AUDIT Score: 0     If applicable, date SBIRT discussed in treatment team AND documented:     Tobacco - patient is a smoker: Have You Used Tobacco in the Past 30 Days: No  Illegal Drugs use: Have You Used Any Illegal Substances Over the Past 12 Months: No    24 hour chart check complete: yes     Patient goal(s) for today:   Treatment team focus/goals:   Progress note     LOS:  16  Expected LOS:     Financial concerns/prescription coverage:    Date of last family contact:       Family requesting physician contact today:    Discharge plan:   Guns in the home:         Outpatient provider(s):     Participating treatment team members: Tray Regan, * (assigned SW),

## 2018-09-22 NOTE — PROGRESS NOTES
Problem: Altered Thought Process (Adult/Pediatric)  Goal: *STG: Participates in treatment plan  Outcome: Progressing Towards Goal  Pt pleasant and cooperative. Med and meal compliant. Interactive with staff and peers. Staff will continue to monitor, orient and encourage positive thoughts and coping skills. Problem: Falls - Risk of  Goal: *Absence of Falls  Document Kizzy Fall Risk and appropriate interventions in the flowsheet. Outcome: Progressing Towards Goal  Fall Risk Interventions:  Mobility Interventions: Bed/chair exit alarm    Mentation Interventions: Door open when patient unattended    Medication Interventions: Evaluate medications/consider consulting pharmacy    Elimination Interventions: Toileting schedule/hourly rounds    History of Falls Interventions: Bed/chair exit alarm     Fall tool is accurate and up to date. Pt is free from falls. Problem: Pressure Injury - Risk of  Goal: *Prevention of pressure injury  Document Oumar Scale and appropriate interventions in the flowsheet. Outcome: Progressing Towards Goal  Pressure Injury Interventions:  Sensory Interventions: Assess changes in LOC    Moisture Interventions: Absorbent underpads    Activity Interventions: Increase time out of bed    Mobility Interventions: HOB 30 degrees or less    Nutrition Interventions: Offer support with meals,snacks and hydration    Friction and Shear Interventions: Lift sheet       Staff monitoring skin. Chair cushion being used. Barrier cream applied to buttocks and sacral areas.

## 2018-09-22 NOTE — PROGRESS NOTES
Problem: Altered Thought Process (Adult/Pediatric)  Goal: *STG: Participates in treatment plan  Outcome: Progressing Towards Goal  Patient is confused. Patient cooperative. Med and meal compliant. Patient needs assist to turn and pivot in chair. Patient has weak lower extremities and unable to walk this evening with staff.

## 2018-09-22 NOTE — PROGRESS NOTES
Problem: Falls - Risk of  Goal: *Absence of Falls  Document Kizzy Fall Risk and appropriate interventions in the flowsheet. Outcome: Progressing Towards Goal  Fall Risk Interventions:  Mobility Interventions: Bed/chair exit alarm, Communicate number of staff needed for ambulation/transfer, Patient to call before getting OOB, Utilize walker, cane, or other assistive device    Mentation Interventions: Adequate sleep, hydration, pain control, Bed/chair exit alarm, Door open when patient unattended    Medication Interventions: Bed/chair exit alarm, Patient to call before getting OOB, Teach patient to arise slowly    Elimination Interventions: Bed/chair exit alarm, Patient to call for help with toileting needs, Toilet paper/wipes in reach, Toileting schedule/hourly rounds    History of Falls Interventions: Bed/chair exit alarm, Door open when patient unattended    Patient asleep in bed at this time. Respirations regular and even. Continue to monitor q 15 minutes for safety.

## 2018-09-22 NOTE — BH NOTES
PSYCHIATRIC PROGRESS NOTE         Patient Name  Dylan Celeste   Date of Birth 5/24/1933   [facility] [id number]   Medical Record Number  [id number]      Age  80 y.o. PCP Chase Dudley MD   Admit date:  9/6/2018    Room Number  746/01  @ [facility]   Date of Service  09/21/18          PSYCHOTHERAPY SESSION NOTE:  Length of psychotherapy session: 20 minutes    Main condition/diagnosis/issues treated during session today, 9/21/2018 : I employed Cognitive Behavioral therapy techniques, Reality-Oriented psychotherapy, as well as supportive psychotherapy in regards to various ongoing psychosocial stressors, including the following: pre-admission and current problems; housing issues;   medical issues; and stress of hospitalization. Interpersonal relationship issues and psychodynamic conflicts explored. Attempts made to alleviate maladaptive patterns. Overall, patient is not progressing    Treatment Plan Update (reviewed an updated 9/21/2018) : I will modify psychotherapy tx plan by implementing more stress management strategies, building upon cognitive behavioral techniques, increasing coping skills, as well as shoring up psychological defenses). An extended energy and skill set was needed to engage pt in psychotherapy due to some of the following: resistiveness, complexity, negativity, confrontational nature, hostile behaviors, and/or severe abnormalities in thought processes/psychosis resulting in the loss of expressive/receptive language communication skills. E & M PROGRESS NOTE:         HISTORY         HISTORY OF PRESENT ILLNESS/INTERVAL HISTORY:  (reviewed/updated 9/21/2018). per initial evaluation: The patient, Tray Dodge, is a 80 y.o.   WHITE [place]  female with a past psychiatric history significant for Dementia, MDD who was treated on [facility] for several weeks, until she had a syncopal episode on 9/5 and transferred to the medical floor for observation for 24 hours. She now returns medically stable. Patient's mood and presesentation hs improved compared to original presentation on admission. She remains very confused secondary to advanced dementia. No agitation or aggression noted. Compliant with her medications. Awaiting placement vs. Return home. 9/8/18. \"Am I going home today\". No complaints. Seems calmer. 9/9/18. No complaints. No agitation. Slept ok  9/10/18- Funny and engaging this morning. She enjoyed singing in group this morning. Sleeping well at night. 9/11/18- Patient remains stable. 9/12/18- No complaints, no acute behviors. Bright and euthymic affect. Eating meals, sleeping well. 9/13- patient remains stable  9/14/18- euthymic mood, talkative. Pleasant and cooperative. 9/16/18: seen today,funny at times, accepting po med and meals,no aggressin or agitation,requires assistance  with ADL. 9/17/18- Ms. Regan denies any complaints this morning. Resting comfortably, then easily eating and enjoying her lunch. NO changes. Patient remains stable. 9/18/18- patient presented in upbeat mood, observed sitting and laughing with her . Patient remains compliant and stable. 9/19- unchanged. Bright affect. Funny disposition  9/20- Enjoying visit from her . Smiling. Compliant. Slept through the night.  9/21/18- sleeping this morning. No acute events to report. Stable      SIDE EFFECTS: (reviewed/updated 9/21/2018)  None reported or admitted to. ALLERGIES:(reviewed/updated 9/21/2018)  Allergies   Allergen Reactions    Angiotensin Ii,Human Other (comments)     States does not remember      Avelox [Moxifloxacin] Other (comments)     States does not remember      Demerol [Meperidine] Hives      MEDICATIONS PRIOR TO ADMISSION:(reviewed/updated 9/21/2018)  Prescriptions Prior to Admission   Medication Sig    traZODone (DESYREL) 50 mg tablet Take 0.5 Tabs by mouth nightly for 30 days.  Indications: insomnia associated with depression    risperiDONE (RISPERDAL) 0.25 mg tablet Take 1 Tab by mouth two (2) times a day for 30 days.  magnesium hydroxide (MILK OF MAGNESIA) 400 mg/5 mL suspension Take 30 mL by mouth daily for 30 days.  donepezil (ARICEPT) 10 mg tablet Take 1 Tab by mouth daily for 30 days.  acetaminophen (TYLENOL) 325 mg tablet Take 2 Tabs by mouth every six (6) hours as needed for Pain.  docusate sodium (COLACE) 100 mg capsule Take 1 Cap by mouth daily for 90 days.  dilTIAZem CD (CARTIA XT) 240 mg ER capsule Take 240 mg by mouth daily.  polyethylene glycol (MIRALAX) 17 gram packet Take 17 g by mouth as needed (Constipation).  citalopram (CELEXA) 20 mg tablet TAKE 1 TABLET BY MOUTH DAILY      PAST MEDICAL HISTORY: Past medical history from the initial psychiatric evaluation has been reviewed (reviewed/updated 9/21/2018) with no additional updates (I asked patient and no additional past medical history provided). Past Medical History:   Diagnosis Date    Anemia     Atrial fibrillation ([address]     Cancer (HCC)     basal cell on nose    Chronic pain     back pain    Fibromyalgia 4/1/2010    GERD (gastroesophageal reflux disease) 4/1/2010    Hiatal hernia 4/1/2010    High cholesterol 4/1/2010    HTN (hypertension) 4/1/2010    Ill-defined condition     A.  Fib    OA (osteoarthritis) 4/1/2010    back    Pulmonary emboli ([facility] [address] 2015     Past Surgical History:   Procedure Laterality Date    BREAST SURGERY PROCEDURE UNLISTED      Breast im-plants x 2    ENLARGE BREAST WITH IMPLANT      HX APPENDECTOMY      HX BREAST BIOPSY      HX BREAST RECONSTRUCTION      Breast implants removed 1992    HX CATARACT REMOVAL      HX CHOLECYSTECTOMY  9/11/2013    HX HYSTERECTOMY      HX KNEE REPLACEMENT  2008    bilateral    HX ORTHOPAEDIC  2007    Bilateral TKR    HX PARTIAL HYSTERECTOMY      HX SHOULDER REPLACEMENT  2009    left    HX TONSILLECTOMY        SOCIAL HISTORY: Social history from the initial psychiatric evaluation has been reviewed (reviewed/updated 9/21/2018) with no additional updates (I asked patient and no additional social history provided). Social History     Social History    Marital status:      Spouse name: N/A    Number of children: N/A    Years of education: N/A     Occupational History    Not on file. Social History Main Topics    Smoking status: Never Smoker    Smokeless tobacco: Never Used    Alcohol use No    Drug use: No    Sexual activity: No     Other Topics Concern    Not on file     Social History Narrative      FAMILY HISTORY: Family history from the initial psychiatric evaluation has been reviewed (reviewed/updated 9/21/2018) with no additional updates (I asked patient and no additional family history provided). Family History   Problem Relation Age of Onset   [facility] [address] Arthritis-rheumatoid Mother     Dementia Mother     Coronary Artery Disease Father     Cancer Brother      lung cancer       REVIEW OF SYSTEMS: (reviewed/updated 9/21/2018)  Appetite:improved   Sleep: improved   All other Review of Systems: General ROS: negative         MENTAL STATUS EXAM & VITALS     MENTAL STATUS EXAM (MSE):    MSE FINDINGS ARE WITHIN NORMAL LIMITS (WNL) UNLESS OTHERWISE STATED BELOW. ( ALL OF THE BELOW CATEGORIES OF THE MSE HAVE BEEN REVIEWED (reviewed 9/21/2018) AND UPDATED AS DEEMED APPROPRIATE )  General Presentation age appropriate and older than stated age, cooperative   Orientation not oriented to situation   Vital Signs  See below (reviewed 9/21/2018); Vital Signs (BP, Pulse, & Temp) are within normal limits if not listed below.    Gait and Station Stable/steady, no ataxia   Musculoskeletal System No extrapyramidal symptoms (EPS); no abnormal muscular movements or Tardive Dyskinesia (TD); muscle strength and tone are within normal limits   Language No aphasia or dysarthria   Speech:  soft   Thought Processes illogical; slow rate of thoughts; poor abstract reasoning/computation Thought Associations loose associations   Thought Content free of delusions   Suicidal Ideations no plan    Homicidal Ideations no plan    Mood:  euthymic   Affect:  euthymic   Memory recent  impaired   Memory remote:  impaired   Concentration/Attention:  distractable   Fund of Knowledge below average   Insight:  limited   Reliability limited   Judgment:  limited          VITALS:     Patient Vitals for the past 24 hrs:   Temp Pulse Resp BP SpO2   09/21/18 1924 98.1 °F (36.7 °C) 92 14 139/81 -   09/21/18 1539 98.3 °F (36.8 °C) 97 16 127/69 96 %   09/21/18 0800 98.5 °F (36.9 °C) (!) 102 16 189/86 96 %     Wt Readings from Last 3 Encounters:   09/18/18 66.9 kg (147 lb 8 oz)   09/02/18 64.5 kg (142 lb 3.2 oz)   08/21/18 76.1 kg (167 lb 12.8 oz)     Temp Readings from Last 3 Encounters:   09/21/18 98.1 °F (36.7 °C)   09/06/18 98.9 °F (37.2 °C)   09/05/18 98.1 °F (36.7 °C)     BP Readings from Last 3 Encounters:   09/21/18 139/81   09/06/18 146/77   09/05/18 103/63     Pulse Readings from Last 3 Encounters:   09/21/18 92   09/06/18 (!) 105   09/05/18 68            DATA     LABORATORY DATA:(reviewed/updated 9/21/2018)  Recent Results (from the past 24 hour(s))   GLUCOSE, POC    Collection Time: 09/21/18  8:06 AM   Result Value Ref Range    Glucose (POC) 93 65 - 100 mg/dL    Performed by [address] [place] [address], POC    Collection Time: 09/21/18  4:21 PM   Result Value Ref Range    Glucose (POC) 140 (H) 65 - 100 mg/dL    Performed by Bill Lanza      No results found for: VALF2, VALAC, VALP, VALPR, DS6, CRBAM, CRBAMP, CARB2, XCRBAM  No results found for: LITHM   RADIOLOGY REPORTS:(reviewed/updated 9/21/2018)  Xr Pelv Ap Only    Addendum Date: 8/14/2018    Addendum: No fracture. Result Date: 8/14/2018  Clinical history: Fall yesterday FINDINGS: Single frontal view of the pelvis is obtained. There is no fracture or dislocation identified. There is no significant soft tissue abnormality.      IMPRESSION: No acute fracture. Xr Forearm Rt Ap/lat    Result Date: 8/13/2018  EXAM:  XR FOREARM RT AP/LAT Clinical history: Distal radial fracture. INDICATION:   fall. COMPARISON: None. FINDINGS: Two views of the right radius and ulna demonstrate distal radial fracture. Extensive degenerative change at the radiocarpal and carpometacarpal rows. .     IMPRESSION:  Distal radial fracture with minimal displacement. Severe degenerative change at the wrist..     Xr Wrist Rt Ap/lat    Result Date: 8/27/2018  EXAM: XR WRIST RT AP/LAT INDICATION:  R wrist fracture follow up, was not casted and pt not leaving splint on. COMPARISON: 8/14/2018. FINDINGS: Two  views of the right wrist demonstrate no appreciable change in the T-shaped interarticular distal radial fracture and ulnar styloid fracture in fiberglass. There is chondrocalcinosis of the radiocarpal joint and TFC. Lateral carpal arthritis is noted. Osteopenia is present. IMPRESSION:  No significant change in the right T-shaped interarticular distal radial and ulna styloid fractures in the splint. Xr Wrist Rt Ap/lat/obl Min 3v    Result Date: 8/14/2018  EXAM: XR WRIST RT AP/LAT/OBL MIN 3V Clinical history: Fall, broken wrist INDICATION:  fall, broken wrist. COMPARISON: None. FINDINGS: Three  views of the right wrist demonstrate postreduction images distal radial fracture. .  Soft tissue edema. .     IMPRESSION:  Postreduction images distal radial fracture. .     Xr Wrist Rt Ap/lat/obl Min 3v    Result Date: 8/13/2018  EXAM: XR WRIST RT AP/LAT/OBL MIN 3V HISTORY: Fall, fell in bathroom, right arm INDICATION:  fall. COMPARISON: None. FINDINGS: Three  views of the right wrist demonstrate nondisplaced distal radius fracture. Severe degenerative change of the radiocarpal and carpometacarpal rows. No ulnar fracture. .  The soft tissues are within normal limits. IMPRESSION:  Nondisplaced fracture of the distal radius.  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     Ct Head Wo Cont    Result Date: 8/23/2018  EXAM:  CT head without contrast INDICATION: Altered mental status COMPARISON: CT head 8/14/2018 TECHNIQUE: Axial noncontrast head CT from foramen magnum to vertex. Coronal and sagittal reformatted images were obtained. CT dose reduction was achieved through use of a standardized protocol tailored for this examination and automatic exposure control for dose modulation. Adaptive statistical iterative reconstruction (ASIR) was utilized. FINDINGS:  There is diffuse age-related parenchymal volume loss. The ventricles and sulci are age-appropriate without hydrocephalus. There is no mass effect or midline shift. There is no intracranial hemorrhage or extra-axial fluid collection. Scattered foci of low attenuation in the periventricular white matter represent stable chronic microvascular ischemic changes. There is no new abnormal parenchymal attenuation. The gray-white matter differentiation is maintained. The basal cisterns are patent. 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 The osseous structures are intact. The visualized paranasal sinuses and mastoid air cells are clear. IMPRESSION: There is no acute intracranial abnormality. Ct Head Wo Cont    Result Date: 8/14/2018  EXAM:  CT HEAD WO CONT Clinical history: Fall, fractured wrist, increased pain INDICATION:   fall COMPARISON: 7/20/2018. CONTRAST:  None. TECHNIQUE: Unenhanced CT of the head was performed using 5 mm images. Brain and bone windows were generated. CT dose reduction was achieved through use of a standardized protocol tailored for this examination and automatic exposure control for dose modulation. FINDINGS: The ventricles and sulci are normal in size, shape and configuration and midline. Mild scattered hypodensities in the cerebral white matter. There is no intracranial hemorrhage, extra-axial collection, mass, mass effect or midline shift.   The basilar cisterns are open. No acute infarct is identified. The bone windows demonstrate no abnormalities. The visualized portions of the paranasal sinuses and mastoid air cells are clear. IMPRESSION: No acute cranial process     Ct Head Wo Cont    Result Date: 7/20/2018  EXAM:  CT HEAD WO CONT INDICATION: Fall going to the bathroom and had a ground level fall and hit head on the floor. COMPARISON: MRI brain 11/12/2012. Candance Huger CONTRAST:  None. TECHNIQUE: Unenhanced CT of the head was performed using 5 mm images. Brain and bone windows were generated. CT dose reduction was achieved through use of a standardized protocol tailored for this examination and automatic exposure control for dose modulation. FINDINGS: There is a stable 9 x 12 mm partially calcified extra-axial lesion in the left posterior fossa compatible with meningioma. There is no acute intracranial hemorrhage, other mass, mass effect or herniation. Ventricles and sulci show stable, proportionate, and symmetric pattern. There is a stable pattern of periventricular white matter hypodensity. The gray-white matter differentiation is well-preserved. Atherosclerotic calcifications are seen within the carotid siphons and vertebral arteries. The mastoid air cells are well pneumatized. The visualized paranasal sinuses are normal.     IMPRESSION: No acute intracranial hemorrhage or infarct. Stable left posterior fossa meningioma and chronic white matter change most compatible with small vessel ischemic and/or senescent change. Intracranial atherosclerosis. Ct Abd Pelv Wo Cont    Result Date: 6/20/2018  EXAM:  CT ABD PELV WO CONT INDICATION: abd pain  2 days of abdominal pain COMPARISON: 2013 CONTRAST:  None. TECHNIQUE: Thin axial images were obtained through the abdomen and pelvis. Coronal and sagittal reconstructions were generated. Oral contrast was not administered.  CT dose reduction was achieved through use of a standardized protocol tailored for this examination and automatic exposure control for dose modulation. The absence of intravenous contrast material reduces the sensitivity for evaluation of the solid parenchymal organs of the abdomen. FINDINGS: LUNG BASES: Clear. INCIDENTALLY IMAGED HEART AND MEDIASTINUM: Unremarkable. LIVER: No mass or biliary dilatation. GALLBLADDER: Surgically removed. SPLEEN: No mass. PANCREAS: No mass or ductal dilatation. ADRENALS: Unremarkable. KIDNEYS/URETERS: Malrotated right kidney. Hyperdense right renal cyst. Right nephrolithiasis. STOMACH: Hiatal hernia. SMALL BOWEL: No dilatation or wall thickening. COLON: No dilatation or wall thickening. Moderate colonic stool. APPENDIX: Surgically removed PERITONEUM: No ascites or pneumoperitoneum. RETROPERITONEUM: No lymphadenopathy or aortic aneurysm. REPRODUCTIVE ORGANS: Surgically removed. URINARY BLADDER: No mass or calculus. BONES: No destructive bone lesion. Multilevel degenerative changes. ADDITIONAL COMMENTS: N/A     IMPRESSION: No acute findings. Xr Chest Port    Result Date: 6/20/2018  EXAM:  XR CHEST PORT INDICATION:  Chest Pain COMPARISON:  May 24 FINDINGS: A portable AP radiograph of the chest was obtained at 0555 hours. There is a left shoulder replacement in place. The patient is on a cardiac monitor. The lungs are clear. The cardiac and mediastinal contours and pulmonary vascularity are normal.  The bones and soft tissues are grossly within normal limits. IMPRESSION: No acute findings.           MEDICATIONS     ALL MEDICATIONS:   Current Facility-Administered Medications   Medication Dose Route Frequency    dilTIAZem CD (CARDIZEM CD) capsule 240 mg  240 mg Oral DAILY    magnesium hydroxide (MILK OF MAGNESIA) 400 mg/5 mL oral suspension 30 mL  30 mL Oral DAILY PRN    citalopram (CELEXA) tablet 20 mg  20 mg Oral QHS    docusate sodium (COLACE) capsule 100 mg  100 mg Oral DAILY    donepezil (ARICEPT) tablet 10 mg  10 mg Oral DAILY    polyethylene glycol (MIRALAX) packet 17 g  17 g Oral DAILY PRN    risperiDONE (RisperDAL) tablet 0.25 mg  0.25 mg Oral BID    saline peripheral flush soln 5 mL  5 mL InterCATHeter PRN    traZODone (DESYREL) tablet 25 mg  25 mg Oral QHS PRN    OLANZapine (ZyPREXA) tablet 2.5 mg  2.5 mg Oral Q6H PRN    ziprasidone (GEODON) 10 mg in sterile water (preservative free) 0.5 mL injection  10 mg IntraMUSCular BID PRN    benztropine (COGENTIN) tablet 1 mg  1 mg Oral BID PRN    benztropine (COGENTIN) injection 1 mg  1 mg IntraMUSCular BID PRN    zolpidem (AMBIEN) tablet 5 mg  5 mg Oral QHS PRN    acetaminophen (TYLENOL) tablet 650 mg  650 mg Oral Q4H PRN    ibuprofen (MOTRIN) tablet 400 mg  400 mg Oral Q8H PRN      SCHEDULED MEDICATIONS:   Current Facility-Administered Medications   Medication Dose Route Frequency    dilTIAZem CD (CARDIZEM CD) capsule 240 mg  240 mg Oral DAILY    citalopram (CELEXA) tablet 20 mg  20 mg Oral QHS    docusate sodium (COLACE) capsule 100 mg  100 mg Oral DAILY    donepezil (ARICEPT) tablet 10 mg  10 mg Oral DAILY    risperiDONE (RisperDAL) tablet 0.25 mg  0.25 mg Oral BID          ASSESSMENT & PLAN     DIAGNOSES REQUIRING ACTIVE TREATMENT AND MONITORING: (reviewed/updated 9/21/2018)  Patient Active Hospital Problem List:    The patient, Tray De Oliveira, is a 80 y.o.  female who presents at this time for treatment of the following diagnoses:  Patient Active Hospital Problem List:   Late onset Alzheimer's disease  (8/24/2018)    Assessment: severe, advanced    Plan: Agree with inpatient hospitalization for further stabilization, safety monitoring and medication management  Medications- Aricept and risperdal   09/16/18: Continue current treatment. In summary, Tray Ralph, is a 80 y.o.  female who presents with a severe exacerbation of the principal diagnosis of Dementia  Patient's condition is worsening/not improving/not stable improving.   Patient requires continued inpatient hospitalization for further stabilization, safety monitoring and medication management. I will continue to coordinate the provision of individual, milieu, occupational, group, and substance abuse therapies to address target symptoms/diagnoses as deemed appropriate for the individual patient. A coordinated, multidisplinary treatment team round was conducted with the patient (this team consists of the nurse, psychiatric unit pharmcist,  and writer). Complete current electronic health record for patient has been reviewed today including consultant notes, ancillary staff notes, nurses and psychiatric tech notes. Suicide risk assessment completed and patient deemed to be of low risk for suicide at this time. The following regarding medications was addressed during rounds with patient:   the risks and benefits of the proposed medication. The patient was given the opportunity to ask questions. Informed consent given to the use of the above medications. Will continue to adjust psychiatric and non-psychiatric medications (see above \"medication\" section and orders section for details) as deemed appropriate & based upon diagnoses and response to treatment. I will continue to order blood tests/labs and diagnostic tests as deemed appropriate and review results as they become available (see orders for details and above listed lab/test results). I will order psychiatric records from previous [facility] hospitals to further elucidate the nature of patient's psychopathology and review once available. I will gather additional collateral information from friends, family and o/p treatment team to further elucidate the nature of patient's psychopathology and baselline level of psychiatric functioning.          I certify that this patient's inpatient psychiatric hospital services furnished since the previous certification were, and continue to be, required for treatment that could reasonably be expected to improve the patient's condition, or for diagnostic study, and that the patient continues to need, on a daily basis, active treatment furnished directly by or requiring the supervision of inpatient psychiatric facility personnel. In addition, the hospital records show that services furnished were intensive treatment services, admission or related services, or equivalent services.     EXPECTED DISCHARGE DATE/DAY: TBD     DISPOSITION: Home       Signed By:   Heriberto León MD  9/21/2018

## 2018-09-23 LAB
GLUCOSE BLD STRIP.AUTO-MCNC: 112 MG/DL (ref 65–100)
GLUCOSE BLD STRIP.AUTO-MCNC: 94 MG/DL (ref 65–100)
SERVICE CMNT-IMP: ABNORMAL
SERVICE CMNT-IMP: NORMAL

## 2018-09-23 PROCEDURE — 74011250637 HC RX REV CODE- 250/637: Performed by: PSYCHIATRY & NEUROLOGY

## 2018-09-23 PROCEDURE — 65220000003 HC RM SEMIPRIVATE PSYCH

## 2018-09-23 PROCEDURE — 74011250637 HC RX REV CODE- 250/637: Performed by: INTERNAL MEDICINE

## 2018-09-23 PROCEDURE — 82962 GLUCOSE BLOOD TEST: CPT

## 2018-09-23 RX ADMIN — CITALOPRAM HYDROBROMIDE 20 MG: 20 TABLET ORAL at 21:11

## 2018-09-23 RX ADMIN — DOCUSATE SODIUM 100 MG: 100 CAPSULE, LIQUID FILLED ORAL at 10:15

## 2018-09-23 RX ADMIN — DONEPEZIL HYDROCHLORIDE 10 MG: 10 TABLET, FILM COATED ORAL at 10:15

## 2018-09-23 RX ADMIN — RISPERIDONE 0.25 MG: 0.5 TABLET, FILM COATED ORAL at 17:16

## 2018-09-23 RX ADMIN — RISPERIDONE 0.25 MG: 0.5 TABLET, FILM COATED ORAL at 10:27

## 2018-09-23 RX ADMIN — DILTIAZEM HYDROCHLORIDE 240 MG: 240 CAPSULE, COATED, EXTENDED RELEASE ORAL at 10:15

## 2018-09-23 NOTE — PROGRESS NOTES
Problem: Altered Thought Process (Adult/Pediatric)  Goal: *STG: Participates in treatment plan  Outcome: Progressing Towards Goal  Pt is smiling calm cooperative and pleasantly confused occasionally    Med/Meal compliant   Visiting with her    Able to walk very short distances with staff pivots well   NAD noted   Will continue to monitor.

## 2018-09-23 NOTE — BH NOTES
Behavioral Health Interdisciplinary Rounds     Patient Name: Omar Kern  Age: 80 y.o.   Room/Bed:  746/  Primary Diagnosis: Dementia   Admission Status: Involuntary Commitment     Readmission within 30 days: no  Power of  in place: yes  Patient requires a blocked bed: no          Reason for blocked bed: n/a    VTE Prophylaxis: Not indicated    Mobility needs/Fall risk: yes  Flu Vaccine : yes   Nutritional Plan: no  Consults:          Labs/Testing due today?: no    Sleep hours: 7       Participation in Care/Groups:    Medication Compliant?: Yes  PRNS (last 24 hours): None    Restraints (last 24 hours):  no     CIWA (range last 24 hours):     COWS (range last 24 hours):      Alcohol screening (AUDIT) completed -   AUDIT Score: 0     If applicable, date SBIRT discussed in treatment team AND documented:     Tobacco - patient is a smoker: Have You Used Tobacco in the Past 30 Days: No  Illegal Drugs use: Have You Used Any Illegal Substances Over the Past 12 Months: No    24 hour chart check complete: yes     Patient goal(s) for today:   Treatment team focus/goals:   Progress note     LOS:  17  Expected LOS:     Financial concerns/prescription coverage:    Date of last family contact:       Family requesting physician contact today:    Discharge plan:   Guns in the home:         Outpatient provider(s):     Participating treatment team members: Tray Regan, * (assigned SW),

## 2018-09-23 NOTE — PROGRESS NOTES
Problem: Falls - Risk of  Goal: *Absence of Falls  Document Kizzy Fall Risk and appropriate interventions in the flowsheet. Outcome: Progressing Towards Goal  Fall Risk Interventions:  Mobility Interventions: Bed/chair exit alarm    Mentation Interventions: Adequate sleep, hydration, pain control    Medication Interventions: Bed/chair exit alarm    Elimination Interventions: Toileting schedule/hourly rounds    History of Falls Interventions: Bed/chair exit alarm  Patient asleep in bed at this time. Respirations regular and even. Continue to monitor q 15 minutes for safety.

## 2018-09-23 NOTE — BH NOTES
PSYCHIATRIC PROGRESS NOTE         Patient Name  Mandy Weldon   Date of Birth 5/24/1933   [facility] [id number]   Medical Record Number  [id number]      Age  80 y.o. PCP Denilson Lyn MD   Admit date:  9/6/2018    Room Number  746/01  @ [facility]   Date of Service  09/22/18          PSYCHOTHERAPY SESSION NOTE:  Length of psychotherapy session: 20 minutes    Main condition/diagnosis/issues treated during session today, 9/22/2018 : I employed Cognitive Behavioral therapy techniques, Reality-Oriented psychotherapy, as well as supportive psychotherapy in regards to various ongoing psychosocial stressors, including the following: pre-admission and current problems; housing issues;   medical issues; and stress of hospitalization. Interpersonal relationship issues and psychodynamic conflicts explored. Attempts made to alleviate maladaptive patterns. Overall, patient is not progressing    Treatment Plan Update (reviewed an updated 9/22/2018) : I will modify psychotherapy tx plan by implementing more stress management strategies, building upon cognitive behavioral techniques, increasing coping skills, as well as shoring up psychological defenses). An extended energy and skill set was needed to engage pt in psychotherapy due to some of the following: resistiveness, complexity, negativity, confrontational nature, hostile behaviors, and/or severe abnormalities in thought processes/psychosis resulting in the loss of expressive/receptive language communication skills. E & M PROGRESS NOTE:         HISTORY         HISTORY OF PRESENT ILLNESS/INTERVAL HISTORY:  (reviewed/updated 9/22/2018). per initial evaluation: The patient, Tray Lowery, is a 80 y.o.   WHITE [place]  female with a past psychiatric history significant for Dementia, MDD who was treated on [facility] for several weeks, until she had a syncopal episode on 9/5 and transferred to the medical floor for observation for 24 hours. She now returns medically stable. Patient's mood and presesentation hs improved compared to original presentation on admission. She remains very confused secondary to advanced dementia. No agitation or aggression noted. Compliant with her medications. Awaiting placement vs. Return home. 9/8/18. \"Am I going home today\". No complaints. Seems calmer. 9/9/18. No complaints. No agitation. Slept ok  9/10/18- Funny and engaging this morning. She enjoyed singing in group this morning. Sleeping well at night. 9/11/18- Patient remains stable. 9/12/18- No complaints, no acute behviors. Bright and euthymic affect. Eating meals, sleeping well. 9/13- patient remains stable  9/14/18- euthymic mood, talkative. Pleasant and cooperative. 9/16/18: seen today,funny at times, accepting po med and meals,no aggressin or agitation,requires assistance  with ADL. 9/17/18- Ms. Regan denies any complaints this morning. Resting comfortably, then easily eating and enjoying her lunch. NO changes. Patient remains stable. 9/18/18- patient presented in upbeat mood, observed sitting and laughing with her . Patient remains compliant and stable. 9/19- unchanged. Bright affect. Funny disposition  9/20- Enjoying visit from her . Smiling. Compliant. Slept through the night.  9/21/18- sleeping this morning. No acute events to report. Stable  9/22/18-Compliant with meds. No prn's used. Slept 7.5 hrs. No behavioral issues are reported. SIDE EFFECTS: (reviewed/updated 9/22/2018)  None reported or admitted to.      ALLERGIES:(reviewed/updated 9/22/2018)  Allergies   Allergen Reactions    Angiotensin Ii,Human Other (comments)     States does not remember      Avelox [Moxifloxacin] Other (comments)     States does not remember      Demerol [Meperidine] Hives      MEDICATIONS PRIOR TO ADMISSION:(reviewed/updated 9/22/2018)  Prescriptions Prior to Admission   Medication Sig    traZODone (DESYREL) 50 mg tablet Take 0.5 Tabs by mouth nightly for 30 days. Indications: insomnia associated with depression    risperiDONE (RISPERDAL) 0.25 mg tablet Take 1 Tab by mouth two (2) times a day for 30 days.  magnesium hydroxide (MILK OF MAGNESIA) 400 mg/5 mL suspension Take 30 mL by mouth daily for 30 days.  donepezil (ARICEPT) 10 mg tablet Take 1 Tab by mouth daily for 30 days.  acetaminophen (TYLENOL) 325 mg tablet Take 2 Tabs by mouth every six (6) hours as needed for Pain.  docusate sodium (COLACE) 100 mg capsule Take 1 Cap by mouth daily for 90 days.  dilTIAZem CD (CARTIA XT) 240 mg ER capsule Take 240 mg by mouth daily.  polyethylene glycol (MIRALAX) 17 gram packet Take 17 g by mouth as needed (Constipation).  citalopram (CELEXA) 20 mg tablet TAKE 1 TABLET BY MOUTH DAILY      PAST MEDICAL HISTORY: Past medical history from the initial psychiatric evaluation has been reviewed (reviewed/updated 9/22/2018) with no additional updates (I asked patient and no additional past medical history provided). Past Medical History:   Diagnosis Date    Anemia     Atrial fibrillation ([address]     Cancer (HCC)     basal cell on nose    Chronic pain     back pain    Fibromyalgia 4/1/2010    GERD (gastroesophageal reflux disease) 4/1/2010    Hiatal hernia 4/1/2010    High cholesterol 4/1/2010    HTN (hypertension) 4/1/2010    Ill-defined condition     A.  Fib    OA (osteoarthritis) 4/1/2010    back    Pulmonary emboli ([address] 2015     Past Surgical History:   Procedure Laterality Date    BREAST SURGERY PROCEDURE UNLISTED      Breast im-plants x 2    ENLARGE BREAST WITH IMPLANT      HX APPENDECTOMY      HX BREAST BIOPSY      HX BREAST RECONSTRUCTION      Breast implants removed 1992    HX CATARACT REMOVAL      HX CHOLECYSTECTOMY  9/11/2013    HX HYSTERECTOMY      HX KNEE REPLACEMENT  2008    bilateral    HX ORTHOPAEDIC  2007    Bilateral TKR    HX PARTIAL HYSTERECTOMY      HX SHOULDER REPLACEMENT  2009    left    HX TONSILLECTOMY        SOCIAL HISTORY: Social history from the initial psychiatric evaluation has been reviewed (reviewed/updated 9/22/2018) with no additional updates (I asked patient and no additional social history provided). Social History     Social History    Marital status:      Spouse name: N/A    Number of children: N/A    Years of education: N/A     Occupational History    Not on file. Social History Main Topics    Smoking status: Never Smoker    Smokeless tobacco: Never Used    Alcohol use No    Drug use: No    Sexual activity: No     Other Topics Concern    Not on file     Social History Narrative      FAMILY HISTORY: Family history from the initial psychiatric evaluation has been reviewed (reviewed/updated 9/22/2018) with no additional updates (I asked patient and no additional family history provided). Family History   Problem Relation Age of Onset   Ashok Arthritis-rheumatoid Mother     Dementia Mother     Coronary Artery Disease Father     Cancer Brother      lung cancer       REVIEW OF SYSTEMS: (reviewed/updated 9/22/2018)  Appetite:improved   Sleep: improved   All other Review of Systems: General ROS: negative         MENTAL STATUS EXAM & VITALS     MENTAL STATUS EXAM (MSE):    MSE FINDINGS ARE WITHIN NORMAL LIMITS (WNL) UNLESS OTHERWISE STATED BELOW. ( ALL OF THE BELOW CATEGORIES OF THE MSE HAVE BEEN REVIEWED (reviewed 9/22/2018) AND UPDATED AS DEEMED APPROPRIATE )  General Presentation age appropriate and older than stated age, cooperative   Orientation not oriented to situation   Vital Signs  See below (reviewed 9/22/2018); Vital Signs (BP, Pulse, & Temp) are within normal limits if not listed below.    Gait and Station Stable/steady, no ataxia   Musculoskeletal System No extrapyramidal symptoms (EPS); no abnormal muscular movements or Tardive Dyskinesia (TD); muscle strength and tone are within normal limits   Language No aphasia or dysarthria   Speech:  soft   Thought Processes illogical; slow rate of thoughts; poor abstract reasoning/computation   Thought Associations loose associations   Thought Content free of delusions   Suicidal Ideations no plan    Homicidal Ideations no plan    Mood:  euthymic   Affect:  euthymic   Memory recent  impaired   Memory remote:  impaired   Concentration/Attention:  distractable   Fund of Knowledge below average   Insight:  limited   Reliability limited   Judgment:  limited          VITALS:     Patient Vitals for the past 24 hrs:   Temp Pulse Resp BP SpO2   09/22/18 2008 97.7 °F (36.5 °C) 93 16 114/52 94 %   09/22/18 1610 98 °F (36.7 °C) 84 16 148/77 94 %   09/22/18 0842 98.5 °F (36.9 °C) 95 16 138/79 95 %     Wt Readings from Last 3 Encounters:   09/18/18 66.9 kg (147 lb 8 oz)   09/02/18 64.5 kg (142 lb 3.2 oz)   08/21/18 76.1 kg (167 lb 12.8 oz)     Temp Readings from Last 3 Encounters:   09/22/18 97.7 °F (36.5 °C)   09/06/18 98.9 °F (37.2 °C)   09/05/18 98.1 °F (36.7 °C)     BP Readings from Last 3 Encounters:   09/22/18 114/52   09/06/18 146/77   09/05/18 103/63     Pulse Readings from Last 3 Encounters:   09/22/18 93   09/06/18 (!) 105   09/05/18 68            DATA     LABORATORY DATA:(reviewed/updated 9/22/2018)  Recent Results (from the past 24 hour(s))   GLUCOSE, POC    Collection Time: 09/22/18  7:49 AM   Result Value Ref Range    Glucose (POC) 112 (H) 65 - 100 mg/dL    Performed by Nikky Luna [address], POC    Collection Time: 09/22/18  4:16 PM   Result Value Ref Range    Glucose (POC) 103 (H) 65 - 100 mg/dL    Performed by Kenny Rias      No results found for: VALF2, VALAC, VALP, VALPR, DS6, CRBAM, CRBAMP, CARB2, XCRBAM  No results found for: LITHM   RADIOLOGY REPORTS:(reviewed/updated 9/22/2018)  Xr Pelv Ap Only    Addendum Date: 8/14/2018    Addendum: No fracture. Result Date: 8/14/2018  Clinical history: Fall yesterday FINDINGS: Single frontal view of the pelvis is obtained. There is no fracture or dislocation identified. There is no significant soft tissue abnormality. IMPRESSION: No acute fracture. Xr Forearm Rt Ap/lat    Result Date: 8/13/2018  EXAM:  XR FOREARM RT AP/LAT Clinical history: Distal radial fracture. INDICATION:   fall. COMPARISON: None. FINDINGS: Two views of the right radius and ulna demonstrate distal radial fracture. Extensive degenerative change at the radiocarpal and carpometacarpal rows. .     IMPRESSION:  Distal radial fracture with minimal displacement. Severe degenerative change at the wrist..     Xr Wrist Rt Ap/lat    Result Date: 8/27/2018  EXAM: XR WRIST RT AP/LAT INDICATION:  R wrist fracture follow up, was not casted and pt not leaving splint on. COMPARISON: 8/14/2018. FINDINGS: Two  views of the right wrist demonstrate no appreciable change in the T-shaped interarticular distal radial fracture and ulnar styloid fracture in fiberglass. There is chondrocalcinosis of the radiocarpal joint and TFC. Lateral carpal arthritis is noted. Osteopenia is present. IMPRESSION:  No significant change in the right T-shaped interarticular distal radial and ulna styloid fractures in the splint. Xr Wrist Rt Ap/lat/obl Min 3v    Result Date: 8/14/2018  EXAM: XR WRIST RT AP/LAT/OBL MIN 3V Clinical history: Fall, broken wrist INDICATION:  fall, broken wrist. COMPARISON: None. FINDINGS: Three  views of the right wrist demonstrate postreduction images distal radial fracture. .  Soft tissue edema. .     IMPRESSION:  Postreduction images distal radial fracture. .     Xr Wrist Rt Ap/lat/obl Min 3v    Result Date: 8/13/2018  EXAM: XR WRIST RT AP/LAT/OBL MIN 3V HISTORY: Fall, fell in bathroom, right arm INDICATION:  fall. COMPARISON: None. FINDINGS: Three  views of the right wrist demonstrate nondisplaced distal radius fracture. Severe degenerative change of the radiocarpal and carpometacarpal rows. No ulnar fracture. .  The soft tissues are within normal limits.      IMPRESSION:  Nondisplaced fracture of the distal radius. 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     Ct Head Wo Cont    Result Date: 8/23/2018  EXAM:  CT head without contrast INDICATION: Altered mental status COMPARISON: CT head 8/14/2018 TECHNIQUE: Axial noncontrast head CT from foramen magnum to vertex. Coronal and sagittal reformatted images were obtained. CT dose reduction was achieved through use of a standardized protocol tailored for this examination and automatic exposure control for dose modulation. Adaptive statistical iterative reconstruction (ASIR) was utilized. FINDINGS:  There is diffuse age-related parenchymal volume loss. The ventricles and sulci are age-appropriate without hydrocephalus. There is no mass effect or midline shift. There is no intracranial hemorrhage or extra-axial fluid collection. Scattered foci of low attenuation in the periventricular white matter represent stable chronic microvascular ischemic changes. There is no new abnormal parenchymal attenuation. The gray-white matter differentiation is maintained. The basal cisterns are patent. 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 The osseous structures are intact. The visualized paranasal sinuses and mastoid air cells are clear. IMPRESSION: There is no acute intracranial abnormality. Ct Head Wo Cont    Result Date: 8/14/2018  EXAM:  CT HEAD WO CONT Clinical history: Fall, fractured wrist, increased pain INDICATION:   fall COMPARISON: 7/20/2018. CONTRAST:  None. TECHNIQUE: Unenhanced CT of the head was performed using 5 mm images. Brain and bone windows were generated. CT dose reduction was achieved through use of a standardized protocol tailored for this examination and automatic exposure control for dose modulation. FINDINGS: The ventricles and sulci are normal in size, shape and configuration and midline. Mild scattered hypodensities in the cerebral white matter.  There is no intracranial hemorrhage, extra-axial collection, mass, mass effect or midline shift. The basilar cisterns are open. No acute infarct is identified. The bone windows demonstrate no abnormalities. The visualized portions of the paranasal sinuses and mastoid air cells are clear. IMPRESSION: No acute cranial process     Ct Head Wo Cont    Result Date: 7/20/2018  EXAM:  CT HEAD WO CONT INDICATION: Fall going to the bathroom and had a ground level fall and hit head on the floor. COMPARISON: MRI brain 11/12/2012. [facility] CONTRAST:  None. TECHNIQUE: Unenhanced CT of the head was performed using 5 mm images. Brain and bone windows were generated. CT dose reduction was achieved through use of a standardized protocol tailored for this examination and automatic exposure control for dose modulation. FINDINGS: There is a stable 9 x 12 mm partially calcified extra-axial lesion in the left posterior fossa compatible with meningioma. There is no acute intracranial hemorrhage, other mass, mass effect or herniation. Ventricles and sulci show stable, proportionate, and symmetric pattern. There is a stable pattern of periventricular white matter hypodensity. The gray-white matter differentiation is well-preserved. Atherosclerotic calcifications are seen within the carotid siphons and vertebral arteries. The mastoid air cells are well pneumatized. The visualized paranasal sinuses are normal.     IMPRESSION: No acute intracranial hemorrhage or infarct. Stable left posterior fossa meningioma and chronic white matter change most compatible with small vessel ischemic and/or senescent change. Intracranial atherosclerosis. Ct Abd Pelv Wo Cont    Result Date: 6/20/2018  EXAM:  CT ABD PELV WO CONT INDICATION: abd pain  2 days of abdominal pain COMPARISON: 2013 CONTRAST:  None. TECHNIQUE: Thin axial images were obtained through the abdomen and pelvis. Coronal and sagittal reconstructions were generated. Oral contrast was not administered.  CT dose reduction was achieved through use of a standardized protocol tailored for this examination and automatic exposure control for dose modulation. The absence of intravenous contrast material reduces the sensitivity for evaluation of the solid parenchymal organs of the abdomen. FINDINGS: LUNG BASES: Clear. INCIDENTALLY IMAGED HEART AND MEDIASTINUM: Unremarkable. LIVER: No mass or biliary dilatation. GALLBLADDER: Surgically removed. SPLEEN: No mass. PANCREAS: No mass or ductal dilatation. ADRENALS: Unremarkable. KIDNEYS/URETERS: Malrotated right kidney. Hyperdense right renal cyst. Right nephrolithiasis. STOMACH: Hiatal hernia. SMALL BOWEL: No dilatation or wall thickening. COLON: No dilatation or wall thickening. Moderate colonic stool. APPENDIX: Surgically removed PERITONEUM: No ascites or pneumoperitoneum. RETROPERITONEUM: No lymphadenopathy or aortic aneurysm. REPRODUCTIVE ORGANS: Surgically removed. URINARY BLADDER: No mass or calculus. BONES: No destructive bone lesion. Multilevel degenerative changes. ADDITIONAL COMMENTS: N/A     IMPRESSION: No acute findings. Xr Chest Port    Result Date: 6/20/2018  EXAM:  XR CHEST PORT INDICATION:  Chest Pain COMPARISON:  May 24 FINDINGS: A portable AP radiograph of the chest was obtained at 0555 hours. There is a left shoulder replacement in place. The patient is on a cardiac monitor. The lungs are clear. The cardiac and mediastinal contours and pulmonary vascularity are normal.  The bones and soft tissues are grossly within normal limits. IMPRESSION: No acute findings.           MEDICATIONS     ALL MEDICATIONS:   Current Facility-Administered Medications   Medication Dose Route Frequency    dilTIAZem CD (CARDIZEM CD) capsule 240 mg  240 mg Oral DAILY    magnesium hydroxide (MILK OF MAGNESIA) 400 mg/5 mL oral suspension 30 mL  30 mL Oral DAILY PRN    citalopram (CELEXA) tablet 20 mg  20 mg Oral QHS    docusate sodium (COLACE) capsule 100 mg  100 mg Oral DAILY    donepezil (ARICEPT) tablet 10 mg  10 mg Oral DAILY    polyethylene glycol (MIRALAX) packet 17 g  17 g Oral DAILY PRN    risperiDONE (RisperDAL) tablet 0.25 mg  0.25 mg Oral BID    saline peripheral flush soln 5 mL  5 mL InterCATHeter PRN    traZODone (DESYREL) tablet 25 mg  25 mg Oral QHS PRN    OLANZapine (ZyPREXA) tablet 2.5 mg  2.5 mg Oral Q6H PRN    ziprasidone (GEODON) 10 mg in sterile water (preservative free) 0.5 mL injection  10 mg IntraMUSCular BID PRN    benztropine (COGENTIN) tablet 1 mg  1 mg Oral BID PRN    benztropine (COGENTIN) injection 1 mg  1 mg IntraMUSCular BID PRN    zolpidem (AMBIEN) tablet 5 mg  5 mg Oral QHS PRN    acetaminophen (TYLENOL) tablet 650 mg  650 mg Oral Q4H PRN    ibuprofen (MOTRIN) tablet 400 mg  400 mg Oral Q8H PRN      SCHEDULED MEDICATIONS:   Current Facility-Administered Medications   Medication Dose Route Frequency    dilTIAZem CD (CARDIZEM CD) capsule 240 mg  240 mg Oral DAILY    citalopram (CELEXA) tablet 20 mg  20 mg Oral QHS    docusate sodium (COLACE) capsule 100 mg  100 mg Oral DAILY    donepezil (ARICEPT) tablet 10 mg  10 mg Oral DAILY    risperiDONE (RisperDAL) tablet 0.25 mg  0.25 mg Oral BID          ASSESSMENT & PLAN     DIAGNOSES REQUIRING ACTIVE TREATMENT AND MONITORING: (reviewed/updated 9/22/2018)  Patient Active Hospital Problem List:    The patient, Tray Rodriguez, is a 80 y.o.  female who presents at this time for treatment of the following diagnoses:  Patient Active Hospital Problem List:   Late onset Alzheimer's disease  (8/24/2018)    Assessment: severe, advanced    Plan: Agree with inpatient hospitalization for further stabilization, safety monitoring and medication management  Medications- Aricept and risperdal   09/16/18: Continue current treatment.       In summary, Tray Hardy, is a 80 y.o.  female who presents with a severe exacerbation of the principal diagnosis of Dementia  Patient's condition is worsening/not improving/not stable improving. Patient requires continued inpatient hospitalization for further stabilization, safety monitoring and medication management. I will continue to coordinate the provision of individual, milieu, occupational, group, and substance abuse therapies to address target symptoms/diagnoses as deemed appropriate for the individual patient. A coordinated, multidisplinary treatment team round was conducted with the patient (this team consists of the nurse, psychiatric unit pharmcist,  and writer). Complete current electronic health record for patient has been reviewed today including consultant notes, ancillary staff notes, nurses and psychiatric tech notes. Suicide risk assessment completed and patient deemed to be of low risk for suicide at this time. The following regarding medications was addressed during rounds with patient:   the risks and benefits of the proposed medication. The patient was given the opportunity to ask questions. Informed consent given to the use of the above medications. Will continue to adjust psychiatric and non-psychiatric medications (see above \"medication\" section and orders section for details) as deemed appropriate & based upon diagnoses and response to treatment. I will continue to order blood tests/labs and diagnostic tests as deemed appropriate and review results as they become available (see orders for details and above listed lab/test results). I will order psychiatric records from previous [facility] hospitals to further elucidate the nature of patient's psychopathology and review once available. I will gather additional collateral information from friends, family and o/p treatment team to further elucidate the nature of patient's psychopathology and baselline level of psychiatric functioning.          I certify that this patient's inpatient psychiatric hospital services furnished since the previous certification were, and continue to be, required for treatment that could reasonably be expected to improve the patient's condition, or for diagnostic study, and that the patient continues to need, on a daily basis, active treatment furnished directly by or requiring the supervision of inpatient psychiatric facility personnel. In addition, the hospital records show that services furnished were intensive treatment services, admission or related services, or equivalent services.     EXPECTED DISCHARGE DATE/DAY: TBD     DISPOSITION: Home       Signed By:   Gigi Wells MD  9/22/2018

## 2018-09-23 NOTE — PROGRESS NOTES
Problem: Altered Thought Process (Adult/Pediatric)  Goal: *STG: Participates in treatment plan  Outcome: Progressing Towards Goal  1530: Greeted patient on unit in room resting quietly. Appears in no acute distress. No voiced concerns at present. Will continue to monitor on Q 15 minute safety checks. 2139: Alert, vitals stable, wnl.   Medication compliant. Ate approximately 50% of dinner. Remains confused. Smiling intermittently. Two person assist.   Able to pivot to chair.

## 2018-09-24 LAB
GLUCOSE BLD STRIP.AUTO-MCNC: 105 MG/DL (ref 65–100)
GLUCOSE BLD STRIP.AUTO-MCNC: 95 MG/DL (ref 65–100)
SERVICE CMNT-IMP: ABNORMAL
SERVICE CMNT-IMP: NORMAL

## 2018-09-24 PROCEDURE — 97760 ORTHOTIC MGMT&TRAING 1ST ENC: CPT | Performed by: OCCUPATIONAL THERAPIST

## 2018-09-24 PROCEDURE — 74011250637 HC RX REV CODE- 250/637: Performed by: PSYCHIATRY & NEUROLOGY

## 2018-09-24 PROCEDURE — 65220000003 HC RM SEMIPRIVATE PSYCH

## 2018-09-24 PROCEDURE — 97535 SELF CARE MNGMENT TRAINING: CPT | Performed by: OCCUPATIONAL THERAPIST

## 2018-09-24 PROCEDURE — 82962 GLUCOSE BLOOD TEST: CPT

## 2018-09-24 PROCEDURE — 74011250637 HC RX REV CODE- 250/637: Performed by: INTERNAL MEDICINE

## 2018-09-24 RX ADMIN — RISPERIDONE 0.25 MG: 0.5 TABLET, FILM COATED ORAL at 17:44

## 2018-09-24 RX ADMIN — DILTIAZEM HYDROCHLORIDE 240 MG: 240 CAPSULE, COATED, EXTENDED RELEASE ORAL at 09:22

## 2018-09-24 RX ADMIN — DONEPEZIL HYDROCHLORIDE 10 MG: 10 TABLET, FILM COATED ORAL at 09:31

## 2018-09-24 RX ADMIN — CITALOPRAM HYDROBROMIDE 20 MG: 20 TABLET ORAL at 20:47

## 2018-09-24 RX ADMIN — RISPERIDONE 0.25 MG: 0.5 TABLET, FILM COATED ORAL at 09:31

## 2018-09-24 NOTE — BH NOTES
PSYCHIATRIC PROGRESS NOTE         Patient Name  Pam Mckeon   Date of Birth 5/24/1933   [facility] [id number]   Medical Record Number  [id number]      Age  80 y.o. PCP Chari Francisco MD   Admit date:  9/6/2018    Room Number  746/01  @ [facility]   Date of Service  09/23/18          PSYCHOTHERAPY SESSION NOTE:  Length of psychotherapy session: 20 minutes    Main condition/diagnosis/issues treated during session today, 9/23/2018 : I employed Cognitive Behavioral therapy techniques, Reality-Oriented psychotherapy, as well as supportive psychotherapy in regards to various ongoing psychosocial stressors, including the following: pre-admission and current problems; housing issues;   medical issues; and stress of hospitalization. Interpersonal relationship issues and psychodynamic conflicts explored. Attempts made to alleviate maladaptive patterns. Overall, patient is not progressing    Treatment Plan Update (reviewed an updated 9/23/2018) : I will modify psychotherapy tx plan by implementing more stress management strategies, building upon cognitive behavioral techniques, increasing coping skills, as well as shoring up psychological defenses). An extended energy and skill set was needed to engage pt in psychotherapy due to some of the following: resistiveness, complexity, negativity, confrontational nature, hostile behaviors, and/or severe abnormalities in thought processes/psychosis resulting in the loss of expressive/receptive language communication skills. E & M PROGRESS NOTE:         HISTORY         HISTORY OF PRESENT ILLNESS/INTERVAL HISTORY:  (reviewed/updated 9/23/2018). per initial evaluation: The patient, Tray Barragan, is a 80 y.o.   WHITE [place]  female with a past psychiatric history significant for Dementia, MDD who was treated on [facility] for several weeks, until she had a syncopal episode on 9/5 and transferred to the medical floor for observation for 24 hours. She now returns medically stable. Patient's mood and presesentation hs improved compared to original presentation on admission. She remains very confused secondary to advanced dementia. No agitation or aggression noted. Compliant with her medications. Awaiting placement vs. Return home. 9/8/18. \"Am I going home today\". No complaints. Seems calmer. 9/9/18. No complaints. No agitation. Slept ok  9/10/18- Funny and engaging this morning. She enjoyed singing in group this morning. Sleeping well at night. 9/11/18- Patient remains stable. 9/12/18- No complaints, no acute behviors. Bright and euthymic affect. Eating meals, sleeping well. 9/13- patient remains stable  9/14/18- euthymic mood, talkative. Pleasant and cooperative. 9/16/18: seen today,funny at times, accepting po med and meals,no aggressin or agitation,requires assistance  with ADL. 9/17/18- Ms. Regan denies any complaints this morning. Resting comfortably, then easily eating and enjoying her lunch. NO changes. Patient remains stable. 9/18/18- patient presented in upbeat mood, observed sitting and laughing with her . Patient remains compliant and stable. 9/19- unchanged. Bright affect. Funny disposition  9/20- Enjoying visit from her . Smiling. Compliant. Slept through the night.  9/21/18- sleeping this morning. No acute events to report. Stable  9/22/18-Compliant with meds. No prn's used. Slept 7.5 hrs. No behavioral issues are reported. 9/23/18-Slept 7 hrs. No prn's used. Meds and meals compliant. SIDE EFFECTS: (reviewed/updated 9/23/2018)  None reported or admitted to.      ALLERGIES:(reviewed/updated 9/23/2018)  Allergies   Allergen Reactions    Angiotensin Ii,Human Other (comments)     States does not remember      Avelox [Moxifloxacin] Other (comments)     States does not remember      Demerol [Meperidine] Hives      MEDICATIONS PRIOR TO ADMISSION:(reviewed/updated 9/23/2018)  Prescriptions Prior to Admission Medication Sig    traZODone (DESYREL) 50 mg tablet Take 0.5 Tabs by mouth nightly for 30 days. Indications: insomnia associated with depression    risperiDONE (RISPERDAL) 0.25 mg tablet Take 1 Tab by mouth two (2) times a day for 30 days.  magnesium hydroxide (MILK OF MAGNESIA) 400 mg/5 mL suspension Take 30 mL by mouth daily for 30 days.  donepezil (ARICEPT) 10 mg tablet Take 1 Tab by mouth daily for 30 days.  acetaminophen (TYLENOL) 325 mg tablet Take 2 Tabs by mouth every six (6) hours as needed for Pain.  docusate sodium (COLACE) 100 mg capsule Take 1 Cap by mouth daily for 90 days.  dilTIAZem CD (CARTIA XT) 240 mg ER capsule Take 240 mg by mouth daily.  polyethylene glycol (MIRALAX) 17 gram packet Take 17 g by mouth as needed (Constipation).  citalopram (CELEXA) 20 mg tablet TAKE 1 TABLET BY MOUTH DAILY      PAST MEDICAL HISTORY: Past medical history from the initial psychiatric evaluation has been reviewed (reviewed/updated 9/23/2018) with no additional updates (I asked patient and no additional past medical history provided). Past Medical History:   Diagnosis Date    Anemia     Atrial fibrillation ([address]     Cancer (HCC)     basal cell on nose    Chronic pain     back pain    Fibromyalgia 4/1/2010    GERD (gastroesophageal reflux disease) 4/1/2010    Hiatal hernia 4/1/2010    High cholesterol 4/1/2010    HTN (hypertension) 4/1/2010    Ill-defined condition     A.  Fib    OA (osteoarthritis) 4/1/2010    back    Pulmonary emboli ([address] 2015     Past Surgical History:   Procedure Laterality Date    BREAST SURGERY PROCEDURE UNLISTED      Breast im-plants x 2    ENLARGE BREAST WITH IMPLANT      HX APPENDECTOMY      HX BREAST BIOPSY      HX BREAST RECONSTRUCTION      Breast implants removed 1992    HX CATARACT REMOVAL      HX CHOLECYSTECTOMY  9/11/2013    HX HYSTERECTOMY      HX KNEE REPLACEMENT  2008    bilateral    HX ORTHOPAEDIC  2007    Bilateral TKR    HX PARTIAL HYSTERECTOMY      HX SHOULDER REPLACEMENT  2009    left    HX TONSILLECTOMY        SOCIAL HISTORY: Social history from the initial psychiatric evaluation has been reviewed (reviewed/updated 9/23/2018) with no additional updates (I asked patient and no additional social history provided). Social History     Social History    Marital status:      Spouse name: N/A    Number of children: N/A    Years of education: N/A     Occupational History    Not on file. Social History Main Topics    Smoking status: Never Smoker    Smokeless tobacco: Never Used    Alcohol use No    Drug use: No    Sexual activity: No     Other Topics Concern    Not on file     Social History Narrative      FAMILY HISTORY: Family history from the initial psychiatric evaluation has been reviewed (reviewed/updated 9/23/2018) with no additional updates (I asked patient and no additional family history provided). Family History   Problem Relation Age of Onset   24 [facility] Arthritis-rheumatoid Mother     Dementia Mother     Coronary Artery Disease Father     Cancer Brother      lung cancer       REVIEW OF SYSTEMS: (reviewed/updated 9/23/2018)  Appetite:improved   Sleep: improved   All other Review of Systems: General ROS: negative         MENTAL STATUS EXAM & VITALS     MENTAL STATUS EXAM (MSE):    MSE FINDINGS ARE WITHIN NORMAL LIMITS (WNL) UNLESS OTHERWISE STATED BELOW. ( ALL OF THE BELOW CATEGORIES OF THE MSE HAVE BEEN REVIEWED (reviewed 9/23/2018) AND UPDATED AS DEEMED APPROPRIATE )  General Presentation age appropriate and older than stated age, cooperative   Orientation not oriented to situation   Vital Signs  See below (reviewed 9/23/2018); Vital Signs (BP, Pulse, & Temp) are within normal limits if not listed below.    Gait and Station Stable/steady, no ataxia   Musculoskeletal System No extrapyramidal symptoms (EPS); no abnormal muscular movements or Tardive Dyskinesia (TD); muscle strength and tone are within normal limits Language No aphasia or dysarthria   Speech:  soft   Thought Processes illogical; slow rate of thoughts; poor abstract reasoning/computation   Thought Associations loose associations   Thought Content free of delusions   Suicidal Ideations no plan    Homicidal Ideations no plan    Mood:  euthymic   Affect:  euthymic   Memory recent  impaired   Memory remote:  impaired   Concentration/Attention:  distractable   Fund of Knowledge below average   Insight:  limited   Reliability limited   Judgment:  limited          VITALS:     Patient Vitals for the past 24 hrs:   Temp Pulse Resp BP SpO2   09/23/18 1949 98.1 °F (36.7 °C) 80 16 132/72 94 %   09/23/18 1612 97.7 °F (36.5 °C) 88 16 (!) 154/97 93 %   09/23/18 0906 97.9 °F (36.6 °C) 71 16 153/87 95 %     Wt Readings from Last 3 Encounters:   09/18/18 66.9 kg (147 lb 8 oz)   09/02/18 64.5 kg (142 lb 3.2 oz)   08/21/18 76.1 kg (167 lb 12.8 oz)     Temp Readings from Last 3 Encounters:   09/23/18 98.1 °F (36.7 °C)   09/06/18 98.9 °F (37.2 °C)   09/05/18 98.1 °F (36.7 °C)     BP Readings from Last 3 Encounters:   09/23/18 132/72   09/06/18 146/77   09/05/18 103/63     Pulse Readings from Last 3 Encounters:   09/23/18 80   09/06/18 (!) 105   09/05/18 68            DATA     LABORATORY DATA:(reviewed/updated 9/23/2018)  Recent Results (from the past 24 hour(s))   GLUCOSE, POC    Collection Time: 09/23/18  7:29 AM   Result Value Ref Range    Glucose (POC) 94 65 - 100 mg/dL    Performed by [address] [place] [address], POC    Collection Time: 09/23/18  3:53 PM   Result Value Ref Range    Glucose (POC) 112 (H) 65 - 100 mg/dL    Performed by Sihra England      No results found for: VALF2, VALAC, VALP, VALPR, DS6, CRBAM, CRBAMP, CARB2, XCRBAM  No results found for: LITHM   RADIOLOGY REPORTS:(reviewed/updated 9/23/2018)  Xr Pelv Ap Only    Addendum Date: 8/14/2018    Addendum: No fracture.     Result Date: 8/14/2018  Clinical history: Fall yesterday FINDINGS: Single frontal view of the pelvis is obtained. There is no fracture or dislocation identified. There is no significant soft tissue abnormality. IMPRESSION: No acute fracture. Xr Forearm Rt Ap/lat    Result Date: 8/13/2018  EXAM:  XR FOREARM RT AP/LAT Clinical history: Distal radial fracture. INDICATION:   fall. COMPARISON: None. FINDINGS: Two views of the right radius and ulna demonstrate distal radial fracture. Extensive degenerative change at the radiocarpal and carpometacarpal rows. .     IMPRESSION:  Distal radial fracture with minimal displacement. Severe degenerative change at the wrist..     Xr Wrist Rt Ap/lat    Result Date: 8/27/2018  EXAM: XR WRIST RT AP/LAT INDICATION:  R wrist fracture follow up, was not casted and pt not leaving splint on. COMPARISON: 8/14/2018. FINDINGS: Two  views of the right wrist demonstrate no appreciable change in the T-shaped interarticular distal radial fracture and ulnar styloid fracture in fiberglass. There is chondrocalcinosis of the radiocarpal joint and TFC. Lateral carpal arthritis is noted. Osteopenia is present. IMPRESSION:  No significant change in the right T-shaped interarticular distal radial and ulna styloid fractures in the splint. Xr Wrist Rt Ap/lat/obl Min 3v    Result Date: 8/14/2018  EXAM: XR WRIST RT AP/LAT/OBL MIN 3V Clinical history: Fall, broken wrist INDICATION:  fall, broken wrist. COMPARISON: None. FINDINGS: Three  views of the right wrist demonstrate postreduction images distal radial fracture. .  Soft tissue edema. .     IMPRESSION:  Postreduction images distal radial fracture. .     Xr Wrist Rt Ap/lat/obl Min 3v    Result Date: 8/13/2018  EXAM: XR WRIST RT AP/LAT/OBL MIN 3V HISTORY: Fall, fell in bathroom, right arm INDICATION:  fall. COMPARISON: None. FINDINGS: Three  views of the right wrist demonstrate nondisplaced distal radius fracture. Severe degenerative change of the radiocarpal and carpometacarpal rows. No ulnar fracture. .  The soft tissues are within normal limits. IMPRESSION:  Nondisplaced fracture of the distal radius. 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     Ct Head Wo Cont    Result Date: 8/23/2018  EXAM:  CT head without contrast INDICATION: Altered mental status COMPARISON: CT head 8/14/2018 TECHNIQUE: Axial noncontrast head CT from foramen magnum to vertex. Coronal and sagittal reformatted images were obtained. CT dose reduction was achieved through use of a standardized protocol tailored for this examination and automatic exposure control for dose modulation. Adaptive statistical iterative reconstruction (ASIR) was utilized. FINDINGS:  There is diffuse age-related parenchymal volume loss. The ventricles and sulci are age-appropriate without hydrocephalus. There is no mass effect or midline shift. There is no intracranial hemorrhage or extra-axial fluid collection. Scattered foci of low attenuation in the periventricular white matter represent stable chronic microvascular ischemic changes. There is no new abnormal parenchymal attenuation. The gray-white matter differentiation is maintained. The basal cisterns are patent. 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 The osseous structures are intact. The visualized paranasal sinuses and mastoid air cells are clear. IMPRESSION: There is no acute intracranial abnormality. Ct Head Wo Cont    Result Date: 8/14/2018  EXAM:  CT HEAD WO CONT Clinical history: Fall, fractured wrist, increased pain INDICATION:   fall COMPARISON: 7/20/2018. CONTRAST:  None. TECHNIQUE: Unenhanced CT of the head was performed using 5 mm images. Brain and bone windows were generated. CT dose reduction was achieved through use of a standardized protocol tailored for this examination and automatic exposure control for dose modulation. FINDINGS: The ventricles and sulci are normal in size, shape and configuration and midline.  Mild scattered hypodensities in the cerebral white matter. There is no intracranial hemorrhage, extra-axial collection, mass, mass effect or midline shift. The basilar cisterns are open. No acute infarct is identified. The bone windows demonstrate no abnormalities. The visualized portions of the paranasal sinuses and mastoid air cells are clear. IMPRESSION: No acute cranial process     Ct Head Wo Cont    Result Date: 7/20/2018  EXAM:  CT HEAD WO CONT INDICATION: Fall going to the bathroom and had a ground level fall and hit head on the floor. COMPARISON: MRI brain 11/12/2012. Lucero Antonio CONTRAST:  None. TECHNIQUE: Unenhanced CT of the head was performed using 5 mm images. Brain and bone windows were generated. CT dose reduction was achieved through use of a standardized protocol tailored for this examination and automatic exposure control for dose modulation. FINDINGS: There is a stable 9 x 12 mm partially calcified extra-axial lesion in the left posterior fossa compatible with meningioma. There is no acute intracranial hemorrhage, other mass, mass effect or herniation. Ventricles and sulci show stable, proportionate, and symmetric pattern. There is a stable pattern of periventricular white matter hypodensity. The gray-white matter differentiation is well-preserved. Atherosclerotic calcifications are seen within the carotid siphons and vertebral arteries. The mastoid air cells are well pneumatized. The visualized paranasal sinuses are normal.     IMPRESSION: No acute intracranial hemorrhage or infarct. Stable left posterior fossa meningioma and chronic white matter change most compatible with small vessel ischemic and/or senescent change. Intracranial atherosclerosis. Ct Abd Pelv Wo Cont    Result Date: 6/20/2018  EXAM:  CT ABD PELV WO CONT INDICATION: abd pain  2 days of abdominal pain COMPARISON: 2013 CONTRAST:  None. TECHNIQUE: Thin axial images were obtained through the abdomen and pelvis. Coronal and sagittal reconstructions were generated. Oral contrast was not administered. CT dose reduction was achieved through use of a standardized protocol tailored for this examination and automatic exposure control for dose modulation. The absence of intravenous contrast material reduces the sensitivity for evaluation of the solid parenchymal organs of the abdomen. FINDINGS: LUNG BASES: Clear. INCIDENTALLY IMAGED HEART AND MEDIASTINUM: Unremarkable. LIVER: No mass or biliary dilatation. GALLBLADDER: Surgically removed. SPLEEN: No mass. PANCREAS: No mass or ductal dilatation. ADRENALS: Unremarkable. KIDNEYS/URETERS: Malrotated right kidney. Hyperdense right renal cyst. Right nephrolithiasis. STOMACH: Hiatal hernia. SMALL BOWEL: No dilatation or wall thickening. COLON: No dilatation or wall thickening. Moderate colonic stool. APPENDIX: Surgically removed PERITONEUM: No ascites or pneumoperitoneum. RETROPERITONEUM: No lymphadenopathy or aortic aneurysm. REPRODUCTIVE ORGANS: Surgically removed. URINARY BLADDER: No mass or calculus. BONES: No destructive bone lesion. Multilevel degenerative changes. ADDITIONAL COMMENTS: N/A     IMPRESSION: No acute findings. Xr Chest Port    Result Date: 6/20/2018  EXAM:  XR CHEST PORT INDICATION:  Chest Pain COMPARISON:  May 24 FINDINGS: A portable AP radiograph of the chest was obtained at 0555 hours. There is a left shoulder replacement in place. The patient is on a cardiac monitor. The lungs are clear. The cardiac and mediastinal contours and pulmonary vascularity are normal.  The bones and soft tissues are grossly within normal limits. IMPRESSION: No acute findings.           MEDICATIONS     ALL MEDICATIONS:   Current Facility-Administered Medications   Medication Dose Route Frequency    dilTIAZem CD (CARDIZEM CD) capsule 240 mg  240 mg Oral DAILY    magnesium hydroxide (MILK OF MAGNESIA) 400 mg/5 mL oral suspension 30 mL  30 mL Oral DAILY PRN    citalopram (CELEXA) tablet 20 mg  20 mg Oral QHS    docusate sodium (COLACE) capsule 100 mg  100 mg Oral DAILY    donepezil (ARICEPT) tablet 10 mg  10 mg Oral DAILY    polyethylene glycol (MIRALAX) packet 17 g  17 g Oral DAILY PRN    risperiDONE (RisperDAL) tablet 0.25 mg  0.25 mg Oral BID    saline peripheral flush soln 5 mL  5 mL InterCATHeter PRN    traZODone (DESYREL) tablet 25 mg  25 mg Oral QHS PRN    OLANZapine (ZyPREXA) tablet 2.5 mg  2.5 mg Oral Q6H PRN    ziprasidone (GEODON) 10 mg in sterile water (preservative free) 0.5 mL injection  10 mg IntraMUSCular BID PRN    benztropine (COGENTIN) tablet 1 mg  1 mg Oral BID PRN    benztropine (COGENTIN) injection 1 mg  1 mg IntraMUSCular BID PRN    zolpidem (AMBIEN) tablet 5 mg  5 mg Oral QHS PRN    acetaminophen (TYLENOL) tablet 650 mg  650 mg Oral Q4H PRN    ibuprofen (MOTRIN) tablet 400 mg  400 mg Oral Q8H PRN      SCHEDULED MEDICATIONS:   Current Facility-Administered Medications   Medication Dose Route Frequency    dilTIAZem CD (CARDIZEM CD) capsule 240 mg  240 mg Oral DAILY    citalopram (CELEXA) tablet 20 mg  20 mg Oral QHS    docusate sodium (COLACE) capsule 100 mg  100 mg Oral DAILY    donepezil (ARICEPT) tablet 10 mg  10 mg Oral DAILY    risperiDONE (RisperDAL) tablet 0.25 mg  0.25 mg Oral BID          ASSESSMENT & PLAN     DIAGNOSES REQUIRING ACTIVE TREATMENT AND MONITORING: (reviewed/updated 9/23/2018)  Patient Active Hospital Problem List:    The patient, Tray Velasco, is a 80 y.o.  female who presents at this time for treatment of the following diagnoses:  Patient Active Hospital Problem List:   Late onset Alzheimer's disease  (8/24/2018)    Assessment: severe, advanced    Plan: Agree with inpatient hospitalization for further stabilization, safety monitoring and medication management  Medications- Aricept and risperdal   09/16/18: Continue current treatment.       In summary, Tray Barragan, is a 80 y.o.  female who presents with a severe exacerbation of the principal diagnosis of Dementia  Patient's condition is worsening/not improving/not stable improving. Patient requires continued inpatient hospitalization for further stabilization, safety monitoring and medication management. I will continue to coordinate the provision of individual, milieu, occupational, group, and substance abuse therapies to address target symptoms/diagnoses as deemed appropriate for the individual patient. A coordinated, multidisplinary treatment team round was conducted with the patient (this team consists of the nurse, psychiatric unit pharmcist,  and writer). Complete current electronic health record for patient has been reviewed today including consultant notes, ancillary staff notes, nurses and psychiatric tech notes. Suicide risk assessment completed and patient deemed to be of low risk for suicide at this time. The following regarding medications was addressed during rounds with patient:   the risks and benefits of the proposed medication. The patient was given the opportunity to ask questions. Informed consent given to the use of the above medications. Will continue to adjust psychiatric and non-psychiatric medications (see above \"medication\" section and orders section for details) as deemed appropriate & based upon diagnoses and response to treatment. I will continue to order blood tests/labs and diagnostic tests as deemed appropriate and review results as they become available (see orders for details and above listed lab/test results). I will order psychiatric records from previous [facility] hospitals to further elucidate the nature of patient's psychopathology and review once available. I will gather additional collateral information from friends, family and o/p treatment team to further elucidate the nature of patient's psychopathology and baselline level of psychiatric functioning.          I certify that this patient's inpatient psychiatric hospital services furnished since the previous certification were, and continue to be, required for treatment that could reasonably be expected to improve the patient's condition, or for diagnostic study, and that the patient continues to need, on a daily basis, active treatment furnished directly by or requiring the supervision of inpatient psychiatric facility personnel. In addition, the hospital records show that services furnished were intensive treatment services, admission or related services, or equivalent services.     EXPECTED DISCHARGE DATE/DAY: TBD     DISPOSITION: Home       Signed By:   Symone Hart MD  9/23/2018

## 2018-09-24 NOTE — INTERDISCIPLINARY ROUNDS
Behavioral Health Interdisciplinary Rounds     Patient Name: Kendra Hardy  Age: 80 y.o. Room/Bed:  [facility]/  Primary Diagnosis: Dementia   Admission Status: Involuntary Commitment     Readmission within 30 days: no  Power of  in place: yes  Patient requires a blocked bed: no          Reason for blocked bed:     VTE Prophylaxis: Not indicated    Mobility needs/Fall risk: yes  Flu Vaccine : yes   Nutritional Plan: no  Consults:          Labs/Testing due today?: no    Sleep hours:  8      Participation in Care/Groups:    Medication Compliant?: Yes  PRNS (last 24 hours): None    Restraints (last 24 hours):  no     CIWA (range last 24 hours):     COWS (range last 24 hours):      Alcohol screening (AUDIT) completed -   AUDIT Score: 0     If applicable, date SBIRT discussed in treatment team AND documented:     Tobacco - patient is a smoker: Have You Used Tobacco in the Past 30 Days: No  Illegal Drugs use: Have You Used Any Illegal Substances Over the Past 12 Months: No    24 hour chart check complete: yes     Patient goal(s) for today:   Treatment team focus/goals: Plan to continue to search for a skilled nursing home bed. Progress note - She remains pleasant and complaint with her treatment.       LOS:  18  Expected LOS: TBD    Participating treatment team members: Kendra Hayley,   Claudette Moh, SW- Mike Lugo RN - Dr. Keshawn Farris

## 2018-09-24 NOTE — PROGRESS NOTES
Problem: Altered Thought Process (Adult/Pediatric)  Goal: *STG: Participates in treatment plan  Outcome: Progressing Towards Goal  Pt is calm cooperative and appropriate with staff   Pleasantly confused  Takes a few steps and pivots with 2 person assist  Med/Meal compliant   No signs of distress noted   Will continue to monitor.

## 2018-09-24 NOTE — PROGRESS NOTES
Problem: Falls - Risk of  Goal: *Absence of Falls  Document Kizzy Fall Risk and appropriate interventions in the flowsheet. Outcome: Progressing Towards Goal  Fall Risk Interventions:  Mobility Interventions: Bed/chair exit alarm    Mentation Interventions: Adequate sleep, hydration, pain control, Bed/chair exit alarm    Medication Interventions: Bed/chair exit alarm, Teach patient to arise slowly    Elimination Interventions: Bed/chair exit alarm, Patient to call for help with toileting needs    History of Falls Interventions: Bed/chair exit alarm, Door open when patient unattended    Resting in bed with eyes closed, no complaints, no distress noted. Safety measures in place, will continue to monitor.

## 2018-09-24 NOTE — BH NOTES
GROUP THERAPY PROGRESS NOTE    Tray Regan participated in a Morning Process Group on the Geriatric Unit, with a focus identifying feelings, planning for the day, with music. Group time: 35 minutes. Personal goal for participation: To increase the capacity to shift ones mood, prepare for the day, and share in group singing. Goal orientation: The patient will be able to prepare for the day through group singing. Group therapy participation: When prompted, this patient participated in the group. Therapeutic interventions reviewed and discussed: The group members were introduce themselves by first names and participate in group singing as a way to increase their oxygen and blood flow and begin their day on a positive note. They were also asked to join in singing several songs. Impression of participation: The patient said she was feeling \"alright\" and smiled with recognition of the undersigned. She said she had missed me over the past week. She was alert, generally oriented, and pleasant. She looked like and she confirmed that she enjoyed listening to the music and humming along with a couple of tunes she recognized. She expressed no current SI/HI or overt psychosis. Her affect was not as anxious or labile as on admission. Her mood reflected her affect. She appeared to be engaged in the group process.

## 2018-09-24 NOTE — PROGRESS NOTES
Problem: Self Care Deficits Care Plan (Adult)  Goal: *Acute Goals and Plan of Care (Insert Text)  Occupational Therapy Goals  Reviewed/revised as appropriate 9/17/18, 9/24/2018  Initiated 9/8/2018    1. Patient will perform bathing seated with Rebecca and additional time within 7 days. CONTINUE. MET 9/24/18, but not consistently. Continue for consistency  2. Patient will perform BSC transfer with Rebecca and RW within 7 days. CONTINUE. MET 9/24/18, but not consistently. Continue for consistency  3. Patient will perform UB dressing with Rebecca seated within 7 days. CONTINUE. MET 9/24/18, but not consistently. Continue for consistency  4. Patient will participate in UE therapeutic exercise/activities with supervision for 8 minutes within 7 days. MET - upgrade seated unsupported 10 min supervision. Not met and continue   Occupational Therapy TREATMENT: WEEKLY REASSESSMENT  Patient: Tray Valdovinos (80 y.o. female)  Date: 9/24/2018  Diagnosis: Late onset Alzheimer's disease with behavioral disturbance  Aggression  Psychosis  Psychosis  Late onset Alzheimer's disease with behavioral disturbance Dementia       Precautions: Fall  Chart, occupational therapy assessment, plan of care, and goals were reviewed. ASSESSMENT:  Pt remains limited by impaired cognition, NWB through R wrist, decreased strength, endurance, mobility, balance and safety. She is currently at an overall mod-min A level with ADLs and brief functional mobility using R platform RW to amb short distances. Noted R ill fitting fiberglass splint to Rwrist and forearm. Spoke with ortho PA and agreed OT to provide pre-terese removable wrist support splint. Pt fitted with this splint and RN educated and trained on management. Pt to wear splint at all times until ortho follow up. Continue to recommend SNF at discharge.   Progression toward goals:  []            Improving appropriately and progressing toward goals  [x]            Improving slowly and progressing toward goals  []            Not making progress toward goals and plan of care will be adjusted     PLAN:  Goals have been updated based on progression since last assessment. Patient continues to benefit from skilled intervention to address the above impairments. Continue to follow patient 2 times a week to address goals. Planned Interventions:  [x]                    Self Care Training                  [x]             Therapeutic Activities  [x]                    Functional Mobility Training    [x]             Cognitive Retraining  [x]                    Therapeutic Exercises           [x]             Endurance Activities  [x]                    Balance Training                   []             Neuromuscular Re-Education  []                    Visual/Perceptual Training     [x]        Home Safety Training  [x]                    Patient Education                 [x]             Family Training/Education  []                    Other (comment):  Discharge Recommendations: Skilled Nursing Facility  Further Equipment Recommendations for Discharge: TBD     SUBJECTIVE:   Patient stated I feel ok.     OBJECTIVE DATA SUMMARY:   Cognitive/Behavioral Status:  Neurologic State: Alert;Confused  Orientation Level: Oriented to person;Oriented to place; Disoriented to situation;Disoriented to time  Cognition: Decreased attention/concentration; Follows commands;Memory loss;Poor safety awareness  Perception: Appears intact  Perseveration: No perseveration noted  Safety/Judgement: Awareness of environment;Decreased awareness of need for assistance;Decreased awareness of need for safety;Decreased insight into deficits; Fall prevention    Functional Mobility and Transfers for ADLs:  Bed Mobility:  Scooting: Stand-by assistance    Transfers:  Sit to Stand: Minimum assistance; Additional time;Assist x1 (A for forward wt shift)  Functional Transfers  Bathroom Mobility: Minimum assistance (A for forward wt shift)  Toilet Transfer : Minimum assistance; Additional time (using LUE to pull to stand)  Cues: Physical assistance; Tactile cues provided;Verbal cues provided;Visual cues provided  Adaptive Equipment: Grab bars; Walker (comment) (R platform RW)        Balance:  Sitting: Impaired  Sitting - Static: Fair (occasional)  Sitting - Dynamic: Fair (occasional)  Standing: Impaired; With support (R platform RW)  Standing - Static: Fair;Constant support  Standing - Dynamic : Poor    ADL Intervention:  Lower Body Dressing Assistance  Dressing Assistance: Moderate assistance  Protective Undergarmet: Moderate assistance (to pull pants up and down over hips)  Socks: Supervision/set-up (seated in chair)  Leg Crossed Method Used: Yes  Position Performed: Bending forward method;Seated in chair  Cues: Don;Doff;Verbal cues provided;Visual cues provided    Toileting  Toileting Assistance: Maximum assistance  Bladder Hygiene: Supervision/set-up  Bowel Hygiene: Moderate assistance (for cleanliness)  Clothing Management: Maximum assistance (imp balance in standing )  Cues: Physical assistance for pants down;Physical assistance for pants up; Tactile cues provided;Verbal cues provided;Visual cues provided  Adaptive Equipment: Walker;Grab bars    Cognitive Retraining  Safety/Judgement: Awareness of environment;Decreased awareness of need for assistance;Decreased awareness of need for safety;Decreased insight into deficits; Fall prevention    Splinting Intervention:  R wrist support splint provided to pt to immobilize R wrist while healing from distal radius and ulnar fx. Therapeutic Wearing Schedule:  [x] This is a new splint. Occupational Therapist and Registered Nurse will check skin integrity in 1 hour(s) to verify correct fit. Splint Education:  The patient's splint wear schedule was discussed with pt, her  and RN and nursing was notified. Patient instructed and indicated fair understanding of splint by hand washing with warm soapy water and air drying. The patient and her  were instructed to check their skin frequently for redness and irritation and to inform their occupational therapist of such problems so that the splint can be adjusted as necessary. The patient verbalized/demonstrated Guarded overall understanding of the splinting education provided. Duration        Comments   Day time only  []                          Night time only []                          At all times [x]                          As tolerated []                          During Stressful Activities []                          Remove for prescribed exercises []                          Keep elevated to reduce edema [x]                          Remove at least once per shift for skin assessment   [x]                          Other: []                                                              Pain:  Pain Scale 1: Numeric (0 - 10)  Pain Intensity 1: 0              Activity Tolerance:   Fair  Please refer to the flowsheet for vital signs taken during this treatment.   After treatment:   [x] Patient left in no apparent distress sitting up in chair  [] Patient left in no apparent distress in bed  [x] Call bell left within reach  [x] Nursing notified  [x] Caregiver present- pt's   [] Bed alarm activated    COMMUNICATION/COLLABORATION:   The patients plan of care was discussed with: Registered Nurse and Wendi Mcintyre #[id number] PrAlisa, OT  Time Calculation: 40 mins

## 2018-09-24 NOTE — PROGRESS NOTES
Problem: Altered Thought Process (Adult/Pediatric)  Goal: *STG: Participates in treatment plan  Outcome: Progressing Towards Goal  Pt awake in bed at the start of shift. Pt smiling, pleasant and cooperative. Pt came out to the dining room to eat dinner. Pt ate soup and some mashed potatoes. Pt compliant with meds.

## 2018-09-25 LAB
GLUCOSE BLD STRIP.AUTO-MCNC: 107 MG/DL (ref 65–100)
GLUCOSE BLD STRIP.AUTO-MCNC: 98 MG/DL (ref 65–100)
SERVICE CMNT-IMP: ABNORMAL
SERVICE CMNT-IMP: NORMAL

## 2018-09-25 PROCEDURE — 74011250637 HC RX REV CODE- 250/637: Performed by: INTERNAL MEDICINE

## 2018-09-25 PROCEDURE — 82962 GLUCOSE BLOOD TEST: CPT

## 2018-09-25 PROCEDURE — 97110 THERAPEUTIC EXERCISES: CPT

## 2018-09-25 PROCEDURE — 74011250637 HC RX REV CODE- 250/637: Performed by: PSYCHIATRY & NEUROLOGY

## 2018-09-25 PROCEDURE — 97116 GAIT TRAINING THERAPY: CPT

## 2018-09-25 PROCEDURE — 65220000003 HC RM SEMIPRIVATE PSYCH

## 2018-09-25 RX ADMIN — DILTIAZEM HYDROCHLORIDE 240 MG: 240 CAPSULE, COATED, EXTENDED RELEASE ORAL at 09:43

## 2018-09-25 RX ADMIN — RISPERIDONE 0.25 MG: 0.5 TABLET, FILM COATED ORAL at 17:43

## 2018-09-25 RX ADMIN — RISPERIDONE 0.25 MG: 0.5 TABLET, FILM COATED ORAL at 09:42

## 2018-09-25 RX ADMIN — CITALOPRAM HYDROBROMIDE 20 MG: 20 TABLET ORAL at 21:09

## 2018-09-25 RX ADMIN — DONEPEZIL HYDROCHLORIDE 10 MG: 10 TABLET, FILM COATED ORAL at 09:43

## 2018-09-25 NOTE — PROGRESS NOTES
Problem: Falls - Risk of  Goal: *Absence of Falls  Document Kizzy Fall Risk and appropriate interventions in the flowsheet. Outcome: Progressing Towards Goal  Fall Risk Interventions:  Mobility Interventions: Bed/chair exit alarm, Patient to call before getting OOB, PT Consult for assist device competence, Utilize walker, cane, or other assistive device    Mentation Interventions: Bed/chair exit alarm, Door open when patient unattended, Eyeglasses and hearing aids    Medication Interventions: Bed/chair exit alarm, Patient to call before getting OOB, Teach patient to arise slowly    Elimination Interventions: Bed/chair exit alarm, Call light in reach, Elevated toilet seat    History of Falls Interventions: Bed/chair exit alarm, Door open when patient unattended    Free of falls. Falls tool completed and accuart. Bed alarm on the bed. Patient able to ambulate with assistance from 2 nurses,gaite unstable.

## 2018-09-25 NOTE — BH NOTES
PSYCHIATRIC PROGRESS NOTE         Patient Name  Som Carpenter   Date of Birth 5/24/1933   [facility] [id number]   Medical Record Number  [id number]      Age  80 y.o. PCP Karan Bentley MD   Admit date:  9/6/2018    [facility] Number  746/01  [facility]   Date of Service  09/24/18          PSYCHOTHERAPY SESSION NOTE:  Length of psychotherapy session: 20 minutes    Main condition/diagnosis/issues treated during session today, 9/24/2018 : I employed Cognitive Behavioral therapy techniques, Reality-Oriented psychotherapy, as well as supportive psychotherapy in regards to various ongoing psychosocial stressors, including the following: pre-admission and current problems; housing issues;   medical issues; and stress of hospitalization. Interpersonal relationship issues and psychodynamic conflicts explored. Attempts made to alleviate maladaptive patterns. Overall, patient is not progressing    Treatment Plan Update (reviewed an updated 9/24/2018) : I will modify psychotherapy tx plan by implementing more stress management strategies, building upon cognitive behavioral techniques, increasing coping skills, as well as shoring up psychological defenses). An extended energy and skill set was needed to engage pt in psychotherapy due to some of the following: resistiveness, complexity, negativity, confrontational nature, hostile behaviors, and/or severe abnormalities in thought processes/psychosis resulting in the loss of expressive/receptive language communication skills. E & M PROGRESS NOTE:         HISTORY         HISTORY OF PRESENT ILLNESS/INTERVAL HISTORY:  (reviewed/updated 9/24/2018). per initial evaluation: The patient, Tray Bolaños, is a 80 y.o.   WHITE [place]  female with a past psychiatric history significant for Dementia, MDD who was treated on [facility] for several weeks, until she had a syncopal episode on 9/5 and transferred to the medical floor for observation for 24 hours. She now returns medically stable. Patient's mood and presesentation hs improved compared to original presentation on admission. She remains very confused secondary to advanced dementia. No agitation or aggression noted. Compliant with her medications. Awaiting placement vs. Return home. 9/8/18. \"Am I going home today\". No complaints. Seems calmer. 9/9/18. No complaints. No agitation. Slept ok  9/10/18- Funny and engaging this morning. She enjoyed singing in group this morning. Sleeping well at night. 9/11/18- Patient remains stable. 9/12/18- No complaints, no acute behviors. Bright and euthymic affect. Eating meals, sleeping well. 9/13- patient remains stable  9/14/18- euthymic mood, talkative. Pleasant and cooperative. 9/16/18: seen today,funny at times, accepting po med and meals,no aggressin or agitation,requires assistance  with ADL. 9/17/18- Ms. Regan denies any complaints this morning. Resting comfortably, then easily eating and enjoying her lunch. NO changes. Patient remains stable. 9/18/18- patient presented in upbeat mood, observed sitting and laughing with her . Patient remains compliant and stable. 9/19- unchanged. Bright affect. Funny disposition  9/20- Enjoying visit from her . Smiling. Compliant. Slept through the night.  9/21/18- sleeping this morning. No acute events to report. Stable  9/22/18-Compliant with meds. No prn's used. Slept 7.5 hrs. No behavioral issues are reported. 9/23/18-Slept 7 hrs. No prn's used. Meds and meals compliant. 9/24/18- Sleeping well, overall positive and upbeat mood      SIDE EFFECTS: (reviewed/updated 9/24/2018)  None reported or admitted to.      ALLERGIES:(reviewed/updated 9/24/2018)  Allergies   Allergen Reactions    Angiotensin Ii,Human Other (comments)     States does not remember      Avelox [Moxifloxacin] Other (comments)     States does not remember      Demerol [Meperidine] Hives      MEDICATIONS PRIOR TO ADMISSION:(reviewed/updated 9/24/2018)  Prescriptions Prior to Admission   Medication Sig    traZODone (DESYREL) 50 mg tablet Take 0.5 Tabs by mouth nightly for 30 days. Indications: insomnia associated with depression    risperiDONE (RISPERDAL) 0.25 mg tablet Take 1 Tab by mouth two (2) times a day for 30 days.  magnesium hydroxide (MILK OF MAGNESIA) 400 mg/5 mL suspension Take 30 mL by mouth daily for 30 days.  donepezil (ARICEPT) 10 mg tablet Take 1 Tab by mouth daily for 30 days.  acetaminophen (TYLENOL) 325 mg tablet Take 2 Tabs by mouth every six (6) hours as needed for Pain.  docusate sodium (COLACE) 100 mg capsule Take 1 Cap by mouth daily for 90 days.  dilTIAZem CD (CARTIA XT) 240 mg ER capsule Take 240 mg by mouth daily.  polyethylene glycol (MIRALAX) 17 gram packet Take 17 g by mouth as needed (Constipation).  citalopram (CELEXA) 20 mg tablet TAKE 1 TABLET BY MOUTH DAILY      PAST MEDICAL HISTORY: Past medical history from the initial psychiatric evaluation has been reviewed (reviewed/updated 9/24/2018) with no additional updates (I asked patient and no additional past medical history provided). Past Medical History:   Diagnosis Date    Anemia     Atrial fibrillation ([address]     Cancer (HCC)     basal cell on nose    Chronic pain     back pain    Fibromyalgia 4/1/2010    GERD (gastroesophageal reflux disease) 4/1/2010    Hiatal hernia 4/1/2010    High cholesterol 4/1/2010    HTN (hypertension) 4/1/2010    Ill-defined condition     A.  Fib    OA (osteoarthritis) 4/1/2010    back    Pulmonary emboli ([address] 2015     Past Surgical History:   Procedure Laterality Date    BREAST SURGERY PROCEDURE UNLISTED      Breast im-plants x 2    ENLARGE BREAST WITH IMPLANT      HX APPENDECTOMY      HX BREAST BIOPSY      HX BREAST RECONSTRUCTION      Breast implants removed 1992    HX CATARACT REMOVAL      HX CHOLECYSTECTOMY  9/11/2013    HX HYSTERECTOMY      HX KNEE REPLACEMENT 2008    bilateral    HX ORTHOPAEDIC  2007    Bilateral TKR    HX PARTIAL HYSTERECTOMY      HX SHOULDER REPLACEMENT  2009    left    HX TONSILLECTOMY        SOCIAL HISTORY: Social history from the initial psychiatric evaluation has been reviewed (reviewed/updated 9/24/2018) with no additional updates (I asked patient and no additional social history provided). Social History     Social History    Marital status:      Spouse name: N/A    Number of children: N/A    Years of education: N/A     Occupational History    Not on file. Social History Main Topics    Smoking status: Never Smoker    Smokeless tobacco: Never Used    Alcohol use No    Drug use: No    Sexual activity: No     Other Topics Concern    Not on file     Social History Narrative      FAMILY HISTORY: Family history from the initial psychiatric evaluation has been reviewed (reviewed/updated 9/24/2018) with no additional updates (I asked patient and no additional family history provided). Family History   Problem Relation Age of Onset   [facility] Arthritis-rheumatoid Mother     Dementia Mother     Coronary Artery Disease Father     Cancer Brother      lung cancer       REVIEW OF SYSTEMS: (reviewed/updated 9/24/2018)  Appetite:improved   Sleep: improved   All other Review of Systems: General ROS: negative         MENTAL STATUS EXAM & VITALS     MENTAL STATUS EXAM (MSE):    MSE FINDINGS ARE WITHIN NORMAL LIMITS (WNL) UNLESS OTHERWISE STATED BELOW. ( ALL OF THE BELOW CATEGORIES OF THE MSE HAVE BEEN REVIEWED (reviewed 9/24/2018) AND UPDATED AS DEEMED APPROPRIATE )  General Presentation age appropriate and older than stated age, cooperative   Orientation not oriented to situation   Vital Signs  See below (reviewed 9/24/2018); Vital Signs (BP, Pulse, & Temp) are within normal limits if not listed below.    Gait and Station Stable/steady, no ataxia   Musculoskeletal System No extrapyramidal symptoms (EPS); no abnormal muscular movements or Tardive Dyskinesia (TD); muscle strength and tone are within normal limits   Language No aphasia or dysarthria   Speech:  soft   Thought Processes illogical; slow rate of thoughts; poor abstract reasoning/computation   Thought Associations loose associations   Thought Content free of delusions   Suicidal Ideations no plan    Homicidal Ideations no plan    Mood:  euthymic   Affect:  euthymic   Memory recent  impaired   Memory remote:  impaired   Concentration/Attention:  distractable   Fund of Knowledge below average   Insight:  limited   Reliability limited   Judgment:  limited          VITALS:     Patient Vitals for the past 24 hrs:   Temp Pulse Resp BP SpO2   09/24/18 1935 98 °F (36.7 °C) 63 14 140/72 -   09/24/18 1600 98 °F (36.7 °C) 70 16 136/81 95 %   09/24/18 0922 - 88 - 106/70 -   09/24/18 0830 98.1 °F (36.7 °C) 95 16 115/64 95 %     Wt Readings from Last 3 Encounters:   09/18/18 66.9 kg (147 lb 8 oz)   09/02/18 64.5 kg (142 lb 3.2 oz)   08/21/18 76.1 kg (167 lb 12.8 oz)     Temp Readings from Last 3 Encounters:   09/24/18 98 °F (36.7 °C)   09/06/18 98.9 °F (37.2 °C)   09/05/18 98.1 °F (36.7 °C)     BP Readings from Last 3 Encounters:   09/24/18 140/72   09/06/18 146/77   09/05/18 103/63     Pulse Readings from Last 3 Encounters:   09/24/18 63   09/06/18 (!) 105   09/05/18 68            DATA     LABORATORY DATA:(reviewed/updated 9/24/2018)  Recent Results (from the past 24 hour(s))   GLUCOSE, POC    Collection Time: 09/24/18  7:51 AM   Result Value Ref Range    Glucose (POC) 105 (H) 65 - 100 mg/dL    Performed by Orion MARTINO(CON)    GLUCOSE, POC    Collection Time: 09/24/18  4:18 PM   Result Value Ref Range    Glucose (POC) 95 65 - 100 mg/dL    Performed by Romel Nassar      No results found for: VALF2, VALAC, VALP, VALPR, DS6, CRBAM, CRBAMP, CARB2, XCRBAM  No results found for: LITHM   RADIOLOGY REPORTS:(reviewed/updated 9/24/2018)  Xr Pelv Ap Only    Addendum Date: 8/14/2018    Addendum: No fracture. Result Date: 8/14/2018  Clinical history: Fall yesterday FINDINGS: Single frontal view of the pelvis is obtained. There is no fracture or dislocation identified. There is no significant soft tissue abnormality. IMPRESSION: No acute fracture. Xr Forearm Rt Ap/lat    Result Date: 8/13/2018  EXAM:  XR FOREARM RT AP/LAT Clinical history: Distal radial fracture. INDICATION:   fall. COMPARISON: None. FINDINGS: Two views of the right radius and ulna demonstrate distal radial fracture. Extensive degenerative change at the radiocarpal and carpometacarpal rows. .     IMPRESSION:  Distal radial fracture with minimal displacement. Severe degenerative change at the wrist..     Xr Wrist Rt Ap/lat    Result Date: 8/27/2018  EXAM: XR WRIST RT AP/LAT INDICATION:  R wrist fracture follow up, was not casted and pt not leaving splint on. COMPARISON: 8/14/2018. FINDINGS: Two  views of the right wrist demonstrate no appreciable change in the T-shaped interarticular distal radial fracture and ulnar styloid fracture in fiberglass. There is chondrocalcinosis of the radiocarpal joint and TFC. Lateral carpal arthritis is noted. Osteopenia is present. IMPRESSION:  No significant change in the right T-shaped interarticular distal radial and ulna styloid fractures in the splint. Xr Wrist Rt Ap/lat/obl Min 3v    Result Date: 8/14/2018  EXAM: XR WRIST RT AP/LAT/OBL MIN 3V Clinical history: Fall, broken wrist INDICATION:  fall, broken wrist. COMPARISON: None. FINDINGS: Three  views of the right wrist demonstrate postreduction images distal radial fracture. .  Soft tissue edema. .     IMPRESSION:  Postreduction images distal radial fracture. .     Xr Wrist Rt Ap/lat/obl Min 3v    Result Date: 8/13/2018  EXAM: XR WRIST RT AP/LAT/OBL MIN 3V HISTORY: Fall, fell in bathroom, right arm INDICATION:  fall. COMPARISON: None. FINDINGS: Three  views of the right wrist demonstrate nondisplaced distal radius fracture.  Severe degenerative change of the radiocarpal and carpometacarpal rows. No ulnar fracture. .  The soft tissues are within normal limits. IMPRESSION:  Nondisplaced fracture of the distal radius. 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     Ct Head Wo Cont    Result Date: 8/23/2018  EXAM:  CT head without contrast INDICATION: Altered mental status COMPARISON: CT head 8/14/2018 TECHNIQUE: Axial noncontrast head CT from foramen magnum to vertex. Coronal and sagittal reformatted images were obtained. CT dose reduction was achieved through use of a standardized protocol tailored for this examination and automatic exposure control for dose modulation. Adaptive statistical iterative reconstruction (ASIR) was utilized. FINDINGS:  There is diffuse age-related parenchymal volume loss. The ventricles and sulci are age-appropriate without hydrocephalus. There is no mass effect or midline shift. There is no intracranial hemorrhage or extra-axial fluid collection. Scattered foci of low attenuation in the periventricular white matter represent stable chronic microvascular ischemic changes. There is no new abnormal parenchymal attenuation. The gray-white matter differentiation is maintained. The basal cisterns are patent. 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 The osseous structures are intact. The visualized paranasal sinuses and mastoid air cells are clear. IMPRESSION: There is no acute intracranial abnormality. Ct Head Wo Cont    Result Date: 8/14/2018  EXAM:  CT HEAD WO CONT Clinical history: Fall, fractured wrist, increased pain INDICATION:   fall COMPARISON: 7/20/2018. CONTRAST:  None. TECHNIQUE: Unenhanced CT of the head was performed using 5 mm images. Brain and bone windows were generated. CT dose reduction was achieved through use of a standardized protocol tailored for this examination and automatic exposure control for dose modulation.   FINDINGS: The ventricles and sulci are normal in size, shape and configuration and midline. Mild scattered hypodensities in the cerebral white matter. There is no intracranial hemorrhage, extra-axial collection, mass, mass effect or midline shift. The basilar cisterns are open. No acute infarct is identified. The bone windows demonstrate no abnormalities. The visualized portions of the paranasal sinuses and mastoid air cells are clear. IMPRESSION: No acute cranial process     Ct Head Wo Cont    Result Date: 7/20/2018  EXAM:  CT HEAD WO CONT INDICATION: Fall going to the bathroom and had a ground level fall and hit head on the floor. COMPARISON: MRI brain 11/12/2012. Jerry Blanco CONTRAST:  None. TECHNIQUE: Unenhanced CT of the head was performed using 5 mm images. Brain and bone windows were generated. CT dose reduction was achieved through use of a standardized protocol tailored for this examination and automatic exposure control for dose modulation. FINDINGS: There is a stable 9 x 12 mm partially calcified extra-axial lesion in the left posterior fossa compatible with meningioma. There is no acute intracranial hemorrhage, other mass, mass effect or herniation. Ventricles and sulci show stable, proportionate, and symmetric pattern. There is a stable pattern of periventricular white matter hypodensity. The gray-white matter differentiation is well-preserved. Atherosclerotic calcifications are seen within the carotid siphons and vertebral arteries. The mastoid air cells are well pneumatized. The visualized paranasal sinuses are normal.     IMPRESSION: No acute intracranial hemorrhage or infarct. Stable left posterior fossa meningioma and chronic white matter change most compatible with small vessel ischemic and/or senescent change. Intracranial atherosclerosis. Ct Abd Pelv Wo Cont    Result Date: 6/20/2018  EXAM:  CT ABD PELV WO CONT INDICATION: abd pain  2 days of abdominal pain COMPARISON: 2013 CONTRAST:  None.  TECHNIQUE: Thin axial images were obtained through the abdomen and pelvis. Coronal and sagittal reconstructions were generated. Oral contrast was not administered. CT dose reduction was achieved through use of a standardized protocol tailored for this examination and automatic exposure control for dose modulation. The absence of intravenous contrast material reduces the sensitivity for evaluation of the solid parenchymal organs of the abdomen. FINDINGS: LUNG BASES: Clear. INCIDENTALLY IMAGED HEART AND MEDIASTINUM: Unremarkable. LIVER: No mass or biliary dilatation. GALLBLADDER: Surgically removed. SPLEEN: No mass. PANCREAS: No mass or ductal dilatation. ADRENALS: Unremarkable. KIDNEYS/URETERS: Malrotated right kidney. Hyperdense right renal cyst. Right nephrolithiasis. STOMACH: Hiatal hernia. SMALL BOWEL: No dilatation or wall thickening. COLON: No dilatation or wall thickening. Moderate colonic stool. APPENDIX: Surgically removed PERITONEUM: No ascites or pneumoperitoneum. RETROPERITONEUM: No lymphadenopathy or aortic aneurysm. REPRODUCTIVE ORGANS: Surgically removed. URINARY BLADDER: No mass or calculus. BONES: No destructive bone lesion. Multilevel degenerative changes. ADDITIONAL COMMENTS: N/A     IMPRESSION: No acute findings. Xr Chest Port    Result Date: 6/20/2018  EXAM:  XR CHEST PORT INDICATION:  Chest Pain COMPARISON:  May 24 FINDINGS: A portable AP radiograph of the chest was obtained at 0555 hours. There is a left shoulder replacement in place. The patient is on a cardiac monitor. The lungs are clear. The cardiac and mediastinal contours and pulmonary vascularity are normal.  The bones and soft tissues are grossly within normal limits. IMPRESSION: No acute findings.           MEDICATIONS     ALL MEDICATIONS:   Current Facility-Administered Medications   Medication Dose Route Frequency    dilTIAZem CD (CARDIZEM CD) capsule 240 mg  240 mg Oral DAILY    magnesium hydroxide (MILK OF MAGNESIA) 400 mg/5 mL oral suspension 30 mL  30 mL Oral DAILY PRN    citalopram (CELEXA) tablet 20 mg  20 mg Oral QHS    docusate sodium (COLACE) capsule 100 mg  100 mg Oral DAILY    donepezil (ARICEPT) tablet 10 mg  10 mg Oral DAILY    polyethylene glycol (MIRALAX) packet 17 g  17 g Oral DAILY PRN    risperiDONE (RisperDAL) tablet 0.25 mg  0.25 mg Oral BID    saline peripheral flush soln 5 mL  5 mL InterCATHeter PRN    traZODone (DESYREL) tablet 25 mg  25 mg Oral QHS PRN    OLANZapine (ZyPREXA) tablet 2.5 mg  2.5 mg Oral Q6H PRN    ziprasidone (GEODON) 10 mg in sterile water (preservative free) 0.5 mL injection  10 mg IntraMUSCular BID PRN    benztropine (COGENTIN) tablet 1 mg  1 mg Oral BID PRN    benztropine (COGENTIN) injection 1 mg  1 mg IntraMUSCular BID PRN    zolpidem (AMBIEN) tablet 5 mg  5 mg Oral QHS PRN    acetaminophen (TYLENOL) tablet 650 mg  650 mg Oral Q4H PRN    ibuprofen (MOTRIN) tablet 400 mg  400 mg Oral Q8H PRN      SCHEDULED MEDICATIONS:   Current Facility-Administered Medications   Medication Dose Route Frequency    dilTIAZem CD (CARDIZEM CD) capsule 240 mg  240 mg Oral DAILY    citalopram (CELEXA) tablet 20 mg  20 mg Oral QHS    docusate sodium (COLACE) capsule 100 mg  100 mg Oral DAILY    donepezil (ARICEPT) tablet 10 mg  10 mg Oral DAILY    risperiDONE (RisperDAL) tablet 0.25 mg  0.25 mg Oral BID          ASSESSMENT & PLAN     DIAGNOSES REQUIRING ACTIVE TREATMENT AND MONITORING: (reviewed/updated 9/24/2018)  Patient Active Hospital Problem List:    The patient, Tray Johnson, is a 80 y.o.  female who presents at this time for treatment of the following diagnoses:  Patient Active Hospital Problem List:   Late onset Alzheimer's disease  (8/24/2018)    Assessment: severe, advanced    Plan: Agree with inpatient hospitalization for further stabilization, safety monitoring and medication management  Medications- Aricept and risperdal   09/16/18: Continue current treatment.       In summary, Tray Regan, is a 80 y.o.  female who presents with a severe exacerbation of the principal diagnosis of Dementia  Patient's condition is worsening/not improving/not stable improving. Patient requires continued inpatient hospitalization for further stabilization, safety monitoring and medication management. I will continue to coordinate the provision of individual, milieu, occupational, group, and substance abuse therapies to address target symptoms/diagnoses as deemed appropriate for the individual patient. A coordinated, multidisplinary treatment team round was conducted with the patient (this team consists of the nurse, psychiatric unit pharmcist,  and writer). Complete current electronic health record for patient has been reviewed today including consultant notes, ancillary staff notes, nurses and psychiatric tech notes. Suicide risk assessment completed and patient deemed to be of low risk for suicide at this time. The following regarding medications was addressed during rounds with patient:   the risks and benefits of the proposed medication. The patient was given the opportunity to ask questions. Informed consent given to the use of the above medications. Will continue to adjust psychiatric and non-psychiatric medications (see above \"medication\" section and orders section for details) as deemed appropriate & based upon diagnoses and response to treatment. I will continue to order blood tests/labs and diagnostic tests as deemed appropriate and review results as they become available (see orders for details and above listed lab/test results). I will order psychiatric records from previous [facility] hospitals to further elucidate the nature of patient's psychopathology and review once available.     I will gather additional collateral information from friends, family and o/p treatment team to further elucidate the nature of patient's psychopathology and baselline level of psychiatric functioning. I certify that this patient's inpatient psychiatric hospital services furnished since the previous certification were, and continue to be, required for treatment that could reasonably be expected to improve the patient's condition, or for diagnostic study, and that the patient continues to need, on a daily basis, active treatment furnished directly by or requiring the supervision of inpatient psychiatric facility personnel. In addition, the hospital records show that services furnished were intensive treatment services, admission or related services, or equivalent services.     EXPECTED DISCHARGE DATE/DAY: TBD     DISPOSITION: Home       Signed By:   Heriberto León MD  9/24/2018

## 2018-09-25 NOTE — PROGRESS NOTES
Problem: Discharge Planning  Goal: *Discharge to safe environment  Outcome: Progressing Towards Goal  She requires skilled nursing home care   Goal: *Knowledge of medication management  Outcome: Progressing Towards Goal  She is compliant with her medications

## 2018-09-25 NOTE — BH NOTES
PSYCHIATRIC PROGRESS NOTE         Patient Name  Orlando Sanchez   Date of Birth 5/24/1933   [facility] [id number]   Medical Record Number  [id number]      Age  80 y.o. PCP Kathy Epperson MD   Admit date:  9/6/2018    Room Number  746/01  @ . [address]   Date of Service  09/25/18          PSYCHOTHERAPY SESSION NOTE:  Length of psychotherapy session: 20 minutes    Main condition/diagnosis/issues treated during session today, 9/25/2018 : I employed Cognitive Behavioral therapy techniques, Reality-Oriented psychotherapy, as well as supportive psychotherapy in regards to various ongoing psychosocial stressors, including the following: pre-admission and current problems; housing issues;   medical issues; and stress of hospitalization. Interpersonal relationship issues and psychodynamic conflicts explored. Attempts made to alleviate maladaptive patterns. Overall, patient is not progressing    Treatment Plan Update (reviewed an updated 9/25/2018) : I will modify psychotherapy tx plan by implementing more stress management strategies, building upon cognitive behavioral techniques, increasing coping skills, as well as shoring up psychological defenses). An extended energy and skill set was needed to engage pt in psychotherapy due to some of the following: resistiveness, complexity, negativity, confrontational nature, hostile behaviors, and/or severe abnormalities in thought processes/psychosis resulting in the loss of expressive/receptive language communication skills. E & M PROGRESS NOTE:         HISTORY         HISTORY OF PRESENT ILLNESS/INTERVAL HISTORY:  (reviewed/updated 9/25/2018). per initial evaluation: The patient, Tray Byers, is a 80 y.o.   WHITE [place]  female with a past psychiatric history significant for Dementia, MDD who was treated on [facility] for several weeks, until she had a syncopal episode on 9/5 and transferred to the medical floor for observation for 24 hours. She now returns medically stable. Patient's mood and presesentation hs improved compared to original presentation on admission. She remains very confused secondary to advanced dementia. No agitation or aggression noted. Compliant with her medications. Awaiting placement vs. Return home. 9/8/18. \"Am I going home today\". No complaints. Seems calmer. 9/9/18. No complaints. No agitation. Slept ok  9/10/18- Funny and engaging this morning. She enjoyed singing in group this morning. Sleeping well at night. 9/11/18- Patient remains stable. 9/12/18- No complaints, no acute behviors. Bright and euthymic affect. Eating meals, sleeping well. 9/13- patient remains stable  9/14/18- euthymic mood, talkative. Pleasant and cooperative. 9/16/18: seen today,funny at times, accepting po med and meals,no aggressin or agitation,requires assistance  with ADL. 9/17/18- Ms. Regan denies any complaints this morning. Resting comfortably, then easily eating and enjoying her lunch. NO changes. Patient remains stable. 9/18/18- patient presented in upbeat mood, observed sitting and laughing with her . Patient remains compliant and stable. 9/19- unchanged. Bright affect. Funny disposition  9/20- Enjoying visit from her . Smiling. Compliant. Slept through the night.  9/21/18- sleeping this morning. No acute events to report. Stable  9/22/18-Compliant with meds. No prn's used. Slept 7.5 hrs. No behavioral issues are reported. 9/23/18-Slept 7 hrs. No prn's used. Meds and meals compliant. 9/24/18- Sleeping well, overall positive and upbeat mood  925/18 patient enjoys visits with her . He acknowledges that she is much improved. Patient made jokes about her poor memory. Very upbeat and friendly. SIDE EFFECTS: (reviewed/updated 9/25/2018)  None reported or admitted to.      ALLERGIES:(reviewed/updated 9/25/2018)  Allergies   Allergen Reactions    Angiotensin Ii,Human Other (comments)     States does not remember      Avelox [Moxifloxacin] Other (comments)     States does not remember      Demerol [Meperidine] Hives      MEDICATIONS PRIOR TO ADMISSION:(reviewed/updated 9/25/2018)  Prescriptions Prior to Admission   Medication Sig    traZODone (DESYREL) 50 mg tablet Take 0.5 Tabs by mouth nightly for 30 days. Indications: insomnia associated with depression    risperiDONE (RISPERDAL) 0.25 mg tablet Take 1 Tab by mouth two (2) times a day for 30 days.  magnesium hydroxide (MILK OF MAGNESIA) 400 mg/5 mL suspension Take 30 mL by mouth daily for 30 days.  donepezil (ARICEPT) 10 mg tablet Take 1 Tab by mouth daily for 30 days.  acetaminophen (TYLENOL) 325 mg tablet Take 2 Tabs by mouth every six (6) hours as needed for Pain.  docusate sodium (COLACE) 100 mg capsule Take 1 Cap by mouth daily for 90 days.  dilTIAZem CD (CARTIA XT) 240 mg ER capsule Take 240 mg by mouth daily.  polyethylene glycol (MIRALAX) 17 gram packet Take 17 g by mouth as needed (Constipation).  citalopram (CELEXA) 20 mg tablet TAKE 1 TABLET BY MOUTH DAILY      PAST MEDICAL HISTORY: Past medical history from the initial psychiatric evaluation has been reviewed (reviewed/updated 9/25/2018) with no additional updates (I asked patient and no additional past medical history provided). Past Medical History:   Diagnosis Date    Anemia     Atrial fibrillation ([address]     Cancer (HCC)     basal cell on nose    Chronic pain     back pain    Fibromyalgia 4/1/2010    GERD (gastroesophageal reflux disease) 4/1/2010    Hiatal hernia 4/1/2010    High cholesterol 4/1/2010    HTN (hypertension) 4/1/2010    Ill-defined condition     A.  Fib    OA (osteoarthritis) 4/1/2010    back    Pulmonary emboli ([address] 2015     Past Surgical History:   Procedure Laterality Date    BREAST SURGERY PROCEDURE UNLISTED      Breast im-plants x 2    ENLARGE BREAST WITH IMPLANT      HX APPENDECTOMY      HX BREAST BIOPSY      HX BREAST RECONSTRUCTION Breast implants removed 1992    HX CATARACT REMOVAL      HX CHOLECYSTECTOMY  9/11/2013    HX HYSTERECTOMY      HX KNEE REPLACEMENT  2008    bilateral    HX ORTHOPAEDIC  2007    Bilateral TKR    HX PARTIAL HYSTERECTOMY      HX SHOULDER REPLACEMENT  2009    left    HX TONSILLECTOMY        SOCIAL HISTORY: Social history from the initial psychiatric evaluation has been reviewed (reviewed/updated 9/25/2018) with no additional updates (I asked patient and no additional social history provided). Social History     Social History    Marital status:      Spouse name: N/A    Number of children: N/A    Years of education: N/A     Occupational History    Not on file. Social History Main Topics    Smoking status: Never Smoker    Smokeless tobacco: Never Used    Alcohol use No    Drug use: No    Sexual activity: No     Other Topics Concern    Not on file     Social History Narrative      FAMILY HISTORY: Family history from the initial psychiatric evaluation has been reviewed (reviewed/updated 9/25/2018) with no additional updates (I asked patient and no additional family history provided). Family History   Problem Relation Age of Onset   [facility] Arthritis-rheumatoid Mother     Dementia Mother     Coronary Artery Disease Father     Cancer Brother      lung cancer       REVIEW OF SYSTEMS: (reviewed/updated 9/25/2018)  Appetite:improved   Sleep: improved   All other Review of Systems: General ROS: negative         MENTAL STATUS EXAM & VITALS     MENTAL STATUS EXAM (MSE):    MSE FINDINGS ARE WITHIN NORMAL LIMITS (WNL) UNLESS OTHERWISE STATED BELOW. ( ALL OF THE BELOW CATEGORIES OF THE MSE HAVE BEEN REVIEWED (reviewed 9/25/2018) AND UPDATED AS DEEMED APPROPRIATE )  General Presentation age appropriate and older than stated age, cooperative   Orientation not oriented to situation   Vital Signs  See below (reviewed 9/25/2018); Vital Signs (BP, Pulse, & Temp) are within normal limits if not listed below. Gait and Station Stable/steady, no ataxia   Musculoskeletal System No extrapyramidal symptoms (EPS); no abnormal muscular movements or Tardive Dyskinesia (TD); muscle strength and tone are within normal limits   Language No aphasia or dysarthria   Speech:  soft   Thought Processes illogical; slow rate of thoughts; poor abstract reasoning/computation   Thought Associations loose associations   Thought Content free of delusions   Suicidal Ideations no plan    Homicidal Ideations no plan    Mood:  euthymic   Affect:  euthymic   Memory recent  impaired   Memory remote:  impaired   Concentration/Attention:  distractable   Fund of Knowledge below average   Insight:  limited   Reliability limited   Judgment:  limited          VITALS:     Patient Vitals for the past 24 hrs:   Temp Pulse Resp BP SpO2   09/25/18 1545 98 °F (36.7 °C) 77 16 159/84 92 %   09/25/18 0855 97.9 °F (36.6 °C) 88 16 161/88 96 %   09/24/18 1935 98 °F (36.7 °C) 63 14 140/72 -     Wt Readings from Last 3 Encounters:   09/18/18 66.9 kg (147 lb 8 oz)   09/02/18 64.5 kg (142 lb 3.2 oz)   08/21/18 76.1 kg (167 lb 12.8 oz)     Temp Readings from Last 3 Encounters:   09/25/18 98 °F (36.7 °C)   09/06/18 98.9 °F (37.2 °C)   09/05/18 98.1 °F (36.7 °C)     BP Readings from Last 3 Encounters:   09/25/18 159/84   09/06/18 146/77   09/05/18 103/63     Pulse Readings from Last 3 Encounters:   09/25/18 77   09/06/18 (!) 105   09/05/18 68            DATA     LABORATORY DATA:(reviewed/updated 9/25/2018)  Recent Results (from the past 24 hour(s))   GLUCOSE, POC    Collection Time: 09/25/18  7:21 AM   Result Value Ref Range    Glucose (POC) 98 65 - 100 mg/dL    Performed by [facility]    GLUCOSE, POC    Collection Time: 09/25/18  4:25 PM   Result Value Ref Range    Glucose (POC) 107 (H) 65 - 100 mg/dL    Performed by Ravindra Ferguson      No results found for: VALF2, VALAC, VALP, VALPR, DS6, CRBAM, CRBAMP, CARB2, XCRBAM  No results found for: LITHM   RADIOLOGY REPORTS:(reviewed/updated 9/25/2018)  Xr Pelv Ap Only    Addendum Date: 8/14/2018    Addendum: No fracture. Result Date: 8/14/2018  Clinical history: Fall yesterday FINDINGS: Single frontal view of the pelvis is obtained. There is no fracture or dislocation identified. There is no significant soft tissue abnormality. IMPRESSION: No acute fracture. Xr Forearm Rt Ap/lat    Result Date: 8/13/2018  EXAM:  XR FOREARM RT AP/LAT Clinical history: Distal radial fracture. INDICATION:   fall. COMPARISON: None. FINDINGS: Two views of the right radius and ulna demonstrate distal radial fracture. Extensive degenerative change at the radiocarpal and carpometacarpal rows. .     IMPRESSION:  Distal radial fracture with minimal displacement. Severe degenerative change at the wrist..     Xr Wrist Rt Ap/lat    Result Date: 8/27/2018  EXAM: XR WRIST RT AP/LAT INDICATION:  R wrist fracture follow up, was not casted and pt not leaving splint on. COMPARISON: 8/14/2018. FINDINGS: Two  views of the right wrist demonstrate no appreciable change in the T-shaped interarticular distal radial fracture and ulnar styloid fracture in fiberglass. There is chondrocalcinosis of the radiocarpal joint and TFC. Lateral carpal arthritis is noted. Osteopenia is present. IMPRESSION:  No significant change in the right T-shaped interarticular distal radial and ulna styloid fractures in the splint. Xr Wrist Rt Ap/lat/obl Min 3v    Result Date: 8/14/2018  EXAM: XR WRIST RT AP/LAT/OBL MIN 3V Clinical history: Fall, broken wrist INDICATION:  fall, broken wrist. COMPARISON: None. FINDINGS: Three  views of the right wrist demonstrate postreduction images distal radial fracture. .  Soft tissue edema. .     IMPRESSION:  Postreduction images distal radial fracture. .     Xr Wrist Rt Ap/lat/obl Min 3v    Result Date: 8/13/2018  EXAM: XR WRIST RT AP/LAT/OBL MIN 3V HISTORY: Fall, fell in bathroom, right arm INDICATION:  fall. COMPARISON: None.  FINDINGS: Three  views of the right wrist demonstrate nondisplaced distal radius fracture. Severe degenerative change of the radiocarpal and carpometacarpal rows. No ulnar fracture. .  The soft tissues are within normal limits. IMPRESSION:  Nondisplaced fracture of the distal radius. 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     Ct Head Wo Cont    Result Date: 8/23/2018  EXAM:  CT head without contrast INDICATION: Altered mental status COMPARISON: CT head 8/14/2018 TECHNIQUE: Axial noncontrast head CT from foramen magnum to vertex. Coronal and sagittal reformatted images were obtained. CT dose reduction was achieved through use of a standardized protocol tailored for this examination and automatic exposure control for dose modulation. Adaptive statistical iterative reconstruction (ASIR) was utilized. FINDINGS:  There is diffuse age-related parenchymal volume loss. The ventricles and sulci are age-appropriate without hydrocephalus. There is no mass effect or midline shift. There is no intracranial hemorrhage or extra-axial fluid collection. Scattered foci of low attenuation in the periventricular white matter represent stable chronic microvascular ischemic changes. There is no new abnormal parenchymal attenuation. The gray-white matter differentiation is maintained. The basal cisterns are patent. 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 The osseous structures are intact. The visualized paranasal sinuses and mastoid air cells are clear. IMPRESSION: There is no acute intracranial abnormality. Ct Head Wo Cont    Result Date: 8/14/2018  EXAM:  CT HEAD WO CONT Clinical history: Fall, fractured wrist, increased pain INDICATION:   fall COMPARISON: 7/20/2018. CONTRAST:  None. TECHNIQUE: Unenhanced CT of the head was performed using 5 mm images. Brain and bone windows were generated.   CT dose reduction was achieved through use of a standardized protocol tailored for this examination and automatic exposure control for dose modulation. FINDINGS: The ventricles and sulci are normal in size, shape and configuration and midline. Mild scattered hypodensities in the cerebral white matter. There is no intracranial hemorrhage, extra-axial collection, mass, mass effect or midline shift. The basilar cisterns are open. No acute infarct is identified. The bone windows demonstrate no abnormalities. The visualized portions of the paranasal sinuses and mastoid air cells are clear. IMPRESSION: No acute cranial process     Ct Head Wo Cont    Result Date: 7/20/2018  EXAM:  CT HEAD WO CONT INDICATION: Fall going to the bathroom and had a ground level fall and hit head on the floor. COMPARISON: MRI brain 11/12/2012. [facility] CONTRAST:  None. TECHNIQUE: Unenhanced CT of the head was performed using 5 mm images. Brain and bone windows were generated. CT dose reduction was achieved through use of a standardized protocol tailored for this examination and automatic exposure control for dose modulation. FINDINGS: There is a stable 9 x 12 mm partially calcified extra-axial lesion in the left posterior fossa compatible with meningioma. There is no acute intracranial hemorrhage, other mass, mass effect or herniation. Ventricles and sulci show stable, proportionate, and symmetric pattern. There is a stable pattern of periventricular white matter hypodensity. The gray-white matter differentiation is well-preserved. Atherosclerotic calcifications are seen within the carotid siphons and vertebral arteries. The mastoid air cells are well pneumatized. The visualized paranasal sinuses are normal.     IMPRESSION: No acute intracranial hemorrhage or infarct. Stable left posterior fossa meningioma and chronic white matter change most compatible with small vessel ischemic and/or senescent change. Intracranial atherosclerosis.      Ct Abd Pelv Wo Cont    Result Date: 6/20/2018  EXAM:  CT ABD PELV WO CONT INDICATION: abd pain  2 days of abdominal pain COMPARISON: 2013 CONTRAST:  None. TECHNIQUE: Thin axial images were obtained through the abdomen and pelvis. Coronal and sagittal reconstructions were generated. Oral contrast was not administered. CT dose reduction was achieved through use of a standardized protocol tailored for this examination and automatic exposure control for dose modulation. The absence of intravenous contrast material reduces the sensitivity for evaluation of the solid parenchymal organs of the abdomen. FINDINGS: LUNG BASES: Clear. INCIDENTALLY IMAGED HEART AND MEDIASTINUM: Unremarkable. LIVER: No mass or biliary dilatation. GALLBLADDER: Surgically removed. SPLEEN: No mass. PANCREAS: No mass or ductal dilatation. ADRENALS: Unremarkable. KIDNEYS/URETERS: Malrotated right kidney. Hyperdense right renal cyst. Right nephrolithiasis. STOMACH: Hiatal hernia. SMALL BOWEL: No dilatation or wall thickening. COLON: No dilatation or wall thickening. Moderate colonic stool. APPENDIX: Surgically removed PERITONEUM: No ascites or pneumoperitoneum. RETROPERITONEUM: No lymphadenopathy or aortic aneurysm. REPRODUCTIVE ORGANS: Surgically removed. URINARY BLADDER: No mass or calculus. BONES: No destructive bone lesion. Multilevel degenerative changes. ADDITIONAL COMMENTS: N/A     IMPRESSION: No acute findings. Xr Chest Port    Result Date: 6/20/2018  EXAM:  XR CHEST PORT INDICATION:  Chest Pain COMPARISON:  May 24 FINDINGS: A portable AP radiograph of the chest was obtained at 0555 hours. There is a left shoulder replacement in place. The patient is on a cardiac monitor. The lungs are clear. The cardiac and mediastinal contours and pulmonary vascularity are normal.  The bones and soft tissues are grossly within normal limits. IMPRESSION: No acute findings.           MEDICATIONS     ALL MEDICATIONS:   Current Facility-Administered Medications   Medication Dose Route Frequency    dilTIAZem CD (CARDIZEM CD) capsule 240 mg  240 mg Oral DAILY    magnesium hydroxide (MILK OF MAGNESIA) 400 mg/5 mL oral suspension 30 mL  30 mL Oral DAILY PRN    citalopram (CELEXA) tablet 20 mg  20 mg Oral QHS    donepezil (ARICEPT) tablet 10 mg  10 mg Oral DAILY    polyethylene glycol (MIRALAX) packet 17 g  17 g Oral DAILY PRN    risperiDONE (RisperDAL) tablet 0.25 mg  0.25 mg Oral BID    saline peripheral flush soln 5 mL  5 mL InterCATHeter PRN    traZODone (DESYREL) tablet 25 mg  25 mg Oral QHS PRN    OLANZapine (ZyPREXA) tablet 2.5 mg  2.5 mg Oral Q6H PRN    ziprasidone (GEODON) 10 mg in sterile water (preservative free) 0.5 mL injection  10 mg IntraMUSCular BID PRN    benztropine (COGENTIN) tablet 1 mg  1 mg Oral BID PRN    benztropine (COGENTIN) injection 1 mg  1 mg IntraMUSCular BID PRN    zolpidem (AMBIEN) tablet 5 mg  5 mg Oral QHS PRN    acetaminophen (TYLENOL) tablet 650 mg  650 mg Oral Q4H PRN    ibuprofen (MOTRIN) tablet 400 mg  400 mg Oral Q8H PRN      SCHEDULED MEDICATIONS:   Current Facility-Administered Medications   Medication Dose Route Frequency    dilTIAZem CD (CARDIZEM CD) capsule 240 mg  240 mg Oral DAILY    citalopram (CELEXA) tablet 20 mg  20 mg Oral QHS    donepezil (ARICEPT) tablet 10 mg  10 mg Oral DAILY    risperiDONE (RisperDAL) tablet 0.25 mg  0.25 mg Oral BID          ASSESSMENT & PLAN     DIAGNOSES REQUIRING ACTIVE TREATMENT AND MONITORING: (reviewed/updated 9/25/2018)  Patient Active Hospital Problem List:    The patient, Tray Smith, is a 80 y.o.  female who presents at this time for treatment of the following diagnoses:  Patient Active Hospital Problem List:   Late onset Alzheimer's disease  (8/24/2018)    Assessment: severe, advanced    Plan: Agree with inpatient hospitalization for further stabilization, safety monitoring and medication management  Medications- Aricept and risperdal   09/16/18: Continue current treatment.       In summary, Tray Pink, is a 80 y.o.  female who presents with a severe exacerbation of the principal diagnosis of Dementia  Patient's condition is worsening/not improving/not stable improving. Patient requires continued inpatient hospitalization for further stabilization, safety monitoring and medication management. I will continue to coordinate the provision of individual, milieu, occupational, group, and substance abuse therapies to address target symptoms/diagnoses as deemed appropriate for the individual patient. A coordinated, multidisplinary treatment team round was conducted with the patient (this team consists of the nurse, psychiatric unit pharmcist,  and writer). Complete current electronic health record for patient has been reviewed today including consultant notes, ancillary staff notes, nurses and psychiatric tech notes. Suicide risk assessment completed and patient deemed to be of low risk for suicide at this time. The following regarding medications was addressed during rounds with patient:   the risks and benefits of the proposed medication. The patient was given the opportunity to ask questions. Informed consent given to the use of the above medications. Will continue to adjust psychiatric and non-psychiatric medications (see above \"medication\" section and orders section for details) as deemed appropriate & based upon diagnoses and response to treatment. I will continue to order blood tests/labs and diagnostic tests as deemed appropriate and review results as they become available (see orders for details and above listed lab/test results). I will order psychiatric records from previous [facility] hospitals to further elucidate the nature of patient's psychopathology and review once available. I will gather additional collateral information from friends, family and o/p treatment team to further elucidate the nature of patient's psychopathology and baselline level of psychiatric functioning.          I certify that this patient's inpatient psychiatric hospital services furnished since the previous certification were, and continue to be, required for treatment that could reasonably be expected to improve the patient's condition, or for diagnostic study, and that the patient continues to need, on a daily basis, active treatment furnished directly by or requiring the supervision of inpatient psychiatric facility personnel. In addition, the hospital records show that services furnished were intensive treatment services, admission or related services, or equivalent services.     EXPECTED DISCHARGE DATE/DAY: TBD     DISPOSITION: Home       Signed By:   Rufino Roblero MD  9/25/2018

## 2018-09-25 NOTE — PROGRESS NOTES
Problem: Mobility Impaired (Adult and Pediatric)  Goal: *Acute Goals and Plan of Care (Insert Text)  Physical Therapy Goals  Weekly re-assessment 9/25/2018  1. Patient will move from supine to sit and sit to supine , scoot up and down and roll side to side in bed with supervisional assist within 7 day(s). 2.  Patient will transfer from bed to chair and chair to bed with contact guard assist using the least restrictive device within 7 day(s). 3.  Patient will perform sit to stand withcontact guard assist within 7 day(s). 4.  Patient will ambulate with contact guard assist for 50 feet with the least restrictive device within 7 day(s). Initiated 9/8/2018, re-assessed 9/17/2018 continue progressing towards CGA  1. Patient will move from supine to sit and sit to supine , scoot up and down and roll side to side in bed with minimal assistance/contact guard assist within 7 day(s). 2.  Patient will transfer from bed to chair and chair to bed with minimal assistance/contact guard assist using the least restrictive device within 7 day(s). 3.  Patient will perform sit to stand with minimal assistance/contact guard assist within 7 day(s). 4.  Patient will ambulate with minimal assistance/contact guard assist for 50 feet with the least restrictive device within 7 day(s). physical Therapy TREATMENT: WEEKLY REASSESSMENT  Patient: Tray Estes (80 y.o. female)  Date: 9/25/2018  Diagnosis: Late onset Alzheimer's disease with behavioral disturbance  Aggression  Psychosis  Psychosis  Late onset Alzheimer's disease with behavioral disturbance Dementia       Precautions: Fall  Chart, physical therapy assessment, plan of care and goals were reviewed. ASSESSMENT:  Patient received in bed and agreeable to therapy. Moving well in bed today with less assist from last visit. She reports feeling week and unable to stand. Worked on exercises in bed and then on transfers and gait.   Moving better today with sit to stand needing cues for hand placement. Ambulated with  RW twice and left up in chair. encouraged to work on  Bed exercises. Patient's progression toward goals since last assessment: progressing; goals upgraded     PLAN:  Goals have been updated based on progression since last assessment. Patient continues to benefit from skilled intervention to address the above impairments. Continue to follow the patient 3 times a week to address goals. Planned Interventions:  []              Bed Mobility Training             []       Neuromuscular Re-Education  []              Transfer Training                   []       Orthotic/Prosthetic Training  []              Gait Training                         []       Modalities  []              Therapeutic Exercises           []       Edema Management/Control  []              Therapeutic Activities            []       Patient and Family Training/Education  []              Other (comment):  Discharge Recommendations: Alpseh Perez  Further Equipment Recommendations for Discharge: TBD     SUBJECTIVE:   Patient stated I am very weak. I can't stand on my legs.     OBJECTIVE DATA SUMMARY:   Critical Behavior:  Neurologic State: Alert, Confused  Orientation Level: Disoriented to person, Disoriented to place, Disoriented to time  Cognition: Appropriate for age attention/concentration, Decreased attention/concentration, Follows commands, Impaired decision making, Poor safety awareness  Safety/Judgement: Awareness of environment, Decreased awareness of need for assistance, Decreased awareness of need for safety, Decreased insight into deficits, Fall prevention      Functional Mobility Training:  Bed Mobility:     Supine to Sit: Minimum assistance     Scooting: Stand-by assistance        Transfers:  Sit to Stand: Contact guard assistance  Stand to Sit: Contact guard assistance            Balance:  Sitting: Intact  Sitting - Static: Good (unsupported)  Sitting - Dynamic: Good (unsupported)  Standing: Impaired; With support  Standing - Static: Fair  Standing - Dynamic : Fair  Ambulation/Gait Training:  Distance (ft): 10 Feet (ft) (x2)  Assistive Device: Gait belt;Walker, rolling;Brace/Splint (with platform on right)  Ambulation - Level of Assistance: Minimal assistance        Gait Abnormalities: Decreased step clearance; Step to gait        Base of Support: Narrowed     Speed/Patricia: Pace decreased (<100 feet/min)  Step Length: Left shortened;Right shortened         Therapeutic Exercises:   Supine straight leg raises; short arc quads  Pain:  Pain Scale 1: Numeric (0 - 10)  Pain Intensity 1: 0         After treatment:   [x]  Patient left in no apparent distress sitting up in chair  []  Patient left in no apparent distress in bed  []  Call bell left within reach  [x]  Nursing notified  []  Caregiver present  []  Bed alarm activated    COMMUNICATION/COLLABORATION:   The patients plan of care was discussed with: Certified Nursing Assistant/Patient Care Technician    Sarah Ceballos PT   Time Calculation: 24 mins

## 2018-09-25 NOTE — PROGRESS NOTES
Problem: Falls - Risk of  Goal: *Absence of Falls  Document Kizzy Fall Risk and appropriate interventions in the flowsheet. Outcome: Progressing Towards Goal  Fall Risk Interventions:  Mobility Interventions: Bed/chair exit alarm, Patient to call before getting OOB  Mentation Interventions: Bed/chair exit alarm, Adequate sleep, hydration, pain control, Door open when patient unattended  Medication Interventions: Patient to call before getting OOB, Teach patient to arise slowly  Elimination Interventions: Call light in reach, Bed/chair exit alarm  History of Falls Interventions: Door open when patient unattended, Bed/chair exit alarm    2330 Pt appears asleep in bed. Respirations even and unlabored. Will monitor with Q 15 safety checks. 0330 Pt had soft BM on bedside commode. Incontinent of urine.

## 2018-09-25 NOTE — PROGRESS NOTES
Problem: Altered Thought Process (Adult/Pediatric)  Goal: *STG: Participates in treatment plan  Outcome: Progressing Towards Goal  Received this morning resting in bed. Assisted  to the emiliano chair and came out to the dining room. Is alert and oriented. Thoughts are clear and organized,no confusion noted this morning. Voiced she was having bad dreams last night,understands and is clear they were not hallucinations. Is pleasant and cooperative with staff and others. Declined most of the food on her breakfast tray,but ate 2 muffins with butter and jelly with apple juice. Sat out on the unit during music group.  came to visit after group,good visit noted. Assisted to the BSC,having 2 loose stools this morning-Dr. Phuong Greenwood made aware,stool softener was discontinued. Presently resting back in bed. Staff to provide a safe environment during hospital stay. PT called about follow up with strengthening.

## 2018-09-25 NOTE — BH NOTES
GROUP THERAPY PROGRESS NOTE    Tray Regan participated in a Morning Process Group on the Geriatric Unit, with a focus identifying feelings, planning for the day, with music. Group time: 40 minutes. Personal goal for participation: To increase the capacity to shift ones mood, prepare for the day, and share in group singing. Goal orientation: The patient will be able to prepare for the day through group singing. Group therapy participation: When prompted, this patient participated in the group. Therapeutic interventions reviewed and discussed: The group members were introduce themselves by first names and participate in group singing as a way to increase their oxygen and blood flow and begin their day on a positive note. They were also asked to join in singing several songs. Impression of participation: The patient said she was feeling \"fine\" and that she was looking forward to the music. She smiled throughout most of the group. She was alert, generally oriented, and pleasant. She expressed no current SI/HI or overt psychosis in this group. Her affect was mildly euphoric and she was engaged in the music. Her mood reflected her affect.

## 2018-09-25 NOTE — INTERDISCIPLINARY ROUNDS
Behavioral Health Interdisciplinary Rounds     Patient Name: Kendra Hardy  Age: 80 y.o. Room/Bed:  746/  Primary Diagnosis: Dementia   Admission Status: Involuntary Commitment     Readmission within 30 days: no  Power of  in place: yes  Patient requires a blocked bed: no          Reason for blocked bed: n/a    VTE Prophylaxis: Not indicated    Mobility needs/Fall risk: yes  Flu Vaccine : yes   Nutritional Plan: no  Consults:         Labs/Testing due today?: no    Sleep hours: 5.5        Participation in Care/Groups:  yes  Medication Compliant?: Yes  PRNS (last 24 hours): None    Restraints (last 24 hours):  no     CIWA (range last 24 hours):     COWS (range last 24 hours):      Alcohol screening (AUDIT) completed -   AUDIT Score: 0     If applicable, date SBIRT discussed in treatment team AND documented:     Tobacco - patient is a smoker: Have You Used Tobacco in the Past 30 Days: No  Illegal Drugs use: Have You Used Any Illegal Substances Over the Past 12 Months: No    24 hour chart check complete: yes     Patient goal(s) for today:   Treatment team focus/goals: Plan to continue to search for a SNF nursing home bed.     Progress note she remains pleasant and compliant with her treatment     LOS:  19  Expected LOS: TBD     Participating treatment team members: Hanna Oh RN

## 2018-09-26 LAB
GLUCOSE BLD STRIP.AUTO-MCNC: 106 MG/DL (ref 65–100)
GLUCOSE BLD STRIP.AUTO-MCNC: 98 MG/DL (ref 65–100)
SERVICE CMNT-IMP: ABNORMAL
SERVICE CMNT-IMP: NORMAL

## 2018-09-26 PROCEDURE — 65220000003 HC RM SEMIPRIVATE PSYCH

## 2018-09-26 PROCEDURE — 82962 GLUCOSE BLOOD TEST: CPT

## 2018-09-26 PROCEDURE — 74011250637 HC RX REV CODE- 250/637: Performed by: PSYCHIATRY & NEUROLOGY

## 2018-09-26 PROCEDURE — 97110 THERAPEUTIC EXERCISES: CPT

## 2018-09-26 PROCEDURE — 97116 GAIT TRAINING THERAPY: CPT

## 2018-09-26 PROCEDURE — 74011250637 HC RX REV CODE- 250/637: Performed by: INTERNAL MEDICINE

## 2018-09-26 RX ADMIN — DONEPEZIL HYDROCHLORIDE 10 MG: 10 TABLET, FILM COATED ORAL at 09:57

## 2018-09-26 RX ADMIN — RISPERIDONE 0.25 MG: 0.5 TABLET, FILM COATED ORAL at 17:10

## 2018-09-26 RX ADMIN — CITALOPRAM HYDROBROMIDE 20 MG: 20 TABLET ORAL at 20:58

## 2018-09-26 RX ADMIN — RISPERIDONE 0.25 MG: 0.5 TABLET, FILM COATED ORAL at 09:57

## 2018-09-26 RX ADMIN — DILTIAZEM HYDROCHLORIDE 240 MG: 240 CAPSULE, COATED, EXTENDED RELEASE ORAL at 09:57

## 2018-09-26 NOTE — PROGRESS NOTES
Problem: Altered Thought Process (Adult/Pediatric)  Goal: *STG: Participates in treatment plan  Outcome: Progressing Towards Goal  Pt is pleasant and cooperative. Pt interacts appropriately with staff and peers. Staff will continue to orient and encourage positive thoughts and coping skills. Problem: Falls - Risk of  Goal: *Absence of Falls  Document Kizzy Fall Risk and appropriate interventions in the flowsheet. Outcome: Progressing Towards Goal  Fall Risk Interventions:  Mobility Interventions: Communicate number of staff needed for ambulation/transfer    Mentation Interventions: Bed/chair exit alarm    Medication Interventions: Evaluate medications/consider consulting pharmacy    Elimination Interventions: Toileting schedule/hourly rounds    History of Falls Interventions: Door open when patient unattended    Fall tool is accurate and up to date. Pt is free from falls.

## 2018-09-26 NOTE — INTERDISCIPLINARY ROUNDS
Behavioral Health Interdisciplinary Rounds     Patient Name: Gaye Smith  Age: 80 y.o. Room/Bed:  [facility]/  Primary Diagnosis: Dementia   Admission Status: Involuntary Commitment     Readmission within 30 days: no  Power of  in place: yes  Patient requires a blocked bed: no          Reason for blocked bed: n/a    VTE Prophylaxis: Not indicated    Mobility needs/Fall risk: yes  Flu Vaccine : yes   Nutritional Plan: no  Consults:          Labs/Testing due today?: no    Sleep hours: 8+       Participation in Care/Groups:  yes  Medication Compliant?: Yes  PRNS (last 24 hours): None    Restraints (last 24 hours):  no     CIWA (range last 24 hours):     COWS (range last 24 hours):      Alcohol screening (AUDIT) completed -   AUDIT Score: 0     If applicable, date SBIRT discussed in treatment team AND documented:     Tobacco - patient is a smoker: Have You Used Tobacco in the Past 30 Days: No  Illegal Drugs use: Have You Used Any Illegal Substances Over the Past 12 Months: No    24 hour chart check complete: yes     Patient goal(s) for today:   Treatment team focus/goals: Plan to continue to search for a skilled nursing home.     Progress note - she continue to be pleasant and compliant with her treatment     LOS:  19  Expected LOS: TBD  Participating treatment team members: Judy Stuart RN

## 2018-09-26 NOTE — PROGRESS NOTES
Problem: Falls - Risk of  Goal: *Absence of Falls  Document Kizzy Fall Risk and appropriate interventions in the flowsheet. Outcome: Progressing Towards Goal  Fall Risk Interventions:  Mobility Interventions: Bed/chair exit alarm, Communicate number of staff needed for ambulation/transfer, Utilize walker, cane, or other assistive device    Mentation Interventions: Bed/chair exit alarm, Door open when patient unattended, More frequent rounding    Medication Interventions: Bed/chair exit alarm, Patient to call before getting OOB    Elimination Interventions: Bed/chair exit alarm, Patient to call for help with toileting needs    History of Falls Interventions: Door open when patient unattended, Bed/chair exit alarm     No falls. Ambulates short distances with walker and one staff assist.          Problem: Nutrition Deficit  Goal: *Optimize nutritional status  Outcome: Not Progressing Towards Goal    1515 Received patient resting in bed with eyes open. NAD. On q 15 min. Safety checks. Visible in DR sitting in recliner for dinner, listening to music, and watching TV. Spoke to  on phone. Patient refused to eat dinner. Said \"I am not hungry\". She did drink fluids 480 ml. Assisted to BR to void and have BM. Pleasant confused,polite and cooperative.

## 2018-09-26 NOTE — PROGRESS NOTES
Problem: Falls - Risk of  Goal: *Absence of Falls  Document Kizzy Fall Risk and appropriate interventions in the flowsheet. Outcome: Progressing Towards Goal  Fall Risk Interventions:  Mobility Interventions: Bed/chair exit alarm, Communicate number of staff needed for ambulation/transfer, Utilize walker, cane, or other assistive device  In bed asleep with respirations noted as even and unlabored as chest was rising and falling.  Will continue to monitor for safety and 15 minute checks throughout shift  Mentation Interventions: Bed/chair exit alarm, Door open when patient unattended, More frequent rounding    Medication Interventions: Bed/chair exit alarm, Patient to call before getting OOB    Elimination Interventions: Bed/chair exit alarm, Patient to call for help with toileting needs    History of Falls Interventions: Door open when patient unattended, Bed/chair exit alarm

## 2018-09-26 NOTE — BH NOTES
GROUP THERAPY PROGRESS NOTE    Tray Regan participated in a 45 minute afternoon Process Group on the Geriatric Unit with a focus on shifting ones feelings to a positive note and preparing for rest of the day through music. Group time: 45 minutes. Personal goal for participation: To increase the capacity to improve ones mood and structure. Goal orientation: The patient will be able to identify their feelings, develop a plan for structuring the rest their day, and either listen or, hopefully, participate in the singing. Group therapy participation: When prompted, this patient actively participated in the group. Therapeutic interventions reviewed and discussed: The group members were asked to listen or sing along with several songs. Impression of participation: The patient said she was doing \"okay,\" even though she looked a little tired and did not exhibit her normal morning bounce. She waved and smiled a hello and a goodbye. She commented that she enjoyed the music. She was alert, generally oriented, and pleasant. She expressed no current SI/HI or overt psychosis. Her affect was non-dysphoric and her mood was positive, despite her tiredness.

## 2018-09-26 NOTE — PROGRESS NOTES
Problem: Mobility Impaired (Adult and Pediatric)  Goal: *Acute Goals and Plan of Care (Insert Text)  Physical Therapy Goals  Weekly re-assessment 9/25/2018  1. Patient will move from supine to sit and sit to supine , scoot up and down and roll side to side in bed with supervisional assist within 7 day(s). 2.  Patient will transfer from bed to chair and chair to bed with contact guard assist using the least restrictive device within 7 day(s). 3.  Patient will perform sit to stand withcontact guard assist within 7 day(s). 4.  Patient will ambulate with contact guard assist for 50 feet with the least restrictive device within 7 day(s). Initiated 9/8/2018, re-assessed 9/17/2018 continue progressing towards CGA  1. Patient will move from supine to sit and sit to supine , scoot up and down and roll side to side in bed with minimal assistance/contact guard assist within 7 day(s). 2.  Patient will transfer from bed to chair and chair to bed with minimal assistance/contact guard assist using the least restrictive device within 7 day(s). 3.  Patient will perform sit to stand with minimal assistance/contact guard assist within 7 day(s). 4.  Patient will ambulate with minimal assistance/contact guard assist for 50 feet with the least restrictive device within 7 day(s). physical Therapy TREATMENT  Patient: Al Rodriguez [de-identified]80 y.o. female)  Date: 9/26/2018  Diagnosis: Late onset Alzheimer's disease with behavioral disturbance  Aggression  Psychosis  Psychosis  Late onset Alzheimer's disease with behavioral disturbance Dementia       Precautions: Fall  Chart, physical therapy assessment, plan of care and goals were reviewed. ASSESSMENT:  Patient received in day room in NAD. Slight progression of gait to day to 25' with RW and minimal assist transitioning to moderate when returning to chair. Impaired left step clearance and knee flexion increased as she fatigued.  Returned to a bedside chair and encouraged to complete a 2nd attempt after rest but she deferred. A session of seated LE exercises completed instead with strong fatigue reported following this. Patient progressing overall but presents with dementia, a hx of falls and right wrist fx, and now significant debility. Anticipate discharge to a SNF rehab setting before returning home, if she is able to safely transition back to home. Progression toward goals:  []    Improving appropriately and progressing toward goals  [x]    Improving slowly and progressing toward goals  []    Not making progress toward goals and plan of care will be adjusted     PLAN:  Patient continues to benefit from skilled intervention to address the above impairments. Continue treatment per established plan of care. Discharge Recommendations:  Skilled Nursing Facility  Further Equipment Recommendations for Discharge:  to be determined       SUBJECTIVE:   Patient stated I can't. I'm too tired.     OBJECTIVE DATA SUMMARY:   Critical Behavior:  Neurologic State: Alert, Confused  Orientation Level: Disoriented to place, Disoriented to situation, Disoriented to time  Cognition: Appropriate for age attention/concentration, Decreased attention/concentration, Follows commands, Impaired decision making, Poor safety awareness  Safety/Judgement: Awareness of environment, Decreased awareness of need for assistance, Decreased awareness of need for safety, Decreased insight into deficits, Fall prevention  Functional Mobility Training:  Bed Mobility:                    Transfers:  Sit to Stand: Contact guard assistance  Stand to Sit: Contact guard assistance                             Balance:  Sitting: Intact  Sitting - Static: Good (unsupported)  Sitting - Dynamic: Good (unsupported)  Standing: Impaired; With support  Standing - Static: Fair  Standing - Dynamic : Fair (to poor with fatigue)  Ambulation/Gait Training:  Distance (ft): 25 Feet (ft)  Assistive Device: Gait belt;Walker, rolling  Ambulation - Level of Assistance: Minimal assistance        Gait Abnormalities: Decreased step clearance (impaired L>R)        Base of Support: Narrowed     Speed/Patricia: Pace decreased (<100 feet/min)  Step Length: Left shortened                    Stairs:              Neuro Re-Education:    Therapeutic Exercises:   Seated ankle pumps, LAQs, marching x15 each, mildly resisted hip abduction x10  Pain:  Pain Scale 1: Numeric (0 - 10)  Pain Intensity 1: 0              Activity Tolerance:     Please refer to the flowsheet for vital signs taken during this treatment.   After treatment:   [x]    Patient left in no apparent distress sitting up in chair  []    Patient left in no apparent distress in bed  [x]    Call bell left within reach  [x]    Nursing notified  []    Caregiver present  []    Bed alarm activated    COMMUNICATION/COLLABORATION:   The patients plan of care was discussed with: Registered Nurse    Rex Hinton, PT, DPT   Time Calculation: 28 mins

## 2018-09-27 LAB
ALBUMIN SERPL-MCNC: 2.7 G/DL (ref 3.5–5)
ALBUMIN/GLOB SERPL: 0.8 {RATIO} (ref 1.1–2.2)
ALP SERPL-CCNC: 99 U/L (ref 45–117)
ALT SERPL-CCNC: 28 U/L (ref 12–78)
ANION GAP SERPL CALC-SCNC: 9 MMOL/L (ref 5–15)
AST SERPL-CCNC: 14 U/L (ref 15–37)
BILIRUB SERPL-MCNC: 0.7 MG/DL (ref 0.2–1)
BUN SERPL-MCNC: 8 MG/DL (ref 6–20)
BUN/CREAT SERPL: 10 (ref 12–20)
CALCIUM SERPL-MCNC: 9 MG/DL (ref 8.5–10.1)
CHLORIDE SERPL-SCNC: 106 MMOL/L (ref 97–108)
CO2 SERPL-SCNC: 27 MMOL/L (ref 21–32)
CREAT SERPL-MCNC: 0.77 MG/DL (ref 0.55–1.02)
GLOBULIN SER CALC-MCNC: 3.2 G/DL (ref 2–4)
GLUCOSE BLD STRIP.AUTO-MCNC: 106 MG/DL (ref 65–100)
GLUCOSE BLD STRIP.AUTO-MCNC: 88 MG/DL (ref 65–100)
GLUCOSE SERPL-MCNC: 93 MG/DL (ref 65–100)
POTASSIUM SERPL-SCNC: 3.2 MMOL/L (ref 3.5–5.1)
PROT SERPL-MCNC: 5.9 G/DL (ref 6.4–8.2)
SERVICE CMNT-IMP: ABNORMAL
SERVICE CMNT-IMP: NORMAL
SODIUM SERPL-SCNC: 142 MMOL/L (ref 136–145)

## 2018-09-27 PROCEDURE — 74011250637 HC RX REV CODE- 250/637: Performed by: INTERNAL MEDICINE

## 2018-09-27 PROCEDURE — 80053 COMPREHEN METABOLIC PANEL: CPT | Performed by: PSYCHIATRY & NEUROLOGY

## 2018-09-27 PROCEDURE — 82962 GLUCOSE BLOOD TEST: CPT

## 2018-09-27 PROCEDURE — 36415 COLL VENOUS BLD VENIPUNCTURE: CPT | Performed by: PSYCHIATRY & NEUROLOGY

## 2018-09-27 PROCEDURE — 74011250637 HC RX REV CODE- 250/637: Performed by: PSYCHIATRY & NEUROLOGY

## 2018-09-27 PROCEDURE — 65220000003 HC RM SEMIPRIVATE PSYCH

## 2018-09-27 RX ADMIN — DONEPEZIL HYDROCHLORIDE 10 MG: 10 TABLET, FILM COATED ORAL at 10:33

## 2018-09-27 RX ADMIN — DILTIAZEM HYDROCHLORIDE 240 MG: 240 CAPSULE, COATED, EXTENDED RELEASE ORAL at 10:33

## 2018-09-27 RX ADMIN — CITALOPRAM HYDROBROMIDE 20 MG: 20 TABLET ORAL at 21:03

## 2018-09-27 RX ADMIN — RISPERIDONE 0.25 MG: 0.5 TABLET, FILM COATED ORAL at 17:01

## 2018-09-27 RX ADMIN — RISPERIDONE 0.25 MG: 0.5 TABLET, FILM COATED ORAL at 10:33

## 2018-09-27 NOTE — BH NOTES
GROUP THERAPY PROGRESS NOTE    Tray Regan participated in a Morning Process Group on the Geriatric Unit, with a focus identifying feelings, planning for the day, with music. Group time: 45 minutes. Personal goal for participation: To increase the capacity to shift ones mood, prepare for the day, and share in group singing. Goal orientation: The patient will be able to prepare for the day through group singing. Group therapy participation: When prompted, this patient actively participated in the group, after joining the group about five minutes after it started. Therapeutic interventions reviewed and discussed: The group members were introduce themselves by first names and participate in group singing as a way to increase their oxygen and blood flow and begin their day on a positive note. They were also asked to join in singing several songs. Impression of participation: The patient said she was feeling \"pretty good. \" She was alert, generally oriented, and pleasant. She sang along with the songs she recognized. She expressed no current SI/HI or overt psychosis in this group. Her affect was mildly euphoric and tired. Her mood matched her affect.

## 2018-09-27 NOTE — PROGRESS NOTES
Problem: Discharge Planning  Goal: *Discharge to safe environment  Outcome: Progressing Towards Goal  Plan to discharge tomorrow to [address] and rehab ( SNF care )   She has a supportive family     Goal: *Knowledge of medication management  Outcome: Progressing Towards Goal  She is medication compliant   Goal: *Knowledge of discharge instructions  Outcome: Not Progressing Towards Goal  She is unable to verbalize discharge instructions     Problem: Patient Education: Go to Patient Education Activity  Goal: Patient/Family Education  Outcome: Progressing Towards Goal  SW will educate family on discharge needs

## 2018-09-27 NOTE — INTERDISCIPLINARY ROUNDS
Behavioral Health Interdisciplinary Rounds     Patient Name: Mandy Weldon  Age: 80 y.o.   Room/Bed:  6/  Primary Diagnosis: Dementia   Admission Status: Involuntary Commitment     Readmission within 30 days: no  Power of  in place: yes  Patient requires a blocked bed: no          Reason for blocked bed: n/a    VTE Prophylaxis: Not indicated    Mobility needs/Fall risk: yes  Flu Vaccine : yes   Nutritional Plan: no  Consults:          Labs/Testing due today?: no    Sleep hours:        Participation in Care/Groups:  yes  Medication Compliant?: Yes  PRNS (last 24 hours): None    Restraints (last 24 hours):  no     CIWA (range last 24 hours):     COWS (range last 24 hours):      Alcohol screening (AUDIT) completed -   AUDIT Score: 0     If applicable, date SBIRT discussed in treatment team AND documented:     Tobacco - patient is a smoker: Have You Used Tobacco in the Past 30 Days: No  Illegal Drugs use: Have You Used Any Illegal Substances Over the Past 12 Months: No    24 hour chart check complete: yes     Patient goal(s) for today:   Treatment team focus/goals: Plan to discharge on Friday to [facility] and rehab   Progress note : She has been pleasant and complaint with her care     LOS:  20  Expected LOS:TBD    Financial concerns/prescription coverage:  Medicare   Date of last family contact:       Family requesting physician contact today:    Discharge plan: plan to discharge tomorrow to a SNF bed at [facility] in the home:  no       Outpatient provider(s): [facility] and Rehab     Participating treatment team members: Carly Zambrano - Vijay Mac RN

## 2018-09-27 NOTE — PROGRESS NOTES
Problem: Altered Thought Process (Adult/Pediatric)  Goal: *STG: Seeks staff when feelings of anxiety and fear arise  Outcome: Progressing Towards Goal    1515 Received patient resting in bed. NAD. On q 15 min. safety checks. Visible in DR watching TV, listening to music, and eating 20% dinner, and 100% fluids . Requested to go back to bed at 1800 because I am very tired and sleepy\". Med compliant. Problem: Falls - Risk of  Goal: *Absence of Falls  Document Kizzy Fall Risk and appropriate interventions in the flowsheet. Outcome: Progressing Towards Goal  Fall Risk Interventions:  Mobility Interventions: Bed/chair exit alarm, Communicate number of staff needed for ambulation/transfer, Patient to call before getting OOB, Utilize walker, cane, or other assistive device    Mentation Interventions: Adequate sleep, hydration, pain control, Bed/chair exit alarm, Door open when patient unattended    Medication Interventions: Bed/chair exit alarm, Patient to call before getting OOB, Teach patient to arise slowly    Elimination Interventions: Bed/chair exit alarm, Patient to call for help with toileting needs, Toilet paper/wipes in reach, Toileting schedule/hourly rounds    History of Falls Interventions: Bed/chair exit alarm, Door open when patient unattended     No falls. Requires one staff assist with ambulating and ADL's. Sits in recliner in

## 2018-09-27 NOTE — PROGRESS NOTES
Problem: Altered Thought Process (Adult/Pediatric)  Goal: *STG: Participates in treatment plan  Outcome: Progressing Towards Goal  Pt is calm and cooperative. Med and meal compliant. Pt attended and participated in process group. Staff will continue to monitor, orient, and encourage positive thoughts and coping skills. Problem: Falls - Risk of  Goal: *Absence of Falls  Document Kizzy Fall Risk and appropriate interventions in the flowsheet. Outcome: Progressing Towards Goal  Fall Risk Interventions:  Mobility Interventions: Bed/chair exit alarm    Mentation Interventions: Adequate sleep, hydration, pain control    Medication Interventions: Bed/chair exit alarm, Patient to call before getting OOB, Teach patient to arise slowly    Elimination Interventions: Toileting schedule/hourly rounds    History of Falls Interventions: Bed/chair exit alarm    Fall tool is accurate and up to date. Pt is free from falls.

## 2018-09-27 NOTE — PROGRESS NOTES
Problem: Falls - Risk of  Goal: *Absence of Falls  Document Kizzy Fall Risk and appropriate interventions in the flowsheet. Outcome: Progressing Towards Goal  Fall Risk Interventions:  Mobility Interventions: Bed/chair exit alarm, Communicate number of staff needed for ambulation/transfer, Patient to call before getting OOB, Utilize walker, cane, or other assistive device  In bed asleep with respirations noted as even and unlabored as chest was rising falling. Will continue to monitor for safety and 15 minute checks throughout shift.   Mentation Interventions: Adequate sleep, hydration, pain control, Bed/chair exit alarm, Door open when patient unattended    Medication Interventions: Bed/chair exit alarm, Patient to call before getting OOB, Teach patient to arise slowly    Elimination Interventions: Bed/chair exit alarm, Patient to call for help with toileting needs, Toilet paper/wipes in reach, Toileting schedule/hourly rounds    History of Falls Interventions: Bed/chair exit alarm, Door open when patient unattended

## 2018-09-28 VITALS
OXYGEN SATURATION: 94 % | HEART RATE: 80 BPM | WEIGHT: 147.5 LBS | BODY MASS INDEX: 22.35 KG/M2 | HEIGHT: 68 IN | SYSTOLIC BLOOD PRESSURE: 165 MMHG | RESPIRATION RATE: 16 BRPM | TEMPERATURE: 98.5 F | DIASTOLIC BLOOD PRESSURE: 80 MMHG

## 2018-09-28 PROCEDURE — 74011250637 HC RX REV CODE- 250/637: Performed by: PSYCHIATRY & NEUROLOGY

## 2018-09-28 PROCEDURE — 74011250637 HC RX REV CODE- 250/637: Performed by: INTERNAL MEDICINE

## 2018-09-28 RX ORDER — POLYETHYLENE GLYCOL 3350 17 G/17G
17 POWDER, FOR SOLUTION ORAL DAILY
Qty: 15 PACKET | Refills: 0 | Status: SHIPPED | OUTPATIENT
Start: 2018-09-28 | End: 2019-01-02 | Stop reason: ALTCHOICE

## 2018-09-28 RX ORDER — DILTIAZEM HYDROCHLORIDE 240 MG/1
240 CAPSULE, COATED, EXTENDED RELEASE ORAL DAILY
Qty: 15 CAP | Refills: 0 | Status: SHIPPED | OUTPATIENT
Start: 2018-09-29 | End: 2019-01-14 | Stop reason: SDUPTHER

## 2018-09-28 RX ORDER — DONEPEZIL HYDROCHLORIDE 10 MG/1
10 TABLET, FILM COATED ORAL DAILY
Qty: 15 TAB | Refills: 0 | Status: SHIPPED | OUTPATIENT
Start: 2018-09-29 | End: 2019-01-14 | Stop reason: SDUPTHER

## 2018-09-28 RX ORDER — TRAZODONE HYDROCHLORIDE 50 MG/1
25 TABLET ORAL
Qty: 15 TAB | Refills: 0 | Status: SHIPPED | OUTPATIENT
Start: 2018-09-28 | End: 2019-01-02 | Stop reason: ALTCHOICE

## 2018-09-28 RX ORDER — RISPERIDONE 0.25 MG/1
0.25 TABLET, FILM COATED ORAL 2 TIMES DAILY
Qty: 30 TAB | Refills: 0 | Status: SHIPPED | OUTPATIENT
Start: 2018-09-28 | End: 2019-01-02 | Stop reason: ALTCHOICE

## 2018-09-28 RX ORDER — CITALOPRAM 20 MG/1
20 TABLET, FILM COATED ORAL
Qty: 15 TAB | Refills: 0 | Status: SHIPPED | OUTPATIENT
Start: 2018-09-28 | End: 2019-01-02 | Stop reason: ALTCHOICE

## 2018-09-28 RX ADMIN — DILTIAZEM HYDROCHLORIDE 240 MG: 240 CAPSULE, COATED, EXTENDED RELEASE ORAL at 09:44

## 2018-09-28 RX ADMIN — RISPERIDONE 0.25 MG: 0.5 TABLET, FILM COATED ORAL at 09:44

## 2018-09-28 RX ADMIN — DONEPEZIL HYDROCHLORIDE 10 MG: 10 TABLET, FILM COATED ORAL at 09:44

## 2018-09-28 NOTE — BH NOTES
Behavioral Health Transition Record to Provider    Patient Name: Caroline Jones  YOB: 1933  Medical Record Number: [id number]  Date of Admission: 9/6/2018  Date of Discharge: 9/28/2018   Attending Provider: Rufino Roblero MD  Discharging Provider: Dr. Riana Lepe   To contact this individual call [phone] and ask the  to page. If unavailable, ask to be transferred to [facility] Provider on call. [facility] Provider will be available on call 24/7 and during holidays. Primary Care Provider: Latrice Torres MD    Allergies   Allergen Reactions    Angiotensin Ii,Human Other (comments)     States does not remember      Avelox [Moxifloxacin] Other (comments)     States does not remember      Demerol [Meperidine] Hives       Reason for Admission: CC: Return from medical floor/ Dementia.  Pt admitted on a voluntary basis for severe dementia posing risk of harm to herself and others     Admission Diagnosis: Late onset Alzheimer's disease with behavioral disturbance  Aggression  Psychosis  Psychosis  Late onset Alzheimer's disease with behavioral disturbance    * No surgery found *    Results for orders placed or performed during the hospital encounter of 09/06/18   CBC W/O DIFF   Result Value Ref Range    WBC 6.4 3.6 - 11.0 K/uL    RBC 3.97 3.80 - 5.20 M/uL    HGB 13.0 11.5 - 16.0 g/dL    HCT 38.0 35.0 - 47.0 %    MCV 95.7 80.0 - 99.0 FL    MCH 32.7 26.0 - 34.0 PG    MCHC 34.2 30.0 - 36.5 g/dL    RDW 12.5 11.5 - 14.5 %    PLATELET 789 476 - 231 K/uL    MPV 11.6 8.9 - 12.9 FL    NRBC 0.0 0  WBC    ABSOLUTE NRBC 0.00 0.00 - 0.01 K/uL   CK   Result Value Ref Range    CK 25 (L) 26 - 588 U/L   METABOLIC PANEL, BASIC   Result Value Ref Range    Sodium 142 136 - 145 mmol/L    Potassium 3.1 (L) 3.5 - 5.1 mmol/L    Chloride 105 97 - 108 mmol/L    CO2 26 21 - 32 mmol/L    Anion gap 11 5 - 15 mmol/L    Glucose 107 (H) 65 - 100 mg/dL    BUN 26 (H) 6 - 20 MG/DL    Creatinine 0.85 0.55 - 1.02 MG/DL    BUN/Creatinine ratio 31 (H) 12 - 20      GFR est AA >60 >60 ml/min/1.73m2    GFR est non-AA >60 >60 ml/min/1.73m2    Calcium 10.1 8.5 - 10.1 MG/DL   TROPONIN I   Result Value Ref Range    Troponin-I, Qt. <0.05 <2.44 ng/mL   METABOLIC PANEL, BASIC   Result Value Ref Range    Sodium 143 136 - 145 mmol/L    Potassium 3.5 3.5 - 5.1 mmol/L    Chloride 107 97 - 108 mmol/L    CO2 26 21 - 32 mmol/L    Anion gap 10 5 - 15 mmol/L    Glucose 101 (H) 65 - 100 mg/dL    BUN 11 6 - 20 MG/DL    Creatinine 0.89 0.55 - 1.02 MG/DL    BUN/Creatinine ratio 12 12 - 20      GFR est AA >60 >60 ml/min/1.73m2    GFR est non-AA >60 >60 ml/min/1.73m2    Calcium 9.3 8.5 - 51.9 MG/DL   METABOLIC PANEL, COMPREHENSIVE   Result Value Ref Range    Sodium 142 136 - 145 mmol/L    Potassium 3.2 (L) 3.5 - 5.1 mmol/L    Chloride 106 97 - 108 mmol/L    CO2 27 21 - 32 mmol/L    Anion gap 9 5 - 15 mmol/L    Glucose 93 65 - 100 mg/dL    BUN 8 6 - 20 MG/DL    Creatinine 0.77 0.55 - 1.02 MG/DL    BUN/Creatinine ratio 10 (L) 12 - 20      GFR est AA >60 >60 ml/min/1.73m2    GFR est non-AA >60 >60 ml/min/1.73m2    Calcium 9.0 8.5 - 10.1 MG/DL    Bilirubin, total 0.7 0.2 - 1.0 MG/DL    ALT (SGPT) 28 12 - 78 U/L    AST (SGOT) 14 (L) 15 - 37 U/L    Alk.  phosphatase 99 45 - 117 U/L    Protein, total 5.9 (L) 6.4 - 8.2 g/dL    Albumin 2.7 (L) 3.5 - 5.0 g/dL    Globulin 3.2 2.0 - 4.0 g/dL    A-G Ratio 0.8 (L) 1.1 - 2.2     GLUCOSE, POC   Result Value Ref Range    Glucose (POC) 112 (H) 65 - 100 mg/dL    Performed by Erich Almazan    GLUCOSE, POC   Result Value Ref Range    Glucose (POC) 100 65 - 100 mg/dL    Performed by [address], POC   Result Value Ref Range    Glucose (POC) 101 (H) 65 - 100 mg/dL    Performed by Xi MooreMccall) VANESSA    GLUCOSE, POC   Result Value Ref Range    Glucose (POC) 105 (H) 65 - 100 mg/dL    Performed by [employer]    GLUCOSE, POC   Result Value Ref Range    Glucose (POC) 117 (H) 65 - 100 mg/dL Performed by Lila Mcguire    GLUCOSE, POC   Result Value Ref Range    Glucose (POC) 227 (H) 65 - 100 mg/dL    Performed by Tejas Nieto    GLUCOSE, POC   Result Value Ref Range    Glucose (POC) 106 (H) 65 - 100 mg/dL    Performed by Tana Marques    GLUCOSE, POC   Result Value Ref Range    Glucose (POC) 121 (H) 65 - 100 mg/dL    Performed by Allyssa Blackmon    GLUCOSE, POC   Result Value Ref Range    Glucose (POC) 102 (H) 65 - 100 mg/dL    Performed by Saleem Couch    GLUCOSE, POC   Result Value Ref Range    Glucose (POC) 102 (H) 65 - 100 mg/dL    Performed by Noah Bear    GLUCOSE, POC   Result Value Ref Range    Glucose (POC) 104 (H) 65 - 100 mg/dL    Performed by Ally Joe    GLUCOSE, POC   Result Value Ref Range    Glucose (POC) 116 (H) 65 - 100 mg/dL    Performed by Noah Bear    GLUCOSE, POC   Result Value Ref Range    Glucose (POC) 105 (H) 65 - 100 mg/dL    Performed by Arben Handsome    GLUCOSE, POC   Result Value Ref Range    Glucose (POC) 108 (H) 65 - 100 mg/dL    Performed by Noah Bear    GLUCOSE, POC   Result Value Ref Range    Glucose (POC) 110 (H) 65 - 100 mg/dL    Performed by Al Henry    GLUCOSE, POC   Result Value Ref Range    Glucose (POC) 91 65 - 100 mg/dL    Performed by Bonita Melendez.    GLUCOSE, POC   Result Value Ref Range    Glucose (POC) 103 (H) 65 - 100 mg/dL    Performed by Tana Marques    GLUCOSE, POC   Result Value Ref Range    Glucose (POC) 114 (H) 65 - 100 mg/dL    Performed by Orquidea Hernandez    GLUCOSE, POC   Result Value Ref Range    Glucose (POC) 114 (H) 65 - 100 mg/dL    Performed by Arben Handsome    GLUCOSE, POC   Result Value Ref Range    Glucose (POC) 96 65 - 100 mg/dL    Performed by Noah Bear    GLUCOSE, POC   Result Value Ref Range    Glucose (POC) 121 (H) 65 - 100 mg/dL    Performed by Tana Marques    GLUCOSE, POC   Result Value Ref Range    Glucose (POC) 94 65 - 100 mg/dL    Performed by [address], POC   Result Value Ref Range Glucose (POC) 102 (H) 65 - 100 mg/dL    Performed by Baldemar Moritz    GLUCOSE, POC   Result Value Ref Range    Glucose (POC) 143 (H) 65 - 100 mg/dL    Performed by [employer]    GLUCOSE, POC   Result Value Ref Range    Glucose (POC) 116 (H) 65 - 100 mg/dL    Performed by Sherrill Ferro    GLUCOSE, POC   Result Value Ref Range    Glucose (POC) 111 (H) 65 - 100 mg/dL    Performed by Greg Donis    GLUCOSE, POC   Result Value Ref Range    Glucose (POC) 115 (H) 65 - 100 mg/dL    Performed by Anshul Larkin    GLUCOSE, POC   Result Value Ref Range    Glucose (POC) 104 (H) 65 - 100 mg/dL    Performed by Anshul Larkin    GLUCOSE, POC   Result Value Ref Range    Glucose (POC) 112 (H) 65 - 100 mg/dL    Performed by [employer]    GLUCOSE, POC   Result Value Ref Range    Glucose (POC) 104 (H) 65 - 100 mg/dL    Performed by [employer]    GLUCOSE, POC   Result Value Ref Range    Glucose (POC) 93 65 - 100 mg/dL    Performed by Ravindra Ferguson    GLUCOSE, POC   Result Value Ref Range    Glucose (POC) 93 65 - 100 mg/dL    Performed by Anshul Larkin    GLUCOSE, POC   Result Value Ref Range    Glucose (POC) 140 (H) 65 - 100 mg/dL    Performed by Anat Flash    GLUCOSE, POC   Result Value Ref Range    Glucose (POC) 112 (H) 65 - 100 mg/dL    Performed by AMADOR SERNA    GLUCOSE, POC   Result Value Ref Range    Glucose (POC) 103 (H) 65 - 100 mg/dL    Performed by Delano Douglas    GLUCOSE, POC   Result Value Ref Range    Glucose (POC) 94 65 - 100 mg/dL    Performed by [facility]    GLUCOSE, POC   Result Value Ref Range    Glucose (POC) 112 (H) 65 - 100 mg/dL    Performed by Maria Victoria Medrano    GLUCOSE, POC   Result Value Ref Range    Glucose (POC) 105 (H) 65 - 100 mg/dL    Performed by BR[place](CON)    GLUCOSE, POC   Result Value Ref Range    Glucose (POC) 95 65 - 100 mg/dL    Performed by Anat Flash    GLUCOSE, POC   Result Value Ref Range    Glucose (POC) 98 65 - 100 mg/dL    Performed by Anshul Larkin GLUCOSE, POC   Result Value Ref Range    Glucose (POC) 107 (H) 65 - 100 mg/dL    Performed by Ravindra Ferguson    GLUCOSE, POC   Result Value Ref Range    Glucose (POC) 98 65 - 100 mg/dL    Performed by Cortney Hoskins    GLUCOSE, POC   Result Value Ref Range    Glucose (POC) 106 (H) 65 - 100 mg/dL    Performed by RAÚL PACHECO    GLUCOSE, POC   Result Value Ref Range    Glucose (POC) 88 65 - 100 mg/dL    Performed by [address], POC   Result Value Ref Range    Glucose (POC) 106 (H) 65 - 100 mg/dL    Performed by Isra Rodriguez        Immunizations administered during this encounter:   Immunization History   Administered Date(s) Administered    Influenza High Dose Vaccine PF 11/09/2016, 10/16/2017    Influenza Vaccine 10/29/2013    Influenza Vaccine (Quad) 10/20/2015    Influenza Vaccine (Quad) PF 09/20/2018    Influenza Vaccine Split 11/16/2010, 10/11/2011, 11/07/2012    Influenza Vaccine Whole 08/01/2009    Pneumococcal Conjugate (PCV-13) 04/13/2016    ZZZ-RETIRED (DO NOT USE) Pneumococcal Vaccine (Unspecified Type) 04/01/2008       Screening for Metabolic Disorders for Patients on Antipsychotic Medications  (Data obtained from the EMR)    Estimated Body Mass Index  Estimated body mass index is 22.43 kg/(m^2) as calculated from the following:    Height as of this encounter: 5' 8\" (1.727 m). Weight as of this encounter: 66.9 kg (147 lb 8 oz).      Vital Signs/Blood Pressure  Visit Vitals    /80 (BP 1 Location: Left arm, BP Patient Position: Sitting)    Pulse 80    Temp 98.5 °F (36.9 °C)    Resp 16    Ht 5' 8\" (1.727 m)    Wt 66.9 kg (147 lb 8 oz)    SpO2 94%    Breastfeeding No    BMI 22.43 kg/m2       Blood Glucose/Hemoglobin A1c  Lab Results   Component Value Date/Time    Glucose 93 09/27/2018 06:21 AM    Glucose 119 (H) 08/25/2018 06:00 AM    Glucose (POC) 106 (H) 09/27/2018 04:05 PM       Lab Results   Component Value Date/Time    Hemoglobin A1c 6.8 (H) 05/25/2018 12:16 PM        Lipid Panel  Lab Results   Component Value Date/Time    Cholesterol, total 217 (H) 08/25/2018 06:00 AM    HDL Cholesterol 50 08/25/2018 06:00 AM    LDL, calculated 140 (H) 08/25/2018 06:00 AM    Triglyceride 135 08/25/2018 06:00 AM    CHOL/HDL Ratio 4.3 08/25/2018 06:00 AM        Discharge Diagnosis: Dementia     Discharge Plan: She will be discharge to a SNF bed at [facility]    The patient Tray Regan exhibits the ability to control behavior in a less restrictive environment. Patient's level of functioning is improving. No assaultive/destructive behavior has been observed for the past 24 hours. No suicidal/homicidal threat or behavior has been observed for the past 24 hours. There is no evidence of serious medication side effects. Patient has not been in physical or protective restraints for at least the past 24 hours  If weapons involved, how are they secured? No weapons involved     Is patient aware of and in agreement with discharge plan? Patient and family are aware of discharge and are in agreement     Arrangements for medication:  Prescriptions sent with the patient      Copy of discharge instructions to  provider?:  Yes, fax to facility     Arrangements for transportation home:  Ambulance will  and transport to [facility] and [address] [place] [address] all follow up appointments as scheduled, continue to take prescribed medications per physician instructions. Mental health crisis number:  [phone] or your local mental health crisis line number at [phone]           Discharge Medication List and Instructions:   Current Discharge Medication List      CONTINUE these medications which have CHANGED    Details   citalopram (CELEXA) 20 mg tablet Take 1 Tab by mouth nightly. Indications: Generalized Anxiety Disorder, major depressive disorder  Qty: 15 Tab, Refills: 0      dilTIAZem CD (CARDIZEM CD) 240 mg ER capsule Take 1 Cap by mouth daily.  Indications: hypertension  Qty: 15 Cap, Refills: 0      donepezil (ARICEPT) 10 mg tablet Take 1 Tab by mouth daily. 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 Indications: constipation  Qty: 15 Packet, Refills: 0      risperiDONE (RISPERDAL) 0.25 mg tablet Take 1 Tab by mouth two (2) times a day. Indications: schizophrenia  Qty: 30 Tab, Refills: 0      traZODone (DESYREL) 50 mg tablet Take 0.5 Tabs by mouth nightly as needed for Sleep. Indications: insomnia associated with depression  Qty: 15 Tab, Refills: 0         CONTINUE these medications which have NOT CHANGED    Details   magnesium hydroxide (MILK OF MAGNESIA) 400 mg/5 mL suspension Take 30 mL by mouth daily for 30 days. Qty: 900 mL, Refills: 0      acetaminophen (TYLENOL) 325 mg tablet Take 2 Tabs by mouth every six (6) hours as needed for Pain. Qty: 20 Tab, Refills: 0         STOP taking these medications       docusate sodium (COLACE) 100 mg capsule Comments:   Reason for Stopping:               Unresulted Labs     None        To obtain results of studies pending at discharge, please contact [phone]    Follow-up Information     Follow up With Details Comments Chelsea Caruso MD Schedule an appointment as soon as possible for a visit on 10/15/2018 please make a PCP appointment with Dr. River Parra when you leave [address] [place] [address]  [phone]            Advanced Directive:   Does the patient have an appointed surrogate decision maker? Yes  Does the patient have a Medical Advance Directive? Yes  Does the patient have a Psychiatric Advance Directive?  No  If the patient does not have a surrogate or Medical Advance Directive AND Psychiatric Advance Directive, the patient was offered information on these advance directives She has an advanced directive     Patient Instructions: Please continue all medications until otherwise directed by physician. Tobacco Cessation Discharge Plan:   Is the patient a smoker and needs referral for smoking cessation? No  Patient referred to the following for smoking cessation with an appointment? No     Patient was offered medication to assist with smoking cessation at discharge? No  Was education for smoking cessation added to the discharge instructions? Yes    Alcohol/Substance Abuse Discharge Plan:   Does the patient have a history of substance/alcohol abuse and requires a referral for treatment? No   Patient referred to the following for substance/alcohol abuse treatment with an appointment? Not applicable  Patient was offered medication to assist with alcohol cessation at discharge? Not applicable  Was education for substance/alcohol abuse added to discharge instructions?  Not applicable    Patient discharged to Inpatient facility; discussed with the receiving facility  24-hour/7-day contact information , contact information for pending studies, plan for follow-up care and Primary physician, other healthcare professional, or site designated for follow up care

## 2018-09-28 NOTE — DISCHARGE INSTRUCTIONS
DISCHARGE SUMMARY    NAME:Tray Regan  : 1933  MRN: [id number]    The patient Tray Regan exhibits the ability to control behavior in a less restrictive environment. Patient's level of functioning is improving. No assaultive/destructive behavior has been observed for the past 24 hours. No suicidal/homicidal threat or behavior has been observed for the past 24 hours. There is no evidence of serious medication side effects. Patient has not been in physical or protective restraints for at least the past 24 hours  If weapons involved, how are they secured? No weapons involved     Is patient aware of and in agreement with discharge plan? Patient and family are aware of discharge and are in agreement     Arrangements for medication:  Prescriptions sent with the patient      Copy of discharge instructions to  provider?:  Yes, fax to facility     Arrangements for transportation home:  Ambulance will  and transport to [facility] and [address] [place] [address] all follow up appointments as scheduled, continue to take prescribed medications per physician instructions. Mental health crisis number:  [phone] or your local mental health crisis line number at [place] [phone] from Nurse    PATIENT INSTRUCTIONS: Assist x 2 staff with transfers and ambulation. Uses walker with attached arm brace    What to do at Home:  Recommended activity: Activity as tolerated,     If you experience any of the following symptoms Anxiety, depression, confusion, thoughts about harming yourself or others , please follow up with 911 or your local mental health crisis line number at [phone]     *  Please give a list of your current medications to your Primary Care Provider. *  Please update this list whenever your medications are discontinued, doses are      changed, or new medications (including over-the-counter products) are added.     *  Please carry medication information at all times in case of emergency situations. These are general instructions for a healthy lifestyle:    No smoking/ No tobacco products/ Avoid exposure to second hand smoke  Surgeon General's Warning:  Quitting smoking now greatly reduces serious risk to your health. Obesity, smoking, and sedentary lifestyle greatly increases your risk for illness    A healthy diet, regular physical exercise & weight monitoring are important for maintaining a healthy lifestyle    You may be retaining fluid if you have a history of heart failure or if you experience any of the following symptoms:  Weight gain of 3 pounds or more overnight or 5 pounds in a week, increased swelling in our hands or feet or shortness of breath while lying flat in bed. Please call your doctor as soon as you notice any of these symptoms; do not wait until your next office visit. Recognize signs and symptoms of STROKE:    F-face looks uneven    A-arms unable to move or move unevenly    S-speech slurred or non-existent    T-time-call 911 as soon as signs and symptoms begin-DO NOT go       Back to bed or wait to see if you get better-TIME IS BRAIN. Warning Signs of HEART ATTACK     Call 911 if you have these symptoms:   Chest discomfort. Most heart attacks involve discomfort in the center of the chest that lasts more than a few minutes, or that goes away and comes back. It can feel like uncomfortable pressure, squeezing, fullness, or pain.  Discomfort in other areas of the upper body. Symptoms can include pain or discomfort in one or both arms, the back, neck, jaw, or stomach.  Shortness of breath with or without chest discomfort.  Other signs may include breaking out in a cold sweat, nausea, or lightheadedness. Don't wait more than five minutes to call 911 - MINUTES MATTER! Fast action can save your life. Calling 911 is almost always the fastest way to get lifesaving treatment.  Emergency Medical Services staff can begin treatment when they arrive -- up to an hour sooner than if someone gets to the hospital by car. The discharge information has been reviewed with the patient. The patient verbalized understanding. Discharge medications reviewed with the patient and appropriate educational materials and side effects teaching were provided.   ___________________________________________________________________________________________________________________________________

## 2018-09-28 NOTE — BH NOTES
GROUP THERAPY PROGRESS NOTE    Tray Regan participated in a Morning Process Group with a focus identifying feelings, planning for the day, reviewing myths and facts about grief and preparing for rest of the day through group singing. Group time: 50 minutes. Personal goal for participation: To increase the capacity to improve ones mood and structure. Goal orientation: The patient will be able to identify their feelings, develop a plan for structuring their day, reviewing myths and facts about grief and preparing for rest of the day through group singing. Group therapy participation: This patient actively participated in the group after joining it about [facility] through the session. Therapeutic interventions reviewed and discussed: The group members were asked to identify an emotion they are having or let the group know what they want to focus on for the day. They were also provided a handout on various myths and facts regarding grief. The group members were also asked to listen or sing along with several songs. Impression of participation: The patient smiled as she greeted her peers and the undersigned. She actively participated in the music and added that she hoped to be leaving the hospital in the next few days. She was alert, generally oriented, and pleasant. She expressed no current SI/HI or overt psychosis. Her affect was non-dysphoric with mild euphoria. Her mood reflected her affect. She said she enjoyed the music this morning and asked for a copy of the song sheet to keep.

## 2018-09-28 NOTE — PROGRESS NOTES
Problem: Falls - Risk of  Goal: *Absence of Falls  Document Kizzy Fall Risk and appropriate interventions in the flowsheet. Outcome: Progressing Towards Goal  Fall Risk Interventions:  Mobility Interventions: Bed/chair exit alarm, Communicate number of staff needed for ambulation/transfer, Patient to call before getting OOB, Utilize walker, cane, or other assistive device    Mentation Interventions: Adequate sleep, hydration, pain control, Bed/chair exit alarm, Door open when patient unattended    Medication Interventions: Bed/chair exit alarm, Patient to call before getting OOB, Teach patient to arise slowly, Utilize gait belt for transfers/ambulation    Elimination Interventions: Bed/chair exit alarm, Patient to call for help with toileting needs, Toilet paper/wipes in reach, Toileting schedule/hourly rounds    History of Falls Interventions: Bed/chair exit alarm, Door open when patient unattended     No falls. Requires two person assist to use BSC and transferring into and out of Recliner and bed. Problem: Nutrition Deficit  Goal: *Optimize nutritional status  Outcome: Not Progressing Towards Goal      1515 Received patient resting in bed. NAD. On q 15 min. Safety checks. Visible in DR sitting in recliner watching TV, listening to music and eating 20% dinner, 100% fluids. Remains pleasantly confused, polite and cooperative. Uses Humor. Med compliant. Clothes were washed.

## 2018-09-28 NOTE — INTERDISCIPLINARY ROUNDS
Behavioral Health Interdisciplinary Rounds     Patient Name: Zainab Dodge  Age: 80 y.o.   Room/Bed:  6/  Primary Diagnosis: Dementia   Admission Status: Involuntary Commitment     Readmission within 30 days: no  Power of  in place: yes  Patient requires a blocked bed: no          Reason for blocked bed: n/a    VTE Prophylaxis: Not indicated    Mobility needs/Fall risk: yes  Flu Vaccine : yes   Nutritional Plan: no  Consults:          Labs/Testing due today?: no    Sleep hours: 7.0       Participation in Care/Groups:  yes  Medication Compliant?: Yes  PRNS (last 24 hours): None    Restraints (last 24 hours):  no     CIWA (range last 24 hours):     COWS (range last 24 hours):      Alcohol screening (AUDIT) completed -   AUDIT Score: 0     If applicable, date SBIRT discussed in treatment team AND documented:     Tobacco - patient is a smoker: Have You Used Tobacco in the Past 30 Days: No  Illegal Drugs use: Have You Used Any Illegal Substances Over the Past 12 Months: No    24 hour chart check complete: yes     Patient goal(s) for today:   Treatment team focus/goals: Plan for discharge today   Progress note - she will be discharged today to a SNF bed at [address] [place] [address] and Rehab     LOS:  22  Expected LOS: plan for discharge today     Financial concerns/prescription coverage:  Medicare   Date of last family contact:      Family requesting physician contact today:    Discharge plan: discharged to skilled care at [facility] in the home:  no       Outpatient provider(s): [address] [place] [address] and Rehab     Participating treatment team members: Tricia Church - Dr. Lucila York

## 2018-09-28 NOTE — PROGRESS NOTES
Problem: Altered Thought Process (Adult/Pediatric)  Goal: *STG: Participates in treatment plan  Outcome: Progressing Towards Goal  Pt is calm cooperative and pleasantly confused   Med/meal compliant   [place] and takes a few steps with 2 person assist   Attended morning group. NAD noted   Will continue to monitor. 1222: TRANSFER - OUT REPORT:    Verbal report given to [facility] LPN (name) on Tray Regan  being transferred to [facility] and Rehab [phone] (unit) for routine progression of care       Report consisted of patients Situation, Background, Assessment and   Recommendations(SBAR). Information from the following report(s) SBAR, Kardex, Intake/Output, MAR and Recent Results was reviewed with the receiving nurse. Lines:       Opportunity for questions and clarification was provided.       Patient transported with:  Ambulance at [phone]: Went over discharge instructions with patient   Pt signed electronically discharge instructions   oppurtunity for questions given patient has none   Report given to [facility]   Personal belongings sent with patient   She is discharged at this time with Ambulance   NAD noted

## 2018-09-28 NOTE — PROGRESS NOTES
Pharmacist Discharge Medication Reconciliation    Discharging Provider: Dr. Himanshu Kirby PMH:   Past Medical History:   Diagnosis Date    Anemia     Atrial fibrillation ([facility] [address] [facility]     Cancer ([facility] [address]     basal cell on nose    Chronic pain     back pain    Fibromyalgia 4/1/2010    GERD (gastroesophageal reflux disease) 4/1/2010    Hiatal hernia 4/1/2010    High cholesterol 4/1/2010    HTN (hypertension) 4/1/2010    Ill-defined condition     A. Fib    OA (osteoarthritis) 4/1/2010    back    Pulmonary emboli [facility]) 2015     Chief Complaint for this Admission: No chief complaint on file. Allergies: Angiotensin ii,human; Avelox [moxifloxacin]; and Demerol [meperidine]    Discharge Medications:   Current Discharge Medication List        CONTINUE these medications which have CHANGED    Details   citalopram (CELEXA) 20 mg tablet Take 1 Tab by mouth nightly. Indications: Generalized Anxiety Disorder, major depressive disorder  Qty: 15 Tab, Refills: 0      dilTIAZem CD (CARDIZEM CD) 240 mg ER capsule Take 1 Cap by mouth daily. Indications: hypertension  Qty: 15 Cap, Refills: 0      donepezil (ARICEPT) 10 mg tablet Take 1 Tab by mouth daily. 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 Indications: constipation  Qty: 15 Packet, Refills: 0      risperiDONE (RISPERDAL) 0.25 mg tablet Take 1 Tab by mouth two (2) times a day. Indications: schizophrenia  Qty: 30 Tab, Refills: 0      traZODone (DESYREL) 50 mg tablet Take 0.5 Tabs by mouth nightly as needed for Sleep. Indications: insomnia associated with depression  Qty: 15 Tab, Refills: 0           CONTINUE these medications which have NOT CHANGED    Details   magnesium hydroxide (MILK OF MAGNESIA) 400 mg/5 mL suspension Take 30 mL by mouth daily for 30 days.   Qty: 900 mL, Refills: 0      acetaminophen (TYLENOL) 325 mg tablet Take 2 Tabs by mouth every six (6) hours as needed for Pain.   Qty: 20 Tab, Refills: 0           STOP taking these medications       docusate sodium (COLACE) 100 mg capsule Comments:   Reason for Stopping:             The patient's chart, MAR and AVS were reviewed by Saundra Govea, PHARMD.

## 2018-09-28 NOTE — PROGRESS NOTES
Problem: Falls - Risk of  Goal: *Absence of Falls  Document Kizzy Fall Risk and appropriate interventions in the flowsheet. Outcome: Progressing Towards Goal  Fall Risk Interventions:  Mobility Interventions: Bed/chair exit alarm, Communicate number of staff needed for ambulation/transfer, Patient to call before getting OOB, Utilize walker, cane, or other assistive device  In bed asleep with respirations noted as even and unlabored as chest was rising and falling.  Will continue to monitor for safety and 15 minute checks throughout shift  Mentation Interventions: Adequate sleep, hydration, pain control, Bed/chair exit alarm, Door open when patient unattended    Medication Interventions: Bed/chair exit alarm, Patient to call before getting OOB, Teach patient to arise slowly, Utilize gait belt for transfers/ambulation    Elimination Interventions: Bed/chair exit alarm, Patient to call for help with toileting needs, Toilet paper/wipes in reach, Toileting schedule/hourly rounds    History of Falls Interventions: Bed/chair exit alarm, Door open when patient unattended

## 2018-09-28 NOTE — BH NOTES
PSYCHIATRIC PROGRESS NOTE         Patient Name  Mandy Weldon   Date of Birth 5/24/1933   [facility] [id number]   Medical Record Number  [id number]      Age  80 y.o. PCP Denilson Lyn MD   Admit date:  9/6/2018    Room Number  746/01  @ [facility]   Date of Service  09/27/18          PSYCHOTHERAPY SESSION NOTE:  Length of psychotherapy session: 20 minutes    Main condition/diagnosis/issues treated during session today, 9/27/2018 : I employed Cognitive Behavioral therapy techniques, Reality-Oriented psychotherapy, as well as supportive psychotherapy in regards to various ongoing psychosocial stressors, including the following: pre-admission and current problems; housing issues;   medical issues; and stress of hospitalization. Interpersonal relationship issues and psychodynamic conflicts explored. Attempts made to alleviate maladaptive patterns. Overall, patient is not progressing    Treatment Plan Update (reviewed an updated 9/27/2018) : I will modify psychotherapy tx plan by implementing more stress management strategies, building upon cognitive behavioral techniques, increasing coping skills, as well as shoring up psychological defenses). An extended energy and skill set was needed to engage pt in psychotherapy due to some of the following: resistiveness, complexity, negativity, confrontational nature, hostile behaviors, and/or severe abnormalities in thought processes/psychosis resulting in the loss of expressive/receptive language communication skills. E & M PROGRESS NOTE:         HISTORY         HISTORY OF PRESENT ILLNESS/INTERVAL HISTORY:  (reviewed/updated 9/27/2018). per initial evaluation: The patient, Tray Lowery, is a 80 y.o.   WHITE [place]  female with a past psychiatric history significant for Dementia, MDD who was treated on [facility] for several weeks, until she had a syncopal episode on 9/5 and transferred to the medical floor for observation for 24 hours. She now returns medically stable. Patient's mood and presesentation hs improved compared to original presentation on admission. She remains very confused secondary to advanced dementia. No agitation or aggression noted. Compliant with her medications. Awaiting placement vs. Return home. 9/8/18. \"Am I going home today\". No complaints. Seems calmer. 9/9/18. No complaints. No agitation. Slept ok  9/10/18- Funny and engaging this morning. She enjoyed singing in group this morning. Sleeping well at night. 9/11/18- Patient remains stable. 9/12/18- No complaints, no acute behviors. Bright and euthymic affect. Eating meals, sleeping well. 9/13- patient remains stable  9/14/18- euthymic mood, talkative. Pleasant and cooperative. 9/16/18: seen today,funny at times, accepting po med and meals,no aggressin or agitation,requires assistance  with ADL. 9/17/18- Ms. Regan denies any complaints this morning. Resting comfortably, then easily eating and enjoying her lunch. NO changes. Patient remains stable. 9/18/18- patient presented in upbeat mood, observed sitting and laughing with her . Patient remains compliant and stable. 9/19- unchanged. Bright affect. Funny disposition  9/20- Enjoying visit from her . Smiling. Compliant. Slept through the night.  9/21/18- sleeping this morning. No acute events to report. Stable  9/22/18-Compliant with meds. No prn's used. Slept 7.5 hrs. No behavioral issues are reported. 9/23/18-Slept 7 hrs. No prn's used. Meds and meals compliant. 9/24/18- Sleeping well, overall positive and upbeat mood  925/18 patient enjoys visits with her . He acknowledges that she is much improved. Patient made jokes about her poor memory. Very upbeat and friendly. 9/26/18- more tired than usual this morning, but patient apparently did a great deal of exercising with PT this morning. Overall stable. (+)loose stool  9/27/18- Ms. Regan denies any complaints.  She enjoyed her visit with her . Calm and cooperative. SIDE EFFECTS: (reviewed/updated 9/27/2018)  None reported or admitted to. ALLERGIES:(reviewed/updated 9/27/2018)  Allergies   Allergen Reactions    Angiotensin Ii,Human Other (comments)     States does not remember      Avelox [Moxifloxacin] Other (comments)     States does not remember      Demerol [Meperidine] Hives      MEDICATIONS PRIOR TO ADMISSION:(reviewed/updated 9/27/2018)  Prescriptions Prior to Admission   Medication Sig    traZODone (DESYREL) 50 mg tablet Take 0.5 Tabs by mouth nightly for 30 days. Indications: insomnia associated with depression    risperiDONE (RISPERDAL) 0.25 mg tablet Take 1 Tab by mouth two (2) times a day for 30 days.  magnesium hydroxide (MILK OF MAGNESIA) 400 mg/5 mL suspension Take 30 mL by mouth daily for 30 days.  donepezil (ARICEPT) 10 mg tablet Take 1 Tab by mouth daily for 30 days.  acetaminophen (TYLENOL) 325 mg tablet Take 2 Tabs by mouth every six (6) hours as needed for Pain.  docusate sodium (COLACE) 100 mg capsule Take 1 Cap by mouth daily for 90 days.  dilTIAZem CD (CARTIA XT) 240 mg ER capsule Take 240 mg by mouth daily.  polyethylene glycol (MIRALAX) 17 gram packet Take 17 g by mouth as needed (Constipation).  citalopram (CELEXA) 20 mg tablet TAKE 1 TABLET BY MOUTH DAILY      PAST MEDICAL HISTORY: Past medical history from the initial psychiatric evaluation has been reviewed (reviewed/updated 9/27/2018) with no additional updates (I asked patient and no additional past medical history provided). Past Medical History:   Diagnosis Date    Anemia     Atrial fibrillation ([facility] [address]     Cancer (HCC)     basal cell on nose    Chronic pain     back pain    Fibromyalgia 4/1/2010    GERD (gastroesophageal reflux disease) 4/1/2010    Hiatal hernia 4/1/2010    High cholesterol 4/1/2010    HTN (hypertension) 4/1/2010    Ill-defined condition     A.  Fib    OA (osteoarthritis) 4/1/2010 back    Pulmonary emboli ([facility] [address] 2015     Past Surgical History:   Procedure Laterality Date    BREAST SURGERY PROCEDURE UNLISTED      Breast im-plants x 2    ENLARGE BREAST WITH IMPLANT      HX APPENDECTOMY      HX BREAST BIOPSY      HX BREAST RECONSTRUCTION      Breast implants removed 1992    HX CATARACT REMOVAL      HX CHOLECYSTECTOMY  9/11/2013    HX HYSTERECTOMY      HX KNEE REPLACEMENT  2008    bilateral    HX ORTHOPAEDIC  2007    Bilateral TKR    HX PARTIAL HYSTERECTOMY      HX SHOULDER REPLACEMENT  2009    left    HX TONSILLECTOMY        SOCIAL HISTORY: Social history from the initial psychiatric evaluation has been reviewed (reviewed/updated 9/27/2018) with no additional updates (I asked patient and no additional social history provided). Social History     Social History    Marital status:      Spouse name: N/A    Number of children: N/A    Years of education: N/A     Occupational History    Not on file. Social History Main Topics    Smoking status: Never Smoker    Smokeless tobacco: Never Used    Alcohol use No    Drug use: No    Sexual activity: No     Other Topics Concern    Not on file     Social History Narrative      FAMILY HISTORY: Family history from the initial psychiatric evaluation has been reviewed (reviewed/updated 9/27/2018) with no additional updates (I asked patient and no additional family history provided).    Family History   Problem Relation Age of Onset   [de-identified] Arthritis-rheumatoid Mother     Dementia Mother     Coronary Artery Disease Father     Cancer Brother      lung cancer       REVIEW OF SYSTEMS: (reviewed/updated 9/27/2018)  Appetite:improved   Sleep: improved   All other Review of Systems: General ROS: negative         MENTAL STATUS EXAM & VITALS     MENTAL STATUS EXAM (MSE):    MSE FINDINGS ARE WITHIN NORMAL LIMITS (WNL) UNLESS OTHERWISE STATED BELOW. ( ALL OF THE BELOW CATEGORIES OF THE MSE HAVE BEEN REVIEWED (reviewed 9/27/2018) AND UPDATED AS DEEMED APPROPRIATE )  General Presentation age appropriate and older than stated age, cooperative   Orientation not oriented to situation   Vital Signs  See below (reviewed 9/27/2018); Vital Signs (BP, Pulse, & Temp) are within normal limits if not listed below.    Gait and Station Stable/steady, no ataxia   Musculoskeletal System No extrapyramidal symptoms (EPS); no abnormal muscular movements or Tardive Dyskinesia (TD); muscle strength and tone are within normal limits   Language No aphasia or dysarthria   Speech:  soft   Thought Processes illogical; slow rate of thoughts; poor abstract reasoning/computation   Thought Associations loose associations   Thought Content free of delusions   Suicidal Ideations no plan    Homicidal Ideations no plan    Mood:  euthymic   Affect:  euthymic   Memory recent  impaired   Memory remote:  impaired   Concentration/Attention:  distractable   Fund of Knowledge below average   Insight:  limited   Reliability limited   Judgment:  limited          VITALS:     Patient Vitals for the past 24 hrs:   Temp Pulse Resp BP SpO2   09/27/18 1905 98.1 °F (36.7 °C) 82 18 145/78 96 %   09/27/18 1559 97.8 °F (36.6 °C) 69 18 132/73 95 %   09/27/18 0830 98.2 °F (36.8 °C) 86 12 142/86 93 %     Wt Readings from Last 3 Encounters:   09/18/18 66.9 kg (147 lb 8 oz)   09/02/18 64.5 kg (142 lb 3.2 oz)   08/21/18 76.1 kg (167 lb 12.8 oz)     Temp Readings from Last 3 Encounters:   09/27/18 98.1 °F (36.7 °C)   09/06/18 98.9 °F (37.2 °C)   09/05/18 98.1 °F (36.7 °C)     BP Readings from Last 3 Encounters:   09/27/18 145/78   09/06/18 146/77   09/05/18 103/63     Pulse Readings from Last 3 Encounters:   09/27/18 82   09/06/18 (!) 105   09/05/18 68            DATA     LABORATORY DATA:(reviewed/updated 9/27/2018)  Recent Results (from the past 24 hour(s))   METABOLIC PANEL, COMPREHENSIVE    Collection Time: 09/27/18  6:21 AM   Result Value Ref Range    Sodium 142 136 - 145 mmol/L    Potassium 3.2 (L) 3.5 - 5.1 mmol/L    Chloride 106 97 - 108 mmol/L    CO2 27 21 - 32 mmol/L    Anion gap 9 5 - 15 mmol/L    Glucose 93 65 - 100 mg/dL    BUN 8 6 - 20 MG/DL    Creatinine 0.77 0.55 - 1.02 MG/DL    BUN/Creatinine ratio 10 (L) 12 - 20      GFR est AA >60 >60 ml/min/1.73m2    GFR est non-AA >60 >60 ml/min/1.73m2    Calcium 9.0 8.5 - 10.1 MG/DL    Bilirubin, total 0.7 0.2 - 1.0 MG/DL    ALT (SGPT) 28 12 - 78 U/L    AST (SGOT) 14 (L) 15 - 37 U/L    Alk. phosphatase 99 45 - 117 U/L    Protein, total 5.9 (L) 6.4 - 8.2 g/dL    Albumin 2.7 (L) 3.5 - 5.0 g/dL    Globulin 3.2 2.0 - 4.0 g/dL    A-G Ratio 0.8 (L) 1.1 - 2.2     GLUCOSE, POC    Collection Time: 09/27/18  8:09 AM   Result Value Ref Range    Glucose (POC) 88 65 - 100 mg/dL    Performed by Nikky Luna 87, POC    Collection Time: 09/27/18  4:05 PM   Result Value Ref Range    Glucose (POC) 106 (H) 65 - 100 mg/dL    Performed by Greg Donis      No results found for: VALF2, VALAC, VALP, VALPR, DS6, CRBAM, CRBAMP, CARB2, XCRBAM  No results found for: LITHM   RADIOLOGY REPORTS:(reviewed/updated 9/27/2018)  Xr Pelv Ap Only    Addendum Date: 8/14/2018    Addendum: No fracture. Result Date: 8/14/2018  Clinical history: Fall yesterday FINDINGS: Single frontal view of the pelvis is obtained. There is no fracture or dislocation identified. There is no significant soft tissue abnormality. IMPRESSION: No acute fracture. Xr Forearm Rt Ap/lat    Result Date: 8/13/2018  EXAM:  XR FOREARM RT AP/LAT Clinical history: Distal radial fracture. INDICATION:   fall. COMPARISON: None. FINDINGS: Two views of the right radius and ulna demonstrate distal radial fracture. Extensive degenerative change at the radiocarpal and carpometacarpal rows. .     IMPRESSION:  Distal radial fracture with minimal displacement.  Severe degenerative change at the wrist..     Xr Wrist Rt Ap/lat    Result Date: 8/27/2018  EXAM: XR WRIST RT AP/LAT INDICATION:  R wrist fracture follow up, was not casted and pt not leaving splint on. COMPARISON: 8/14/2018. FINDINGS: Two  views of the right wrist demonstrate no appreciable change in the T-shaped interarticular distal radial fracture and ulnar styloid fracture in fiberglass. There is chondrocalcinosis of the radiocarpal joint and TFC. Lateral carpal arthritis is noted. Osteopenia is present. IMPRESSION:  No significant change in the right T-shaped interarticular distal radial and ulna styloid fractures in the splint. Xr Wrist Rt Ap/lat/obl Min 3v    Result Date: 8/14/2018  EXAM: XR WRIST RT AP/LAT/OBL MIN 3V Clinical history: Fall, broken wrist INDICATION:  fall, broken wrist. COMPARISON: None. FINDINGS: Three  views of the right wrist demonstrate postreduction images distal radial fracture. .  Soft tissue edema. .     IMPRESSION:  Postreduction images distal radial fracture. .     Xr Wrist Rt Ap/lat/obl Min 3v    Result Date: 8/13/2018  EXAM: XR WRIST RT AP/LAT/OBL MIN 3V HISTORY: Fall, fell in bathroom, right arm INDICATION:  fall. COMPARISON: None. FINDINGS: Three  views of the right wrist demonstrate nondisplaced distal radius fracture. Severe degenerative change of the radiocarpal and carpometacarpal rows. No ulnar fracture. .  The soft tissues are within normal limits. IMPRESSION:  Nondisplaced fracture of the distal radius. 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     Ct Head Wo Cont    Result Date: 8/23/2018  EXAM:  CT head without contrast INDICATION: Altered mental status COMPARISON: CT head 8/14/2018 TECHNIQUE: Axial noncontrast head CT from foramen magnum to vertex. Coronal and sagittal reformatted images were obtained. CT dose reduction was achieved through use of a standardized protocol tailored for this examination and automatic exposure control for dose modulation. Adaptive statistical iterative reconstruction (ASIR) was utilized. FINDINGS:  There is diffuse age-related parenchymal volume loss.  The ventricles and sulci are age-appropriate without hydrocephalus. There is no mass effect or midline shift. There is no intracranial hemorrhage or extra-axial fluid collection. Scattered foci of low attenuation in the periventricular white matter represent stable chronic microvascular ischemic changes. There is no new abnormal parenchymal attenuation. The gray-white matter differentiation is maintained. The basal cisterns are patent. 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 The osseous structures are intact. The visualized paranasal sinuses and mastoid air cells are clear. IMPRESSION: There is no acute intracranial abnormality. Ct Head Wo Cont    Result Date: 8/14/2018  EXAM:  CT HEAD WO CONT Clinical history: Fall, fractured wrist, increased pain INDICATION:   fall COMPARISON: 7/20/2018. CONTRAST:  None. TECHNIQUE: Unenhanced CT of the head was performed using 5 mm images. Brain and bone windows were generated. CT dose reduction was achieved through use of a standardized protocol tailored for this examination and automatic exposure control for dose modulation. FINDINGS: The ventricles and sulci are normal in size, shape and configuration and midline. Mild scattered hypodensities in the cerebral white matter. There is no intracranial hemorrhage, extra-axial collection, mass, mass effect or midline shift. The basilar cisterns are open. No acute infarct is identified. The bone windows demonstrate no abnormalities. The visualized portions of the paranasal sinuses and mastoid air cells are clear. IMPRESSION: No acute cranial process     Ct Head Wo Cont    Result Date: 7/20/2018  EXAM:  CT HEAD WO CONT INDICATION: Fall going to the bathroom and had a ground level fall and hit head on the floor. COMPARISON: MRI brain 11/12/2012. Leanna Phelps CONTRAST:  None. TECHNIQUE: Unenhanced CT of the head was performed using 5 mm images. Brain and bone windows were generated.   CT dose reduction was achieved through use of a standardized protocol tailored for this examination and automatic exposure control for dose modulation. FINDINGS: There is a stable 9 x 12 mm partially calcified extra-axial lesion in the left posterior fossa compatible with meningioma. There is no acute intracranial hemorrhage, other mass, mass effect or herniation. Ventricles and sulci show stable, proportionate, and symmetric pattern. There is a stable pattern of periventricular white matter hypodensity. The gray-white matter differentiation is well-preserved. Atherosclerotic calcifications are seen within the carotid siphons and vertebral arteries. The mastoid air cells are well pneumatized. The visualized paranasal sinuses are normal.     IMPRESSION: No acute intracranial hemorrhage or infarct. Stable left posterior fossa meningioma and chronic white matter change most compatible with small vessel ischemic and/or senescent change. Intracranial atherosclerosis. Ct Abd Pelv Wo Cont    Result Date: 6/20/2018  EXAM:  CT ABD PELV WO CONT INDICATION: abd pain  2 days of abdominal pain COMPARISON: 2013 CONTRAST:  None. TECHNIQUE: Thin axial images were obtained through the abdomen and pelvis. Coronal and sagittal reconstructions were generated. Oral contrast was not administered. CT dose reduction was achieved through use of a standardized protocol tailored for this examination and automatic exposure control for dose modulation. The absence of intravenous contrast material reduces the sensitivity for evaluation of the solid parenchymal organs of the abdomen. FINDINGS: LUNG BASES: Clear. INCIDENTALLY IMAGED HEART AND MEDIASTINUM: Unremarkable. LIVER: No mass or biliary dilatation. GALLBLADDER: Surgically removed. SPLEEN: No mass. PANCREAS: No mass or ductal dilatation. ADRENALS: Unremarkable. KIDNEYS/URETERS: Malrotated right kidney. Hyperdense right renal cyst. Right nephrolithiasis. STOMACH: Hiatal hernia.  SMALL BOWEL: No dilatation or wall thickening. COLON: No dilatation or wall thickening. Moderate colonic stool. APPENDIX: Surgically removed PERITONEUM: No ascites or pneumoperitoneum. RETROPERITONEUM: No lymphadenopathy or aortic aneurysm. REPRODUCTIVE ORGANS: Surgically removed. URINARY BLADDER: No mass or calculus. BONES: No destructive bone lesion. Multilevel degenerative changes. ADDITIONAL COMMENTS: N/A     IMPRESSION: No acute findings. Xr Chest Port    Result Date: 6/20/2018  EXAM:  XR CHEST PORT INDICATION:  Chest Pain COMPARISON:  May 24 FINDINGS: A portable AP radiograph of the chest was obtained at 0555 hours. There is a left shoulder replacement in place. The patient is on a cardiac monitor. The lungs are clear. The cardiac and mediastinal contours and pulmonary vascularity are normal.  The bones and soft tissues are grossly within normal limits. IMPRESSION: No acute findings.           MEDICATIONS     ALL MEDICATIONS:   Current Facility-Administered Medications   Medication Dose Route Frequency    dilTIAZem CD (CARDIZEM CD) capsule 240 mg  240 mg Oral DAILY    magnesium hydroxide (MILK OF MAGNESIA) 400 mg/5 mL oral suspension 30 mL  30 mL Oral DAILY PRN    citalopram (CELEXA) tablet 20 mg  20 mg Oral QHS    donepezil (ARICEPT) tablet 10 mg  10 mg Oral DAILY    polyethylene glycol (MIRALAX) packet 17 g  17 g Oral DAILY PRN    risperiDONE (RisperDAL) tablet 0.25 mg  0.25 mg Oral BID    saline peripheral flush soln 5 mL  5 mL InterCATHeter PRN    traZODone (DESYREL) tablet 25 mg  25 mg Oral QHS PRN    OLANZapine (ZyPREXA) tablet 2.5 mg  2.5 mg Oral Q6H PRN    ziprasidone (GEODON) 10 mg in sterile water (preservative free) 0.5 mL injection  10 mg IntraMUSCular BID PRN    benztropine (COGENTIN) tablet 1 mg  1 mg Oral BID PRN    benztropine (COGENTIN) injection 1 mg  1 mg IntraMUSCular BID PRN    zolpidem (AMBIEN) tablet 5 mg  5 mg Oral QHS PRN    acetaminophen (TYLENOL) tablet 650 mg  650 mg Oral Q4H PRN    ibuprofen (MOTRIN) tablet 400 mg  400 mg Oral Q8H PRN      SCHEDULED MEDICATIONS:   Current Facility-Administered Medications   Medication Dose Route Frequency    dilTIAZem CD (CARDIZEM CD) capsule 240 mg  240 mg Oral DAILY    citalopram (CELEXA) tablet 20 mg  20 mg Oral QHS    donepezil (ARICEPT) tablet 10 mg  10 mg Oral DAILY    risperiDONE (RisperDAL) tablet 0.25 mg  0.25 mg Oral BID          ASSESSMENT & PLAN     DIAGNOSES REQUIRING ACTIVE TREATMENT AND MONITORING: (reviewed/updated 9/27/2018)  Patient Active Hospital Problem List:    The patient, Tray Vallecillo, is a 80 y.o.  female who presents at this time for treatment of the following diagnoses:  Patient C[facility] Problem List:   Late onset Alzheimer's disease  (8/24/2018)    Assessment: severe, advanced    Plan: Agree with inpatient hospitalization for further stabilization, safety monitoring and medication management  Medications- Aricept and risperdal   09/16/18: Continue current treatment. 9/26- check labs    In summary, Tray Regan, is a 80 y.o.  female who presents with a severe exacerbation of the principal diagnosis of Dementia  Patient's condition is worsening/not improving/not stable improving. Patient requires continued inpatient hospitalization for further stabilization, safety monitoring and medication management. I will continue to coordinate the provision of individual, milieu, occupational, group, and substance abuse therapies to address target symptoms/diagnoses as deemed appropriate for the individual patient. A coordinated, multidisplinary treatment team round was conducted with the patient (this team consists of the nurse, psychiatric unit pharmcist,  and writer). Complete current electronic health record for patient has been reviewed today including consultant notes, ancillary staff notes, nurses and psychiatric tech notes.     Suicide risk assessment completed and patient deemed to be of low risk for suicide at this time. The following regarding medications was addressed during rounds with patient:   the risks and benefits of the proposed medication. The patient was given the opportunity to ask questions. Informed consent given to the use of the above medications. Will continue to adjust psychiatric and non-psychiatric medications (see above \"medication\" section and orders section for details) as deemed appropriate & based upon diagnoses and response to treatment. I will continue to order blood tests/labs and diagnostic tests as deemed appropriate and review results as they become available (see orders for details and above listed lab/test results). I will order psychiatric records from previous [facility] hospitals to further elucidate the nature of patient's psychopathology and review once available. I will gather additional collateral information from friends, family and o/p treatment team to further elucidate the nature of patient's psychopathology and baselline level of psychiatric functioning. I certify that this patient's inpatient psychiatric hospital services furnished since the previous certification were, and continue to be, required for treatment that could reasonably be expected to improve the patient's condition, or for diagnostic study, and that the patient continues to need, on a daily basis, active treatment furnished directly by or requiring the supervision of inpatient psychiatric facility personnel. In addition, the hospital records show that services furnished were intensive treatment services, admission or related services, or equivalent services.     EXPECTED DISCHARGE DATE/DAY: TBD     DISPOSITION: Home       Signed By:   Tyler Carvajal MD  9/27/2018
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
## 2018-10-18 NOTE — DISCHARGE SUMMARY
PSYCHIATRIC DISCHARGE SUMMARY         IDENTIFICATION:    Patient Name  Kendra Hardy   Date of Birth 5/24/1933   [facility] [id number]   Medical Record Number  [id number]      Age  80 y.o.    PCP Delonte Marquez MD   Admit date:  9/6/2018    Discharge date: 9/28/18   Room Number  746/01  @ [facility]   Date of Service  9/28/18            TYPE OF DISCHARGE: REGULAR               CONDITION AT DISCHARGE: improved       PROVISIONAL & DISCHARGE DIAGNOSES:    Problem List  Date Reviewed: 8/1/2018          Codes Class    * (Principal) Dementia ICD-10-CM: F03.90  ICD-9-CM: 294.20         Aggression ICD-10-CM: R46.89  ICD-9-CM: V40.39         Psychosis ([facility] [address] ICD-10-CM: F29  ICD-9-CM: 298.9         Syncope, vasovagal ICD-10-CM: R55  ICD-9-CM: 780.2         Late onset Alzheimer's disease with behavioral disturbance ICD-10-CM: G30.1, F02.81  ICD-9-CM: 331.0, 294.11         Frequent falls ICD-10-CM: R29.6  ICD-9-CM: V15.88         A-fib (HCC) ICD-10-CM: I48.91  ICD-9-CM: 427.31         Dyslipidemia ICD-10-CM: E78.5  ICD-9-CM: 272.4         Chronic anticoagulation ICD-10-CM: Z79.01  ICD-9-CM: V58.61         Constipation ICD-10-CM: K59.00  ICD-9-CM: 564.00         History of pulmonary embolism ICD-10-CM: V91.097  ICD-9-CM: V12.55         Controlled type 2 diabetes mellitus without complication, without long-term current use of insulin (HCC) ICD-10-CM: E11.9  ICD-9-CM: 250.00         Chronic pain ICD-10-CM: G89.29  ICD-9-CM: 338.29         HTN (hypertension) (Chronic) ICD-10-CM: I10  ICD-9-CM: 401.9         OA (osteoarthritis) (Chronic) ICD-10-CM: M19.90  ICD-9-CM: 715.90         Fibromyalgia (Chronic) ICD-10-CM: M79.7  ICD-9-CM: 729.1         GERD (gastroesophageal reflux disease) (Chronic) ICD-10-CM: K21.9  ICD-9-CM: 530.81               Active Hospital Problems    *Dementia        DISCHARGE DIAGNOSIS:   Axis I:  SEE ABOVE  Axis II: SEE ABOVE  Axis III: SEE ABOVE  Axis IV: unable to live independently;  lack of structure  Axis V:  45 on admission, 55 on discharge 65(baseline)       CC & HISTORY OF PRESENT ILLNESS:  The patient, Tray Cook, is a 80 y.o. WHITE [place]  female with a past psychiatric history significant for Dementia, MDD who was treated on [facility] for several weeks, until she had a syncopal episode on 9/5 and transferred to the medical floor for observation for 24 hours. She now returns medically stable. Patient's mood and presesentation hs improved compared to original presentation on admission. She remains very confused secondary to advanced dementia. No agitation or aggression noted. Compliant with her medications. Awaiting placement vs. Return home. SOCIAL HISTORY:    Social History     Socioeconomic History    Marital status:      Spouse name: Not on file    Number of children: Not on file    Years of education: Not on file    Highest education level: Not on file   Social Needs    Financial resource strain: Not on file    Food insecurity - worry: Not on file    Food insecurity - inability: Not on file   [employer] needs - medical: Not on file   [employer] needs - non-medical: Not on file   Occupational History    Not on file   Tobacco Use    Smoking status: Never Smoker    Smokeless tobacco: Never Used   Substance and Sexual Activity    Alcohol use: No    Drug use: No    Sexual activity: No   Other Topics Concern    Not on file   Social History Narrative    Not on file      FAMILY HISTORY:   Family History   Problem Relation Age of Onset    Arthritis-rheumatoid Mother     Dementia Mother     Coronary Artery Disease Father     Cancer Brother         lung cancer             HOSPITALIZATION COURSE:    Tray Regan was admitted to the inpatient psychiatric unit [facility] for acute psychiatric stabilization in regards to symptomatology as described in the HPI above.  Patient's dementia related confusion exacerbated by a recent fall and hand fracture. Tylenol for pain management and below stated medications help to stabilize her irritable mood and behavioral disturbance related to dementia. She was pleasant and cooperative at the time of discharge.        LABS AND IMAGAING:    Labs Reviewed   CK - Abnormal; Notable for the following components:       Result Value    CK 25 (*)     All other components within normal limits   METABOLIC PANEL, BASIC - Abnormal; Notable for the following components:    Potassium 3.1 (*)     Glucose 107 (*)     BUN 26 (*)     BUN/Creatinine ratio 31 (*)     All other components within normal limits   METABOLIC PANEL, BASIC - Abnormal; Notable for the following components:    Glucose 101 (*)     All other components within normal limits   METABOLIC PANEL, COMPREHENSIVE - Abnormal; Notable for the following components:    Potassium 3.2 (*)     BUN/Creatinine ratio 10 (*)     AST (SGOT) 14 (*)     Protein, total 5.9 (*)     Albumin 2.7 (*)     A-G Ratio 0.8 (*)     All other components within normal limits   GLUCOSE, POC - Abnormal; Notable for the following components:    Glucose (POC) 112 (*)     All other components within normal limits   GLUCOSE, POC - Abnormal; Notable for the following components:    Glucose (POC) 101 (*)     All other components within normal limits   GLUCOSE, POC - Abnormal; Notable for the following components:    Glucose (POC) 105 (*)     All other components within normal limits   GLUCOSE, POC - Abnormal; Notable for the following components:    Glucose (POC) 117 (*)     All other components within normal limits   GLUCOSE, POC - Abnormal; Notable for the following components:    Glucose (POC) 227 (*)     All other components within normal limits   GLUCOSE, POC - Abnormal; Notable for the following components:    Glucose (POC) 106 (*)     All other components within normal limits   GLUCOSE, POC - Abnormal; Notable for the following components:    Glucose (POC) 121 (*)     All other components within normal limits   GLUCOSE, POC - Abnormal; Notable for the following components:    Glucose (POC) 102 (*)     All other components within normal limits   GLUCOSE, POC - Abnormal; Notable for the following components:    Glucose (POC) 102 (*)     All other components within normal limits   GLUCOSE, POC - Abnormal; Notable for the following components:    Glucose (POC) 104 (*)     All other components within normal limits   GLUCOSE, POC - Abnormal; Notable for the following components:    Glucose (POC) 116 (*)     All other components within normal limits   GLUCOSE, POC - Abnormal; Notable for the following components:    Glucose (POC) 105 (*)     All other components within normal limits   GLUCOSE, POC - Abnormal; Notable for the following components:    Glucose (POC) 108 (*)     All other components within normal limits   GLUCOSE, POC - Abnormal; Notable for the following components:    Glucose (POC) 110 (*)     All other components within normal limits   GLUCOSE, POC - Abnormal; Notable for the following components:    Glucose (POC) 103 (*)     All other components within normal limits   GLUCOSE, POC - Abnormal; Notable for the following components:    Glucose (POC) 114 (*)     All other components within normal limits   GLUCOSE, POC - Abnormal; Notable for the following components:    Glucose (POC) 114 (*)     All other components within normal limits   GLUCOSE, POC - Abnormal; Notable for the following components:    Glucose (POC) 121 (*)     All other components within normal limits   GLUCOSE, POC - Abnormal; Notable for the following components:    Glucose (POC) 102 (*)     All other components within normal limits   GLUCOSE, POC - Abnormal; Notable for the following components:    Glucose (POC) 143 (*)     All other components within normal limits   GLUCOSE, POC - Abnormal; Notable for the following components:    Glucose (POC) 116 (*)     All other components within normal limits   GLUCOSE, POC - Abnormal; Notable for the following components:    Glucose (POC) 111 (*)     All other components within normal limits   GLUCOSE, POC - Abnormal; Notable for the following components:    Glucose (POC) 115 (*)     All other components within normal limits   GLUCOSE, POC - Abnormal; Notable for the following components:    Glucose (POC) 104 (*)     All other components within normal limits   GLUCOSE, POC - Abnormal; Notable for the following components:    Glucose (POC) 112 (*)     All other components within normal limits   GLUCOSE, POC - Abnormal; Notable for the following components:    Glucose (POC) 104 (*)     All other components within normal limits   GLUCOSE, POC - Abnormal; Notable for the following components:    Glucose (POC) 140 (*)     All other components within normal limits   GLUCOSE, POC - Abnormal; Notable for the following components:    Glucose (POC) 112 (*)     All other components within normal limits   GLUCOSE, POC - Abnormal; Notable for the following components:    Glucose (POC) 103 (*)     All other components within normal limits   GLUCOSE, POC - Abnormal; Notable for the following components:    Glucose (POC) 112 (*)     All other components within normal limits   GLUCOSE, POC - Abnormal; Notable for the following components:    Glucose (POC) 105 (*)     All other components within normal limits   GLUCOSE, POC - Abnormal; Notable for the following components:    Glucose (POC) 107 (*)     All other components within normal limits   GLUCOSE, POC - Abnormal; Notable for the following components:    Glucose (POC) 106 (*)     All other components within normal limits   GLUCOSE, POC - Abnormal; Notable for the following components:    Glucose (POC) 106 (*)     All other components within normal limits   CBC W/O DIFF   TROPONIN I   GLUCOSE, POC   GLUCOSE, POC   GLUCOSE, POC   GLUCOSE, POC   GLUCOSE, POC   GLUCOSE, POC   GLUCOSE, POC   GLUCOSE, POC   GLUCOSE, POC   GLUCOSE, POC   GLUCOSE, POC     No results found for: DS35, PHEN, PHENO, PHENT, DILF, DS39, PHENY, PTN, VALF2, VALAC, VALP, VALPR, DS6, CRBAM, CRBAMP, CARB2, XCRBAM  Admission on 09/06/2018, Discharged on 09/28/2018   Component Date Value Ref Range Status    Glucose (POC) 09/06/2018 112* 65 - 100 mg/dL Final    Performed by 09/06/2018 LIBIA PLASCENCIA   Final    Glucose (POC) 09/06/2018 100  65 - 100 mg/dL Final    Performed by 09/06/2018 LIBIA PLASCENCIA   Final    WBC 09/07/2018 6.4  3.6 - 11.0 K/uL Final    RBC 09/07/2018 3.97  3.80 - 5.20 M/uL Final    HGB 09/07/2018 13.0  11.5 - 16.0 g/dL Final    HCT 09/07/2018 38.0  35.0 - 47.0 % Final    MCV 09/07/2018 95.7  80.0 - 99.0 FL Final    MCH 09/07/2018 32.7  26.0 - 34.0 PG Final    MCHC 09/07/2018 34.2  30.0 - 36.5 g/dL Final    RDW 09/07/2018 12.5  11.5 - 14.5 % Final    PLATELET 00/94/1315 376  150 - 400 K/uL Final    MPV 09/07/2018 11.6  8.9 - 12.9 FL Final    NRBC 09/07/2018 0.0  0  WBC Final    ABSOLUTE NRBC 09/07/2018 0.00  0.00 - 0.01 K/uL Final    CK 09/07/2018 25* 26 - 192 U/L Final    Sodium 09/07/2018 142  136 - 145 mmol/L Final    Potassium 09/07/2018 3.1* 3.5 - 5.1 mmol/L Final    Chloride 09/07/2018 105  97 - 108 mmol/L Final    CO2 09/07/2018 26  21 - 32 mmol/L Final    Anion gap 09/07/2018 11  5 - 15 mmol/L Final    Glucose 09/07/2018 107* 65 - 100 mg/dL Final    BUN 09/07/2018 26* 6 - 20 MG/DL Final    Creatinine 09/07/2018 0.85  0.55 - 1.02 MG/DL Final    BUN/Creatinine ratio 09/07/2018 31* 12 - 20   Final    GFR est AA 09/07/2018 >60  >60 ml/min/1.73m2 Final    GFR est non-AA 09/07/2018 >60  >60 ml/min/1.73m2 Final    Calcium 09/07/2018 10.1  8.5 - 10.1 MG/DL Final    Troponin-I, Qt. 09/07/2018 <0.05  <0.05 ng/mL Final    Glucose (POC) 09/07/2018 101* 65 - 100 mg/dL Final    Performed by 09/07/2018 ANDERSON (Moon) VANESSA   Final    Glucose (POC) 09/07/2018 105* 65 - 100 mg/dL Final    Performed by 09/07/2018 Radha Hernandez   Final    Glucose (POC) 09/08/2018 117* 65 - 100 mg/dL Final    Performed by 09/08/2018 Jayesh Quintero   Final    Glucose (POC) 09/08/2018 227* 65 - 100 mg/dL Final    Performed by 09/08/2018 Yesica Pratt   Final    Glucose (POC) 09/09/2018 106* 65 - 100 mg/dL Final    Performed by 09/09/2018 Maria M Shoulder   Final    Glucose (POC) 09/09/2018 121* 65 - 100 mg/dL Final    Performed by 09/09/2018 Les Kin   Final    Glucose (POC) 09/10/2018 102* 65 - 100 mg/dL Final    Performed by 09/10/2018 PRESTON LANSA   Final    Glucose (POC) 09/10/2018 102* 65 - 100 mg/dL Final    Performed by 09/10/2018 [facility]   Final    Glucose (POC) 09/11/2018 104* 65 - 100 mg/dL Final    Performed by 09/11/2018 Cassy GAR   Final    Glucose (POC) 09/11/2018 116* 65 - 100 mg/dL Final    Performed by 09/11/2018 [facility]   Final    Glucose (POC) 09/12/2018 105* 65 - 100 mg/dL Final    Performed by 09/12/2018 Hill  Charnetta   Final    Glucose (POC) 09/12/2018 108* 65 - 100 mg/dL Final    Performed by 09/12/2018 [facility]   Final    Glucose (POC) 09/13/2018 110* 65 - 100 mg/dL Final    Performed by 09/13/2018 Anton Garzon   Final    Glucose (POC) 09/13/2018 91  65 - 100 mg/dL Final    Performed by 09/13/2018 Aaliyah VALLADARES   Final    Glucose (POC) 09/14/2018 103* 65 - 100 mg/dL Final    Performed by 09/14/2018 Maria M Shoulder   Final    Glucose (POC) 09/14/2018 114* 65 - 100 mg/dL Final    Performed by 09/14/2018 Gerhardt Spire   Final    Glucose (POC) 09/15/2018 114* 65 - 100 mg/dL Final    Performed by 09/15/2018 [facility]  Charnetta   Final    Glucose (POC) 09/15/2018 96  65 - 100 mg/dL Final    Performed by 09/15/2018 [facility]   Final    Glucose (POC) 09/16/2018 121* 65 - 100 mg/dL Final    Performed by 09/16/2018 Maria M Friend   Final    Glucose (POC) 09/16/2018 94  65 - 100 mg/dL Final    Performed by 09/16/2018 Lauro Culp   Final    Glucose (POC) 09/17/2018 102* 65 - 100 mg/dL Final    Performed by 09/17/2018 Dorothy Potter   Final    Glucose (POC) 09/17/2018 143* 65 - 100 mg/dL Final    Performed by 09/17/2018 Radha Josey Padilla   Final    Sodium 09/18/2018 143  136 - 145 mmol/L Final    Potassium 09/18/2018 3.5  3.5 - 5.1 mmol/L Final    Chloride 09/18/2018 107  97 - 108 mmol/L Final    CO2 09/18/2018 26  21 - 32 mmol/L Final    Anion gap 09/18/2018 10  5 - 15 mmol/L Final    Glucose 09/18/2018 101* 65 - 100 mg/dL Final    BUN 09/18/2018 11  6 - 20 MG/DL Final    Creatinine 09/18/2018 0.89  0.55 - 1.02 MG/DL Final    BUN/Creatinine ratio 09/18/2018 12  12 - 20   Final    GFR est AA 09/18/2018 >60  >60 ml/min/1.73m2 Final    GFR est non-AA 09/18/2018 >60  >60 ml/min/1.73m2 Final    Calcium 09/18/2018 9.3  8.5 - 10.1 MG/DL Final    Glucose (POC) 09/18/2018 116* 65 - 100 mg/dL Final    Performed by 09/18/2018 Dwayne GAR   Final    Glucose (POC) 09/18/2018 111* 65 - 100 mg/dL Final    Performed by 09/18/2018 Funmilayo Kruegera   Final    Glucose (POC) 09/19/2018 115* 65 - 100 mg/dL Final    Performed by 09/19/2018 [facility]  Octavianetta   Final    Glucose (POC) 09/19/2018 104* 65 - 100 mg/dL Final    Performed by 09/19/2018 [facility]  Charnetta   Final    Glucose (POC) 09/20/2018 112* 65 - 100 mg/dL Final    Performed by 09/20/2018 RAÚL CHANNEL   Final    Glucose (POC) 09/20/2018 104* 65 - 100 mg/dL Final    Performed by 09/20/2018 RAÚL CHANNEL   Final    Glucose (POC) 09/20/2018 93  65 - 100 mg/dL Final    Performed by 09/20/2018 Neurohr  Jillian Moots   Final    Glucose (POC) 09/21/2018 93  65 - 100 mg/dL Final    Performed by 09/21/2018 [facility]  Charnetta   Final    Glucose (POC) 09/21/2018 140* 65 - 100 mg/dL Final    Performed by 09/21/2018 Jose Luis Silverman   Final    Glucose (POC) 09/22/2018 112* 65 - 100 mg/dL Final    Performed by 09/22/2018 AMADOR SERNA   Final    Glucose (POC) 09/22/2018 103* 65 - 100 mg/dL Final    Performed by 09/22/2018 Corin Peralta   Final    Glucose (POC) 09/23/2018 94  65 - 100 mg/dL Final    Performed by 09/23/2018 Edward Lara   Final    Glucose (POC) 09/23/2018 112* 65 - 100 mg/dL Final    Performed by 09/23/2018 Joselin Butts   Final    Glucose (POC) 09/24/2018 105* 65 - 100 mg/dL Final    Performed by 09/24/2018 BROWN SALENA(CON)   Final    Glucose (POC) 09/24/2018 95  65 - 100 mg/dL Final    Performed by 09/24/2018 Gwendolyn Schwalbe   Final    Glucose (POC) 09/25/2018 98  65 - 100 mg/dL Final    Performed by 09/25/2018 Herrera Dejesus   Final    Glucose (POC) 09/25/2018 107* 65 - 100 mg/dL Final    Performed by 09/25/2018 Neurohr  Maxx Founds   Final    Glucose (POC) 09/26/2018 98  65 - 100 mg/dL Final    Performed by 09/26/2018 Tanika GAR   Final    Glucose (POC) 09/26/2018 106* 65 - 100 mg/dL Final    Performed by 09/26/2018 RAÚL CHANNEL   Final    Sodium 09/27/2018 142  136 - 145 mmol/L Final    Potassium 09/27/2018 3.2* 3.5 - 5.1 mmol/L Final    Chloride 09/27/2018 106  97 - 108 mmol/L Final    CO2 09/27/2018 27  21 - 32 mmol/L Final    Anion gap 09/27/2018 9  5 - 15 mmol/L Final    Glucose 09/27/2018 93  65 - 100 mg/dL Final    BUN 09/27/2018 8  6 - 20 MG/DL Final    Creatinine 09/27/2018 0.77  0.55 - 1.02 MG/DL Final    BUN/Creatinine ratio 09/27/2018 10* 12 - 20   Final    GFR est AA 09/27/2018 >60  >60 ml/min/1.73m2 Final    GFR est non-AA 09/27/2018 >60  >60 ml/min/1.73m2 Final    Calcium 09/27/2018 9.0  8.5 - 10.1 MG/DL Final    Bilirubin, total 09/27/2018 0.7  0.2 - 1.0 MG/DL Final    ALT (SGPT) 09/27/2018 28  12 - 78 U/L Final    AST (SGOT) 09/27/2018 14* 15 - 37 U/L Final    Alk.  phosphatase 09/27/2018 99  45 - 117 U/L Final    Protein, total 09/27/2018 5.9* 6.4 - 8.2 g/dL Final    Albumin 09/27/2018 2.7* 3.5 - 5.0 g/dL Final    Globulin 09/27/2018 3.2  2.0 - 4.0 g/dL Final    A-G Ratio 09/27/2018 0.8* 1.1 - 2.2   Final    Glucose (POC) 09/27/2018 88  65 - 100 mg/dL Final    Performed by 09/27/2018 [place] [address]   Final    Glucose (POC) 09/27/2018 106* 65 - 100 mg/dL Final    Performed by 09/27/2018 [facility]   Final     No results found. DISPOSITION:    Home. Patient to f/u with psychiatric appointments. Patient is to f/u with internist as directed. FOLLOW-UP CARE:    Activity as tolerated  Regular Diet  Wound Care: none needed. Follow-up Information     Follow up With Specialties Details Why Contact Info    Skye Broderick MD Family Practice Schedule an appointment as soon as possible for a visit on 10/15/2018 please make a PCP appointment with Dr. Colleen Harp when you leave [address] [place] [address] Rd  [phone]                   PROGNOSIS:   Guarded-- based on nature of patient's pathology/ies and treatment compliance issues. Prognosis is greatly dependent upon patient's ability to f/u with psychiatric/psychotherapy appointments as well as to comply with psychiatric medications as prescribed. DISCHARGE MEDICATIONS:  Informed consent given for the use of following psychotropic medications:  Discharge Medication List as of 9/28/2018 11:40 AM      CONTINUE these medications which have CHANGED    Details   citalopram (CELEXA) 20 mg tablet Take 1 Tab by mouth nightly. Indications: Generalized Anxiety Disorder, major depressive disorder, Print, Disp-15 Tab, R-0      dilTIAZem CD (CARDIZEM CD) 240 mg ER capsule Take 1 Cap by mouth daily. Indications: hypertension, Print, Disp-15 Cap, R-0      donepezil (ARICEPT) 10 mg tablet Take 1 Tab by mouth daily. Indications: MODERATE TO SEVERE ALZHEIMER'S TYPE DEMENTIA, Print, Disp-15 Tab, R-0      polyethylene glycol (MIRALAX) 17 gram packet Take 1 Packet by mouth daily. Indications: constipation, Print, Disp-15 Packet, R-0      risperiDONE (RISPERDAL) 0.25 mg tablet Take 1 Tab by mouth two (2) times a day.  Indications: schizophrenia, Print, Disp-30 Tab, R-0      traZODone (DESYREL) 50 mg tablet Take 0.5 Tabs by mouth nightly as needed for Sleep. Indications: insomnia associated with depression, Print, Disp-15 Tab, R-0         CONTINUE these medications which have NOT CHANGED    Details   acetaminophen (TYLENOL) 325 mg tablet Take 2 Tabs by mouth every six (6) hours as needed for Pain., Print, Disp-20 Tab, R-0      magnesium hydroxide (MILK OF MAGNESIA) 400 mg/5 mL suspension Take 30 mL by mouth daily for 30 days. , No Print, Disp-900 mL, R-0         STOP taking these medications       docusate sodium (COLACE) 100 mg capsule Comments:   Reason for Stopping:                      A coordinated, multidisplinary treatment team round was conducted with Tray Regan---this is done daily here at [facility]. This team consists of the nurse, psychiatric unit pharmcist,  and writer. I have spent greater than 35 minutes on discharge work.     Signed:  Azul Ho MD  9/28/18

## 2018-10-23 ENCOUNTER — OFFICE VISIT (OUTPATIENT)
Dept: CARDIOLOGY CLINIC | Age: 83
End: 2018-10-23

## 2018-10-23 VITALS
HEART RATE: 67 BPM | WEIGHT: 127 LBS | SYSTOLIC BLOOD PRESSURE: 110 MMHG | HEIGHT: 68 IN | BODY MASS INDEX: 19.25 KG/M2 | DIASTOLIC BLOOD PRESSURE: 70 MMHG | OXYGEN SATURATION: 97 %

## 2018-10-23 DIAGNOSIS — I48.19 PERSISTENT ATRIAL FIBRILLATION (HCC): Primary | ICD-10-CM

## 2018-10-23 DIAGNOSIS — I10 ESSENTIAL HYPERTENSION: ICD-10-CM

## 2018-10-23 RX ORDER — CHOLECALCIFEROL TAB 125 MCG (5000 UNIT) 125 MCG
TAB ORAL DAILY
COMMUNITY
End: 2019-01-02 | Stop reason: ALTCHOICE

## 2018-10-23 RX ORDER — DOCUSATE SODIUM 100 MG/1
125 CAPSULE, LIQUID FILLED ORAL
COMMUNITY
End: 2019-02-28 | Stop reason: ALTCHOICE

## 2018-10-23 RX ORDER — LORATADINE 10 MG/1
10 TABLET ORAL
COMMUNITY
End: 2019-01-02 | Stop reason: ALTCHOICE

## 2018-10-23 NOTE — PROGRESS NOTES
Gem Gaytan MD    [address] [facility] [place], [address] [place]    Office [phone],HEK [phone]  Pager [phone]    Govind Jones is a 80 y.o. female is here for f/u visit. Primary care physician:  Torrey Marin MD    Patient Active Problem List   Diagnosis Code    HTN (hypertension) I10    OA (osteoarthritis) M19.90    Fibromyalgia M79.7    GERD (gastroesophageal reflux disease) K21.9    Chronic pain G89.29    Controlled type 2 diabetes mellitus without complication, without long-term current use of insulin (HCC) E11.9    A-fib (HCC) I48.91    Dyslipidemia E78.5    Chronic anticoagulation Z79.01    Constipation K59.00    History of pulmonary embolism Z86.711    Frequent falls R29.6    Late onset Alzheimer's disease with behavioral disturbance G30.1, F02.81    Syncope, vasovagal R55    Aggression R46.89    Psychosis ([address] F29    Dementia F03.90       Dear Dr. Ena Hernandez,    I had the pleasure of seeing  Ms Govind Jones in the office today. Chief complaint:  Chief Complaint   Patient presents with    Irregular Heart Beat    Hypertension       Assessment:    Atrial fibrillation with controlled ventricular response  History of paroxysmal atrial fibrillation-status post GULSHAN DC CV 5/25/18. Patient was undergoing EGD when she was found to be in A. fib with RVR. History of PE  Hypertension        Plan:     Pt currently in NH  On Cardizem CD to 240 mg daily    Discussed with patient /daughter-no further ischemic workup/invasive cardiac workup because of her comorbidities and quality of life . Aggressive cardio vascular risk factor modification. Follow-up in 6 mthsor earlier as needed. Patient understands the plan. All questions were answered to the patient's satisfaction.     Medication Side Effects and Warnings were discussed with patient: yes  Patient Labs were reviewed and or requested:  yes  Patient Past Records were reviewed and or requested: yes    I appreciate the opportunity to be involved in Ms. Rodrigez. See note below for details. Please do not hesitate to contact us with questions or concerns. Pito Jade MD    Cardiac Testing/ Procedures: A. Cardiac Cath/PCI:    B.ECHO/GULSHAN: 5/24/18 echocardiogram-EF 55-60%. Wall thickness mild to moderately increased. KAYLA, moderate MR, moderate TR, small pericardial effusion      C. StressNuclear/Stress ECHO/Stress test:    D.Vascular:    E. EP: 5/25/18 Successful GULSHAN guided DCCV converting AF to sinus    F. Miscellaneous:    Subjective:  Tray Prajapati is a 80 y.o. female who returns for follow up today. Patient has been in a nursing home since her discharge from the hospital.  She is currently not on anticoagulation. Per , she seems to be doing better. Wheelchair-bound. Accompanied by /daughter. No CP/dyspnea ( Limited activity)        ROS:  (bold if positive, if negative)    As in HPI       Medications before admission:    Current Outpatient Medications   Medication Sig Dispense    cholecalciferol, VITAMIN D3, (VITAMIN D3) 5,000 unit tab tablet Take  by mouth daily.  loratadine (CLARITIN) 10 mg tablet Take 10 mg by mouth.  docusate sodium (COLACE) 100 mg capsule Take 100 mg by mouth two (2) times a day.  citalopram (CELEXA) 20 mg tablet Take 1 Tab by mouth nightly. Indications: Generalized Anxiety Disorder, major depressive disorder 15 Tab    dilTIAZem CD (CARDIZEM CD) 240 mg ER capsule Take 1 Cap by mouth daily. Indications: hypertension 15 Cap    donepezil (ARICEPT) 10 mg tablet Take 1 Tab by mouth daily. Indications: MODERATE TO SEVERE ALZHEIMER'S TYPE DEMENTIA 15 Tab    risperiDONE (RISPERDAL) 0.25 mg tablet Take 1 Tab by mouth two (2) times a day. Indications: schizophrenia 30 Tab    traZODone (DESYREL) 50 mg tablet Take 0.5 Tabs by mouth nightly as needed for Sleep.  Indications: insomnia associated with depression 15 Tab    acetaminophen (TYLENOL) 325 mg tablet Take 2 Tabs by mouth every six (6) hours as needed for Pain. 20 Tab    polyethylene glycol (MIRALAX) 17 gram packet Take 1 Packet by mouth daily. Indications: constipation 15 Packet     No current facility-administered medications for this visit. Family History of CAD:   Yes    Social History:  Current  Smoker No    Physical Exam:  Visit Vitals  /70 (BP 1 Location: Left arm, BP Patient Position: Sitting)   Pulse 67   Ht 5' 8\" (1.727 m)   Wt 127 lb (57.6 kg)   SpO2 97%   BMI 19.31 kg/m²          Gen: Well-developed, well-nourished,elderly, In wheel chair  Neck: Supple,No JVD, No Carotid Bruit,   Resp: No accessory muscle use, Clear breath sounds, No rales or rhonchi  Card: Irregular rate,Rythm,Normal S1, S2, 2/6 ESM+, No rubs or gallop. No thrills.    Abd:  Soft, BS+,   MSK: No cyanosis  Skin: No rashes    Neuro: moving all four extremities , follows commands appropriately  Psych:  Good insight, oriented to person, place , alert, Nml Affect  LE: No edema    EKG:      LABS:        Lab Results   Component Value Date/Time    WBC 6.4 09/07/2018 05:25 AM    HGB 13.0 09/07/2018 05:25 AM    HCT 38.0 09/07/2018 05:25 AM    PLATELET 547 68/57/4576 05:25 AM     Lab Results   Component Value Date/Time    Sodium 142 09/27/2018 06:21 AM    Potassium 3.2 (L) 09/27/2018 06:21 AM    Chloride 106 09/27/2018 06:21 AM    CO2 27 09/27/2018 06:21 AM    Anion gap 9 09/27/2018 06:21 AM    Glucose 93 09/27/2018 06:21 AM    Glucose 119 (H) 08/25/2018 06:00 AM    BUN 8 09/27/2018 06:21 AM    Creatinine 0.77 09/27/2018 06:21 AM    BUN/Creatinine ratio 10 (L) 09/27/2018 06:21 AM    GFR est AA >60 09/27/2018 06:21 AM    GFR est non-AA >60 09/27/2018 06:21 AM    Calcium 9.0 09/27/2018 06:21 AM       Lab Results   Component Value Date/Time    aPTT 59.5 (H) 08/14/2018 04:31 AM     Lab Results   Component Value Date/Time    INR 2.6 (H) 08/17/2018 06:50 AM    INR 3.3 (H) 08/16/2018 01:20 AM    INR 3.6 (H) 08/15/2018 12:54 AM    Prothrombin time 25.6 (H) 08/17/2018 06:50 AM    Prothrombin time 31.5 (H) 08/16/2018 01:20 AM    Prothrombin time 36.7 (H) 08/15/2018 12:54 AM     No components found for: Yousuf Aranda MD

## 2018-10-23 NOTE — PROGRESS NOTES
Patient was in hospital 9/5 to 9/6 for  Syncope, vasovagal    Patient says she has hot flashes only     No complaints at this time     Visit Vitals  /70 (BP 1 Location: Left arm, BP Patient Position: Sitting)   Pulse 67   Ht 5' 8\" (1.727 m)   Wt 127 lb (57.6 kg)   SpO2 97%   BMI 19.31 kg/m²

## 2019-01-02 ENCOUNTER — HOSPITAL ENCOUNTER (OUTPATIENT)
Dept: LAB | Age: 84
Discharge: HOME | End: 2019-01-02
Payer: MEDICARE

## 2019-01-02 ENCOUNTER — DOCUMENTATION ONLY (OUTPATIENT)
Dept: FAMILY MEDICINE CLINIC | Age: 84
End: 2019-01-02

## 2019-01-02 ENCOUNTER — OFFICE VISIT (OUTPATIENT)
Dept: FAMILY MEDICINE CLINIC | Age: 84
End: 2019-01-02

## 2019-01-02 VITALS
HEART RATE: 74 BPM | SYSTOLIC BLOOD PRESSURE: 100 MMHG | DIASTOLIC BLOOD PRESSURE: 58 MMHG | HEIGHT: 68 IN | OXYGEN SATURATION: 96 % | BODY MASS INDEX: 21.22 KG/M2 | TEMPERATURE: 98 F | WEIGHT: 140 LBS | RESPIRATION RATE: 20 BRPM

## 2019-01-02 DIAGNOSIS — E78.5 DYSLIPIDEMIA: ICD-10-CM

## 2019-01-02 DIAGNOSIS — I10 ESSENTIAL HYPERTENSION: Chronic | ICD-10-CM

## 2019-01-02 DIAGNOSIS — G30.1 LATE ONSET ALZHEIMER'S DISEASE WITH BEHAVIORAL DISTURBANCE (HCC): ICD-10-CM

## 2019-01-02 DIAGNOSIS — F02.818 LATE ONSET ALZHEIMER'S DISEASE WITH BEHAVIORAL DISTURBANCE (HCC): ICD-10-CM

## 2019-01-02 DIAGNOSIS — E11.9 CONTROLLED TYPE 2 DIABETES MELLITUS WITHOUT COMPLICATION, WITHOUT LONG-TERM CURRENT USE OF INSULIN (HCC): Primary | ICD-10-CM

## 2019-01-02 PROBLEM — E11.21 TYPE 2 DIABETES WITH NEPHROPATHY (HCC): Status: ACTIVE | Noted: 2019-01-02

## 2019-01-02 PROCEDURE — 83036 HEMOGLOBIN GLYCOSYLATED A1C: CPT

## 2019-01-02 PROCEDURE — 80061 LIPID PANEL: CPT

## 2019-01-02 PROCEDURE — 84443 ASSAY THYROID STIM HORMONE: CPT

## 2019-01-02 PROCEDURE — 80053 COMPREHEN METABOLIC PANEL: CPT

## 2019-01-02 PROCEDURE — 85025 COMPLETE CBC W/AUTO DIFF WBC: CPT

## 2019-01-02 RX ORDER — CITALOPRAM 10 MG/1
TABLET ORAL DAILY
COMMUNITY
End: 2019-01-14 | Stop reason: SDUPTHER

## 2019-01-02 RX ORDER — POTASSIUM CHLORIDE 1500 MG/1
20 TABLET, FILM COATED, EXTENDED RELEASE ORAL DAILY
COMMUNITY
End: 2019-01-14 | Stop reason: SDUPTHER

## 2019-01-02 RX ORDER — MELATONIN
DAILY
COMMUNITY
End: 2019-06-17

## 2019-01-02 NOTE — PROGRESS NOTES
Patient here for rehab f/u. Discharged from [facility], rehab for weakness and PT. Discharged 12/22/2018. New meds brought in with . Patient currently being seen by [facility] for hh , pt.  1. Have you been to the ER, urgent care clinic since your last visit? Hospitalized since your last visit? Yes [facility]    2. Have you seen or consulted any other health care providers outside of the [address] [place] [address] since your last visit? Include any pap smears or colon screening. No     Lab Results   Component Value Date/Time    Microalb/Creat ratio (ug/mg creat.) 133.1 (H) 04/16/2018 09:16 AM      Chief Complaint   Patient presents with   [facility] Follow Up     she is a 80y.o. year old female who presents for evaluation. See Diabetic Report Card listed above. Patient Active Problem List    Diagnosis    Type 2 diabetes with nephropathy [address] [address]    Dementia    Aggression    Psychosis ([facility]    Syncope, vasovagal    Late onset Alzheimer's disease with behavioral disturbance    Frequent falls    A-fib ([facility]    Dyslipidemia    Chronic anticoagulation    Constipation    History of pulmonary embolism    Controlled type 2 diabetes mellitus without complication, without long-term current use of insulin (HCC)    Chronic pain    HTN (hypertension)    OA (osteoarthritis)    Fibromyalgia    GERD (gastroesophageal reflux disease)       Reviewed PmHx, RxHx, FmHx, SocHx, AllgHx--dated and updated in the chart.     Review of Systems - negative except as listed above in the HPI    Objective:     Vitals:    01/02/19 1012   BP: 100/58   Pulse: 74   Resp: 20   Temp: 98 °F (36.7 °C)   SpO2: 96%   Weight: 140 lb (63.5 kg)   Height: 5' 8\" (1.727 m)     Physical Examination: General appearance - alert, well appearing, and in no distress  Neck - supple, no significant adenopathy  Chest - clear to auscultation, no wheezes, rales or rhonchi, symmetric air entry  Heart - normal rate, regular rhythm, normal S1, S2, no murmurs, rubs, clicks or gallops  Abdomen - soft, nontender, nondistended, no masses or organomegaly  Extremities - peripheral pulses normal, no pedal edema, no clubbing or cyanosis    Assessment/ Plan:   Diagnoses and all orders for this visit:    1. Controlled type 2 diabetes mellitus without complication, without long-term current use of insulin (HCC)  -     LIPID PANEL  -     METABOLIC PANEL, COMPREHENSIVE  -     CBC WITH AUTOMATED DIFF  -     TSH 3RD GENERATION  -     HEMOGLOBIN A1C WITH EAG  -off all rx    2. Essential hypertension  -     LIPID PANEL  -     METABOLIC PANEL, COMPREHENSIVE  -at goal    3. Dyslipidemia  -     LIPID PANEL  -     METABOLIC PANEL, COMPREHENSIVE    4. Late onset Alzheimer's disease with behavioral disturbance  -stable  -on new rx       Follow-up Disposition:  Return in about 3 months (around 4/2/2019). Lab Results   Component Value Date/Time    Cholesterol, total 217 (H) 08/25/2018 06:00 AM    HDL Cholesterol 50 08/25/2018 06:00 AM    LDL, calculated 140 (H) 08/25/2018 06:00 AM    Triglyceride 135 08/25/2018 06:00 AM    CHOL/HDL Ratio 4.3 08/25/2018 06:00 AM     Lab Results   Component Value Date/Time    Hemoglobin A1c 6.8 (H) 05/25/2018 12:16 PM    Hemoglobin A1c 5.4 04/16/2018 09:16 AM    Hemoglobin A1c 6.0 (H) 11/27/2017 09:14 AM    Microalb/Creat ratio (ug/mg creat.) 133.1 (H) 04/16/2018 09:16 AM    LDL, calculated 140 (H) 08/25/2018 06:00 AM    Creatinine (POC) 0.9 11/12/2012 07:33 AM    Creatinine 0.77 09/27/2018 06:21 AM          Discussed with patient goal of Diabetes to include:  HgA1C <7, LDL cholesterol <100, Blood pressure <140/80. Discussed with patient diet and weight management and to get regular exercise. Recommend yearly eye exams and daily foot care. The patient understands and agrees with the plan. I have discussed the diagnosis with the patient and the intended plan as seen in the above orders.   The patient has received an after-visit summary and questions were answered concerning future plans. Medication Side Effects and Warnings were discussed with patient  Patient Labs were reviewed and or requested  Patient Past Records were reviewed and or requested    Tom Foster M.D. [facility]    There are no Patient Instructions on file for this visit.

## 2019-01-03 LAB
ALBUMIN SERPL-MCNC: 4.1 G/DL (ref 3.5–4.7)
ALBUMIN/GLOB SERPL: 1.8 {RATIO} (ref 1.2–2.2)
ALP SERPL-CCNC: 109 IU/L (ref 39–117)
ALT SERPL-CCNC: 20 IU/L (ref 0–32)
AST SERPL-CCNC: 32 IU/L (ref 0–40)
BASOPHILS # BLD AUTO: 0 X10E3/UL (ref 0–0.2)
BASOPHILS NFR BLD AUTO: 1 %
BILIRUB SERPL-MCNC: 0.3 MG/DL (ref 0–1.2)
BUN SERPL-MCNC: 15 MG/DL (ref 8–27)
BUN/CREAT SERPL: 15 (ref 12–28)
CALCIUM SERPL-MCNC: 9.7 MG/DL (ref 8.7–10.3)
CHLORIDE SERPL-SCNC: 104 MMOL/L (ref 96–106)
CHOLEST SERPL-MCNC: 188 MG/DL (ref 100–199)
CO2 SERPL-SCNC: 19 MMOL/L (ref 20–29)
CREAT SERPL-MCNC: 1.02 MG/DL (ref 0.57–1)
EOSINOPHIL # BLD AUTO: 0 X10E3/UL (ref 0–0.4)
EOSINOPHIL NFR BLD AUTO: 1 %
ERYTHROCYTE [DISTWIDTH] IN BLOOD BY AUTOMATED COUNT: 13.8 % (ref 12.3–15.4)
EST. AVERAGE GLUCOSE BLD GHB EST-MCNC: 97 MG/DL
GLOBULIN SER CALC-MCNC: 2.3 G/DL (ref 1.5–4.5)
GLUCOSE SERPL-MCNC: 139 MG/DL (ref 65–99)
HBA1C MFR BLD: 5 % (ref 4.8–5.6)
HCT VFR BLD AUTO: 35.3 % (ref 34–46.6)
HDLC SERPL-MCNC: 64 MG/DL
HGB BLD-MCNC: 11.7 G/DL (ref 11.1–15.9)
IMM GRANULOCYTES # BLD: 0 X10E3/UL (ref 0–0.1)
IMM GRANULOCYTES NFR BLD: 0 %
INTERPRETATION, 910389: NORMAL
INTERPRETATION: NORMAL
LDLC SERPL CALC-MCNC: 93 MG/DL (ref 0–99)
LYMPHOCYTES # BLD AUTO: 1.5 X10E3/UL (ref 0.7–3.1)
LYMPHOCYTES NFR BLD AUTO: 28 %
Lab: NORMAL
MCH RBC QN AUTO: 32.7 PG (ref 26.6–33)
MCHC RBC AUTO-ENTMCNC: 33.1 G/DL (ref 31.5–35.7)
MCV RBC AUTO: 99 FL (ref 79–97)
MONOCYTES # BLD AUTO: 0.2 X10E3/UL (ref 0.1–0.9)
MONOCYTES NFR BLD AUTO: 4 %
NEUTROPHILS # BLD AUTO: 3.5 X10E3/UL (ref 1.4–7)
NEUTROPHILS NFR BLD AUTO: 66 %
PDF IMAGE, 910387: NORMAL
PLATELET # BLD AUTO: 203 X10E3/UL (ref 150–379)
POTASSIUM SERPL-SCNC: 4.3 MMOL/L (ref 3.5–5.2)
PROT SERPL-MCNC: 6.4 G/DL (ref 6–8.5)
RBC # BLD AUTO: 3.58 X10E6/UL (ref 3.77–5.28)
SODIUM SERPL-SCNC: 141 MMOL/L (ref 134–144)
TRIGL SERPL-MCNC: 156 MG/DL (ref 0–149)
TSH SERPL DL<=0.005 MIU/L-ACNC: 1.46 UIU/ML (ref 0.45–4.5)
VLDLC SERPL CALC-MCNC: 31 MG/DL (ref 5–40)
WBC # BLD AUTO: 5.2 X10E3/UL (ref 3.4–10.8)

## 2019-01-03 NOTE — PROGRESS NOTES
After reviewing your labs, I believe they are within normal  limits for your age and let us stay with our current plan of action. If you have any questions, feel free to email thru [facility], or give us   a call back. Divine Urena M.D.   Good Help to Those in Need  \"You maybe whatever you resolve to be\"

## 2019-01-03 NOTE — PROGRESS NOTES
[facility] forms signed & faxed to [phone],ok,Copy placed in scan folder to be scanned to chart.

## 2019-01-14 RX ORDER — CITALOPRAM 10 MG/1
10 TABLET ORAL DAILY
Qty: 90 TAB | Refills: 3 | Status: SHIPPED | OUTPATIENT
Start: 2019-01-14 | End: 2019-10-15

## 2019-01-14 RX ORDER — POTASSIUM CHLORIDE 1500 MG/1
20 TABLET, FILM COATED, EXTENDED RELEASE ORAL DAILY
Qty: 90 TAB | Refills: 3 | Status: SHIPPED | OUTPATIENT
Start: 2019-01-14 | End: 2019-02-28

## 2019-01-14 RX ORDER — DONEPEZIL HYDROCHLORIDE 10 MG/1
10 TABLET, FILM COATED ORAL DAILY
Qty: 90 TAB | Refills: 3 | Status: SHIPPED | OUTPATIENT
Start: 2019-01-14 | End: 2019-02-28

## 2019-01-14 RX ORDER — DILTIAZEM HYDROCHLORIDE 240 MG/1
240 CAPSULE, COATED, EXTENDED RELEASE ORAL DAILY
Qty: 90 CAP | Refills: 3 | Status: SHIPPED | OUTPATIENT
Start: 2019-01-14 | End: 2019-10-15

## 2019-02-28 ENCOUNTER — OFFICE VISIT (OUTPATIENT)
Dept: FAMILY MEDICINE CLINIC | Age: 84
End: 2019-02-28

## 2019-02-28 ENCOUNTER — HOSPITAL ENCOUNTER (OUTPATIENT)
Dept: LAB | Age: 84
Discharge: HOME | End: 2019-02-28
Payer: MEDICARE

## 2019-02-28 VITALS
RESPIRATION RATE: 18 BRPM | BODY MASS INDEX: 20.92 KG/M2 | HEART RATE: 98 BPM | TEMPERATURE: 98.6 F | DIASTOLIC BLOOD PRESSURE: 81 MMHG | WEIGHT: 138 LBS | HEIGHT: 68 IN | OXYGEN SATURATION: 95 % | SYSTOLIC BLOOD PRESSURE: 135 MMHG

## 2019-02-28 DIAGNOSIS — I48.0 PAROXYSMAL ATRIAL FIBRILLATION (HCC): ICD-10-CM

## 2019-02-28 DIAGNOSIS — M19.90 OSTEOARTHRITIS, UNSPECIFIED OSTEOARTHRITIS TYPE, UNSPECIFIED SITE: Chronic | ICD-10-CM

## 2019-02-28 DIAGNOSIS — G30.1 LATE ONSET ALZHEIMER'S DISEASE WITH BEHAVIORAL DISTURBANCE (HCC): ICD-10-CM

## 2019-02-28 DIAGNOSIS — F02.818 LATE ONSET ALZHEIMER'S DISEASE WITH BEHAVIORAL DISTURBANCE (HCC): ICD-10-CM

## 2019-02-28 DIAGNOSIS — I10 ESSENTIAL HYPERTENSION: Chronic | ICD-10-CM

## 2019-02-28 DIAGNOSIS — R19.7 DIARRHEA, UNSPECIFIED TYPE: Primary | ICD-10-CM

## 2019-02-28 DIAGNOSIS — E11.9 CONTROLLED TYPE 2 DIABETES MELLITUS WITHOUT COMPLICATION, WITHOUT LONG-TERM CURRENT USE OF INSULIN (HCC): ICD-10-CM

## 2019-02-28 PROCEDURE — 80053 COMPREHEN METABOLIC PANEL: CPT

## 2019-02-28 PROCEDURE — 84443 ASSAY THYROID STIM HORMONE: CPT

## 2019-02-28 PROCEDURE — 85025 COMPLETE CBC W/AUTO DIFF WBC: CPT

## 2019-02-28 RX ORDER — MELOXICAM 7.5 MG/1
7.5 TABLET ORAL DAILY
Qty: 30 TAB | Refills: 1 | Status: SHIPPED | OUTPATIENT
Start: 2019-02-28 | End: 2019-06-17

## 2019-02-28 RX ORDER — LOPERAMIDE HCL 2 MG
2 TABLET ORAL
COMMUNITY

## 2019-02-28 NOTE — PROGRESS NOTES
Patient here for severe diarrhea x several months. Patient states she goes everyday from two times to five times per day. 1. Have you been to the ER, urgent care clinic since your last visit? Hospitalized since your last visit? No    2. Have you seen or consulted any other health care providers outside of the [address] [place] [address] since your last visit? Include any pap smears or colon screening. No     Loose stools 2-6 times a day for 4 months      Chief Complaint   Patient presents with    Diarrhea     severe for months     She is a 80 y.o. female who presents for evalution. Reviewed PmHx, RxHx, FmHx, SocHx, AllgHx and updated and dated in the chart.     Patient Active Problem List    Diagnosis    Type 2 diabetes with nephropathy (HCC)    Dementia    Aggression    [facility]osis [facility] [address]    Syncope, vasovagal    Late onset Alzheimer's disease with behavioral disturbance    Frequent falls    A-fib ([facility] [address] [facility]    Dyslipidemia    Chronic anticoagulation    Constipation    History of pulmonary embolism    Controlled type 2 diabetes mellitus without complication, without long-term current use of insulin (HCC)    Chronic pain    HTN (hypertension)    OA (osteoarthritis)    Fibromyalgia    GERD (gastroesophageal reflux disease)       Review of Systems - negative except as listed above in the HPI    Objective:     Vitals:    02/28/19 0840   BP: 135/81   Pulse: 98   Resp: 18   Temp: 98.6 °F (37 °C)   SpO2: 95%   Weight: 138 lb (62.6 kg)   Height: 5' 8\" (1.727 m)     Physical Examination: General appearance - alert, well appearing, and in no distress  Neck - supple, no significant adenopathy  Chest - clear to auscultation, no wheezes, rales or rhonchi, symmetric air entry  Heart - normal rate, regular rhythm, normal S1, S2, no murmurs, rubs, clicks or gallops  Abdomen - soft, nontender, nondistended, no masses or organomegaly  Extremities - peripheral pulses normal, no pedal edema, no clubbing or cyanosis    Assessment/ Plan:   Diagnoses and all orders for this visit:    1. Diarrhea, unspecified type  -     METABOLIC PANEL, COMPREHENSIVE  -     CBC WITH AUTOMATED DIFF  -     TSH 3RD GENERATION  -suspect due to Aricept so hold rx    2. Essential hypertension  -     METABOLIC PANEL, COMPREHENSIVE  -at goal    3. Controlled type 2 diabetes mellitus without complication, without long-term current use of insulin (HCC)  -     METABOLIC PANEL, COMPREHENSIVE  -next labs in April    4. Paroxysmal atrial fibrillation (HCC)  -stable    5. Late onset Alzheimer's disease with behavioral disturbance  -hold rx    6. Osteoarthritis, unspecified osteoarthritis type, unspecified site  -     meloxicam (MOBIC) 7.5 mg tablet; Take 1 Tab by mouth daily.  -add rx       Follow-up Disposition:  Return if symptoms worsen or fail to improve. I have discussed the diagnosis with the patient and the intended plan as seen in the above orders. The patient understands and agrees with the plan. The patient has received an after-visit summary and questions were answered concerning future plans. Medication Side Effects and Warnings were discussed with patient  Patient Labs were reviewed and or requested:  Patient Past Records were reviewed and or requested    Sudarshan Soares M.D. There are no Patient Instructions on file for this visit.

## 2019-03-01 LAB
ALBUMIN SERPL-MCNC: 4.1 G/DL (ref 3.5–4.7)
ALBUMIN/GLOB SERPL: 1.5 {RATIO} (ref 1.2–2.2)
ALP SERPL-CCNC: 114 IU/L (ref 39–117)
ALT SERPL-CCNC: 7 IU/L (ref 0–32)
AST SERPL-CCNC: 15 IU/L (ref 0–40)
BASOPHILS # BLD AUTO: 0 X10E3/UL (ref 0–0.2)
BASOPHILS NFR BLD AUTO: 1 %
BILIRUB SERPL-MCNC: 0.5 MG/DL (ref 0–1.2)
BUN SERPL-MCNC: 17 MG/DL (ref 8–27)
BUN/CREAT SERPL: 18 (ref 12–28)
CALCIUM SERPL-MCNC: 9.8 MG/DL (ref 8.7–10.3)
CHLORIDE SERPL-SCNC: 101 MMOL/L (ref 96–106)
CO2 SERPL-SCNC: 25 MMOL/L (ref 20–29)
CREAT SERPL-MCNC: 0.96 MG/DL (ref 0.57–1)
EOSINOPHIL # BLD AUTO: 0.1 X10E3/UL (ref 0–0.4)
EOSINOPHIL NFR BLD AUTO: 1 %
ERYTHROCYTE [DISTWIDTH] IN BLOOD BY AUTOMATED COUNT: 13.2 % (ref 12.3–15.4)
GLOBULIN SER CALC-MCNC: 2.8 G/DL (ref 1.5–4.5)
GLUCOSE SERPL-MCNC: 99 MG/DL (ref 65–99)
HCT VFR BLD AUTO: 34.4 % (ref 34–46.6)
HGB BLD-MCNC: 11.2 G/DL (ref 11.1–15.9)
IMM GRANULOCYTES # BLD AUTO: 0 X10E3/UL (ref 0–0.1)
IMM GRANULOCYTES NFR BLD AUTO: 0 %
INTERPRETATION: NORMAL
LYMPHOCYTES # BLD AUTO: 2.1 X10E3/UL (ref 0.7–3.1)
LYMPHOCYTES NFR BLD AUTO: 36 %
Lab: NORMAL
MCH RBC QN AUTO: 32.7 PG (ref 26.6–33)
MCHC RBC AUTO-ENTMCNC: 32.6 G/DL (ref 31.5–35.7)
MCV RBC AUTO: 101 FL (ref 79–97)
MONOCYTES # BLD AUTO: 0.4 X10E3/UL (ref 0.1–0.9)
MONOCYTES NFR BLD AUTO: 6 %
NEUTROPHILS # BLD AUTO: 3.3 X10E3/UL (ref 1.4–7)
NEUTROPHILS NFR BLD AUTO: 56 %
PLATELET # BLD AUTO: 257 X10E3/UL (ref 150–379)
POTASSIUM SERPL-SCNC: 4.3 MMOL/L (ref 3.5–5.2)
PROT SERPL-MCNC: 6.9 G/DL (ref 6–8.5)
RBC # BLD AUTO: 3.42 X10E6/UL (ref 3.77–5.28)
SODIUM SERPL-SCNC: 141 MMOL/L (ref 134–144)
TSH SERPL DL<=0.005 MIU/L-ACNC: 1.82 UIU/ML (ref 0.45–4.5)
WBC # BLD AUTO: 5.8 X10E3/UL (ref 3.4–10.8)

## 2019-03-04 NOTE — PROGRESS NOTES
After reviewing your labs, I believe they are within normal  limits for your age and let us stay with our current plan of action. If you have any questions, feel free to email thru [facility], or give us   a call back. Delmy Hardy M.D.   Good Help to Those in Need  \"You maybe whatever you resolve to be\"

## 2019-04-16 ENCOUNTER — OFFICE VISIT (OUTPATIENT)
Dept: FAMILY MEDICINE CLINIC | Age: 84
End: 2019-04-16

## 2019-04-16 VITALS
HEART RATE: 85 BPM | SYSTOLIC BLOOD PRESSURE: 131 MMHG | WEIGHT: 135 LBS | RESPIRATION RATE: 18 BRPM | HEIGHT: 68 IN | TEMPERATURE: 98.8 F | DIASTOLIC BLOOD PRESSURE: 76 MMHG | OXYGEN SATURATION: 95 % | BODY MASS INDEX: 20.46 KG/M2

## 2019-04-16 DIAGNOSIS — R21 RASH: Primary | ICD-10-CM

## 2019-04-16 DIAGNOSIS — I10 ESSENTIAL HYPERTENSION: Chronic | ICD-10-CM

## 2019-04-16 RX ORDER — HYDROXYZINE PAMOATE 25 MG/1
25 CAPSULE ORAL
Qty: 30 CAP | Refills: 3 | Status: SHIPPED | OUTPATIENT
Start: 2019-04-16 | End: 2019-05-16

## 2019-04-16 NOTE — PROGRESS NOTES
Patient here for 3 month f/u diarrhea and dementia.  states since off donepezil 10 mg , patient is more forgetful, memory worse.  states her diarrhea is still present even after stopping the medication, which was the reason Dr. Jayson Fernandez told him to stop it. Patient no longer on coumadin , no INR needed. Continues taking tylenol and meloxicam for pain. Continues itching all over. 1. Have you been to the ER, urgent care clinic since your last visit? Hospitalized since your last visit? No    2. Have you seen or consulted any other health care providers outside of the [address] [place] [address] since your last visit? Include any pap smears or colon screening. No       Chief Complaint   Patient presents with    Follow-up     dementia, off rx    Follow-up     diarrhea    Skin Problem     itching all over     She is a 80 y.o. female who presents for evalution. Reviewed PmHx, RxHx, FmHx, SocHx, AllgHx and updated and dated in the chart.     Patient Active Problem List    Diagnosis    Type 2 diabetes with nephropathy (HCC)    Dementia    Aggression    Psychosis ([facility] [address]    Syncope, vasovagal    Late onset Alzheimer's disease with behavioral disturbance    Frequent falls    A-fib ([facility] [address] [facility]    Dyslipidemia    Chronic anticoagulation    Constipation    History of pulmonary embolism    Controlled type 2 diabetes mellitus without complication, without long-term current use of insulin (HCC)    Chronic pain    HTN (hypertension)    OA (osteoarthritis)    Fibromyalgia    GERD (gastroesophageal reflux disease)       Review of Systems - negative except as listed above in the HPI    Objective:     Vitals:    04/16/19 0739   BP: 131/76   Pulse: 85   Resp: 18   Temp: 98.8 °F (37.1 °C)   SpO2: 95%   Weight: 135 lb (61.2 kg)   Height: 5' 8\" (1.727 m)     Physical Examination: General appearance - alert, well appearing, and in no distress  Neck - supple, no significant adenopathy  Chest - clear to auscultation, no wheezes, rales or rhonchi, symmetric air entry  Heart - normal rate, regular rhythm, normal S1, S2, no murmurs, rubs, clicks or gallops  Abdomen - soft, nontender, nondistended, no masses or organomegaly    Assessment/ Plan:   Diagnoses and all orders for this visit:    1. Rash  -     hydrOXYzine pamoate (VISTARIL) 25 mg capsule; Take 1 Cap by mouth nightly for 30 days. Indications: sleep and itching    2. Essential hypertension  -at goal       Follow-up and Dispositions    · Return in about 6 months (around 10/16/2019). I have discussed the diagnosis with the patient and the intended plan as seen in the above orders. The patient understands and agrees with the plan. The patient has received an after-visit summary and questions were answered concerning future plans. Medication Side Effects and Warnings were discussed with patient  Patient Labs were reviewed and or requested:  Patient Past Records were reviewed and or requested    Ugo Pena M.D. There are no Patient Instructions on file for this visit.

## 2019-05-02 ENCOUNTER — TELEPHONE (OUTPATIENT)
Dept: FAMILY MEDICINE CLINIC | Age: 84
End: 2019-05-02

## 2019-05-02 NOTE — TELEPHONE ENCOUNTER
Returned call to , on Anaid Collado, informed him per Dr. Kate Flores. No other medication to give. Suggested try breaking pill in half and only giving her half at a time. That might cut out down the nausea. When her body gets used to that, we can try to increase it.

## 2019-05-02 NOTE — TELEPHONE ENCOUNTER
Patients  Alyssa Hopkins on hippa called in and is wondering  If there is anything else other than citalopram Tray can take cause she is refusing to take it because it makes her sick. If there is anything else you can call in please send to the [facility] on file. Call back number for them if needed is [phone]. Thanks.

## 2019-05-08 DIAGNOSIS — R19.7 DIARRHEA, UNSPECIFIED TYPE: Primary | ICD-10-CM

## 2019-05-08 RX ORDER — ONDANSETRON 8 MG/1
8 TABLET, ORALLY DISINTEGRATING ORAL
Qty: 30 TAB | Refills: 0 | Status: SHIPPED | OUTPATIENT
Start: 2019-05-08

## 2019-05-08 RX ORDER — DIPHENOXYLATE HYDROCHLORIDE AND ATROPINE SULFATE 2.5; .025 MG/1; MG/1
1 TABLET ORAL
Qty: 45 TAB | Refills: 0 | OUTPATIENT
Start: 2019-05-08 | End: 2019-06-13 | Stop reason: SDUPTHER

## 2019-05-26 ENCOUNTER — HOSPITAL ENCOUNTER (EMERGENCY)
Age: 84
Discharge: HOME | End: 2019-05-26
Attending: EMERGENCY MEDICINE
Payer: MEDICARE

## 2019-05-26 ENCOUNTER — APPOINTMENT (OUTPATIENT)
Dept: GENERAL RADIOLOGY | Age: 84
End: 2019-05-26
Attending: NURSE PRACTITIONER
Payer: MEDICARE

## 2019-05-26 VITALS
RESPIRATION RATE: 20 BRPM | OXYGEN SATURATION: 100 % | BODY MASS INDEX: 20.46 KG/M2 | HEIGHT: 68 IN | SYSTOLIC BLOOD PRESSURE: 162 MMHG | DIASTOLIC BLOOD PRESSURE: 83 MMHG | TEMPERATURE: 97 F | HEART RATE: 90 BPM | WEIGHT: 135 LBS

## 2019-05-26 DIAGNOSIS — R31.9 URINARY TRACT INFECTION WITH HEMATURIA, SITE UNSPECIFIED: Primary | ICD-10-CM

## 2019-05-26 DIAGNOSIS — N39.0 URINARY TRACT INFECTION WITH HEMATURIA, SITE UNSPECIFIED: Primary | ICD-10-CM

## 2019-05-26 LAB
ALBUMIN SERPL-MCNC: 3.9 G/DL (ref 3.5–5)
ALBUMIN/GLOB SERPL: 1.1 {RATIO} (ref 1.1–2.2)
ALP SERPL-CCNC: 146 U/L (ref 45–117)
ALT SERPL-CCNC: 14 U/L (ref 12–78)
ANION GAP SERPL CALC-SCNC: 5 MMOL/L (ref 5–15)
APPEARANCE UR: ABNORMAL
AST SERPL-CCNC: 13 U/L (ref 15–37)
BACTERIA URNS QL MICRO: ABNORMAL /HPF
BASOPHILS # BLD: 0.1 K/UL (ref 0–0.1)
BASOPHILS NFR BLD: 1 % (ref 0–1)
BILIRUB SERPL-MCNC: 0.7 MG/DL (ref 0.2–1)
BILIRUB UR QL: NEGATIVE
BUN SERPL-MCNC: 16 MG/DL (ref 6–20)
BUN/CREAT SERPL: 16 (ref 12–20)
CALCIUM SERPL-MCNC: 10.1 MG/DL (ref 8.5–10.1)
CHLORIDE SERPL-SCNC: 106 MMOL/L (ref 97–108)
CO2 SERPL-SCNC: 29 MMOL/L (ref 21–32)
COLOR UR: ABNORMAL
COMMENT, HOLDF: NORMAL
CREAT SERPL-MCNC: 1.02 MG/DL (ref 0.55–1.02)
DIFFERENTIAL METHOD BLD: ABNORMAL
EOSINOPHIL # BLD: 0.1 K/UL (ref 0–0.4)
EOSINOPHIL NFR BLD: 1 % (ref 0–7)
EPITH CASTS URNS QL MICRO: ABNORMAL /LPF
ERYTHROCYTE [DISTWIDTH] IN BLOOD BY AUTOMATED COUNT: 12.5 % (ref 11.5–14.5)
GLOBULIN SER CALC-MCNC: 3.5 G/DL (ref 2–4)
GLUCOSE SERPL-MCNC: 105 MG/DL (ref 65–100)
GLUCOSE UR STRIP.AUTO-MCNC: NEGATIVE MG/DL
HCT VFR BLD AUTO: 39.8 % (ref 35–47)
HGB BLD-MCNC: 13.1 G/DL (ref 11.5–16)
HGB UR QL STRIP: ABNORMAL
HYALINE CASTS URNS QL MICRO: ABNORMAL /LPF (ref 0–5)
IMM GRANULOCYTES # BLD AUTO: 0 K/UL (ref 0–0.04)
IMM GRANULOCYTES NFR BLD AUTO: 0 % (ref 0–0.5)
KETONES UR QL STRIP.AUTO: NEGATIVE MG/DL
LEUKOCYTE ESTERASE UR QL STRIP.AUTO: ABNORMAL
LIPASE SERPL-CCNC: 313 U/L (ref 73–393)
LYMPHOCYTES # BLD: 1.8 K/UL (ref 0.8–3.5)
LYMPHOCYTES NFR BLD: 32 % (ref 12–49)
MCH RBC QN AUTO: 32.7 PG (ref 26–34)
MCHC RBC AUTO-ENTMCNC: 32.9 G/DL (ref 30–36.5)
MCV RBC AUTO: 99.3 FL (ref 80–99)
MONOCYTES # BLD: 0.3 K/UL (ref 0–1)
MONOCYTES NFR BLD: 5 % (ref 5–13)
NEUTS SEG # BLD: 3.6 K/UL (ref 1.8–8)
NEUTS SEG NFR BLD: 61 % (ref 32–75)
NITRITE UR QL STRIP.AUTO: POSITIVE
NRBC # BLD: 0 K/UL (ref 0–0.01)
NRBC BLD-RTO: 0 PER 100 WBC
PH UR STRIP: 6 [PH] (ref 5–8)
PLATELET # BLD AUTO: 168 K/UL (ref 150–400)
PMV BLD AUTO: 11.1 FL (ref 8.9–12.9)
POTASSIUM SERPL-SCNC: 3.6 MMOL/L (ref 3.5–5.1)
PROT SERPL-MCNC: 7.4 G/DL (ref 6.4–8.2)
PROT UR STRIP-MCNC: ABNORMAL MG/DL
RBC # BLD AUTO: 4.01 M/UL (ref 3.8–5.2)
RBC #/AREA URNS HPF: ABNORMAL /HPF (ref 0–5)
SAMPLES BEING HELD,HOLD: NORMAL
SODIUM SERPL-SCNC: 140 MMOL/L (ref 136–145)
SP GR UR REFRACTOMETRY: 1.02 (ref 1–1.03)
TROPONIN I BLD-MCNC: <0.04 NG/ML (ref 0–0.08)
TROPONIN I SERPL-MCNC: <0.05 NG/ML
UR CULT HOLD, URHOLD: NORMAL
UROBILINOGEN UR QL STRIP.AUTO: 0.2 EU/DL (ref 0.2–1)
WBC # BLD AUTO: 5.8 K/UL (ref 3.6–11)
WBC URNS QL MICRO: ABNORMAL /HPF (ref 0–4)

## 2019-05-26 PROCEDURE — 84484 ASSAY OF TROPONIN QUANT: CPT

## 2019-05-26 PROCEDURE — 71046 X-RAY EXAM CHEST 2 VIEWS: CPT

## 2019-05-26 PROCEDURE — 99284 EMERGENCY DEPT VISIT MOD MDM: CPT

## 2019-05-26 PROCEDURE — 87086 URINE CULTURE/COLONY COUNT: CPT

## 2019-05-26 PROCEDURE — 83690 ASSAY OF LIPASE: CPT

## 2019-05-26 PROCEDURE — 36415 COLL VENOUS BLD VENIPUNCTURE: CPT

## 2019-05-26 PROCEDURE — 87186 SC STD MICRODIL/AGAR DIL: CPT

## 2019-05-26 PROCEDURE — 85025 COMPLETE CBC W/AUTO DIFF WBC: CPT

## 2019-05-26 PROCEDURE — 80053 COMPREHEN METABOLIC PANEL: CPT

## 2019-05-26 PROCEDURE — 74011250637 HC RX REV CODE- 250/637: Performed by: NURSE PRACTITIONER

## 2019-05-26 PROCEDURE — 93005 ELECTROCARDIOGRAM TRACING: CPT

## 2019-05-26 PROCEDURE — 81001 URINALYSIS AUTO W/SCOPE: CPT

## 2019-05-26 PROCEDURE — 87077 CULTURE AEROBIC IDENTIFY: CPT

## 2019-05-26 RX ORDER — ONDANSETRON 4 MG/1
8 TABLET, ORALLY DISINTEGRATING ORAL
Status: COMPLETED | OUTPATIENT
Start: 2019-05-26 | End: 2019-05-26

## 2019-05-26 RX ORDER — NITROFURANTOIN 25; 75 MG/1; MG/1
100 CAPSULE ORAL 2 TIMES DAILY
Qty: 14 CAP | Refills: 0 | Status: SHIPPED | OUTPATIENT
Start: 2019-05-26 | End: 2019-06-02

## 2019-05-26 RX ORDER — NITROFURANTOIN 25; 75 MG/1; MG/1
100 CAPSULE ORAL
Status: COMPLETED | OUTPATIENT
Start: 2019-05-26 | End: 2019-05-26

## 2019-05-26 RX ADMIN — NITROFURANTOIN MONOHYDRATE/MACROCRYSTALLINE 100 MG: 25; 75 CAPSULE ORAL at 11:01

## 2019-05-26 RX ADMIN — ONDANSETRON 8 MG: 4 TABLET, ORALLY DISINTEGRATING ORAL at 11:00

## 2019-05-26 NOTE — ED PROVIDER NOTES
This is an 55-year-old female who is brought in by her  for complaints of abdominal pain, nausea, vomiting x5 days. Patient states she was seen by her primary care doctor who gave her a prescription for Lomotil. Patient's  states that she took one Lomotil and her diarrhea stopped only to return the next day. Patient's  states she's been complaining of increased nausea, and has vomited twice this morning with increased abdominal pain. Patient is also complaining of chest pain and sees Dr. Jacek Ryan here at [address] as her cardiologist. Per  she missed her appointment last week secondary to diarrhea. Denies any fever but states he does not have any thermometer at home to take her temperature. Patient states she did have chills. Denies shortness of breath, denies dizziness. Appetite unchanged. Patient does have a history of dementia. There no further complaints at this time. Jose Luis Murray MD  Past Medical History:  No date: Anemia  No date: Atrial fibrillation [facility])  No date: Cancer [facility])      Comment:  basal cell on nose  No date: Chronic pain      Comment:  back pain  4/1/2010: Fibromyalgia  4/1/2010: GERD (gastroesophageal reflux disease)  4/1/2010: Hiatal hernia  4/1/2010: High cholesterol  4/1/2010: HTN (hypertension)  No date: Ill-defined condition      Comment:  A.  Fib  4/1/2010: OA (osteoarthritis)      Comment:  back  2015: Pulmonary emboli (HCC)  Past Surgical History:  No date: BREAST SURGERY PROCEDURE UNLISTED      Comment:  Breast im-plants x 2  No date: ENLARGE BREAST WITH IMPLANT  No date: HX APPENDECTOMY  No date: HX BREAST BIOPSY  No date: HX BREAST RECONSTRUCTION      Comment:  Breast implants removed 1992  No date: HX CATARACT REMOVAL  9/11/2013: HX CHOLECYSTECTOMY  No date: HX HYSTERECTOMY  2008: HX KNEE REPLACEMENT      Comment:  bilateral  2007: HX ORTHOPAEDIC      Comment:  Bilateral TKR  No date: HX PARTIAL HYSTERECTOMY  2009: HX SHOULDER REPLACEMENT Comment:  left  No date: HX TONSILLECTOMY             Past Medical History:   Diagnosis Date    Anemia     [facility]     Cancer (HCC)     basal cell on nose    Chronic pain     back pain    Fibromyalgia 4/1/2010    GERD (gastroesophageal reflux disease) 4/1/2010    Hiatal hernia 4/1/2010    High cholesterol 4/1/2010    HTN (hypertension) 4/1/2010    Ill-defined condition     A.  Fib    OA (osteoarthritis) 4/1/2010    back    Pulmonary emboli ([facility] [facility] 2015       Past Surgical History:   Procedure Laterality Date    BREAST SURGERY PROCEDURE UNLISTED      Breast im-plants x 2    ENLARGE BREAST WITH IMPLANT      HX APPENDECTOMY      HX BREAST BIOPSY      HX BREAST RECONSTRUCTION      Breast implants removed 1992    HX CATARACT REMOVAL      HX CHOLECYSTECTOMY  9/11/2013    HX HYSTERECTOMY      HX KNEE REPLACEMENT  2008    bilateral    HX ORTHOPAEDIC  2007    Bilateral TKR    HX PARTIAL HYSTERECTOMY      HX SHOULDER REPLACEMENT  2009    left    HX TONSILLECTOMY           Family History:   Problem Relation Age of Onset   [facility] Arthritis-rheumatoid Mother     Dementia Mother     Coronary Artery Disease Father     Cancer Brother         lung cancer       Social History     Socioeconomic History    Marital status:      Spouse name: Not on file    Number of children: Not on file    Years of education: Not on file    Highest education level: Not on file   Occupational History    Not on file   Social Needs    Financial resource strain: Not on file    Food insecurity:     Worry: Not on file     Inability: Not on file    Transportation needs:     Medical: Not on file     Non-medical: Not on file   Tobacco Use    Smoking status: Never Smoker    Smokeless tobacco: Never Used   Substance and Sexual Activity    Alcohol use: No    Drug use: No    Sexual activity: Never   Lifestyle    Physical activity:     Days per week: Not on file     Minutes per session: Not on file    Stress: Not on file   Relationships    Social connections:     Talks on phone: Not on file     Gets together: Not on file     Attends Mu-[employer] service: Not on file     Active member of club or organization: Not on file     Attends meetings of clubs or organizations: Not on file     Relationship status: Not on file    Intimate partner violence:     Fear of current or ex partner: Not on file     Emotionally abused: Not on file     Physically abused: Not on file     Forced sexual activity: Not on file   Other Topics Concern    Not on file   Social History Narrative    Not on file         ALLERGIES: Angiotensin ii,human; Avelox [moxifloxacin]; and Demerol [meperidine]    Review of Systems   Constitutional: Positive for chills. Negative for appetite change, diaphoresis, fatigue and fever. HENT: Negative for congestion, ear discharge, ear pain, sinus pressure, sinus pain, sore throat and trouble swallowing. Eyes: Negative for photophobia, pain, redness and visual disturbance. Respiratory: Negative for chest tightness, shortness of breath and wheezing. Cardiovascular: Positive for chest pain. Negative for palpitations. Gastrointestinal: Positive for abdominal pain, diarrhea, nausea and vomiting. Negative for abdominal distention. Endocrine: Negative. Genitourinary: Negative for difficulty urinating, flank pain, frequency and urgency. Musculoskeletal: Negative for back pain, neck pain and neck stiffness. Skin: Negative for color change, pallor, rash and wound. Allergic/Immunologic: Negative. Neurological: Negative for dizziness, speech difficulty, weakness and headaches. Hematological: Does not bruise/bleed easily. Psychiatric/Behavioral: Positive for confusion (history of dementia). Negative for behavioral problems. The patient is not nervous/anxious.         Vitals:    05/26/19 0805   BP: 162/83   Pulse: 90   Resp: 20   Temp: 97 °F (36.1 °C)   SpO2: 100%   Weight: 61.2 kg (135 lb) Height: 5' 8\" (1.727 m)            Physical Exam   Constitutional: She is oriented to person, place, and time. She appears well-developed and well-nourished. No distress. Patient with history of dementia     HENT:   Head: Normocephalic and atraumatic. Right Ear: External ear normal.   Left Ear: External ear normal.   Nose: Nose normal.   Mouth/Throat: Oropharynx is clear and moist.   Eyes: Pupils are equal, round, and reactive to light. Conjunctivae and EOM are normal. Right eye exhibits no discharge. Left eye exhibits no discharge. Neck: Normal range of motion. Neck supple. No JVD present. No tracheal deviation present. Cardiovascular: Normal rate, regular rhythm, normal heart sounds and intact distal pulses. Exam reveals no gallop. No murmur heard. Pulmonary/Chest: Effort normal and breath sounds normal. No respiratory distress. She has no wheezes. She has no rales. She exhibits no tenderness. Abdominal: Soft. Bowel sounds are normal. She exhibits no distension. There is tenderness. There is no rebound and no guarding. Genitourinary:   Genitourinary Comments: Negative     Musculoskeletal: Normal range of motion. She exhibits no edema or tenderness. Neurological: She is alert and oriented to person, place, and time. Skin: Skin is warm and dry. No rash noted. No erythema. No pallor. Psychiatric: She has a normal mood and affect. Her behavior is normal. Judgment and thought content normal.   Nursing note and vitals reviewed. MDM  Number of Diagnoses or Management Options  Urinary tract infection with hematuria, site unspecified: new and requires workup  Diagnosis management comments: Plan:  Discharge to home and follow up with PCP. Antibiotics for urinary tract infection. Return to the ED with worsening symptoms. Patient and  in agreement with plan of care.         Amount and/or Complexity of Data Reviewed  Clinical lab tests: ordered and reviewed  Tests in the radiology section of CPT®: ordered and reviewed  Discuss the patient with other providers: yes (Discussed plan of care with Dr. Owen Jorgensen  )         10:17 AM  Discussed plan of care with Dr. Owen Jorgensen. Will give PO challenge, dose macrobid for positive UTI. 1130: Tolerating PO challenge. Pt has been reexamined. Pt has no new complaints, changes or physical findings. Care plan outlined and precautions discussed. All available results were reviewed with pt and . All medications were reviewed with pt and . All of pt's questions and concerns were addressed. Pt agrees to F/U as instructed and agrees to return to ED upon further deterioration. Pt is ready to go home. Vianey Reyes NP      Procedures    ED EKG interpretation:  Rhythm: atrial fib; and regular . Rate (approx.): 71; Axis: normal; ST/T wave: T wave abnormality; Voltage Criteria for LVH; Time: 9:29 AM; As dictated by Ba Becker.  JOZEF Molina.

## 2019-05-26 NOTE — DISCHARGE INSTRUCTIONS

## 2019-05-26 NOTE — ED TRIAGE NOTES
Abdominal pain x 4-5 days worsening today and started having nausea yesterday. Patient has dementia.  reports that she had diarrhea and was on medication for that. Last episode of diarrhea was yesterday. Under care of PCP for diarrhea.

## 2019-05-27 LAB
ATRIAL RATE: 357 BPM
CALCULATED R AXIS, ECG10: -26 DEGREES
CALCULATED T AXIS, ECG11: 114 DEGREES
DIAGNOSIS, 93000: NORMAL
Q-T INTERVAL, ECG07: 428 MS
QRS DURATION, ECG06: 108 MS
QTC CALCULATION (BEZET), ECG08: 465 MS
VENTRICULAR RATE, ECG03: 71 BPM

## 2019-05-28 LAB
BACTERIA SPEC CULT: ABNORMAL
CC UR VC: ABNORMAL
SERVICE CMNT-IMP: ABNORMAL

## 2019-06-13 ENCOUNTER — OFFICE VISIT (OUTPATIENT)
Dept: FAMILY MEDICINE CLINIC | Age: 84
End: 2019-06-13

## 2019-06-13 VITALS
HEART RATE: 67 BPM | HEIGHT: 68 IN | SYSTOLIC BLOOD PRESSURE: 127 MMHG | TEMPERATURE: 98.3 F | OXYGEN SATURATION: 98 % | DIASTOLIC BLOOD PRESSURE: 62 MMHG | WEIGHT: 129 LBS | BODY MASS INDEX: 19.55 KG/M2 | RESPIRATION RATE: 18 BRPM

## 2019-06-13 DIAGNOSIS — F02.80 ALZHEIMER'S DEMENTIA WITHOUT BEHAVIORAL DISTURBANCE, UNSPECIFIED TIMING OF DEMENTIA ONSET: Primary | ICD-10-CM

## 2019-06-13 DIAGNOSIS — N39.0 URINARY TRACT INFECTION WITHOUT HEMATURIA, SITE UNSPECIFIED: ICD-10-CM

## 2019-06-13 DIAGNOSIS — R19.7 DIARRHEA, UNSPECIFIED TYPE: ICD-10-CM

## 2019-06-13 DIAGNOSIS — G30.9 ALZHEIMER'S DEMENTIA WITHOUT BEHAVIORAL DISTURBANCE, UNSPECIFIED TIMING OF DEMENTIA ONSET: Primary | ICD-10-CM

## 2019-06-13 RX ORDER — DIPHENOXYLATE HYDROCHLORIDE AND ATROPINE SULFATE 2.5; .025 MG/1; MG/1
1 TABLET ORAL
Qty: 45 TAB | Refills: 0 | Status: SHIPPED | OUTPATIENT
Start: 2019-06-13 | End: 2019-11-11

## 2019-06-13 RX ORDER — RIVASTIGMINE 9.5 MG/24H
1 PATCH, EXTENDED RELEASE TRANSDERMAL DAILY
Qty: 30 PATCH | Refills: 11 | Status: SHIPPED | OUTPATIENT
Start: 2019-06-13 | End: 2019-06-18

## 2019-06-13 RX ORDER — DONEPEZIL HYDROCHLORIDE 10 MG/1
10 TABLET, FILM COATED ORAL
COMMUNITY
End: 2019-06-13

## 2019-06-13 RX ORDER — SULFAMETHOXAZOLE AND TRIMETHOPRIM 800; 160 MG/1; MG/1
1 TABLET ORAL 2 TIMES DAILY
Qty: 20 TAB | Refills: 0 | Status: ON HOLD | OUTPATIENT
Start: 2019-06-13 | End: 2019-06-18

## 2019-06-13 NOTE — PROGRESS NOTES
Patient here for abdominal pain. She also still having diarrhea. Patient states she is not taking all of her meds. No pain meds or nausea meds. 1. Have you been to the ER, urgent care clinic since your last visit? Hospitalized since your last visit? No    2. Have you seen or consulted any other health care providers outside of the [address] [place] [address] since your last visit? Include any pap smears or colon screening. No       Chief Complaint   Patient presents with    Diarrhea    Abdominal Pain     She is a 80 y.o. female who presents for evalution. Reviewed PmHx, RxHx, FmHx, SocHx, AllgHx and updated and dated in the chart. Patient Active Problem List    Diagnosis    Type 2 diabetes with nephropathy (HCC)    Dementia    Aggression    Psychosis ([facility] [address]    Syncope, vasovagal    Late onset Alzheimer's disease with behavioral disturbance    Frequent falls    [facility]fib ([facility]    Dyslipidemia    Chronic anticoagulation    Constipation    History of pulmonary embolism    Controlled type 2 diabetes mellitus without complication, without long-term current use of insulin (HCC)    Chronic pain    HTN (hypertension)    OA (osteoarthritis)    Fibromyalgia    GERD (gastroesophageal reflux disease)       Review of Systems - negative except as listed above in the HPI    Objective:     Vitals:    06/13/19 1102   BP: 127/62   Pulse: 67   Resp: 18   Temp: 98.3 °F (36.8 °C)   SpO2: 98%   Weight: 129 lb (58.5 kg)   Height: 5' 8\" (1.727 m)     Physical Examination: General appearance - alert, well appearing, and in no distress  Chest - clear to auscultation, no wheezes, rales or rhonchi, symmetric air entry  Heart - normal rate, regular rhythm, normal S1, S2, no murmurs, rubs, clicks or gallops  Abdomen - soft, nontender, nondistended, no masses or organomegaly    Assessment/ Plan:   Diagnoses and all orders for this visit:    1.  Alzheimer's dementia without behavioral disturbance, unspecified timing of dementia onset  -     rivastigmine (EXELON) 9.5 mg/24 hr patch; 1 Patch by TransDERmal route daily. -DC aricept and add new rx    2. Diarrhea, unspecified type  -     diphenoxylate-atropine (LOMOTIL) 2.5-0.025 mg per tablet; Take 1 Tab by mouth four (4) times daily as needed for Diarrhea. Max Daily Amount: 4 Tabs. 3. Urinary tract infection without hematuria, site unspecified  -     trimethoprim-sulfamethoxazole (BACTRIM DS, SEPTRA DS) 160-800 mg per tablet; Take 1 Tab by mouth two (2) times a day for 10 days. -pos UA  -add rx       Follow-up and Dispositions    · Return if symptoms worsen or fail to improve. I have discussed the diagnosis with the patient and the intended plan as seen in the above orders. The patient understands and agrees with the plan. The patient has received an after-visit summary and questions were answered concerning future plans. Medication Side Effects and Warnings were discussed with patient  Patient Labs were reviewed and or requested:  Patient Past Records were reviewed and or requested    Alona Ferro M.D. There are no Patient Instructions on file for this visit.

## 2019-06-17 ENCOUNTER — HOSPITAL ENCOUNTER (INPATIENT)
Age: 84
LOS: 1 days | Discharge: HOME | DRG: 439 | End: 2019-06-18
Attending: EMERGENCY MEDICINE | Admitting: FAMILY MEDICINE
Payer: MEDICARE

## 2019-06-17 ENCOUNTER — APPOINTMENT (OUTPATIENT)
Dept: CT IMAGING | Age: 84
DRG: 439 | End: 2019-06-17
Attending: EMERGENCY MEDICINE
Payer: MEDICARE

## 2019-06-17 DIAGNOSIS — K85.90 ACUTE PANCREATITIS, UNSPECIFIED COMPLICATION STATUS, UNSPECIFIED PANCREATITIS TYPE: Primary | ICD-10-CM

## 2019-06-17 PROBLEM — R10.9 ABDOMINAL PAIN: Status: ACTIVE | Noted: 2019-06-17

## 2019-06-17 LAB
ALBUMIN SERPL-MCNC: 3.8 G/DL (ref 3.5–5)
ALBUMIN/GLOB SERPL: 1 {RATIO} (ref 1.1–2.2)
ALP SERPL-CCNC: 159 U/L (ref 45–117)
ALT SERPL-CCNC: 13 U/L (ref 12–78)
ANION GAP SERPL CALC-SCNC: 8 MMOL/L (ref 5–15)
APPEARANCE UR: CLEAR
AST SERPL-CCNC: 15 U/L (ref 15–37)
ATRIAL RATE: 122 BPM
BACTERIA URNS QL MICRO: NEGATIVE /HPF
BASOPHILS # BLD: 0.1 K/UL (ref 0–0.1)
BASOPHILS NFR BLD: 1 % (ref 0–1)
BILIRUB SERPL-MCNC: 0.7 MG/DL (ref 0.2–1)
BILIRUB UR QL: NEGATIVE
BUN SERPL-MCNC: 16 MG/DL (ref 6–20)
BUN/CREAT SERPL: 12 (ref 12–20)
CALCIUM SERPL-MCNC: 10.7 MG/DL (ref 8.5–10.1)
CALCULATED R AXIS, ECG10: -28 DEGREES
CALCULATED T AXIS, ECG11: 103 DEGREES
CHLORIDE SERPL-SCNC: 104 MMOL/L (ref 97–108)
CHOLEST SERPL-MCNC: 227 MG/DL
CO2 SERPL-SCNC: 25 MMOL/L (ref 21–32)
COLOR UR: ABNORMAL
COMMENT, HOLDF: NORMAL
CREAT SERPL-MCNC: 1.32 MG/DL (ref 0.55–1.02)
DIAGNOSIS, 93000: NORMAL
DIFFERENTIAL METHOD BLD: ABNORMAL
EOSINOPHIL # BLD: 0 K/UL (ref 0–0.4)
EOSINOPHIL NFR BLD: 0 % (ref 0–7)
EPITH CASTS URNS QL MICRO: ABNORMAL /LPF
ERYTHROCYTE [DISTWIDTH] IN BLOOD BY AUTOMATED COUNT: 12.2 % (ref 11.5–14.5)
GLOBULIN SER CALC-MCNC: 3.8 G/DL (ref 2–4)
GLUCOSE BLD STRIP.AUTO-MCNC: 106 MG/DL (ref 65–100)
GLUCOSE BLD STRIP.AUTO-MCNC: 95 MG/DL (ref 65–100)
GLUCOSE BLD STRIP.AUTO-MCNC: 99 MG/DL (ref 65–100)
GLUCOSE SERPL-MCNC: 135 MG/DL (ref 65–100)
GLUCOSE UR STRIP.AUTO-MCNC: NEGATIVE MG/DL
HCT VFR BLD AUTO: 38.9 % (ref 35–47)
HDLC SERPL-MCNC: 92 MG/DL
HDLC SERPL: 2.5 {RATIO} (ref 0–5)
HGB BLD-MCNC: 13.5 G/DL (ref 11.5–16)
HGB UR QL STRIP: ABNORMAL
HYALINE CASTS URNS QL MICRO: ABNORMAL /LPF (ref 0–5)
IMM GRANULOCYTES # BLD AUTO: 0 K/UL (ref 0–0.04)
IMM GRANULOCYTES NFR BLD AUTO: 0 % (ref 0–0.5)
KETONES UR QL STRIP.AUTO: NEGATIVE MG/DL
LACTATE SERPL-SCNC: 1 MMOL/L (ref 0.4–2)
LACTATE SERPL-SCNC: 2.4 MMOL/L (ref 0.4–2)
LDLC SERPL CALC-MCNC: 120.2 MG/DL (ref 0–100)
LEUKOCYTE ESTERASE UR QL STRIP.AUTO: NEGATIVE
LIPASE SERPL-CCNC: 931 U/L (ref 73–393)
LIPID PROFILE,FLP: ABNORMAL
LYMPHOCYTES # BLD: 0.9 K/UL (ref 0.8–3.5)
LYMPHOCYTES NFR BLD: 19 % (ref 12–49)
MCH RBC QN AUTO: 32.7 PG (ref 26–34)
MCHC RBC AUTO-ENTMCNC: 34.7 G/DL (ref 30–36.5)
MCV RBC AUTO: 94.2 FL (ref 80–99)
MONOCYTES # BLD: 0.2 K/UL (ref 0–1)
MONOCYTES NFR BLD: 4 % (ref 5–13)
NEUTS SEG # BLD: 3.8 K/UL (ref 1.8–8)
NEUTS SEG NFR BLD: 76 % (ref 32–75)
NITRITE UR QL STRIP.AUTO: NEGATIVE
NRBC # BLD: 0 K/UL (ref 0–0.01)
NRBC BLD-RTO: 0 PER 100 WBC
PH UR STRIP: 7.5 [PH] (ref 5–8)
PLATELET # BLD AUTO: 237 K/UL (ref 150–400)
PMV BLD AUTO: 10.6 FL (ref 8.9–12.9)
POTASSIUM SERPL-SCNC: 4 MMOL/L (ref 3.5–5.1)
PROT SERPL-MCNC: 7.6 G/DL (ref 6.4–8.2)
PROT UR STRIP-MCNC: 100 MG/DL
Q-T INTERVAL, ECG07: 374 MS
QRS DURATION, ECG06: 92 MS
QTC CALCULATION (BEZET), ECG08: 474 MS
RBC # BLD AUTO: 4.13 M/UL (ref 3.8–5.2)
RBC #/AREA URNS HPF: ABNORMAL /HPF (ref 0–5)
SAMPLES BEING HELD,HOLD: NORMAL
SERVICE CMNT-IMP: ABNORMAL
SERVICE CMNT-IMP: NORMAL
SERVICE CMNT-IMP: NORMAL
SODIUM SERPL-SCNC: 137 MMOL/L (ref 136–145)
SP GR UR REFRACTOMETRY: 1.01 (ref 1–1.03)
TRIGL SERPL-MCNC: 74 MG/DL (ref ?–150)
TROPONIN I SERPL-MCNC: <0.05 NG/ML
TROPONIN I SERPL-MCNC: <0.05 NG/ML
UA: UC IF INDICATED,UAUC: ABNORMAL
UROBILINOGEN UR QL STRIP.AUTO: 0.2 EU/DL (ref 0.2–1)
VENTRICULAR RATE, ECG03: 97 BPM
VLDLC SERPL CALC-MCNC: 14.8 MG/DL
WBC # BLD AUTO: 5 K/UL (ref 3.6–11)
WBC URNS QL MICRO: ABNORMAL /HPF (ref 0–4)

## 2019-06-17 PROCEDURE — 96374 THER/PROPH/DIAG INJ IV PUSH: CPT

## 2019-06-17 PROCEDURE — 96361 HYDRATE IV INFUSION ADD-ON: CPT

## 2019-06-17 PROCEDURE — 94761 N-INVAS EAR/PLS OXIMETRY MLT: CPT

## 2019-06-17 PROCEDURE — 74011250636 HC RX REV CODE- 250/636: Performed by: EMERGENCY MEDICINE

## 2019-06-17 PROCEDURE — 36415 COLL VENOUS BLD VENIPUNCTURE: CPT

## 2019-06-17 PROCEDURE — 74011250636 HC RX REV CODE- 250/636: Performed by: FAMILY MEDICINE

## 2019-06-17 PROCEDURE — 83605 ASSAY OF LACTIC ACID: CPT

## 2019-06-17 PROCEDURE — 80053 COMPREHEN METABOLIC PANEL: CPT

## 2019-06-17 PROCEDURE — 77030027138 HC INCENT SPIROMETER -A

## 2019-06-17 PROCEDURE — 74011636320 HC RX REV CODE- 636/320: Performed by: RADIOLOGY

## 2019-06-17 PROCEDURE — 93005 ELECTROCARDIOGRAM TRACING: CPT

## 2019-06-17 PROCEDURE — 65660000000 HC RM CCU STEPDOWN

## 2019-06-17 PROCEDURE — 77030038269 HC DRN EXT URIN PURWCK BARD -A

## 2019-06-17 PROCEDURE — 74177 CT ABD & PELVIS W/CONTRAST: CPT

## 2019-06-17 PROCEDURE — 81001 URINALYSIS AUTO W/SCOPE: CPT

## 2019-06-17 PROCEDURE — 99285 EMERGENCY DEPT VISIT HI MDM: CPT

## 2019-06-17 PROCEDURE — 84484 ASSAY OF TROPONIN QUANT: CPT

## 2019-06-17 PROCEDURE — 85025 COMPLETE CBC W/AUTO DIFF WBC: CPT

## 2019-06-17 PROCEDURE — 80061 LIPID PANEL: CPT

## 2019-06-17 PROCEDURE — 82962 GLUCOSE BLOOD TEST: CPT

## 2019-06-17 PROCEDURE — 74011000250 HC RX REV CODE- 250: Performed by: FAMILY MEDICINE

## 2019-06-17 PROCEDURE — 83690 ASSAY OF LIPASE: CPT

## 2019-06-17 RX ORDER — DEXTROSE MONOHYDRATE 25 G/50ML
12.5-25 INJECTION, SOLUTION INTRAVENOUS AS NEEDED
Status: DISCONTINUED | OUTPATIENT
Start: 2019-06-17 | End: 2019-06-18 | Stop reason: HOSPADM

## 2019-06-17 RX ORDER — METOPROLOL TARTRATE 5 MG/5ML
2.5 INJECTION INTRAVENOUS EVERY 6 HOURS
Status: DISCONTINUED | OUTPATIENT
Start: 2019-06-17 | End: 2019-06-18

## 2019-06-17 RX ORDER — HEPARIN SODIUM 5000 [USP'U]/ML
5000 INJECTION, SOLUTION INTRAVENOUS; SUBCUTANEOUS EVERY 8 HOURS
Status: DISCONTINUED | OUTPATIENT
Start: 2019-06-17 | End: 2019-06-18 | Stop reason: HOSPADM

## 2019-06-17 RX ORDER — SODIUM CHLORIDE 0.9 % (FLUSH) 0.9 %
5-40 SYRINGE (ML) INJECTION AS NEEDED
Status: DISCONTINUED | OUTPATIENT
Start: 2019-06-17 | End: 2019-06-18 | Stop reason: HOSPADM

## 2019-06-17 RX ORDER — SODIUM CHLORIDE 0.9 % (FLUSH) 0.9 %
5-40 SYRINGE (ML) INJECTION EVERY 8 HOURS
Status: DISCONTINUED | OUTPATIENT
Start: 2019-06-17 | End: 2019-06-18 | Stop reason: HOSPADM

## 2019-06-17 RX ORDER — ONDANSETRON 2 MG/ML
4 INJECTION INTRAMUSCULAR; INTRAVENOUS
Status: DISCONTINUED | OUTPATIENT
Start: 2019-06-17 | End: 2019-06-18 | Stop reason: HOSPADM

## 2019-06-17 RX ORDER — MAGNESIUM SULFATE 100 %
4 CRYSTALS MISCELLANEOUS AS NEEDED
Status: DISCONTINUED | OUTPATIENT
Start: 2019-06-17 | End: 2019-06-18 | Stop reason: HOSPADM

## 2019-06-17 RX ORDER — INSULIN LISPRO 100 [IU]/ML
INJECTION, SOLUTION INTRAVENOUS; SUBCUTANEOUS EVERY 6 HOURS
Status: DISCONTINUED | OUTPATIENT
Start: 2019-06-17 | End: 2019-06-18

## 2019-06-17 RX ORDER — SODIUM CHLORIDE, SODIUM LACTATE, POTASSIUM CHLORIDE, CALCIUM CHLORIDE 600; 310; 30; 20 MG/100ML; MG/100ML; MG/100ML; MG/100ML
100 INJECTION, SOLUTION INTRAVENOUS CONTINUOUS
Status: DISCONTINUED | OUTPATIENT
Start: 2019-06-17 | End: 2019-06-18 | Stop reason: HOSPADM

## 2019-06-17 RX ORDER — PROCHLORPERAZINE EDISYLATE 5 MG/ML
10 INJECTION INTRAMUSCULAR; INTRAVENOUS
Status: COMPLETED | OUTPATIENT
Start: 2019-06-17 | End: 2019-06-17

## 2019-06-17 RX ADMIN — PROCHLORPERAZINE EDISYLATE 10 MG: 5 INJECTION INTRAMUSCULAR; INTRAVENOUS at 08:08

## 2019-06-17 RX ADMIN — HEPARIN SODIUM 5000 UNITS: 5000 INJECTION INTRAVENOUS; SUBCUTANEOUS at 21:48

## 2019-06-17 RX ADMIN — METOPROLOL TARTRATE 2.5 MG: 5 INJECTION, SOLUTION INTRAVENOUS at 11:42

## 2019-06-17 RX ADMIN — SODIUM CHLORIDE, SODIUM LACTATE, POTASSIUM CHLORIDE, AND CALCIUM CHLORIDE 170 ML/HR: 600; 310; 30; 20 INJECTION, SOLUTION INTRAVENOUS at 10:13

## 2019-06-17 RX ADMIN — METOPROLOL TARTRATE 2.5 MG: 5 INJECTION, SOLUTION INTRAVENOUS at 20:30

## 2019-06-17 RX ADMIN — Medication 10 ML: at 21:48

## 2019-06-17 RX ADMIN — IOPAMIDOL 80 ML: 755 INJECTION, SOLUTION INTRAVENOUS at 09:25

## 2019-06-17 RX ADMIN — SODIUM CHLORIDE 1000 ML: 900 INJECTION, SOLUTION INTRAVENOUS at 08:03

## 2019-06-17 NOTE — PROGRESS NOTES
[address] Medicine Residency Attending Addendum:    I was NOT present with the resident during the interview & examination of the patient. I personally interviewed the patient & repeated the critical or key portions of the exam.  I agree with the resident's note with the following additions:    Hx: 81yo F with hx alzheimer's dementia, who is here with abd pain \"for months\" and nausea and vomiting in the last few days. Recently dx with UTI and on Bactrim. Exam:   VS:   Patient Vitals for the past 4 hrs:   Temp Pulse Resp BP SpO2   06/17/19 0659 97.3 °F (36.3 °C) 88 21 (!) 125/92 95 %       GEN: NAD, lying in bed comfortably  PSYCH: normal mood and affect     Assessment/Plan:    1. Abd pain: likely 2/2 pancreatitis given sx and lipase in 900s, CT scan ok, LR   2.  UTI: had uti end of May and was treated with Macrobid, then saw her PCP on 6/13, no UA or culture in the system but was empirically tx with Bactrim. UA okay here so will not start abx.       Jose 83, DO 6/17/2019

## 2019-06-17 NOTE — ED NOTES
TRANSFER - OUT REPORT:    Verbal report given to Ajit Hernandez RN (name) on Tray Regan  being transferred to 5th floor room 512 (unit) for routine progression of care       Report consisted of patients Situation, Background, Assessment and   Recommendations(SBAR). Information from the following report(s) SBAR, Kardex, Intake/Output, Recent Results and Cardiac Rhythm AF was reviewed with the receiving nurse. Lines:   Peripheral IV 06/17/19 Right Antecubital (Active)   Site Assessment Clean, dry, & intact 6/17/2019  8:04 AM   Phlebitis Assessment 0 6/17/2019  8:04 AM   Infiltration Assessment 0 6/17/2019  8:04 AM   Dressing Status Clean, dry, & intact 6/17/2019  8:04 AM   Dressing Type Transparent 6/17/2019  8:04 AM   Hub Color/Line Status Pink 6/17/2019  8:04 AM   Alcohol Cap Used Yes 6/17/2019  8:04 AM        Opportunity for questions and clarification was provided.       Patient transported with:   Monitor  Registered Nurse

## 2019-06-17 NOTE — ED NOTES
Patient Throughput:  Charge nurse on 5th floor made aware of patient's room assignment, room [address] [facility], RN  Shift Resource Nurse  Emergency Department

## 2019-06-17 NOTE — ED PROVIDER NOTES
HPI The patient has a 3 day history of vomiting and initially had diarrhea, but it has resolved. She also complains of abdominal pain which seems to be in the lower quadrants. The patient also complains of shortness of breath and there may have been fever. She denies chest pain, back pain, cough. She had a UTI one month ago, but denies urinary symptoms at the present time very      Past Medical History:   Diagnosis Date    Anemia     Atrial fibrillation (HCC)     Cancer (HCC)     basal cell on nose    Chronic pain     back pain    Fibromyalgia 4/1/2010    GERD (gastroesophageal reflux disease) 4/1/2010    Hiatal hernia 4/1/2010    High cholesterol 4/1/2010    HTN (hypertension) 4/1/2010    Ill-defined condition     A.  Fib    OA (osteoarthritis) 4/1/2010    back    Pulmonary emboli ([address] 2015       Past Surgical History:   Procedure Laterality Date    BREAST SURGERY PROCEDURE UNLISTED      Breast im-plants x 2    ENLARGE BREAST WITH IMPLANT      HX APPENDECTOMY      HX BREAST BIOPSY      HX BREAST RECONSTRUCTION      Breast implants removed 1992    HX CATARACT REMOVAL      HX CHOLECYSTECTOMY  9/11/2013    HX HYSTERECTOMY      HX KNEE REPLACEMENT  2008    bilateral    HX ORTHOPAEDIC  2007    Bilateral TKR    HX PARTIAL HYSTERECTOMY      HX SHOULDER REPLACEMENT  2009    left    HX TONSILLECTOMY           Family History:   Problem Relation Age of Onset   [facility] Arthritis-rheumatoid Mother     Dementia Mother     Coronary Artery Disease Father     Cancer Brother         lung cancer       Social History     Socioeconomic History    Marital status:      Spouse name: Not on file    Number of children: Not on file    Years of education: Not on file    Highest education level: Not on file   Occupational History    Not on file   Social Needs    Financial resource strain: Not on file    Food insecurity:     Worry: Not on file     Inability: Not on file    Transportation needs:     Medical: Not on file     Non-medical: Not on file   Tobacco Use    Smoking status: Never Smoker    Smokeless tobacco: Never Used   Substance and Sexual Activity    Alcohol use: No    Drug use: No    Sexual activity: Never   Lifestyle    Physical activity:     Days per week: Not on file     Minutes per session: Not on file    Stress: Not on file   Relationships    Social connections:     Talks on phone: Not on file     Gets together: Not on file     Attends [employer] service: Not on file     Active member of club or organization: Not on file     Attends meetings of clubs or organizations: Not on file     Relationship status: Not on file    Intimate partner violence:     Fear of current or ex partner: Not on file     Emotionally abused: Not on file     Physically abused: Not on file     Forced sexual activity: Not on file   Other Topics Concern    Not on file   Social History Narrative    Not on file         ALLERGIES: Angiotensin ii,human; Avelox [moxifloxacin]; and Demerol [meperidine]    Review of Systems   Constitutional: Negative for fever. Eyes: Negative for visual disturbance. Respiratory: Positive for shortness of breath. Negative for cough and wheezing. Cardiovascular: Negative for chest pain and leg swelling. Gastrointestinal: Positive for abdominal pain, diarrhea, nausea and vomiting. Genitourinary: Negative for dysuria. Musculoskeletal: Negative. Negative for back pain and neck stiffness. Skin: Negative for rash. Neurological: Negative. Negative for syncope and headaches. Psychiatric/Behavioral: Negative for confusion. All other systems reviewed and are negative. Vitals:    06/17/19 0659   BP: (!) 125/92   Pulse: 88   Resp: 21   Temp: 97.3 °F (36.3 °C)   SpO2: 95%   Weight: 58.5 kg (129 lb)   Height: 5' 8\" (1.727 m)            Physical Exam   Constitutional: She appears well-developed and well-nourished. No distress. HENT:   Head: Normocephalic.    Eyes: Pupils are equal, round, and reactive to light. Neck: Normal range of motion. Cardiovascular: Normal rate. No murmur heard. Pulmonary/Chest: Effort normal and breath sounds normal.   Abdominal: Soft. There is tenderness. ? ttp in lq's bilat   Musculoskeletal: Normal range of motion. Neurological: She is alert. Skin: Skin is warm and dry. Capillary refill takes less than 2 seconds. Psychiatric: She has a normal mood and affect.  Her behavior is normal.        MDM       Procedures      Spoke c fp resident - they will adm

## 2019-06-17 NOTE — PROGRESS NOTES
Bedside and Verbal shift change report given to Ebony Weiner RN (oncoming nurse) by Mary Kumar RN (offgoing nurse). Report included the following information SBAR, Kardex, Intake/Output, MAR and Recent Results.

## 2019-06-17 NOTE — PROGRESS NOTES
BMAT assessment complete  Mobility level  = 3 (needs walker and gait belt)  Added to walking program list and discussed with tech for walking program and/or referred to mobility team for turning or transfers

## 2019-06-17 NOTE — PROGRESS NOTES
8:58 AM  CM met with pt for initial evaluation. Pt resides with spouse in a one story home with a ramp to enter the home. SpouseGenebradley Velasquez: [phone]     Reason for Admission:   Abdominal pain               RRAT Score:     36             Resources/supports as identified by patient/family:   Spouse provides needs at home                Top Challenges facing patient (as identified by patient/family and CM): Finances/Medication cost?    None                Transportation? Spouse provides transportation              Support system or lack thereof? Only spouse and pt in home                     Living arrangements? 1 story home with ramp             Self-care/ADLs/Cognition? Is able to dress and provide personal care needs, spouse cooks           Current Advanced Directive/Advance Care Plan:  Not on file                          Plan for utilizing home health:    Used in the past, unsure of name of agnecy                 Transition of Care Plan:   Pt will return home with spouse who will provide transportation. DME:  RW, Rollator, Cane, Wheelchair  PCP:  Rito Cabrera MD    Care Management Interventions  PCP Verified by CM: (S) Yes(Saw last week. Has a NN)  Mode of Transport at Discharge:  Other (see comment)(Spouse will provide )  Physical Therapy Consult: No  Occupational Therapy Consult: No  Speech Therapy Consult: No  Current Support Network: Lives with Spouse  Confirm Follow Up Transport: Family  Discharge Location  Discharge Placement: Home with family assistance

## 2019-06-17 NOTE — H&P
[address] [facility]  Medicine  History & Physical        Date of admission: 2019    Patient name: David Faulkner  MRN: [id number]  : 1933  Age: 80 y.o. Primary care provider:  Dave Wick MD     Source of Information: patient,                                  Chief complain: abdominal pain and vomiting    History of present illness  Tray Hartley is a 80 y.o. female with PMH of Alzheimer;s dementia, recurrent falls, HTN, Osteoarthritis, HLD, pulmonary embolism, Iron deficiency anemia, Afib, DM type 2 with neuropathy and fibromyalgia who presents with abdominal pain and vomiting. Pt has been having abdominal pain for months. Localized in the epigastric area. Developed nonbloody, non-bilious vomiting 3 days ago.  became concerned given decreased po intake and brought to ED. Hx of cholecystectomy in , no EtOH use. Switched to Rivastigmine patch 4 days ago. Last BM was yesterday. Per chart review, pt has a history of PE on chronic anticoagulation. She also has a history of paroxysmal Afib  and underwent an EP study (18) with GULSHAN guided DCCV and successfully converted to NSR. Pt however converted back into Afib. She follows Dr. Jessica Glaser outpatient. In the ED,   Vitals: Temp 97.3 F, 88 bpm, 125/82, resp 21, 95% RA  Labs: Hgb 13.5, Na 137, K 4.0, Cr 1.32 (baseline 0.96), lipase 931, LA 2.4, trop negative. Imaging: CT abd/pel without acute findings.   1L NS, and compazine 10 mg      Medications   sodium chloride (NS) flush 5-40 mL (has no administration in time range)   sodium chloride (NS) flush 5-40 mL (has no administration in time range)   heparin (porcine) injection 5,000 Units (has no administration in time range)   lactated Ringers infusion (has no administration in time range)   prochlorperazine (COMPAZINE) injection 10 mg (10 mg IntraVENous Given 19 0808)   sodium chloride 0.9 % bolus infusion 1,000 mL (1,000 mL IntraVENous New Bag 19 0803)   iopamidol (ISOVUE-370) 76 % injection 100 mL (80 mL IntraVENous Given 6/17/19 0925)       Past Medical History:   Diagnosis Date    Anemia     Atrial fibrillation (HCC)     Cancer ([facility] [address]     basal cell on nose    Chronic pain     back pain    Fibromyalgia 4/1/2010    GERD (gastroesophageal reflux disease) 4/1/2010    Hiatal hernia 4/1/2010    High cholesterol 4/1/2010    HTN (hypertension) 4/1/2010    Ill-defined condition     A. Fib    OA (osteoarthritis) 4/1/2010    back    Pulmonary emboli ([facility] [address] 2015      Past Surgical History:   Procedure Laterality Date    BREAST SURGERY PROCEDURE UNLISTED      Breast im-plants x 2    ENLARGE BREAST WITH IMPLANT      HX APPENDECTOMY      HX BREAST BIOPSY      HX BREAST RECONSTRUCTION      Breast implants removed 1992    HX CATARACT REMOVAL      HX CHOLECYSTECTOMY  9/11/2013    HX HYSTERECTOMY      HX KNEE REPLACEMENT  2008    bilateral    HX ORTHOPAEDIC  2007    Bilateral TKR    HX PARTIAL HYSTERECTOMY      HX SHOULDER REPLACEMENT  2009    left    HX TONSILLECTOMY       Prior to Admission medications    Medication Sig Start Date End Date Taking? Authorizing Provider   rivastigmine (EXELON) 9.5 mg/24 hr patch 1 Patch by TransDERmal route daily. 6/13/19  Yes Cas Avalos MD   diphenoxylate-atropine (LOMOTIL) 2.5-0.025 mg per tablet Take 1 Tab by mouth four (4) times daily as needed for Diarrhea. Max Daily Amount: 4 Tabs. 6/13/19  Yes Cas Avalos MD   trimethoprim-sulfamethoxazole (BACTRIM DS, SEPTRA DS) 160-800 mg per tablet Take 1 Tab by mouth two (2) times a day for 10 days. 6/13/19 6/23/19 Yes Cas Avalos MD   ondansetron (ZOFRAN ODT) 8 mg disintegrating tablet Take 1 Tab by mouth every eight (8) hours as needed for Nausea. 5/8/19  Yes Cas Avalos MD   loperamide (IMMODIUM) 2 mg tablet Take 2 mg by mouth four (4) times daily as needed for Diarrhea. Yes Provider, Historical   dilTIAZem CD (CARDIZEM CD) 240 mg ER capsule Take 1 Cap by mouth daily. 1/14/19  Yes Joel Cardozo MD   citalopram (CELEXA) 10 mg tablet Take 1 Tab by mouth daily. 1/14/19  Yes Joel Cardozo MD   acetaminophen (TYLENOL) 325 mg tablet Take 2 Tabs by mouth every six (6) hours as needed for Pain. 8/20/18  Yes Ramses Corey MD     Allergies   Allergen Reactions    Angiotensin Ii,Human Other (comments)     States does not remember      Avelox [Moxifloxacin] Other (comments)     States does not remember      Demerol [Meperidine] Hives      Family History   Problem Relation Age of Onset   [address] [facility] Arthritis-rheumatoid Mother     Dementia Mother     Coronary Artery Disease Father     Cancer Brother         lung cancer      Family history reviewed and non-contributory. Social history  Patient resides    Independently    x  With family care      Assisted living      SNF    Ambulates    Independently      With cane       Assisted walker    x  walker   Alcohol history   x  None     Social     Chronic   Smoking history  x  None     Former smoker     Current smoker     Social History     Tobacco Use   Smoking Status Never Smoker   Smokeless Tobacco Never Used       Code status    Full code   x  DNR        Code status discussed with the patient/caregivers. DNR    Review of systems  Constitutional: Negative for fever. Eyes: Negative for visual disturbance. Respiratory:  Negative for cough, SOB, and wheezing. Cardiovascular: Negative for chest pain and leg swelling. Gastrointestinal: Positive for abdominal pain, nausea and vomiting. Genitourinary: Negative for dysuria. Musculoskeletal: Negative. Negative for back pain and neck stiffness. Skin: Negative for rash. Neurological: Negative. Negative for syncope and headaches. Psychiatric/Behavioral: Negative for confusion. All other systems reviewed and are negative.     Physical Examination   Visit Vitals  BP (!) 125/92 (BP 1 Location: Right arm, BP Patient Position: Sitting)   Pulse 88   Temp 97.3 °F (36.3 °C)   Resp 21   Ht 5' 8\" (1.727 m)   Wt 129 lb (58.5 kg)   SpO2 95%   BMI 19.61 kg/m²          O2 Device: Room air    General: No acute distress. Not cooperative    Head: Normocephalic. Atraumatic. Eyes:             Conjunctiva pink. Sclera white. PERRL. Respiratory: CTAB. No w/r/r/c.   Cardiovascular: Irregularly Irregular rhythm, 2/6 ESM+, No r/g. Pulses 2+ throughout. GI: + bowel sounds. Tender on epigatric and lower quadrants b/l. No rebound tenderness or guarding. Nondistended. Extremities: No edema. No palpable cord. No tenderness. Musculoskeletal: No cyanosis   Neuro: Oriented only to person. Moving all four extremities , follows commands appropriately   Psych:            Dementia        Data Review    EKG:  Afib. Rate 97    24 Hour Results:  Recent Results (from the past 24 hour(s))   LACTIC ACID    Collection Time: 06/17/19  8:09 AM   Result Value Ref Range    Lactic acid 2.4 (HH) 0.4 - 2.0 MMOL/L   CBC WITH AUTOMATED DIFF    Collection Time: 06/17/19  8:10 AM   Result Value Ref Range    WBC 5.0 3.6 - 11.0 K/uL    RBC 4.13 3.80 - 5.20 M/uL    HGB 13.5 11.5 - 16.0 g/dL    HCT 38.9 35.0 - 47.0 %    MCV 94.2 80.0 - 99.0 FL    MCH 32.7 26.0 - 34.0 PG    MCHC 34.7 30.0 - 36.5 g/dL    RDW 12.2 11.5 - 14.5 %    PLATELET 068 843 - 509 K/uL    MPV 10.6 8.9 - 12.9 FL    NRBC 0.0 0  WBC    ABSOLUTE NRBC 0.00 0.00 - 0.01 K/uL    NEUTROPHILS 76 (H) 32 - 75 %    LYMPHOCYTES 19 12 - 49 %    MONOCYTES 4 (L) 5 - 13 %    EOSINOPHILS 0 0 - 7 %    BASOPHILS 1 0 - 1 %    IMMATURE GRANULOCYTES 0 0.0 - 0.5 %    ABS. NEUTROPHILS 3.8 1.8 - 8.0 K/UL    ABS. LYMPHOCYTES 0.9 0.8 - 3.5 K/UL    ABS. MONOCYTES 0.2 0.0 - 1.0 K/UL    ABS. EOSINOPHILS 0.0 0.0 - 0.4 K/UL    ABS. BASOPHILS 0.1 0.0 - 0.1 K/UL    ABS. IMM.  GRANS. 0.0 0.00 - 0.04 K/UL    DF AUTOMATED     METABOLIC PANEL, COMPREHENSIVE    Collection Time: 06/17/19  8:10 AM   Result Value Ref Range    Sodium 137 136 - 145 mmol/L    Potassium 4.0 3.5 - 5.1 mmol/L    Chloride 104 97 - 108 mmol/L    CO2 25 21 - 32 mmol/L    Anion gap 8 5 - 15 mmol/L    Glucose 135 (H) 65 - 100 mg/dL    BUN 16 6 - 20 MG/DL    Creatinine 1.32 (H) 0.55 - 1.02 MG/DL    BUN/Creatinine ratio 12 12 - 20      GFR est AA 46 (L) >60 ml/min/1.73m2    GFR est non-AA 38 (L) >60 ml/min/1.73m2    Calcium 10.7 (H) 8.5 - 10.1 MG/DL    Bilirubin, total 0.7 0.2 - 1.0 MG/DL    ALT (SGPT) 13 12 - 78 U/L    AST (SGOT) 15 15 - 37 U/L    Alk. phosphatase 159 (H) 45 - 117 U/L    Protein, total 7.6 6.4 - 8.2 g/dL    Albumin 3.8 3.5 - 5.0 g/dL    Globulin 3.8 2.0 - 4.0 g/dL    A-G Ratio 1.0 (L) 1.1 - 2.2     LIPASE    Collection Time: 06/17/19  8:10 AM   Result Value Ref Range    Lipase 931 (H) 73 - 393 U/L   TROPONIN I    Collection Time: 06/17/19  8:10 AM   Result Value Ref Range    Troponin-I, Qt. <0.05 <0.05 ng/mL   SAMPLES BEING HELD    Collection Time: 06/17/19  8:10 AM   Result Value Ref Range    SAMPLES BEING HELD 1SST,1RED,1BLU     COMMENT        Add-on orders for these samples will be processed based on acceptable specimen integrity and analyte stability, which may vary by analyte.    URINALYSIS W/ REFLEX CULTURE    Collection Time: 06/17/19  8:46 AM   Result Value Ref Range    Color YELLOW/STRAW      Appearance CLEAR CLEAR      Specific gravity 1.012 1.003 - 1.030      pH (UA) 7.5 5.0 - 8.0      Protein 100 (A) NEG mg/dL    Glucose NEGATIVE  NEG mg/dL    Ketone NEGATIVE  NEG mg/dL    Bilirubin NEGATIVE  NEG      Blood SMALL (A) NEG      Urobilinogen 0.2 0.2 - 1.0 EU/dL    Nitrites NEGATIVE  NEG      Leukocyte Esterase NEGATIVE  NEG      WBC 0-4 0 - 4 /hpf    RBC 5-10 0 - 5 /hpf    Epithelial cells FEW FEW /lpf    Bacteria NEGATIVE  NEG /hpf    UA:UC IF INDICATED CULTURE NOT INDICATED BY UA RESULT CNI      Hyaline cast 0-2 0 - 5 /lpf     Recent Labs     06/17/19  0810   WBC 5.0   HGB 13.5   HCT 38.9        Recent Labs     06/17/19  0810      K 4.0      CO2 25   *   BUN 16   CREA 1.32*   CA 10.7*   ALB 3.8 SGOT 15   ALT 13       Imaging  CT ABD Pel with cont:  FINDINGS:   LUNG BASES: Clear. INCIDENTALLY IMAGED HEART AND MEDIASTINUM: There is moderately severe  cardiomegaly. There is aortic valvular calcification. LIVER: No mass or biliary dilatation. GALLBLADDER: Surgically absent  SPLEEN: No mass. PANCREAS: No mass or ductal dilatation. ADRENALS: Unremarkable. KIDNEYS: There is a 0.9 cm low-density upper pole left kidney indeterminant most  likely present on the prior study. There is a 1.1 cm low-density right renal  cortex posteriorly. There is a nonobstructing calculus lower pole right kidney. STOMACH: Unremarkable. There is a hiatal hernia  SMALL BOWEL: No dilatation or wall thickening. COLON: No dilatation or wall thickening. There are multiple diverticula however  no definite diverticulitis identified. APPENDIX: Not identified  PERITONEUM: No ascites or pneumoperitoneum. RETROPERITONEUM: No lymphadenopathy or aortic aneurysm. There are extensive  changes of atherosclerosis  REPRODUCTIVE ORGANS: Patient status post hysterectomy  URINARY BLADDER: No mass or calculus. BONES: No destructive bone lesion. There are degenerative changes in the spine  with severe degenerative disc changes at L2-L3 similar to the prior study. There  is osteopenia. ADDITIONAL COMMENTS: N/A     IMPRESSION:  No acute abnormality identified. Assessment and Plan   Tray Gupta is a 80 y.o. female who is admitted for abdominal pain and vomiting likely 2/2 pancreatitis. Pancreatitis: Epigastric pain, hx of vomiting, and lipase in 900s. No hx of pancreatitis. Unrevealing CT abd pel today. ED gave 1 L NS and compazine  -Admit to remote tele  -NPO  -Fluid resuscitate with 170 cc/hr for 24 hr and change to MIVF  -Zofran PRN  -Medication re-eval for possible causes will hold bactrim (6/13/2019), celexa (started 9/2018), and Rivastigmine (6/13/2019). Recommended psyc follow-up OP. Lactic Acidosis: POA 2.4.  Likely 2/2 vomiting  -IV hydration  -LA q4h    At risk for ARUNA: Likely 2/2 volume depeltion. Received 1L NS.  -Aggressive IV hydration given pancreatitis  -IVF and recheck CMP daily. Alzheimer's Dementia: Recently placed on Rivastigmine 9.5 mg/ 24 hr patch on 6/13/2019.   -Will hold Rivastigmine patch given SE profile includes pancreatitis, abdominal pain.     Hx of UTI: Has been treated with macrobid in 5/2019 and given increased urinary frequency was started on bactrim on 6/13, 4 days ago. Pt asymptomatic today. Unremarkable UA in ED.  -Will hold bactrim given SE of pancreatitis.      DM type 2 with neuropathy: Last A1c 5.0 (1/2/19). Diet controlled  - Insulin Sliding Scale normal sensitivity with q6h glucose checks as NPO.  - Hypoglycemia protocols ordered.      Hypertension: On admission BP was 125/92, pt was agitated. - Hold home dilt 240 mg ER as NPO. Will start Metoprolol 2.5mg q6. - Will continue to monitor at this time and readjust as BP's trend.     Paroxysmal Afib: Currently in Afib with RVR (rates 110s). Underwent an EP study (5/25/18) with GULSHAN guided DCCV and successfully converted to NSR. Pt however converted back to Afib at subsequent visits. Pt of Dr. Yusuf Robin.   -Last Echo (5/25/18) with EF 55%-60%  -Cont home Cardizem 240mg daily.   -Cont home warfarin 3mg daily, pharmacy dosing  -Will consider Cardio consult if pt stays in persistent Afib despite treatment    Hyperlipidemia: Lipid panel (1/2/19) - Tchol 188, HDL 64, LDL 93 and . Diet controlled, pt not on any medication   - Continue to monitor     Depression:   -Hold home Celexa 20mg daily given NPO     Hx of Iron deficiency anemia: POA 13.5. Pt was initially on Iron but has since been discontinued by Dr. Julissa Guerrero (Heme/onc) given persistent constipation.  -Cont to monitor  -Daily CBC    Hx of Pulmonary Embolism not on Anticoagulation: Per Heme/onc note, this was unprovoked and pt will be on life long anti-coag.  Warfarin was discharged in 8/2018 by cardiology due to fall risk. Diet: NPO. With LR @ 170 ml/hr  Activity: Ambulate with assistence  DVT prophylaxis: Heparin subq  Consultations: none  Anticipated disposition: TBD    CODE STATUS: DNR CODE       Signed by:  Ana Elam DO, - Family Medicine Resident    June 17, 2019 at 10:08 AM

## 2019-06-17 NOTE — PROGRESS NOTES
[facility]: MED RECONCILIATION    Comments/Recommendations:   Medication reconciliation completed by  who had medications with him to review,  manages medications at home, understands not to give patient any of her own medications while inpatient. Patient has not had any medications today, took all scheduled medications yesterday. Patient prescribed Bactrim DS BID X 10 days on 6/13/19 for UTI, has ~10 tabs left. Discussed allergies, unsure of reaction to angiotensin and avelox, hives to meperidine. Confirmed preferred pharmacy as [facility] on [address] [place]     Medications added:     none    Medications removed:    Vitamin D3-not taking  Meloxicam-not taking, patient refuses       Medications adjusted:    none    Information obtained from: , medication bottles     Allergies: Angiotensin ii,human; Avelox [moxifloxacin]; and Demerol [meperidine]    Prior to Admission Medications:     Medication Documentation Review Audit       Reviewed by Shirley Monreal (Pharmacist) on 06/17/19 at 0916      Medication Sig Documenting Provider Last Dose Status Taking?   acetaminophen (TYLENOL) 325 mg tablet Take 2 Tabs by mouth every six (6) hours as needed for Pain. Brittney Aldrich MD  Active Yes   citalopram (CELEXA) 10 mg tablet Take 1 Tab by mouth daily. Melissa Lomax MD 6/16/2019 AM Active Yes   dilTIAZem CD (CARDIZEM CD) 240 mg ER capsule Take 1 Cap by mouth daily. Melissa Lomax MD 6/16/2019 AM Active Yes   diphenoxylate-atropine (LOMOTIL) 2.5-0.025 mg per tablet Take 1 Tab by mouth four (4) times daily as needed for Diarrhea. Max Daily Amount: 4 Tabs. Melissa Lomax MD  Active Yes           Med Note (SHIRLEY HOOKER   Mon Jun 17, 2019  9:14 AM) Has medication at home but has not needed to take   loperamide (IMMODIUM) 2 mg tablet Take 2 mg by mouth four (4) times daily as needed for Diarrhea.  Provider, Historical  Active Yes   ondansetron (ZOFRAN ODT) 8 mg disintegrating tablet Take 1 Tab by mouth every eight (8) hours as needed for Nausea. Joel Cardozo MD  Active Yes   rivastigmine (EXELON) 9.5 mg/24 hr patch 1 Patch by TransDERmal route daily. Joel Cardozo MD 6/16/2019 AM Active Yes   trimethoprim-sulfamethoxazole (BACTRIM DS, SEPTRA DS) 160-800 mg per tablet Take 1 Tab by mouth two (2) times a day for 10 days. Joel Cardozo MD 6/16/2019 PM Active Yes           Med Note (ARLETTE HOOKER   Mon Jun 17, 2019  9:16 AM) Prescribed on 6/13/19 X 10 days for UTI, patient has ~10 tabs left.                        Thank Jose Alfredo Thompson, PharmD, BCPS   Contact: [phone]

## 2019-06-18 VITALS
BODY MASS INDEX: 19.55 KG/M2 | HEART RATE: 84 BPM | TEMPERATURE: 98.1 F | HEIGHT: 68 IN | SYSTOLIC BLOOD PRESSURE: 156 MMHG | RESPIRATION RATE: 16 BRPM | DIASTOLIC BLOOD PRESSURE: 87 MMHG | WEIGHT: 129 LBS | OXYGEN SATURATION: 96 %

## 2019-06-18 LAB
ALBUMIN SERPL-MCNC: 3.2 G/DL (ref 3.5–5)
ALBUMIN/GLOB SERPL: 0.9 {RATIO} (ref 1.1–2.2)
ALP SERPL-CCNC: 144 U/L (ref 45–117)
ALT SERPL-CCNC: 12 U/L (ref 12–78)
ANION GAP SERPL CALC-SCNC: 4 MMOL/L (ref 5–15)
AST SERPL-CCNC: 17 U/L (ref 15–37)
BASOPHILS # BLD: 0.1 K/UL (ref 0–0.1)
BASOPHILS NFR BLD: 1 % (ref 0–1)
BILIRUB SERPL-MCNC: 0.5 MG/DL (ref 0.2–1)
BUN SERPL-MCNC: 12 MG/DL (ref 6–20)
BUN/CREAT SERPL: 11 (ref 12–20)
CALCIUM SERPL-MCNC: 9.4 MG/DL (ref 8.5–10.1)
CHLORIDE SERPL-SCNC: 108 MMOL/L (ref 97–108)
CO2 SERPL-SCNC: 27 MMOL/L (ref 21–32)
CREAT SERPL-MCNC: 1.08 MG/DL (ref 0.55–1.02)
DIFFERENTIAL METHOD BLD: NORMAL
EOSINOPHIL # BLD: 0.1 K/UL (ref 0–0.4)
EOSINOPHIL NFR BLD: 1 % (ref 0–7)
ERYTHROCYTE [DISTWIDTH] IN BLOOD BY AUTOMATED COUNT: 12.1 % (ref 11.5–14.5)
GLOBULIN SER CALC-MCNC: 3.4 G/DL (ref 2–4)
GLUCOSE BLD STRIP.AUTO-MCNC: 91 MG/DL (ref 65–100)
GLUCOSE BLD STRIP.AUTO-MCNC: 92 MG/DL (ref 65–100)
GLUCOSE SERPL-MCNC: 90 MG/DL (ref 65–100)
HCT VFR BLD AUTO: 38.6 % (ref 35–47)
HGB BLD-MCNC: 13 G/DL (ref 11.5–16)
IMM GRANULOCYTES # BLD AUTO: 0 K/UL (ref 0–0.04)
IMM GRANULOCYTES NFR BLD AUTO: 0 % (ref 0–0.5)
LYMPHOCYTES # BLD: 1.9 K/UL (ref 0.8–3.5)
LYMPHOCYTES NFR BLD: 37 % (ref 12–49)
MCH RBC QN AUTO: 31.9 PG (ref 26–34)
MCHC RBC AUTO-ENTMCNC: 33.7 G/DL (ref 30–36.5)
MCV RBC AUTO: 94.6 FL (ref 80–99)
MONOCYTES # BLD: 0.3 K/UL (ref 0–1)
MONOCYTES NFR BLD: 7 % (ref 5–13)
NEUTS SEG # BLD: 2.7 K/UL (ref 1.8–8)
NEUTS SEG NFR BLD: 54 % (ref 32–75)
NRBC # BLD: 0 K/UL (ref 0–0.01)
NRBC BLD-RTO: 0 PER 100 WBC
PLATELET # BLD AUTO: 203 K/UL (ref 150–400)
PMV BLD AUTO: 10.7 FL (ref 8.9–12.9)
POTASSIUM SERPL-SCNC: 3.7 MMOL/L (ref 3.5–5.1)
PROT SERPL-MCNC: 6.6 G/DL (ref 6.4–8.2)
RBC # BLD AUTO: 4.08 M/UL (ref 3.8–5.2)
SERVICE CMNT-IMP: NORMAL
SERVICE CMNT-IMP: NORMAL
SODIUM SERPL-SCNC: 139 MMOL/L (ref 136–145)
TROPONIN I SERPL-MCNC: <0.05 NG/ML
TROPONIN I SERPL-MCNC: <0.05 NG/ML
WBC # BLD AUTO: 5 K/UL (ref 3.6–11)

## 2019-06-18 PROCEDURE — 36415 COLL VENOUS BLD VENIPUNCTURE: CPT

## 2019-06-18 PROCEDURE — 74011000250 HC RX REV CODE- 250: Performed by: FAMILY MEDICINE

## 2019-06-18 PROCEDURE — 97165 OT EVAL LOW COMPLEX 30 MIN: CPT

## 2019-06-18 PROCEDURE — 84484 ASSAY OF TROPONIN QUANT: CPT

## 2019-06-18 PROCEDURE — 97116 GAIT TRAINING THERAPY: CPT

## 2019-06-18 PROCEDURE — 80053 COMPREHEN METABOLIC PANEL: CPT

## 2019-06-18 PROCEDURE — 97535 SELF CARE MNGMENT TRAINING: CPT

## 2019-06-18 PROCEDURE — 74011250637 HC RX REV CODE- 250/637: Performed by: FAMILY MEDICINE

## 2019-06-18 PROCEDURE — 85025 COMPLETE CBC W/AUTO DIFF WBC: CPT

## 2019-06-18 PROCEDURE — 74011250637 HC RX REV CODE- 250/637: Performed by: STUDENT IN AN ORGANIZED HEALTH CARE EDUCATION/TRAINING PROGRAM

## 2019-06-18 PROCEDURE — 74011250636 HC RX REV CODE- 250/636: Performed by: FAMILY MEDICINE

## 2019-06-18 PROCEDURE — 94760 N-INVAS EAR/PLS OXIMETRY 1: CPT

## 2019-06-18 PROCEDURE — 97162 PT EVAL MOD COMPLEX 30 MIN: CPT

## 2019-06-18 PROCEDURE — 82962 GLUCOSE BLOOD TEST: CPT

## 2019-06-18 RX ORDER — DILTIAZEM HYDROCHLORIDE 120 MG/1
240 CAPSULE, COATED, EXTENDED RELEASE ORAL DAILY
Status: DISCONTINUED | OUTPATIENT
Start: 2019-06-18 | End: 2019-06-18 | Stop reason: HOSPADM

## 2019-06-18 RX ORDER — DILTIAZEM HYDROCHLORIDE 120 MG/1
240 CAPSULE, COATED, EXTENDED RELEASE ORAL DAILY
Status: CANCELLED | OUTPATIENT
Start: 2019-06-18

## 2019-06-18 RX ORDER — INSULIN LISPRO 100 [IU]/ML
INJECTION, SOLUTION INTRAVENOUS; SUBCUTANEOUS
Status: DISCONTINUED | OUTPATIENT
Start: 2019-06-18 | End: 2019-06-18 | Stop reason: HOSPADM

## 2019-06-18 RX ORDER — DILTIAZEM HYDROCHLORIDE 120 MG/1
240 CAPSULE, COATED, EXTENDED RELEASE ORAL DAILY
Status: DISCONTINUED | OUTPATIENT
Start: 2019-06-18 | End: 2019-06-18

## 2019-06-18 RX ORDER — CITALOPRAM 20 MG/1
10 TABLET, FILM COATED ORAL DAILY
Status: DISCONTINUED | OUTPATIENT
Start: 2019-06-18 | End: 2019-06-18 | Stop reason: HOSPADM

## 2019-06-18 RX ADMIN — CITALOPRAM 10 MG: 20 TABLET, FILM COATED ORAL at 09:07

## 2019-06-18 RX ADMIN — DILTIAZEM HYDROCHLORIDE 240 MG: 120 CAPSULE, COATED, EXTENDED RELEASE ORAL at 09:07

## 2019-06-18 RX ADMIN — METOPROLOL TARTRATE 2.5 MG: 5 INJECTION, SOLUTION INTRAVENOUS at 06:12

## 2019-06-18 RX ADMIN — Medication 10 ML: at 05:22

## 2019-06-18 RX ADMIN — HEPARIN SODIUM 5000 UNITS: 5000 INJECTION INTRAVENOUS; SUBCUTANEOUS at 12:28

## 2019-06-18 RX ADMIN — METOPROLOL TARTRATE 2.5 MG: 5 INJECTION, SOLUTION INTRAVENOUS at 00:58

## 2019-06-18 RX ADMIN — SODIUM CHLORIDE, SODIUM LACTATE, POTASSIUM CHLORIDE, AND CALCIUM CHLORIDE 150 ML/HR: 600; 310; 30; 20 INJECTION, SOLUTION INTRAVENOUS at 01:03

## 2019-06-18 RX ADMIN — HEPARIN SODIUM 5000 UNITS: 5000 INJECTION INTRAVENOUS; SUBCUTANEOUS at 05:21

## 2019-06-18 RX ADMIN — Medication 10 ML: at 15:26

## 2019-06-18 NOTE — PROGRESS NOTES
Occupational Therapy EVALUATION/discharge  Patient: Parisa Zurita (80 y.o. female)  Date: 6/18/2019  Primary Diagnosis: Pancreatitis [K85.90]       Precautions:   Fall    ASSESSMENT:   Based on the objective data described below, the patient presents with hospital admission secondary to abdominal pain and emesis. Work up revealed pancreatitis. Patient lives with her  and able to perform own ADLs with assist from spouse for IADL tasks. Patient received in bed, sidelying. Patient agreeable to activity and is very pleasant and agreeable. Patient talkative throughout evaluation and requires verbal cues for safety with ADL tasks. She requires only CGA for ADLs and transfers. Patient HR reading 90 at rest to 111 with toileting. Patient with tendency to release RW with ambulation and requires verbal, cues to keep walker management and use. Recommend patient return home with assist/supervision of spouse. Further skilled acute occupational therapy is not indicated at this time. Discharge Recommendations: None  Further Equipment Recommendations for Discharge: none noted       SUBJECTIVE:   Patient stated Addie Padilla told me I can go home. I hope it soon, I'm ready.     OBJECTIVE DATA SUMMARY:   HISTORY:   Past Medical History:   Diagnosis Date    Anemia     Atrial fibrillation ([address]     Cancer (HCC)     basal cell on nose    Chronic pain     back pain    Fibromyalgia 4/1/2010    GERD (gastroesophageal reflux disease) 4/1/2010    Hiatal hernia 4/1/2010    High cholesterol 4/1/2010    HTN (hypertension) 4/1/2010    Ill-defined condition     A.  Fib    OA (osteoarthritis) 4/1/2010    back    Pulmonary emboli ([address] 2015     Past Surgical History:   Procedure Laterality Date    BREAST SURGERY PROCEDURE UNLISTED      Breast im-plants x 2    ENLARGE BREAST WITH IMPLANT      HX APPENDECTOMY      HX BREAST BIOPSY      HX BREAST RECONSTRUCTION      Breast implants removed 1992    HX CATARACT REMOVAL      HX CHOLECYSTECTOMY  9/11/2013    HX HYSTERECTOMY      HX KNEE REPLACEMENT  2008    bilateral    HX ORTHOPAEDIC  2007    Bilateral TKR    HX PARTIAL HYSTERECTOMY      HX SHOULDER REPLACEMENT  2009    left    HX TONSILLECTOMY         Prior Level of Function/Environment/Context: supervision-mod I  Occupations in which the patient is/was successful, what are the barriers preventing that success:   Performance Patterns (routines, roles, habits, and rituals):   Personal Interests and/or values:   Expanded or extensive additional review of patient history:     Home Situation  Home Environment: Private residence  # Steps to Enter: 4  Rails to Enter: Yes  Hand Rails : Bilateral  Wheelchair Ramp: Yes  One/Two Story Residence: One story  Living Alone: No  Support Systems: Spouse/Significant Other/Partner  Patient Expects to be Discharged to[de-identified] Private residence  Current DME Used/Available at Home: Vonna Papa, rollator  Tub or Shower Type: Shower    Hand dominance: Right    EXAMINATION OF PERFORMANCE DEFICITS:  Cognitive/Behavioral Status:  Neurologic State: Alert  Orientation Level: Oriented X4  Cognition: Follows commands             Skin: intact as seen    Edema: none noted     Hearing: Auditory  Auditory Impairment: None    Vision/Perceptual:                                     Range of Motion:  AROM: Within functional limits  PROM: Within functional limits                      Strength:  Strength: Generally decreased, functional                Coordination:  Coordination: Within functional limits  Fine Motor Skills-Upper: Left Intact; Right Intact    Gross Motor Skills-Upper: Left Intact; Right Intact    Tone & Sensation:  Tone: Normal  Sensation: Intact                      Balance:  Sitting: Intact  Standing: Intact; With support    Functional Mobility and Transfers for ADLs:  Bed Mobility:  Rolling: Modified independent  Supine to Sit: Modified independent  Sit to Supine: Modified independent    Transfers:  Sit to Stand: Contact guard assistance  Stand to Sit: Contact guard assistance  Bed to Chair: Contact guard assistance  Bathroom Mobility: Contact guard assistance  Toilet Transfer : Contact guard assistance    ADL Assessment:  Feeding: Independent    Oral Facial Hygiene/Grooming: Contact guard assistance(standing at sink)    Bathing: Contact guard assistance    Upper Body Dressing: Supervision    Lower Body Dressing: Contact guard assistance    Toileting: Contact guard assistance                ADL Intervention and task modifications:                                          Therapeutic Exercise:     Functional Measure:  Barthel Index:    Bathin  Bladder: 10  Bowels: 10  Groomin  Dressin  Feeding: 10  Mobility: 10  Stairs: 5  Toilet Use: 5  Transfer (Bed to Chair and Back): 10  Total: 70/100        Percentage of impairment   0%   1-19%   20-39%   40-59%   60-79%   80-99%   100%   Barthel Score 0-100 100 99-80 79-60 59-40 20-39 1-19   0     The Barthel ADL Index: Guidelines  1. The index should be used as a record of what a patient does, not as a record of what a patient could do. 2. The main aim is to establish degree of independence from any help, physical or verbal, however minor and for whatever reason. 3. The need for supervision renders the patient not independent. 4. A patient's performance should be established using the best available evidence. Asking the patient, friends/relatives and nurses are the usual sources, but direct observation and common sense are also important. However direct testing is not needed. 5. Usually the patient's performance over the preceding 24-48 hours is important, but occasionally longer periods will be relevant. 6. Middle categories imply that the patient supplies over 50 per cent of the effort. 7. Use of aids to be independent is allowed. Link ., Barthel, D.W. (1919). Functional evaluation: the Barthel Index. [address] [facility] (14)2.   LES Cat, Wanda, MARY GRACE Trevino (1999). Measuring the change indisability after inpatient rehabilitation; comparison of the responsiveness of the Barthel Index and Functional Windber Measure. Journal of Neurology, Neurosurgery, and Psychiatry, 66(4), 456-449. HILDA Disla, PAMELLA Cole, & Honey Bourgeois M.A. (2004.) Assessment of post-stroke quality of life in cost-effectiveness studies: The usefulness of the Barthel Index and the EuroQoL-5D. Quality of Life Research, 15, 121-85         Occupational Therapy Evaluation Charge Determination   History Examination Decision-Making   LOW Complexity : Brief history review  LOW Complexity : 1-3 performance deficits relating to physical, cognitive , or psychosocial skils that result in activity limitations and / or participation restrictions  LOW Complexity : No comorbidities that affect functional and no verbal or physical assistance needed to complete eval tasks       Based on the above components, the patient evaluation is determined to be of the following complexity level: LOW   Pain:  Pain Scale 1: Numeric (0 - 10)  Pain Intensity 1: 0              Activity Tolerance:   VSS  Please refer to the flowsheet for vital signs taken during this treatment. After treatment:   []  Patient left in no apparent distress sitting up in chair  [x]  Patient left in no apparent distress in bed  [x]  Call bell left within reach  [x]  Nursing notified  []  Caregiver present  [x]  Bed alarm activated    COMMUNICATION/EDUCATION:   Communication/Collaboration:  [x]      Home safety education was provided and the patient/caregiver indicated understanding. [x]      Patient/family have participated as able and agree with findings and recommendations. []      Patient is unable to participate in plan of care at this time.   Findings and recommendations were discussed with: Physical Therapist and Registered Nurse    PEG Elise/L  Time Calculation: 25 mins

## 2019-06-18 NOTE — PHYSICIAN ADVISORY
Short Stay Review       Pt Name:  David Faulkner   MR#  [id number]   CSN#   [id number]   [address] [place] [address]  550/54  @ [facility]   Hospitalization date  6/17/2019  7:00 AM  No discharge date for patient encounter. Current Attending Physician  Tamanna Frias,      A discharge order has been placed for this episode of hospital care for Ms. Tray Regan; since this hospital stay is less than two midnights, I reviewed Ms. Tray Regan's chart. Ms. Juan Diego Kern healthcare insurance/benefit include:  Payor: [place] MEDICARE / Plan: VA MEDICARE PART A & B / Product Type: Medicare /     Utilization Review related case summary:   Age  80 y.o.   BMI  Body mass index is 19.61 kg/m².     PMHx includes  HTN, Afib, Chronic pain, Fibromyalgia, GERD etc.    Hospital course  The pt was hospitalized for acute pancreatitis, with usual Rx including IVF, NPO etc.      Risk of deterioration at the time this patient  was hospitalized     Moderate            On the basis of chart review, this patient's hospitalization status      is appropriate for Krys Vargas MD MPH FACP   Cell : [phone]  Physician [address]  [address] [place] [address]   Utilization Review, Care Management         CSN:  [id number]   ANASTASIIA:   [id number]  Admitted on :  6/17/2019   Discharge order

## 2019-06-18 NOTE — PROGRESS NOTES
Problem: Mobility Impaired (Adult and Pediatric)  Goal: *Acute Goals and Plan of Care (Insert Text)  Description  Physical Therapy Goals  Initiated 6/18/2019  1. Patient will move from supine to sit and sit to supine  in bed with modified independence within 7 day(s). 2.  Patient will transfer from bed to chair and chair to bed with modified independence using the least restrictive device within 7 day(s). 3.  Patient will perform sit to stand with modified independence within 7 day(s). 4.  Patient will ambulate with modified independence for 150 feet with the least restrictive device within 7 day(s). 5.  Patient will ascend/descend 4 stairs with 2 handrail(s) with modified independence within 7 day(s). Outcome: Progressing Towards Goal  Note:   PHYSICAL THERAPY EVALUATION  Patient: Tray Valdovinos (80 y.o. female)  Date: 6/18/2019  Primary Diagnosis: Pancreatitis [K85.90]        Precautions:   Fall    ASSESSMENT :  Based on the objective data described below, the patient presents with decreased safety awareness and decreased balance following admission for abdominal pain and emesis and found to have pancreatitis. Patient prior to admission lives with her  in a 1 story home with 4 steps to enter, she normally ambulates with a rollator.  reports fall history 1 week prior when ambulating without her assistive device. Patient today with tachycardia. Patient was premedicated by nursing prior to activity. Patient with short distance ambulation to bathroom heart rate 109-118 bpm,  with longer walk in hallway 160-172 bpm with minimal recovery with a standing rest break and irregular rhythm. She was returned to supine, where she did demonstrate heart rate recovery to 98 bpm.  She is asymptomatic throughout. Overall she was CGA using a RW for upright mobility. Patient demonstrates poor walker management and decreased safety awareness she requires verbal cuing for improved RW sequencing. Patient would benefit from skilled PT and home health PT at discharge. .    Patient will benefit from skilled intervention to address the above impairments. Patient?s rehabilitation potential is considered to be Fair  Factors which may influence rehabilitation potential include:   ? None noted  ? Mental ability/status  ? Medical condition  ? Home/family situation and support systems  ? Safety awareness  ? Pain tolerance/management  ? Other:      PLAN :  Recommendations and Planned Interventions:  ?           Bed Mobility Training             ? Neuromuscular Re-Education  ? Transfer Training                   ? Orthotic/Prosthetic Training  ? Gait Training                         ? Modalities  ? Therapeutic Exercises           ? Edema Management/Control  ? Therapeutic Activities            ? Patient and Family Training/Education  ? Other (comment):    Frequency/Duration: Patient will be followed by physical therapy  5 times a week to address goals. Discharge Recommendations: Home Health  Further Equipment Recommendations for Discharge: owns RW      SUBJECTIVE:   Patient stated ? I have to go to the bathroom. ?    OBJECTIVE DATA SUMMARY:   HISTORY:    Past Medical History:   Diagnosis Date    Anemia     Atrial [address] ([facility]     Cancer ([facility]     basal cell on nose    Chronic pain     back pain    Fibromyalgia 4/1/2010    GERD (gastroesophageal reflux disease) 4/1/2010    Hiatal hernia 4/1/2010    High cholesterol 4/1/2010    HTN (hypertension) 4/1/2010    Ill-defined condition     A.  Fib    OA (osteoarthritis) 4/1/2010    back    Pulmonary emboli ([facility] 2015     Past Surgical History:   Procedure Laterality Date    BREAST SURGERY PROCEDURE UNLISTED      Breast im-plants x 2    ENLARGE BREAST WITH IMPLANT      HX APPENDECTOMY      HX BREAST BIOPSY      HX BREAST RECONSTRUCTION      Breast implants removed [address]      HX CHOLECYSTECTOMY  9/11/2013    HX HYSTERECTOMY      HX KNEE REPLACEMENT  2008    bilateral    HX ORTHOPAEDIC  2007    Bilateral TKR    HX PARTIAL HYSTERECTOMY      HX SHOULDER REPLACEMENT  2009    left    HX TONSILLECTOMY       Prior Level of Function/Home Situation: see below  Personal factors and/or comorbidities impacting plan of care: see above    Home Situation  Home Environment: Private residence  # Steps to Enter: 4  Rails to Enter: Yes  Hand Rails : Bilateral  Wheelchair Ramp: Yes  One/Two Story Residence: One story  Living Alone: No  Support Systems: Spouse/Significant Other/Partner  Patient Expects to be Discharged to[de-identified] Private residence  Current DME Used/Available at Home: Yash Barajas rollator    EXAMINATION/PRESENTATION/DECISION MAKING:   Critical Behavior:  Neurologic State: Alert  Orientation Level: Oriented X4  Cognition: Follows commands     Hearing: Auditory  Auditory Impairment: None  Skin:  all exposed intact  Edema: none noted  Range Of Motion:  AROM: Within functional limits           PROM: Within functional limits           Strength:    Strength: Generally decreased, functional                    Tone & Sensation:   Tone: Normal              Sensation: Intact               Coordination:  Coordination: Within functional limits  Vision:      Functional Mobility:  Bed Mobility:  Rolling: Modified independent  Supine to Sit: Modified independent  Sit to Supine: Modified independent     Transfers:  Sit to Stand: Contact guard assistance  Stand to Sit: Contact guard assistance        Bed to Chair: Contact guard assistance              Balance:   Sitting: Intact  Standing: Intact; With support  Ambulation/Gait Training:  Distance (ft): 50 Feet (ft)  Assistive Device: Walker, rolling;Gait belt  Ambulation - Level of Assistance: Contact guard assistance     Gait Description (WDL): Exceptions to [facility]            Therapeutic Exercises:       Functional Measure:  Tinetti test:    Sitting Balance: 1  Arises: 2  Attempts to Rise: 2  Immediate Standing Balance: 2  Standing Balance: 2  Nudged: 1  Eyes Closed: 1  Turn 360 Degrees - Continuous/Discontinuous: 1  Turn 360 Degrees - Steady/Unsteady: 1  Sitting Down: 2  Balance Score: 15  Indication of Gait: 1  R Step Length/Height: 1  L Step Length/Height: 1  R Foot Clearance: 1  L Foot Clearance: 1  Step Symmetry: 1  Step Continuity: 1  Path: 1  Trunk: 0  Walking Time: 1  Gait Score: 9  Total Score: 24         Tinetti Tool Score Risk of Falls  <19 = High Fall Risk  19-24 = Moderate Fall Risk  25-28 = Low Fall Risk  Tinetti ME. Performance-Oriented Assessment of Mobility Problems in Elderly Patients. [address]; [id number]. (Scoring Description: PT Bulletin Feb. 10, 1993)    Older adults: Herbert Aleman et al, 2009; n = 1000 [facility] elderly evaluated with ABC, GILBERTO, ADL, and IADL)  · Mean GILBERTO score for males aged 69-68 years = 26.21(3.40)  · Mean GILBERTO score for females age 69-68 years = 25.16(4.30)  · Mean GILBERTO score for males over 80 years = 23.29(6.02)  · Mean GILBERTO score for females over 80 years = 17.20(8.32)              Physical Therapy Evaluation Charge Determination   History Examination Presentation Decision-Making   HIGH Complexity :3+ comorbidities / personal factors will impact the outcome/ POC  MEDIUM Complexity : 3 Standardized tests and measures addressing body structure, function, activity limitation and / or participation in recreation  MEDIUM Complexity : Evolving with changing characteristics  Other outcome measures Tinetti  MEDIUM      Based on the above components, the patient evaluation is determined to be of the following complexity level: MEDIUM    Pain:  Pain Scale 1: Numeric (0 - 10)  Pain Intensity 1: 0              Activity Tolerance:   Fair- tachycardia with activity  Please refer to the flowsheet for vital signs taken during this treatment.   After treatment:   ?         Patient left in no apparent distress sitting up in chair  ? Patient left in no apparent distress in bed  ? Call bell left within reach  ? Nursing notified  ? Caregiver present  ? Bed alarm activated    COMMUNICATION/EDUCATION:   The patient?s plan of care was discussed with: Registered Nurse. ?         Fall prevention education was provided and the patient/caregiver indicated understanding. ? Patient/family have participated as able in goal setting and plan of care. ?         Patient/family agree to work toward stated goals and plan of care. ?         Patient understands intent and goals of therapy, but is neutral about his/her participation. ? Patient is unable to participate in goal setting and plan of care.     Thank you for this referral.  Edgar Tapia, PT, DPT   Time Calculation: 18 mins

## 2019-06-18 NOTE — PROGRESS NOTES
0:  Pt's  stated [facility] was the name he was looking for. However, pt stated she does not want them. She will f/u with her PCP tomorrow and if he feels she would benefit it will be ordered. Or if he feels pt is a candidate for OP PT that can be ordered as well. Pt's  to transport her home today. CALIXTO Montilla    CM Note:  Order for home health and RW noted. I spoke with pt's . He will look for the agency name prior to coming in to the hospital.  She was last seen by [facility]. He stated pt already has a RW as well as other DME at home.   CALIXTO Montilla

## 2019-06-18 NOTE — PROGRESS NOTES
Bedside and Verbal shift change report given to Toñito Nowak RN (oncoming nurse) by Gilbert Mcclellan RN (offgoing nurse). Report included the following information SBAR, Kardex, MAR, Accordion and Recent Results.

## 2019-06-18 NOTE — DISCHARGE SUMMARY
[facility] [facility] [address] [place] [address]   Office [phone]  Fax [phone]       Discharge / Transfer / Off-Service Note     Name: Som Carpenter MRN: [id number]  Sex: Female   YOB: 1933  Age: 80 y.o. PCP: Monty Nur MD     Date of admission: 6/17/2019  Date of discharge/transfer: 6/18/2019    Attending physician at admission: Dr. Kong Ward  Attending physician at discharge/transfer: Dr. Kong Ward  Resident physician at discharge/transfer: Mariah Robertson MD     Consultants during hospitalization  None     Admission diagnoses   Pancreatitis [K85.90]    Recommended follow-up after discharge  1. PCP  2. Psych     Things to follow up on with PCP:   - When to resume rivastigmine dose     Medication Changes:  DISCONTINUED: rivastigmine until seen and evaluated by PCP     History of Present Illness  Tray Bolaños is a 80 y.o. female with PMH of Alzheimer;s dementia, recurrent falls, HTN, Osteoarthritis, HLD, pulmonary embolism, Iron deficiency anemia, Afib, DM type 2 with neuropathy and fibromyalgia who presents with abdominal pain and vomiting. Pt has been having abdominal pain for months. Localized in the epigastric area. Developed nonbloody, non-bilious vomiting 3 days ago.  became concerned given decreased po intake and brought to ED. Hx of cholecystectomy in 2013, no EtOH use. Switched to Rivastigmine patch 4 days ago. Last BM was yesterday.      Per chart review, pt has a history of PE on chronic anticoagulation. She also has a history of paroxysmal Afib  and underwent an EP study (5/25/18) with GULSHAN guided DCCV and successfully converted to NSR. Pt however converted back into Afib. She follows Dr. Candace Mackey outpatient.     In the ED,   Vitals: Temp 97.3 F, 88 bpm, 125/82, resp 21, 95% RA  Labs: Hgb 13.5, Na 137, K 4.0, Cr 1.32 (baseline 0.96), lipase 931, LA 2.4, trop negative. Imaging: CT abd/pel without acute findings.   1L NS, and compazine 10 mg    JAMIA MOSCOSO SOTERO - HUMACAO course  Tray Regan was admitted into the Family Medicine Service from 6/17/19 to 6/18/19 for abdominal pain and vomiting likely 2/2 pancreatitis. During the course of this admission, the following conditions were addressed/managed; Pancreatitis: Epigastric pain, hx of vomiting, and lipase in 900s. LA 2.4 likely 2/2 vomiting, resolved. No hx of pancreatitis. Unrevealing CT abd pel. By the time of discharge, pt tolerating diet. Discontinued bactrim which she was taking prior to admission. Rivastigmine held due to SE profile including pancreatitis. - Continue to hold rivastigmine until evaluated by PCP w/in 24hrs     At risk for ARUNA:   Improved. Cr 1.32 POA, 1.02 6/18 (BL 0.8). Was likely 2/2 volume depeltion. At [facility] by the time of discharge    Lactic Acidosis:  Likely due to nausea and vomiting. Treated with hydration and resolved.     Alzheimer's Dementia:   Recently placed on Rivastigmine 9.5 mg/ 24 hr patch on 6/13/2019.   - Continue to hold rivastigmine until evaluated by PCP w/in 24hrs    Hx of UTI:   Has been treated with macrobid in 5/2019 and given increased urinary frequency was started on bactrim on 6/13, 4 days ago. Pt asymptomatic today. Unremarkable UA in ED. Discontinued bactrim given SE of pancreatitis.   - Follow-up w/ PCP     DM type 2 with neuropathy:   Last A1c 5.0 (1/2/19).  Diet controlled     Hypertension:   Stable. Continue home dilt 240 mg ER     Paroxysmal Afib:   Stable. Currently in Afib with RVR (rates 110s).  Underwent an EP study (5/25/18) with GULSHAN guided DCCV and successfully converted to NSR. Pt however converted back to Afib at subsequent visits. Pt of Dr. Jenifer Man. Echo (5/25/18) with EF 55%-60%. Continue home Cardizem 240mg daily, warfarin 3mg daily.     Hyperlipidemia:   Lipid panel (1/2/19) - Eliazar Frost, HDL 64, LDL 93 and . Diet controlled, pt not on any medication      Depression:   Stable Continue home Celexa.     Hx of Iron deficiency anemia:   Stable.  Hgb POA 13.5, remained stable throughout hospital course. Pt was initially on Iron but has since been discontinued by Dr. Anita Todd (Heme/onc) given persistent constipation.     Hx of Pulmonary Embolism not on Anticoagulation:   Per Heme/onc note, this was unprovoked and pt will be on life long anti-coag. Warfarin was discharged in 8/2018 by cardiology due to fall risk. Condition at discharge: Stable. Labs  Recent Labs     06/18/19  0500 06/17/19  0810   WBC 5.0 5.0   HGB 13.0 13.5   HCT 38.6 38.9    237     Recent Labs     06/18/19  0500 06/17/19  0810    137   K 3.7 4.0    104   CO2 27 25   BUN 12 16   CREA 1.08* 1.32*   GLU 90 135*   CA 9.4 10.7*     Recent Labs     06/18/19  0500 06/17/19  0810   SGOT 17 15   ALT 12 13   * 159*   TBILI 0.5 0.7   TP 6.6 7.6   ALB 3.2* 3.8   GLOB 3.4 3.8   LPSE  --  931*     Recent Labs     06/18/19  1201 06/18/19  0604 06/18/19  0500 06/18/19  0045 06/17/19  2353 06/17/19  1737 06/17/19  1146 06/17/19  1140   TROIQ  --   --  <0.05 <0.05  --   --  <0.05  --    GLUCPOC 91 92  --   --  95 106*  --  99       Imaging         Results from [place] encounter on 06/17/19   CT ABD PELV W CONT    Narrative EXAM: CT ABD PELV W CONT    INDICATION: ap    COMPARISON: 2018     CONTRAST: 100 mL of Isovue-370. TECHNIQUE:   Following the uneventful intravenous administration of contrast, thin axial  images were obtained through the abdomen and pelvis. Coronal and sagittal  reconstructions were generated. Oral contrast was not administered. CT dose  reduction was achieved through use of a standardized protocol tailored for this  examination and automatic exposure control for dose modulation. FINDINGS:   LUNG BASES: Clear. INCIDENTALLY IMAGED HEART AND MEDIASTINUM: There is moderately severe  cardiomegaly. There is aortic valvular calcification. LIVER: No mass or biliary dilatation. GALLBLADDER: Surgically absent  SPLEEN: No mass.   PANCREAS: No mass or ductal dilatation. ADRENALS: Unremarkable. KIDNEYS: There is a 0.9 cm low-density upper pole left kidney indeterminant most  likely present on the prior study. There is a 1.1 cm low-density right renal  cortex posteriorly. There is a nonobstructing calculus lower pole right kidney. STOMACH: Unremarkable. There is a hiatal hernia  SMALL BOWEL: No dilatation or wall thickening. COLON: No dilatation or wall thickening. There are multiple diverticula however  no definite diverticulitis identified. APPENDIX: Not identified  PERITONEUM: No ascites or pneumoperitoneum. RETROPERITONEUM: No lymphadenopathy or aortic aneurysm. There are extensive  changes of atherosclerosis  REPRODUCTIVE ORGANS: Patient status post hysterectomy  URINARY BLADDER: No mass or calculus. BONES: No destructive bone lesion. There are degenerative changes in the spine  with severe degenerative disc changes at L2-L3 similar to the prior study. There  is osteopenia. ADDITIONAL COMMENTS: N/A      Impression IMPRESSION:  No acute abnormality identified.               Chronic diagnoses   Problem List as of 6/18/2019 Date Reviewed: 6/13/2019          Codes Class Noted - Resolved    Pancreatitis ICD-10-CM: K85.90  ICD-9-CM: 288.6  6/17/2019 - Present        * (Principal) Abdominal pain ICD-10-CM: R10.9  ICD-9-CM: 789.00  6/17/2019 - Present        Type 2 diabetes with nephropathy ([facility] ICD-10-CM: E11.21  ICD-9-CM: 250.40, 583.81  1/2/2019 - Present        Dementia ICD-10-CM: F03.90  ICD-9-CM: 294.20  9/18/2018 - Present        Aggression ICD-10-CM: R46.89  ICD-9-CM: V40.39  9/6/2018 - Present        Psychosis ([facility] ICD-10-CM: F29  ICD-9-CM: 298.9  9/6/2018 - Present        Syncope, vasovagal ICD-10-CM: R55  ICD-9-CM: 780.2  9/5/2018 - Present        Late onset Alzheimer's disease with behavioral disturbance ICD-10-CM: G30.1, F02.81  ICD-9-CM: 331.0, 294.11  8/24/2018 - Present        Frequent falls ICD-10-CM: R29.6  ICD-9-CM: V15.88  8/14/2018 - Present A-fib [facility]) ICD-10-CM: I48.91  ICD-9-CM: 427.31  5/24/2018 - Present        Dyslipidemia ICD-10-CM: E78.5  ICD-9-CM: 272.4  5/24/2018 - Present        Chronic anticoagulation ICD-10-CM: Z79.01  ICD-9-CM: V58.61  5/24/2018 - Present        Constipation ICD-10-CM: K59.00  ICD-9-CM: 564.00  5/24/2018 - Present        History of pulmonary embolism ICD-10-CM: Q00.453  ICD-9-CM: V12.55  5/24/2018 - Present        Controlled type 2 diabetes mellitus without complication, without long-term current use of insulin (HCC) ICD-10-CM: E11.9  ICD-9-CM: 250.00  5/9/2017 - Present        Chronic pain ICD-10-CM: G89.29  ICD-9-CM: 338.29  11/16/2010 - Present        HTN (hypertension) (Chronic) ICD-10-CM: I10  ICD-9-CM: 401.9  4/1/2010 - Present        OA (osteoarthritis) (Chronic) ICD-10-CM: M19.90  ICD-9-CM: 715.90  4/1/2010 - Present        Fibromyalgia (Chronic) ICD-10-CM: M79.7  ICD-9-CM: 729.1  4/1/2010 - Present        GERD (gastroesophageal reflux disease) (Chronic) ICD-10-CM: K21.9  ICD-9-CM: 530.81  4/1/2010 - Present        RESOLVED: Psychosis ([facility] ICD-10-CM: F29  ICD-9-CM: 298.9  8/23/2018 - 9/6/2018        RESOLVED: Wrist fracture, right ICD-10-CM: S62.101A  ICD-9-CM: 814.00  8/14/2018 - 9/6/2018        RESOLVED: Type 2 diabetes with nephropathy ([facility] ICD-10-CM: E11.21  ICD-9-CM: 250.40, 583.81  2/27/2018 - 9/6/2018        RESOLVED: Advanced care planning/counseling discussion ICD-10-CM: Z71.89  ICD-9-CM: V65.49  5/9/2017 - 9/6/2018    Overview Signed 5/9/2017  8:03 AM by April Packer MD     Has no completed, dwp importance             RESOLVED: Iron deficiency anemia ICD-10-CM: D50.9  ICD-9-CM: 280.9  8/11/2016 - 9/6/2018        RESOLVED: Advance care planning ICD-10-CM: Z71.89  ICD-9-CM: V65.49  4/13/2016 - 9/6/2018    Overview Signed 4/13/2016  8:49 AM by April Packer MD     Has a LW             RESOLVED: Pulmonary embolism ([facility] ICD-10-CM: C25.06  ICD-9-CM: 415.19  2/25/2015 - 9/6/2018        RESOLVED: Itching ICD-10-CM: L29.9  ICD-9-CM: 698.9  2/7/2014 - 9/6/2018        RESOLVED: Elevated liver enzymes ICD-10-CM: R74.8  ICD-9-CM: 790.5  1/2/2014 - 9/6/2018        RESOLVED: S/P cholecystectomy ICD-10-CM: Z90.49  ICD-9-CM: V45.79  1/2/2014 - 9/6/2018    Overview Signed 1/2/2014 11:42 AM by Archie Patterson MD     11/2013             RESOLVED: S/P knee replacement ICD-10-CM: Z57.415  ICD-9-CM: V43.65  1/2/2014 - 9/6/2018    Overview Signed 1/2/2014 11:43 AM by Archie Patterson MD     bilaterally             RESOLVED: S/P shoulder surgery ICD-10-CM: M91.582  ICD-9-CM: V45.89  1/2/2014 - 9/6/2018    Overview Signed 1/2/2014 11:43 AM by Archie Patterson MD     Left             RESOLVED: H/O carpal tunnel repair ICD-10-CM: Z98.890  ICD-9-CM: V15.29  1/2/2014 - 9/6/2018    Overview Signed 1/2/2014 11:43 AM by Archie Patterson MD     Right hand             RESOLVED: High cholesterol (Chronic) ICD-10-CM: E78.00  ICD-9-CM: 272.0  4/1/2010 - 9/6/2018        RESOLVED: Hiatal hernia (Chronic) ICD-10-CM: K44.9  ICD-9-CM: 553.3  4/1/2010 - 9/6/2018              Discharge/Transfer Medications  Current Discharge Medication List      CONTINUE these medications which have NOT CHANGED    Details   diphenoxylate-atropine (LOMOTIL) 2.5-0.025 mg per tablet Take 1 Tab by mouth four (4) times daily as needed for Diarrhea. Max Daily Amount: 4 Tabs. Qty: 45 Tab, Refills: 0    Associated Diagnoses: Diarrhea, unspecified type      ondansetron (ZOFRAN ODT) 8 mg disintegrating tablet Take 1 Tab by mouth every eight (8) hours as needed for Nausea. Qty: 30 Tab, Refills: 0      loperamide (IMMODIUM) 2 mg tablet Take 2 mg by mouth four (4) times daily as needed for Diarrhea.      dilTIAZem CD (CARDIZEM CD) 240 mg ER capsule Take 1 Cap by mouth daily. Qty: 90 Cap, Refills: 3      citalopram (CELEXA) 10 mg tablet Take 1 Tab by mouth daily.   Qty: 90 Tab, Refills: 3      acetaminophen (TYLENOL) 325 mg tablet Take 2 Tabs by mouth every six (6) hours as needed for Pain. Qty: 20 Tab, Refills: 0         STOP taking these medications       rivastigmine (EXELON) 9.5 mg/24 hr patch Comments:   Reason for Stopping:         trimethoprim-sulfamethoxazole (BACTRIM DS, SEPTRA DS) 160-800 mg per tablet Comments:   Reason for Stopping:                Diet:  Regular diet.     Activity:  As tolerated     Disposition: Home or Self Care    Discharge instructions to patient/family  Please seek medical attention for any new or worsening symptoms particularly fever, chest pain, shortness of breath, abdominal pain, nausea, vomiting    Follow up plans/appointments  Follow-up Information     Follow up With Specialties Details Why Contact Info    Mike Johns MD Family Practice On 6/19/2019 appt at 1:45pm [address] [facility]  [phone]             Becky Richardson MD  Family Medicine Resident

## 2019-06-18 NOTE — DISCHARGE INSTRUCTIONS
HOME DISCHARGE INSTRUCTIONS    Arlen Conn / [id number] : 1933    Admission date: 2019 Discharge date: 2019     Please bring this form with you to show your care provider at your follow-up appointment. Primary care provider:  Jb Oro MD    Discharging provider:  Isreal Llamas MD  - Family Medicine Resident  Jovita Flores DO - Attending, Family Medicine     You have been admitted to the hospital with the following diagnoses:    ACUTE DIAGNOSES:  Pancreatitis [K85.90]  . . . . . . . . . . . . . . . . . . . . . . . . . . . . . . . . . . . . . . . . . . . . . . . . . . . . . . . . . . . . . . . . . . . . . . . Mary Jimenez FOLLOW-UP CARE RECOMMENDATIONS:    Appointments  Follow-up Information     Follow up With Specialties Details Why Contact Info    Jb Oro MD Family [facility] On 2019 appt at 1:45pm [address] [facility]  [phone]          It was recommended that you have home health Physical therapy and occupational therapy but you declined those services. Please discuss with PCP tomorrow    Discussed fall precautions in great detail. Use your walking walker at all times when ambulating    Recommend bland diet as we discussed. Please follow up with your PCP regarding:  - Restarting rivastigmine in setting of pancreatitis    MEDICATION CHANGES:  1. HOLDING rivastigmine in setting of acute pancreatitis until PCP eval    Follow-up tests needed: BMP to follow Cr. Pending test results: At the time of your discharge the following test results are still pending: none. Please make sure you review these results with your outpatient follow-up provider(s). Specific symptoms to watch for: chest pain, shortness of breath, fever, chills, nausea, vomiting, diarrhea, change in mentation, falling, weakness, bleeding. DIET/what to eat:  Branch Fury regular Diet for 4 days then return to your regular diet.     ACTIVITY:  Activity as tolerated    Wound care: NA    Equipment needed:  Rolling walker, you have one at home    What to do if new or unexpected symptoms occur? If you experience any of the above symptoms (or should other concerns or questions arise after discharge) please call your primary care physician. Return to the emergency room if you cannot get hold of your doctor. · It is very important that you keep your follow-up appointment(s). · Please bring discharge papers, medication list (and/or medication bottles) to your follow-up appointments for review by your outpatient provider(s). · Please check the list of medications and be sure it includes every medication (even non-prescription medications) that your provider wants you to take. · It is important that you take the medication exactly as they are prescribed. · Keep your medication in the bottles provided by the pharmacist and keep a list of the medication names, dosages, and times to be taken in your wallet. · Do not take other medications without consulting your doctor. · If you have any questions about your medications or other instructions, please talk to your nurse or care provider before you leave the hospital.     Information obtained by:     I understand that if any problems occur once I am at home I am to contact my physician. These instructions were explained to me and I had the opportunity to ask questions. I understand and acknowledge receipt of the instructions indicated above.                                                                                                                                                Physician's or R.N.'s Signature                                                                  Date/Time                                                                                                                                              Patient or Representative Signature Date/Time    Patient Education   Patient Education        Preventing Falls: Care Instructions  Your Care Instructions    Getting around your home safely can be a challenge if you have injuries or health problems that make it easy for you to fall. Loose rugs and furniture in walkways are among the dangers for many older people who have problems walking or who have poor eyesight. People who have conditions such as arthritis, osteoporosis, or dementia also have to be careful not to fall. You can make your home safer with a few simple measures. Follow-up care is a key part of your treatment and safety. Be sure to make and go to all appointments, and call your doctor if you are having problems. It's also a good idea to know your test results and keep a list of the medicines you take. How can you care for yourself at home? Taking care of yourself  · You may get dizzy if you do not drink enough water. To prevent dehydration, drink plenty of fluids, enough so that your urine is light yellow or clear like water. Choose water and other caffeine-free clear liquids. If you have kidney, heart, or liver disease and have to limit fluids, talk with your doctor before you increase the amount of fluids you drink. · Exercise regularly to improve your strength, muscle tone, and balance. Walk if you can. Swimming may be a good choice if you cannot walk easily. · Have your vision and hearing checked each year or any time you notice a change. If you have trouble seeing and hearing, you might not be able to avoid objects and could lose your balance. · Know the side effects of the medicines you take. Ask your doctor or pharmacist whether the medicines you take can affect your balance. Sleeping pills or sedatives can affect your balance. · Limit the amount of alcohol you drink. Alcohol can impair your balance and other senses. · Ask your doctor whether calluses or corns on your feet need to be removed.  If you wear loose-fitting shoes because of calluses or corns, you can lose your balance and fall. · Talk to your doctor if you have numbness in your feet. Preventing falls at home  · Remove raised doorway thresholds, throw rugs, and clutter. Repair loose carpet or raised areas in the floor. · Move furniture and electrical cords to keep them out of walking paths. · Use nonskid floor wax, and wipe up spills right away, especially on ceramic tile floors. · If you use a walker or cane, put rubber tips on it. If you use crutches, clean the bottoms of them regularly with an abrasive pad, such as steel wool. · Keep your house well lit, especially [facility], and outside walkways. Use night-lights in areas such as hallways and bathrooms. Add extra light switches or use remote switches (such as switches that go on or off when you clap your hands) to make it easier to turn lights on if you have to get up during the night. · Install sturdy handrails on stairways. · Move items in your cabinets so that the things you use a lot are on the lower shelves (about waist level). · Keep a cordless phone and a flashlight with new batteries by your bed. If possible, put a phone in each of the main rooms of your house, or carry a cell phone in case you fall and cannot reach a phone. Or, you can wear a device around your neck or wrist. You push a button that sends a signal for help. · Wear low-heeled shoes that fit well and give your feet good support. Use footwear with nonskid soles. Check the heels and soles of your shoes for wear. Repair or replace worn heels or soles. · Do not wear socks without shoes on wood floors. · Walk on the grass when the sidewalks are slippery. If you live in an area that gets snow and ice in the winter, sprinkle salt on slippery steps and sidewalks. Preventing falls in the bath  · Install grab bars and nonskid mats inside and outside your shower or tub and near the toilet and sinks.   · Use shower chairs and bath benches. · Use a hand-held shower head that will allow you to sit while showering. · Get into a tub or shower by putting the weaker leg in first. Get out of a tub or shower with your strong side first.  · Repair loose toilet seats and consider installing a raised toilet seat to make getting on and off the toilet easier. · Keep your bathroom door unlocked while you are in the shower. Where can you learn more? Go to http://leonel-socorro.info/. Enter 0476 [id number] [phone] in the search box to learn more about \"Preventing Falls: Care Instructions. \"  Current as of: March 15, 2018  Content Version: 11.9  © 2503-8928 [employer]. Care instructions adapted under license by [employer] (which disclaims liability or warranty for this information). If you have questions about a medical condition or this instruction, always ask your healthcare professional. [place] [address] any warranty or liability for your use of this information. Soft-Textured, [place] Diet: Care Instructions  Your Care Instructions    A soft-textured, bland diet is used when you need food that is easy to chew, swallow, and digest. You will need to choose soft foods that are low in spices and seasonings. You will need to avoid high-fat foods, as well as caffeine and alcohol. Your doctor or dietitian can help you plan a soft-textured, bland diet based on your health and what you prefer to eat. Ask your doctor how long you should stay on this diet. As you get better, you will probably be able to go back to a regular diet. Talk with your doctor or dietitian before you make changes in your diet. Follow-up care is a key part of your treatment and safety. Be sure to make and go to all appointments, and call your doctor if you are having problems. It's also a good idea to know your test results and keep a list of the medicines you take. How can you care for yourself at home?   · Choose foods that are easy to chew and swallow. Good choices are mashed potatoes, soft breads and rolls, cream soups, oatmeal, and Cream of Wheat. · Choose soft, well-cooked vegetables and soft or canned fruits. Good choices are applesauce, ripe bananas, and non-citrus fruit juice. · Try milk, yogurt, or other milk products, if you can digest dairy without too many problems. Your doctor may limit milk and milk products for a while. If so, he or she may recommend a calcium and vitamin D supplement. · Choose soft protein foods such as eggs, tofu, steamed fish, chicken, and turkey. Slow-cooking methods, such as stewing, will help soften meat. Chopping meat in a  or  also will make it easier to eat. · Avoid nuts, raw vegetables, hard crackers, tough meats, and prunes and prune juice. · Avoid foods that are very spicy, such as foods seasoned with black pepper, chili peppers, horseradish, or hot sauce. · Avoid highly acidic foods such as citrus fruits, citrus fruit juices, and tomato-based foods. · Avoid high-fat foods such as fried meat, chips, and rich desserts. · Check with your doctor before you drink alcohol or beverages that have caffeine, such as coffee, tea, and cola beverages. Where can you learn more? Go to http://leonel-socorro.info/. Enter C913 in the search box to learn more about \"Soft-Textured, [place]ny Jonathan Diet: Care Instructions. \"  Current as of: March 28, 2018  Content Version: 11.9  © 5272-0047 [employer]. Care instructions adapted under license by [employer] (which disclaims liability or warranty for this information). If you have questions about a medical condition or this instruction, always ask your healthcare professional. [address] any warranty or liability for your use of this information.

## 2019-06-18 NOTE — PROGRESS NOTES
0920-Physician called again and notified about elevated HR post activity. Physician requested to be notified if HR sustains at 120 bpm or higher. 0915-Assessment completed. Patient reports no chest pain, or pain of any kind. Alert and oriented x 4. No shortness of breath at this time  0907-Diltiazem administered. 0904-RN at bedside. Patient just returned to bed from bathroom with PCT for assistance. HR and BP checked; vitals closer to normal range (see flowsheets). HR is fluctuating between 105/115 bpm.   0902-Telemetry tech called RN to report HR in the 180's.   0900- RN called attending physician to report elevated BP. MD ordered diltiazem to be given early.

## 2019-06-18 NOTE — PROGRESS NOTES
I have reviewed discharge instructions with the patient and spouse. The patient and spouse verbalized understanding. Discharge medications reviewed with patient and spouse and appropriate educational materials and side effects teaching were provided. E-sign not working at this time. Patient's spouse signed copy of AVS. Signed copy placed on patient's chart.

## 2019-06-18 NOTE — PROGRESS NOTES
Orders received, chart reviewed and patient evaluated by physical therapy. Recommend patient to discharge to home health pending progress with skilled acute care physical therapy. Recommend with nursing patient to complete as able in order to maintain strength, endurance and independence: OOB to chair 3x/day with CGA and ambulating with RW. Thank you for your assistance. Full evaluation to follow.    Pro Nelson PT,DPT,NCS

## 2019-06-18 NOTE — PROGRESS NOTES
[facility]   Daily Progress Note    Date: 6/18/2019  PCP: Eleni Cheema MD     Assessment/Plan:   Sami Snowsin is a 80 y.o. female with history of Alzheimer;s dementia, recurrent falls, HTN, Osteoarthritis, HLD, pulmonary embolism, Iron deficiency anemia, Afib, DM type 2 with neuropathy and fibromyalgia, who has spent 1 night(s) in the hospital , who is admitted for abdominal pain and vomiting likely 2/2 pancreatitis. 24 hour Events: NAEO    Pancreatitis: Epigastric pain, hx of vomiting, and lipase in 900s. LA 2.4 likely 2/2 vomiting --> 1. No hx of pancreatitis. Unrevealing CT abd pel. NPO since admission (6/17)  - Clear liquid diet, advance as tolerated  - MIVF  - Zofran PRN  - Medication re-eval for possible causes will hold bactrim (6/13/2019), celexa (started 9/2018), and Rivastigmine (6/13/2019). Recommended psyc follow-up OP.     At risk for ARUNA:   Improved. Cr 1.32 POA, 1.02 6/18 (BL 0.8). Was likely 2/2 volume depeltion. Received 1L NS.  - MIVF  - Daily CMP     Alzheimer's Dementia:   Recently placed on Rivastigmine 9.5 mg/ 24 hr patch on 6/13/2019.   - Will hold Rivastigmine patch given SE profile includes pancreatitis, abdominal pain. - PCP follow-up w/in 24hrs discharge for eval of restarting rivastigmine     Hx of UTI:   Has been treated with macrobid in 5/2019 and given increased urinary frequency was started on bactrim on 6/13, 4 days ago. Pt asymptomatic today. Unremarkable UA in ED. - Will hold bactrim given SE of pancreatitis.      DM type 2 with neuropathy:   Last A1c 5.0 (1/2/19).  Diet controlled  - SSI NS with ACHS glucose checks  - Hypoglycemia protocols ordered.      Hypertension:   Stable  - Resume home dilt 240 mg ER  - Will continue to monitor at this time and readjust as BP's trend.     Paroxysmal Afib:   Currently in Afib with RVR (rates 110s).  Underwent an EP study (5/25/18) with GULSHAN guided DCCV and successfully converted to NSR.  Pt however converted back to Afib at subsequent visits. Pt of Dr. Hoda Ponce) with EF 55%-60%  - Cont home Cardizem 240mg daily.   - Cont home warfarin 3mg daily, pharmacy dosing  - Will consider Cardio consult if pt stays in persistent Afib despite treatment     Hyperlipidemia:   Lipid panel (1/2/19) - Tchol 188, HDL 64, LDL 93 and . Diet controlled, pt not on any medication   - Continue to monitor     Depression:   -Continue home Celexa 10mg     Hx of Iron deficiency anemia:   Stable. Hgb POA 13.5. Pt was initially on Iron but has since been discontinued by Dr. Palma Escobar (Heme/onc) given persistent constipation.  - Cont to monitor  - Daily CBC     Hx of Pulmonary Embolism not on Anticoagulation:   Per Heme/onc note, this was unprovoked and pt will be on life long anti-coag. Warfarin was discharged in 8/2018 by cardiology due to fall risk. Pt was discussed with Dr. Sue Weldon, Family Medicine Attending    Gabriella Benoit MD  Family Medicine Resident         CC: abdominal pain    Subjective  No acute events overnight. Patient was NPO. Denies fever, chills, chest pain, palpitations, shortness of breath, abdominal pain, nausea and vomiting.     Inpatient Medications  Current Facility-Administered Medications   Medication Dose Route Frequency    sodium chloride (NS) flush 5-40 mL  5-40 mL IntraVENous Q8H    sodium chloride (NS) flush 5-40 mL  5-40 mL IntraVENous PRN    heparin (porcine) injection 5,000 Units  5,000 Units SubCUTAneous Q8H    lactated Ringers infusion  150 mL/hr IntraVENous CONTINUOUS    metoprolol (LOPRESSOR) injection 2.5 mg  2.5 mg IntraVENous Q6H    insulin lispro (HUMALOG) injection   SubCUTAneous Q6H    glucose chewable tablet 16 g  4 Tab Oral PRN    dextrose (D50) infusion 12.5-25 g  12.5-25 g IntraVENous PRN    glucagon (GLUCAGEN) injection 1 mg  1 mg IntraMUSCular PRN    ondansetron (ZOFRAN) injection 4 mg  4 mg IntraVENous Q6H PRN         Allergies  Allergies Allergen Reactions    Angiotensin Ii,Human Other (comments)     States does not remember      Avelox [Moxifloxacin] Other (comments)     States does not remember      Demerol [Meperidine] Hives         Objective  Vitals:  Visit Vitals  BP (!) 152/96 (BP 1 Location: Right arm, BP Patient Position: At rest)   Pulse 85   Temp 98 °F (36.7 °C)   Resp 16   Ht 5' 8\" (1.727 m)   Wt 129 lb (58.5 kg)   SpO2 95%   BMI 19.61 kg/m²      Temp (24hrs), Av.8 °F (36.6 °C), Min:97.3 °F (36.3 °C), Max:98.1 °F (36.7 °C)         O2 Device: Room air    I/O:    Intake/Output Summary (Last 24 hours) at 2019 0657  Last data filed at 2019 1147  Gross per 24 hour   Intake 1000 ml   Output 300 ml   Net 700 ml     Last 3 shifts:     0701 -  1900  In: 1000 [I.V.:1000]  Out: 300 [Urine:300]    Physical Exam:  General: No acute distress. Alert. Cooperative. Head: Normocephalic. Atraumatic. Respiratory: CTAB. No w/r/r/c.   Cardiovascular: RRR. Normal S1,S2. + murmur. GI: + bowel sounds. Nontender. No rebound tenderness or guarding. Nondistended   Extremities: No edema. No palpable cord. No tenderness. Neuro:                          Oriented. No focal deficits   Skin:                             Warm and dry.  No rashes or lesions      Labs and Data:    Recent Labs     19  0500 19  0810   WBC 5.0 5.0   HGB 13.0 13.5   HCT 38.6 38.9    237     Recent Labs     19  0500 19  0810    137   K 3.7 4.0    104   CO2 27 25   GLU 90 135*   BUN 12 16   CREA 1.08* 1.32*   CA 9.4 10.7*   ALB 3.2* 3.8   SGOT 17 15   ALT 12 13           Signed by: Nikki Dias MD  Resident, Family Medicine  19

## 2019-06-19 ENCOUNTER — OFFICE VISIT (OUTPATIENT)
Dept: FAMILY MEDICINE CLINIC | Age: 84
End: 2019-06-19

## 2019-06-19 ENCOUNTER — PATIENT OUTREACH (OUTPATIENT)
Dept: FAMILY MEDICINE CLINIC | Age: 84
End: 2019-06-19

## 2019-06-19 VITALS
DIASTOLIC BLOOD PRESSURE: 82 MMHG | RESPIRATION RATE: 18 BRPM | BODY MASS INDEX: 19.55 KG/M2 | HEIGHT: 68 IN | OXYGEN SATURATION: 98 % | SYSTOLIC BLOOD PRESSURE: 151 MMHG | HEART RATE: 83 BPM | WEIGHT: 129 LBS | TEMPERATURE: 98.7 F

## 2019-06-19 DIAGNOSIS — F02.818 LATE ONSET ALZHEIMER'S DISEASE WITH BEHAVIORAL DISTURBANCE (HCC): ICD-10-CM

## 2019-06-19 DIAGNOSIS — G30.1 LATE ONSET ALZHEIMER'S DISEASE WITH BEHAVIORAL DISTURBANCE (HCC): ICD-10-CM

## 2019-06-19 DIAGNOSIS — K85.30 DRUG-INDUCED ACUTE PANCREATITIS, UNSPECIFIED COMPLICATION STATUS: Primary | ICD-10-CM

## 2019-06-19 DIAGNOSIS — I10 ESSENTIAL HYPERTENSION: ICD-10-CM

## 2019-06-19 DIAGNOSIS — E11.21 TYPE 2 DIABETES WITH NEPHROPATHY (HCC): ICD-10-CM

## 2019-06-19 NOTE — PROGRESS NOTES
Patient here for hosp f/u pancreatitis [facility] 6/17/2019-6/18/2019.    1. Have you been to the ER, urgent care clinic since your last visit? Hospitalized since your last visit? Yes When: see notes    2. Have you seen or consulted any other health care providers outside of the [address] [place] [address] since your last visit? Include any pap smears or colon screening. No     Doing better, food is being tolerated, off patch now      Chief Complaint   Patient presents with   [facility] Up     [facility] pancreatitis     She is a 80 y.o. female who presents for evalution. Reviewed PmHx, RxHx, FmHx, SocHx, AllgHx and updated and dated in the chart.     Patient Active Problem List    Diagnosis    Pancreatitis    Abdominal pain    Type 2 diabetes with nephropathy (HCC)    Dementia    Aggression    Psychosis ([address]    Syncope, vasovagal    Late onset Alzheimer's disease with behavioral disturbance    Frequent falls    A-fib ([facility]    Dyslipidemia    Chronic anticoagulation    Constipation    History of pulmonary embolism    Controlled type 2 diabetes mellitus without complication, without long-term current use of insulin (HCC)    Chronic pain    HTN (hypertension)    OA (osteoarthritis)    Fibromyalgia    GERD (gastroesophageal reflux disease)       Review of Systems - negative except as listed above in the HPI    Objective:     Vitals:    06/19/19 1402   BP: 151/82   Pulse: 83   Resp: 18   Temp: 98.7 °F (37.1 °C)   SpO2: 98%   Weight: 129 lb (58.5 kg)   Height: 5' 8\" (1.727 m)     Physical Examination: General appearance - alert, well appearing, and in no distress  Eyes - pupils equal and reactive, extraocular eye movements intact  Ears - bilateral TM's and external ear canals normal  Nose - normal and patent, no erythema, discharge or polyps  Mouth - mucous membranes moist, pharynx normal without lesions  Neck - supple, no significant adenopathy  Chest - clear to auscultation, no wheezes, rales or rhonchi, symmetric air entry  Heart - normal rate, regular rhythm, normal S1, S2, no murmurs, rubs, clicks or gallops  Abdomen - soft, nontender, nondistended, no masses or organomegaly  Extremities - peripheral pulses normal, no pedal edema, no clubbing or cyanosis    Assessment/ Plan:   Diagnoses and all orders for this visit:    1. Drug-induced acute pancreatitis, unspecified complication status  -see NN note and DC summary  -suspect due to patch added last OV and pt just cannot tolerate Alz Rx  -may slowly add back foods    2. Late onset Alzheimer's disease with behavioral disturbance  -as above    3. Essential hypertension  -ok for age    3. Type 2 diabetes with nephropathy ([facility]  Lab Results   Component Value Date/Time    Hemoglobin A1c 5.0 01/02/2019 10:45 AM     -good       Follow-up and Dispositions    · Return if symptoms worsen or fail to improve. I have discussed the diagnosis with the patient and the intended plan as seen in the above orders. The patient understands and agrees with the plan. The patient has received an after-visit summary and questions were answered concerning future plans. Medication Side Effects and Warnings were discussed with patient  Patient Labs were reviewed and or requested:  Patient Past Records were reviewed and or requested    Jena Spears M.D. There are no Patient Instructions on file for this visit.

## 2019-06-19 NOTE — PROGRESS NOTES
Hospital Discharge Follow-Up      Date/Time:  2019 9:52 AM    Patient was admitted to [facility] on  and discharged on  for pancreatitis. The physician discharge summary was available at the time of outreach. Patient was contacted within 1 business days of discharge. Top Challenges reviewed with the provider   Vomitting/abd pain since starting bactrim and rivastigmine   Pancreatitis SE of both    Recommended HH, wants to discuss necessity with you first     Method of communication with provider :none    Inpatient RRAT score: 39  Was this a readmission? no   Patient stated reason for the readmission: n/a    Nurse Navigator (NN) contacted the family by telephone to perform post hospital discharge assessment. Verified name and  with family as identifiers. Provided introduction to self, and explanation of the Nurse Navigator role. Reviewed discharge instructions and red flags with family who verbalized understanding. Family given an opportunity to ask questions and does not have any further questions or concerns at this time. The family agrees to contact the PCP office for questions related to their healthcare. NN provided contact information for future reference. Disease Specific:   N/A    Summary of patient's top problems:  1. Pancreatitis: Epigastric pain, hx of vomiting, and lipase in 900s. LA 2.4 likely 2/2 vomiting, resolved. No hx of pancreatitis. Unrevealing CT abd pel. By the time of discharge, pt tolerating diet. Discontinued bactrim which she was taking prior to admission. Rivastigmine held due to SE profile including pancreatitis. 2. At risk for ARUNA: Improved. Cr 1.32 POA, 1.02  (BL 0.8). Was likely 2/2 volume depeltion. At [facility] by the time of discharge  3. Hx of UTI: Has been treated with macrobid in 2019 and given increased urinary frequency was started on bactrim on , 4 days ago. Pt asymptomatic today. Unremarkable UA in ED.  Discontinued bactrim given SE of pancreatitis.     Home Health orders at discharge: patient refused, prior history with non [address] [place] [address] Street: Meliza Alonso [facility]  Date of initial visit: n/a    Durable Medical Equipment ordered/company: n/a  Durable Medical Equipment received: n/a    Barriers to care? lack of knowledge about disease    Advance Care Planning:   Does patient have an Advance Directive:  reviewed and current     Medication(s):   New Medications at Discharge: none  Changed Medications at Discharge: none  Discontinued Medications at Discharge: rivastigmine, bactrim    Medication reconciliation was performed with family, who verbalizes understanding of administration of home medications. There were no barriers to obtaining medications identified at this time. Referral to Pharm D needed: no     Current Outpatient Medications   Medication Sig    diphenoxylate-atropine (LOMOTIL) 2.5-0.025 mg per tablet Take 1 Tab by mouth four (4) times daily as needed for Diarrhea. Max Daily Amount: 4 Tabs.  ondansetron (ZOFRAN ODT) 8 mg disintegrating tablet Take 1 Tab by mouth every eight (8) hours as needed for Nausea.  loperamide (IMMODIUM) 2 mg tablet Take 2 mg by mouth four (4) times daily as needed for Diarrhea.  dilTIAZem CD (CARDIZEM CD) 240 mg ER capsule Take 1 Cap by mouth daily.  citalopram (CELEXA) 10 mg tablet Take 1 Tab by mouth daily.  acetaminophen (TYLENOL) 325 mg tablet Take 2 Tabs by mouth every six (6) hours as needed for Pain. No current facility-administered medications for this visit. There are no discontinued medications. [facility] follow up appointment(s):   Future Appointments   Date Time Provider Beena Adler   6/19/2019  1:45 PM MD JUN Brink LAUR[facility] SCHED   10/15/2019  8:30 AM MD SIMEON BrinkP [facility].      Non-[facility] follow up appointment(s): none  [facility]:  information provided as a resource       Goals      Attends follow-up appointments as directed. 06/19/19 Patient will attend appt today with Dr. Jacinta Aschoff. -APM

## 2019-06-20 NOTE — CDMP QUERY
Retro query:  
 
Dear Dr. Shade Rowell The diagnosis of Lactic Acidosis: POA 2.4. Likely 2/2 vomiting is documented in the patient record  in the H&P with the following treatments and diagnostic studies: IV hydration, LA q4h At the time of discharge, this condition is not reported as a possible condition that was treated  during this episode of care. Please clarify if possible: 
=>  Lactic acidosis was ruled out 
=>  Lactic acidosis was still a possible condition at discharge, and was treated during the 
patient's stay. 
=>  Unable to clinically determine Thank you for your time [facility] RN/BSN, CCDS Desk:   [phone] Other:  [phone]

## 2019-06-25 ENCOUNTER — PATIENT OUTREACH (OUTPATIENT)
Dept: FAMILY MEDICINE CLINIC | Age: 84
End: 2019-06-25

## 2019-06-25 NOTE — PROGRESS NOTES
Goals Addressed                 This Visit's Progress     COMPLETED: Attends follow-up appointments as directed. 06/19/19 Patient will attend appt today with Dr. Serena Tubbs. -APM    06/25/19 Attended PCP appt. No new orders at that time. No further follow up scheduled. -APM       Returns to baseline activity level. 06/25/19 Per patient's , she is eating better and no issues with abdominal pain or diarrhea. At baseline activity. Will follow up next week. -BRAXTON

## 2019-07-02 ENCOUNTER — PATIENT OUTREACH (OUTPATIENT)
Dept: FAMILY MEDICINE CLINIC | Age: 84
End: 2019-07-02

## 2019-07-03 NOTE — PROGRESS NOTES
Goals      Returns to baseline activity level. 06/25/19 Per patient's , she is eating better and no issues with abdominal pain or diarrhea. At baseline activity. Will follow up next week. -BRAXTON    07/03/19 Having slight abdominal pain and occasional diarrhea. States this is not unusual for her at this point. Declines follow up with Dr. Lucius Ceballos about this at this time. Eating well. Baseline activity level. No fevers or vomiting per . Will follow up next week. -BRAXTON

## 2019-07-10 ENCOUNTER — PATIENT OUTREACH (OUTPATIENT)
Dept: FAMILY MEDICINE CLINIC | Age: 84
End: 2019-07-10

## 2019-07-12 NOTE — PROGRESS NOTES
Goals      Returns to baseline activity level. 06/25/19 Per patient's , she is eating better and no issues with abdominal pain or diarrhea. At baseline activity. Will follow up next week. -BRAXTON    07/03/19 Having slight abdominal pain and occasional diarrhea. States this is not unusual for her at this point. Declines follow up with Dr. Debbie Hernandez about this at this time. Eating well. Baseline activity level. No fevers or vomiting per . Will follow up next week. -BRAXTON    07/12/19 Unable to contact. Will follow up next week. -MIGUELM

## 2019-07-18 ENCOUNTER — PATIENT OUTREACH (OUTPATIENT)
Dept: FAMILY MEDICINE CLINIC | Age: 84
End: 2019-07-18

## 2019-07-19 NOTE — PROGRESS NOTES
Patient has graduated from the Transitions of Care Coordination  program on 07/19/19. Patient's symptoms are stable at this time. Patient/family has the ability to self-manage. Care management goals have been completed at this time. No further nurse navigator follow up scheduled. Goals Addressed                 This Visit's Progress     COMPLETED: Returns to baseline activity level. 06/25/19 Per patient's , she is eating better and no issues with abdominal pain or diarrhea. At baseline activity. Will follow up next week. -APM    07/03/19 Having slight abdominal pain and occasional diarrhea. States this is not unusual for her at this point. Declines follow up with Dr. Sha Garza about this at this time. Eating well. Baseline activity level. No fevers or vomiting per . Will follow up next week. -APM    07/12/19 Unable to contact. Will follow up next week. -APM    07/19/19 Unable to contact. No return calls. Resolving encounter. -AP            Pt has nurse navigator's contact information for any further questions, concerns, or needs.   Patients upcoming visits:    Future Appointments   Date Time Provider Beena Adler   10/15/2019  8:30 AM Nora Edmonds MD [facility] [address]

## 2019-09-09 ENCOUNTER — TELEPHONE (OUTPATIENT)
Dept: FAMILY MEDICINE CLINIC | Age: 84
End: 2019-09-09

## 2019-09-09 NOTE — TELEPHONE ENCOUNTER
Called and spoke to patient's spouse. Sydney Quintero) Spouse states patient is showing increased signs of dementia:  \"Forgetfulness, confusion, disorientation and seeing people who are not there. \"  Requesting any medication change or intervention.  Office visit scheduled for 10/15/19

## 2019-09-09 NOTE — TELEPHONE ENCOUNTER
Patients  Jt Bearden called and would like to have a call back to discuss patients behavior. Please call him back at [phone].

## 2019-09-10 NOTE — TELEPHONE ENCOUNTER
Now that she is off the prior memory rx, she will have more--if he needs help we can refer the home service to them

## 2019-09-10 NOTE — TELEPHONE ENCOUNTER
1st attempt. Attempted to call  Ginny Vizcaino. No answer. No option to leave message.  Will see patient at appt 9/15/2019

## 2019-09-29 NOTE — BH NOTES
Noted on urinalysis  This is not been present on previous urinalysis  Patient denies urinary symptoms  Not currently anemic  · Recommend continued follow-up  · Consider renal/bladder imaging  PSYCHIATRIC PROGRESS NOTE         Patient Name  Eloise Martinez   Date of Birth 5/24/1933   [facility] [id number]   Medical Record Number  [id number]      Age  80 y.o. PCP Canelo Vital MD   Admit date:  9/6/2018    Room Number  746/01  @ [facility]   Date of Service  09/26/18          PSYCHOTHERAPY SESSION NOTE:  Length of psychotherapy session: 20 minutes    Main condition/diagnosis/issues treated during session today, 9/26/2018 : I employed Cognitive Behavioral therapy techniques, Reality-Oriented psychotherapy, as well as supportive psychotherapy in regards to various ongoing psychosocial stressors, including the following: pre-admission and current problems; housing issues;   medical issues; and stress of hospitalization. Interpersonal relationship issues and psychodynamic conflicts explored. Attempts made to alleviate maladaptive patterns. Overall, patient is not progressing    Treatment Plan Update (reviewed an updated 9/26/2018) : I will modify psychotherapy tx plan by implementing more stress management strategies, building upon cognitive behavioral techniques, increasing coping skills, as well as shoring up psychological defenses). An extended energy and skill set was needed to engage pt in psychotherapy due to some of the following: resistiveness, complexity, negativity, confrontational nature, hostile behaviors, and/or severe abnormalities in thought processes/psychosis resulting in the loss of expressive/receptive language communication skills. E & M PROGRESS NOTE:         HISTORY         HISTORY OF PRESENT ILLNESS/INTERVAL HISTORY:  (reviewed/updated 9/26/2018). per initial evaluation: The patient, Tray Vila, is a 80 y.o.   WHITE [place]  female with a past psychiatric history significant for Dementia, MDD who was treated on [facility] for several weeks, until she had a syncopal episode on 9/5 and transferred to the medical floor for observation for 24 hours. She now returns medically stable. Patient's mood and presesentation hs improved compared to original presentation on admission. She remains very confused secondary to advanced dementia. No agitation or aggression noted. Compliant with her medications. Awaiting placement vs. Return home. 9/8/18. \"Am I going home today\". No complaints. Seems calmer. 9/9/18. No complaints. No agitation. Slept ok  9/10/18- Funny and engaging this morning. She enjoyed singing in group this morning. Sleeping well at night. 9/11/18- Patient remains stable. 9/12/18- No complaints, no acute behviors. Bright and euthymic affect. Eating meals, sleeping well. 9/13- patient remains stable  9/14/18- euthymic mood, talkative. Pleasant and cooperative. 9/16/18: seen today,funny at times, accepting po med and meals,no aggressin or agitation,requires assistance  with ADL. 9/17/18- Ms. Regan denies any complaints this morning. Resting comfortably, then easily eating and enjoying her lunch. NO changes. Patient remains stable. 9/18/18- patient presented in upbeat mood, observed sitting and laughing with her . Patient remains compliant and stable. 9/19- unchanged. Bright affect. Funny disposition  9/20- Enjoying visit from her . Smiling. Compliant. Slept through the night.  9/21/18- sleeping this morning. No acute events to report. Stable  9/22/18-Compliant with meds. No prn's used. Slept 7.5 hrs. No behavioral issues are reported. 9/23/18-Slept 7 hrs. No prn's used. Meds and meals compliant. 9/24/18- Sleeping well, overall positive and upbeat mood  925/18 patient enjoys visits with her . He acknowledges that she is much improved. Patient made jokes about her poor memory. Very upbeat and friendly. 9/26/18- more tired than usual this morning, but patient apparently did a great deal of exercising with PT this morning. Overall stable.  (+)loose stool      SIDE EFFECTS: (reviewed/updated 9/26/2018)  None reported or admitted to. ALLERGIES:(reviewed/updated 9/26/2018)  Allergies   Allergen Reactions    Angiotensin Ii,Human Other (comments)     States does not remember      Avelox [Moxifloxacin] Other (comments)     States does not remember      Demerol [Meperidine] Hives      MEDICATIONS PRIOR TO ADMISSION:(reviewed/updated 9/26/2018)  Prescriptions Prior to Admission   Medication Sig    traZODone (DESYREL) 50 mg tablet Take 0.5 Tabs by mouth nightly for 30 days. Indications: insomnia associated with depression    risperiDONE (RISPERDAL) 0.25 mg tablet Take 1 Tab by mouth two (2) times a day for 30 days.  magnesium hydroxide (MILK OF MAGNESIA) 400 mg/5 mL suspension Take 30 mL by mouth daily for 30 days.  donepezil (ARICEPT) 10 mg tablet Take 1 Tab by mouth daily for 30 days.  acetaminophen (TYLENOL) 325 mg tablet Take 2 Tabs by mouth every six (6) hours as needed for Pain.  docusate sodium (COLACE) 100 mg capsule Take 1 Cap by mouth daily for 90 days.  dilTIAZem CD (CARTIA XT) 240 mg ER capsule Take 240 mg by mouth daily.  polyethylene glycol (MIRALAX) 17 gram packet Take 17 g by mouth as needed (Constipation).  citalopram (CELEXA) 20 mg tablet TAKE 1 TABLET BY MOUTH DAILY      PAST MEDICAL HISTORY: Past medical history from the initial psychiatric evaluation has been reviewed (reviewed/updated 9/26/2018) with no additional updates (I asked patient and no additional past medical history provided). Past Medical History:   Diagnosis Date    Anemia     Atrial fibrillation ([address]     Cancer (HCC)     basal cell on nose    Chronic pain     back pain    Fibromyalgia 4/1/2010    GERD (gastroesophageal reflux disease) 4/1/2010    Hiatal hernia 4/1/2010    High cholesterol 4/1/2010    HTN (hypertension) 4/1/2010    Ill-defined condition     A.  Fib    OA (osteoarthritis) 4/1/2010    back    Pulmonary emboli ([address] 2015     Past Surgical History:   Procedure Laterality Date    BREAST SURGERY PROCEDURE UNLISTED      Breast im-plants x 2    ENLARGE BREAST WITH IMPLANT      HX APPENDECTOMY      HX BREAST BIOPSY      HX BREAST RECONSTRUCTION      Breast implants removed 1992    HX CATARACT REMOVAL      HX CHOLECYSTECTOMY  9/11/2013    HX HYSTERECTOMY      HX KNEE REPLACEMENT  2008    bilateral    HX ORTHOPAEDIC  2007    Bilateral TKR    HX PARTIAL HYSTERECTOMY      HX SHOULDER REPLACEMENT  2009    left    HX TONSILLECTOMY        SOCIAL HISTORY: Social history from the initial psychiatric evaluation has been reviewed (reviewed/updated 9/26/2018) with no additional updates (I asked patient and no additional social history provided). Social History     Social History    Marital status:      Spouse name: N/A    Number of children: N/A    Years of education: N/A     Occupational History    Not on file. Social History Main Topics    Smoking status: Never Smoker    Smokeless tobacco: Never Used    Alcohol use No    Drug use: No    Sexual activity: No     Other Topics Concern    Not on file     Social History Narrative      FAMILY HISTORY: Family history from the initial psychiatric evaluation has been reviewed (reviewed/updated 9/26/2018) with no additional updates (I asked patient and no additional family history provided).    Family History   Problem Relation Age of Onset   [facility] Arthritis-rheumatoid Mother     Dementia Mother     Coronary Artery Disease Father     Cancer Brother      lung cancer       REVIEW OF SYSTEMS: (reviewed/updated 9/26/2018)  Appetite:improved   Sleep: improved   All other Review of Systems: General ROS: negative         MENTAL STATUS EXAM & VITALS     MENTAL STATUS EXAM (MSE):    MSE FINDINGS ARE WITHIN NORMAL LIMITS (WNL) UNLESS OTHERWISE STATED BELOW. ( ALL OF THE BELOW CATEGORIES OF THE MSE HAVE BEEN REVIEWED (reviewed 9/26/2018) AND UPDATED AS DEEMED APPROPRIATE )  General Presentation age appropriate and older than stated age, cooperative Orientation not oriented to situation   Vital Signs  See below (reviewed 9/26/2018); Vital Signs (BP, Pulse, & Temp) are within normal limits if not listed below.    Gait and Station Stable/steady, no ataxia   Musculoskeletal System No extrapyramidal symptoms (EPS); no abnormal muscular movements or Tardive Dyskinesia (TD); muscle strength and tone are within normal limits   Language No aphasia or dysarthria   Speech:  soft   Thought Processes illogical; slow rate of thoughts; poor abstract reasoning/computation   Thought Associations loose associations   Thought Content free of delusions   Suicidal Ideations no plan    Homicidal Ideations no plan    Mood:  euthymic   Affect:  euthymic   Memory recent  impaired   Memory remote:  impaired   Concentration/Attention:  distractable   Fund of Knowledge below average   Insight:  limited   Reliability limited   Judgment:  limited          VITALS:     Patient Vitals for the past 24 hrs:   Temp Pulse Resp BP SpO2   09/26/18 2001 98.1 °F (36.7 °C) 80 16 131/75 93 %   09/26/18 1608 98.4 °F (36.9 °C) 64 16 124/66 94 %   09/26/18 0800 98.6 °F (37 °C) 74 20 157/90 92 %     Wt Readings from Last 3 Encounters:   09/18/18 66.9 kg (147 lb 8 oz)   09/02/18 64.5 kg (142 lb 3.2 oz)   08/21/18 76.1 kg (167 lb 12.8 oz)     Temp Readings from Last 3 Encounters:   09/26/18 98.1 °F (36.7 °C)   09/06/18 98.9 °F (37.2 °C)   09/05/18 98.1 °F (36.7 °C)     BP Readings from Last 3 Encounters:   09/26/18 131/75   09/06/18 146/77   09/05/18 103/63     Pulse Readings from Last 3 Encounters:   09/26/18 80   09/06/18 (!) 105   09/05/18 68            DATA     LABORATORY DATA:(reviewed/updated 9/26/2018)  Recent Results (from the past 24 hour(s))   GLUCOSE, POC    Collection Time: 09/26/18  7:37 AM   Result Value Ref Range    Glucose (POC) 98 65 - 100 mg/dL    Performed by Demetri Díaz, POC    Collection Time: 09/26/18  4:04 PM   Result Value Ref Range    Glucose (POC) 106 (H) 65 - 100 mg/dL    Performed by RAÚL CHANNEL      No results found for: VALF2, VALAC, VALP, VALPR, DS6, CRBAM, CRBAMP, CARB2, XCRBAM  No results found for: LITHM   RADIOLOGY REPORTS:(reviewed/updated 9/26/2018)  Xr Pelv Ap Only    Addendum Date: 8/14/2018    Addendum: No fracture. Result Date: 8/14/2018  Clinical history: Fall yesterday FINDINGS: Single frontal view of the pelvis is obtained. There is no fracture or dislocation identified. There is no significant soft tissue abnormality. IMPRESSION: No acute fracture. Xr Forearm Rt Ap/lat    Result Date: 8/13/2018  EXAM:  XR FOREARM RT AP/LAT Clinical history: Distal radial fracture. INDICATION:   fall. COMPARISON: None. FINDINGS: Two views of the right radius and ulna demonstrate distal radial fracture. Extensive degenerative change at the radiocarpal and carpometacarpal rows. .     IMPRESSION:  Distal radial fracture with minimal displacement. Severe degenerative change at the wrist..     Xr Wrist Rt Ap/lat    Result Date: 8/27/2018  EXAM: XR WRIST RT AP/LAT INDICATION:  R wrist fracture follow up, was not casted and pt not leaving splint on. COMPARISON: 8/14/2018. FINDINGS: Two  views of the right wrist demonstrate no appreciable change in the T-shaped interarticular distal radial fracture and ulnar styloid fracture in fiberglass. There is chondrocalcinosis of the radiocarpal joint and TFC. Lateral carpal arthritis is noted. Osteopenia is present. IMPRESSION:  No significant change in the right T-shaped interarticular distal radial and ulna styloid fractures in the splint. Xr Wrist Rt Ap/lat/obl Min 3v    Result Date: 8/14/2018  EXAM: XR WRIST RT AP/LAT/OBL MIN 3V Clinical history: Fall, broken wrist INDICATION:  fall, broken wrist. COMPARISON: None. FINDINGS: Three  views of the right wrist demonstrate postreduction images distal radial fracture. .  Soft tissue edema. .     IMPRESSION:  Postreduction images distal radial fracture. .     Xr Wrist Rt Ap/lat/obl Min 3v    Result Date: 8/13/2018  EXAM: XR WRIST RT AP/LAT/OBL MIN 3V HISTORY: Fall, fell in bathroom, right arm INDICATION:  fall. COMPARISON: None. FINDINGS: Three  views of the right wrist demonstrate nondisplaced distal radius fracture. Severe degenerative change of the radiocarpal and carpometacarpal rows. No ulnar fracture. .  The soft tissues are within normal limits. IMPRESSION:  Nondisplaced fracture of the distal radius. 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     Ct Head Wo Cont    Result Date: 8/23/2018  EXAM:  CT head without contrast INDICATION: Altered mental status COMPARISON: CT head 8/14/2018 TECHNIQUE: Axial noncontrast head CT from foramen magnum to vertex. Coronal and sagittal reformatted images were obtained. CT dose reduction was achieved through use of a standardized protocol tailored for this examination and automatic exposure control for dose modulation. Adaptive statistical iterative reconstruction (ASIR) was utilized. FINDINGS:  There is diffuse age-related parenchymal volume loss. The ventricles and sulci are age-appropriate without hydrocephalus. There is no mass effect or midline shift. There is no intracranial hemorrhage or extra-axial fluid collection. Scattered foci of low attenuation in the periventricular white matter represent stable chronic microvascular ischemic changes. There is no new abnormal parenchymal attenuation. The gray-white matter differentiation is maintained. The basal cisterns are patent. 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 The osseous structures are intact. The visualized paranasal sinuses and mastoid air cells are clear. IMPRESSION: There is no acute intracranial abnormality. Ct Head Wo Cont    Result Date: 8/14/2018  EXAM:  CT HEAD WO CONT Clinical history: Fall, fractured wrist, increased pain INDICATION:   fall COMPARISON: 7/20/2018. CONTRAST:  None.  TECHNIQUE: Unenhanced CT of the head was performed using 5 mm images. Brain and bone windows were generated. CT dose reduction was achieved through use of a standardized protocol tailored for this examination and automatic exposure control for dose modulation. FINDINGS: The ventricles and sulci are normal in size, shape and configuration and midline. Mild scattered hypodensities in the cerebral white matter. There is no intracranial hemorrhage, extra-axial collection, mass, mass effect or midline shift. The basilar cisterns are open. No acute infarct is identified. The bone windows demonstrate no abnormalities. The visualized portions of the paranasal sinuses and mastoid air cells are clear. IMPRESSION: No acute cranial process     Ct Head Wo Cont    Result Date: 7/20/2018  EXAM:  CT HEAD WO CONT INDICATION: Fall going to the bathroom and had a ground level fall and hit head on the floor. COMPARISON: MRI brain 11/12/2012. Lucero Antonio CONTRAST:  None. TECHNIQUE: Unenhanced CT of the head was performed using 5 mm images. Brain and bone windows were generated. CT dose reduction was achieved through use of a standardized protocol tailored for this examination and automatic exposure control for dose modulation. FINDINGS: There is a stable 9 x 12 mm partially calcified extra-axial lesion in the left posterior fossa compatible with meningioma. There is no acute intracranial hemorrhage, other mass, mass effect or herniation. Ventricles and sulci show stable, proportionate, and symmetric pattern. There is a stable pattern of periventricular white matter hypodensity. The gray-white matter differentiation is well-preserved. Atherosclerotic calcifications are seen within the carotid siphons and vertebral arteries. The mastoid air cells are well pneumatized. The visualized paranasal sinuses are normal.     IMPRESSION: No acute intracranial hemorrhage or infarct.  Stable left posterior fossa meningioma and chronic white matter change most compatible with small vessel ischemic and/or senescent change. Intracranial atherosclerosis. Ct Abd Pelv Wo Cont    Result Date: 6/20/2018  EXAM:  CT ABD PELV WO CONT INDICATION: abd pain  2 days of abdominal pain COMPARISON: 2013 CONTRAST:  None. TECHNIQUE: Thin axial images were obtained through the abdomen and pelvis. Coronal and sagittal reconstructions were generated. Oral contrast was not administered. CT dose reduction was achieved through use of a standardized protocol tailored for this examination and automatic exposure control for dose modulation. The absence of intravenous contrast material reduces the sensitivity for evaluation of the solid parenchymal organs of the abdomen. FINDINGS: LUNG BASES: Clear. INCIDENTALLY IMAGED HEART AND MEDIASTINUM: Unremarkable. LIVER: No mass or biliary dilatation. GALLBLADDER: Surgically removed. SPLEEN: No mass. PANCREAS: No mass or ductal dilatation. ADRENALS: Unremarkable. KIDNEYS/URETERS: Malrotated right kidney. Hyperdense right renal cyst. Right nephrolithiasis. STOMACH: Hiatal hernia. SMALL BOWEL: No dilatation or wall thickening. COLON: No dilatation or wall thickening. Moderate colonic stool. APPENDIX: Surgically removed PERITONEUM: No ascites or pneumoperitoneum. RETROPERITONEUM: No lymphadenopathy or aortic aneurysm. REPRODUCTIVE ORGANS: Surgically removed. URINARY BLADDER: No mass or calculus. BONES: No destructive bone lesion. Multilevel degenerative changes. ADDITIONAL COMMENTS: N/A     IMPRESSION: No acute findings. Xr Chest Port    Result Date: 6/20/2018  EXAM:  XR CHEST PORT INDICATION:  Chest Pain COMPARISON:  May 24 FINDINGS: A portable AP radiograph of the chest was obtained at 0555 hours. There is a left shoulder replacement in place. The patient is on a cardiac monitor. The lungs are clear. The cardiac and mediastinal contours and pulmonary vascularity are normal.  The bones and soft tissues are grossly within normal limits.      IMPRESSION: No acute findings.           MEDICATIONS     ALL MEDICATIONS:   Current Facility-Administered Medications   Medication Dose Route Frequency    dilTIAZem CD (CARDIZEM CD) capsule 240 mg  240 mg Oral DAILY    magnesium hydroxide (MILK OF MAGNESIA) 400 mg/5 mL oral suspension 30 mL  30 mL Oral DAILY PRN    citalopram (CELEXA) tablet 20 mg  20 mg Oral QHS    donepezil (ARICEPT) tablet 10 mg  10 mg Oral DAILY    polyethylene glycol (MIRALAX) packet 17 g  17 g Oral DAILY PRN    risperiDONE (RisperDAL) tablet 0.25 mg  0.25 mg Oral BID    saline peripheral flush soln 5 mL  5 mL InterCATHeter PRN    traZODone (DESYREL) tablet 25 mg  25 mg Oral QHS PRN    OLANZapine (ZyPREXA) tablet 2.5 mg  2.5 mg Oral Q6H PRN    ziprasidone (GEODON) 10 mg in sterile water (preservative free) 0.5 mL injection  10 mg IntraMUSCular BID PRN    benztropine (COGENTIN) tablet 1 mg  1 mg Oral BID PRN    benztropine (COGENTIN) injection 1 mg  1 mg IntraMUSCular BID PRN    zolpidem (AMBIEN) tablet 5 mg  5 mg Oral QHS PRN    acetaminophen (TYLENOL) tablet 650 mg  650 mg Oral Q4H PRN    ibuprofen (MOTRIN) tablet 400 mg  400 mg Oral Q8H PRN      SCHEDULED MEDICATIONS:   Current Facility-Administered Medications   Medication Dose Route Frequency    dilTIAZem CD (CARDIZEM CD) capsule 240 mg  240 mg Oral DAILY    citalopram (CELEXA) tablet 20 mg  20 mg Oral QHS    donepezil (ARICEPT) tablet 10 mg  10 mg Oral DAILY    risperiDONE (RisperDAL) tablet 0.25 mg  0.25 mg Oral BID          ASSESSMENT & PLAN     DIAGNOSES REQUIRING ACTIVE TREATMENT AND MONITORING: (reviewed/updated 9/26/2018)  Patient Active Hospital Problem List:    The patient, Tray Berg, is a 80 y.o.  female who presents at this time for treatment of the following diagnoses:  Patient Active Hospital Problem List:   Late onset Alzheimer's disease  (8/24/2018)    Assessment: severe, advanced    Plan: Agree with inpatient hospitalization for further stabilization, safety monitoring and medication management  Medications- Aricept and risperdal   09/16/18: Continue current treatment. 9/26- check labs    In summary, Tray Regan, is a 80 y.o.  female who presents with a severe exacerbation of the principal diagnosis of Dementia  Patient's condition is worsening/not improving/not stable improving. Patient requires continued inpatient hospitalization for further stabilization, safety monitoring and medication management. I will continue to coordinate the provision of individual, milieu, occupational, group, and substance abuse therapies to address target symptoms/diagnoses as deemed appropriate for the individual patient. A coordinated, multidisplinary treatment team round was conducted with the patient (this team consists of the nurse, psychiatric unit pharmcist,  and writer). Complete current electronic health record for patient has been reviewed today including consultant notes, ancillary staff notes, nurses and psychiatric tech notes. Suicide risk assessment completed and patient deemed to be of low risk for suicide at this time. The following regarding medications was addressed during rounds with patient:   the risks and benefits of the proposed medication. The patient was given the opportunity to ask questions. Informed consent given to the use of the above medications. Will continue to adjust psychiatric and non-psychiatric medications (see above \"medication\" section and orders section for details) as deemed appropriate & based upon diagnoses and response to treatment. I will continue to order blood tests/labs and diagnostic tests as deemed appropriate and review results as they become available (see orders for details and above listed lab/test results). I will order psychiatric records from previous [facility] hospitals to further elucidate the nature of patient's psychopathology and review once available.     I will gather additional collateral information from friends, family and o/p treatment team to further elucidate the nature of patient's psychopathology and baselline level of psychiatric functioning. I certify that this patient's inpatient psychiatric hospital services furnished since the previous certification were, and continue to be, required for treatment that could reasonably be expected to improve the patient's condition, or for diagnostic study, and that the patient continues to need, on a daily basis, active treatment furnished directly by or requiring the supervision of inpatient psychiatric facility personnel. In addition, the hospital records show that services furnished were intensive treatment services, admission or related services, or equivalent services.     EXPECTED DISCHARGE DATE/DAY: TBD     DISPOSITION: Home       Signed By:   Rufino Roblero MD  9/26/2018

## 2019-10-15 ENCOUNTER — HOSPITAL ENCOUNTER (OUTPATIENT)
Dept: LAB | Age: 84
Discharge: HOME | End: 2019-10-15
Payer: MEDICARE

## 2019-10-15 ENCOUNTER — OFFICE VISIT (OUTPATIENT)
Dept: FAMILY MEDICINE CLINIC | Age: 84
End: 2019-10-15

## 2019-10-15 VITALS
HEART RATE: 94 BPM | HEIGHT: 68 IN | SYSTOLIC BLOOD PRESSURE: 160 MMHG | RESPIRATION RATE: 16 BRPM | BODY MASS INDEX: 20.85 KG/M2 | WEIGHT: 137.6 LBS | DIASTOLIC BLOOD PRESSURE: 84 MMHG | TEMPERATURE: 98 F | OXYGEN SATURATION: 98 %

## 2019-10-15 DIAGNOSIS — G30.1 LATE ONSET ALZHEIMER'S DISEASE WITH BEHAVIORAL DISTURBANCE (HCC): ICD-10-CM

## 2019-10-15 DIAGNOSIS — Z00.00 ROUTINE GENERAL MEDICAL EXAMINATION AT A HEALTH CARE FACILITY: Primary | ICD-10-CM

## 2019-10-15 DIAGNOSIS — Z23 ENCOUNTER FOR IMMUNIZATION: ICD-10-CM

## 2019-10-15 DIAGNOSIS — F29 PSYCHOSIS, UNSPECIFIED PSYCHOSIS TYPE (HCC): ICD-10-CM

## 2019-10-15 DIAGNOSIS — F02.818 LATE ONSET ALZHEIMER'S DISEASE WITH BEHAVIORAL DISTURBANCE (HCC): ICD-10-CM

## 2019-10-15 DIAGNOSIS — E78.5 DYSLIPIDEMIA: ICD-10-CM

## 2019-10-15 DIAGNOSIS — E11.9 CONTROLLED TYPE 2 DIABETES MELLITUS WITHOUT COMPLICATION, WITHOUT LONG-TERM CURRENT USE OF INSULIN (HCC): ICD-10-CM

## 2019-10-15 DIAGNOSIS — I48.0 PAROXYSMAL ATRIAL FIBRILLATION (HCC): ICD-10-CM

## 2019-10-15 DIAGNOSIS — I10 ESSENTIAL HYPERTENSION: Chronic | ICD-10-CM

## 2019-10-15 PROCEDURE — 83036 HEMOGLOBIN GLYCOSYLATED A1C: CPT

## 2019-10-15 PROCEDURE — 80053 COMPREHEN METABOLIC PANEL: CPT

## 2019-10-15 PROCEDURE — 80061 LIPID PANEL: CPT

## 2019-10-15 RX ORDER — METOPROLOL SUCCINATE 50 MG/1
50 TABLET, EXTENDED RELEASE ORAL DAILY
Qty: 30 TAB | Refills: 5 | Status: SHIPPED | OUTPATIENT
Start: 2019-10-15 | End: 2020-11-24

## 2019-10-15 RX ORDER — HYDROXYZINE PAMOATE 25 MG/1
25 CAPSULE ORAL
COMMUNITY
End: 2019-11-11

## 2019-10-15 RX ORDER — DULOXETIN HYDROCHLORIDE 30 MG/1
30 CAPSULE, DELAYED RELEASE ORAL DAILY
Qty: 30 CAP | Refills: 5 | Status: SHIPPED | OUTPATIENT
Start: 2019-10-15 | End: 2020-06-11

## 2019-10-15 NOTE — PROGRESS NOTES
Chief Complaint   Patient presents with    Hypertension    Skin Problem     legs bilat    Labs     Fasting coffee only    Diarrhea     Abdo discomfort    Immunization/Injection     Fluad     1. Have you been to the ER, urgent care clinic since your last visit? Hospitalized since your last visit? No    2. Have you seen or consulted any other health care providers outside of the [address] [place] [address] since your last visit? Include any pap smears or colon screening. No   Lab Results   Component Value Date/Time    Microalb/Creat ratio (ug/mg creat.) 133.1 (H) 04/16/2018 09:16 AM      Chief Complaint   Patient presents with    Hypertension    Skin Problem     legs bilat    Labs     Fasting coffee only    Diarrhea     Abdo discomfort    Immunization/Injection     Fluad     she is a 80y.o. year old female who presents for evaluation. See Diabetic Report Card listed above. Patient Active Problem List    Diagnosis    Pancreatitis    Abdominal pain    Type 2 diabetes with nephropathy (HCC)    Dementia (HCC)    Aggression    Psychosis ([facility]    Syncope, vasovagal    Late onset Alzheimer's disease with behavioral disturbance ([facility]    Frequent falls    A-fib ([facility]    Dyslipidemia    Chronic anticoagulation    Constipation    History of pulmonary embolism    Controlled type 2 diabetes mellitus without complication, without long-term current use of insulin (HCC)    Chronic pain    HTN (hypertension)    OA (osteoarthritis)    Fibromyalgia    GERD (gastroesophageal reflux disease)       Reviewed PmHx, RxHx, FmHx, SocHx, AllgHx--dated and updated in the chart.     Review of Systems - negative except as listed above in the HPI    Objective:     Vitals:    10/15/19 0817   BP: 160/84   Pulse: 94   Resp: 16   Temp: 98 °F (36.7 °C)   TempSrc: Oral   SpO2: 98%   Weight: 137 lb 9.6 oz (62.4 kg)   Height: 5' 8\" (1.727 m)     Physical Examination: General appearance - alert, well appearing, and in no distress  Neck - supple, no significant adenopathy  Chest - clear to auscultation, no wheezes, rales or rhonchi, symmetric air entry  Heart - normal rate, regular rhythm, normal S1, S2, no murmurs, rubs, clicks or gallops  Abdomen - soft, nontender, nondistended, no masses or organomegaly      Assessment/ Plan:   Diagnoses and all orders for this visit:    1. Controlled type 2 diabetes mellitus without complication, without long-term current use of insulin (HCC)  -     LIPID PANEL  -     METABOLIC PANEL, COMPREHENSIVE  -     HEMOGLOBIN A1C WITH EAG  -at goal  -med cpx done--has LW, is  A fall risk with walker, has mod pain, no etoh, is depressed    2. Encounter for immunization  -     INFLUENZA VACCINE INACTIVATED (IIV), SUBUNIT, ADJUVANTED, IM  -     ADMIN INFLUENZA VIRUS VAC    3. Essential hypertension  -     LIPID PANEL  -     METABOLIC PANEL, COMPREHENSIVE  -     metoprolol succinate (TOPROL-XL) 50 mg XL tablet; Take 1 Tab by mouth daily.  -add new rx    4. Dyslipidemia  -     LIPID PANEL  -     METABOLIC PANEL, COMPREHENSIVE    5. Late onset Alzheimer's disease with behavioral disturbance (HCC)  -stable    6. Paroxysmal atrial fibrillation (HCC)  -     metoprolol succinate (TOPROL-XL) 50 mg XL tablet; Take 1 Tab by mouth daily.  -dc cardizem due to ? Diarrhea issues    7. Psychosis, unspecified psychosis type ([facility]  -     DULoxetine (CYMBALTA) 30 mg capsule; Take 1 Cap by mouth daily.  -add new rx and dc celexa to help with mood and pain       Follow-up and Dispositions    · Return in about 6 months (around 4/15/2020).        Lab Results   Component Value Date/Time    Cholesterol, total 227 (H) 06/17/2019 08:10 AM    HDL Cholesterol 92 06/17/2019 08:10 AM    LDL, calculated 120.2 (H) 06/17/2019 08:10 AM    Triglyceride 74 06/17/2019 08:10 AM    CHOL/HDL Ratio 2.5 06/17/2019 08:10 AM     Lab Results   Component Value Date/Time    Hemoglobin A1c 5.0 01/02/2019 10:45 AM    Hemoglobin A1c 6.8 (H) 05/25/2018 12:16 PM    Hemoglobin A1c 5.4 04/16/2018 09:16 AM    Microalb/Creat ratio (ug/mg creat.) 133.1 (H) 04/16/2018 09:16 AM    LDL, calculated 120.2 (H) 06/17/2019 08:10 AM    Creatinine (POC) 0.9 11/12/2012 07:33 AM    Creatinine 1.08 (H) 06/18/2019 05:00 AM          Discussed with patient goal of Diabetes to include:  HgA1C <7, LDL cholesterol <100, Blood pressure <140/80. Discussed with patient diet and weight management and to get regular exercise. Recommend yearly eye exams and daily foot care. The patient understands and agrees with the plan. I have discussed the diagnosis with the patient and the intended plan as seen in the above orders. The patient has received an after-visit summary and questions were answered concerning future plans. Medication Side Effects and Warnings were discussed with patient  Patient Labs were reviewed and or requested  Patient Past Records were reviewed and or requested    Lena Rod M.D. [facility]    There are no Patient Instructions on file for this visit.

## 2019-10-16 LAB
ALBUMIN SERPL-MCNC: 4.2 G/DL (ref 3.5–4.7)
ALBUMIN/GLOB SERPL: 1.4 {RATIO} (ref 1.2–2.2)
ALP SERPL-CCNC: 173 IU/L (ref 39–117)
ALT SERPL-CCNC: 7 IU/L (ref 0–32)
AST SERPL-CCNC: 19 IU/L (ref 0–40)
BILIRUB SERPL-MCNC: 0.5 MG/DL (ref 0–1.2)
BUN SERPL-MCNC: 20 MG/DL (ref 8–27)
BUN/CREAT SERPL: 17 (ref 12–28)
CALCIUM SERPL-MCNC: 10.4 MG/DL (ref 8.7–10.3)
CHLORIDE SERPL-SCNC: 100 MMOL/L (ref 96–106)
CHOLEST SERPL-MCNC: 232 MG/DL (ref 100–199)
CO2 SERPL-SCNC: 24 MMOL/L (ref 20–29)
CREAT SERPL-MCNC: 1.15 MG/DL (ref 0.57–1)
EST. AVERAGE GLUCOSE BLD GHB EST-MCNC: 103 MG/DL
GLOBULIN SER CALC-MCNC: 2.9 G/DL (ref 1.5–4.5)
GLUCOSE SERPL-MCNC: 99 MG/DL (ref 65–99)
HBA1C MFR BLD: 5.2 % (ref 4.8–5.6)
HDLC SERPL-MCNC: 85 MG/DL
INTERPRETATION, 910389: NORMAL
INTERPRETATION: NORMAL
LDLC SERPL CALC-MCNC: 127 MG/DL (ref 0–99)
Lab: NORMAL
PDF IMAGE, 910387: NORMAL
POTASSIUM SERPL-SCNC: 4.8 MMOL/L (ref 3.5–5.2)
PROT SERPL-MCNC: 7.1 G/DL (ref 6–8.5)
SODIUM SERPL-SCNC: 144 MMOL/L (ref 134–144)
TRIGL SERPL-MCNC: 102 MG/DL (ref 0–149)
VLDLC SERPL CALC-MCNC: 20 MG/DL (ref 5–40)

## 2019-10-16 NOTE — PROGRESS NOTES
After reviewing your labs, I believe they are within normal  limits for your age. Keep working hard on diet and taking your medications that are prescribed. If you have any acute care needs and are having trouble getting an appointment. .. please send me a   [facility] message or have the  send me a message. Have a blessed day and  be kind  to others! If you have any questions, feel free to email thru [facility], or give us   a call back at [phone]. Mo Gabriel M.D.   Good Help to Those in Need  \"You maybe whatever you resolve to be\"

## 2019-10-28 ENCOUNTER — HOSPITAL ENCOUNTER (EMERGENCY)
Age: 84
Discharge: HOME | End: 2019-10-28
Attending: EMERGENCY MEDICINE
Payer: MEDICARE

## 2019-10-28 ENCOUNTER — APPOINTMENT (OUTPATIENT)
Dept: CT IMAGING | Age: 84
End: 2019-10-28
Attending: EMERGENCY MEDICINE
Payer: MEDICARE

## 2019-10-28 VITALS
HEART RATE: 95 BPM | SYSTOLIC BLOOD PRESSURE: 171 MMHG | HEIGHT: 68 IN | OXYGEN SATURATION: 94 % | RESPIRATION RATE: 20 BRPM | BODY MASS INDEX: 20.46 KG/M2 | WEIGHT: 135 LBS | TEMPERATURE: 98.4 F | DIASTOLIC BLOOD PRESSURE: 90 MMHG

## 2019-10-28 DIAGNOSIS — R10.84 ABDOMINAL PAIN, GENERALIZED: Primary | ICD-10-CM

## 2019-10-28 DIAGNOSIS — K59.00 CONSTIPATION, UNSPECIFIED CONSTIPATION TYPE: ICD-10-CM

## 2019-10-28 LAB
ALBUMIN SERPL-MCNC: 3.4 G/DL (ref 3.5–5)
ALBUMIN/GLOB SERPL: 0.9 {RATIO} (ref 1.1–2.2)
ALP SERPL-CCNC: 160 U/L (ref 45–117)
ALT SERPL-CCNC: 13 U/L (ref 12–78)
ANION GAP SERPL CALC-SCNC: 6 MMOL/L (ref 5–15)
APPEARANCE UR: CLEAR
AST SERPL-CCNC: 14 U/L (ref 15–37)
ATRIAL RATE: 163 BPM
BACTERIA URNS QL MICRO: NEGATIVE /HPF
BASOPHILS # BLD: 0 K/UL (ref 0–0.1)
BASOPHILS NFR BLD: 1 % (ref 0–1)
BILIRUB DIRECT SERPL-MCNC: 0.2 MG/DL (ref 0–0.2)
BILIRUB SERPL-MCNC: 0.5 MG/DL (ref 0.2–1)
BILIRUB UR QL: NEGATIVE
BUN SERPL-MCNC: 18 MG/DL (ref 6–20)
BUN/CREAT SERPL: 19 (ref 12–20)
CALCIUM SERPL-MCNC: 9.7 MG/DL (ref 8.5–10.1)
CALCULATED R AXIS, ECG10: -19 DEGREES
CALCULATED T AXIS, ECG11: 105 DEGREES
CHLORIDE SERPL-SCNC: 107 MMOL/L (ref 97–108)
CO2 SERPL-SCNC: 27 MMOL/L (ref 21–32)
COLOR UR: ABNORMAL
COMMENT, HOLDF: NORMAL
CREAT SERPL-MCNC: 0.97 MG/DL (ref 0.55–1.02)
DIAGNOSIS, 93000: NORMAL
DIFFERENTIAL METHOD BLD: NORMAL
EOSINOPHIL # BLD: 0.1 K/UL (ref 0–0.4)
EOSINOPHIL NFR BLD: 1 % (ref 0–7)
EPITH CASTS URNS QL MICRO: ABNORMAL /LPF
ERYTHROCYTE [DISTWIDTH] IN BLOOD BY AUTOMATED COUNT: 12.7 % (ref 11.5–14.5)
GLOBULIN SER CALC-MCNC: 3.8 G/DL (ref 2–4)
GLUCOSE SERPL-MCNC: 122 MG/DL (ref 65–100)
GLUCOSE UR STRIP.AUTO-MCNC: NEGATIVE MG/DL
HCT VFR BLD AUTO: 37.8 % (ref 35–47)
HGB BLD-MCNC: 12.4 G/DL (ref 11.5–16)
HGB UR QL STRIP: NEGATIVE
HYALINE CASTS URNS QL MICRO: ABNORMAL /LPF (ref 0–5)
IMM GRANULOCYTES # BLD AUTO: 0 K/UL (ref 0–0.04)
IMM GRANULOCYTES NFR BLD AUTO: 0 % (ref 0–0.5)
KETONES UR QL STRIP.AUTO: NEGATIVE MG/DL
LEUKOCYTE ESTERASE UR QL STRIP.AUTO: NEGATIVE
LIPASE SERPL-CCNC: 104 U/L (ref 73–393)
LYMPHOCYTES # BLD: 2 K/UL (ref 0.8–3.5)
LYMPHOCYTES NFR BLD: 31 % (ref 12–49)
MCH RBC QN AUTO: 32.1 PG (ref 26–34)
MCHC RBC AUTO-ENTMCNC: 32.8 G/DL (ref 30–36.5)
MCV RBC AUTO: 97.9 FL (ref 80–99)
MONOCYTES # BLD: 0.4 K/UL (ref 0–1)
MONOCYTES NFR BLD: 6 % (ref 5–13)
NEUTS SEG # BLD: 3.8 K/UL (ref 1.8–8)
NEUTS SEG NFR BLD: 61 % (ref 32–75)
NITRITE UR QL STRIP.AUTO: NEGATIVE
NRBC # BLD: 0 K/UL (ref 0–0.01)
NRBC BLD-RTO: 0 PER 100 WBC
PH UR STRIP: 7 [PH] (ref 5–8)
PLATELET # BLD AUTO: 184 K/UL (ref 150–400)
PMV BLD AUTO: 11.3 FL (ref 8.9–12.9)
POTASSIUM SERPL-SCNC: 3.9 MMOL/L (ref 3.5–5.1)
PROT SERPL-MCNC: 7.2 G/DL (ref 6.4–8.2)
PROT UR STRIP-MCNC: ABNORMAL MG/DL
Q-T INTERVAL, ECG07: 360 MS
QRS DURATION, ECG06: 86 MS
QTC CALCULATION (BEZET), ECG08: 447 MS
RBC # BLD AUTO: 3.86 M/UL (ref 3.8–5.2)
RBC #/AREA URNS HPF: ABNORMAL /HPF (ref 0–5)
SAMPLES BEING HELD,HOLD: NORMAL
SODIUM SERPL-SCNC: 140 MMOL/L (ref 136–145)
SP GR UR REFRACTOMETRY: 1.01 (ref 1–1.03)
TROPONIN I SERPL-MCNC: <0.05 NG/ML
UR CULT HOLD, URHOLD: NORMAL
UROBILINOGEN UR QL STRIP.AUTO: 0.2 EU/DL (ref 0.2–1)
VENTRICULAR RATE, ECG03: 93 BPM
WBC # BLD AUTO: 6.3 K/UL (ref 3.6–11)
WBC URNS QL MICRO: ABNORMAL /HPF (ref 0–4)

## 2019-10-28 PROCEDURE — 81001 URINALYSIS AUTO W/SCOPE: CPT

## 2019-10-28 PROCEDURE — 74011250636 HC RX REV CODE- 250/636: Performed by: EMERGENCY MEDICINE

## 2019-10-28 PROCEDURE — 70450 CT HEAD/BRAIN W/O DYE: CPT

## 2019-10-28 PROCEDURE — 83690 ASSAY OF LIPASE: CPT

## 2019-10-28 PROCEDURE — 74177 CT ABD & PELVIS W/CONTRAST: CPT

## 2019-10-28 PROCEDURE — 93005 ELECTROCARDIOGRAM TRACING: CPT

## 2019-10-28 PROCEDURE — 85025 COMPLETE CBC W/AUTO DIFF WBC: CPT

## 2019-10-28 PROCEDURE — 80076 HEPATIC FUNCTION PANEL: CPT

## 2019-10-28 PROCEDURE — 84484 ASSAY OF TROPONIN QUANT: CPT

## 2019-10-28 PROCEDURE — 80048 BASIC METABOLIC PNL TOTAL CA: CPT

## 2019-10-28 PROCEDURE — 77030011943

## 2019-10-28 PROCEDURE — 99285 EMERGENCY DEPT VISIT HI MDM: CPT

## 2019-10-28 PROCEDURE — 36415 COLL VENOUS BLD VENIPUNCTURE: CPT

## 2019-10-28 PROCEDURE — 96374 THER/PROPH/DIAG INJ IV PUSH: CPT

## 2019-10-28 PROCEDURE — 74011636320 HC RX REV CODE- 636/320: Performed by: RADIOLOGY

## 2019-10-28 RX ORDER — SODIUM CHLORIDE 0.9 % (FLUSH) 0.9 %
5-40 SYRINGE (ML) INJECTION AS NEEDED
Status: DISCONTINUED | OUTPATIENT
Start: 2019-10-28 | End: 2019-10-28 | Stop reason: HOSPADM

## 2019-10-28 RX ORDER — ACETAMINOPHEN 325 MG/1
650 TABLET ORAL
Status: DISCONTINUED | OUTPATIENT
Start: 2019-10-28 | End: 2019-10-28 | Stop reason: HOSPADM

## 2019-10-28 RX ORDER — SODIUM CHLORIDE 0.9 % (FLUSH) 0.9 %
5-40 SYRINGE (ML) INJECTION EVERY 8 HOURS
Status: DISCONTINUED | OUTPATIENT
Start: 2019-10-28 | End: 2019-10-28 | Stop reason: HOSPADM

## 2019-10-28 RX ORDER — ONDANSETRON 2 MG/ML
4 INJECTION INTRAMUSCULAR; INTRAVENOUS
Status: COMPLETED | OUTPATIENT
Start: 2019-10-28 | End: 2019-10-28

## 2019-10-28 RX ADMIN — SODIUM CHLORIDE 1000 ML: 900 INJECTION, SOLUTION INTRAVENOUS at 02:25

## 2019-10-28 RX ADMIN — ONDANSETRON 4 MG: 2 INJECTION INTRAMUSCULAR; INTRAVENOUS at 03:03

## 2019-10-28 RX ADMIN — IOPAMIDOL 100 ML: 755 INJECTION, SOLUTION INTRAVENOUS at 04:09

## 2019-10-28 NOTE — ED NOTES
Patient given discharge papers. No further questions. Patient to car via wheelchair and no apparent distress.

## 2019-10-28 NOTE — ED PROVIDER NOTES
80 y.o. female with past medical history significant for dementia, anemia, A-fib, cancer, fibromyalgia, GERD, hypercholesteremia, HTN, osteoarthritis, and pulmonary emboli who presents from home with her caregiver with a chief complaint of a headache. Per the caregiver, the patient noted onset of a headache that began at approximately 0030 this morning. The patient also complains of generalized abdominal pain that is chronic in nature. The caregiver states the patient did not take any medication for her headache prior to arrival and there are no alleviating factors. The caregiver notes the patient has associated symptoms of fever and diarrhea. Full history, physical exam, and ROS unable to be obtained due to dementia. Social hx: negative tobacco, negative alcohol, negative illicit drug use  Surgical Hx: bilateral knee replacement, partial hysterectomy, cataract removal, tonsillectomy, appendectomy, left shoulder replacement, breast implants x2, breast biopsy, cholecystectomy  Allergies: Angiotensin, Avelox, Demerol    PCP: Migue Mao MD    Note written by Kasandra Trejo, as dictated by Gildardo Pagan, DO 2:20 AM            The history is provided by the patient and a caregiver. The history is limited by the condition of the patient (dementia). No  was used. Past Medical History:   Diagnosis Date    Anemia     Atrial fibrillation ([address]     Cancer (HCC)     basal cell on nose    Chronic pain     back pain    Fibromyalgia 4/1/2010    GERD (gastroesophageal reflux disease) 4/1/2010    Hiatal hernia 4/1/2010    High cholesterol 4/1/2010    HTN (hypertension) 4/1/2010    Ill-defined condition     A.  Fib    OA (osteoarthritis) 4/1/2010    back    Pulmonary emboli ([address] 2015       Past Surgical History:   Procedure Laterality Date    BREAST SURGERY PROCEDURE UNLISTED      Breast im-plants x 2    ENLARGE BREAST WITH IMPLANT      HX APPENDECTOMY      HX BREAST BIOPSY      HX BREAST RECONSTRUCTION      Breast implants removed 1992    HX CATARACT REMOVAL      HX CHOLECYSTECTOMY  9/11/2013    HX HYSTERECTOMY      HX KNEE REPLACEMENT  2008    bilateral    HX ORTHOPAEDIC  2007    Bilateral TKR    HX PARTIAL HYSTERECTOMY      HX SHOULDER REPLACEMENT  2009    left    HX TONSILLECTOMY           Family History:   Problem Relation Age of Onset   Mary Arthritis-rheumatoid Mother     Dementia Mother     Coronary Artery Disease Father     Cancer Brother         lung cancer       Social History     Socioeconomic History    Marital status:      Spouse name: Not on file    Number of children: Not on file    Years of education: Not on file    Highest education level: Not on file   Occupational History    Not on file   Social Needs    Financial resource strain: Not on file    Food insecurity:     Worry: Not on file     Inability: Not on file    Transportation needs:     Medical: Not on file     Non-medical: Not on file   Tobacco Use    Smoking status: Never Smoker    Smokeless tobacco: Never Used   Substance and Sexual Activity    Alcohol use: No    Drug use: No    Sexual activity: Never   Lifestyle    Physical activity:     Days per week: Not on file     Minutes per session: Not on file    Stress: Not on file   Relationships    Social connections:     Talks on phone: Not on file     Gets together: Not on file     Attends Spiritism service: Not on file     Active member of club or organization: Not on file     Attends meetings of clubs or organizations: Not on file     Relationship status: Not on file    Intimate partner violence:     Fear of current or ex partner: Not on file     Emotionally abused: Not on file     Physically abused: Not on file     Forced sexual activity: Not on file   Other Topics Concern    Not on file   Social History Narrative    Not on file         ALLERGIES: Angiotensin ii,human;  Avelox [moxifloxacin]; and Demerol [meperidine]    Review of Systems   Unable to perform ROS: Dementia   Constitutional: Negative for appetite change, chills, fever and unexpected weight change. HENT: Negative for ear pain, hearing loss, rhinorrhea and trouble swallowing. Eyes: Negative for pain and visual disturbance. Respiratory: Negative for cough, chest tightness and shortness of breath. Cardiovascular: Negative for chest pain and palpitations. Gastrointestinal: Positive for abdominal pain and diarrhea. Negative for abdominal distention, blood in stool and vomiting. Genitourinary: Negative for dysuria, hematuria and urgency. Musculoskeletal: Negative for back pain and myalgias. Skin: Negative for rash. Neurological: Positive for headaches. Negative for dizziness, syncope, weakness and numbness. Psychiatric/Behavioral: Negative for confusion and suicidal ideas. All other systems reviewed and are negative. There were no vitals filed for this visit. Physical Exam   Constitutional: She appears well-developed. No distress. Elderly   HENT:   Head: Normocephalic and atraumatic. Right Ear: External ear normal.   Left Ear: External ear normal.   Nose: Nose normal.   Mouth/Throat: Oropharynx is clear and moist. No oropharyngeal exudate. Eyes: Pupils are equal, round, and reactive to light. Conjunctivae and EOM are normal. Right eye exhibits no discharge. Left eye exhibits no discharge. No scleral icterus. Neck: Normal range of motion. Neck supple. No JVD present. No tracheal deviation present. Cardiovascular: Normal rate, normal heart sounds and intact distal pulses. An irregularly irregular rhythm present. Exam reveals no gallop and no friction rub. No murmur heard. Pulmonary/Chest: Effort normal and breath sounds normal. No stridor. No respiratory distress. She has no decreased breath sounds. She has no wheezes. She has no rhonchi. She has no rales. She exhibits no tenderness. Abdominal: Soft.  Bowel sounds are normal. She exhibits no distension. There is no tenderness. There is no rebound and no guarding. Musculoskeletal: Normal range of motion. She exhibits no edema or tenderness. Neurological: She is alert. She has normal strength and normal reflexes. She is disoriented. No cranial nerve deficit. Coordination normal. GCS eye subscore is 4. GCS verbal subscore is 4. GCS motor subscore is 6. There are no focal deficits. Patient appears to be at baseline per . Skin: Skin is warm and dry. Capillary refill takes less than 2 seconds. No rash noted. She is not diaphoretic. No erythema. No pallor. Psychiatric: She has a normal mood and affect. Her behavior is normal. Judgment and thought content normal.   Nursing note and vitals reviewed. MDM  Number of Diagnoses or Management Options  Abdominal pain, generalized:   Constipation, unspecified constipation type:      Amount and/or Complexity of Data Reviewed  Clinical lab tests: ordered and reviewed  Tests in the radiology section of CPT®: ordered and reviewed  Tests in the medicine section of CPT®: ordered and reviewed  Review and summarize past medical records: yes  Independent visualization of images, tracings, or specimens: yes (EKG)    Risk of Complications, Morbidity, and/or Mortality  Presenting problems: moderate  Diagnostic procedures: moderate  Management options: moderate    Patient Progress  Patient progress: stable       Procedures    Chief Complaint   Patient presents with    Headache    Abdominal Pain       The patient's presenting problems have been discussed, and they are in agreement with the care plan formulated and outlined with them. I have encouraged them to ask questions as they arise throughout their visit.     MEDICATIONS GIVEN:  Medications   sodium chloride (NS) flush 5-40 mL (has no administration in time range)   sodium chloride (NS) flush 5-40 mL (has no administration in time range)   acetaminophen (TYLENOL) tablet 650 mg (650 mg Oral Refused 10/28/19 8135)   acetaminophen (TYLENOL) tablet 650 mg (has no administration in time range)   ondansetron (ZOFRAN) injection 4 mg (4 mg IntraVENous Given 10/28/19 0983)   sodium chloride 0.9 % bolus infusion 1,000 mL (1,000 mL IntraVENous New Bag 10/28/19 2461)   iopamidol (ISOVUE-370) 76 % injection 100 mL (100 mL IntraVENous Given 10/28/19 2702)       LABS REVIEWED:  Recent Results (from the past 24 hour(s))   EKG, 12 LEAD, INITIAL    Collection Time: 10/28/19  2:31 AM   Result Value Ref Range    Ventricular Rate 93 BPM    Atrial Rate 163 BPM    QRS Duration 86 ms    Q-T Interval 360 ms    QTC Calculation (Bezet) 447 ms    Calculated R Axis -19 degrees    Calculated T Axis 105 degrees    Diagnosis       Atrial fibrillation  Voltage criteria for left ventricular hypertrophy  Nonspecific T wave abnormality  Abnormal ECG  When compared with ECG of 17-JUN-2019 09:52,  Nonspecific T wave abnormality has replaced inverted T waves in Lateral leads     CBC WITH AUTOMATED DIFF    Collection Time: 10/28/19  2:38 AM   Result Value Ref Range    WBC 6.3 3.6 - 11.0 K/uL    RBC 3.86 3.80 - 5.20 M/uL    HGB 12.4 11.5 - 16.0 g/dL    HCT 37.8 35.0 - 47.0 %    MCV 97.9 80.0 - 99.0 FL    MCH 32.1 26.0 - 34.0 PG    MCHC 32.8 30.0 - 36.5 g/dL    RDW 12.7 11.5 - 14.5 %    PLATELET 687 627 - 866 K/uL    MPV 11.3 8.9 - 12.9 FL    NRBC 0.0 0  WBC    ABSOLUTE NRBC 0.00 0.00 - 0.01 K/uL    NEUTROPHILS 61 32 - 75 %    LYMPHOCYTES 31 12 - 49 %    MONOCYTES 6 5 - 13 %    EOSINOPHILS 1 0 - 7 %    BASOPHILS 1 0 - 1 %    IMMATURE GRANULOCYTES 0 0.0 - 0.5 %    ABS. NEUTROPHILS 3.8 1.8 - 8.0 K/UL    ABS. LYMPHOCYTES 2.0 0.8 - 3.5 K/UL    ABS. MONOCYTES 0.4 0.0 - 1.0 K/UL    ABS. EOSINOPHILS 0.1 0.0 - 0.4 K/UL    ABS. BASOPHILS 0.0 0.0 - 0.1 K/UL    ABS. IMM.  GRANS. 0.0 0.00 - 0.04 K/UL    DF AUTOMATED     METABOLIC PANEL, BASIC    Collection Time: 10/28/19  2:38 AM   Result Value Ref Range    Sodium 140 136 - 145 mmol/L    Potassium 3.9 3.5 - 5.1 mmol/L    Chloride 107 97 - 108 mmol/L    CO2 27 21 - 32 mmol/L    Anion gap 6 5 - 15 mmol/L    Glucose 122 (H) 65 - 100 mg/dL    BUN 18 6 - 20 MG/DL    Creatinine 0.97 0.55 - 1.02 MG/DL    BUN/Creatinine ratio 19 12 - 20      GFR est AA >60 >60 ml/min/1.73m2    GFR est non-AA 54 (L) >60 ml/min/1.73m2    Calcium 9.7 8.5 - 10.1 MG/DL   LIPASE    Collection Time: 10/28/19  2:38 AM   Result Value Ref Range    Lipase 104 73 - 393 U/L   HEPATIC FUNCTION PANEL    Collection Time: 10/28/19  2:38 AM   Result Value Ref Range    Protein, total 7.2 6.4 - 8.2 g/dL    Albumin 3.4 (L) 3.5 - 5.0 g/dL    Globulin 3.8 2.0 - 4.0 g/dL    A-G Ratio 0.9 (L) 1.1 - 2.2      Bilirubin, total 0.5 0.2 - 1.0 MG/DL    Bilirubin, direct 0.2 0.0 - 0.2 MG/DL    Alk. phosphatase 160 (H) 45 - 117 U/L    AST (SGOT) 14 (L) 15 - 37 U/L    ALT (SGPT) 13 12 - 78 U/L   SAMPLES BEING HELD    Collection Time: 10/28/19  2:38 AM   Result Value Ref Range    SAMPLES BEING HELD BLUE      COMMENT        Add-on orders for these samples will be processed based on acceptable specimen integrity and analyte stability, which may vary by analyte.    TROPONIN I    Collection Time: 10/28/19  2:38 AM   Result Value Ref Range    Troponin-I, Qt. <0.05 <0.05 ng/mL   URINALYSIS W/MICROSCOPIC    Collection Time: 10/28/19  2:43 AM   Result Value Ref Range    Color YELLOW/STRAW      Appearance CLEAR CLEAR      Specific gravity 1.011 1.003 - 1.030      pH (UA) 7.0 5.0 - 8.0      Protein TRACE (A) NEG mg/dL    Glucose NEGATIVE  NEG mg/dL    Ketone NEGATIVE  NEG mg/dL    Bilirubin NEGATIVE  NEG      Blood NEGATIVE  NEG      Urobilinogen 0.2 0.2 - 1.0 EU/dL    Nitrites NEGATIVE  NEG      Leukocyte Esterase NEGATIVE  NEG      WBC 0-4 0 - 4 /hpf    RBC 0-5 0 - 5 /hpf    Epithelial cells FEW FEW /lpf    Bacteria NEGATIVE  NEG /hpf    Hyaline cast 0-2 0 - 5 /lpf   URINE CULTURE HOLD SAMPLE    Collection Time: 10/28/19  2:43 AM   Result Value Ref Range    Urine culture hold        URINE ON HOLD IN MICROBIOLOGY DEPT FOR 3 DAYS. IF UNPRESERVED URINE IS SUBMITTED, IT CANNOT BE USED FOR ADDITIONAL TESTING AFTER 24 HRS, RECOLLECTION WILL BE REQUIRED. VITAL SIGNS:  Patient Vitals for the past 24 hrs:   Temp Pulse Resp BP SpO2   10/28/19 0330 -- 95 20 171/90 94 %   10/28/19 0316 -- -- -- -- 95 %   10/28/19 0315 -- 93 17 (!) 149/98 96 %   10/28/19 0230 -- 94 17 (!) 167/91 95 %   10/28/19 0221 98.4 °F (36.9 °C) 83 23 155/89 96 %       RADIOLOGY RESULTS:  The following have been ordered and reviewed:  Ct Head Wo Cont    Result Date: 10/28/2019  EXAM:  CT head without contrast INDICATION: Headache. COMPARISON: CT 8/23/2018 TECHNIQUE: Axial noncontrast head CT from foramen magnum to vertex. Coronal and sagittal reformatted images were obtained. CT dose reduction was achieved through use of a standardized protocol tailored for this examination and automatic exposure control for dose modulation. FINDINGS:  There is diffuse age-related parenchymal volume loss. The ventricles and sulci are age-appropriate without hydrocephalus. There is no mass effect or midline shift. There is no intracranial hemorrhage or extra-axial fluid collection. Scattered foci of low attenuation in the periventricular white matter represent stable chronic microvascular ischemic changes. The gray-white matter differentiation is maintained. The basal cisterns are patent. 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 The osseous structures are intact. The visualized paranasal sinuses and mastoid air cells are clear. IMPRESSION: No acute intracranial abnormality. Ct Abd Pelv W Cont    Result Date: 10/28/2019  EXAM:  CT abdomen pelvis with contrast INDICATION: Abdominal pain. COMPARISON: CT 6/17/2019.  TECHNIQUE: Helical CT of the abdomen  and pelvis  following the uneventful intravenous administration of nonionic contrast.  Coronal and sagittal reformats are performed. CT dose reduction was achieved through use of a standardized protocol tailored for this examination and automatic exposure control for dose modulation. FINDINGS: The visualized lung bases demonstrate no mass or consolidation. There is stable cardiomegaly. There is no pericardial or pleural effusion. There is a small hiatal hernia. The liver, spleen, pancreas, and adrenal glands are normal. The gall bladder is surgically absent without intra- or extra-hepatic biliary dilatation. The kidneys are symmetric without hydronephrosis. Multiple bilateral subcentimeter low-density kidney lesions are too small to characterize but likely represent cysts. There is a large amount of rectal stool. There are no dilated bowel loops. The appendix is surgically absent. There is marked distal colonic diverticulosis without focal adjacent formation. There are no enlarged lymph nodes. There is no free fluid or free air. The aorta tapers without aneurysm. There is aortoiliac atherosclerosis. The urinary bladder is normal.  There is no pelvic mass. The uterus is surgically absent. There are degenerative changes in the spine most prominent at L2-3. There is no aggressive bony lesion. There is diffuse osteopenia. IMPRESSION: 1. Large amount of rectal stool. No bowel obstruction. Distal colonic diverticulosis without diverticulitis. 2. No other acute abnormality in the abdomen or pelvis. ED EKG interpretation:  Rhythm: atrial fib; and irregular. Rate (approx.): 93; Axis: normal; QRS interval: normal ; ST/T wave: non-specific changes; Other findings: Abnormal EKG with LVH. This EKG was interpreted by Camilla Worthington DO, ED Provider. PROGRESS NOTES:  Attempt was made to give the patient a soapsuds enema. Patient is unable to tolerate. Nursing staff stated that the plastic for the enema advance easily into the rectum. There is no resistance.  Discussed results and plan with patient and spouse. Patient will be discharged home with PCP follow up. Patient instructed to return to the emergency room for any worsening symptoms or any other concerns. DIAGNOSIS:    1. Abdominal pain, generalized    2. Constipation, unspecified constipation type        PLAN:  Follow-up Information     Follow up With Specialties Details Why Contact Info    Filomena Robin MD Family Practice Schedule an appointment as soon as possible for a visit  [address]  [phone]      [facility] EMERGENCY DEPT Emergency Medicine  If symptoms worsen [address] [facility]  [phone]        Current Discharge Medication List      CONTINUE these medications which have NOT CHANGED    Details   hydrOXYzine pamoate (VISTARIL) 25 mg capsule Take 25 mg by mouth three (3) times daily as needed for Itching. DULoxetine (CYMBALTA) 30 mg capsule Take 1 Cap by mouth daily. Qty: 30 Cap, Refills: 5    Associated Diagnoses: Psychosis, unspecified psychosis type ([address]      metoprolol succinate (TOPROL-XL) 50 mg XL tablet Take 1 Tab by mouth daily. Qty: 30 Tab, Refills: 5    Associated Diagnoses: Essential hypertension; Paroxysmal atrial fibrillation (HCC)      diphenoxylate-atropine (LOMOTIL) 2.5-0.025 mg per tablet Take 1 Tab by mouth four (4) times daily as needed for Diarrhea. Max Daily Amount: 4 Tabs. Qty: 45 Tab, Refills: 0    Associated Diagnoses: Diarrhea, unspecified type      ondansetron (ZOFRAN ODT) 8 mg disintegrating tablet Take 1 Tab by mouth every eight (8) hours as needed for Nausea. Qty: 30 Tab, Refills: 0      loperamide (IMMODIUM) 2 mg tablet Take 2 mg by mouth four (4) times daily as needed for Diarrhea.      acetaminophen (TYLENOL) 325 mg tablet Take 2 Tabs by mouth every six (6) hours as needed for Pain. Qty: 20 Tab, Refills: 0             ED COURSE: The patient's hospital course has been uncomplicated.

## 2019-10-28 NOTE — ED TRIAGE NOTES
Patient arrives with  for complaints of abdominal pain that has been present for awhile but has also been complaining of severe headache and eye pain since about 0030.      Patient has also been having diarrhea intermittently

## 2019-10-28 NOTE — DISCHARGE INSTRUCTIONS
We hope that we have addressed all of your medical concerns. The examination and treatment you received in the Emergency Department were for an emergent problem and were not intended as complete care. It is important that you follow up with your healthcare provider(s) for ongoing care. If your symptoms worsen or do not improve as expected, and you are unable to reach your usual health care provider(s), you should return to the Emergency Department. Today's healthcare is undergoing tremendous change, and patient satisfaction surveys are one of the many tools to assess the quality of medical care. You may receive a survey from the [facility] regarding your experience in the Emergency Department. I hope that your experience has been completely positive, particularly the medical care that I provided. As such, please participate in the survey; anything less than excellent does not meet my expectations or intentions. [facility] and 8 [facility] participate in nationally recognized quality of care measures. If your blood pressure is greater than 120/80, as reported below, we urge that you seek medical care to address the potential of high blood pressure, commonly known as hypertension. Hypertension can be hereditary or can be caused by certain medical conditions, pain, stress, or \"white coat syndrome. \"       Please make an appointment with your health care provider(s) for follow up of your Emergency Department visit. VITALS:   Patient Vitals for the past 8 hrs:   Temp Pulse Resp BP SpO2   10/28/19 0330 -- 95 20 171/90 94 %   10/28/19 0316 -- -- -- -- 95 %   10/28/19 0315 -- 93 17 (!) 149/98 96 %   10/28/19 0230 -- 94 17 (!) 167/91 95 %   10/28/19 0221 98.4 °F (36.9 °C) 83 23 155/89 96 %          Thank you for allowing us to provide you with medical care today. We realize that you have many choices for your emergency care needs.   Please choose us in the future for any continued health care needs. Katarzyna Ludivina Clemens  [address] [employer].   Office: [phone]            Recent Results (from the past 24 hour(s))   EKG, 12 LEAD, INITIAL    Collection Time: 10/28/19  2:31 AM   Result Value Ref Range    Ventricular Rate 93 BPM    Atrial Rate 163 BPM    QRS Duration 86 ms    Q-T Interval 360 ms    QTC Calculation (Bezet) 447 ms    Calculated R Axis -19 degrees    Calculated T Axis 105 degrees    Diagnosis       Atrial fibrillation  Voltage criteria for left ventricular hypertrophy  Nonspecific T wave abnormality  Abnormal ECG  When compared with ECG of 17-JUN-2019 09:52,  Nonspecific T wave abnormality has replaced inverted T waves in Lateral leads     CBC WITH AUTOMATED DIFF    Collection Time: 10/28/19  2:38 AM   Result Value Ref Range    WBC 6.3 3.6 - 11.0 K/uL    RBC 3.86 3.80 - 5.20 M/uL    HGB 12.4 11.5 - 16.0 g/dL    HCT 37.8 35.0 - 47.0 %    MCV 97.9 80.0 - 99.0 FL    MCH 32.1 26.0 - 34.0 PG    MCHC 32.8 30.0 - 36.5 g/dL    RDW 12.7 11.5 - 14.5 %    PLATELET 315 255 - 081 K/uL    MPV 11.3 8.9 - 12.9 FL    NRBC 0.0 0  WBC    ABSOLUTE NRBC 0.00 0.00 - 0.01 K/uL    NEUTROPHILS 61 32 - 75 %    LYMPHOCYTES 31 12 - 49 %    MONOCYTES 6 5 - 13 %    EOSINOPHILS 1 0 - 7 %    BASOPHILS 1 0 - 1 %    IMMATURE GRANULOCYTES 0 0.0 - 0.5 %    ABS. NEUTROPHILS 3.8 1.8 - 8.0 K/UL    ABS. LYMPHOCYTES 2.0 0.8 - 3.5 K/UL    ABS. MONOCYTES 0.4 0.0 - 1.0 K/UL    ABS. EOSINOPHILS 0.1 0.0 - 0.4 K/UL    ABS. BASOPHILS 0.0 0.0 - 0.1 K/UL    ABS. IMM.  GRANS. 0.0 0.00 - 0.04 K/UL    DF AUTOMATED     METABOLIC PANEL, BASIC    Collection Time: 10/28/19  2:38 AM   Result Value Ref Range    Sodium 140 136 - 145 mmol/L    Potassium 3.9 3.5 - 5.1 mmol/L    Chloride 107 97 - 108 mmol/L    CO2 27 21 - 32 mmol/L    Anion gap 6 5 - 15 mmol/L    Glucose 122 (H) 65 - 100 mg/dL    BUN 18 6 - 20 MG/DL    Creatinine 0.97 0.55 - 1.02 MG/DL    BUN/Creatinine ratio 19 12 - 20      GFR est AA >60 >60 ml/min/1.73m2    GFR est non-AA 54 (L) >60 ml/min/1.73m2    Calcium 9.7 8.5 - 10.1 MG/DL   LIPASE    Collection Time: 10/28/19  2:38 AM   Result Value Ref Range    Lipase 104 73 - 393 U/L   HEPATIC FUNCTION PANEL    Collection Time: 10/28/19  2:38 AM   Result Value Ref Range    Protein, total 7.2 6.4 - 8.2 g/dL    Albumin 3.4 (L) 3.5 - 5.0 g/dL    Globulin 3.8 2.0 - 4.0 g/dL    A-G Ratio 0.9 (L) 1.1 - 2.2      Bilirubin, total 0.5 0.2 - 1.0 MG/DL    Bilirubin, direct 0.2 0.0 - 0.2 MG/DL    Alk. phosphatase 160 (H) 45 - 117 U/L    AST (SGOT) 14 (L) 15 - 37 U/L    ALT (SGPT) 13 12 - 78 U/L   SAMPLES BEING HELD    Collection Time: 10/28/19  2:38 AM   Result Value Ref Range    SAMPLES BEING HELD BLUE      COMMENT        Add-on orders for these samples will be processed based on acceptable specimen integrity and analyte stability, which may vary by analyte. TROPONIN I    Collection Time: 10/28/19  2:38 AM   Result Value Ref Range    Troponin-I, Qt. <0.05 <0.05 ng/mL   URINALYSIS W/MICROSCOPIC    Collection Time: 10/28/19  2:43 AM   Result Value Ref Range    Color YELLOW/STRAW      Appearance CLEAR CLEAR      Specific gravity 1.011 1.003 - 1.030      pH (UA) 7.0 5.0 - 8.0      Protein TRACE (A) NEG mg/dL    Glucose NEGATIVE  NEG mg/dL    Ketone NEGATIVE  NEG mg/dL    Bilirubin NEGATIVE  NEG      Blood NEGATIVE  NEG      Urobilinogen 0.2 0.2 - 1.0 EU/dL    Nitrites NEGATIVE  NEG      Leukocyte Esterase NEGATIVE  NEG      WBC 0-4 0 - 4 /hpf    RBC 0-5 0 - 5 /hpf    Epithelial cells FEW FEW /lpf    Bacteria NEGATIVE  NEG /hpf    Hyaline cast 0-2 0 - 5 /lpf   URINE CULTURE HOLD SAMPLE    Collection Time: 10/28/19  2:43 AM   Result Value Ref Range    Urine culture hold        URINE ON HOLD IN MICROBIOLOGY DEPT FOR 3 DAYS. IF UNPRESERVED URINE IS SUBMITTED, IT CANNOT BE USED FOR ADDITIONAL TESTING AFTER 24 HRS, RECOLLECTION WILL BE REQUIRED.        Ct Head Wo Cont    Result Date: 10/28/2019  EXAM:  CT head without contrast INDICATION: Headache. COMPARISON: CT 8/23/2018 TECHNIQUE: Axial noncontrast head CT from foramen magnum to vertex. Coronal and sagittal reformatted images were obtained. CT dose reduction was achieved through use of a standardized protocol tailored for this examination and automatic exposure control for dose modulation. FINDINGS:  There is diffuse age-related parenchymal volume loss. The ventricles and sulci are age-appropriate without hydrocephalus. There is no mass effect or midline shift. There is no intracranial hemorrhage or extra-axial fluid collection. Scattered foci of low attenuation in the periventricular white matter represent stable chronic microvascular ischemic changes. The gray-white matter differentiation is maintained. The basal cisterns are patent. 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 The osseous structures are intact. The visualized paranasal sinuses and mastoid air cells are clear. IMPRESSION: No acute intracranial abnormality. Ct Abd Pelv W Cont    Result Date: 10/28/2019  EXAM:  CT abdomen pelvis with contrast INDICATION: Abdominal pain. COMPARISON: CT 6/17/2019. TECHNIQUE: Helical CT of the abdomen  and pelvis  following the uneventful intravenous administration of nonionic contrast.  Coronal and sagittal reformats are performed. CT dose reduction was achieved through use of a standardized protocol tailored for this examination and automatic exposure control for dose modulation. FINDINGS: The visualized lung bases demonstrate no mass or consolidation. There is stable cardiomegaly. There is no pericardial or pleural effusion. There is a small hiatal hernia. The liver, spleen, pancreas, and adrenal glands are normal. The gall bladder is surgically absent without intra- or extra-hepatic biliary dilatation. The kidneys are symmetric without hydronephrosis. Multiple bilateral subcentimeter low-density kidney lesions are too small to characterize but likely represent cysts. There is a large amount of rectal stool. There are no dilated bowel loops. The appendix is surgically absent. There is marked distal colonic diverticulosis without focal adjacent formation. There are no enlarged lymph nodes. There is no free fluid or free air. The aorta tapers without aneurysm. There is aortoiliac atherosclerosis. The urinary bladder is normal.  There is no pelvic mass. The uterus is surgically absent. There are degenerative changes in the spine most prominent at L2-3. There is no aggressive bony lesion. There is diffuse osteopenia. IMPRESSION: 1. Large amount of rectal stool. No bowel obstruction. Distal colonic diverticulosis without diverticulitis. 2. No other acute abnormality in the abdomen or pelvis. Patient Education        Abdominal Pain: Care Instructions  Your Care Instructions    Abdominal pain has many possible causes. Some aren't serious and get better on their own in a few days. Others need more testing and treatment. If your pain continues or gets worse, you need to be rechecked and may need more tests to find out what is wrong. You may need surgery to correct the problem. Don't ignore new symptoms, such as fever, nausea and vomiting, urination problems, pain that gets worse, and dizziness. These may be signs of a more serious problem. Your doctor may have recommended a follow-up visit in the next 8 to 12 hours. If you are not getting better, you may need more tests or treatment. The doctor has checked you carefully, but problems can develop later. If you notice any problems or new symptoms, get medical treatment right away. Follow-up care is a key part of your treatment and safety. Be sure to make and go to all appointments, and call your doctor if you are having problems.  It's also a good idea to know your test results and keep a list of the medicines you take. How can you care for yourself at home? · Rest until you feel better. · To prevent dehydration, drink plenty of fluids, enough so that your urine is light yellow or clear like water. Choose water and other caffeine-free clear liquids until you feel better. If you have kidney, heart, or liver disease and have to limit fluids, talk with your doctor before you increase the amount of fluids you drink. · If your stomach is upset, eat mild foods, such as rice, dry toast or crackers, bananas, and applesauce. Try eating several small meals instead of two or three large ones. · Wait until 48 hours after all symptoms have gone away before you have spicy foods, alcohol, and drinks that contain caffeine. · Do not eat foods that are high in fat. · Avoid anti-inflammatory medicines such as aspirin, ibuprofen (Advil, Motrin), and naproxen (Aleve). These can cause stomach upset. Talk to your doctor if you take daily aspirin for another health problem. When should you call for help? Call 911 anytime you think you may need emergency care. For example, call if:    · You passed out (lost consciousness).     · You pass maroon or very bloody stools.     · You vomit blood or what looks like coffee grounds.     · You have new, severe belly pain.    Call your doctor now or seek immediate medical care if:    · Your pain gets worse, especially if it becomes focused in one area of your belly.     · You have a new or higher fever.     · Your stools are black and look like tar, or they have streaks of blood.     · You have unexpected vaginal bleeding.     · You have symptoms of a urinary tract infection. These may include:  ? Pain when you urinate. ? Urinating more often than usual.  ? Blood in your urine.     · You are dizzy or lightheaded, or you feel like you may faint.    Watch closely for changes in your health, and be sure to contact your doctor if:    · You are not getting better after 1 day (24 hours). Where can you learn more? Go to http://leonel-socorro.info/. Enter V650 in the search box to learn more about \"Abdominal Pain: Care Instructions. \"  Current as of: June 26, 2019  Content Version: 12.2  © 7948-6204 [employer]. Care instructions adapted under license by [employer] (which disclaims liability or warranty for this information). If you have questions about a medical condition or this instruction, always ask your healthcare professional. [place] [address] any warranty or liability for your use of this information. Patient Education        Constipation: Care Instructions  Your Care Instructions    Constipation means that you have a hard time passing stools (bowel movements). People pass stools from 3 times a day to once every 3 days. What is normal for you may be different. Constipation may occur with pain in the rectum and cramping. The pain may get worse when you try to pass stools. Sometimes there are small amounts of bright red blood on toilet paper or the surface of stools. This is because of enlarged veins near the rectum (hemorrhoids). A few changes in your diet and lifestyle may help you avoid ongoing constipation. Your doctor may also prescribe medicine to help loosen your stool. Some medicines can cause constipation. These include pain medicines and antidepressants. Tell your doctor about all the medicines you take. Your doctor may want to make a medicine change to ease your symptoms. Follow-up care is a key part of your treatment and safety. Be sure to make and go to all appointments, and call your doctor if you are having problems. It's also a good idea to know your test results and keep a list of the medicines you take. How can you care for yourself at home? · Drink plenty of fluids, enough so that your urine is light yellow or clear like water.  If you have kidney, heart, or liver disease and have to limit fluids, talk with your doctor before you increase the amount of fluids you drink. · Include high-fiber foods in your diet each day. These include fruits, vegetables, beans, and whole grains. · Get at least 30 minutes of exercise on most days of the week. Walking is a good choice. You also may want to do other activities, such as running, swimming, cycling, or playing tennis or team sports. · Take a fiber supplement, such as Citrucel or Metamucil, every day. Read and follow all instructions on the label. · Schedule time each day for a bowel movement. A daily routine may help. Take your time having your bowel movement. · Support your feet with a small step stool when you sit on the toilet. This helps flex your hips and places your pelvis in a squatting position. · Your doctor may recommend an over-the-counter laxative to relieve your constipation. Examples are Milk of Magnesia and MiraLax. Read and follow all instructions on the label. Do not use laxatives on a long-term basis. When should you call for help? Call your doctor now or seek immediate medical care if:    · You have new or worse belly pain.     · You have new or worse nausea or vomiting.     · You have blood in your stools.    Watch closely for changes in your health, and be sure to contact your doctor if:    · Your constipation is getting worse.     · You do not get better as expected. Where can you learn more? Go to http://leonel-socorro.info/. Enter 21 [phone] in the search box to learn more about \"Constipation: Care Instructions. \"  Current as of: June 26, 2019  Content Version: 12.2  © 2709-6891 [employer]. Care instructions adapted under license by [employer] (which disclaims liability or warranty for this information).  If you have questions about a medical condition or this instruction, always ask your healthcare professional. [place] [address] any warranty or liability for your use of this information.

## 2019-11-11 ENCOUNTER — OFFICE VISIT (OUTPATIENT)
Dept: FAMILY MEDICINE CLINIC | Age: 84
End: 2019-11-11

## 2019-11-11 VITALS
BODY MASS INDEX: 20.31 KG/M2 | DIASTOLIC BLOOD PRESSURE: 98 MMHG | TEMPERATURE: 98.3 F | SYSTOLIC BLOOD PRESSURE: 176 MMHG | HEART RATE: 99 BPM | WEIGHT: 134 LBS | HEIGHT: 68 IN | RESPIRATION RATE: 18 BRPM | OXYGEN SATURATION: 95 %

## 2019-11-11 DIAGNOSIS — E11.21 TYPE 2 DIABETES WITH NEPHROPATHY (HCC): ICD-10-CM

## 2019-11-11 DIAGNOSIS — F02.818 LATE ONSET ALZHEIMER'S DISEASE WITH BEHAVIORAL DISTURBANCE (HCC): ICD-10-CM

## 2019-11-11 DIAGNOSIS — G30.1 LATE ONSET ALZHEIMER'S DISEASE WITH BEHAVIORAL DISTURBANCE (HCC): ICD-10-CM

## 2019-11-11 DIAGNOSIS — F29 PSYCHOSIS, UNSPECIFIED PSYCHOSIS TYPE (HCC): Primary | ICD-10-CM

## 2019-11-11 DIAGNOSIS — F02.80 ALZHEIMER'S DEMENTIA WITHOUT BEHAVIORAL DISTURBANCE, UNSPECIFIED TIMING OF DEMENTIA ONSET: ICD-10-CM

## 2019-11-11 DIAGNOSIS — G30.9 ALZHEIMER'S DEMENTIA WITHOUT BEHAVIORAL DISTURBANCE, UNSPECIFIED TIMING OF DEMENTIA ONSET: ICD-10-CM

## 2019-11-11 DIAGNOSIS — I10 ESSENTIAL HYPERTENSION: ICD-10-CM

## 2019-11-11 NOTE — PROGRESS NOTES
Patient here for  to discuss diarrhea and memory loss , hallucinations. Patient ambulates with walker but is somewhat unsteady. Encouraged home pt but patient refuses.  states patient had home pt before and refused it every time the nurses, therapists came. Discussed with patient probability of falling and injuring herself, still patient refused home health. 1. Have you been to the ER, urgent care clinic since your last visit? Hospitalized since your last visit? No    2. Have you seen or consulted any other health care providers outside of the [address] [place] [address] since your last visit? Include any pap smears or colon screening. No       Few months of hallucinations daily-seeing people and talking with people      Chief Complaint   Patient presents with    Diarrhea     couple weeks ago in hospital for abd pain    Memory Loss     hallucinations     She is a 80 y.o. female who presents for evalution. Reviewed PmHx, RxHx, FmHx, SocHx, AllgHx and updated and dated in the chart.     Patient Active Problem List    Diagnosis    Pancreatitis    Abdominal pain    Type 2 diabetes with nephropathy (HCC)    Dementia (HCC)    Aggression    Psychosis ([address]    Syncope, vasovagal    Late onset Alzheimer's disease with behavioral disturbance ([address]    Frequent falls    A-fib ([address]    Dyslipidemia    Chronic anticoagulation    Constipation    History of pulmonary embolism    Controlled type 2 diabetes mellitus without complication, without long-term current use of insulin (HCC)    Chronic pain    HTN (hypertension)    OA (osteoarthritis)    Fibromyalgia    GERD (gastroesophageal reflux disease)       Review of Systems - negative except as listed above in the HPI    Objective:     Vitals:    11/11/19 0905   BP: (!) 176/98   Pulse: 99   Resp: 18   Temp: 98.3 °F (36.8 °C)   SpO2: 95%   Weight: 134 lb (60.8 kg)   Height: 5' 8\" (1.727 m)     Physical Examination: General appearance - advanced demetia    Chest - clear to auscultation, no wheezes, rales or rhonchi, symmetric air entry  Heart - normal rate, regular rhythm, normal S1, S2, no murmurs, rubs, clicks or gallops    Assessment/ Plan:   Diagnoses and all orders for this visit:    1. Psychosis, unspecified psychosis type ([facility]  -no new rx  -pt is not in danger of self or others and has had many adrs in past  -supportive care at this time    2. Late onset Alzheimer's disease with behavioral disturbance ([facility]  -as above    3. Alzheimer's dementia without behavioral disturbance, unspecified timing of dementia onset ([facility]    4. Essential hypertension  -ok for pt    5. Type 2 diabetes with nephropathy ([facility]  -at goal       Follow-up and Dispositions    · Return in about 3 months (around 2/11/2020). I have discussed the diagnosis with the patient and the intended plan as seen in the above orders. The patient understands and agrees with the plan. The patient has received an after-visit summary and questions were answered concerning future plans. Medication Side Effects and Warnings were discussed with patient  Patient Labs were reviewed and or requested:  Patient Past Records were reviewed and or requested    Jacqueline Samayoa M.D. There are no Patient Instructions on file for this visit.

## 2020-04-03 NOTE — PROGRESS NOTES
Problem: Falls - Risk of  Goal: *Absence of Falls  Document Kizzy Fall Risk and appropriate interventions in the flowsheet. Outcome: Progressing Towards Goal  Fall Risk Interventions:  Mobility Interventions: Bed/chair exit alarm, Patient to call before getting OOB, Utilize walker, cane, or other assistive device    Mentation Interventions: Adequate sleep, hydration, pain control    Medication Interventions: Bed/chair exit alarm, Teach patient to arise slowly    Elimination Interventions: Bed/chair exit alarm, Patient to call for help with toileting needs    History of Falls Interventions: Bed/chair exit alarm, Door open when patient unattended     Patient remains confused. Ambulates with  Unsteady gait and two person assist.   Appetite fair. Ate 60% of her dinner tray. Will continue to monitor on Q 15 minute safety checks. You may try over-the-counter Probiotics to avoid or reduce diarrhea with antibiotic use.  Look for Probiotics labels that have Lactobacillus and/or S. boulardii.  You may also try eating 1-2 cups of yogurt or drinking 1-2 cups of blue daily if you can tolerate dairy products.  Kombucha, is a fermented tea that also contains probiotics without dairy.    Patient Education     Sinusitis (Antibiotic Treatment)    The sinuses are air-filled spaces within the bones of the face. They connect to the inside of the nose. Sinusitis is an inflammation of the tissue that lines the sinuses. Sinusitis can occur during a cold. It can also happen due to allergies to pollens and other particles in the air. Sinusitis can cause symptoms of sinus congestion and a feeling of fullness. A sinus infection causes fever, headache, and facial pain. There is often green or yellow fluid draining from the nose or into the back of the throat (post-nasal drip). You have been given antibiotics to treat this condition.  Home care  · Take the full course of antibiotics as instructed. Do not stop taking them, even when you feel better.  · Drink plenty of water, hot tea, and other liquids. This may help thin nasal mucus. It also may help your sinuses drain fluids.  · Heat may help soothe painful areas of your face. Use a towel soaked in hot water. Or,  the shower and direct the warm spray onto your face. Using a vaporizer along with a menthol rub at night may also help soothe symptoms.   · An expectorant with guaifenesin may help thin nasal mucus and help your sinuses drain fluids.  · You can use an over-the-counter decongestant, unless a similar medicine was prescribed to you. Nasal sprays work the fastest. Use one that contains phenylephrine or oxymetazoline. First blow your nose gently. Then use the spray. Do not use these medicines more often than directed on the label. If you do, your symptoms may get worse. You may also take pills that  contain pseudoephedrine. Don’t use products that combine multiple medicines. This is because side effects may be increased. Read labels. You can also ask the pharmacist for help. (People with high blood pressure should not use decongestants. They can raise blood pressure.)  · Over-the-counter antihistamines may help if allergies contributed to your sinusitis.    · Do not use nasal rinses or irrigation during an acute sinus infection, unless your healthcare provider tells you to. Rinsing may spread the infection to other areas in your sinuses.  · Use acetaminophen or ibuprofen to control pain, unless another pain medicine was prescribed to you. If you have chronic liver or kidney disease or ever had a stomach ulcer, talk with your healthcare provider before using these medicines. (Aspirin should never be taken by anyone under age 18 who is ill with a fever. It may cause severe liver damage.)  · Don't smoke. This can make symptoms worse.  Follow-up care  Follow up with your healthcare provider or our staff if you are better in 1 week.  When to seek medical advice  Call your healthcare provider if any of these occur:  · Facial pain or headache that gets worse  · Stiff neck  · Unusual drowsiness or confusion  · Swelling of your forehead or eyelids  · Vision problems, such as blurred or double vision  · Fever of 100.4ºF (38ºC) or higher, or as directed by your healthcare provider  · Seizure  · Breathing problems  · Symptoms don't go away in 10 days  Prevention  Here are steps you can take to help prevent an infection:  · Keep good hand washing habits.  · Don’t have close contact with people who have sore throats, colds, or other upper respiratory infections.  · Don’t smoke, and stay away from secondhand smoke.  · Stay up to date with of your vaccines.  Date Last Reviewed: 11/1/2017  © 6932-2672 [employer]. [address] [place] [address], [place] [address]. All rights reserved. This information is not intended as a  substitute for professional medical care. Always follow your healthcare professional's instructions.

## 2020-06-09 ENCOUNTER — TELEPHONE (OUTPATIENT)
Dept: FAMILY MEDICINE CLINIC | Age: 85
End: 2020-06-09

## 2020-06-09 NOTE — TELEPHONE ENCOUNTER
Patients  called wants a call regarding his wife being home now and wishes to talk about meds but was not able to provide names to me. Also wants pharmacy review and possible change.     Call [phone]

## 2020-06-09 NOTE — TELEPHONE ENCOUNTER
Nurse called pt and he reported pt has not received her medicare card he received the card number but no card,Advised office does not handle insurance cards would need to call the insurance company.

## 2020-06-11 DIAGNOSIS — F29 PSYCHOSIS, UNSPECIFIED PSYCHOSIS TYPE (HCC): ICD-10-CM

## 2020-06-11 RX ORDER — QUETIAPINE FUMARATE 25 MG/1
TABLET, FILM COATED ORAL
Qty: 30 TAB | Refills: 0 | Status: SHIPPED | OUTPATIENT
Start: 2020-06-11 | End: 2020-08-10

## 2020-06-11 RX ORDER — METOPROLOL TARTRATE 25 MG/1
TABLET, FILM COATED ORAL
Qty: 60 TAB | Refills: 0 | Status: SHIPPED | OUTPATIENT
Start: 2020-06-11 | End: 2020-07-07

## 2020-06-11 RX ORDER — ATORVASTATIN CALCIUM 40 MG/1
TABLET, FILM COATED ORAL
Qty: 30 TAB | Refills: 0 | Status: SHIPPED | OUTPATIENT
Start: 2020-06-11 | End: 2020-07-07

## 2020-06-11 RX ORDER — FUROSEMIDE 40 MG/1
TABLET ORAL
Qty: 30 TAB | Refills: 0 | Status: SHIPPED | OUTPATIENT
Start: 2020-06-11 | End: 2020-07-07

## 2020-06-11 RX ORDER — DULOXETIN HYDROCHLORIDE 30 MG/1
CAPSULE, DELAYED RELEASE ORAL
Qty: 30 CAP | Refills: 0 | Status: SHIPPED | OUTPATIENT
Start: 2020-06-11 | End: 2020-07-09 | Stop reason: SDUPTHER

## 2020-06-11 RX ORDER — AMLODIPINE BESYLATE 5 MG/1
TABLET ORAL
Qty: 60 TAB | Refills: 0 | Status: SHIPPED | OUTPATIENT
Start: 2020-06-11 | End: 2020-07-07

## 2020-06-11 RX ORDER — CLONIDINE 0.3 MG/24H
PATCH, EXTENDED RELEASE TRANSDERMAL
Qty: 4 PATCH | Refills: 0 | Status: SHIPPED | OUTPATIENT
Start: 2020-06-11 | End: 2020-07-05

## 2020-06-11 RX ORDER — POTASSIUM CHLORIDE 750 MG/1
CAPSULE, EXTENDED RELEASE ORAL
Qty: 60 CAP | Refills: 0 | Status: SHIPPED | OUTPATIENT
Start: 2020-06-11 | End: 2020-07-07

## 2020-06-15 ENCOUNTER — TELEPHONE (OUTPATIENT)
Dept: FAMILY MEDICINE CLINIC | Age: 85
End: 2020-06-15

## 2020-06-15 NOTE — TELEPHONE ENCOUNTER
Romero/patient's  would like to discuss diarrhea that he thinks is coming from some of the medications she's taking.  Romero's phone: [phone]

## 2020-06-15 NOTE — TELEPHONE ENCOUNTER
Called and spoke to patient's spouse. Paul Celeste) Medication reviewed and confirmed as listed. Spouse suspects potassium may be causing the diarrhea issue. Recommended Immodium twice daily and call office if symptoms worsen or fail to improve.

## 2020-06-16 NOTE — TELEPHONE ENCOUNTER
Called and spoke to patient's spouse. Son Hairston) Patient states understanding  per Dr Muna Grubbs:  Discontinue potassium and see if symptoms improve.

## 2020-07-01 NOTE — PROGRESS NOTES
Past Medical History:   Diagnosis Date    Anticoagulant long-term use     CHF (congestive heart failure)     Dilated cardiomyopathy 1/10/2018    Disorder of kidney and ureter     CKD    Encounter for blood transfusion     Gout     HTN (hypertension)     Thyroid disease     Ventricular tachycardia (paroxysmal)        Past Surgical History:   Procedure Laterality Date    CARDIAC CATHETERIZATION  Dec. 2012    CARDIAC DEFIBRILLATOR PLACEMENT Left     CRRT-D    COLONOSCOPY N/A 3/6/2018    Procedure: COLONOSCOPY;  Surgeon: Alonso Bone MD;  Location: [facility] ENDO (2ND FLR);  Service: Endoscopy;  Laterality: N/A;    COLONOSCOPY N/A 7/17/2019    Procedure: COLONOSCOPY;  Surgeon: Blane Valdez MD;  Location: [facility] ENDO (2ND FLR);  Service: Endoscopy;  Laterality: N/A;    COLONOSCOPY N/A 7/18/2019    Procedure: COLONOSCOPY;  Surgeon: Blane Valdez MD;  Location: [facility] ENDO (2ND FLR);  Service: Endoscopy;  Laterality: N/A;    ESOPHAGOGASTRODUODENOSCOPY N/A 7/17/2019    Procedure: EGD (ESOPHAGOGASTRODUODENOSCOPY);  Surgeon: Blane Valdez MD;  Location: [facility] ENDO (2ND FLR);  Service: Endoscopy;  Laterality: N/A;    ESOPHAGOGASTRODUODENOSCOPY N/A 7/18/2019    Procedure: EGD (ESOPHAGOGASTRODUODENOSCOPY);  Surgeon: Blane Valdez MD;  Location: [facility] ENDO (2ND FLR);  Service: Endoscopy;  Laterality: N/A;    NONINVASIVE CARDIAC ELECTROPHYSIOLOGY STUDY N/A 10/18/2019    Procedure: CARDIAC ELECTROPHYSIOLOGY STUDY, NONINVASIVE;  Surgeon: Raz Wagner MD;  Location: [facility] EP LAB;  Service: Cardiology;  Laterality: N/A;  VT, DFTs, MDT CRTD in situ, LVAD, juarez MB, 3098    REPLACEMENT OF IMPLANTABLE CARDIOVERTER-DEFIBRILLATOR (ICD) GENERATOR N/A 3/9/2020    Procedure: REPLACEMENT, ICD GENERATOR;  Surgeon: Harry Yun MD;  Location: [facility] EP LAB;  Service: Cardiology;  Laterality: N/A;  VT, ICD Gen Change and Lead Revision, MDT, MAC, DM,3 Prep    REVISION OF IMPLANTABLE CARDIOVERTER-DEFIBRILLATOR (ICD) ELECTRODE LEAD PLACEMENT  Problem: Altered Thought Process (Adult/Pediatric)  Goal: *STG: Remains safe in hospital  Outcome: Progressing Towards Goal  Patient has remained safe in hospital. Ate 50% dinner in DR sitting in [place]. Pleasantly confused. Med compliant. Problem: Falls - Risk of  Goal: *Absence of Falls  Document Kizzy Fall Risk and appropriate interventions in the flowsheet. Outcome: Progressing Towards Goal  Fall Risk Interventions:  Mobility Interventions: Bed/chair exit alarm    Mentation Interventions: Bed/chair exit alarm    Medication Interventions: Evaluate medications/consider consulting pharmacy    Elimination Interventions: Toileting schedule/hourly rounds    History of Falls Interventions: Bed/chair exit alarm     No falls. N/A 3/9/2020    Procedure: REVISION, INSERTION, ELECTRODE LEAD, ICD;  Surgeon: Harry Yun MD;  Location: [facility] EP LAB;  Service: Cardiology;  Laterality: N/A;  VT, ICD Gen Change and Lead Revision, MDT, MAC, DM,3 Prep    TREATMENT OF CARDIAC ARRHYTHMIA  10/18/2019    Procedure: Cardioversion or Defibrillation;  Surgeon: Raz Wagner MD;  Location: [facility] EP LAB;  Service: Cardiology;;       Review of patient's allergies indicates:   Allergen Reactions    Lisinopril Anaphylaxis    Hydralazine analogues      Chronic constipation, impotence, dizziness       No current facility-administered medications on file prior to encounter.      Current Outpatient Medications on File Prior to Encounter   Medication Sig    allopurinol (ZYLOPRIM) 100 MG tablet TAKE 1 TABLET BY MOUTH EVERY DAY (Patient taking differently: Take 100 mg by mouth nightly. )    amiodarone (PACERONE) 400 MG tablet Take 1 tablet (400 mg total) by mouth once daily. (Patient taking differently: Take 200 mg by mouth once daily. )    amLODIPine (NORVASC) 10 MG tablet Take 1 tablet (10 mg total) by mouth once daily.    doxazosin (CARDURA) 8 MG Tab Take 1 tablet (8 mg total) by mouth every 12 (twelve) hours.    doxycycline (VIBRAMYCIN) 100 MG Cap Take 1 capsule (100 mg total) by mouth 2 (two) times daily. for 10 days    ferrous gluconate 324 mg (37.5 mg iron) Tab tablet Take 1 tablet (324 mg total) by mouth daily with breakfast. (Patient taking differently: Take 324 mg by mouth daily with lunch. )    guanFACINE (TENEX) 1 MG Tab Take 1 tablet (1 mg total) by mouth every evening.    pantoprazole (PROTONIX) 40 MG tablet TAKE 1 TABLET (40 MG TOTAL) BY MOUTH ONCE DAILY. (Patient taking differently: Take 40 mg by mouth daily with lunch. )    paroxetine (PAXIL) 10 MG tablet Take 1 tablet (10 mg total) by mouth every morning.    potassium chloride SA (K-DUR,KLOR-CON) 20 MEQ tablet Take 20 mEq by mouth 2 (two) times daily.    predniSONE (DELTASONE)  2.5 MG tablet Take 3 tablets (7.5 mg total) by mouth once daily for 10 days, THEN 2 tablets (5 mg total) once daily for 10 days, THEN 1 tablet (2.5 mg total) once daily for 15 days. Then discontinue..    spironolactone (ALDACTONE) 25 MG tablet Take 1 tablet (25 mg total) by mouth once daily. (Patient taking differently: Take 25 mg by mouth daily with lunch. )    torsemide (DEMADEX) 20 MG Tab TAKE 2 TABS BY MOUTH EVERY MORNING AND 1 TAB EVERY EVENING FOR 3 DAYS THEN 2 TABS DAILY THEREAFTER (Patient taking differently: Take 40 mg by mouth once daily. TAKE 2 TABS BY MOUTH EVERY MORNING AND 1 TAB EVERY EVENING FOR 3 DAYS THEN 2 TABS DAILY THEREAFTER)    vitamin D (VITAMIN D3) 1000 units Tab Take 1,000 Units by mouth once daily.    zolpidem (AMBIEN) 10 mg Tab Take 1 tablet (10 mg total) by mouth nightly as needed.    aspirin (ECOTRIN) 81 MG EC tablet Take 1 tablet (81 mg total) by mouth once daily.    fluticasone propionate (FLONASE) 50 mcg/actuation nasal spray 2 sprays (100 mcg total) by Each Nostril route once daily.    sildenafiL (VIAGRA) 100 MG tablet Take 1 tablet (100 mg total) by mouth daily as needed for Erectile Dysfunction.    warfarin (COUMADIN) 5 MG tablet Take 5mg orally daily except 2.5mg orally on Monday /  (Patient taking differently: Take 2.5 mg by mouth. Take 5mg orally daily except 2.5mg orally on  and )     Family History     Problem Relation (Age of Onset)    Cancer Sister (54)    Coronary artery disease Father    Diabetes Father    Heart disease Father    Hypertension Father    No Known Problems Mother, Brother        Tobacco Use    Smoking status: Former Smoker     Packs/day: 1.00     Years: 31.00     Pack years: 31.00     Types: Cigarettes     Quit date: 2018     Years since quittin.4    Smokeless tobacco: Never Used    Tobacco comment: pt is quiting on his own - pt stated not qualified for program;  pt  quit on his own   Substance and Sexual  Activity    Alcohol use: No     Alcohol/week: 0.0 standard drinks     Frequency: Never     Drinks per session: Patient refused     Binge frequency: Never     Comment: one fifth of gin or rum/week    Drug use: No    Sexual activity: Yes     Partners: Female     Birth control/protection: None     Comment: 10/5/17  with same partner 7 years      Review of Systems   Constitution: Negative for chills and decreased appetite.   HENT: Negative for congestion.    Cardiovascular: Negative for chest pain.   Respiratory: Negative for cough and shortness of breath.    Endocrine: Negative for cold intolerance and heat intolerance.   Skin: Negative for rash.   Musculoskeletal: Negative for arthritis and back pain.   Gastrointestinal: Negative for abdominal pain, constipation and diarrhea.   Genitourinary: Negative for dysuria and hematuria.   Neurological: Negative for dizziness and headaches.   Psychiatric/Behavioral: Negative for altered mental status.     Objective:     Vital Signs (Most Recent):  Temp: 97.5 °F (36.4 °C) (07/01/20 1625)  Pulse: 65 (07/01/20 1625)  Resp: 16 (07/01/20 1625)  BP: (!) 90/0 (07/01/20 1401)  SpO2: 95 % (07/01/20 1625) Vital Signs (24h Range):  Temp:  [97.5 °F (36.4 °C)-97.8 °F (36.6 °C)] 97.5 °F (36.4 °C)  Pulse:  [64-68] 65  Resp:  [16] 16  SpO2:  [95 %] 95 %  BP: (90)/(0) 90/0       Weight: 125.5 kg (276 lb 10.8 oz)  Body mass index is 36.5 kg/m².    SpO2: 95 %  O2 Device (Oxygen Therapy): room air    Physical Exam   Constitutional: He is oriented to person, place, and time. He appears well-developed and well-nourished. No distress.   HENT:   Head: Normocephalic.   Left Ear: External ear normal.   Eyes: Pupils are equal, round, and reactive to light. Right eye exhibits no discharge. Left eye exhibits no discharge.   Neck: Normal range of motion. No thyromegaly present.   Cardiovascular: Normal rate and regular rhythm. Exam reveals no friction rub.   Murmur heard.  Pulmonary/Chest: Effort  normal and breath sounds normal. No respiratory distress.   Abdominal: Soft. Bowel sounds are normal. He exhibits distension. There is no abdominal tenderness.   Musculoskeletal: Normal range of motion.         General: No edema.   Neurological: He is alert and oriented to person, place, and time.   Skin: Rash noted. There is erythema.           Significant Labs: All pertinent lab results from the last 24 hours have been reviewed.

## 2020-07-05 RX ORDER — CLONIDINE 0.3 MG/24H
PATCH, EXTENDED RELEASE TRANSDERMAL
Qty: 4 PATCH | Refills: 0 | Status: SHIPPED | OUTPATIENT
Start: 2020-07-05 | End: 2020-07-28 | Stop reason: SDUPTHER

## 2020-07-09 DIAGNOSIS — F29 PSYCHOSIS, UNSPECIFIED PSYCHOSIS TYPE (HCC): ICD-10-CM

## 2020-07-09 RX ORDER — DULOXETIN HYDROCHLORIDE 30 MG/1
CAPSULE, DELAYED RELEASE ORAL
Qty: 30 CAP | Refills: 5 | Status: SHIPPED | OUTPATIENT
Start: 2020-07-09 | End: 2020-10-06

## 2020-07-28 RX ORDER — CLONIDINE 0.3 MG/24H
PATCH, EXTENDED RELEASE TRANSDERMAL
Qty: 4 PATCH | Refills: 0 | Status: SHIPPED | OUTPATIENT
Start: 2020-07-28 | End: 2020-08-18

## 2020-07-31 ENCOUNTER — DOCUMENTATION ONLY (OUTPATIENT)
Dept: FAMILY MEDICINE CLINIC | Age: 85
End: 2020-07-31

## 2020-08-03 ENCOUNTER — TELEPHONE (OUTPATIENT)
Dept: FAMILY MEDICINE CLINIC | Age: 85
End: 2020-08-03

## 2020-08-03 NOTE — TELEPHONE ENCOUNTER
[facility] request & last office note was faxed to [phone],SYL,JANIS placed in scan folder to be scanned to chart.

## 2020-08-09 NOTE — PROGRESS NOTES
[address] [place] [address]   Office [phone]  Fax [phone]          Assessment and Plan     Tray Wooten is a 80 y.o. female admitted for right wrist fracture following recurrent falls. She has spent 3 night(s) in the hospital.    24 Hour Events: Overnight Ms Regan's heart rate was running between 125-139 during periods of agitation. She received two doses of Metoprolol 2.5mg. This helped bring the rate down to about 115. Right Wrist fracture: Secondary to GLF on right outstretch hand. This is 3rd fall in the past month. Non-displaced right wrist fracture confirmed on x-ray. No acute pelvic fracture noted on pelv Xray. Normal CT head  - Ortho consult, appreciate recs. · Pt currently in sugartong splint and right upper extremity splint   · Will treat conservatively. Pt needs strict elevation due to severity of hand/ finger swelling. · Follow up outpatient with Dr Zhou Almaraz   -  Morphine 1 mg qh4 prn  - CM working with Ms Nelly Suarez family regarding her placement      Paroxysmal Afib: Currently in Afib with RVR (rates 110s). Underwent an EP study (5/25/18) with GULSHAN guided DCCV and successfully converted to NSR. Pt however converted back to Afib at subsequent visits. Pt of Dr. Mitchell Lockhart. INR 3.3 (goal 2-3). 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 Per Heme/onc note, this was unprovoked and pt will be on life long anti-coag  -Hold warfarin 3mg      Asymptomatic Bacteriuria: 2+ bacteria and neg LE/nitrites. -Urine culture: Lactobacillus spp.  -Continue IV Rocephin    - Repeat urine culture pending      DM type 2 with neuropathy: Last A1c 6.8 (5/25/18).  Diet controlled  - Insulin Sliding Scale [address] ENCOUNTER          ATTENDING PHYSICIAN NOTE       Date of evaluation: 8/9/2020    Chief Complaint     Head Injury      History of Present Illness     [facility] JOSE ROBERTO is a 25 y.o. male with an unfortunate history of severe autism and self-injurious behavior, who presents to the emergency department today for evaluation after head trauma. The patient has a history of self-induced head trauma, which in the remote past led to a significant TBI. As a result, he wears a padded helmet at baseline. He is sent to the emergency department today from his skilled nursing facility because he reportedly took off his helmet and hit his head against the ground 2 or 3 times. He was thereafter noted to have a small left frontal cephalohematoma. Nursing staff report no change in behavior. The patient is nonverbal at baseline, but will follow some commands particularly if the desired behavior is demonstrated. Review of Systems     Review of Systems   Unable to perform ROS: Patient nonverbal       Past Medical, Surgical, Family, and Social History     He has a past medical history of Ataxia, Autism, Cerebral artery occlusion with cerebral infarction ([facility] [address] and Encephalopathy. He has a past surgical history that includes Upper gastrointestinal endoscopy (N/A, 7/13/2020). His family history is not on file. He reports that he has never smoked. He has never used smokeless tobacco. He reports that he does not drink alcohol or use drugs. Medications     Previous Medications    ACETAMINOPHEN (TYLENOL) 325 MG TABLET    Take 650 mg by mouth every 6 hours as needed for Pain    ALBUTEROL SULFATE HFA (PROAIR HFA) 108 (90 BASE) MCG/ACT INHALER    Use 2 puffs every 4 hours while awake for  PRN cough/wheezing  Dispense with SPACER and Instruct on use. May sub Ventolin or Proventil as needed per [employer].     ALUMINUM & MAGNESIUM HYDROXIDE-SIMETHICONE (MAALOX) [phone] MG/5ML SUSP SUSPENSION normal sensitivity with AC&HS glucose checks. - Hypoglycemia protocols ordered. - Cont Gabapentin 600mg TID for neuropathic pain     Hypertension: On admission BP was 199/91 as pt was agitated. This later trended down to 168/98.   - Restart home medications of Losartan/HCTZ 50/12.5 mg for SBP >170 per cards  - Will continue to monitor at this time and readjust as BP's trend.     Hyperlipidemia: Lipid panel (18) - Tchol 228, HDL 55,  and TG 1886. Diet controlled, pt not on any medication   - Continue to monitor     Depression:   -Continue home Celexa 20mg daily     Hx of Iron deficiency anemia: Normal Hb(12.2). Pt was initially on Iron but has since been discontinued by Dr. Modesto Jarvis (Heme/onc) given persistent constipation and dark stools  -Continue to monitor     Muscle Spasms:  -Continue home flexeril 5mg prn      Constipation:  -Miralax prn     FEN/GI - Diabetic Diet  Activity - Fall precaution, ambulate with assistance  DVT prophylaxis - Continue home coumadin, pharmacy dosing per protocol  GI prophylaxis -  None  Disposition - CM consulted for placing.     CODE STATUS:  Full, discussed with     cell is [phone], name is Royal Domingo. I will discuss this patient with Dr Sharda Michelle (Attending Physician)     I appreciate the opportunity to participate in the care of this patient,  Miguel A Meehan MD  [facility] Medicine Resident         Subjective / Objective     Subjective  This morning Ms Bartolome Cook was not oriented to time, person or place. She was resting comfortably and arousable but confused. She was unable to express any pain or discomfort. Temp (24hrs), Av.6 °F (36.4 °C), Min:96.6 °F (35.9 °C), Max:98.2 °F (36.8 °C)     Objective:  Vital signs: (most recent): Blood pressure 154/86, pulse (!) 111, temperature 98.6 °F (37 °C), resp. rate 19, height 5' 8\" (1.727 m), weight 167 lb 4.8 oz (75.9 kg), SpO2 94 %.       Respiratory: O2 Flow Rate (L/min): 2 l/min O2 Device: Room air Visit Vitals    BP (!) 168/117 (BP 1 Location: Left arm, BP Patient Position: At rest)    Pulse (!) 137    Temp 96.6 °F (35.9 °C)    Resp 16    Ht 5' 8\" (1.727 m)    Wt 170 lb (77.1 kg)    SpO2 96%    BMI 25.85 kg/m2     Physical Exam  General:  Heart No acute distress. Not cooperative   Tachycardic, but regular rhythm. Musculoskeletal: Right wrist is in sugartong splint with moderate swelling. Bruising present in right hand. Neuro: Not oriented to person place or time.      I/O:    Date 08/16/18 0700 - 08/17/18 0659 08/17/18 0700 - 08/18/18 0659   Shift 8323-2427 9805-9143 24 Hour Total 0269-7227 8088-6117 24 Hour Total   I  N  T  A  K  E   I.V.  (mL/kg/hr) 4581.1  (5) 1420.8  (1.5) 6001.9  (3.2)         Cardizem Volume 4 0 4         Volume (lactated Ringers infusion) 4527.1 1420.8 5947. 9         Volume (cefTRIAXone (ROCEPHIN) 1 g in 0.9% sodium chloride (MBP/ADV) 50 mL) 50 0 50       Shift Total  (mL/kg) 4581.1  (60.4) 1420.8  (18.4) 6001.9  (77.8)      O  U  T  P  U  T   Urine  (mL/kg/hr)  550  (0.6) 550  (0.3)         Urine Voided  550 550       Shift Total  (mL/kg)  550  (7.1) 550  (7.1)      NET 4581.1 870.8 5451.9      Weight (kg) 75.9 77.1 77.1 77.1 77.1 77.1       CBC:  Recent Labs      08/17/18 0517 08/16/18   0120  08/15/18   0054   WBC  7.8  8.2  9.5   HGB  12.9  12.0  11.9   HCT  40.1  36.5  37.2   PLT  246  195  168       Metabolic Panel:  Recent Labs      08/17/18 0517 08/16/18   0120  08/15/18   0054   NA  138  137  136   K  4.0  3.1*  3.8   CL  101  101  102   CO2  26  30  27   BUN  15  14  16   CREA  0.78  0.91  0.97   GLU  108*  103*  112*   CA  9.3  9.1  9.0   INR   --   3.3*  3.6*          For Billing    Chief Complaint   Patient presents with    Wrist Pain       Hospital Problems  Date Reviewed: 8/1/2018          Codes Class Noted POA    * (Principal)Frequent falls ICD-10-CM: R29.6  ICD-9-CM: V15.88  8/14/2018 Unknown        Wrist fracture, right ICD-10-CM: S62.101A  ICD-9-CM: 814.00  8/14/2018 Unknown        Dyslipidemia ICD-10-CM: E78.5  ICD-9-CM: 272.4  5/24/2018 Yes        Controlled type 2 diabetes mellitus without complication, without long-term current use of insulin ([facility] ICD-10-CM: E11.9  ICD-9-CM: 250.00  5/9/2017 Yes        Iron deficiency anemia ICD-10-CM: D50.9  ICD-9-CM: 280.9  8/11/2016 Yes        HTN (hypertension) (Chronic) ICD-10-CM: I10  ICD-9-CM: 401.9  4/1/2010 Yes        GERD (gastroesophageal reflux disease) (Chronic) ICD-10-CM: K21.9  ICD-9-CM: 530.81  4/1/2010 Yes               [address] [facility] Medicine Residency Attending Addendum:  I saw and evaluated the patient, performing the key elements of the service. I discussed the findings, assessment and plan with the resident and agree with the resident's findings and plan as documented in the resident's note.     The patient was seen on 8/17/18  No acute events    Vitals:    08/21/18 0700 08/21/18 0759 08/21/18 1129 08/21/18 1301   BP:  (!) 149/92 130/81    Pulse: (!) 113 99 95    Resp:  16 16    Temp:  98 °F (36.7 °C) 97.8 °F (36.6 °C)    SpO2:  92% 95% 97%   Weight:       Height:         VS-afebrile  Right hand in splint      Assessment/Plan:   Right wrist fracture- awaiting placement    Hospital Problems  Date Reviewed: 8/1/2018          Codes Class Noted POA    * (Principal)Frequent falls ICD-10-CM: R29.6  ICD-9-CM: V15.88  8/14/2018 Unknown        Wrist fracture, right ICD-10-CM: S62.101A  ICD-9-CM: 814.00  8/14/2018 Unknown        Dyslipidemia ICD-10-CM: E78.5  ICD-9-CM: 272.4  5/24/2018 Yes        Controlled type 2 diabetes mellitus without complication, without long-term current use of insulin (HCC) ICD-10-CM: E11.9  ICD-9-CM: 250.00  5/9/2017 Yes        Iron deficiency anemia ICD-10-CM: D50.9  ICD-9-CM: 280.9  8/11/2016 Yes        HTN (hypertension) (Chronic) ICD-10-CM: I10  ICD-9-CM: 401.9  4/1/2010 Yes        GERD (gastroesophageal reflux disease) (Chronic) ICD-10-CM: K21.9  ICD-9-CM: 530.81  4/1/2010 Yes

## 2020-08-10 RX ORDER — QUETIAPINE FUMARATE 25 MG/1
TABLET, FILM COATED ORAL
Qty: 30 TAB | Refills: 0 | Status: SHIPPED | OUTPATIENT
Start: 2020-08-10 | End: 2020-08-17 | Stop reason: SDUPTHER

## 2020-08-17 RX ORDER — QUETIAPINE FUMARATE 50 MG/1
TABLET, FILM COATED ORAL
Qty: 90 TAB | Refills: 0 | Status: SHIPPED | OUTPATIENT
Start: 2020-08-17 | End: 2020-09-08 | Stop reason: SDUPTHER

## 2020-08-18 RX ORDER — CLONIDINE 0.3 MG/24H
PATCH, EXTENDED RELEASE TRANSDERMAL
Qty: 4 PATCH | Refills: 0 | Status: SHIPPED | OUTPATIENT
Start: 2020-08-18 | End: 2020-09-08

## 2020-09-08 RX ORDER — CLONIDINE 0.3 MG/24H
PATCH, EXTENDED RELEASE TRANSDERMAL
Qty: 4 PATCH | Refills: 0 | Status: SHIPPED | OUTPATIENT
Start: 2020-09-08 | End: 2020-09-23

## 2020-09-08 RX ORDER — QUETIAPINE FUMARATE 50 MG/1
TABLET, FILM COATED ORAL
Qty: 90 TAB | Refills: 0 | Status: SHIPPED | OUTPATIENT
Start: 2020-09-08 | End: 2020-11-24 | Stop reason: SDUPTHER

## 2020-09-09 ENCOUNTER — VIRTUAL VISIT (OUTPATIENT)
Dept: FAMILY MEDICINE CLINIC | Age: 85
End: 2020-09-09
Payer: MEDICARE

## 2020-09-09 DIAGNOSIS — I48.0 PAROXYSMAL ATRIAL FIBRILLATION (HCC): ICD-10-CM

## 2020-09-09 DIAGNOSIS — G30.1 LATE ONSET ALZHEIMER'S DISEASE WITH BEHAVIORAL DISTURBANCE (HCC): ICD-10-CM

## 2020-09-09 DIAGNOSIS — I10 ESSENTIAL HYPERTENSION: ICD-10-CM

## 2020-09-09 DIAGNOSIS — F02.818 LATE ONSET ALZHEIMER'S DISEASE WITH BEHAVIORAL DISTURBANCE (HCC): ICD-10-CM

## 2020-09-09 DIAGNOSIS — D68.9 COAGULATION DEFICIENCY (HCC): ICD-10-CM

## 2020-09-09 DIAGNOSIS — E11.21 TYPE 2 DIABETES WITH NEPHROPATHY (HCC): Primary | ICD-10-CM

## 2020-09-09 DIAGNOSIS — F29 PSYCHOSIS, UNSPECIFIED PSYCHOSIS TYPE (HCC): ICD-10-CM

## 2020-09-09 PROCEDURE — 99214 OFFICE O/P EST MOD 30 MIN: CPT | Performed by: FAMILY MEDICINE

## 2020-09-09 PROCEDURE — G8428 CUR MEDS NOT DOCUMENT: HCPCS | Performed by: FAMILY MEDICINE

## 2020-09-09 PROCEDURE — 1090F PRES/ABSN URINE INCON ASSESS: CPT | Performed by: FAMILY MEDICINE

## 2020-09-09 PROCEDURE — G8432 DEP SCR NOT DOC, RNG: HCPCS | Performed by: FAMILY MEDICINE

## 2020-09-09 PROCEDURE — 1101F PT FALLS ASSESS-DOCD LE1/YR: CPT | Performed by: FAMILY MEDICINE

## 2020-09-09 NOTE — PROGRESS NOTES
Patient here for office visit for follow-up after being discharged from [facility]. She has severe dementia and some psychoses and doing better with her recent change in medications. Patient also due for diabetic follow-up. Blood pressure doing well at home. Does not need refills of medications. Consent: Tray Regan, who was seen by synchronous (real-time) audio-video technology, and/or her healthcare decision maker, is aware that this patient-initiated, Telehealth encounter on 9/9/2020 is a billable service, with coverage as determined by her insurance carrier. She is aware that she may receive a bill and has provided verbal consent to proceed: YES-Consent obtained within past 12 months        712  Subjective:   Tray Jiang is a 80 y.o. female who was seen for No chief complaint on file. Prior to Admission medications    Medication Sig Start Date End Date Taking? Authorizing Provider   QUEtiapine (SEROquel) 50 mg tablet TAKE 1 TABLET BY MOUTH EVERYDAY AT BEDTIME 9/8/20   Dev Messina MD   cloNIDine (CATAPRES) 0.3 mg/24 hr APPLY 1 PATCH WEEKLY 9/8/20   Dev Messina MD   DULoxetine (CYMBALTA) 30 mg capsule TAKE 1 CAPSULE AT BEDTIME 7/9/20   Dev Messina MD   atorvastatin (LIPITOR) 40 mg tablet TAKE 1 TABLET BY MOUTH EVERY DAY 7/7/20   Dev Messina MD   potassium chloride SA (MICRO-K) 10 mEq capsule TAKE 2 CAPSULES BY MOUTH ONCE DAILY 7/7/20   Dve Messina MD   metoprolol tartrate (LOPRESSOR) 25 mg tablet TAKE 1 TABLET BY MOUTH EVERY 12 HOURS 7/7/20   Dev Messina MD   amLODIPine (NORVASC) 5 mg tablet TAKE 1 TABLET BY MOUTH EVERY 12 HOURS 7/7/20   Dev Messina MD   furosemide (LASIX) 40 mg tablet TAKE 1 TABLET BY MOUTH EVERY DAY 7/7/20   Dev Messina MD   metoprolol succinate (TOPROL-XL) 50 mg XL tablet Take 1 Tab by mouth daily. 10/15/19   Dev Messina MD   ondansetron (ZOFRAN ODT) 8 mg disintegrating tablet Take 1 Tab by mouth every eight (8) hours as needed for Nausea.  5/8/19 Chelsey Willis MD   loperamide (IMMODIUM) 2 mg tablet Take 2 mg by mouth four (4) times daily as needed for Diarrhea. Provider, Historical     Allergies   Allergen Reactions    Angiotensin Ii,Human Other (comments)     States does not remember      Avelox [Moxifloxacin] Other (comments)     States does not remember      Demerol [Meperidine] Hives       ROS    Objective:   Vital Signs: (As obtained by patient/caregiver at home)  There were no vitals taken for this visit. [INSTRUCTIONS:  \"[x]\" Indicates a positive item  \"[]\" Indicates a negative item  -- DELETE ALL ITEMS NOT EXAMINED]    Constitutional: [x] Appears well-developed and well-nourished [x] No apparent distress      [] Abnormal -     Mental status: [x] Alert and awake  [x] Oriented to person/place/time [x] Able to follow commands    [] Abnormal -     Eyes:   EOM    [x]  Normal    [] Abnormal -   Sclera  [x]  Normal    [] Abnormal -          Discharge [x]  None visible   [] Abnormal -     HENT: [x] Normocephalic, atraumatic  [] Abnormal -   [x] Mouth/Throat: Mucous membranes are moist    External Ears [x] Normal  [] Abnormal -    Neck: [x] No visualized mass [] Abnormal -     Pulmonary/Chest: [x] Respiratory effort normal   [x] No visualized signs of difficulty breathing or respiratory distress        [] Abnormal -        Neurological:        [x] No Facial Asymmetry (Cranial nerve 7 motor function) (limited exam due to video visit)          [x] No gaze palsy        [] Abnormal -          Skin:        [x] No significant exanthematous lesions or discoloration noted on facial skin         [] Abnormal -            Psychiatric:       [x] Normal Affect [] Abnormal -        [x] No Hallucinations    Other pertinent observable physical exam findings:-              Assessment & Plan:   Diagnoses and all orders for this visit:    1. Type 2 diabetes with nephropathy (HCC)  -     METABOLIC PANEL, COMPREHENSIVE; Future  -     CBC WITH AUTOMATED DIFF;  Future  - TSH 3RD GENERATION; Future  -     HEMOGLOBIN A1C WITH EAG; Future  -At goal for age  3. Psychosis, unspecified psychosis type ([facility]  -Just discharged from psychiatric hospital and doing much better. Continue with current med changes and follow-up with her psychiatrist    3. Late onset Alzheimer's disease with behavioral disturbance ([facility]  -As above  4. Paroxysmal atrial fibrillation (HCC)  -No palpitations  5. [facility]  -Doing well  6. Essential hypertension  -Blood pressure at goal at home        Follow-up and Dispositions    · Return in about 6 months (around 3/9/2021). We discussed the expected course, resolution and complications of the diagnosis(es) in detail. Medication risks, benefits, costs, interactions, and alternatives were discussed as indicated. I advised her to contact the office if her condition worsens, changes or fails to improve as anticipated. She expressed understanding with the diagnosis(es) and plan. Brock Carreon is a 80 y.o. female being evaluated by a video visit encounter for concerns as above. A caregiver was present when appropriate. Due to this being a TeleHealth encounter (During HEM-32 public health emergency), evaluation of the following organ systems was limited: Vitals/Constitutional/EENT/Resp/CV/GI//MS/Neuro/Skin/Heme-Lymph-Imm. Pursuant to the emergency declaration under the [facility] waiver authority and the [employer] Act, this Virtual  Visit was conducted, with patient's (and/or legal guardian's) consent, to reduce the patient's risk of exposure to COVID-19 and provide necessary medical care. Services were provided through a video synchronous discussion virtually to substitute for in-person clinic visit. Patient and provider were located at their individual homes.         Alex Fountain MD

## 2020-09-10 ENCOUNTER — TELEPHONE (OUTPATIENT)
Dept: FAMILY MEDICINE CLINIC | Age: 85
End: 2020-09-10

## 2020-09-10 NOTE — TELEPHONE ENCOUNTER
Paula/[facility]- They will start seeing patient this coming week.  Paula's phone: [phone]

## 2020-09-16 ENCOUNTER — DOCUMENTATION ONLY (OUTPATIENT)
Dept: FAMILY MEDICINE CLINIC | Age: 85
End: 2020-09-16

## 2020-09-16 NOTE — PROGRESS NOTES
1010 [facility] plan of care was placed on [facility] desk to process.

## 2020-09-17 ENCOUNTER — TELEPHONE (OUTPATIENT)
Dept: FAMILY MEDICINE CLINIC | Age: 85
End: 2020-09-17

## 2020-09-17 DIAGNOSIS — E11.21 TYPE 2 DIABETES WITH NEPHROPATHY (HCC): ICD-10-CM

## 2020-09-17 NOTE — TELEPHONE ENCOUNTER
7472 [facility] plan of care was  Signed & faxed to [phone] placed in scan folder to be scanned to chart.

## 2020-09-23 RX ORDER — ATORVASTATIN CALCIUM 40 MG/1
TABLET, FILM COATED ORAL
Qty: 90 TAB | Refills: 3 | Status: SHIPPED | OUTPATIENT
Start: 2020-09-23 | End: 2020-11-24

## 2020-09-23 RX ORDER — METOPROLOL TARTRATE 25 MG/1
TABLET, FILM COATED ORAL
Qty: 180 TAB | Refills: 3 | Status: SHIPPED | OUTPATIENT
Start: 2020-09-23 | End: 2020-11-24

## 2020-09-23 RX ORDER — FUROSEMIDE 40 MG/1
TABLET ORAL
Qty: 90 TAB | Refills: 3 | Status: SHIPPED | OUTPATIENT
Start: 2020-09-23 | End: 2021-12-07

## 2020-09-23 RX ORDER — CLONIDINE 0.3 MG/24H
PATCH, EXTENDED RELEASE TRANSDERMAL
Qty: 12 PATCH | Refills: 3 | Status: SHIPPED | OUTPATIENT
Start: 2020-09-23

## 2020-09-29 ENCOUNTER — DOCUMENTATION ONLY (OUTPATIENT)
Dept: FAMILY MEDICINE CLINIC | Age: 85
End: 2020-09-29

## 2020-09-30 ENCOUNTER — TELEPHONE (OUTPATIENT)
Dept: FAMILY MEDICINE CLINIC | Age: 85
End: 2020-09-30

## 2020-09-30 NOTE — TELEPHONE ENCOUNTER
7019 [facility] order was signed & faxed to [phone] placed in scan folder to be scanned to chart.

## 2020-10-06 ENCOUNTER — TELEPHONE (OUTPATIENT)
Dept: FAMILY MEDICINE CLINIC | Age: 85
End: 2020-10-06

## 2020-10-06 DIAGNOSIS — F29 PSYCHOSIS, UNSPECIFIED PSYCHOSIS TYPE (HCC): ICD-10-CM

## 2020-10-06 DIAGNOSIS — G89.4 CHRONIC PAIN SYNDROME: Primary | ICD-10-CM

## 2020-10-06 RX ORDER — DULOXETIN HYDROCHLORIDE 30 MG/1
CAPSULE, DELAYED RELEASE ORAL
Qty: 30 CAP | Refills: 5 | Status: SHIPPED | OUTPATIENT
Start: 2020-10-06 | End: 2020-10-30 | Stop reason: SDUPTHER

## 2020-10-06 RX ORDER — TRAZODONE HYDROCHLORIDE 50 MG/1
TABLET ORAL
COMMUNITY
End: 2020-10-06 | Stop reason: SDUPTHER

## 2020-10-06 RX ORDER — MEMANTINE HYDROCHLORIDE 5 MG/1
5 TABLET ORAL DAILY
Qty: 30 TAB | Refills: 5 | Status: SHIPPED | OUTPATIENT
Start: 2020-10-06 | End: 2020-10-30 | Stop reason: SDUPTHER

## 2020-10-06 RX ORDER — MEMANTINE HYDROCHLORIDE 5 MG/1
5 TABLET ORAL DAILY
COMMUNITY
End: 2020-10-06 | Stop reason: SDUPTHER

## 2020-10-06 RX ORDER — MIRTAZAPINE 15 MG/1
15 TABLET, FILM COATED ORAL DAILY
Qty: 30 TAB | Refills: 5 | Status: SHIPPED | OUTPATIENT
Start: 2020-10-06 | End: 2020-11-24

## 2020-10-06 RX ORDER — MIRTAZAPINE 15 MG/1
TABLET, FILM COATED ORAL
COMMUNITY
End: 2020-10-06 | Stop reason: SDUPTHER

## 2020-10-06 RX ORDER — TRAZODONE HYDROCHLORIDE 50 MG/1
50 TABLET ORAL
Qty: 30 TAB | Refills: 5 | Status: SHIPPED | OUTPATIENT
Start: 2020-10-06 | End: 2020-10-30 | Stop reason: SDUPTHER

## 2020-10-06 NOTE — TELEPHONE ENCOUNTER
[facility] pharmacist called and states patient needs refill of these medications. These were prescribed last from hospitalist, but was verified by  Jovanny Kelly that he still gives  these to patient. Please reorder.

## 2020-10-06 NOTE — TELEPHONE ENCOUNTER
Called and spoke to pharmacist regarding a couple medications from the hospitalists that patient needs refills.  verifies he still gives patient these meds. Refill request sent to Dr. Isac Edwards.

## 2020-10-18 RX ORDER — AMLODIPINE BESYLATE 5 MG/1
TABLET ORAL
Qty: 180 TAB | Refills: 0 | Status: SHIPPED | OUTPATIENT
Start: 2020-10-18 | End: 2020-11-02

## 2020-10-30 DIAGNOSIS — F29 PSYCHOSIS, UNSPECIFIED PSYCHOSIS TYPE (HCC): ICD-10-CM

## 2020-10-30 DIAGNOSIS — G89.4 CHRONIC PAIN SYNDROME: ICD-10-CM

## 2020-10-30 RX ORDER — DULOXETIN HYDROCHLORIDE 30 MG/1
CAPSULE, DELAYED RELEASE ORAL
Qty: 30 CAP | Refills: 5 | Status: SHIPPED | OUTPATIENT
Start: 2020-10-30 | End: 2020-11-24

## 2020-10-30 RX ORDER — MEMANTINE HYDROCHLORIDE 5 MG/1
5 TABLET ORAL DAILY
Qty: 30 TAB | Refills: 5 | Status: SHIPPED | OUTPATIENT
Start: 2020-10-30 | End: 2021-05-17

## 2020-11-02 RX ORDER — TRAZODONE HYDROCHLORIDE 50 MG/1
50 TABLET ORAL
Qty: 30 TAB | Refills: 5 | Status: SHIPPED | OUTPATIENT
Start: 2020-11-02 | End: 2021-01-22 | Stop reason: SDUPTHER

## 2020-11-02 RX ORDER — POTASSIUM CHLORIDE 750 MG/1
CAPSULE, EXTENDED RELEASE ORAL
Qty: 180 CAP | Refills: 0 | Status: SHIPPED | OUTPATIENT
Start: 2020-11-02

## 2020-11-02 RX ORDER — AMLODIPINE BESYLATE 5 MG/1
TABLET ORAL
Qty: 180 TAB | Refills: 0 | Status: SHIPPED | OUTPATIENT
Start: 2020-11-02 | End: 2021-03-19

## 2020-11-09 ENCOUNTER — DOCUMENTATION ONLY (OUTPATIENT)
Dept: FAMILY MEDICINE CLINIC | Age: 85
End: 2020-11-09

## 2020-11-11 ENCOUNTER — TELEPHONE (OUTPATIENT)
Dept: FAMILY MEDICINE CLINIC | Age: 85
End: 2020-11-11

## 2020-11-11 NOTE — TELEPHONE ENCOUNTER
2920 [facility] orders were signed & faxed to [phone] placed in scan folder to be scanned to chart.

## 2020-11-17 ENCOUNTER — DOCUMENTATION ONLY (OUTPATIENT)
Dept: FAMILY MEDICINE CLINIC | Age: 85
End: 2020-11-17

## 2020-11-17 NOTE — PROGRESS NOTES
[facility] home health certification & POC was put on EARL MCKEE desk to process

## 2020-11-18 ENCOUNTER — TELEPHONE (OUTPATIENT)
Dept: FAMILY MEDICINE CLINIC | Age: 85
End: 2020-11-18

## 2020-11-18 NOTE — TELEPHONE ENCOUNTER
[facility] health certification & POC was signed & faxed to [phone],WT,MQWM placed in scan folder to be scanned to chart.

## 2020-11-24 ENCOUNTER — OFFICE VISIT (OUTPATIENT)
Dept: FAMILY MEDICINE CLINIC | Age: 85
End: 2020-11-24
Payer: MEDICARE

## 2020-11-24 VITALS
BODY MASS INDEX: 19.55 KG/M2 | SYSTOLIC BLOOD PRESSURE: 83 MMHG | HEIGHT: 68 IN | DIASTOLIC BLOOD PRESSURE: 47 MMHG | HEART RATE: 67 BPM | RESPIRATION RATE: 20 BRPM | WEIGHT: 129 LBS | TEMPERATURE: 97.9 F | OXYGEN SATURATION: 99 %

## 2020-11-24 DIAGNOSIS — R10.9 ABDOMINAL PAIN, UNSPECIFIED ABDOMINAL LOCATION: ICD-10-CM

## 2020-11-24 DIAGNOSIS — I10 ESSENTIAL HYPERTENSION: ICD-10-CM

## 2020-11-24 DIAGNOSIS — E11.21 TYPE 2 DIABETES WITH NEPHROPATHY (HCC): ICD-10-CM

## 2020-11-24 DIAGNOSIS — F02.818 LATE ONSET ALZHEIMER'S DISEASE WITH BEHAVIORAL DISTURBANCE (HCC): ICD-10-CM

## 2020-11-24 DIAGNOSIS — Z00.00 ROUTINE GENERAL MEDICAL EXAMINATION AT A HEALTH CARE FACILITY: Primary | ICD-10-CM

## 2020-11-24 DIAGNOSIS — F29 PSYCHOSIS, UNSPECIFIED PSYCHOSIS TYPE (HCC): ICD-10-CM

## 2020-11-24 DIAGNOSIS — F02.80 ALZHEIMER'S DEMENTIA WITHOUT BEHAVIORAL DISTURBANCE, UNSPECIFIED TIMING OF DEMENTIA ONSET: ICD-10-CM

## 2020-11-24 DIAGNOSIS — G30.1 LATE ONSET ALZHEIMER'S DISEASE WITH BEHAVIORAL DISTURBANCE (HCC): ICD-10-CM

## 2020-11-24 DIAGNOSIS — G30.9 ALZHEIMER'S DEMENTIA WITHOUT BEHAVIORAL DISTURBANCE, UNSPECIFIED TIMING OF DEMENTIA ONSET: ICD-10-CM

## 2020-11-24 DIAGNOSIS — I95.9 HYPOTENSION, UNSPECIFIED HYPOTENSION TYPE: ICD-10-CM

## 2020-11-24 PROCEDURE — 1090F PRES/ABSN URINE INCON ASSESS: CPT | Performed by: FAMILY MEDICINE

## 2020-11-24 PROCEDURE — G0463 HOSPITAL OUTPT CLINIC VISIT: HCPCS | Performed by: FAMILY MEDICINE

## 2020-11-24 PROCEDURE — G8427 DOCREV CUR MEDS BY ELIG CLIN: HCPCS | Performed by: FAMILY MEDICINE

## 2020-11-24 PROCEDURE — 99214 OFFICE O/P EST MOD 30 MIN: CPT | Performed by: FAMILY MEDICINE

## 2020-11-24 PROCEDURE — G0439 PPPS, SUBSEQ VISIT: HCPCS | Performed by: FAMILY MEDICINE

## 2020-11-24 PROCEDURE — G8510 SCR DEP NEG, NO PLAN REQD: HCPCS | Performed by: FAMILY MEDICINE

## 2020-11-24 PROCEDURE — G8536 NO DOC ELDER MAL SCRN: HCPCS | Performed by: FAMILY MEDICINE

## 2020-11-24 PROCEDURE — G8420 CALC BMI NORM PARAMETERS: HCPCS | Performed by: FAMILY MEDICINE

## 2020-11-24 PROCEDURE — 1101F PT FALLS ASSESS-DOCD LE1/YR: CPT | Performed by: FAMILY MEDICINE

## 2020-11-24 RX ORDER — QUETIAPINE FUMARATE 50 MG/1
TABLET, FILM COATED ORAL
Qty: 90 TAB | Refills: 0 | Status: SHIPPED | OUTPATIENT
Start: 2020-11-24 | End: 2022-07-05

## 2020-11-24 NOTE — PROGRESS NOTES
Chief Complaint   Patient presents with    Back Pain    Abdominal Pain    Leg Pain    Altered mental status     [facility] ED 9/4/20     Patient is here today with longstanding chronic issues and complaints. Clarise Stacks complaints continue to start around her advanced dementia and chronic abdominal issues. 1. Have you been to the ER, urgent care clinic since your last visit? Hospitalized since your last visit? Yes Where: [facility] 9/4/20    2. Have you seen or consulted any other health care providers outside of the [address] [place] [address] since your last visit? Include any pap smears or colon screening. No    Medicare Wellness Exam:    Chief Complaint   Patient presents with    Back Pain    Abdominal Pain    Leg Pain    Altered mental status     [facility] ED 9/4/20     she is a 80y.o. year old female who presents for evaluation for their Medicare Wellness Visit. Anu Tl is completed and assessed=yes  Depression Screen is completed and assessed=yes  Medication list reviewed and adjusted for accuracy=yes  Immunizations reviewed and updated=yes  Health/Preventative Screenings reviewed and updated=yes  ADL Functions reviewed=yes    Patient Active Problem List    Diagnosis    Pancreatitis    Abdominal pain    Type 2 diabetes with nephropathy ([address]    Dementia ([address]    Aggression    Psychosis ([address]    Syncope, vasovagal    Late onset Alzheimer's disease with behavioral disturbance ([address]    Frequent falls    A-fib ([address]    Dyslipidemia    Chronic anticoagulation    Constipation    History of pulmonary embolism    Controlled type 2 diabetes mellitus without complication, without long-term current use of insulin (HCC)    Chronic pain    HTN (hypertension)    OA (osteoarthritis)    Fibromyalgia    GERD (gastroesophageal reflux disease)       Reviewed PmHx, RxHx, FmHx, SocHx, AllgHx and updated and dated in the chart.     Review of Systems - negative except as listed above in the HPI    Objective:     Vitals:    11/24/20 0942   BP: (!) 83/47   Pulse: 67   Resp: 20   Temp: 97.9 °F (36.6 °C)   TempSrc: Oral   SpO2: 99%   Weight: 129 lb (58.5 kg)   Height: 5' 8\" (1.727 m)     Physical Examination: General appearance - alert, well appearing, and in no distress  Chest - clear to auscultation, no wheezes, rales or rhonchi, symmetric air entry  Heart - normal rate, regular rhythm, normal S1, S2, no murmurs, rubs, clicks or gallops    Assessment/ Plan:   Diagnoses and all orders for this visit:    1. Routine general medical examination at a health care facility  -See below  -Patient has advanced dementia  -Discussed with  hospice care in the near future  -Has living will    2. Type 2 diabetes with nephropathy (HCC)  -     METABOLIC PANEL, COMPREHENSIVE; Future  -     CBC WITH AUTOMATED DIFF; Future  -     HEMOGLOBIN A1C WITH EAG; Future  -     TSH 3RD GENERATION; Future  Lab Results   Component Value Date/Time    Hemoglobin A1c 5.2 10/15/2019 08:53 AM   -At goal for patient    3. Late onset Alzheimer's disease with behavioral disturbance ([facility] [address]  -Advanced    4. Psychosis, unspecified psychosis type ([address]  -Increase Seroquel to 50 mg once a day and discontinue the 12.5 mg twice daily    5. Alzheimer's dementia without behavioral disturbance, unspecified timing of dementia onset ([facility] [address]  -As above    6. Essential hypertension  -     METABOLIC PANEL, COMPREHENSIVE; Future  -DC metoprolol due to low blood pressure    7. Abdominal pain, unspecified abdominal location  -Chronic issues without positive work-ups     8.  Hypotension, unspecified hypotension type  -Discontinue metoprolol    Other orders  -     QUEtiapine (SEROquel) 50 mg tablet; TAKE 1 TABLET BY MOUTH EVERYDAY AT BEDTIME         -Pain evaluation performed in office  -Cognitive Screen performed in office  -Depression Screen, Fall risks (by up and go test)  and ADL functionality were addressed  -Medication list updated and reviewed for any changes   -A comprehensive review of medical issues and a plan was formulated  -End of life planning was addressed with pt   -Health Screenings for preventions were addressed and a plan was formulated  -Shingles Vaccine was recommended  -Discussed with patient cancer risk factors and appropriate screenings for age  -Patient evaluated for colonoscopy and referred if needed per screeing criteria  -Labs from previous visits were discussed with patient   -Discussed with patient diet and exercise and formulated a plan as needed  -An Advanced care plan was developed with the patient.  -Alcohol screening performed and was negative    -    I have discussed the diagnosis with the patient and the intended plan as seen in the above orders. The patient understands and agrees with the plan. The patient has received an after-visit summary and questions were answered concerning future plans. Medication Side Effects and Warnings were discussed with patien  Patient Labs were reviewed and or requested  Patient Past Records were reviewed and or requested    There are no Patient Instructions on file for this visit.       Familia Lane M.D.

## 2020-11-25 LAB
ALBUMIN SERPL-MCNC: 3.5 G/DL (ref 3.5–5)
ALBUMIN/GLOB SERPL: 1 {RATIO} (ref 1.1–2.2)
ALP SERPL-CCNC: 174 U/L (ref 45–117)
ALT SERPL-CCNC: 14 U/L (ref 12–78)
ANION GAP SERPL CALC-SCNC: 4 MMOL/L (ref 5–15)
AST SERPL-CCNC: 16 U/L (ref 15–37)
BASOPHILS # BLD: 0 K/UL (ref 0–0.1)
BASOPHILS NFR BLD: 1 % (ref 0–1)
BILIRUB SERPL-MCNC: 0.6 MG/DL (ref 0.2–1)
BUN SERPL-MCNC: 31 MG/DL (ref 6–20)
BUN/CREAT SERPL: 24 (ref 12–20)
CALCIUM SERPL-MCNC: 10.1 MG/DL (ref 8.5–10.1)
CHLORIDE SERPL-SCNC: 107 MMOL/L (ref 97–108)
CO2 SERPL-SCNC: 29 MMOL/L (ref 21–32)
CREAT SERPL-MCNC: 1.3 MG/DL (ref 0.55–1.02)
DIFFERENTIAL METHOD BLD: ABNORMAL
EOSINOPHIL # BLD: 0.1 K/UL (ref 0–0.4)
EOSINOPHIL NFR BLD: 1 % (ref 0–7)
ERYTHROCYTE [DISTWIDTH] IN BLOOD BY AUTOMATED COUNT: 14.2 % (ref 11.5–14.5)
EST. AVERAGE GLUCOSE BLD GHB EST-MCNC: 114 MG/DL
GLOBULIN SER CALC-MCNC: 3.4 G/DL (ref 2–4)
GLUCOSE SERPL-MCNC: 113 MG/DL (ref 65–100)
HBA1C MFR BLD: 5.6 % (ref 4–5.6)
HCT VFR BLD AUTO: 40 % (ref 35–47)
HGB BLD-MCNC: 12.5 G/DL (ref 11.5–16)
IMM GRANULOCYTES # BLD AUTO: 0 K/UL (ref 0–0.04)
IMM GRANULOCYTES NFR BLD AUTO: 0 % (ref 0–0.5)
LYMPHOCYTES # BLD: 2.2 K/UL (ref 0.8–3.5)
LYMPHOCYTES NFR BLD: 41 % (ref 12–49)
MCH RBC QN AUTO: 32.6 PG (ref 26–34)
MCHC RBC AUTO-ENTMCNC: 31.3 G/DL (ref 30–36.5)
MCV RBC AUTO: 104.4 FL (ref 80–99)
MONOCYTES # BLD: 0.4 K/UL (ref 0–1)
MONOCYTES NFR BLD: 7 % (ref 5–13)
NEUTS SEG # BLD: 2.6 K/UL (ref 1.8–8)
NEUTS SEG NFR BLD: 50 % (ref 32–75)
NRBC # BLD: 0 K/UL (ref 0–0.01)
NRBC BLD-RTO: 0 PER 100 WBC
PLATELET # BLD AUTO: 200 K/UL (ref 150–400)
PMV BLD AUTO: 11.5 FL (ref 8.9–12.9)
POTASSIUM SERPL-SCNC: 3.8 MMOL/L (ref 3.5–5.1)
PROT SERPL-MCNC: 6.9 G/DL (ref 6.4–8.2)
RBC # BLD AUTO: 3.83 M/UL (ref 3.8–5.2)
SODIUM SERPL-SCNC: 140 MMOL/L (ref 136–145)
TSH SERPL DL<=0.05 MIU/L-ACNC: 1.5 UIU/ML (ref 0.36–3.74)
WBC # BLD AUTO: 5.2 K/UL (ref 3.6–11)

## 2020-11-25 NOTE — PROGRESS NOTES
Thank you for your visit,  and I hope that we met your expectations! We are offering Virtual appointments  for our patients who would like a more convenient access to your provider. ..just ask the  when you   call our office for your next appointment. Coming soon is a [address] that will offer  many new exciting options for you. After reviewing your labs, I believe they are within normal  limits for your age. Keep working hard on diet and taking your medications that are prescribed. If you have any acute care needs and are having trouble getting an appointment. .. please send me a   [facility] message or have the  send me a message. Have a blessed day and  be kind  to others! If you have any questions, feel free to email thru [facility], or give us   a call back at [phone]. Hernan Esteban M.D.   Good Help to Those in Need  \"You may be whatever you resolve to be\"

## 2021-01-11 ENCOUNTER — OFFICE VISIT (OUTPATIENT)
Dept: FAMILY MEDICINE CLINIC | Age: 86
End: 2021-01-11
Payer: MEDICARE

## 2021-01-11 VITALS
WEIGHT: 132 LBS | DIASTOLIC BLOOD PRESSURE: 79 MMHG | OXYGEN SATURATION: 98 % | HEART RATE: 81 BPM | BODY MASS INDEX: 20 KG/M2 | HEIGHT: 68 IN | RESPIRATION RATE: 18 BRPM | SYSTOLIC BLOOD PRESSURE: 143 MMHG | TEMPERATURE: 97.6 F

## 2021-01-11 DIAGNOSIS — R21 RASH: Primary | ICD-10-CM

## 2021-01-11 PROCEDURE — 1101F PT FALLS ASSESS-DOCD LE1/YR: CPT | Performed by: FAMILY MEDICINE

## 2021-01-11 PROCEDURE — 99213 OFFICE O/P EST LOW 20 MIN: CPT | Performed by: FAMILY MEDICINE

## 2021-01-11 PROCEDURE — G0463 HOSPITAL OUTPT CLINIC VISIT: HCPCS | Performed by: FAMILY MEDICINE

## 2021-01-11 PROCEDURE — G8427 DOCREV CUR MEDS BY ELIG CLIN: HCPCS | Performed by: FAMILY MEDICINE

## 2021-01-11 PROCEDURE — G8420 CALC BMI NORM PARAMETERS: HCPCS | Performed by: FAMILY MEDICINE

## 2021-01-11 PROCEDURE — G8536 NO DOC ELDER MAL SCRN: HCPCS | Performed by: FAMILY MEDICINE

## 2021-01-11 PROCEDURE — 1090F PRES/ABSN URINE INCON ASSESS: CPT | Performed by: FAMILY MEDICINE

## 2021-01-11 PROCEDURE — G8510 SCR DEP NEG, NO PLAN REQD: HCPCS | Performed by: FAMILY MEDICINE

## 2021-01-11 RX ORDER — TRIAMCINOLONE ACETONIDE 1 MG/G
CREAM TOPICAL DAILY
Qty: 60 G | Refills: 0 | Status: SHIPPED | OUTPATIENT
Start: 2021-01-11 | End: 2021-08-02 | Stop reason: SDUPTHER

## 2021-01-11 NOTE — PROGRESS NOTES
Patient here for middle abdominal pain. Not as much diarrhea as usual. Patient has rash on lower legs. It itches and gets sore mostly at night. 1. Have you been to the ER, urgent care clinic since your last visit? Hospitalized since your last visit? No    2. Have you seen or consulted any other health care providers outside of the [address] [place] [address] since your last visit? Include any pap smears or colon screening. No              Chief Complaint   Patient presents with    Hives     legs    Abdominal Pain     middle lower abd pain     She is a 80 y.o. female who presents for evalution. Reviewed PmHx, RxHx, FmHx, SocHx, AllgHx and updated and dated in the chart. Patient Active Problem List    Diagnosis    Pancreatitis    Abdominal pain    Type 2 diabetes with nephropathy (HCC)    Dementia (HCC)    Aggression    Psychosis ([facility]    Syncope, vasovagal    Late onset Alzheimer's disease with behavioral disturbance ([facility]    Frequent falls    A-fib ([facility]    Dyslipidemia    Chronic anticoagulation    Constipation    History of pulmonary embolism    Controlled type 2 diabetes mellitus without complication, without long-term current use of insulin (HCC)    Chronic pain    HTN (hypertension)    OA (osteoarthritis)    Fibromyalgia    GERD (gastroesophageal reflux disease)       Review of Systems - negative except as listed above in the HPI    Objective:     Vitals:    01/11/21 1523   BP: (!) 143/79   Pulse: 81   Resp: 18   Temp: 97.6 °F (36.4 °C)   SpO2: 98%   Weight: 132 lb (59.9 kg)   Height: 5' 8\" (1.727 m)     Physical Examination: General appearance - alert, well appearing, and in no distress    Assessment/ Plan:   Diagnoses and all orders for this visit:    1. Rash  -     triamcinolone acetonide (KENALOG) 0.1 % topical cream; Apply  to affected area daily.  -eczema on legs        I have discussed the diagnosis with the patient and the intended plan as seen in the above orders.   The patient understands and agrees with the plan. The patient has received an after-visit summary and questions were answered concerning future plans. Medication Side Effects and Warnings were discussed with patient  Patient Labs were reviewed and or requested:  Patient Past Records were reviewed and or requested    Dorcus Cabot, M.D. There are no Patient Instructions on file for this visit.

## 2021-01-18 DIAGNOSIS — G89.4 CHRONIC PAIN SYNDROME: Primary | ICD-10-CM

## 2021-01-18 RX ORDER — TRAMADOL HYDROCHLORIDE 50 MG/1
50 TABLET ORAL
Qty: 30 TAB | Refills: 0 | Status: SHIPPED | OUTPATIENT
Start: 2021-01-18 | End: 2021-02-15

## 2021-01-22 RX ORDER — TRAZODONE HYDROCHLORIDE 50 MG/1
50 TABLET ORAL
Qty: 30 TAB | Refills: 5 | Status: SHIPPED | OUTPATIENT
Start: 2021-01-22

## 2021-02-15 DIAGNOSIS — G89.4 CHRONIC PAIN SYNDROME: ICD-10-CM

## 2021-02-15 RX ORDER — TRAMADOL HYDROCHLORIDE 50 MG/1
TABLET ORAL
Qty: 30 TAB | Refills: 1 | Status: SHIPPED | OUTPATIENT
Start: 2021-02-15 | End: 2021-03-17

## 2021-03-19 RX ORDER — AMLODIPINE BESYLATE 5 MG/1
TABLET ORAL
Qty: 180 TAB | Refills: 0 | Status: SHIPPED | OUTPATIENT
Start: 2021-03-19 | End: 2021-10-18

## 2021-04-12 ENCOUNTER — OFFICE VISIT (OUTPATIENT)
Dept: FAMILY MEDICINE CLINIC | Age: 86
End: 2021-04-12
Payer: MEDICARE

## 2021-04-12 VITALS
RESPIRATION RATE: 20 BRPM | HEART RATE: 100 BPM | OXYGEN SATURATION: 98 % | WEIGHT: 132.4 LBS | SYSTOLIC BLOOD PRESSURE: 143 MMHG | TEMPERATURE: 99.1 F | DIASTOLIC BLOOD PRESSURE: 78 MMHG | BODY MASS INDEX: 20.07 KG/M2 | HEIGHT: 68 IN

## 2021-04-12 DIAGNOSIS — G30.1 LATE ONSET ALZHEIMER'S DISEASE WITH BEHAVIORAL DISTURBANCE (HCC): ICD-10-CM

## 2021-04-12 DIAGNOSIS — F29 PSYCHOSIS, UNSPECIFIED PSYCHOSIS TYPE (HCC): ICD-10-CM

## 2021-04-12 DIAGNOSIS — E11.21 TYPE 2 DIABETES WITH NEPHROPATHY (HCC): ICD-10-CM

## 2021-04-12 DIAGNOSIS — I48.0 PAROXYSMAL ATRIAL FIBRILLATION (HCC): ICD-10-CM

## 2021-04-12 DIAGNOSIS — R10.9 ABDOMINAL PAIN, UNSPECIFIED ABDOMINAL LOCATION: ICD-10-CM

## 2021-04-12 DIAGNOSIS — D68.9 COAGULATION DEFICIENCY (HCC): ICD-10-CM

## 2021-04-12 DIAGNOSIS — G89.4 CHRONIC PAIN SYNDROME: Primary | ICD-10-CM

## 2021-04-12 DIAGNOSIS — F02.818 LATE ONSET ALZHEIMER'S DISEASE WITH BEHAVIORAL DISTURBANCE (HCC): ICD-10-CM

## 2021-04-12 DIAGNOSIS — I10 ESSENTIAL HYPERTENSION: ICD-10-CM

## 2021-04-12 PROCEDURE — G0463 HOSPITAL OUTPT CLINIC VISIT: HCPCS | Performed by: FAMILY MEDICINE

## 2021-04-12 PROCEDURE — G8510 SCR DEP NEG, NO PLAN REQD: HCPCS | Performed by: FAMILY MEDICINE

## 2021-04-12 PROCEDURE — G8420 CALC BMI NORM PARAMETERS: HCPCS | Performed by: FAMILY MEDICINE

## 2021-04-12 PROCEDURE — 99214 OFFICE O/P EST MOD 30 MIN: CPT | Performed by: FAMILY MEDICINE

## 2021-04-12 PROCEDURE — G8427 DOCREV CUR MEDS BY ELIG CLIN: HCPCS | Performed by: FAMILY MEDICINE

## 2021-04-12 PROCEDURE — 1101F PT FALLS ASSESS-DOCD LE1/YR: CPT | Performed by: FAMILY MEDICINE

## 2021-04-12 PROCEDURE — G8536 NO DOC ELDER MAL SCRN: HCPCS | Performed by: FAMILY MEDICINE

## 2021-04-12 PROCEDURE — 1090F PRES/ABSN URINE INCON ASSESS: CPT | Performed by: FAMILY MEDICINE

## 2021-04-12 RX ORDER — HYDROCODONE BITARTRATE AND ACETAMINOPHEN 5; 325 MG/1; MG/1
1 TABLET ORAL 2 TIMES DAILY
Qty: 60 TAB | Refills: 0 | Status: SHIPPED | OUTPATIENT
Start: 2021-04-12 | End: 2021-04-15

## 2021-04-12 RX ORDER — PREDNISONE 10 MG/1
TABLET ORAL
Qty: 1 DOSE PACK | Refills: 0 | Status: SHIPPED | OUTPATIENT
Start: 2021-04-12 | End: 2021-05-12 | Stop reason: ALTCHOICE

## 2021-04-12 NOTE — PROGRESS NOTES
Chief Complaint   Patient presents with    Abdominal Pain     X 1 week: Tramadol not helping    Leg Swelling    Skin Problem     legs itching     1. Have you been to the ER, urgent care clinic since your last visit? Hospitalized since your last visit? Yes Where: [facility] ED :Fall    2. Have you seen or consulted any other health care providers outside of the [address] [place] [address] since your last visit? Include any pap smears or colon screening. No             Chief Complaint   Patient presents with    Abdominal Pain     X 1 week: Tramadol not helping    Leg Swelling    Skin Problem     legs itching     She is a 80 y.o. female who presents for evalution. Reviewed PmHx, RxHx, FmHx, SocHx, AllgHx and updated and dated in the chart. Patient Active Problem List    Diagnosis    Pancreatitis    Abdominal pain    Type 2 diabetes with nephropathy (HCC)    Dementia (HCC)    Aggression    Psychosis ([address]    Syncope, vasovagal    Late onset [facility]'s disease with behavioral disturbance ([facility]    Frequent falls    A-fib ([facility]    Dyslipidemia    Chronic anticoagulation    Constipation    History of pulmonary embolism    Controlled type 2 diabetes mellitus without complication, without long-term current use of insulin (HCC)    Chronic pain    HTN (hypertension)    OA (osteoarthritis)    Fibromyalgia    GERD (gastroesophageal reflux disease)       Review of Systems - negative except as listed above in the HPI    Objective:     Vitals:    04/12/21 1417   BP: (!) 143/78   Pulse: 100   Resp: 20   Temp: 99.1 °F (37.3 °C)   TempSrc: Oral   SpO2: 98%   Weight: 132 lb 6.4 oz (60.1 kg)   Height: 5' 8\" (1.727 m)     Physical Examination: General appearance - anxious and crying      Assessment/ Plan:   Diagnoses and all orders for this visit:    1.  Chronic pain syndrome  -     predniSONE (STERAPRED DS) 10 mg dose pack; 6 Day DS taper pack as directed  -     HYDROcodone-acetaminophen (NORCO) 5-325 mg per tablet; Take 1 Tab by mouth two (2) times a day for 3 days. Max Daily Amount: 2 Tabs. -dc ultram  - is good. No pain contract or UDS warranted with hx of pt  - to monitor pain rx closely    2. Late onset Alzheimer's disease with behavioral disturbance ([facility] [facility]  -end stage    3. Abdominal pain, unspecified abdominal location  -     predniSONE (STERAPRED DS) 10 mg dose pack; 6 Day DS taper pack as directed  -     HYDROcodone-acetaminophen (NORCO) 5-325 mg per tablet; Take 1 Tab by mouth two (2) times a day for 3 days. Max Daily Amount: 2 Tabs. 4. Essential hypertension  -inc with pain    5. Psychosis, unspecified psychosis type ([facility] [address]  -at baseline    6. Type 2 diabetes with nephropathy ([facility]  Lab Results   Component Value Date/Time    Hemoglobin A1c 5.6 11/24/2020 11:00 AM     -next OV get labs    7. Paroxysmal atrial fibrillation (HCC)  -rate controlled    8. Coagulation deficiency ([facility] [address]  -stable         Follow-up and Dispositions    · Return in about 1 month (around 5/12/2021). I have discussed the diagnosis with the patient and the intended plan as seen in the above orders. The patient understands and agrees with the plan. The patient has received an after-visit summary and questions were answered concerning future plans. Medication Side Effects and Warnings were discussed with patient  Patient Labs were reviewed and or requested:  Patient Past Records were reviewed and or requested    Jason Carmen M.D. There are no Patient Instructions on file for this visit.

## 2021-04-28 NOTE — PROGRESS NOTES
Problem: Altered Thought Process (Adult/Pediatric)  Goal: *STG: Participates in treatment plan  Outcome: Progressing Towards Goal  Pleasant but confused. Pt med compliant. Pt declined breakfast and did not eat lunch. Follow up appt with HAKAN AT [facility] orthopedics. Pt has kept splint on fingers are blanchable. Pt attended process group this morning. Sheath #1: Presents as clean, dry, & intact and no bleeding.

## 2021-05-12 ENCOUNTER — OFFICE VISIT (OUTPATIENT)
Dept: FAMILY MEDICINE CLINIC | Age: 86
End: 2021-05-12
Payer: MEDICARE

## 2021-05-12 VITALS
RESPIRATION RATE: 18 BRPM | HEIGHT: 68 IN | BODY MASS INDEX: 20.76 KG/M2 | SYSTOLIC BLOOD PRESSURE: 139 MMHG | OXYGEN SATURATION: 98 % | HEART RATE: 95 BPM | DIASTOLIC BLOOD PRESSURE: 89 MMHG | TEMPERATURE: 97.9 F | WEIGHT: 137 LBS

## 2021-05-12 DIAGNOSIS — N39.0 URINARY TRACT INFECTION WITHOUT HEMATURIA, SITE UNSPECIFIED: ICD-10-CM

## 2021-05-12 DIAGNOSIS — G30.9 ALZHEIMER'S DEMENTIA WITHOUT BEHAVIORAL DISTURBANCE, UNSPECIFIED TIMING OF DEMENTIA ONSET: ICD-10-CM

## 2021-05-12 DIAGNOSIS — F02.80 ALZHEIMER'S DEMENTIA WITHOUT BEHAVIORAL DISTURBANCE, UNSPECIFIED TIMING OF DEMENTIA ONSET: ICD-10-CM

## 2021-05-12 DIAGNOSIS — E11.21 TYPE 2 DIABETES WITH NEPHROPATHY (HCC): Primary | ICD-10-CM

## 2021-05-12 DIAGNOSIS — I10 ESSENTIAL HYPERTENSION: ICD-10-CM

## 2021-05-12 DIAGNOSIS — G89.4 CHRONIC PAIN SYNDROME: ICD-10-CM

## 2021-05-12 PROCEDURE — G8510 SCR DEP NEG, NO PLAN REQD: HCPCS | Performed by: FAMILY MEDICINE

## 2021-05-12 PROCEDURE — G8536 NO DOC ELDER MAL SCRN: HCPCS | Performed by: FAMILY MEDICINE

## 2021-05-12 PROCEDURE — G8420 CALC BMI NORM PARAMETERS: HCPCS | Performed by: FAMILY MEDICINE

## 2021-05-12 PROCEDURE — G8427 DOCREV CUR MEDS BY ELIG CLIN: HCPCS | Performed by: FAMILY MEDICINE

## 2021-05-12 PROCEDURE — G0463 HOSPITAL OUTPT CLINIC VISIT: HCPCS | Performed by: FAMILY MEDICINE

## 2021-05-12 PROCEDURE — 1101F PT FALLS ASSESS-DOCD LE1/YR: CPT | Performed by: FAMILY MEDICINE

## 2021-05-12 PROCEDURE — 1090F PRES/ABSN URINE INCON ASSESS: CPT | Performed by: FAMILY MEDICINE

## 2021-05-12 PROCEDURE — 99214 OFFICE O/P EST MOD 30 MIN: CPT | Performed by: FAMILY MEDICINE

## 2021-05-12 RX ORDER — HYDROCODONE BITARTRATE AND ACETAMINOPHEN 5; 325 MG/1; MG/1
1 TABLET ORAL
Qty: 90 TAB | Refills: 0 | Status: SHIPPED | OUTPATIENT
Start: 2021-05-12 | End: 2021-07-20 | Stop reason: SDUPTHER

## 2021-05-12 RX ORDER — HYDROCODONE BITARTRATE AND ACETAMINOPHEN 5; 325 MG/1; MG/1
1 TABLET ORAL
COMMUNITY
End: 2021-05-12

## 2021-05-12 NOTE — PROGRESS NOTES
Patient here for 1 month f/u abdominal pain. Helping a little per patient. Refill needed. 1. Have you been to the ER, urgent care clinic since your last visit? Hospitalized since your last visit? No    2. Have you seen or consulted any other health care providers outside of the [address] [place] [address] since your last visit? Include any pap smears or colon screening. No     Lab Results   Component Value Date/Time    Microalb/Creat ratio (ug/mg creat.) 133.1 (H) 04/16/2018 09:16 AM      Chief Complaint   Patient presents with    Medication Refill     pain med for abdominal pain    Back Pain     10/10     she is a 80y.o. year old female who presents for evaluation. See Diabetic Report Card listed above. Patient Active Problem List    Diagnosis    Pancreatitis    Abdominal pain    Type 2 diabetes with nephropathy (HCC)    Dementia (HCC)    Aggression    Psychosis ([facility]    Syncope, vasovagal    Late onset Alzheimer's disease with behavioral disturbance ([facility]    Frequent falls    A-fib ([facility]    Dyslipidemia    Chronic anticoagulation    Constipation    History of pulmonary embolism    Controlled type 2 diabetes mellitus without complication, without long-term current use of insulin (HCC)    Chronic pain    HTN (hypertension)    OA (osteoarthritis)    Fibromyalgia    GERD (gastroesophageal reflux disease)       Reviewed PmHx, RxHx, FmHx, SocHx, AllgHx--dated and updated in the chart. Review of Systems - negative except as listed above in the HPI    Objective:     Vitals:    05/12/21 0942   BP: 139/89   Pulse: 95   Resp: 18   Temp: 97.9 °F (36.6 °C)   SpO2: 98%   Weight: 137 lb (62.1 kg)   Height: 5' 8\" (1.727 m)     Physical Examination: General appearance - alert, well appearing, and in no distress    Assessment/ Plan:   Diagnoses and all orders for this visit:    1. Type 2 diabetes with nephropathy (HCC)  -     LIPID PANEL; Future  -     METABOLIC PANEL, COMPREHENSIVE;  Future  - HEMOGLOBIN A1C WITH EAG; Future  -     CBC WITH AUTOMATED DIFF; Future  Due for lab work, at goal  2. Urinary tract infection without hematuria, site unspecified  Patient status post treatment from hospital and doing better  3. Alzheimer's dementia without behavioral disturbance, unspecified timing of dementia onset ([facility]  Advanced and end-stage  4. Chronic pain syndrome  -     HYDROcodone-acetaminophen (NORCO) 5-325 mg per tablet; Take 1 Tab by mouth every eight (8) hours as needed for Pain for up to 30 days. Max Daily Amount: 3 Tabs. Increase pain medicine from twice daily to 3 times daily and recheck in 1 month,  is good 5. Essential hypertension  -     LIPID PANEL; Future  -     METABOLIC PANEL, COMPREHENSIVE; Future  At goal     Follow-up and Dispositions    · Return in about 1 month (around 6/12/2021). Lab Results   Component Value Date/Time    Cholesterol, total 232 (H) 10/15/2019 08:53 AM    HDL Cholesterol 85 10/15/2019 08:53 AM    LDL, calculated 127 (H) 10/15/2019 08:53 AM    Triglyceride 102 10/15/2019 08:53 AM    CHOL/HDL Ratio 2.5 06/17/2019 08:10 AM     Lab Results   Component Value Date/Time    Hemoglobin A1c 5.6 11/24/2020 11:00 AM    Hemoglobin A1c 5.2 10/15/2019 08:53 AM    Hemoglobin A1c 5.0 01/02/2019 10:45 AM    Microalb/Creat ratio (ug/mg creat.) 133.1 (H) 04/16/2018 09:16 AM    LDL, calculated 127 (H) 10/15/2019 08:53 AM    Creatinine (POC) 0.9 11/12/2012 07:33 AM    Creatinine 1.30 (H) 11/24/2020 11:00 AM          Discussed with patient goal of Diabetes to include:  HgA1C <7, LDL cholesterol <100, Blood pressure <140/80. Discussed with patient diet and weight management and to get regular exercise. Recommend yearly eye exams and daily foot care. The patient understands and agrees with the plan. I have discussed the diagnosis with the patient and the intended plan as seen in the above orders.   The patient has received an after-visit summary and questions were answered concerning future plans. Medication Side Effects and Warnings were discussed with patient  Patient Labs were reviewed and or requested  Patient Past Records were reviewed and or requested    Greg Odom M.D. [address] [facility]    There are no Patient Instructions on file for this visit.

## 2021-05-13 LAB
ALBUMIN SERPL-MCNC: 3.7 G/DL (ref 3.5–5)
ALBUMIN/GLOB SERPL: 1.1 {RATIO} (ref 1.1–2.2)
ALP SERPL-CCNC: 186 U/L (ref 45–117)
ALT SERPL-CCNC: 17 U/L (ref 12–78)
ANION GAP SERPL CALC-SCNC: 4 MMOL/L (ref 5–15)
AST SERPL-CCNC: 15 U/L (ref 15–37)
BASOPHILS # BLD: 0 K/UL (ref 0–0.1)
BASOPHILS NFR BLD: 1 % (ref 0–1)
BILIRUB SERPL-MCNC: 0.5 MG/DL (ref 0.2–1)
BUN SERPL-MCNC: 21 MG/DL (ref 6–20)
BUN/CREAT SERPL: 18 (ref 12–20)
CALCIUM SERPL-MCNC: 9.8 MG/DL (ref 8.5–10.1)
CHLORIDE SERPL-SCNC: 105 MMOL/L (ref 97–108)
CHOLEST SERPL-MCNC: 223 MG/DL
CO2 SERPL-SCNC: 31 MMOL/L (ref 21–32)
CREAT SERPL-MCNC: 1.16 MG/DL (ref 0.55–1.02)
DIFFERENTIAL METHOD BLD: ABNORMAL
EOSINOPHIL # BLD: 0 K/UL (ref 0–0.4)
EOSINOPHIL NFR BLD: 1 % (ref 0–7)
ERYTHROCYTE [DISTWIDTH] IN BLOOD BY AUTOMATED COUNT: 13.8 % (ref 11.5–14.5)
EST. AVERAGE GLUCOSE BLD GHB EST-MCNC: 108 MG/DL
GLOBULIN SER CALC-MCNC: 3.4 G/DL (ref 2–4)
GLUCOSE SERPL-MCNC: 106 MG/DL (ref 65–100)
HBA1C MFR BLD: 5.4 % (ref 4–5.6)
HCT VFR BLD AUTO: 40.5 % (ref 35–47)
HDLC SERPL-MCNC: 80 MG/DL
HDLC SERPL: 2.8 {RATIO} (ref 0–5)
HGB BLD-MCNC: 12.5 G/DL (ref 11.5–16)
IMM GRANULOCYTES # BLD AUTO: 0 K/UL (ref 0–0.04)
IMM GRANULOCYTES NFR BLD AUTO: 0 % (ref 0–0.5)
LDLC SERPL CALC-MCNC: 118.8 MG/DL (ref 0–100)
LIPID PROFILE,FLP: ABNORMAL
LYMPHOCYTES # BLD: 1.5 K/UL (ref 0.8–3.5)
LYMPHOCYTES NFR BLD: 30 % (ref 12–49)
MCH RBC QN AUTO: 31.7 PG (ref 26–34)
MCHC RBC AUTO-ENTMCNC: 30.9 G/DL (ref 30–36.5)
MCV RBC AUTO: 102.8 FL (ref 80–99)
MONOCYTES # BLD: 0.4 K/UL (ref 0–1)
MONOCYTES NFR BLD: 7 % (ref 5–13)
NEUTS SEG # BLD: 3.1 K/UL (ref 1.8–8)
NEUTS SEG NFR BLD: 61 % (ref 32–75)
NRBC # BLD: 0 K/UL (ref 0–0.01)
NRBC BLD-RTO: 0 PER 100 WBC
PLATELET # BLD AUTO: 245 K/UL (ref 150–400)
PMV BLD AUTO: 11.4 FL (ref 8.9–12.9)
POTASSIUM SERPL-SCNC: 4.1 MMOL/L (ref 3.5–5.1)
PROT SERPL-MCNC: 7.1 G/DL (ref 6.4–8.2)
RBC # BLD AUTO: 3.94 M/UL (ref 3.8–5.2)
SODIUM SERPL-SCNC: 140 MMOL/L (ref 136–145)
TRIGL SERPL-MCNC: 121 MG/DL (ref ?–150)
VLDLC SERPL CALC-MCNC: 24.2 MG/DL
WBC # BLD AUTO: 5.1 K/UL (ref 3.6–11)

## 2021-05-13 NOTE — PROGRESS NOTES
Thank you for your visit,  and I hope that we met your expectations! Let us hope 2021 is a great year for you! For 2021 and beyond we are offering Virtual appointments for you, giving you more convenient access to your provider. ..just ask the  when you call our office for your next appointment. We also are offering E-Visits. You can find the link for an E-Visit in your [facility] scarlet and this is a visit thru messaging and attached to your medical record. If you have a simple concern you can click on this link and answer few questions, by the end of the day your concerns will have been addressed. After reviewing your labs, they are within normal limits for your age. Keep working hard on diet and taking your medications that are prescribed. If you have any acute care needs and are having trouble getting an appointment. .. please send me a   [facility] [place] message or have the  send me a message by calling [phone]. Have a blessed day and don't forget to be kind  to others! Yesica Walker M.D.   Good Help to Those in Need  \"You may be whatever you resolve to be\"

## 2021-05-17 RX ORDER — MEMANTINE HYDROCHLORIDE 5 MG/1
TABLET ORAL
Qty: 30 TAB | Refills: 5 | Status: SHIPPED | OUTPATIENT
Start: 2021-05-17 | End: 2021-12-13

## 2021-06-15 ENCOUNTER — OFFICE VISIT (OUTPATIENT)
Dept: FAMILY MEDICINE CLINIC | Age: 86
End: 2021-06-15
Payer: MEDICARE

## 2021-06-15 VITALS
HEART RATE: 85 BPM | WEIGHT: 137.2 LBS | OXYGEN SATURATION: 97 % | SYSTOLIC BLOOD PRESSURE: 119 MMHG | RESPIRATION RATE: 16 BRPM | BODY MASS INDEX: 20.79 KG/M2 | TEMPERATURE: 98.1 F | DIASTOLIC BLOOD PRESSURE: 72 MMHG | HEIGHT: 68 IN

## 2021-06-15 DIAGNOSIS — G30.1 LATE ONSET ALZHEIMER'S DISEASE WITH BEHAVIORAL DISTURBANCE (HCC): ICD-10-CM

## 2021-06-15 DIAGNOSIS — K59.00 CONSTIPATION, UNSPECIFIED CONSTIPATION TYPE: Primary | ICD-10-CM

## 2021-06-15 DIAGNOSIS — I10 ESSENTIAL HYPERTENSION: ICD-10-CM

## 2021-06-15 DIAGNOSIS — F02.818 LATE ONSET ALZHEIMER'S DISEASE WITH BEHAVIORAL DISTURBANCE (HCC): ICD-10-CM

## 2021-06-15 DIAGNOSIS — E11.21 TYPE 2 DIABETES WITH NEPHROPATHY (HCC): ICD-10-CM

## 2021-06-15 PROCEDURE — G8536 NO DOC ELDER MAL SCRN: HCPCS | Performed by: FAMILY MEDICINE

## 2021-06-15 PROCEDURE — G8510 SCR DEP NEG, NO PLAN REQD: HCPCS | Performed by: FAMILY MEDICINE

## 2021-06-15 PROCEDURE — G8420 CALC BMI NORM PARAMETERS: HCPCS | Performed by: FAMILY MEDICINE

## 2021-06-15 PROCEDURE — 99214 OFFICE O/P EST MOD 30 MIN: CPT | Performed by: FAMILY MEDICINE

## 2021-06-15 PROCEDURE — 1101F PT FALLS ASSESS-DOCD LE1/YR: CPT | Performed by: FAMILY MEDICINE

## 2021-06-15 PROCEDURE — G0463 HOSPITAL OUTPT CLINIC VISIT: HCPCS | Performed by: FAMILY MEDICINE

## 2021-06-15 PROCEDURE — G8427 DOCREV CUR MEDS BY ELIG CLIN: HCPCS | Performed by: FAMILY MEDICINE

## 2021-06-15 PROCEDURE — 1090F PRES/ABSN URINE INCON ASSESS: CPT | Performed by: FAMILY MEDICINE

## 2021-06-15 NOTE — PROGRESS NOTES
Chief Complaint   Patient presents with    Back Pain    Abdominal Pain     1. Have you been to the ER, urgent care clinic since your last visit? Hospitalized since your last visit? No    2. Have you seen or consulted any other health care providers outside of the [address] [place] [address] since your last visit? Include any pap smears or colon screening. No      Lab Results   Component Value Date/Time    Microalb/Creat ratio (ug/mg creat.) 133.1 (H) 04/16/2018 09:16 AM      Chief Complaint   Patient presents with    Back Pain    Abdominal Pain     she is a 80y.o. year old female who presents for evaluation. See Diabetic Report Card listed above. Patient Active Problem List    Diagnosis    Pancreatitis    Abdominal pain    Type 2 diabetes with nephropathy (HCC)    Dementia (HCC)    Aggression    Psychosis ([facility]    Syncope, vasovagal    Late onset Alzheimer's disease with behavioral disturbance ([facility]    Frequent falls    A-fib ([facility]    Dyslipidemia    Chronic anticoagulation    Constipation    History of pulmonary embolism    Controlled type 2 diabetes mellitus without complication, without long-term current use of insulin (HCC)    Chronic pain    HTN (hypertension)    OA (osteoarthritis)    Fibromyalgia    GERD (gastroesophageal reflux disease)       Reviewed PmHx, RxHx, FmHx, SocHx, AllgHx--dated and updated in the chart. Review of Systems - negative except as listed above in the HPI    Objective:     Vitals:    06/15/21 1027   BP: 119/72   Pulse: 85   Resp: 16   Temp: 98.1 °F (36.7 °C)   TempSrc: Oral   SpO2: 97%   Weight: 137 lb 3.2 oz (62.2 kg)   Height: 5' 8\" (1.727 m)         Assessment/ Plan:   Diagnoses and all orders for this visit:    1. Constipation, unspecified constipation type  -due to pain rx  -rec daily stool softners  -pain is better controlled    2. Essential hypertension  -at goal    3.  Late onset Alzheimer's disease with behavioral disturbance ([facility]  -stable    4. Type 2 diabetes with nephropathy (HCC)  -at goal  Lab Results   Component Value Date/Time    Hemoglobin A1c 5.4 05/12/2021 10:43 AM          Follow-up and Dispositions    · Return in about 1 month (around 7/15/2021). Lab Results   Component Value Date/Time    Cholesterol, total 223 (H) 05/12/2021 10:43 AM    HDL Cholesterol 80 05/12/2021 10:43 AM    LDL, calculated 118.8 (H) 05/12/2021 10:43 AM    Triglyceride 121 05/12/2021 10:43 AM    CHOL/HDL Ratio 2.8 05/12/2021 10:43 AM     Lab Results   Component Value Date/Time    Hemoglobin A1c 5.4 05/12/2021 10:43 AM    Hemoglobin A1c 5.6 11/24/2020 11:00 AM    Hemoglobin A1c 5.2 10/15/2019 08:53 AM    Microalb/Creat ratio (ug/mg creat.) 133.1 (H) 04/16/2018 09:16 AM    LDL, calculated 118.8 (H) 05/12/2021 10:43 AM    Creatinine (POC) 0.9 11/12/2012 07:33 AM    Creatinine 1.16 (H) 05/12/2021 10:43 AM          Discussed with patient goal of Diabetes to include:  HgA1C <7, LDL cholesterol <100, Blood pressure <140/80. Discussed with patient diet and weight management and to get regular exercise. Recommend yearly eye exams and daily foot care. The patient understands and agrees with the plan. I have discussed the diagnosis with the patient and the intended plan as seen in the above orders. The patient has received an after-visit summary and questions were answered concerning future plans. Medication Side Effects and Warnings were discussed with patient  Patient Labs were reviewed and or requested  Patient Past Records were reviewed and or requested    Jason Carmen M.D. [address] [facility]    There are no Patient Instructions on file for this visit.

## 2021-07-20 ENCOUNTER — OFFICE VISIT (OUTPATIENT)
Dept: FAMILY MEDICINE CLINIC | Age: 86
End: 2021-07-20
Payer: MEDICARE

## 2021-07-20 VITALS
SYSTOLIC BLOOD PRESSURE: 154 MMHG | HEART RATE: 89 BPM | DIASTOLIC BLOOD PRESSURE: 91 MMHG | HEIGHT: 68 IN | WEIGHT: 134.5 LBS | BODY MASS INDEX: 20.38 KG/M2 | OXYGEN SATURATION: 97 % | RESPIRATION RATE: 16 BRPM | TEMPERATURE: 98 F

## 2021-07-20 DIAGNOSIS — G89.4 CHRONIC PAIN SYNDROME: ICD-10-CM

## 2021-07-20 DIAGNOSIS — R10.9 ABDOMINAL PAIN, UNSPECIFIED ABDOMINAL LOCATION: Primary | ICD-10-CM

## 2021-07-20 PROCEDURE — G0463 HOSPITAL OUTPT CLINIC VISIT: HCPCS | Performed by: FAMILY MEDICINE

## 2021-07-20 PROCEDURE — G8420 CALC BMI NORM PARAMETERS: HCPCS | Performed by: FAMILY MEDICINE

## 2021-07-20 PROCEDURE — G8427 DOCREV CUR MEDS BY ELIG CLIN: HCPCS | Performed by: FAMILY MEDICINE

## 2021-07-20 PROCEDURE — 99213 OFFICE O/P EST LOW 20 MIN: CPT | Performed by: FAMILY MEDICINE

## 2021-07-20 PROCEDURE — G8510 SCR DEP NEG, NO PLAN REQD: HCPCS | Performed by: FAMILY MEDICINE

## 2021-07-20 PROCEDURE — 1101F PT FALLS ASSESS-DOCD LE1/YR: CPT | Performed by: FAMILY MEDICINE

## 2021-07-20 PROCEDURE — G8536 NO DOC ELDER MAL SCRN: HCPCS | Performed by: FAMILY MEDICINE

## 2021-07-20 PROCEDURE — 1090F PRES/ABSN URINE INCON ASSESS: CPT | Performed by: FAMILY MEDICINE

## 2021-07-20 RX ORDER — HYDROCODONE BITARTRATE AND ACETAMINOPHEN 5; 325 MG/1; MG/1
1 TABLET ORAL
Qty: 90 TABLET | Refills: 0 | Status: SHIPPED | OUTPATIENT
Start: 2021-07-20 | End: 2021-12-13 | Stop reason: SDUPTHER

## 2021-07-20 NOTE — PROGRESS NOTES
Chief Complaint   Patient presents with    Abdominal Pain    Back Pain     1. Have you been to the ER, urgent care clinic since your last visit? Hospitalized since your last visit? No    2. Have you seen or consulted any other health care providers outside of the [address] [place] [address] since your last visit? Include any pap smears or colon screening. No             Chief Complaint   Patient presents with    Abdominal Pain    Back Pain     She is a 80 y.o. female who presents for evalution. Reviewed PmHx, RxHx, FmHx, SocHx, AllgHx and updated and dated in the chart. Patient Active Problem List    Diagnosis    Pancreatitis    Abdominal pain    Type 2 diabetes with nephropathy (HCC)    Dementia (HCC)    Aggression    Psychosis ([facility]    Syncope, vasovagal    Late onset Alzheimer's disease with behavioral disturbance ([facility]    Frequent falls    A-fib ([facility]    Dyslipidemia    Chronic anticoagulation    Constipation    History of pulmonary embolism    Controlled type 2 diabetes mellitus without complication, without long-term current use of insulin (HCC)    Chronic pain    HTN (hypertension)    OA (osteoarthritis)    Fibromyalgia    GERD (gastroesophageal reflux disease)       Review of Systems - negative except as listed above in the HPI    Objective:     Vitals:    07/20/21 0939   BP: (!) 154/91   Pulse: 89   Resp: 16   Temp: 98 °F (36.7 °C)   TempSrc: Oral   SpO2: 97%   Weight: 134 lb 8 oz (61 kg)   Height: 5' 8\" (1.727 m)         Assessment/ Plan:   Diagnoses and all orders for this visit:    1. Abdominal pain, unspecified abdominal location  -chronic and pt needs to take pain rx and colace   -no further workup due to age and demetia             I have discussed the diagnosis with the patient and the intended plan as seen in the above orders. The patient understands and agrees with the plan.  The patient has received an after-visit summary and questions were answered concerning future plans.     Medication Side Effects and Warnings were discussed with patient  Patient Labs were reviewed and or requested:  Patient Past Records were reviewed and or requested    Sohail Worthington M.D. There are no Patient Instructions on file for this visit.

## 2021-08-02 DIAGNOSIS — R21 RASH: ICD-10-CM

## 2021-08-02 RX ORDER — TRIAMCINOLONE ACETONIDE 1 MG/G
CREAM TOPICAL DAILY
Qty: 60 G | Refills: 0 | Status: SHIPPED | OUTPATIENT
Start: 2021-08-02

## 2021-10-18 RX ORDER — AMLODIPINE BESYLATE 5 MG/1
TABLET ORAL
Qty: 180 TABLET | Refills: 0 | Status: SHIPPED | OUTPATIENT
Start: 2021-10-18 | End: 2021-10-25

## 2021-10-25 RX ORDER — AMLODIPINE BESYLATE 5 MG/1
TABLET ORAL
Qty: 180 TABLET | Refills: 0 | Status: SHIPPED | OUTPATIENT
Start: 2021-10-25 | End: 2022-04-20 | Stop reason: SDUPTHER

## 2021-12-07 RX ORDER — FUROSEMIDE 40 MG/1
TABLET ORAL
Qty: 90 TABLET | Refills: 3 | Status: SHIPPED | OUTPATIENT
Start: 2021-12-07

## 2021-12-13 DIAGNOSIS — G89.4 CHRONIC PAIN SYNDROME: ICD-10-CM

## 2021-12-13 RX ORDER — MEMANTINE HYDROCHLORIDE 5 MG/1
TABLET ORAL
Qty: 30 TABLET | Refills: 5 | Status: SHIPPED | OUTPATIENT
Start: 2021-12-13 | End: 2022-01-05 | Stop reason: SDUPTHER

## 2021-12-13 RX ORDER — HYDROCODONE BITARTRATE AND ACETAMINOPHEN 5; 325 MG/1; MG/1
1 TABLET ORAL
Qty: 90 TABLET | Refills: 0 | Status: SHIPPED | OUTPATIENT
Start: 2021-12-13 | End: 2022-04-06 | Stop reason: SDUPTHER

## 2022-01-24 ENCOUNTER — TELEPHONE (OUTPATIENT)
Dept: FAMILY MEDICINE CLINIC | Age: 87
End: 2022-01-24

## 2022-01-24 NOTE — TELEPHONE ENCOUNTER
Called and spoke to patient's spouse. Sheryl Adhikari) Spouse states patient complaining of abdominal pain with no substantial bowel movement for 3 days. Spouse states abdomen painful on lower left but also all across. Per spouse: patient drinking plenty of water and Senakot twice daily.     OTC laxatives discussed and spouse states he will try Doculax products and call back to office if symptoms worsen or persist.

## 2022-01-24 NOTE — TELEPHONE ENCOUNTER
There is nothing further that can be done for the lump. No surgery or medication.     Dr. Mark Araya

## 2022-01-24 NOTE — TELEPHONE ENCOUNTER
[facility] and is wondering what dr graciela can take Tray to for her stomach. States it's the same problems as before but has a lump on her stomach now. He thinks her seeing a specialist would do her good. Call back number for him is [phone]. Thanks.

## 2022-03-15 ENCOUNTER — TRANSCRIBE ORDER (OUTPATIENT)
Dept: REGISTRATION | Age: 87
End: 2022-03-15

## 2022-03-15 ENCOUNTER — HOSPITAL ENCOUNTER (OUTPATIENT)
Dept: GENERAL RADIOLOGY | Age: 87
Discharge: HOME | End: 2022-03-15
Payer: MEDICARE

## 2022-03-15 DIAGNOSIS — K59.00 CONSTIPATION: Primary | ICD-10-CM

## 2022-03-15 DIAGNOSIS — K59.00 CONSTIPATION: ICD-10-CM

## 2022-03-15 PROCEDURE — 74018 RADEX ABDOMEN 1 VIEW: CPT

## 2022-03-18 PROBLEM — R10.9 ABDOMINAL PAIN: Status: ACTIVE | Noted: 2019-06-17

## 2022-03-18 PROBLEM — Z79.01 CHRONIC ANTICOAGULATION: Status: ACTIVE | Noted: 2018-05-24

## 2022-03-18 PROBLEM — E11.9 CONTROLLED TYPE 2 DIABETES MELLITUS WITHOUT COMPLICATION, WITHOUT LONG-TERM CURRENT USE OF INSULIN (HCC): Status: ACTIVE | Noted: 2017-05-09

## 2022-03-19 PROBLEM — E11.21 TYPE 2 DIABETES WITH NEPHROPATHY (HCC): Status: ACTIVE | Noted: 2019-01-02

## 2022-03-19 PROBLEM — R29.6 FREQUENT FALLS: Status: ACTIVE | Noted: 2018-08-14

## 2022-03-19 PROBLEM — E78.5 DYSLIPIDEMIA: Status: ACTIVE | Noted: 2018-05-24

## 2022-03-19 PROBLEM — K59.00 CONSTIPATION: Status: ACTIVE | Noted: 2018-05-24

## 2022-03-19 PROBLEM — Z86.711 HISTORY OF PULMONARY EMBOLISM: Status: ACTIVE | Noted: 2018-05-24

## 2022-03-19 PROBLEM — R46.89 AGGRESSION: Status: ACTIVE | Noted: 2018-09-06

## 2022-03-19 PROBLEM — G30.1 LATE ONSET ALZHEIMER'S DISEASE WITH BEHAVIORAL DISTURBANCE (HCC): Status: ACTIVE | Noted: 2018-08-24

## 2022-03-19 PROBLEM — K85.90 PANCREATITIS: Status: ACTIVE | Noted: 2019-06-17

## 2022-03-19 PROBLEM — F03.90 DEMENTIA (HCC): Status: ACTIVE | Noted: 2018-09-18

## 2022-03-19 PROBLEM — R55 SYNCOPE, VASOVAGAL: Status: ACTIVE | Noted: 2018-09-05

## 2022-03-19 PROBLEM — F02.818 LATE ONSET ALZHEIMER'S DISEASE WITH BEHAVIORAL DISTURBANCE (HCC): Status: ACTIVE | Noted: 2018-08-24

## 2022-03-20 PROBLEM — I48.91 A-FIB (HCC): Status: ACTIVE | Noted: 2018-05-24

## 2022-03-20 PROBLEM — F29 PSYCHOSIS (HCC): Status: ACTIVE | Noted: 2018-09-06

## 2022-04-04 RX ORDER — MEMANTINE HYDROCHLORIDE 5 MG/1
TABLET ORAL
Qty: 90 TABLET | Refills: 0 | Status: SHIPPED | OUTPATIENT
Start: 2022-04-04 | End: 2022-06-27

## 2022-04-06 ENCOUNTER — OFFICE VISIT (OUTPATIENT)
Dept: FAMILY MEDICINE CLINIC | Age: 87
End: 2022-04-06
Payer: MEDICARE

## 2022-04-06 VITALS
WEIGHT: 124 LBS | TEMPERATURE: 98 F | OXYGEN SATURATION: 98 % | HEART RATE: 87 BPM | BODY MASS INDEX: 18.79 KG/M2 | RESPIRATION RATE: 16 BRPM | SYSTOLIC BLOOD PRESSURE: 128 MMHG | HEIGHT: 68 IN | DIASTOLIC BLOOD PRESSURE: 71 MMHG

## 2022-04-06 DIAGNOSIS — F29 PSYCHOSIS, UNSPECIFIED PSYCHOSIS TYPE (HCC): ICD-10-CM

## 2022-04-06 DIAGNOSIS — Z00.01 ENCOUNTER FOR ROUTINE ADULT HEALTH EXAMINATION WITH ABNORMAL FINDINGS: Primary | ICD-10-CM

## 2022-04-06 DIAGNOSIS — D68.9 COAGULATION DEFICIENCY (HCC): ICD-10-CM

## 2022-04-06 DIAGNOSIS — I10 PRIMARY HYPERTENSION: ICD-10-CM

## 2022-04-06 DIAGNOSIS — E11.21 TYPE 2 DIABETES WITH NEPHROPATHY (HCC): ICD-10-CM

## 2022-04-06 DIAGNOSIS — F02.80 ALZHEIMER'S DEMENTIA WITHOUT BEHAVIORAL DISTURBANCE, UNSPECIFIED TIMING OF DEMENTIA ONSET: ICD-10-CM

## 2022-04-06 DIAGNOSIS — F02.818 LATE ONSET ALZHEIMER'S DISEASE WITH BEHAVIORAL DISTURBANCE (HCC): ICD-10-CM

## 2022-04-06 DIAGNOSIS — G30.9 ALZHEIMER'S DEMENTIA WITHOUT BEHAVIORAL DISTURBANCE, UNSPECIFIED TIMING OF DEMENTIA ONSET: ICD-10-CM

## 2022-04-06 DIAGNOSIS — G30.1 LATE ONSET ALZHEIMER'S DISEASE WITH BEHAVIORAL DISTURBANCE (HCC): ICD-10-CM

## 2022-04-06 DIAGNOSIS — G89.4 CHRONIC PAIN SYNDROME: ICD-10-CM

## 2022-04-06 DIAGNOSIS — I48.0 PAROXYSMAL ATRIAL FIBRILLATION (HCC): ICD-10-CM

## 2022-04-06 PROCEDURE — G8510 SCR DEP NEG, NO PLAN REQD: HCPCS | Performed by: FAMILY MEDICINE

## 2022-04-06 PROCEDURE — G0439 PPPS, SUBSEQ VISIT: HCPCS | Performed by: FAMILY MEDICINE

## 2022-04-06 PROCEDURE — G0463 HOSPITAL OUTPT CLINIC VISIT: HCPCS | Performed by: FAMILY MEDICINE

## 2022-04-06 PROCEDURE — G8420 CALC BMI NORM PARAMETERS: HCPCS | Performed by: FAMILY MEDICINE

## 2022-04-06 PROCEDURE — G8536 NO DOC ELDER MAL SCRN: HCPCS | Performed by: FAMILY MEDICINE

## 2022-04-06 PROCEDURE — 1101F PT FALLS ASSESS-DOCD LE1/YR: CPT | Performed by: FAMILY MEDICINE

## 2022-04-06 PROCEDURE — 99214 OFFICE O/P EST MOD 30 MIN: CPT | Performed by: FAMILY MEDICINE

## 2022-04-06 PROCEDURE — G8427 DOCREV CUR MEDS BY ELIG CLIN: HCPCS | Performed by: FAMILY MEDICINE

## 2022-04-06 PROCEDURE — 1090F PRES/ABSN URINE INCON ASSESS: CPT | Performed by: FAMILY MEDICINE

## 2022-04-06 RX ORDER — HYDROCODONE BITARTRATE AND ACETAMINOPHEN 5; 325 MG/1; MG/1
1 TABLET ORAL
Qty: 90 TABLET | Refills: 0 | Status: SHIPPED | OUTPATIENT
Start: 2022-04-06 | End: 2022-06-27 | Stop reason: SDUPTHER

## 2022-04-06 NOTE — PROGRESS NOTES
Chief Complaint   Patient presents with    Annual Wellness Visit     Sore spot on Left buttocks    Hypertension    Labs     Fasting today    Abdominal Pain     Med Refill: Hydrocodone     1. Have you been to the ER, urgent care clinic since your last visit? Hospitalized since your last visit? No    2. Have you seen or consulted any other health care providers outside of the [address] [place] [address] since your last visit? Include any pap smears or colon screening. No    Medicare Wellness Exam:    Chief Complaint   Patient presents with    Annual Wellness Visit     Sore spot on Left buttocks    Hypertension    Labs     Fasting today    Abdominal Pain     Med Refill: Hydrocodone     she is a 80y.o. year old female who presents for evaluation for their Medicare Wellness Visit. Farrah Kevyn is completed and assessed=yes  Depression Screen is completed and assessed=yes  Medication list reviewed and adjusted for accuracy=yes  Immunizations reviewed and updated=yes  Health/Preventative Screenings reviewed and updated=yes  ADL Functions reviewed=yes    Patient Active Problem List    Diagnosis    Coagulation deficiency ([facility]    Pancreatitis    Abdominal pain    Type 2 diabetes with nephropathy ([facility]    Dementia ([facility]    Aggression    Psychosis ([address]    Syncope, vasovagal    Late onset Alzheimer's disease with behavioral disturbance ([address]    Frequent falls    A-fib ([address]    Dyslipidemia    Chronic anticoagulation    Constipation    History of pulmonary embolism    Controlled type 2 diabetes mellitus without complication, without long-term current use of insulin (HCC)    Chronic pain    HTN (hypertension)    OA (osteoarthritis)    Fibromyalgia    GERD (gastroesophageal reflux disease)       Reviewed PmHx, RxHx, FmHx, SocHx, AllgHx and updated and dated in the chart.     Review of Systems - negative except as listed above in the HPI    Objective:     Vitals:    04/06/22 0937   BP: 128/71   Pulse: 87   Resp: 16   Temp: 98 °F (36.7 °C)   TempSrc: Oral   SpO2: 98%   Weight: 124 lb (56.2 kg)   Height: 5' 8\" (1.727 m)       Assessment/ Plan:   Diagnoses and all orders for this visit:    1. Encounter for routine adult health examination with abnormal findings  -     LIPID PANEL; Future  -     METABOLIC PANEL, COMPREHENSIVE; Future  -     CBC WITH AUTOMATED DIFF; Future  -     TSH 3RD GENERATION; Future  -     HEMOGLOBIN A1C WITH EAG; Future  -has lw  -see below  -advanced dementia    2. Type 2 diabetes with nephropathy (HCC)  -     LIPID PANEL; Future  -     METABOLIC PANEL, COMPREHENSIVE; Future  -     CBC WITH AUTOMATED DIFF; Future  -     TSH 3RD GENERATION; Future  -     HEMOGLOBIN A1C WITH EAG; Future  Lab Results   Component Value Date/Time    Hemoglobin A1c 5.4 05/12/2021 10:43 AM       3. Coagulation deficiency ([facility]  4. Psychosis, unspecified psychosis type ([facility]  5. Late onset Alzheimer's disease with behavioral disturbance (HCC)  6. Paroxysmal atrial fibrillation (HCC)  -all stable    7. Primary hypertension  -     LIPID PANEL; Future  -     METABOLIC PANEL, COMPREHENSIVE; Future  -at goal    8.  Alzheimer's dementia without behavioral disturbance, unspecified timing of dementia onset ([facility]         -Pain evaluation performed in office  -Cognitive Screen performed in office  -Depression Screen, Fall risks (by up and go test)  and ADL functionality were addressed  -Medication list updated and reviewed for any changes   -A comprehensive review of medical issues and a plan was formulated  -End of life planning was addressed with pt   -Health Screenings for preventions were addressed and a plan was formulated  -Shingles Vaccine was recommended  -Discussed with patient cancer risk factors and appropriate screenings for age  -Patient evaluated for colonoscopy and referred if needed per screeing criteria  -Labs from previous visits were discussed with patient   -Discussed with patient diet and exercise and formulated a plan as needed  -An Advanced care plan was developed with the patient.  -Alcohol screening performed and was negative    -  Follow-up and Dispositions    · Return in about 6 months (around 10/6/2022). I have discussed the diagnosis with the patient and the intended plan as seen in the above orders. The patient understands and agrees with the plan. The patient has received an after-visit summary and questions were answered concerning future plans. Medication Side Effects and Warnings were discussed with patien  Patient Labs were reviewed and or requested  Patient Past Records were reviewed and or requested    There are no Patient Instructions on file for this visit.       Mary Crockett M.D.

## 2022-04-08 LAB
ALBUMIN SERPL-MCNC: 3.7 G/DL (ref 3.5–5)
ALBUMIN/GLOB SERPL: 1.2 {RATIO} (ref 1.1–2.2)
ALP SERPL-CCNC: 148 U/L (ref 45–117)
ALT SERPL-CCNC: 15 U/L (ref 12–78)
ANION GAP SERPL CALC-SCNC: 5 MMOL/L (ref 5–15)
AST SERPL-CCNC: 16 U/L (ref 15–37)
BASOPHILS # BLD: 0 K/UL (ref 0–0.1)
BASOPHILS NFR BLD: 1 % (ref 0–1)
BILIRUB SERPL-MCNC: 0.5 MG/DL (ref 0.2–1)
BUN SERPL-MCNC: 25 MG/DL (ref 6–20)
BUN/CREAT SERPL: 21 (ref 12–20)
CALCIUM SERPL-MCNC: 9.8 MG/DL (ref 8.5–10.1)
CHLORIDE SERPL-SCNC: 105 MMOL/L (ref 97–108)
CHOLEST SERPL-MCNC: 215 MG/DL
CO2 SERPL-SCNC: 30 MMOL/L (ref 21–32)
CREAT SERPL-MCNC: 1.17 MG/DL (ref 0.55–1.02)
DIFFERENTIAL METHOD BLD: ABNORMAL
EOSINOPHIL # BLD: 0.1 K/UL (ref 0–0.4)
EOSINOPHIL NFR BLD: 2 % (ref 0–7)
ERYTHROCYTE [DISTWIDTH] IN BLOOD BY AUTOMATED COUNT: 13.4 % (ref 11.5–14.5)
EST. AVERAGE GLUCOSE BLD GHB EST-MCNC: 108 MG/DL
GLOBULIN SER CALC-MCNC: 3.2 G/DL (ref 2–4)
GLUCOSE SERPL-MCNC: 100 MG/DL (ref 65–100)
HBA1C MFR BLD: 5.4 % (ref 4–5.6)
HCT VFR BLD AUTO: 38 % (ref 35–47)
HDLC SERPL-MCNC: 79 MG/DL
HDLC SERPL: 2.7 {RATIO} (ref 0–5)
HGB BLD-MCNC: 12.4 G/DL (ref 11.5–16)
IMM GRANULOCYTES # BLD AUTO: 0 K/UL (ref 0–0.04)
IMM GRANULOCYTES NFR BLD AUTO: 0 % (ref 0–0.5)
LDLC SERPL CALC-MCNC: 114.2 MG/DL (ref 0–100)
LYMPHOCYTES # BLD: 1.4 K/UL (ref 0.8–3.5)
LYMPHOCYTES NFR BLD: 30 % (ref 12–49)
MCH RBC QN AUTO: 32.9 PG (ref 26–34)
MCHC RBC AUTO-ENTMCNC: 32.6 G/DL (ref 30–36.5)
MCV RBC AUTO: 100.8 FL (ref 80–99)
MONOCYTES # BLD: 0.4 K/UL (ref 0–1)
MONOCYTES NFR BLD: 7 % (ref 5–13)
NEUTS SEG # BLD: 2.9 K/UL (ref 1.8–8)
NEUTS SEG NFR BLD: 60 % (ref 32–75)
NRBC # BLD: 0 K/UL (ref 0–0.01)
NRBC BLD-RTO: 0 PER 100 WBC
PLATELET # BLD AUTO: 181 K/UL (ref 150–400)
PMV BLD AUTO: 12 FL (ref 8.9–12.9)
POTASSIUM SERPL-SCNC: 3.9 MMOL/L (ref 3.5–5.1)
PROT SERPL-MCNC: 6.9 G/DL (ref 6.4–8.2)
RBC # BLD AUTO: 3.77 M/UL (ref 3.8–5.2)
SODIUM SERPL-SCNC: 140 MMOL/L (ref 136–145)
TRIGL SERPL-MCNC: 109 MG/DL (ref ?–150)
TSH SERPL DL<=0.05 MIU/L-ACNC: 2.07 UIU/ML (ref 0.36–3.74)
VLDLC SERPL CALC-MCNC: 21.8 MG/DL
WBC # BLD AUTO: 4.7 K/UL (ref 3.6–11)

## 2022-04-11 NOTE — PROGRESS NOTES
Attempted to call patient regarding labs results. No answer, no option to leave message. 1st attempt.

## 2022-04-11 NOTE — PROGRESS NOTES
Patient's labs are good and stable. No changes to medical regimen. Please encourage patient (if appropriate) to sign up for Mychart and text them the link if needed.     Dr. Alex Kirby

## 2022-04-20 ENCOUNTER — TELEPHONE (OUTPATIENT)
Dept: FAMILY MEDICINE CLINIC | Age: 87
End: 2022-04-20

## 2022-04-20 RX ORDER — AMLODIPINE BESYLATE 5 MG/1
5 TABLET ORAL EVERY 12 HOURS
Qty: 180 TABLET | Refills: 1 | Status: SHIPPED | OUTPATIENT
Start: 2022-04-20

## 2022-04-20 NOTE — TELEPHONE ENCOUNTER
----- Message from Francoise Morgan sent at 4/20/2022 12:02 PM EDT -----  Subject: Refill Request    QUESTIONS  Name of Medication? amLODIPine (NORVASC) 5 mg tablet  Patient-reported dosage and instructions? Take 1 tablet daily  How many days do you have left? 6  Preferred Pharmacy? [facility] #[id number]  Pharmacy phone number (if available)? [phone]  Additional Information for Provider? [facility] received call from PT Tray Regan   who is requesting refill on medication. PT requesting RX be sent to [facility].   Please returnn call   ---------------------------------------------------------------------------  --------------  CALL BACK INFO  What is the best way for the office to contact you? OK to leave message on   voicemail  Preferred Call Back Phone Number? [phone]  ---------------------------------------------------------------------------  --------------  SCRIPT ANSWERS  Relationship to Patient?  Self

## 2022-06-27 DIAGNOSIS — G89.4 CHRONIC PAIN SYNDROME: ICD-10-CM

## 2022-06-27 RX ORDER — HYDROCODONE BITARTRATE AND ACETAMINOPHEN 5; 325 MG/1; MG/1
1 TABLET ORAL
Qty: 90 TABLET | Refills: 0 | Status: SHIPPED | OUTPATIENT
Start: 2022-06-27 | End: 2022-08-25 | Stop reason: SDUPTHER

## 2022-06-27 RX ORDER — MEMANTINE HYDROCHLORIDE 5 MG/1
TABLET ORAL
Qty: 90 TABLET | Refills: 0 | Status: SHIPPED | OUTPATIENT
Start: 2022-06-27 | End: 2022-09-23

## 2022-07-05 RX ORDER — QUETIAPINE FUMARATE 50 MG/1
TABLET, FILM COATED ORAL
Qty: 90 TABLET | Refills: 0 | Status: SHIPPED | OUTPATIENT
Start: 2022-07-05

## 2022-08-24 ENCOUNTER — TELEPHONE (OUTPATIENT)
Dept: FAMILY MEDICINE CLINIC | Age: 87
End: 2022-08-24

## 2022-08-24 NOTE — TELEPHONE ENCOUNTER
Pts  Brandan Christopher called in and requested a refill request on hydrocodone please. Didn't see on med list to choose it, sorry. Thanks.

## 2022-08-25 DIAGNOSIS — G89.4 CHRONIC PAIN SYNDROME: ICD-10-CM

## 2022-08-25 RX ORDER — HYDROCODONE BITARTRATE AND ACETAMINOPHEN 5; 325 MG/1; MG/1
1 TABLET ORAL
Qty: 90 TABLET | Refills: 0 | Status: SHIPPED | OUTPATIENT
Start: 2022-08-25 | End: 2022-10-10 | Stop reason: SDUPTHER

## 2022-09-23 RX ORDER — MEMANTINE HYDROCHLORIDE 5 MG/1
TABLET ORAL
Qty: 90 TABLET | Refills: 0 | Status: SHIPPED | OUTPATIENT
Start: 2022-09-23

## 2022-10-01 ENCOUNTER — HOSPITAL ENCOUNTER (EMERGENCY)
Age: 87
Discharge: HOME | End: 2022-10-01
Attending: STUDENT IN AN ORGANIZED HEALTH CARE EDUCATION/TRAINING PROGRAM
Payer: MEDICARE

## 2022-10-01 ENCOUNTER — APPOINTMENT (OUTPATIENT)
Dept: CT IMAGING | Age: 87
End: 2022-10-01
Attending: STUDENT IN AN ORGANIZED HEALTH CARE EDUCATION/TRAINING PROGRAM
Payer: MEDICARE

## 2022-10-01 VITALS
DIASTOLIC BLOOD PRESSURE: 96 MMHG | TEMPERATURE: 98 F | BODY MASS INDEX: 18.79 KG/M2 | HEART RATE: 88 BPM | HEIGHT: 68 IN | RESPIRATION RATE: 18 BRPM | OXYGEN SATURATION: 98 % | SYSTOLIC BLOOD PRESSURE: 166 MMHG | WEIGHT: 124 LBS

## 2022-10-01 DIAGNOSIS — R10.30 LOWER ABDOMINAL PAIN: ICD-10-CM

## 2022-10-01 DIAGNOSIS — G30.9 ALZHEIMER'S DEMENTIA WITH AGITATION, UNSPECIFIED DEMENTIA SEVERITY, UNSPECIFIED TIMING OF DEMENTIA ONSET (HCC): ICD-10-CM
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
## 2022-10-01 DIAGNOSIS — F02.811 ALZHEIMER'S DEMENTIA WITH AGITATION, UNSPECIFIED DEMENTIA SEVERITY, UNSPECIFIED TIMING OF DEMENTIA ONSET (HCC): ICD-10-CM

## 2022-10-01 DIAGNOSIS — I63.9 CEREBROVASCULAR ACCIDENT (CVA), UNSPECIFIED MECHANISM (HCC): ICD-10-CM

## 2022-10-01 LAB
ALBUMIN SERPL-MCNC: 3.4 G/DL (ref 3.5–5)
ALBUMIN/GLOB SERPL: 0.9 {RATIO} (ref 1.1–2.2)
ALP SERPL-CCNC: 149 U/L (ref 45–117)
ALT SERPL-CCNC: 15 U/L (ref 12–78)
ANION GAP SERPL CALC-SCNC: 6 MMOL/L (ref 5–15)
AST SERPL-CCNC: ABNORMAL U/L (ref 15–37)
BILIRUB SERPL-MCNC: 0.8 MG/DL (ref 0.2–1)
BUN SERPL-MCNC: 20 MG/DL (ref 6–20)
BUN/CREAT SERPL: 17 (ref 12–20)
CALCIUM SERPL-MCNC: 9.8 MG/DL (ref 8.5–10.1)
CHLORIDE SERPL-SCNC: 105 MMOL/L (ref 97–108)
CO2 SERPL-SCNC: 28 MMOL/L (ref 21–32)
CREAT SERPL-MCNC: 1.16 MG/DL (ref 0.55–1.02)
GLOBULIN SER CALC-MCNC: 4 G/DL (ref 2–4)
GLUCOSE SERPL-MCNC: 118 MG/DL (ref 65–100)
LIPASE SERPL-CCNC: 199 U/L (ref 73–393)
MAGNESIUM SERPL-MCNC: NORMAL MG/DL (ref 1.6–2.4)
POTASSIUM SERPL-SCNC: ABNORMAL MMOL/L (ref 3.5–5.1)
PROT SERPL-MCNC: 7.4 G/DL (ref 6.4–8.2)
SODIUM SERPL-SCNC: 139 MMOL/L (ref 136–145)
TSH SERPL DL<=0.05 MIU/L-ACNC: 1.19 UIU/ML (ref 0.36–3.74)

## 2022-10-01 PROCEDURE — 99284 EMERGENCY DEPT VISIT MOD MDM: CPT

## 2022-10-01 PROCEDURE — 84443 ASSAY THYROID STIM HORMONE: CPT

## 2022-10-01 PROCEDURE — 83690 ASSAY OF LIPASE: CPT

## 2022-10-01 PROCEDURE — 36415 COLL VENOUS BLD VENIPUNCTURE: CPT

## 2022-10-01 PROCEDURE — 74176 CT ABD & PELVIS W/O CONTRAST: CPT

## 2022-10-01 PROCEDURE — 80053 COMPREHEN METABOLIC PANEL: CPT

## 2022-10-01 PROCEDURE — 83735 ASSAY OF MAGNESIUM: CPT

## 2022-10-01 PROCEDURE — 70450 CT HEAD/BRAIN W/O DYE: CPT

## 2022-10-01 NOTE — ED PROVIDER NOTES
EMERGENCY DEPARTMENT HISTORY AND PHYSICAL EXAM      Date: 10/1/2022  Patient Name: Tray Regan    History of Presenting Illness     HPI: Tray Regan, 80 y.o. female with past medical history of A. fib, Alzheimer's, type 2 diabetes, hypertension, osteoarthritis, fibromyalgia, chronic pain on hydrocodone, frequent falls, presents to the ED with cc of unwitnessed fall this morning, abdominal pain, and increased agitation earlier today. Per , he was in the vicinity when patient fell, but did not personally witness the fall. Patient herself tells me that she tripped in the kitchen, but does not think she injured herself anywhere from the fall. She denies hitting her head or loss of consciousness, however, patient does have advanced dementia-and unclear if history is reliable. Patient was ambulatory after the fall.  also shares that patient has been more agitated than usual this morning. States that she attempted to throw a chair at home, which is not her typical behavior. Though he admits that she has at times had psychosis and bouts of agitation like this morning. States that she is currently acting at her baseline mental status since being in the ED. Finally, patient complains of abdominal pain, most severe over the right side. She does have a history of constipation, but  reports she has been taking a stool softener daily, and feels patient requires additional work-up including imaging for her increased abdominal pain recently. She has not had any nausea or vomiting. Unclear what bowel habits have been like recently. No Fevers or chills. PCP: Raudel Serna MD    No current facility-administered medications on file prior to encounter.      Current Outpatient Medications on File Prior to Encounter   Medication Sig Dispense Refill    memantine (NAMENDA) 5 mg tablet TAKE 1 TABLET BY MOUTH EVERY DAY 90 Tablet 0    QUEtiapine (SEROquel) 50 mg tablet TAKE 1 TABLET BY MOUTH EVERYDAY AT BEDTIME 90 Tablet 0    amLODIPine (NORVASC) 5 mg tablet Take 1 Tablet by mouth every twelve (12) hours. 180 Tablet 1    furosemide (LASIX) 40 mg tablet TAKE 1 TABLET BY MOUTH EVERY DAY 90 Tablet 3    triamcinolone acetonide (KENALOG) 0.1 % topical cream Apply  to affected area daily. (Patient not taking: Reported on 4/6/2022) 60 g 0    traZODone (DESYREL) 50 mg tablet Take 1 Tab by mouth nightly. (Patient not taking: Reported on 4/6/2022) 30 Tab 5    potassium chloride SA (MICRO-K) 10 mEq capsule TAKE 2 CAPSULES BY MOUTH EVERY DAY (Patient not taking: Reported on 4/6/2022) 180 Cap 0    cloNIDine (CATAPRES) 0.3 mg/24 hr APPLY 1 PATCH WEEKLY (Patient not taking: Reported on 4/6/2022) 12 Patch 3    ondansetron (ZOFRAN ODT) 8 mg disintegrating tablet Take 1 Tab by mouth every eight (8) hours as needed for Nausea. (Patient not taking: Reported on 4/6/2022) 30 Tab 0    loperamide (IMMODIUM) 2 mg tablet Take 2 mg by mouth four (4) times daily as needed for Diarrhea. (Patient not taking: Reported on 4/6/2022)         Past History     Past Medical History:  Past Medical History:   Diagnosis Date    Anemia     Atrial fibrillation ([address]     Cancer (HCC)     basal cell on nose    Chronic pain     back pain    Fibromyalgia 4/1/2010    GERD (gastroesophageal reflux disease) 4/1/2010    Hiatal hernia 4/1/2010    High cholesterol 4/1/2010    HTN (hypertension) 4/1/2010    Ill-defined condition     A.  Fib    OA (osteoarthritis) 4/1/2010    back    Pulmonary emboli ([address] 2015       Past Surgical History:  Past Surgical History:   Procedure Laterality Date    HX APPENDECTOMY      HX BREAST BIOPSY      HX BREAST RECONSTRUCTION      Breast implants removed 1992    HX CATARACT REMOVAL      HX CHOLECYSTECTOMY  9/11/2013    HX HYSTERECTOMY      HX KNEE REPLACEMENT  2008    bilateral    HX ORTHOPAEDIC  2007    Bilateral TKR    HX PARTIAL HYSTERECTOMY      HX SHOULDER REPLACEMENT  2009    left    HX TONSILLECTOMY      IN BREAST AUGMENTATION WITH IMPLANT      UT BREAST SURGERY PROCEDURE UNLISTED      Breast im-plants x 2       Family History:  Family History   Problem Relation Age of Onset    Arthritis-rheumatoid Mother     Dementia Mother     Coronary Art Dis Father     Cancer Brother         lung cancer       Social History:  Social History     Tobacco Use    Smoking status: Never    Smokeless tobacco: Never   Substance Use Topics    Alcohol use: No    Drug use: No       Allergies: Allergies   Allergen Reactions    Angiotensin Ii,Human Other (comments)     States does not remember      Avelox [Moxifloxacin] Other (comments)     States does not remember      Demerol [Meperidine] Hives         Review of Systems   Review of Systems   Constitutional:  Negative for chills and fever. HENT:  Negative for congestion and rhinorrhea. Eyes:  Negative for visual disturbance. Respiratory:  Negative for chest tightness and shortness of breath. Gastrointestinal:  Positive for abdominal pain. Negative for diarrhea, nausea and vomiting. Genitourinary:  Negative for dysuria, flank pain and hematuria. Musculoskeletal:  Negative for back pain and neck pain. Skin:  Negative for rash. Allergic/Immunologic: Negative for immunocompromised state. Neurological:  Negative for dizziness, speech difficulty, weakness and headaches. Hematological:  Negative for adenopathy. Psychiatric/Behavioral:  Positive for agitation. Negative for dysphoric mood and suicidal ideas. Physical Exam   Physical Exam  Vitals and nursing note reviewed. Constitutional:       General: She is not in acute distress. Appearance: She is not ill-appearing or toxic-appearing. HENT:      Head: Normocephalic and atraumatic. Nose: Nose normal.      Mouth/Throat:      Mouth: Mucous membranes are moist.   Eyes:      Extraocular Movements: Extraocular movements intact. Pupils: Pupils are equal, round, and reactive to light.    Cardiovascular:      Rate and Rhythm: Tachycardia present. Rhythm regularly irregular. Pulses: Normal pulses. Pulmonary:      Effort: Pulmonary effort is normal.      Breath sounds: No stridor. No wheezing or rhonchi. Abdominal:      General: Abdomen is flat. There is no distension. Tenderness: There is abdominal tenderness in the right lower quadrant and suprapubic area. Musculoskeletal:         General: Normal range of motion. Cervical back: Normal range of motion and neck supple. Skin:     General: Skin is warm and dry. Neurological:      General: No focal deficit present. Mental Status: She is alert. Mental status is at baseline. She is disoriented and confused. Psychiatric:         Judgment: Judgment normal.       Diagnostic Study Results     Labs -     Recent Results (from the past 24 hour(s))   METABOLIC PANEL, COMPREHENSIVE    Collection Time: 10/01/22 11:54 AM   Result Value Ref Range    Sodium 139 136 - 145 mmol/L    Potassium HEMOLYZED,RECOLLECT REQUESTED 3.5 - 5.1 mmol/L    Chloride 105 97 - 108 mmol/L    CO2 28 21 - 32 mmol/L    Anion gap 6 5 - 15 mmol/L    Glucose 118 (H) 65 - 100 mg/dL    BUN 20 6 - 20 MG/DL    Creatinine 1.16 (H) 0.55 - 1.02 MG/DL    BUN/Creatinine ratio 17 12 - 20      GFR est AA 53 (L) >60 ml/min/1.73m2    GFR est non-AA 44 (L) >60 ml/min/1.73m2    Calcium 9.8 8.5 - 10.1 MG/DL    Bilirubin, total 0.8 0.2 - 1.0 MG/DL    ALT (SGPT) 15 12 - 78 U/L    AST (SGOT) HEMOLYZED,RECOLLECT REQUESTED 15 - 37 U/L    Alk.  phosphatase 149 (H) 45 - 117 U/L    Protein, total 7.4 6.4 - 8.2 g/dL    Albumin 3.4 (L) 3.5 - 5.0 g/dL    Globulin 4.0 2.0 - 4.0 g/dL    A-G Ratio 0.9 (L) 1.1 - 2.2     LIPASE    Collection Time: 10/01/22 11:54 AM   Result Value Ref Range    Lipase 199 73 - 393 U/L   MAGNESIUM    Collection Time: 10/01/22 11:54 AM   Result Value Ref Range    Magnesium HEMOLYZED,RECOLLECT REQUESTED 1.6 - 2.4 mg/dL   TSH 3RD GENERATION    Collection Time: 10/01/22 11:54 AM   Result Value Ref Range    TSH 1.19 0.36 - 3.74 uIU/mL       Radiologic Studies -   CT HEAD WO CONT   Final Result   1. Possible acute-subacute infarct in the left occipital lobe. No acute   intracranial hemorrhage. 2.  Right sphenoid sinus fluid, correlate for acute sinusitis. 3.  Chronic right cerebellar encephalomalacia. CT ABD PELV WO CONT   Final Result      1. No acute findings in the abdomen or pelvis. 2. Colonic diverticulosis. No evidence of acute diverticulitis. 3. 4 mm stone of the RIGHT kidney. No hydronephrosis. 4. Small hiatal hernia. CT Results  (Last 48 hours)                 10/01/22 1358  CT HEAD WO CONT Final result    Impression:  1. Possible acute-subacute infarct in the left occipital lobe. No acute   intracranial hemorrhage. 2.  Right sphenoid sinus fluid, correlate for acute sinusitis. 3.  Chronic right cerebellar encephalomalacia. Narrative:  EXAM: CT HEAD WO CONT       INDICATION: fall, AMS       COMPARISON: CT head 10/28/2019. CONTRAST: None. TECHNIQUE: Unenhanced CT of the head was performed using 5 mm images. Brain and   bone windows were generated. Coronal and sagittal reformats. CT dose reduction   was achieved through use of a standardized protocol tailored for this   examination and automatic exposure control for dose modulation. FINDINGS:   Generalized volume loss. Scattered white matter hypodensities likely reflect   chronic microangiopathic change. There is no intracranial hemorrhage,   extra-axial collection, or mass effect. The basilar cisterns are open. Subtle   hypodensity and loss of gray differentiation in the medial left occipital lobe   may reflect acute-subacute infarct. Chronic right cerebellar encephalomalacia. The bone windows demonstrate no abnormalities. Fluid in the right sphenoid   sinus. Mastoid air cells are clear. Bilateral lens replacements.            10/01/22 1358  CT ABD PELV WO CONT Final result    Impression:      1. No acute findings in the abdomen or pelvis. 2. Colonic diverticulosis. No evidence of acute diverticulitis. 3. 4 mm stone of the RIGHT kidney. No hydronephrosis. 4. Small hiatal hernia. Narrative:  EXAM: CT ABD PELV WO CONT       INDICATION: lower quadrant abd pain       COMPARISON: CT of the abdomen/pelvis October 28, 2019       IV CONTRAST: None. ORAL CONTRAST: None       TECHNIQUE:    Thin axial images were obtained through the abdomen and pelvis. Coronal and   sagittal reformats were generated. CT dose reduction was achieved through use of   a standardized protocol tailored for this examination and automatic exposure   control for dose modulation. The absence of intravenous contrast material reduces the sensitivity for   evaluation of the vasculature and solid organs. FINDINGS:    LOWER THORAX: The heart is enlarged. There are mitral and aortic valve   calcification. The LEFT atrium is enlarged. There is a sliding-type hiatal   hernia. LIVER: No mass. BILIARY TREE: Status post cholecystectomy. CBD is not dilated. SPLEEN: within normal limits. PANCREAS: No focal abnormality. ADRENALS: Unremarkable. KIDNEYS/URETERS: There is a 4 mm calculus of the RIGHT kidney upper pole. There   is a 13 mm hemorrhagic or proteinaceous cyst of the RIGHT kidney middle pole. STOMACH: Small hiatus hernia. SMALL BOWEL: No dilatation or wall thickening. COLON: There are numerous colonic diverticula, but no evidence of acute   diverticulitis. APPENDIX: Not clearly visualized. No focal abnormality seen in its expected   location. PERITONEUM: No ascites or pneumoperitoneum. RETROPERITONEUM: No lymphadenopathy or aortic aneurysm. There is extensive   atheromatous vascular calcification. REPRODUCTIVE ORGANS: No adnexal mass. Status post hysterectomy. URINARY BLADDER: No mass or calculus. BONES: No destructive bone lesion.  The bones appear diffusely osteopenic. There   is severe L2-L3 degenerative disc disease. ABDOMINAL WALL: No mass or hernia. ADDITIONAL COMMENTS: N/A                 CXR Results  (Last 48 hours)      None            Medical Decision Making   I, Linsey Sanchez MD-- am the first provider for this patient, and I am the attending of record for this patient encounter. I reviewed the vital signs, available nursing notes, past medical history, past surgical history, family history and social history. Vital Signs-Reviewed the patient's vital signs. Patient Vitals for the past 24 hrs:   Temp Pulse Resp BP SpO2   10/01/22 1515 98 °F (36.7 °C) 88 18 (!) 166/96 98 %   10/01/22 1505 -- 94 25 -- 94 %   10/01/22 1450 -- -- 16 -- --   10/01/22 1449 -- (!) 103 15 -- --   10/01/22 1448 -- 96 18 -- --   10/01/22 1447 -- 99 15 -- --   10/01/22 1446 -- 91 23 -- --   10/01/22 1445 -- 92 16 -- --   10/01/22 1444 -- 89 18 -- --   10/01/22 1443 -- 93 24 -- --   10/01/22 1440 -- 90 17 -- --   10/01/22 1420 -- 92 19 -- 100 %   10/01/22 1217 -- (!) 137 18 (!) 150/84 --   10/01/22 1132 -- (!) 104 20 117/66 100 %   10/01/22 1123 -- 83 -- -- --   10/01/22 1120 98.1 °F (36.7 °C) 91 20 (!) 157/67 100 %       EKG interpretation: (Preliminary)  A. fib, 90 bpm, nonspecific ST changes without ST elevations or depressions. QTc normal. EKG interpretation by Linsey Sanchez MD    Records Reviewed: Nursing Notes and Old Medical Records    Provider Notes (Medical Decision Making):   Ddx: Dementia with behavioral disturbances, UTI, symptomatic A. fib with RVR, dehydration, head trauma, constipation versus acute intra-abdominal process, etc    Plan: EKG, CBC, CMP, magnesium, TSH, lipase, UA, CT head, CT abdomen pelvis. ED Course as of 10/01/22 1519   Sat Oct 01, 2022   1508 Patient's CBC and magnesium hemolyzed, and repeat labs have been ordered. However, when staff went to redraw labs-patient combative and unwilling to allow blood draw.   Patient's  would like to forego remainder blood work as well as urine analysis as he is concerned about patient's escalating behavior. States that she is upset that she is still here, and he thinks to be better to just take her home at this time. States that patient recently had blood work completed by PCP, and he is not too worried about any acute process. Her labs which have resulted thus far including CMP, TSH, lipase, are all largely unremarkable. CT abdomen pelvis without acute process-incidental findings include diverticulosis without diverticulitis, nonobstructive kidney stone, and hiatal hernia. CT head show possible acute-subacute infarct in the left occipital lobe. This result was discussed with the , who has not noted any changes concerning for stroke in the patient. She is currently moving all 4 extremities with 5 out of 5 strength, and intact sensation throughout. No obvious facial droop compared to normal per . Advised that patient require early follow up with PCP and neurology for this finding. Doubt acute stroke considering the above, and patient without apparent deficit. Do not feel patient would do well with inpatient admission, and this would likely cause her more harm considering advanced dementia.  felt comfortable with plan for discharge and early PCP follow up. Reasons to return discussed. [JM]      ED Course User Index  [JM] Aby Alarcon MD       ED Course:   Initial assessment performed. The patient's presenting problems have been discussed, and they are in agreement with the care plan formulated and outlined with them. I have encouraged them to ask questions as they arise throughout their visit. Adi Bill MD      Disposition:  DC      DISCHARGE PLAN:  1. Current Discharge Medication List        2.    Follow-up Information       Follow up With Specialties Details Why Contact Info    Suzy Hannon MD Family Medicine In 2 days  [address] [facility] [id number]  [phone]      [facility] EMERGENCY DEPT Emergency Medicine  If symptoms worsen, As needed [address] [facility]  [phone]          3. Return to ED if worse     Diagnosis     Clinical Impression:   1. Fall, initial encounter    2. Lower abdominal pain    3. Alzheimer's dementia with agitation, unspecified dementia severity, unspecified timing of dementia onset ([facility]    4. Cerebrovascular accident (CVA), unspecified mechanism ([facility]        Attestations:    Ana M Talavera MD    Please note that this dictation was completed with PopUp, the computer voice recognition software. Quite often unanticipated grammatical, syntax, homophones, and other interpretive errors are inadvertently transcribed by the computer software. Please disregard these errors. Please excuse any errors that have escaped final proofreading. Thank you.

## 2022-10-10 ENCOUNTER — OFFICE VISIT (OUTPATIENT)
Dept: FAMILY MEDICINE CLINIC | Age: 87
End: 2022-10-10
Payer: MEDICARE

## 2022-10-10 VITALS
HEART RATE: 71 BPM | RESPIRATION RATE: 18 BRPM | SYSTOLIC BLOOD PRESSURE: 109 MMHG | BODY MASS INDEX: 18.64 KG/M2 | HEIGHT: 68 IN | DIASTOLIC BLOOD PRESSURE: 59 MMHG | TEMPERATURE: 97.8 F | WEIGHT: 123 LBS | OXYGEN SATURATION: 97 %

## 2022-10-10 DIAGNOSIS — E11.21 TYPE 2 DIABETES WITH NEPHROPATHY (HCC): Primary | ICD-10-CM

## 2022-10-10 DIAGNOSIS — F11.99 OPIOID USE, UNSPECIFIED WITH UNSPECIFIED OPIOID-INDUCED DISORDER (HCC): ICD-10-CM

## 2022-10-10 DIAGNOSIS — I10 PRIMARY HYPERTENSION: ICD-10-CM

## 2022-10-10 DIAGNOSIS — G30.9 SEVERE ALZHEIMER'S DEMENTIA WITH MOOD DISTURBANCE, UNSPECIFIED TIMING OF DEMENTIA ONSET (HCC): ICD-10-CM

## 2022-10-10 DIAGNOSIS — N18.31 STAGE 3A CHRONIC KIDNEY DISEASE (HCC): ICD-10-CM

## 2022-10-10 DIAGNOSIS — F02.C3 SEVERE ALZHEIMER'S DEMENTIA WITH MOOD DISTURBANCE, UNSPECIFIED TIMING OF DEMENTIA ONSET (HCC): ICD-10-CM

## 2022-10-10 DIAGNOSIS — G89.4 CHRONIC PAIN SYNDROME: ICD-10-CM

## 2022-10-10 DIAGNOSIS — E78.5 DYSLIPIDEMIA: ICD-10-CM

## 2022-10-10 DIAGNOSIS — R29.6 FREQUENT FALLS: ICD-10-CM

## 2022-10-10 PROBLEM — N18.30 CHRONIC RENAL DISEASE, STAGE III (HCC): Status: ACTIVE | Noted: 2022-10-10

## 2022-10-10 PROCEDURE — G8536 NO DOC ELDER MAL SCRN: HCPCS | Performed by: FAMILY MEDICINE

## 2022-10-10 PROCEDURE — 1090F PRES/ABSN URINE INCON ASSESS: CPT | Performed by: FAMILY MEDICINE

## 2022-10-10 PROCEDURE — 3044F HG A1C LEVEL LT 7.0%: CPT | Performed by: FAMILY MEDICINE

## 2022-10-10 PROCEDURE — 1101F PT FALLS ASSESS-DOCD LE1/YR: CPT | Performed by: FAMILY MEDICINE

## 2022-10-10 PROCEDURE — G8420 CALC BMI NORM PARAMETERS: HCPCS | Performed by: FAMILY MEDICINE

## 2022-10-10 PROCEDURE — G8427 DOCREV CUR MEDS BY ELIG CLIN: HCPCS | Performed by: FAMILY MEDICINE

## 2022-10-10 PROCEDURE — 99214 OFFICE O/P EST MOD 30 MIN: CPT | Performed by: FAMILY MEDICINE

## 2022-10-10 PROCEDURE — G0463 HOSPITAL OUTPT CLINIC VISIT: HCPCS | Performed by: FAMILY MEDICINE

## 2022-10-10 PROCEDURE — G8510 SCR DEP NEG, NO PLAN REQD: HCPCS | Performed by: FAMILY MEDICINE

## 2022-10-10 PROCEDURE — 1123F ACP DISCUSS/DSCN MKR DOCD: CPT | Performed by: FAMILY MEDICINE

## 2022-10-10 RX ORDER — HYDROCODONE BITARTRATE AND ACETAMINOPHEN 5; 325 MG/1; MG/1
1 TABLET ORAL
Qty: 90 TABLET | Refills: 0 | Status: SHIPPED | OUTPATIENT
Start: 2022-10-10 | End: 2022-11-09

## 2022-10-10 RX ORDER — HYDROCODONE BITARTRATE AND ACETAMINOPHEN 5; 325 MG/1; MG/1
1 TABLET ORAL
Qty: 90 TABLET | Refills: 0 | Status: SHIPPED | OUTPATIENT
Start: 2022-10-10 | End: 2022-10-10 | Stop reason: SDUPTHER

## 2022-10-10 RX ORDER — HYDROCODONE BITARTRATE AND ACETAMINOPHEN 5; 325 MG/1; MG/1
TABLET ORAL
COMMUNITY

## 2022-10-10 RX ORDER — HYDROCODONE BITARTRATE AND ACETAMINOPHEN 5; 325 MG/1; MG/1
TABLET ORAL
Status: CANCELLED | OUTPATIENT
Start: 2022-10-10

## 2022-10-10 NOTE — PROGRESS NOTES
Chief Complaint   Patient presents with    Diabetes    Hypertension    Labs     Fasting today    Back Pain     Worse at night    Fall     [facility]  ED 10/1/22 Imaging done     1. Have you been to the ER, urgent care clinic since your last visit? Hospitalized since your last visit? [facility]  ED 10/1/22 Imaging done    2. Have you seen or consulted any other health care providers outside of the [address] [place] [address] since your last visit? Include any pap smears or colon screening. No    Lab Results   Component Value Date/Time    Microalb/Creat ratio (ug/mg creat.) 133.1 (H) 04/16/2018 09:16 AM      Chief Complaint   Patient presents with    Diabetes    Hypertension    Labs     Fasting today    Back Pain     Worse at night    Fall     [facility]  ED 10/1/22 Imaging done     she is a 80y.o. year old female who presents for evaluation. See Diabetic Report Card listed above. Patient Active Problem List    Diagnosis    Chronic renal disease, stage III    Opioid use, unspecified with unspecified opioid-induced disorder    Coagulation deficiency ([address]    Pancreatitis    Abdominal pain    Type 2 diabetes with nephropathy (HCC)    Dementia (HCC)    Aggression    Psychosis ([address]    Syncope, vasovagal    Late onset Alzheimer's disease with behavioral disturbance ([address]    Frequent falls    A-fib (HCC)    Dyslipidemia    Chronic anticoagulation    Constipation    History of pulmonary embolism    Controlled type 2 diabetes mellitus without complication, without long-term current use of insulin (HCC)    Chronic pain    HTN (hypertension)    OA (osteoarthritis)    Fibromyalgia    GERD (gastroesophageal reflux disease)       Reviewed PmHx, RxHx, FmHx, SocHx, AllgHx--dated and updated in the chart.     Review of Systems - negative except as listed above in the HPI    Objective:     Vitals:    10/10/22 0943   BP: (!) 109/59   Pulse: 71   Resp: 18   Temp: 97.8 °F (36.6 °C)   TempSrc: Oral   SpO2: 97%   Weight: 123 lb (55.8 kg)   Height: 5' 8\" (1.727 m)         Assessment/ Plan:   Diagnoses and all orders for this visit:    1. Type 2 diabetes with nephropathy (HCC)  -     MICROALBUMIN, UR, RAND W/ MICROALB/CREAT RATIO; Future  -     HEMOGLOBIN A1C WITH EAG; Future  -     LIPID PANEL; Future  -     METABOLIC PANEL, COMPREHENSIVE; Future  -     CBC WITH AUTOMATED DIFF; Future  -     TSH 3RD GENERATION; Future  Lab Results   Component Value Date/Time    Hemoglobin A1c 5.4 04/07/2022 09:45 AM       2. Stage 3a chronic kidney disease (HCC)  -     METABOLIC PANEL, COMPREHENSIVE; Future    3. Primary hypertension  -     LIPID PANEL; Future  -     METABOLIC PANEL, COMPREHENSIVE; Future  -at goal for age    3. Dyslipidemia  -     LIPID PANEL; Future  -     METABOLIC PANEL, COMPREHENSIVE; Future    5. Severe Alzheimer's dementia with mood disturbance, unspecified timing of dementia onset ([facility]  -advanced    6. Chronic pain syndrome  -     HYDROcodone-acetaminophen (NORCO) 5-325 mg per tablet; Take 1 Tablet by mouth every eight (8) hours as needed for Pain for up to 30 days. Max Daily Amount: 3 Tablets.  -cont tid    7. Frequent falls  8. Opioid use, unspecified with unspecified opioid-induced disorder   -no changes      Lab Results   Component Value Date/Time    Cholesterol, total 215 (H) 04/07/2022 09:45 AM    HDL Cholesterol 79 04/07/2022 09:45 AM    LDL, calculated 114.2 (H) 04/07/2022 09:45 AM    Triglyceride 109 04/07/2022 09:45 AM    CHOL/HDL Ratio 2.7 04/07/2022 09:45 AM     Lab Results   Component Value Date/Time    Hemoglobin A1c 5.4 04/07/2022 09:45 AM    Hemoglobin A1c 5.4 05/12/2021 10:43 AM    Hemoglobin A1c 5.6 11/24/2020 11:00 AM    Microalb/Creat ratio (ug/mg creat.) 133.1 (H) 04/16/2018 09:16 AM    LDL, calculated 114.2 (H) 04/07/2022 09:45 AM    Creatinine (POC) 0.9 11/12/2012 07:33 AM    Creatinine 1.16 (H) 10/01/2022 11:54 AM          Discussed with patient goal of Diabetes to include:  HgA1C <7, LDL cholesterol <100, Blood pressure <140/80. Discussed with patient diet and weight management and to get regular exercise. Recommend yearly eye exams and daily foot care. The patient understands and agrees with the plan. I have discussed the diagnosis with the patient and the intended plan as seen in the above orders. The patient has received an after-visit summary and questions were answered concerning future plans. Medication Side Effects and Warnings were discussed with patient  Patient Labs were reviewed and or requested  Patient Past Records were reviewed and or requested    Cliff Scherer M.D. [address] [facility]    There are no Patient Instructions on file for this visit.

## 2022-10-10 NOTE — PROGRESS NOTES
Chief Complaint   Patient presents with    Diabetes    Hypertension    Labs     Fasting today    Back Pain     Worse at night    Fall     [facility]  ED 10/1/22 Imaging done     1. Have you been to the ER, urgent care clinic since your last visit? Hospitalized since your last visit? [facility]  ED 10/1/22 Imaging done    2. Have you seen or consulted any other health care providers outside of the [address] [place] [address] since your last visit? Include any pap smears or colon screening.  No

## 2022-10-12 LAB
ALBUMIN SERPL-MCNC: 4.2 G/DL (ref 3.6–4.6)
ALBUMIN/CREAT UR: 241 MG/G CREAT (ref 0–29)
ALBUMIN/GLOB SERPL: 1.4 {RATIO} (ref 1.2–2.2)
ALP SERPL-CCNC: 157 IU/L (ref 44–121)
ALT SERPL-CCNC: 8 IU/L (ref 0–32)
AST SERPL-CCNC: 23 IU/L (ref 0–40)
BASOPHILS # BLD AUTO: 0 X10E3/UL (ref 0–0.2)
BASOPHILS NFR BLD AUTO: 1 %
BILIRUB SERPL-MCNC: 0.6 MG/DL (ref 0–1.2)
BUN SERPL-MCNC: 20 MG/DL (ref 8–27)
BUN/CREAT SERPL: 17 (ref 12–28)
CALCIUM SERPL-MCNC: 9.9 MG/DL (ref 8.7–10.3)
CHLORIDE SERPL-SCNC: 100 MMOL/L (ref 96–106)
CHOLEST SERPL-MCNC: 215 MG/DL (ref 100–199)
CO2 SERPL-SCNC: 20 MMOL/L (ref 20–29)
CREAT SERPL-MCNC: 1.17 MG/DL (ref 0.57–1)
CREAT UR-MCNC: 112.3 MG/DL
EGFR: 45 ML/MIN/1.73
EOSINOPHIL # BLD AUTO: 0 X10E3/UL (ref 0–0.4)
EOSINOPHIL NFR BLD AUTO: 1 %
ERYTHROCYTE [DISTWIDTH] IN BLOOD BY AUTOMATED COUNT: 12.1 % (ref 11.7–15.4)
EST. AVERAGE GLUCOSE BLD GHB EST-MCNC: 111 MG/DL
GLOBULIN SER CALC-MCNC: 2.9 G/DL (ref 1.5–4.5)
GLUCOSE SERPL-MCNC: 112 MG/DL (ref 70–99)
HBA1C MFR BLD: 5.5 % (ref 4.8–5.6)
HCT VFR BLD AUTO: 36.6 % (ref 34–46.6)
HDLC SERPL-MCNC: 77 MG/DL
HGB BLD-MCNC: 12.2 G/DL (ref 11.1–15.9)
IMM GRANULOCYTES # BLD AUTO: 0 X10E3/UL (ref 0–0.1)
IMM GRANULOCYTES NFR BLD AUTO: 0 %
IMP & REVIEW OF LAB RESULTS: NORMAL
INTERPRETATION: NORMAL
LDLC SERPL CALC-MCNC: 122 MG/DL (ref 0–99)
LYMPHOCYTES # BLD AUTO: 1.3 X10E3/UL (ref 0.7–3.1)
LYMPHOCYTES NFR BLD AUTO: 24 %
MCH RBC QN AUTO: 31.9 PG (ref 26.6–33)
MCHC RBC AUTO-ENTMCNC: 33.3 G/DL (ref 31.5–35.7)
MCV RBC AUTO: 96 FL (ref 79–97)
MICROALBUMIN UR-MCNC: 270.9 UG/ML
MONOCYTES # BLD AUTO: 0.3 X10E3/UL (ref 0.1–0.9)
MONOCYTES NFR BLD AUTO: 5 %
NEUTROPHILS # BLD AUTO: 3.6 X10E3/UL (ref 1.4–7)
NEUTROPHILS NFR BLD AUTO: 69 %
PLATELET # BLD AUTO: 209 X10E3/UL (ref 150–450)
POTASSIUM SERPL-SCNC: 4.9 MMOL/L (ref 3.5–5.2)
PROT SERPL-MCNC: 7.1 G/DL (ref 6–8.5)
RBC # BLD AUTO: 3.82 X10E6/UL (ref 3.77–5.28)
SODIUM SERPL-SCNC: 141 MMOL/L (ref 134–144)
TRIGL SERPL-MCNC: 94 MG/DL (ref 0–149)
TSH SERPL DL<=0.005 MIU/L-ACNC: 2.18 UIU/ML (ref 0.45–4.5)
VLDLC SERPL CALC-MCNC: 16 MG/DL (ref 5–40)
WBC # BLD AUTO: 5.2 X10E3/UL (ref 3.4–10.8)

## 2022-10-12 NOTE — PROGRESS NOTES
Patient's labs are good and stable. No changes to medical regimen. Please encourage patient (if appropriate) to sign up for Mychart and text them the link if needed.     Dr. Katharine Kumar

## 2022-10-13 NOTE — PROGRESS NOTES
A letter was sent to the patient at the address on file, with lab results and Dr. Ezzard Blizzard recommendations for the patient. Detail Level: Simple

## 2022-11-23 ENCOUNTER — TELEPHONE (OUTPATIENT)
Dept: FAMILY MEDICINE CLINIC | Age: 87
End: 2022-11-23

## 2022-11-23 DIAGNOSIS — G89.4 CHRONIC PAIN SYNDROME: ICD-10-CM

## 2022-11-23 RX ORDER — HYDROCODONE BITARTRATE AND ACETAMINOPHEN 5; 325 MG/1; MG/1
1 TABLET ORAL
Qty: 90 TABLET | Refills: 0 | Status: SHIPPED | OUTPATIENT
Start: 2022-11-23 | End: 2022-12-23

## 2022-11-23 NOTE — TELEPHONE ENCOUNTER
Estefania Ofelia called. Would like Dr. Ken Eaton to call him just for a minute regarding wife's abdominal pain. I spoke to . States abdominal pain she has always had, is getting worse at night. He was very unspecific and states he just wanted to talk to Dr. Ken Eaton for a minute.  He could be reached at [phone] [facility] Phone)    He states patient needs more hydrocodone sent to the [facility] delivery pharmacy

## 2022-11-23 NOTE — TELEPHONE ENCOUNTER
Called and spoke with . Confirmed RX has been sent to be delivered. Malena Lopez verbalized understanding.

## 2022-12-21 RX ORDER — FUROSEMIDE 40 MG/1
TABLET ORAL
Qty: 90 TABLET | Refills: 3 | Status: SHIPPED | OUTPATIENT
Start: 2022-12-21

## 2022-12-22 RX ORDER — MEMANTINE HYDROCHLORIDE 5 MG/1
TABLET ORAL
Qty: 90 TABLET | Refills: 0 | Status: SHIPPED | OUTPATIENT
Start: 2022-12-22

## 2022-12-27 ENCOUNTER — TELEPHONE (OUTPATIENT)
Dept: FAMILY MEDICINE CLINIC | Age: 87
End: 2022-12-27

## 2022-12-27 ENCOUNTER — NURSE TRIAGE (OUTPATIENT)
Dept: OTHER | Facility: CLINIC | Age: 87
End: 2022-12-27

## 2022-12-27 NOTE — TELEPHONE ENCOUNTER
----- Message from [facility] sent at 12/27/2022  4:27 PM EST -----  Subject: Appointment Request    Reason for Call: Established Patient Appointment needed: Semi-Urgent   Return from RN Triage    QUESTIONS    Reason for appointment request? No appointments available during search     Additional Information for Provider? Patient's  Omar Grant was   transferred from [facility] with a disposition for the patient to be seen in   office in 3 days for pain on the sides of tongue and a sore throat. No   appointments were available.  Leena contact patient to further assist.   ---------------------------------------------------------------------------  --------------  [address] [facility]  [phone]; OK to leave message on voicemail  ---------------------------------------------------------------------------  --------------  SCRIPT ANSWERS  COVID Screen: Diamond Strickland

## 2022-12-27 NOTE — TELEPHONE ENCOUNTER
Location of patient: [place]    Received call from ROJAS Thomson at [facility] with [employer]. Subjective: Caller () states sore tongue and places on gums sore,red areas on tongue but answers questions very vaguely, is not specific    Denies ulcerations or white spots   Sore throat   Mild tongue swelling only when asked   Drinking enough fluids     Current Symptoms: sore throat and sides of tongue painful    Onset:2-3 days ago     Associated Symptoms: NA    Pain Severity: 7/10 tongue    Temperature: Denies    What has been tried: nothing     LMP: NA Pregnant: No    Recommended disposition: See PCP within 3 Days    Care advice provided, patient verbalizes understanding; denies any other questions or concerns; instructed to call back for any new or worsening symptoms. Patient/Caller agrees with recommended disposition; writer provided warm transfer to [address] at [facility] for appointment scheduling    Attention Provider: Thank you for allowing me to participate in the care of your patient. The patient was connected to triage in response to information provided to the ECC. Please do not respond through this encounter as the response is not directed to a shared pool.     Reason for Disposition   [1] MILD-MODERATE mouth pain AND [2] present > 3 days    Protocols used: Mouth Pain-ADULT-AH

## 2022-12-27 NOTE — TELEPHONE ENCOUNTER
Returned call to  Charla Lynnette. Sore throat, sores on tongue.  Appt flex with Dr. Isai Rosas for Thursday 12/29/2022

## 2022-12-29 ENCOUNTER — OFFICE VISIT (OUTPATIENT)
Dept: FAMILY MEDICINE CLINIC | Age: 87
End: 2022-12-29
Payer: MEDICARE

## 2022-12-29 VITALS
TEMPERATURE: 97.6 F | BODY MASS INDEX: 17.28 KG/M2 | WEIGHT: 114 LBS | RESPIRATION RATE: 14 BRPM | HEIGHT: 68 IN | HEART RATE: 83 BPM | SYSTOLIC BLOOD PRESSURE: 119 MMHG | OXYGEN SATURATION: 96 % | DIASTOLIC BLOOD PRESSURE: 69 MMHG

## 2022-12-29 DIAGNOSIS — J34.89 SINUS DRAINAGE: Primary | ICD-10-CM

## 2022-12-29 PROCEDURE — 1101F PT FALLS ASSESS-DOCD LE1/YR: CPT | Performed by: FAMILY MEDICINE

## 2022-12-29 PROCEDURE — 1123F ACP DISCUSS/DSCN MKR DOCD: CPT | Performed by: FAMILY MEDICINE

## 2022-12-29 PROCEDURE — G0463 HOSPITAL OUTPT CLINIC VISIT: HCPCS | Performed by: FAMILY MEDICINE

## 2022-12-29 PROCEDURE — G8419 CALC BMI OUT NRM PARAM NOF/U: HCPCS | Performed by: FAMILY MEDICINE

## 2022-12-29 PROCEDURE — 1090F PRES/ABSN URINE INCON ASSESS: CPT | Performed by: FAMILY MEDICINE

## 2022-12-29 PROCEDURE — G8432 DEP SCR NOT DOC, RNG: HCPCS | Performed by: FAMILY MEDICINE

## 2022-12-29 PROCEDURE — G8536 NO DOC ELDER MAL SCRN: HCPCS | Performed by: FAMILY MEDICINE

## 2022-12-29 PROCEDURE — 99213 OFFICE O/P EST LOW 20 MIN: CPT | Performed by: FAMILY MEDICINE

## 2022-12-29 PROCEDURE — G8427 DOCREV CUR MEDS BY ELIG CLIN: HCPCS | Performed by: FAMILY MEDICINE

## 2022-12-29 RX ORDER — CETIRIZINE HYDROCHLORIDE 10 MG/1
10 TABLET ORAL DAILY
Qty: 30 TABLET | Refills: 2 | Status: SHIPPED | OUTPATIENT
Start: 2022-12-29

## 2022-12-29 RX ORDER — IPRATROPIUM BROMIDE 42 UG/1
2 SPRAY, METERED NASAL
Qty: 15 ML | Refills: 2 | Status: SHIPPED | OUTPATIENT
Start: 2022-12-29

## 2022-12-29 NOTE — PROGRESS NOTES
Chief Complaint   Patient presents with    Sore Throat     Pt being seen for c/o sore throat  -pt presents with her  who states the sore throat has been going on for 4-5 days  -pt also states that her tounge is bother her   -pt has only treated with her hydrocodone that is on file   -pt  states it gets worse at night     1. Have you been to the ER, urgent care clinic since your last visit? Hospitalized since your last visit? No    2. Have you seen or consulted any other health care providers outside of the [address] [place] [address] since your last visit? Include any pap smears or colon screening.  No    Pt has no other concerns

## 2022-12-29 NOTE — PATIENT INSTRUCTIONS

## 2022-12-29 NOTE — PROGRESS NOTES
Progress Note    she is a 80y.o. year old female who presents for evalution. Subjective:     Patient here with  states she been having a sore throat. She has significant sinus drainage worse at night which she believes is causing her sore throat as well. Ears are feeling a little congested to but not painful. Not taking anything for this currently. Reviewed PmHx, RxHx, FmHx, SocHx, AllgHx and updated and dated in the chart. Review of Systems - negative except as listed above in the HPI    Objective:     Vitals:    12/29/22 0735   BP: 119/69   Pulse: 83   Resp: 14   Temp: 97.6 °F (36.4 °C)   TempSrc: Oral   SpO2: 96%   Weight: 114 lb (51.7 kg)   Height: 5' 8\" (1.727 m)       Current Outpatient Medications   Medication Sig    ipratropium (ATROVENT) 42 mcg (0.06 %) nasal spray 2 Sprays by Both Nostrils route two (2) times daily as needed for Rhinitis. cetirizine (ZYRTEC) 10 mg tablet Take 1 Tablet by mouth daily. memantine (NAMENDA) 5 mg tablet TAKE 1 TABLET BY MOUTH EVERY DAY    furosemide (LASIX) 40 mg tablet TAKE 1 TABLET BY MOUTH EVERY DAY    HYDROcodone-acetaminophen (NORCO) 5-325 mg per tablet Take  by mouth. amLODIPine (NORVASC) 5 mg tablet Take 1 Tablet by mouth every twelve (12) hours. QUEtiapine (SEROquel) 50 mg tablet TAKE 1 TABLET BY MOUTH EVERYDAY AT BEDTIME (Patient not taking: Reported on 12/29/2022)    triamcinolone acetonide (KENALOG) 0.1 % topical cream Apply  to affected area daily. (Patient not taking: No sig reported)    traZODone (DESYREL) 50 mg tablet Take 1 Tab by mouth nightly. (Patient not taking: No sig reported)    potassium chloride SA (MICRO-K) 10 mEq capsule TAKE 2 CAPSULES BY MOUTH EVERY DAY (Patient not taking: No sig reported)    cloNIDine (CATAPRES) 0.3 mg/24 hr APPLY 1 PATCH WEEKLY (Patient not taking: Reported on 12/29/2022)    ondansetron (ZOFRAN ODT) 8 mg disintegrating tablet Take 1 Tab by mouth every eight (8) hours as needed for Nausea. (Patient not taking: No sig reported)    loperamide (IMMODIUM) 2 mg tablet Take 2 mg by mouth four (4) times daily as needed for Diarrhea. (Patient not taking: Reported on 12/29/2022)     No current facility-administered medications for this visit. Physical Examination: Ears - bilateral TM's and external ear canals normal  Mouth - mucous membranes moist, pharynx normal without lesions  Neck - supple, no significant adenopathy  Chest - clear to auscultation, no wheezes, rales or rhonchi, symmetric air entry  Heart - normal rate, regular rhythm, normal S1, S2, no murmurs, rubs, clicks or gallops      Assessment/ Plan:   Diagnoses and all orders for this visit:    1. Sinus drainage  -     ipratropium (ATROVENT) 42 mcg (0.06 %) nasal spray; 2 Sprays by Both Nostrils route two (2) times daily as needed for Rhinitis. -     cetirizine (ZYRTEC) 10 mg tablet; Take 1 Tablet by mouth daily. Follow-up and Dispositions    Return if symptoms worsen or fail to improve. I have discussed the diagnosis with the patient and the intended plan as seen in the above orders. The patient has received an after-visit summary and questions were answered concerning future plans. Pt conveyed understanding of plan.     Medication Side Effects and Warnings were discussed with patient    An electronic signature was used to authenticate this note  Sultana Ochoa,

## 2023-01-09 DIAGNOSIS — M19.90 OSTEOARTHRITIS, UNSPECIFIED OSTEOARTHRITIS TYPE, UNSPECIFIED SITE: Primary | ICD-10-CM

## 2023-01-11 RX ORDER — HYDROCODONE BITARTRATE AND ACETAMINOPHEN 5; 325 MG/1; MG/1
1 TABLET ORAL
Qty: 60 TABLET | Refills: 0 | Status: SHIPPED | OUTPATIENT
Start: 2023-01-11 | End: 2023-01-12 | Stop reason: SDUPTHER

## 2023-01-12 DIAGNOSIS — M19.90 OSTEOARTHRITIS, UNSPECIFIED OSTEOARTHRITIS TYPE, UNSPECIFIED SITE: ICD-10-CM

## 2023-01-12 RX ORDER — HYDROCODONE BITARTRATE AND ACETAMINOPHEN 5; 325 MG/1; MG/1
1 TABLET ORAL
Qty: 60 TABLET | Refills: 0 | Status: SHIPPED | OUTPATIENT
Start: 2023-01-12 | End: 2023-02-11

## 2023-02-02 DIAGNOSIS — M19.90 OSTEOARTHRITIS, UNSPECIFIED OSTEOARTHRITIS TYPE, UNSPECIFIED SITE: ICD-10-CM

## 2023-02-02 RX ORDER — QUETIAPINE FUMARATE 50 MG/1
50 TABLET, FILM COATED ORAL
Qty: 90 TABLET | Refills: 3 | Status: SHIPPED | OUTPATIENT
Start: 2023-02-02

## 2023-02-02 RX ORDER — HYDROCODONE BITARTRATE AND ACETAMINOPHEN 5; 325 MG/1; MG/1
1 TABLET ORAL
Qty: 90 TABLET | Refills: 0 | Status: SHIPPED | OUTPATIENT
Start: 2023-02-02 | End: 2023-03-04

## 2023-03-01 DIAGNOSIS — M19.90 OSTEOARTHRITIS, UNSPECIFIED OSTEOARTHRITIS TYPE, UNSPECIFIED SITE: ICD-10-CM

## 2023-03-01 RX ORDER — HYDROCODONE BITARTRATE AND ACETAMINOPHEN 5; 325 MG/1; MG/1
1 TABLET ORAL
Qty: 90 TABLET | Refills: 0 | Status: SHIPPED | OUTPATIENT
Start: 2023-03-01 | End: 2023-03-31

## 2023-03-22 ENCOUNTER — HOSPITAL ENCOUNTER (INPATIENT)
Age: 88
LOS: 4 days | Discharge: NURSING, REHAB OR LONG-TERM CARE | DRG: 689 | End: 2023-03-26
Attending: STUDENT IN AN ORGANIZED HEALTH CARE EDUCATION/TRAINING PROGRAM | Admitting: FAMILY MEDICINE
Payer: MEDICARE

## 2023-03-22 ENCOUNTER — APPOINTMENT (OUTPATIENT)
Dept: CT IMAGING | Age: 88
DRG: 689 | End: 2023-03-22
Attending: STUDENT IN AN ORGANIZED HEALTH CARE EDUCATION/TRAINING PROGRAM
Payer: MEDICARE

## 2023-03-22 DIAGNOSIS — E11.9 CONTROLLED TYPE 2 DIABETES MELLITUS WITHOUT COMPLICATION, WITHOUT LONG-TERM CURRENT USE OF INSULIN (HCC): ICD-10-CM

## 2023-03-22 DIAGNOSIS — F03.90 DEMENTIA, UNSPECIFIED DEMENTIA SEVERITY, UNSPECIFIED DEMENTIA TYPE, UNSPECIFIED WHETHER BEHAVIORAL, PSYCHOTIC, OR MOOD DISTURBANCE OR ANXIETY (HCC): ICD-10-CM

## 2023-03-22 DIAGNOSIS — N30.01 ACUTE CYSTITIS WITH HEMATURIA: Primary | ICD-10-CM

## 2023-03-22 DIAGNOSIS — R10.9 ABDOMINAL PAIN, UNSPECIFIED ABDOMINAL LOCATION: ICD-10-CM

## 2023-03-22 DIAGNOSIS — R53.81 DEBILITY: ICD-10-CM

## 2023-03-22 DIAGNOSIS — T76.01XA: ICD-10-CM

## 2023-03-22 DIAGNOSIS — E46 PROTEIN-CALORIE MALNUTRITION, UNSPECIFIED SEVERITY (HCC): ICD-10-CM

## 2023-03-22 DIAGNOSIS — Z51.5 PALLIATIVE CARE ENCOUNTER: ICD-10-CM

## 2023-03-22 LAB
ALBUMIN SERPL-MCNC: 3.6 G/DL (ref 3.5–5)
ALBUMIN/GLOB SERPL: 0.8 (ref 1.1–2.2)
ALP SERPL-CCNC: 178 U/L (ref 45–117)
ALT SERPL-CCNC: 14 U/L (ref 12–78)
ANION GAP SERPL CALC-SCNC: 3 MMOL/L (ref 5–15)
APPEARANCE UR: ABNORMAL
AST SERPL-CCNC: 32 U/L (ref 15–37)
BACTERIA URNS QL MICRO: ABNORMAL /HPF
BASOPHILS # BLD: 0 K/UL (ref 0–0.1)
BASOPHILS NFR BLD: 1 % (ref 0–1)
BILIRUB SERPL-MCNC: 0.9 MG/DL (ref 0.2–1)
BILIRUB UR QL: NEGATIVE
BUN SERPL-MCNC: 23 MG/DL (ref 6–20)
BUN/CREAT SERPL: 15 (ref 12–20)
CALCIUM SERPL-MCNC: 10.2 MG/DL (ref 8.5–10.1)
CHLORIDE SERPL-SCNC: 105 MMOL/L (ref 97–108)
CO2 SERPL-SCNC: 30 MMOL/L (ref 21–32)
COLOR UR: ABNORMAL
COMMENT, HOLDF: NORMAL
CREAT SERPL-MCNC: 1.49 MG/DL (ref 0.55–1.02)
DIFFERENTIAL METHOD BLD: NORMAL
EOSINOPHIL # BLD: 0.1 K/UL (ref 0–0.4)
EOSINOPHIL NFR BLD: 1 % (ref 0–7)
EPITH CASTS URNS QL MICRO: ABNORMAL /LPF
ERYTHROCYTE [DISTWIDTH] IN BLOOD BY AUTOMATED COUNT: 13.1 % (ref 11.5–14.5)
GLOBULIN SER CALC-MCNC: 4.5 G/DL (ref 2–4)
GLUCOSE SERPL-MCNC: 123 MG/DL (ref 65–100)
GLUCOSE UR STRIP.AUTO-MCNC: NEGATIVE MG/DL
HCT VFR BLD AUTO: 39.7 % (ref 35–47)
HGB BLD-MCNC: 13.4 G/DL (ref 11.5–16)
HGB UR QL STRIP: ABNORMAL
HYALINE CASTS URNS QL MICRO: ABNORMAL /LPF (ref 0–5)
IMM GRANULOCYTES # BLD AUTO: 0 K/UL (ref 0–0.04)
IMM GRANULOCYTES NFR BLD AUTO: 0 % (ref 0–0.5)
KETONES UR QL STRIP.AUTO: NEGATIVE MG/DL
LEUKOCYTE ESTERASE UR QL STRIP.AUTO: ABNORMAL
LIPASE SERPL-CCNC: 528 U/L (ref 73–393)
LYMPHOCYTES # BLD: 1.7 K/UL (ref 0.8–3.5)
LYMPHOCYTES NFR BLD: 28 % (ref 12–49)
MCH RBC QN AUTO: 32.8 PG (ref 26–34)
MCHC RBC AUTO-ENTMCNC: 33.8 G/DL (ref 30–36.5)
MCV RBC AUTO: 97.1 FL (ref 80–99)
MONOCYTES # BLD: 0.3 K/UL (ref 0–1)
MONOCYTES NFR BLD: 5 % (ref 5–13)
MUCOUS THREADS URNS QL MICRO: ABNORMAL /LPF
NEUTS SEG # BLD: 4 K/UL (ref 1.8–8)
NEUTS SEG NFR BLD: 65 % (ref 32–75)
NITRITE UR QL STRIP.AUTO: NEGATIVE
NRBC # BLD: 0 K/UL (ref 0–0.01)
NRBC BLD-RTO: 0 PER 100 WBC
PH UR STRIP: 5.5 (ref 5–8)
PLATELET # BLD AUTO: 186 K/UL (ref 150–400)
PMV BLD AUTO: 11.5 FL (ref 8.9–12.9)
POTASSIUM SERPL-SCNC: 4.5 MMOL/L (ref 3.5–5.1)
PROT SERPL-MCNC: 8.1 G/DL (ref 6.4–8.2)
PROT UR STRIP-MCNC: 300 MG/DL
RBC # BLD AUTO: 4.09 M/UL (ref 3.8–5.2)
RBC #/AREA URNS HPF: ABNORMAL /HPF (ref 0–5)
SAMPLES BEING HELD,HOLD: NORMAL
SODIUM SERPL-SCNC: 138 MMOL/L (ref 136–145)
SP GR UR REFRACTOMETRY: 1.02 (ref 1–1.03)
UR CULT HOLD, URHOLD: NORMAL
UROBILINOGEN UR QL STRIP.AUTO: 1 EU/DL (ref 0.2–1)
WBC # BLD AUTO: 6.1 K/UL (ref 3.6–11)
WBC URNS QL MICRO: ABNORMAL /HPF (ref 0–4)

## 2023-03-22 PROCEDURE — 65270000029 HC RM PRIVATE

## 2023-03-22 PROCEDURE — 99285 EMERGENCY DEPT VISIT HI MDM: CPT

## 2023-03-22 PROCEDURE — 93005 ELECTROCARDIOGRAM TRACING: CPT

## 2023-03-22 PROCEDURE — 36415 COLL VENOUS BLD VENIPUNCTURE: CPT

## 2023-03-22 PROCEDURE — 87086 URINE CULTURE/COLONY COUNT: CPT

## 2023-03-22 PROCEDURE — 85025 COMPLETE CBC W/AUTO DIFF WBC: CPT

## 2023-03-22 PROCEDURE — 81001 URINALYSIS AUTO W/SCOPE: CPT

## 2023-03-22 PROCEDURE — 83690 ASSAY OF LIPASE: CPT

## 2023-03-22 PROCEDURE — 74011250636 HC RX REV CODE- 250/636

## 2023-03-22 PROCEDURE — 74011000250 HC RX REV CODE- 250: Performed by: STUDENT IN AN ORGANIZED HEALTH CARE EDUCATION/TRAINING PROGRAM

## 2023-03-22 PROCEDURE — 74176 CT ABD & PELVIS W/O CONTRAST: CPT

## 2023-03-22 PROCEDURE — 80053 COMPREHEN METABOLIC PANEL: CPT

## 2023-03-22 PROCEDURE — 74011250636 HC RX REV CODE- 250/636: Performed by: STUDENT IN AN ORGANIZED HEALTH CARE EDUCATION/TRAINING PROGRAM

## 2023-03-22 RX ORDER — ENOXAPARIN SODIUM 100 MG/ML
30 INJECTION SUBCUTANEOUS DAILY
Status: DISCONTINUED | OUTPATIENT
Start: 2023-03-23 | End: 2023-03-23

## 2023-03-22 RX ORDER — SODIUM CHLORIDE 0.9 % (FLUSH) 0.9 %
5-40 SYRINGE (ML) INJECTION AS NEEDED
Status: DISCONTINUED | OUTPATIENT
Start: 2023-03-22 | End: 2023-03-24

## 2023-03-22 RX ORDER — ONDANSETRON 4 MG/1
4 TABLET, ORALLY DISINTEGRATING ORAL
Status: DISCONTINUED | OUTPATIENT
Start: 2023-03-22 | End: 2023-03-26 | Stop reason: HOSPADM

## 2023-03-22 RX ORDER — SODIUM CHLORIDE 0.9 % (FLUSH) 0.9 %
5-40 SYRINGE (ML) INJECTION EVERY 8 HOURS
Status: DISCONTINUED | OUTPATIENT
Start: 2023-03-22 | End: 2023-03-24

## 2023-03-22 RX ORDER — POLYETHYLENE GLYCOL 3350 17 G/17G
17 POWDER, FOR SOLUTION ORAL DAILY PRN
Status: DISCONTINUED | OUTPATIENT
Start: 2023-03-22 | End: 2023-03-26 | Stop reason: HOSPADM

## 2023-03-22 RX ORDER — SODIUM CHLORIDE, SODIUM LACTATE, POTASSIUM CHLORIDE, CALCIUM CHLORIDE 600; 310; 30; 20 MG/100ML; MG/100ML; MG/100ML; MG/100ML
100 INJECTION, SOLUTION INTRAVENOUS CONTINUOUS
Status: DISCONTINUED | OUTPATIENT
Start: 2023-03-22 | End: 2023-03-23

## 2023-03-22 RX ORDER — ONDANSETRON 2 MG/ML
4 INJECTION INTRAMUSCULAR; INTRAVENOUS
Status: DISCONTINUED | OUTPATIENT
Start: 2023-03-22 | End: 2023-03-26 | Stop reason: HOSPADM

## 2023-03-22 RX ORDER — ACETAMINOPHEN 650 MG/1
650 SUPPOSITORY RECTAL
Status: DISCONTINUED | OUTPATIENT
Start: 2023-03-22 | End: 2023-03-26 | Stop reason: HOSPADM

## 2023-03-22 RX ORDER — ACETAMINOPHEN 325 MG/1
650 TABLET ORAL
Status: DISCONTINUED | OUTPATIENT
Start: 2023-03-22 | End: 2023-03-26 | Stop reason: HOSPADM

## 2023-03-22 RX ADMIN — SODIUM CHLORIDE, POTASSIUM CHLORIDE, SODIUM LACTATE AND CALCIUM CHLORIDE 100 ML/HR: 600; 310; 30; 20 INJECTION, SOLUTION INTRAVENOUS at 22:50

## 2023-03-22 RX ADMIN — CEFTRIAXONE 1 G: 1 INJECTION, POWDER, FOR SOLUTION INTRAMUSCULAR; INTRAVENOUS at 19:12

## 2023-03-22 NOTE — ED NOTES
RN spoke with family when family leaving.  RN asked if family was coming back to visit patient later today, family quoted \"I don't think so\"    RN asked if family was coming back tomorrow to visit patient and family quoted \"no\"
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
## 2023-03-22 NOTE — ROUTINE PROCESS
I was called to the ED waiting area by the charge RN to speak to the family of this patient about staying with the patient until the patient could get a room in the ED. The patient was still being registered at this time. I spoke to the 3 family members and asked them to stay, the son stated that he would. He did not. None of the family stayed with the patient.
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
## 2023-03-22 NOTE — ED NOTES
Pt placed in D29 while waiting for open bed. Pt attempting to leave and wander around ED waiting room. Family in [place], unwilling to intervene. Staff attempting to deescalate situation and have pt sit down due to need for pt safety. Staff asked family if they were staying with pt due to severe dementia. Family reports they are \"going to leave\". Charge nurse called Nursing Supervisor to alert of situation. Nursing Supervisor comes to ED to talk with pt and advise not to leave pt. Nursing supervisor gives family her phone number and asks them not to leave until they talk to her due to need for safety. Family agrees.

## 2023-03-22 NOTE — ED TRIAGE NOTES
Patient arrives to the ED via POV with complaints of lower abdominal pain that started two months. Patient's son reports patient began walking down the street prior to coming to ED. Denies nausea and vomiting.

## 2023-03-23 PROBLEM — E43 SEVERE PROTEIN-CALORIE MALNUTRITION (HCC): Status: ACTIVE | Noted: 2023-03-23

## 2023-03-23 LAB
ALBUMIN SERPL-MCNC: 3.6 G/DL (ref 3.5–5)
ALBUMIN/GLOB SERPL: 0.9 (ref 1.1–2.2)
ALP SERPL-CCNC: 168 U/L (ref 45–117)
ALT SERPL-CCNC: 14 U/L (ref 12–78)
AMPHET UR QL SCN: NEGATIVE
ANION GAP SERPL CALC-SCNC: 6 MMOL/L (ref 5–15)
AST SERPL-CCNC: 16 U/L (ref 15–37)
ATRIAL RATE: 86 BPM
BACTERIA SPEC CULT: NORMAL
BARBITURATES UR QL SCN: NEGATIVE
BASOPHILS # BLD: 0 K/UL (ref 0–0.1)
BASOPHILS NFR BLD: 1 % (ref 0–1)
BENZODIAZ UR QL: NEGATIVE
BILIRUB SERPL-MCNC: 0.5 MG/DL (ref 0.2–1)
BUN SERPL-MCNC: 29 MG/DL (ref 6–20)
BUN/CREAT SERPL: 23 (ref 12–20)
CALCIUM SERPL-MCNC: 10.2 MG/DL (ref 8.5–10.1)
CALCULATED R AXIS, ECG10: -37 DEGREES
CALCULATED T AXIS, ECG11: 75 DEGREES
CANNABINOIDS UR QL SCN: NEGATIVE
CC UR VC: NORMAL
CHLORIDE SERPL-SCNC: 108 MMOL/L (ref 97–108)
CK SERPL-CCNC: 113 U/L (ref 26–192)
CO2 SERPL-SCNC: 26 MMOL/L (ref 21–32)
COCAINE UR QL SCN: NEGATIVE
COMMENT, HOLDF: NORMAL
CREAT SERPL-MCNC: 1.27 MG/DL (ref 0.55–1.02)
DIAGNOSIS, 93000: NORMAL
DIFFERENTIAL METHOD BLD: ABNORMAL
DRUG SCRN COMMENT,DRGCM: NORMAL
EOSINOPHIL # BLD: 0 K/UL (ref 0–0.4)
EOSINOPHIL NFR BLD: 0 % (ref 0–7)
ERYTHROCYTE [DISTWIDTH] IN BLOOD BY AUTOMATED COUNT: 13.1 % (ref 11.5–14.5)
GLOBULIN SER CALC-MCNC: 3.8 G/DL (ref 2–4)
GLUCOSE SERPL-MCNC: 121 MG/DL (ref 65–100)
HCT VFR BLD AUTO: 39.2 % (ref 35–47)
HGB BLD-MCNC: 12.9 G/DL (ref 11.5–16)
IMM GRANULOCYTES # BLD AUTO: 0 K/UL (ref 0–0.04)
IMM GRANULOCYTES NFR BLD AUTO: 0 % (ref 0–0.5)
LACTATE SERPL-SCNC: 1.2 MMOL/L (ref 0.4–2)
LYMPHOCYTES # BLD: 1.5 K/UL (ref 0.8–3.5)
LYMPHOCYTES NFR BLD: 33 % (ref 12–49)
MAGNESIUM SERPL-MCNC: 1.8 MG/DL (ref 1.6–2.4)
MCH RBC QN AUTO: 32.7 PG (ref 26–34)
MCHC RBC AUTO-ENTMCNC: 32.9 G/DL (ref 30–36.5)
MCV RBC AUTO: 99.5 FL (ref 80–99)
METHADONE UR QL: NEGATIVE
MONOCYTES # BLD: 0.3 K/UL (ref 0–1)
MONOCYTES NFR BLD: 7 % (ref 5–13)
NEUTS SEG # BLD: 2.8 K/UL (ref 1.8–8)
NEUTS SEG NFR BLD: 59 % (ref 32–75)
NRBC # BLD: 0 K/UL (ref 0–0.01)
NRBC BLD-RTO: 0 PER 100 WBC
OPIATES UR QL: NEGATIVE
PCP UR QL: NEGATIVE
PHOSPHATE SERPL-MCNC: 2.8 MG/DL (ref 2.6–4.7)
PLATELET # BLD AUTO: 159 K/UL (ref 150–400)
PMV BLD AUTO: 11.3 FL (ref 8.9–12.9)
POTASSIUM SERPL-SCNC: 3.2 MMOL/L (ref 3.5–5.1)
PROT SERPL-MCNC: 7.4 G/DL (ref 6.4–8.2)
Q-T INTERVAL, ECG07: 390 MS
QRS DURATION, ECG06: 98 MS
QTC CALCULATION (BEZET), ECG08: 449 MS
RBC # BLD AUTO: 3.94 M/UL (ref 3.8–5.2)
SAMPLES BEING HELD,HOLD: NORMAL
SERVICE CMNT-IMP: NORMAL
SODIUM SERPL-SCNC: 140 MMOL/L (ref 136–145)
VENTRICULAR RATE, ECG03: 80 BPM
WBC # BLD AUTO: 4.7 K/UL (ref 3.6–11)

## 2023-03-23 PROCEDURE — 82550 ASSAY OF CK (CPK): CPT

## 2023-03-23 PROCEDURE — 80307 DRUG TEST PRSMV CHEM ANLYZR: CPT

## 2023-03-23 PROCEDURE — 80053 COMPREHEN METABOLIC PANEL: CPT

## 2023-03-23 PROCEDURE — 74011250637 HC RX REV CODE- 250/637: Performed by: STUDENT IN AN ORGANIZED HEALTH CARE EDUCATION/TRAINING PROGRAM

## 2023-03-23 PROCEDURE — 74011250636 HC RX REV CODE- 250/636

## 2023-03-23 PROCEDURE — 74011250637 HC RX REV CODE- 250/637

## 2023-03-23 PROCEDURE — 74011000250 HC RX REV CODE- 250

## 2023-03-23 PROCEDURE — 94761 N-INVAS EAR/PLS OXIMETRY MLT: CPT

## 2023-03-23 PROCEDURE — 36415 COLL VENOUS BLD VENIPUNCTURE: CPT

## 2023-03-23 PROCEDURE — 97165 OT EVAL LOW COMPLEX 30 MIN: CPT

## 2023-03-23 PROCEDURE — 84100 ASSAY OF PHOSPHORUS: CPT

## 2023-03-23 PROCEDURE — 83735 ASSAY OF MAGNESIUM: CPT

## 2023-03-23 PROCEDURE — 97161 PT EVAL LOW COMPLEX 20 MIN: CPT

## 2023-03-23 PROCEDURE — 99223 1ST HOSP IP/OBS HIGH 75: CPT | Performed by: PHYSICAL MEDICINE & REHABILITATION

## 2023-03-23 PROCEDURE — 99222 1ST HOSP IP/OBS MODERATE 55: CPT | Performed by: FAMILY MEDICINE

## 2023-03-23 PROCEDURE — 85025 COMPLETE CBC W/AUTO DIFF WBC: CPT

## 2023-03-23 PROCEDURE — 65270000029 HC RM PRIVATE

## 2023-03-23 PROCEDURE — 83605 ASSAY OF LACTIC ACID: CPT

## 2023-03-23 RX ORDER — LANOLIN ALCOHOL/MO/W.PET/CERES
3 CREAM (GRAM) TOPICAL
Status: DISCONTINUED | OUTPATIENT
Start: 2023-03-23 | End: 2023-03-26 | Stop reason: HOSPADM

## 2023-03-23 RX ORDER — AMLODIPINE BESYLATE 5 MG/1
5 TABLET ORAL EVERY 12 HOURS
Status: DISCONTINUED | OUTPATIENT
Start: 2023-03-23 | End: 2023-03-26 | Stop reason: HOSPADM

## 2023-03-23 RX ORDER — MAGNESIUM SULFATE HEPTAHYDRATE 40 MG/ML
2 INJECTION, SOLUTION INTRAVENOUS ONCE
Status: COMPLETED | OUTPATIENT
Start: 2023-03-23 | End: 2023-03-23

## 2023-03-23 RX ORDER — HYDROCODONE BITARTRATE AND ACETAMINOPHEN 5; 325 MG/1; MG/1
1 TABLET ORAL
Status: DISCONTINUED | OUTPATIENT
Start: 2023-03-23 | End: 2023-03-23

## 2023-03-23 RX ORDER — POTASSIUM CHLORIDE 750 MG/1
40 TABLET, FILM COATED, EXTENDED RELEASE ORAL
Status: COMPLETED | OUTPATIENT
Start: 2023-03-23 | End: 2023-03-23

## 2023-03-23 RX ORDER — FUROSEMIDE 40 MG/1
40 TABLET ORAL DAILY
Status: DISCONTINUED | OUTPATIENT
Start: 2023-03-23 | End: 2023-03-26

## 2023-03-23 RX ORDER — TRAZODONE HYDROCHLORIDE 50 MG/1
50 TABLET ORAL
Status: DISCONTINUED | OUTPATIENT
Start: 2023-03-23 | End: 2023-03-26 | Stop reason: HOSPADM

## 2023-03-23 RX ORDER — HYDROCODONE BITARTRATE AND ACETAMINOPHEN 5; 325 MG/1; MG/1
1 TABLET ORAL
Status: DISCONTINUED | OUTPATIENT
Start: 2023-03-23 | End: 2023-03-26 | Stop reason: HOSPADM

## 2023-03-23 RX ORDER — HALOPERIDOL 5 MG/ML
2 INJECTION INTRAMUSCULAR
Status: DISPENSED | OUTPATIENT
Start: 2023-03-23 | End: 2023-03-23

## 2023-03-23 RX ORDER — HALOPERIDOL 5 MG/ML
2 INJECTION INTRAMUSCULAR
Status: COMPLETED | OUTPATIENT
Start: 2023-03-23 | End: 2023-03-23

## 2023-03-23 RX ORDER — CEFDINIR 300 MG/1
300 CAPSULE ORAL EVERY 12 HOURS
Status: DISCONTINUED | OUTPATIENT
Start: 2023-03-23 | End: 2023-03-24

## 2023-03-23 RX ORDER — MEMANTINE HYDROCHLORIDE 10 MG/1
5 TABLET ORAL DAILY
Status: DISCONTINUED | OUTPATIENT
Start: 2023-03-23 | End: 2023-03-26 | Stop reason: HOSPADM

## 2023-03-23 RX ORDER — QUETIAPINE FUMARATE 25 MG/1
50 TABLET, FILM COATED ORAL
Status: DISCONTINUED | OUTPATIENT
Start: 2023-03-23 | End: 2023-03-24

## 2023-03-23 RX ADMIN — HALOPERIDOL LACTATE 2 MG: 5 INJECTION, SOLUTION INTRAMUSCULAR at 19:55

## 2023-03-23 RX ADMIN — HALOPERIDOL LACTATE 2 MG: 5 INJECTION, SOLUTION INTRAMUSCULAR at 21:08

## 2023-03-23 RX ADMIN — POTASSIUM CHLORIDE 40 MEQ: 750 TABLET, EXTENDED RELEASE ORAL at 08:07

## 2023-03-23 RX ADMIN — Medication 3 MG: at 08:06

## 2023-03-23 RX ADMIN — QUETIAPINE FUMARATE 50 MG: 25 TABLET ORAL at 01:03

## 2023-03-23 RX ADMIN — POTASSIUM CHLORIDE 40 MEQ: 750 TABLET, EXTENDED RELEASE ORAL at 05:58

## 2023-03-23 RX ADMIN — MAGNESIUM SULFATE HEPTAHYDRATE 2 G: 40 INJECTION, SOLUTION INTRAVENOUS at 04:59

## 2023-03-23 RX ADMIN — HYDROCODONE BITARTRATE AND ACETAMINOPHEN 1 TABLET: 5; 325 TABLET ORAL at 17:03

## 2023-03-23 RX ADMIN — AMLODIPINE BESYLATE 5 MG: 5 TABLET ORAL at 01:32

## 2023-03-23 RX ADMIN — MEMANTINE 5 MG: 10 TABLET ORAL at 10:36

## 2023-03-23 RX ADMIN — AMLODIPINE BESYLATE 5 MG: 5 TABLET ORAL at 10:36

## 2023-03-23 RX ADMIN — SODIUM CHLORIDE, PRESERVATIVE FREE 10 ML: 5 INJECTION INTRAVENOUS at 11:06
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
## 2023-03-23 NOTE — PROGRESS NOTES
Physician Progress Note      Shayy Pathak  CSN #:                  [id number]  :                       1933  ADMIT DATE:       3/22/2023 3:00 PM  DISCH DATE:  RESPONDING  PROVIDER #:        Basilio CARRILLO DO          QUERY TEXT:    Pt admitted with UTI and has malnutrition documented in H&P with 3/23 RD assessment noting \"severe malnutrition. \" Please further specify type of malnutrition with documentation in the medical record. The medical record reflects the following:  Risk Factors: advanced age, dementia, CKD, DM  Clinical Indicators: H&P notes malnutrition with BMI 17.94  - 3/23 RD- Malnutrition Status:  Severe malnutrition (23 1311)  Context:  Social/environmental circumstances  Findings of the 6 clinical characteristics of malnutrition:  Body Fat Loss:  Severe body fat loss, Buccal region, Orbital  Muscle Mass Loss:  Severe muscle mass loss, Clavicles (pectoralis &deltoids), Temples (temporalis)  Treatment: RD assessment, ONS with lunch and dinner, monitoring    ASPEN Criteria:  https://aspenjournals. onlinelibrary. hartman. com/doi/full/10.1177/1481340438328094    Thank you,    Tobi Tsai RN  CDI  Options provided:  -- Mild Malnutrition  -- Moderate Malnutrition  -- Severe Malnutrition  -- Mild Protein calorie malnutrition  -- Moderate Protein calorie malnutrition  -- Severe Protein calorie malnutrition  -- Other - I will add my own diagnosis  -- Disagree - Not applicable / Not valid  -- Disagree - Clinically unable to determine / Unknown  -- Refer to Clinical Documentation Reviewer    PROVIDER RESPONSE TEXT:    This patient has severe malnutrition.     Query created by: Carri Villeda on 3/23/2023 2:38 PM      Electronically signed by:  Lluvia Summers DO 3/23/2023 2:41 PM

## 2023-03-23 NOTE — PROGRESS NOTES
1100: Patient arrived to floor from ED. Patient alert and oriented x1, which is baseline d/t her dementia. Admission documentation incomplete d/t above AMS. 1245: IV infiltrated- RN removed. Patient refused to allow RN to place another IV. Will try again later. MD made aware.
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
## 2023-03-23 NOTE — ACP (ADVANCE CARE PLANNING)
Advance Care Planning:  Advance Care Planning 9/6/2018   Patient's Healthcare Decision Maker is: Legal Next of Steven 69   Primary Decision Maker Name Alea Canales   Primary Decision Maker Phone Number [phone]   Primary Decision Maker Relationship to Patient Spouse   Confirm Advance Directive None   Patient Would Like to Complete Advance Directive Unable   Does the patient have other document types Do Not Resuscitate       Pt does not have AMD on file, is currently unable to complete. In absence of verified Medical POA,  Alea Canales is legal NOK and surrogate decision maker. Pt has $POA in place, as well as a DDNR dated 8/24/2018.
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
## 2023-03-23 NOTE — PROGRESS NOTES
Palliative Medicine      Code Status: DNR    Advance Care Planning:  Advance Care Planning 9/6/2018   Patient's Healthcare Decision Maker is: Legal Next of Steven 69   Primary Decision Maker Name Geoff Arrington   Primary Decision Maker Phone Number [phone]   Primary Decision Maker Relationship to Patient Spouse   Confirm Advance Directive None   Patient Would Like to Complete Advance Directive Unable   Does the patient have other document types Do Not Resuscitate       Patient / Family Encounter Documentation    Participants (names): Pt,  Andrew Esteves by phone, Palliative Medicine (Dr. Solitario Johnson, Fidel Hallman)    Narrative:  Pt was awake in bed with sitters at bedside, no family currently present. Pt was talkative, confused, spoke of her  Andrew Esteves, was unable to engage in lucid conversation re: her medical condition. Palliative team spoke with  by phone. Pt and  have been  almost 36 yrs, pt has 4 children from a prior marriage,  has 2. All live locally but  indicated the children aren't very involved (\"we hardly ever see any of them\").  reports pt is typically able to care for herself, does have hx of wandering but only goes as far as the neighbor's house, stated pt kept walking further down the street yesterday after \"fussing\" with her dtr Sheila Walker. Pt does not have AMD on file, is currently unable to complete. In absence of verified Medical POA,  Geoff Arrington is legal NOK and surrogate decision maker. Pt has $POA in place, as well as a DDNR dated 8/24/2018. Psychosocial Issues Identified/ Resilience Factors:   indicated family support is limited but reports pt's care needs are usually not too difficult to manage at home. No spiritual concerns identified at this time; review of chart indicates pt previously found comfort in a [place], enjoyed the [employer].       Caregiver [place]: Low,  reports pt is typically able to largely care for herself though does need supervision, does not drive  Does the caregiver feel confident administering medication? Pt has been able to self administer meds at home. Does the caregiver need any help connecting with community resources? Not at this time  Does the caregiver feel confident assisting with activities of daily living?  reports pt was able to perform own ADLs    Goals of Care / Plan:  stated he was told pt would need to be placed in a facility but  prefers pt return home once medically ready. Will plan to offer resources/info from [employer] re: wandering/home safety. Will follow for support while pt is inpt. Thank you for including Palliative Medicine in the care of Ms. Gus Kumar.     [facility] BERNARDINO Devine, [facility]-[address] ([phone])
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
## 2023-03-23 NOTE — PROGRESS NOTES
Spoke with patient's  Stella Martel via phone. Discussed UTI and discharge plans. Reviewed PT recommendation for skilled therapy and SNF placement. He states that this is the first time that the patient has ever wandered. He states he has no trouble taking care of her and is not interested in SNF placement at this time. Verbalizes understanding that the medication for agitation in the setting of dementia cannot manage the patient wandering off and that this could potentially worsen moving forward. He verbalizes interest in home health as an alternative.      Merlin Filippo, DO  Family Medicine Resident

## 2023-03-23 NOTE — PROGRESS NOTES
Bedside shift change report given to [facility] Chad (oncoming nurse) by Stef Newton (offgoing nurse). Report included the following information SBAR, MAR, and Recent Results.
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
## 2023-03-23 NOTE — PROGRESS NOTES
[facility]: MED RECONCILIATION    Comments/Recommendations:   Medication reconciliation completed using Rx query fill history. Spoke with spouse who confirmed pharmacy but could not state any medications. Medications added:     none    Medications removed:    Cetirizine  Clonidine patch  Loperamide  Zofran  KCl  Trazodone  Triamcinolone cream     Medications adjusted:    none    Information obtained from: Rx query    Allergies: Angiotensin ii,human; Avelox [moxifloxacin]; and Demerol [meperidine]    Prior to Admission Medications:     Medication Documentation Review Audit       Reviewed by Tobi Russo (Pharmacist) on 03/23/23 at 0950      Medication Sig Documenting Provider Last Dose Status Taking? amLODIPine (NORVASC) 5 mg tablet Take 1 Tablet by mouth every twelve (12) hours. Praveen Gauthier MD  Active Yes   furosemide (LASIX) 40 mg tablet TAKE 1 TABLET BY MOUTH EVERY DAY Praveen Gauthier MD  Active Yes   HYDROcodone-acetaminophen (NORCO) 5-325 mg per tablet Take 1 Tablet by mouth every eight (8) hours as needed for Pain for up to 30 days. Max Daily Amount: 3 Tablets. Praveen Gauthier MD  Active Yes   ipratropium (ATROVENT) 42 mcg (0.06 %) nasal spray 2 Sprays by Both Nostrils route two (2) times daily as needed for Rhinitis. Cielo Romero, DO  Active Yes   memantine (NAMENDA) 5 mg tablet TAKE 1 TABLET BY MOUTH EVERY DAY Praveen Gauthier MD  Active Yes   QUEtiapine (SEROquel) 50 mg tablet Take 1 Tablet by mouth nightly.  Praveen Gauthier MD  Active Yes                    Thank Alona Garber, PharmD, BCPS   Contact: [phone]
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
## 2023-03-24 PROCEDURE — 74011250637 HC RX REV CODE- 250/637: Performed by: STUDENT IN AN ORGANIZED HEALTH CARE EDUCATION/TRAINING PROGRAM

## 2023-03-24 PROCEDURE — 74011250637 HC RX REV CODE- 250/637

## 2023-03-24 PROCEDURE — 94761 N-INVAS EAR/PLS OXIMETRY MLT: CPT

## 2023-03-24 PROCEDURE — 99232 SBSQ HOSP IP/OBS MODERATE 35: CPT | Performed by: STUDENT IN AN ORGANIZED HEALTH CARE EDUCATION/TRAINING PROGRAM

## 2023-03-24 PROCEDURE — 65270000029 HC RM PRIVATE

## 2023-03-24 RX ORDER — QUETIAPINE FUMARATE 100 MG/1
100 TABLET, FILM COATED ORAL
Status: DISCONTINUED | OUTPATIENT
Start: 2023-03-24 | End: 2023-03-25

## 2023-03-24 RX ORDER — TRAZODONE HYDROCHLORIDE 50 MG/1
50 TABLET ORAL
Qty: 30 TABLET | Refills: 0 | Status: SHIPPED | OUTPATIENT
Start: 2023-03-24 | End: 2023-03-26

## 2023-03-24 RX ADMIN — MEMANTINE 5 MG: 10 TABLET ORAL at 08:12

## 2023-03-24 RX ADMIN — AMLODIPINE BESYLATE 5 MG: 5 TABLET ORAL at 21:28

## 2023-03-24 RX ADMIN — Medication 3 MG: at 21:28

## 2023-03-24 RX ADMIN — TRAZODONE HYDROCHLORIDE 50 MG: 50 TABLET ORAL at 21:28

## 2023-03-24 RX ADMIN — CEFDINIR 300 MG: 300 CAPSULE ORAL at 08:12

## 2023-03-24 RX ADMIN — ACETAMINOPHEN 650 MG: 325 TABLET ORAL at 08:12

## 2023-03-24 RX ADMIN — QUETIAPINE FUMARATE 100 MG: 100 TABLET ORAL at 21:28

## 2023-03-24 RX ADMIN — AMLODIPINE BESYLATE 5 MG: 5 TABLET ORAL at 08:12
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
## 2023-03-24 NOTE — PROGRESS NOTES
Bedside and Verbal shift change report given to Jackson Lezama RN (oncoming nurse) by Lorie Bryant (offgoing nurse). Report included the following information SBAR, Kardex, ED Summary, MAR, Recent Results, and Med Rec Status.
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
## 2023-03-24 NOTE — PROGRESS NOTES
Bedside and Verbal shift change report given to Starlette Leyden, RN (oncoming nurse) by Alvina Knowles. Lina Garcia (offgoing nurse). Report included the following information SBAR, Kardex, Intake/Output, MAR, Recent Results, and Med Rec Status.

## 2023-03-24 NOTE — PROGRESS NOTES
Problem: Falls - Risk of  Goal: *Absence of Falls  Description: Document [address] Fall Risk and appropriate interventions in the flowsheet. Outcome: Progressing Towards Goal  Note: Fall Risk Interventions:                                Problem: Patient Education: Go to Patient Education Activity  Goal: Patient/Family Education  Outcome: Progressing Towards Goal     Problem: Pressure Injury - Risk of  Goal: *Prevention of pressure injury  Description: Document Oumar Scale and appropriate interventions in the flowsheet.   Outcome: Progressing Towards Goal  Note: Pressure Injury Interventions:  Sensory Interventions: Assess changes in LOC, Check visual cues for pain    Moisture Interventions: Absorbent underpads    Activity Interventions: Increase time out of bed, Pressure redistribution bed/mattress(bed type)    Mobility Interventions: Assess need for specialty bed, HOB 30 degrees or less    Nutrition Interventions: Document food/fluid/supplement intake    Friction and Shear Interventions: Apply protective barrier, creams and emollients, Lift sheet, Minimize layers                Problem: Patient Education: Go to Patient Education Activity  Goal: Patient/Family Education  Outcome: Progressing Towards Goal     Problem: Nutrition Deficit  Goal: *Optimize nutritional status  Outcome: Progressing Towards Goal

## 2023-03-24 NOTE — PROGRESS NOTES
Patient is insisting to get out of bed by herself and is hitting the sitter, refused all PO meds. Given Haldol 2mg IM as ordered as pt. Is very high risk for fall and cannot be redirected. Fam med informed. 2015  Called code [place] as pt keeps walking in the hallway and is very confused, hitting everyone who is assisting her. MD at bedside and will put order for addtl dose of Haldol PRN. Awaiting orders. 2036  Gaurang, pt, is redirected to bed and spoke with her  , calm and lying in bed. Sitter at bedside. Will observe pt.

## 2023-03-24 NOTE — PROGRESS NOTES
Rapid Called at 2021    Responded to RRT at 2021 for Altered mental status    Provider at bedside: yes    Interventions ordered: Other (Comment) psychotic medication    Sepsis Suspected: no    Transfer to Higher Level of Care: NO    Pt was wandering in the office area, and threatens hitting people. Security is at bedside guiding pt to bed. MD is also at bedside ordering meds.      Visit Vitals  /73 (BP 1 Location: Right upper arm, BP Patient Position: Sitting)   Pulse 91   Temp 97.4 °F (36.3 °C)   Resp 18   Ht 5' 8\" (1.727 m)   Wt 54.1 kg (119 lb 3.2 oz)   SpO2 99%   BMI 18.12 kg/m²        Rapid Ended at 2030        Mariam Cook RN

## 2023-03-24 NOTE — PROGRESS NOTES
3/24/2023   CARE MANAGEMENT NOTE: CM reviewed EMR and handoff received from ER  Gerri Henao) as well as covering CM yesterday Nikolai Gutierrez). Pt was admitted with acute cystitis with hematuria. Reportedly, pt resides with her  Jamar Gould (c. [phone]). RUR 12%    Transition Plan of Care:  PT/OT evals complete; pt ambulated 65 feet on 3/23 and SNF was recommended. CM had lengthy discussion with pt's dtr Alonso Perez ([phone]) and she is agreeable. Referral was sent to [facility] for review. Long term goal is for pt and her  to be placed into LTC at the same facility. Dtr is going to apply for 's Medicaid online. Rapid Covid will be needed   ER nurse made a referral to [address] [place] [address] (report # [phone]). This CM attempted to call with updated dispo however no answer. UAI will be required prior to discharge    CM will continue to follow pt for SNF  BRITT Ponce

## 2023-03-25 ENCOUNTER — APPOINTMENT (OUTPATIENT)
Dept: CT IMAGING | Age: 88
DRG: 689 | End: 2023-03-25
Payer: MEDICARE

## 2023-03-25 LAB
ALBUMIN SERPL-MCNC: 3.3 G/DL (ref 3.5–5)
ALBUMIN/GLOB SERPL: 1 (ref 1.1–2.2)
ALP SERPL-CCNC: 152 U/L (ref 45–117)
ALT SERPL-CCNC: 16 U/L (ref 12–78)
AMMONIA PLAS-SCNC: 19 UMOL/L
ANION GAP SERPL CALC-SCNC: 5 MMOL/L (ref 5–15)
APAP SERPL-MCNC: <2 UG/ML (ref 10–30)
AST SERPL-CCNC: 27 U/L (ref 15–37)
BILIRUB SERPL-MCNC: 0.6 MG/DL (ref 0.2–1)
BUN SERPL-MCNC: 23 MG/DL (ref 6–20)
BUN/CREAT SERPL: 22 (ref 12–20)
CALCIUM SERPL-MCNC: 10.1 MG/DL (ref 8.5–10.1)
CHLORIDE SERPL-SCNC: 109 MMOL/L (ref 97–108)
CO2 SERPL-SCNC: 25 MMOL/L (ref 21–32)
CREAT SERPL-MCNC: 1.03 MG/DL (ref 0.55–1.02)
FOLATE SERPL-MCNC: 11 NG/ML (ref 5–21)
GLOBULIN SER CALC-MCNC: 3.3 G/DL (ref 2–4)
GLUCOSE SERPL-MCNC: 90 MG/DL (ref 65–100)
MAGNESIUM SERPL-MCNC: 2 MG/DL (ref 1.6–2.4)
PHOSPHATE SERPL-MCNC: 3.6 MG/DL (ref 2.6–4.7)
POTASSIUM SERPL-SCNC: 4.4 MMOL/L (ref 3.5–5.1)
PROT SERPL-MCNC: 6.6 G/DL (ref 6.4–8.2)
SARS-COV-2 RDRP RESP QL NAA+PROBE: NOT DETECTED
SODIUM SERPL-SCNC: 139 MMOL/L (ref 136–145)
SOURCE, COVRS: NORMAL
TSH SERPL DL<=0.05 MIU/L-ACNC: 1.36 UIU/ML (ref 0.36–3.74)
VIT B12 SERPL-MCNC: 273 PG/ML (ref 193–986)

## 2023-03-25 PROCEDURE — 74011250637 HC RX REV CODE- 250/637: Performed by: STUDENT IN AN ORGANIZED HEALTH CARE EDUCATION/TRAINING PROGRAM

## 2023-03-25 PROCEDURE — 65270000029 HC RM PRIVATE

## 2023-03-25 PROCEDURE — 87635 SARS-COV-2 COVID-19 AMP PRB: CPT

## 2023-03-25 PROCEDURE — 84443 ASSAY THYROID STIM HORMONE: CPT

## 2023-03-25 PROCEDURE — 36415 COLL VENOUS BLD VENIPUNCTURE: CPT

## 2023-03-25 PROCEDURE — 86592 SYPHILIS TEST NON-TREP QUAL: CPT

## 2023-03-25 PROCEDURE — 94761 N-INVAS EAR/PLS OXIMETRY MLT: CPT

## 2023-03-25 PROCEDURE — 82746 ASSAY OF FOLIC ACID SERUM: CPT

## 2023-03-25 PROCEDURE — 99232 SBSQ HOSP IP/OBS MODERATE 35: CPT | Performed by: STUDENT IN AN ORGANIZED HEALTH CARE EDUCATION/TRAINING PROGRAM

## 2023-03-25 PROCEDURE — 80143 DRUG ASSAY ACETAMINOPHEN: CPT

## 2023-03-25 PROCEDURE — 82140 ASSAY OF AMMONIA: CPT

## 2023-03-25 PROCEDURE — 80053 COMPREHEN METABOLIC PANEL: CPT

## 2023-03-25 PROCEDURE — 82607 VITAMIN B-12: CPT

## 2023-03-25 PROCEDURE — 84100 ASSAY OF PHOSPHORUS: CPT

## 2023-03-25 PROCEDURE — 74011250637 HC RX REV CODE- 250/637

## 2023-03-25 PROCEDURE — 83735 ASSAY OF MAGNESIUM: CPT

## 2023-03-25 PROCEDURE — 74011250636 HC RX REV CODE- 250/636

## 2023-03-25 PROCEDURE — 70450 CT HEAD/BRAIN W/O DYE: CPT

## 2023-03-25 RX ORDER — HALOPERIDOL 5 MG/ML
2 INJECTION INTRAMUSCULAR
Status: DISCONTINUED | OUTPATIENT
Start: 2023-03-25 | End: 2023-03-26 | Stop reason: HOSPADM

## 2023-03-25 RX ORDER — HALOPERIDOL 5 MG/ML
2 INJECTION INTRAMUSCULAR
Status: DISCONTINUED | OUTPATIENT
Start: 2023-03-25 | End: 2023-03-25

## 2023-03-25 RX ORDER — DIVALPROEX SODIUM 125 MG/1
125 CAPSULE, COATED PELLETS ORAL 2 TIMES DAILY
Status: DISCONTINUED | OUTPATIENT
Start: 2023-03-25 | End: 2023-03-26 | Stop reason: HOSPADM

## 2023-03-25 RX ADMIN — Medication 3 MG: at 21:47

## 2023-03-25 RX ADMIN — AMLODIPINE BESYLATE 5 MG: 5 TABLET ORAL at 21:47

## 2023-03-25 RX ADMIN — MEMANTINE 5 MG: 10 TABLET ORAL at 09:39

## 2023-03-25 RX ADMIN — DIVALPROEX SODIUM 125 MG: 125 CAPSULE, COATED PELLETS ORAL at 14:26

## 2023-03-25 RX ADMIN — HALOPERIDOL LACTATE 2 MG: 5 INJECTION, SOLUTION INTRAMUSCULAR at 00:35

## 2023-03-25 RX ADMIN — AMLODIPINE BESYLATE 5 MG: 5 TABLET ORAL at 09:39

## 2023-03-25 RX ADMIN — QUETIAPINE FUMARATE 150 MG: 100 TABLET ORAL at 21:47

## 2023-03-25 RX ADMIN — DIVALPROEX SODIUM 125 MG: 125 CAPSULE, COATED PELLETS ORAL at 18:40

## 2023-03-25 RX ADMIN — HALOPERIDOL LACTATE 2 MG: 5 INJECTION, SOLUTION INTRAMUSCULAR at 01:33
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
## 2023-03-25 NOTE — PROGRESS NOTES
Bedside shift change report given to Tarun Fatima (oncoming nurse) by Ninoska Brain (offgoing nurse). Report included the following information SBAR, Kardex, MAR, and Recent Results.

## 2023-03-25 NOTE — PROGRESS NOTES
Bedside and Verbal shift change report given to Susannah (oncoming nurse) by Fiordaliza Batista (offgoing nurse). Report included the following information SBAR, Kardex, Intake/Output, and MAR.

## 2023-03-25 NOTE — PROGRESS NOTES
CM Consult Noted:   10:36 AM- Pt remains on Med. Surg- Weekend CM noted consult for placement- reached out to primary team- pt is stable for discharge, just awaiting placement. CM reviewed chart- reached out to [facility] with [facility]- they have accepted pt but need pt to be sitter free for 24 hours and have a COVID-19 rapid negative. They will also need a UAI. CM spoke with pt's - he is agreeable but wished for CM to reach out to his daughter Michell De Guzman aware and agreeable for pt to go to [address] [place] [address] will call pt's dtr in the morning to confirm final arrangements. Primary team and RN aware.      Kristine West, MSW, [address]

## 2023-03-26 VITALS
DIASTOLIC BLOOD PRESSURE: 78 MMHG | TEMPERATURE: 97.6 F | OXYGEN SATURATION: 94 % | RESPIRATION RATE: 16 BRPM | HEART RATE: 92 BPM | WEIGHT: 119.2 LBS | BODY MASS INDEX: 18.07 KG/M2 | SYSTOLIC BLOOD PRESSURE: 124 MMHG | HEIGHT: 68 IN

## 2023-03-26 LAB
ALBUMIN SERPL-MCNC: 3 G/DL (ref 3.5–5)
ALBUMIN/GLOB SERPL: 0.9 (ref 1.1–2.2)
ALP SERPL-CCNC: 142 U/L (ref 45–117)
ALT SERPL-CCNC: 14 U/L (ref 12–78)
ANION GAP SERPL CALC-SCNC: 2 MMOL/L (ref 5–15)
AST SERPL-CCNC: 24 U/L (ref 15–37)
BASOPHILS # BLD: 0 K/UL (ref 0–0.1)
BASOPHILS NFR BLD: 1 % (ref 0–1)
BILIRUB SERPL-MCNC: 0.4 MG/DL (ref 0.2–1)
BUN SERPL-MCNC: 23 MG/DL (ref 6–20)
BUN/CREAT SERPL: 22 (ref 12–20)
CALCIUM SERPL-MCNC: 9.5 MG/DL (ref 8.5–10.1)
CHLORIDE SERPL-SCNC: 110 MMOL/L (ref 97–108)
CO2 SERPL-SCNC: 27 MMOL/L (ref 21–32)
CREAT SERPL-MCNC: 1.06 MG/DL (ref 0.55–1.02)
DIFFERENTIAL METHOD BLD: ABNORMAL
EOSINOPHIL # BLD: 0.1 K/UL (ref 0–0.4)
EOSINOPHIL NFR BLD: 2 % (ref 0–7)
ERYTHROCYTE [DISTWIDTH] IN BLOOD BY AUTOMATED COUNT: 13.2 % (ref 11.5–14.5)
GLOBULIN SER CALC-MCNC: 3.3 G/DL (ref 2–4)
GLUCOSE SERPL-MCNC: 120 MG/DL (ref 65–100)
HCT VFR BLD AUTO: 33.5 % (ref 35–47)
HGB BLD-MCNC: 11.2 G/DL (ref 11.5–16)
IMM GRANULOCYTES # BLD AUTO: 0 K/UL (ref 0–0.04)
IMM GRANULOCYTES NFR BLD AUTO: 0 % (ref 0–0.5)
LYMPHOCYTES # BLD: 1.8 K/UL (ref 0.8–3.5)
LYMPHOCYTES NFR BLD: 39 % (ref 12–49)
MAGNESIUM SERPL-MCNC: 1.9 MG/DL (ref 1.6–2.4)
MCH RBC QN AUTO: 32.7 PG (ref 26–34)
MCHC RBC AUTO-ENTMCNC: 33.4 G/DL (ref 30–36.5)
MCV RBC AUTO: 97.7 FL (ref 80–99)
MONOCYTES # BLD: 0.3 K/UL (ref 0–1)
MONOCYTES NFR BLD: 6 % (ref 5–13)
NEUTS SEG # BLD: 2.4 K/UL (ref 1.8–8)
NEUTS SEG NFR BLD: 52 % (ref 32–75)
NRBC # BLD: 0 K/UL (ref 0–0.01)
NRBC BLD-RTO: 0 PER 100 WBC
PHOSPHATE SERPL-MCNC: 3.3 MG/DL (ref 2.6–4.7)
PLATELET # BLD AUTO: 161 K/UL (ref 150–400)
PMV BLD AUTO: 11.3 FL (ref 8.9–12.9)
POTASSIUM SERPL-SCNC: 4.1 MMOL/L (ref 3.5–5.1)
PROT SERPL-MCNC: 6.3 G/DL (ref 6.4–8.2)
RBC # BLD AUTO: 3.43 M/UL (ref 3.8–5.2)
RPR SER QL: NONREACTIVE
SODIUM SERPL-SCNC: 139 MMOL/L (ref 136–145)
WBC # BLD AUTO: 4.5 K/UL (ref 3.6–11)

## 2023-03-26 PROCEDURE — 74011250637 HC RX REV CODE- 250/637: Performed by: STUDENT IN AN ORGANIZED HEALTH CARE EDUCATION/TRAINING PROGRAM

## 2023-03-26 PROCEDURE — 36415 COLL VENOUS BLD VENIPUNCTURE: CPT

## 2023-03-26 PROCEDURE — 85025 COMPLETE CBC W/AUTO DIFF WBC: CPT

## 2023-03-26 PROCEDURE — 94761 N-INVAS EAR/PLS OXIMETRY MLT: CPT

## 2023-03-26 PROCEDURE — 80053 COMPREHEN METABOLIC PANEL: CPT

## 2023-03-26 PROCEDURE — 83735 ASSAY OF MAGNESIUM: CPT

## 2023-03-26 PROCEDURE — 74011250637 HC RX REV CODE- 250/637

## 2023-03-26 PROCEDURE — 84100 ASSAY OF PHOSPHORUS: CPT

## 2023-03-26 PROCEDURE — 99238 HOSP IP/OBS DSCHRG MGMT 30/<: CPT | Performed by: STUDENT IN AN ORGANIZED HEALTH CARE EDUCATION/TRAINING PROGRAM

## 2023-03-26 RX ORDER — MEMANTINE HYDROCHLORIDE 5 MG/1
TABLET ORAL
Qty: 90 TABLET | Refills: 0 | Status: SHIPPED | OUTPATIENT
Start: 2023-03-26

## 2023-03-26 RX ORDER — DIVALPROEX SODIUM 125 MG/1
125 CAPSULE, COATED PELLETS ORAL 2 TIMES DAILY
Qty: 60 CAPSULE | Refills: 0 | Status: SHIPPED | OUTPATIENT
Start: 2023-03-26

## 2023-03-26 RX ORDER — QUETIAPINE FUMARATE 100 MG/1
100 TABLET, FILM COATED ORAL
Status: DISCONTINUED | OUTPATIENT
Start: 2023-03-26 | End: 2023-03-26 | Stop reason: HOSPADM

## 2023-03-26 RX ORDER — QUETIAPINE FUMARATE 100 MG/1
100 TABLET, FILM COATED ORAL
Qty: 30 TABLET | Refills: 0 | Status: SHIPPED | OUTPATIENT
Start: 2023-03-26

## 2023-03-26 RX ORDER — QUETIAPINE FUMARATE 150 MG/1
150 TABLET, FILM COATED ORAL
Qty: 30 TABLET | Refills: 0 | Status: CANCELLED | OUTPATIENT
Start: 2023-03-26

## 2023-03-26 RX ADMIN — DIVALPROEX SODIUM 125 MG: 125 CAPSULE, COATED PELLETS ORAL at 08:42

## 2023-03-26 RX ADMIN — AMLODIPINE BESYLATE 5 MG: 5 TABLET ORAL at 08:42

## 2023-03-26 RX ADMIN — MEMANTINE 5 MG: 10 TABLET ORAL at 08:42
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
## 2023-03-26 NOTE — PROGRESS NOTES
Pt was resting at beginning of shift. Pt woke up around midnight and attempted to go the BR. Pt needed to be redirected several times on maintaining safety and preventing falls. Pt took all scheduled meds without incident.

## 2023-03-26 NOTE — PROGRESS NOTES
TRANSFER - OUT REPORT:    Verbal report given to Jade(name) on Tray LAMA Link  being transferred to [address] (unit) for routine progression of care       Report consisted of patients Situation, Background, Assessment and   Recommendations(SBAR). Information from the following report(s) SBAR, Kardex, Intake/Output, and MAR was reviewed with the receiving nurse. Lines:       Opportunity for questions and clarification was provided.
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
## 2023-03-26 NOTE — PROGRESS NOTES
12:01 PM- UAI to be approved in time. CM spoke with Anita Osbornejaqui will fax through [facility] [employer] [facility] once available this afternoon. See JUNIOR note for more details. 11:51 AM- UAI completed. Facility can accept if we can get the UAI approved- CM reaching out to [facility], [place] Supervisors to see if they can assist.     10:44 AM- CM reached out to [place] with [facility]- no answer, HIPPA compliant VM left, waiting on a return phone call. CM working on UAI.      [facility] tentatively arranged for 2:00 PM.     Noah Mcclain, [place], [address]

## 2023-03-26 NOTE — PROGRESS NOTES
Problem: Patient Education: Go to Patient Education Activity  Goal: Patient/Family Education  Outcome: Progressing Towards Goal     Problem: Pressure Injury - Risk of  Goal: *Prevention of pressure injury  Description: Document Oumar Scale and appropriate interventions in the flowsheet.   Outcome: Progressing Towards Goal  Note: Pressure Injury Interventions:  Sensory Interventions: Assess changes in LOC, Avoid rigorous massage over bony prominences, Keep linens dry and wrinkle-free, Maintain/enhance activity level, Minimize linen layers, Monitor skin under medical devices    Moisture Interventions: Apply protective barrier, creams and emollients, Absorbent underpads, Check for incontinence Q2 hours and as needed, Maintain skin hydration (lotion/cream), Minimize layers, Moisture barrier    Activity Interventions: Increase time out of bed, Chair cushion    Mobility Interventions: Chair cushion, Float heels, HOB 30 degrees or less    Nutrition Interventions: Discuss nutritional consult with provider, Offer support with meals,snacks and hydration    Friction and Shear Interventions: Apply protective barrier, creams and emollients, HOB 30 degrees or less, Minimize layers                Problem: Patient Education: Go to Patient Education Activity  Goal: Patient/Family Education  Outcome: Progressing Towards Goal     Problem: Nutrition Deficit  Goal: *Optimize nutritional status  Outcome: Progressing Towards Goal

## 2023-03-26 NOTE — PROGRESS NOTES
Transition of Care Plan to SNF/Rehab    SNF/Rehab Transition:  Patient has been accepted to [facility]  and meets criteria for admission. Patient will transported by [facility] and expected to leave at 2:00 PM     Communication to Patient/Family:  Met with patient and spoke with pt's dtr and they are agreeable to the transition plan. Dtr is informing pt's wife. Communication to SNF/Rehab:  Bedside RN,has been notified to update the transition plan to the facility and call report (phone number [phone])    Nursing Please include all hard scripts for controlled substances, med rec and dc summary, and AVS in packet. Reviewed and confirmed with facility, [facility] can manage the patient care needs for the following:     Jos Christianson with (X) only those applicable:    Medication:  [x]  Medications will be available at the facility  []  IV Antibiotics   [x]  Controlled Substance - hard copy to be sent with patient   []  Weekly Labs   Documents:  [x] Hard RX  [x] MAR  [x] Kardex  [x] AVS  [x]Transfer Summary  [x]Discharge   Equipment:  []  CPAP/BiPAP  []  Wound Vacuum  []  Gar or Urinary Device  []  PICC/Central Line  []  Nebulizer  []  Ventilator   Treatment:  []Isolation (for MRSA, VRE, etc.)  []Surgical Drain Management  []Tracheostomy Care  []Dressing Changes  []Dialysis with transportation and chair time  []PEG Care  []Oxygen  []Daily Weights for Heart Failure   Dietary:  [x]Any diet limitations  []Tube Feedings   []Total Parenteral Management (TPN)   Eligible for Medicaid Long Term Services and Supports  Yes:  [x] Eligible for medical assistance or will become eligible within 180 days and UAI completed. [] Provider/Patient and/or support system has requested screening. [] UAI copy provided to patient or responsible party  [] UAI unavailable at discharge will send once processed to SNF provider.   [] UAI unavailable at discharged mailed to patient  No:   [] Private pay and is not financially eligible for Medicaid within the next 180 days. [] Reside out-of-state. [] A residents of a state owned/operated facility that is licensed  by [address] [employer] [facility] or [facility]  [] Enrollment in [facility] hospice services  [] [address]  [] Patient /Family declines to have screening completed or provide financial information for screening     Financial Resources:  Medicaid    [] Initiated and application pending   [] Full coverage     Advanced Care Plan:  [x]Surrogate Decision Maker of Care  []POA  [x]Communicated Code Status- DDNR    Other     Medicare pt has received, reviewed, and signed 2nd IM letter informing them of their right to appeal the discharge. Signed copy has been placed on pt bedside chart (verbal consent from pt's dtr- pt unable to sign due to mental status). Care Management Interventions  PCP Verified by CM:  Yes  Mode of Transport at Discharge: BLS  Transition of Care Consult (CM Consult): Discharge Planning  MyChart Signup: No  Discharge Durable Medical Equipment: No  Health Maintenance Reviewed: Yes  Physical Therapy Consult: Yes  Occupational Therapy Consult: Yes  Speech Therapy Consult: No  Support Systems: Spouse/Significant Other, Child(shruti)  Confirm Follow Up Transport: Family  The Patient and/or Patient Representative was Provided with a Choice of Provider and Agrees with the Discharge Plan?: Yes  Freedom of Choice List was Provided with Basic Dialogue that Supports the Patient's Individualized Plan of Care/Goals, Treatment Preferences and Shares the Quality Data Associated with the Providers?: Yes  Discharge Location  Patient Expects to be Discharged to[de-identified] [address], MSW, [address]

## 2023-03-27 ENCOUNTER — PATIENT OUTREACH (OUTPATIENT)
Dept: CASE MANAGEMENT | Age: 88
End: 2023-03-27

## 2023-03-27 NOTE — PROGRESS NOTES
Transition of care outreach postponed for 7 days due to patient's discharge to inpatient rehab facility.    D/C to [facility] 3/26/23 follow up 7d
Alert and interactive, no focal deficits

## 2023-03-28 ENCOUNTER — TELEPHONE (OUTPATIENT)
Dept: FAMILY MEDICINE CLINIC | Age: 88
End: 2023-03-28

## 2023-03-28 NOTE — TELEPHONE ENCOUNTER
Form completed and sent to Physician's Office electronically via In pocketfungames messaging for review and signature.     Completed form will be faxed to Maximino at [phone]   Patients  Olamide Medina called and would like a call back regarding some questions he has about patient being in [place].  His number is [phone]

## 2023-03-29 NOTE — TELEPHONE ENCOUNTER
Returned call to  [facility]. He states patient is in a nursing home for rehab, but he is having a difficult time taking care of her. He would like to talk to someone about medicaid and other resources that might help him care for her when she comes home, or if he can not take care of her alone, what other options are there for him and her. Romero's number is [phone]. [facility], Can you please reach out to . Maria Luz Porras? They are both patients of Dr. Guille Scruggs and he has been her only primary care giver for many years. Thank you.

## 2023-03-31 NOTE — PROGRESS NOTES
Physician Progress Note      [email or web address]  [facility] #:                  [id number]  :                       1933  ADMIT DATE:       3/22/2023 3:00 PM  [address] [place] DATE:        3/26/2023 7:12 PM  RESPONDING  PROVIDER #:        Iris CARRILLO DO          QUERY TEXT:    Patient admitted with UTI and malnutrition. Noted documentation of \"Urine culture with normal taya\" in DCS. In order to support the diagnosis of UTI, please include additional clinical indicators in your documentation, or please document if the diagnosis has been ruled out after further study. The medical record reflects the following:  Risk Factors: advanced age, dementia, DM  Clinical Indicators: pt admitted with UTI  - UA with moderate LE and 3+ bacteria  - cx with mixed urogenital taya  - DCS notes: UA with signs of UTI. Patient had suprapubic tenderness on examination. Urine culture with normal taya. S/p Rocephin IV x1. Treatment: UA with cx, rocephin IV, monitoring    Thank you,    Refzia Gallardo RN  CDI  Options provided:  -- UTI present as evidenced by, Please document evidence. -- UTI was ruled out  -- Other - I will add my own diagnosis  -- Disagree - Not applicable / Not valid  -- Disagree - Clinically unable to determine / Unknown  -- Refer to Clinical Documentation Reviewer    PROVIDER RESPONSE TEXT:    UTI was ruled out after study.     Query created by: Myriam Johnson on 3/31/2023 1:30 PM      Electronically signed by:  Flaco June DO 3/31/2023 1:36 PM

## 2023-04-05 ENCOUNTER — PATIENT OUTREACH (OUTPATIENT)
Dept: CASE MANAGEMENT | Age: 88
End: 2023-04-05

## 2023-04-05 NOTE — PROGRESS NOTES
LM at son and spouses number in regards to advising family of questionable placement after discharge, will make second attempt 1 day

## 2023-04-05 NOTE — PROGRESS NOTES
Transition of care outreach postponed for 7 days due to patient's discharge to inpatient rehab facility.    D/C to [facility] 3/26/23 still admitted follow up 7d

## 2023-04-06 ENCOUNTER — PATIENT OUTREACH (OUTPATIENT)
Dept: CASE MANAGEMENT | Age: 88
End: 2023-04-06

## 2023-04-06 NOTE — PROGRESS NOTES
Patients daughter Lashae Triplett returned call and CC spoke to her in regards to after care once her mother is discharged. Paula Govea consulting was given to the daughter and she gave me permission to have Mrs Paula Govea contact her. Wm Monique has a follow up  to discuss with PCP next week and also her stepfather who cannot take care of her mother or himself.   Follow up 7d

## 2023-04-06 NOTE — PROGRESS NOTES
Attempt # 2 LM with son, daughter and home number no my chart ,in regards to a follow up after patient is discharged home from rehab  Patient remains in rehab at this time at [facility], will try to contact family next week.

## 2023-04-20 ENCOUNTER — PATIENT OUTREACH (OUTPATIENT)
Dept: CASE MANAGEMENT | Age: 88
End: 2023-04-20

## 2023-04-27 ENCOUNTER — PATIENT OUTREACH (OUTPATIENT)
Dept: CASE MANAGEMENT | Age: 88
End: 2023-04-27

## 2023-04-30 RX ORDER — DIVALPROEX SODIUM 125 MG/1
125 CAPSULE, COATED PELLETS ORAL 2 TIMES DAILY
COMMUNITY
Start: 2023-03-26

## 2024-02-08 NOTE — MR AVS SNAPSHOT
Visit Information Date & Time Provider Department Dept. Phone Encounter #  
 5/9/2017  7:45 AM Veronika Garza MD [facility] [phone] [id number] Follow-up Instructions Return in about 6 weeks (around 6/20/2017). Upcoming Health Maintenance Date Due  
 MEDICARE YEARLY EXAM 4/14/2017 INFLUENZA AGE 9 TO ADULT 8/1/2017 GLAUCOMA SCREENING Q2Y 11/9/2018 DTaP/Tdap/Td series (2 - Td) 11/9/2026 Allergies as of 5/9/2017  Review Complete On: 5/9/2017 By: Veronika Garza MD  
  
 Severity Noted Reaction Type Reactions Angiotensin Ii,human  04/01/2010    Other (comments) States does not remember Avelox [Moxifloxacin]  04/01/2010    Other (comments) States does not remember Demerol [Meperidine]  04/01/2010    Hives Current Immunizations  Reviewed on 11/9/2016 Name Date Influenza High Dose Vaccine PF 11/9/2016 Influenza Vaccine 10/29/2013 Influenza Vaccine [place]) 10/20/2015 Influenza Vaccine Split 11/7/2012, 10/11/2011, 11/16/2010 Influenza Vaccine Whole 8/1/2009 Pneumococcal Conjugate (PCV-13) 4/13/2016 Pneumococcal Vaccine (Unspecified Type) 4/1/2008 Not reviewed this visit You Were Diagnosed With   
  
 Codes Comments Routine general medical examination at a health care facility    -  Primary ICD-10-CM: Z00.00 ICD-9-CM: V70.0 Controlled type 2 diabetes mellitus without complication, without long-term current use of insulin ([facility].)     ICD-10-CM: E11.9 ICD-9-CM: 250.00 Other pulmonary embolism without acute cor pulmonale, unspecified chronicity (HCC)     ICD-10-CM: I26.99 
ICD-9-CM: 415.19 Essential hypertension     ICD-10-CM: I10 
ICD-9-CM: 401.9 High cholesterol     ICD-10-CM: E78.00 ICD-9-CM: 272.0 Iron deficiency anemia, unspecified iron deficiency anemia type     ICD-10-CM: D50.9 ICD-9-CM: 280. 9 Chronic pain syndrome     ICD-10-CM: G89.4 ICD-9-CM: 338.4 Advanced care planning/counseling discussion     ICD-10-CM: Z71.89 ICD-9-CM: V65.49 Vitals BP Pulse Temp Resp Height(growth percentile) Weight(growth percentile) 135/66 (BP 1 Location: Left arm, BP Patient Position: Sitting) 66 98.7 °F (37.1 °C) (Oral) 18 5' 6\" (1.676 m) 184 lb 5 oz (83.6 kg) SpO2 BMI OB Status Smoking Status 97% 29.75 kg/m2 Postmenopausal Never Smoker Vitals History BMI and BSA Data Body Mass Index Body Surface Area  
 29.75 kg/m 2 1.97 m 2 Preferred Pharmacy Pharmacy Name Phone [phone] [facility] [facility] [address] [place] [address] [phone] Your Updated Medication List  
  
   
This list is accurate as of: 5/9/17  8:06 AM.  Always use your most recent med list.  
  
  
  
  
 acetaminophen-codeine 300-30 mg per tablet Commonly known as:  TYLENOL #3 Take 1 Tab by mouth every four (4) hours as needed for Pain. Max Daily Amount: 6 Tabs. Indications: Pain  
  
 amLODIPine 10 mg tablet Commonly known as:  Karyle Autumn TAKE 1 TABLET BY MOUTH DAILY  
  
 citalopram 20 mg tablet Commonly known as:  CELEXA  
TAKE 1 TABLET BY MOUTH DAILY  
  
 cyclobenzaprine 5 mg tablet Commonly known as:  FLEXERIL  
TAKE 1 TABLET BY MOUTH 3 TIMES A DAY AS NEEDED FORMUSCLE SPASM(S)  
  
 gabapentin 600 mg tablet Commonly known as:  NEURONTIN  
TAKE 1 TABLET BY MOUTH 3 TIMES A DAY  
  
 iron polysaccharides 150 mg iron capsule Commonly known as:  Fran Joselyn Take 1 Cap by mouth two (2) times a day. JANTOVEN 3 mg tablet Generic drug:  warfarin TAKE 1 TABLET BY MOUTH DAILY losartan-hydroCHLOROthiazide 100-25 mg per tablet Commonly known as:  HYZAAR  
TAKE 1 TABLET BY MOUTH DAILY  
  
 naloxone 4 mg/actuation Spry Commonly known as:  NARCAN  
1 Spray by Nasal route as needed. Indications: OPIATE-INDUCED RESPIRATORY DEPRESSION  
  
 naproxen 500 mg tablet Commonly known as:  NAPROSYN  
 Take 1 Tab by mouth two (2) times daily (with meals). We Performed the Following AMB POC PT/INR [73147 CPT(R)] CBC WITH AUTOMATED DIFF [45573 CPT(R)] HEMOGLOBIN A1C WITH EAG [66291 CPT(R)] IRON PROFILE [id number] CPT(R)] LIPID PANEL [30388 CPT(R)] METABOLIC PANEL, COMPREHENSIVE [87856 CPT(R)] MICROALBUMIN, UR, RAND W/ MICROALBUMIN/CREA RATIO [id number] CPT(R)] REFERRAL TO OPHTHALMOLOGY [REF57 Custom] Follow-up Instructions Return in about 6 weeks (around 6/20/2017). Referral Information Referral ID Referred By Referred To  
  
 [id number] LAUREL SINGH Not Available Visits Status Start Date End Date 1 New Request 5/9/17 5/9/18 If your referral has a status of pending review or denied, additional information will be sent to support the outcome of this decision. Introducing [facility]! [facility] introduces [employer] patient portal. Now you can access parts of your medical record, email your doctor's office, and request medication refills online. 1. In your internet browser, go to [email or web address] [place] [employer] 2. Click on the First Time User? Click Here link in the Sign In box. You will see the New Member Sign Up page. 3. Enter your [employer] Access Code exactly as it appears below. You will not need to use this code after youve completed the sign-up process. If you do not sign up before the expiration date, you must request a new code. · [employer] Access Code: [email or web address] Expires: 5/10/2017  9:10 AM 
 
4. Enter the last four digits of your Social Security Number (xxxx) and Date of Birth (mm/dd/yyyy) as indicated and click Submit. You will be taken to the next sign-up page. 5. Create a UMMCt ID. This will be your [employer]MMCt login ID and cannot be changed, so think of one that is secure and easy to remember. 6. Create a [employer] password. You can change your password at any time. 7. Enter your Password Reset Question and Answer. This can be used at a later time if you forget your password. 8. Enter your e-mail address. You will receive e-mail notification when new information is available in [address] 9. Click Sign Up. You can now view and download portions of your medical record. 10. Click the Download Summary menu link to download a portable copy of your medical information. If you have questions, please visit the Frequently Asked Questions section of the [employer] website. Remember, [employer] is NOT to be used for urgent needs. For medical emergencies, dial 911. Now available from your iPhone and Android! Please provide this summary of care documentation to your next provider. Your primary care clinician is listed as LAUREL SINGH. If you have any questions after today's visit, please call [phone]. [phone]

## 2024-10-24 NOTE — TELEPHONE ENCOUNTER
Called faiza Fernandes to start [facility]. Dr. Dyllan Santillan will follow. used as socail 1-2x/m, no more used socially before  1-2x/m, no more

## (undated) DEVICE — KENDALL RADIOLUCENT FOAM MONITORING ELECTRODE -RECTANGULAR SHAPE: Brand: KENDALL

## (undated) DEVICE — SYR 5ML 1/5 GRAD LL NSAF LF --

## (undated) DEVICE — 1200 GUARD II KIT W/5MM TUBE W/O VAC TUBE: Brand: GUARDIAN

## (undated) DEVICE — BASIN EMSIS 16OZ GRAPHITE PLAS KID SHP MOLD GRAD FOR ORAL

## (undated) DEVICE — Device

## (undated) DEVICE — NDL FLTR TIP 5 MIC 18GX1.5IN --

## (undated) DEVICE — BAG BELONG PT PERS CLEAR HANDL

## (undated) DEVICE — SOLIDIFIER MEDC 1200ML -- CONVERT TO 356117

## (undated) DEVICE — NDL PRT INJ NSAF BLNT 18GX1.5 --

## (undated) DEVICE — CATH IV AUTOGRD BC BLU 22GA 25 -- INSYTE

## (undated) DEVICE — SYR 3ML LL TIP 1/10ML GRAD --

## (undated) DEVICE — SET ADMIN 16ML TBNG L100IN 2 Y INJ SITE IV PIGGY BK DISP

## (undated) DEVICE — KIT COLON W/ 1.1OZ LUB AND 2 END